# Patient Record
Sex: FEMALE | Race: WHITE | NOT HISPANIC OR LATINO
[De-identification: names, ages, dates, MRNs, and addresses within clinical notes are randomized per-mention and may not be internally consistent; named-entity substitution may affect disease eponyms.]

---

## 2013-04-03 RX ORDER — LOSARTAN/HYDROCHLOROTHIAZIDE 100MG-25MG
1 TABLET ORAL
Qty: 0 | Refills: 0 | COMMUNITY
Start: 2013-04-03

## 2013-04-03 RX ORDER — PREGABALIN 225 MG/1
1 CAPSULE ORAL
Qty: 0 | Refills: 0 | DISCHARGE
Start: 2013-04-03

## 2017-01-04 ENCOUNTER — APPOINTMENT (OUTPATIENT)
Dept: VASCULAR SURGERY | Facility: CLINIC | Age: 82
End: 2017-01-04

## 2017-01-04 VITALS
HEART RATE: 105 BPM | HEIGHT: 62 IN | WEIGHT: 149 LBS | SYSTOLIC BLOOD PRESSURE: 150 MMHG | TEMPERATURE: 97.6 F | DIASTOLIC BLOOD PRESSURE: 78 MMHG | BODY MASS INDEX: 27.42 KG/M2

## 2017-01-04 DIAGNOSIS — R60.0 LOCALIZED EDEMA: ICD-10-CM

## 2017-01-26 ENCOUNTER — LABORATORY RESULT (OUTPATIENT)
Age: 82
End: 2017-01-26

## 2017-01-26 ENCOUNTER — NON-APPOINTMENT (OUTPATIENT)
Age: 82
End: 2017-01-26

## 2017-01-26 ENCOUNTER — APPOINTMENT (OUTPATIENT)
Dept: CARDIOLOGY | Facility: CLINIC | Age: 82
End: 2017-01-26

## 2017-01-26 VITALS
HEART RATE: 96 BPM | WEIGHT: 147 LBS | DIASTOLIC BLOOD PRESSURE: 66 MMHG | HEIGHT: 62 IN | BODY MASS INDEX: 27.05 KG/M2 | OXYGEN SATURATION: 99 % | SYSTOLIC BLOOD PRESSURE: 120 MMHG

## 2017-01-26 DIAGNOSIS — R07.89 OTHER CHEST PAIN: ICD-10-CM

## 2017-01-26 DIAGNOSIS — K52.9 NONINFECTIVE GASTROENTERITIS AND COLITIS, UNSPECIFIED: ICD-10-CM

## 2017-01-27 ENCOUNTER — NON-APPOINTMENT (OUTPATIENT)
Age: 82
End: 2017-01-27

## 2017-01-27 LAB
25(OH)D3 SERPL-MCNC: 34.6 NG/ML
ALBUMIN SERPL ELPH-MCNC: 4.2 G/DL
ALP BLD-CCNC: 99 U/L
ALT SERPL-CCNC: 13 U/L
ANION GAP SERPL CALC-SCNC: 19 MMOL/L
AST SERPL-CCNC: 19 U/L
BASOPHILS # BLD AUTO: 0.05 K/UL
BASOPHILS NFR BLD AUTO: 0.7 %
BILIRUB SERPL-MCNC: 0.2 MG/DL
BUN SERPL-MCNC: 43 MG/DL
CALCIUM SERPL-MCNC: 10.1 MG/DL
CHLORIDE SERPL-SCNC: 100 MMOL/L
CHOLEST SERPL-MCNC: 224 MG/DL
CHOLEST/HDLC SERPL: 4.7 RATIO
CO2 SERPL-SCNC: 24 MMOL/L
CREAT SERPL-MCNC: 1.68 MG/DL
EOSINOPHIL # BLD AUTO: 0.18 K/UL
EOSINOPHIL NFR BLD AUTO: 2.4 %
FT4I SERPL CALC-MCNC: 7.2 INDEX
GLUCOSE SERPL-MCNC: 88 MG/DL
HBA1C MFR BLD HPLC: 6 %
HCT VFR BLD CALC: 32.6 %
HDLC SERPL-MCNC: 48 MG/DL
HGB BLD-MCNC: 10.2 G/DL
IMM GRANULOCYTES NFR BLD AUTO: 0.3 %
LDLC SERPL CALC-MCNC: 133 MG/DL
LYMPHOCYTES # BLD AUTO: 2.01 K/UL
LYMPHOCYTES NFR BLD AUTO: 27.3 %
MAN DIFF?: NORMAL
MCHC RBC-ENTMCNC: 25.5 PG
MCHC RBC-ENTMCNC: 31.3 GM/DL
MCV RBC AUTO: 81.5 FL
MONOCYTES # BLD AUTO: 0.76 K/UL
MONOCYTES NFR BLD AUTO: 10.3 %
NEUTROPHILS # BLD AUTO: 4.34 K/UL
NEUTROPHILS NFR BLD AUTO: 59 %
PLATELET # BLD AUTO: 357 K/UL
POTASSIUM SERPL-SCNC: 3.8 MMOL/L
PROT SERPL-MCNC: 7.3 G/DL
RBC # BLD: 4 M/UL
RBC # FLD: 13 %
SODIUM SERPL-SCNC: 143 MMOL/L
T3 SERPL-MCNC: 75 NG/DL
T3RU NFR SERPL: 1 INDEX
T4 SERPL-MCNC: 7.2 UG/DL
TRIGL SERPL-MCNC: 217 MG/DL
TSH SERPL-ACNC: 2.77 UIU/ML
WBC # FLD AUTO: 7.36 K/UL

## 2017-02-09 ENCOUNTER — APPOINTMENT (OUTPATIENT)
Dept: GASTROENTEROLOGY | Facility: CLINIC | Age: 82
End: 2017-02-09

## 2017-02-23 ENCOUNTER — APPOINTMENT (OUTPATIENT)
Dept: GASTROENTEROLOGY | Facility: CLINIC | Age: 82
End: 2017-02-23

## 2017-03-09 ENCOUNTER — APPOINTMENT (OUTPATIENT)
Dept: CARDIOLOGY | Facility: CLINIC | Age: 82
End: 2017-03-09

## 2017-03-13 ENCOUNTER — APPOINTMENT (OUTPATIENT)
Dept: VASCULAR SURGERY | Facility: CLINIC | Age: 82
End: 2017-03-13

## 2017-04-06 ENCOUNTER — RX RENEWAL (OUTPATIENT)
Age: 82
End: 2017-04-06

## 2017-06-07 ENCOUNTER — APPOINTMENT (OUTPATIENT)
Dept: VASCULAR SURGERY | Facility: CLINIC | Age: 82
End: 2017-06-07

## 2017-06-07 VITALS
TEMPERATURE: 98.2 F | WEIGHT: 145 LBS | BODY MASS INDEX: 26.68 KG/M2 | HEIGHT: 62 IN | SYSTOLIC BLOOD PRESSURE: 130 MMHG | HEART RATE: 69 BPM | DIASTOLIC BLOOD PRESSURE: 73 MMHG

## 2017-06-20 ENCOUNTER — APPOINTMENT (OUTPATIENT)
Dept: GASTROENTEROLOGY | Facility: CLINIC | Age: 82
End: 2017-06-20

## 2017-09-16 ENCOUNTER — RX RENEWAL (OUTPATIENT)
Age: 82
End: 2017-09-16

## 2017-10-30 ENCOUNTER — APPOINTMENT (OUTPATIENT)
Dept: CARDIOLOGY | Facility: CLINIC | Age: 82
End: 2017-10-30

## 2017-11-28 ENCOUNTER — APPOINTMENT (OUTPATIENT)
Dept: CARDIOLOGY | Facility: CLINIC | Age: 82
End: 2017-11-28

## 2017-12-07 ENCOUNTER — NON-APPOINTMENT (OUTPATIENT)
Age: 82
End: 2017-12-07

## 2017-12-07 ENCOUNTER — APPOINTMENT (OUTPATIENT)
Dept: CARDIOLOGY | Facility: CLINIC | Age: 82
End: 2017-12-07
Payer: MEDICARE

## 2017-12-07 VITALS
DIASTOLIC BLOOD PRESSURE: 80 MMHG | OXYGEN SATURATION: 100 % | WEIGHT: 150 LBS | SYSTOLIC BLOOD PRESSURE: 158 MMHG | BODY MASS INDEX: 27.6 KG/M2 | HEIGHT: 62 IN | HEART RATE: 93 BPM

## 2017-12-07 DIAGNOSIS — I35.9 NONRHEUMATIC AORTIC VALVE DISORDER, UNSPECIFIED: ICD-10-CM

## 2017-12-07 PROCEDURE — 93000 ELECTROCARDIOGRAM COMPLETE: CPT

## 2017-12-07 PROCEDURE — 99215 OFFICE O/P EST HI 40 MIN: CPT

## 2018-03-12 ENCOUNTER — INPATIENT (INPATIENT)
Facility: HOSPITAL | Age: 83
LOS: 3 days | Discharge: HOME | DRG: 309 | End: 2018-03-16
Attending: INTERNAL MEDICINE | Admitting: INTERNAL MEDICINE
Payer: MEDICARE

## 2018-03-12 VITALS
OXYGEN SATURATION: 97 % | SYSTOLIC BLOOD PRESSURE: 118 MMHG | TEMPERATURE: 99 F | HEART RATE: 135 BPM | WEIGHT: 151.9 LBS | RESPIRATION RATE: 18 BRPM | DIASTOLIC BLOOD PRESSURE: 78 MMHG

## 2018-03-12 DIAGNOSIS — R07.89 OTHER CHEST PAIN: ICD-10-CM

## 2018-03-12 DIAGNOSIS — I48.91 UNSPECIFIED ATRIAL FIBRILLATION: ICD-10-CM

## 2018-03-12 DIAGNOSIS — I10 ESSENTIAL (PRIMARY) HYPERTENSION: ICD-10-CM

## 2018-03-12 DIAGNOSIS — R06.09 OTHER FORMS OF DYSPNEA: ICD-10-CM

## 2018-03-12 DIAGNOSIS — Z98.89 OTHER SPECIFIED POSTPROCEDURAL STATES: Chronic | ICD-10-CM

## 2018-03-12 DIAGNOSIS — K21.9 GASTRO-ESOPHAGEAL REFLUX DISEASE WITHOUT ESOPHAGITIS: ICD-10-CM

## 2018-03-12 DIAGNOSIS — G47.00 INSOMNIA, UNSPECIFIED: ICD-10-CM

## 2018-03-12 LAB
ALBUMIN SERPL ELPH-MCNC: 4.3 G/DL — SIGNIFICANT CHANGE UP (ref 3.3–5)
ALP SERPL-CCNC: 119 U/L — SIGNIFICANT CHANGE UP (ref 40–120)
ALT FLD-CCNC: 75 U/L RC — HIGH (ref 10–45)
ANION GAP SERPL CALC-SCNC: 14 MMOL/L — SIGNIFICANT CHANGE UP (ref 5–17)
APTT BLD: 29.4 SEC — SIGNIFICANT CHANGE UP (ref 27.5–37.4)
AST SERPL-CCNC: 39 U/L — SIGNIFICANT CHANGE UP (ref 10–40)
BASOPHILS # BLD AUTO: 0.1 K/UL — SIGNIFICANT CHANGE UP (ref 0–0.2)
BASOPHILS NFR BLD AUTO: 0.8 % — SIGNIFICANT CHANGE UP (ref 0–2)
BILIRUB SERPL-MCNC: 0.2 MG/DL — SIGNIFICANT CHANGE UP (ref 0.2–1.2)
BUN SERPL-MCNC: 25 MG/DL — HIGH (ref 7–23)
CALCIUM SERPL-MCNC: 9.6 MG/DL — SIGNIFICANT CHANGE UP (ref 8.4–10.5)
CHLORIDE SERPL-SCNC: 104 MMOL/L — SIGNIFICANT CHANGE UP (ref 96–108)
CK MB CFR SERPL CALC: 2.4 NG/ML — SIGNIFICANT CHANGE UP (ref 0–3.8)
CK SERPL-CCNC: 65 U/L — SIGNIFICANT CHANGE UP (ref 25–170)
CO2 SERPL-SCNC: 24 MMOL/L — SIGNIFICANT CHANGE UP (ref 22–31)
CREAT SERPL-MCNC: 1.63 MG/DL — HIGH (ref 0.5–1.3)
EOSINOPHIL # BLD AUTO: 0.3 K/UL — SIGNIFICANT CHANGE UP (ref 0–0.5)
EOSINOPHIL NFR BLD AUTO: 3.8 % — SIGNIFICANT CHANGE UP (ref 0–6)
GLUCOSE SERPL-MCNC: 97 MG/DL — SIGNIFICANT CHANGE UP (ref 70–99)
HCT VFR BLD CALC: 38.3 % — SIGNIFICANT CHANGE UP (ref 34.5–45)
HGB BLD-MCNC: 13 G/DL — SIGNIFICANT CHANGE UP (ref 11.5–15.5)
INR BLD: 1.01 RATIO — SIGNIFICANT CHANGE UP (ref 0.88–1.16)
LYMPHOCYTES # BLD AUTO: 2.7 K/UL — SIGNIFICANT CHANGE UP (ref 1–3.3)
LYMPHOCYTES # BLD AUTO: 32.1 % — SIGNIFICANT CHANGE UP (ref 13–44)
MCHC RBC-ENTMCNC: 29.3 PG — SIGNIFICANT CHANGE UP (ref 27–34)
MCHC RBC-ENTMCNC: 33.9 GM/DL — SIGNIFICANT CHANGE UP (ref 32–36)
MCV RBC AUTO: 86.4 FL — SIGNIFICANT CHANGE UP (ref 80–100)
MONOCYTES # BLD AUTO: 1 K/UL — HIGH (ref 0–0.9)
MONOCYTES NFR BLD AUTO: 11.3 % — SIGNIFICANT CHANGE UP (ref 2–14)
NEUTROPHILS # BLD AUTO: 4.4 K/UL — SIGNIFICANT CHANGE UP (ref 1.8–7.4)
NEUTROPHILS NFR BLD AUTO: 52.1 % — SIGNIFICANT CHANGE UP (ref 43–77)
NT-PROBNP SERPL-SCNC: 8711 PG/ML — HIGH (ref 0–300)
PLATELET # BLD AUTO: 241 K/UL — SIGNIFICANT CHANGE UP (ref 150–400)
POTASSIUM SERPL-MCNC: 3.9 MMOL/L — SIGNIFICANT CHANGE UP (ref 3.5–5.3)
POTASSIUM SERPL-SCNC: 3.9 MMOL/L — SIGNIFICANT CHANGE UP (ref 3.5–5.3)
PROT SERPL-MCNC: 7.9 G/DL — SIGNIFICANT CHANGE UP (ref 6–8.3)
PROTHROM AB SERPL-ACNC: 11 SEC — SIGNIFICANT CHANGE UP (ref 9.8–12.7)
RBC # BLD: 4.44 M/UL — SIGNIFICANT CHANGE UP (ref 3.8–5.2)
RBC # FLD: 12.1 % — SIGNIFICANT CHANGE UP (ref 10.3–14.5)
SODIUM SERPL-SCNC: 142 MMOL/L — SIGNIFICANT CHANGE UP (ref 135–145)
TROPONIN T SERPL-MCNC: <0.01 NG/ML — SIGNIFICANT CHANGE UP (ref 0–0.06)
WBC # BLD: 8.4 K/UL — SIGNIFICANT CHANGE UP (ref 3.8–10.5)
WBC # FLD AUTO: 8.4 K/UL — SIGNIFICANT CHANGE UP (ref 3.8–10.5)

## 2018-03-12 PROCEDURE — 99223 1ST HOSP IP/OBS HIGH 75: CPT

## 2018-03-12 PROCEDURE — 71045 X-RAY EXAM CHEST 1 VIEW: CPT | Mod: 26

## 2018-03-12 PROCEDURE — 93010 ELECTROCARDIOGRAM REPORT: CPT

## 2018-03-12 PROCEDURE — 99291 CRITICAL CARE FIRST HOUR: CPT | Mod: 25

## 2018-03-12 RX ORDER — FUROSEMIDE 40 MG
40 TABLET ORAL DAILY
Qty: 0 | Refills: 0 | Status: DISCONTINUED | OUTPATIENT
Start: 2018-03-12 | End: 2018-03-15

## 2018-03-12 RX ORDER — AMITRIPTYLINE HCL 25 MG
12.5 TABLET ORAL AT BEDTIME
Qty: 0 | Refills: 0 | Status: DISCONTINUED | OUTPATIENT
Start: 2018-03-12 | End: 2018-03-16

## 2018-03-12 RX ORDER — CHOLESTYRAMINE 4 G/9G
4 POWDER, FOR SUSPENSION ORAL
Qty: 0 | Refills: 0 | Status: DISCONTINUED | OUTPATIENT
Start: 2018-03-12 | End: 2018-03-16

## 2018-03-12 RX ORDER — CHOLECALCIFEROL (VITAMIN D3) 125 MCG
1000 CAPSULE ORAL DAILY
Qty: 0 | Refills: 0 | Status: DISCONTINUED | OUTPATIENT
Start: 2018-03-12 | End: 2018-03-16

## 2018-03-12 RX ORDER — HEPARIN SODIUM 5000 [USP'U]/ML
INJECTION INTRAVENOUS; SUBCUTANEOUS
Qty: 25000 | Refills: 0 | Status: DISCONTINUED | OUTPATIENT
Start: 2018-03-12 | End: 2018-03-13

## 2018-03-12 RX ORDER — PANTOPRAZOLE SODIUM 20 MG/1
40 TABLET, DELAYED RELEASE ORAL
Qty: 0 | Refills: 0 | Status: DISCONTINUED | OUTPATIENT
Start: 2018-03-12 | End: 2018-03-16

## 2018-03-12 RX ORDER — ASPIRIN/CALCIUM CARB/MAGNESIUM 324 MG
1 TABLET ORAL
Qty: 0 | Refills: 0 | COMMUNITY

## 2018-03-12 RX ORDER — PREGABALIN 225 MG/1
100 CAPSULE ORAL DAILY
Qty: 0 | Refills: 0 | Status: DISCONTINUED | OUTPATIENT
Start: 2018-03-12 | End: 2018-03-16

## 2018-03-12 RX ORDER — HEPARIN SODIUM 5000 [USP'U]/ML
5500 INJECTION INTRAVENOUS; SUBCUTANEOUS ONCE
Qty: 0 | Refills: 0 | Status: DISCONTINUED | OUTPATIENT
Start: 2018-03-12 | End: 2018-03-12

## 2018-03-12 RX ORDER — HEPARIN SODIUM 5000 [USP'U]/ML
6000 INJECTION INTRAVENOUS; SUBCUTANEOUS ONCE
Qty: 0 | Refills: 0 | Status: COMPLETED | OUTPATIENT
Start: 2018-03-12 | End: 2018-03-12

## 2018-03-12 RX ORDER — HYDROCHLOROTHIAZIDE 25 MG
25 TABLET ORAL DAILY
Qty: 0 | Refills: 0 | Status: DISCONTINUED | OUTPATIENT
Start: 2018-03-12 | End: 2018-03-15

## 2018-03-12 RX ORDER — HEPARIN SODIUM 5000 [USP'U]/ML
3000 INJECTION INTRAVENOUS; SUBCUTANEOUS EVERY 6 HOURS
Qty: 0 | Refills: 0 | Status: DISCONTINUED | OUTPATIENT
Start: 2018-03-12 | End: 2018-03-14

## 2018-03-12 RX ORDER — HEPARIN SODIUM 5000 [USP'U]/ML
INJECTION INTRAVENOUS; SUBCUTANEOUS
Qty: 25000 | Refills: 0 | Status: DISCONTINUED | OUTPATIENT
Start: 2018-03-12 | End: 2018-03-12

## 2018-03-12 RX ORDER — ASPIRIN/CALCIUM CARB/MAGNESIUM 324 MG
81 TABLET ORAL DAILY
Qty: 0 | Refills: 0 | Status: DISCONTINUED | OUTPATIENT
Start: 2018-03-12 | End: 2018-03-16

## 2018-03-12 RX ORDER — HEPARIN SODIUM 5000 [USP'U]/ML
6000 INJECTION INTRAVENOUS; SUBCUTANEOUS EVERY 6 HOURS
Qty: 0 | Refills: 0 | Status: DISCONTINUED | OUTPATIENT
Start: 2018-03-12 | End: 2018-03-14

## 2018-03-12 RX ORDER — LOSARTAN POTASSIUM 100 MG/1
100 TABLET, FILM COATED ORAL DAILY
Qty: 0 | Refills: 0 | Status: DISCONTINUED | OUTPATIENT
Start: 2018-03-12 | End: 2018-03-15

## 2018-03-12 RX ORDER — HEPARIN SODIUM 5000 [USP'U]/ML
2500 INJECTION INTRAVENOUS; SUBCUTANEOUS EVERY 6 HOURS
Qty: 0 | Refills: 0 | Status: DISCONTINUED | OUTPATIENT
Start: 2018-03-12 | End: 2018-03-12

## 2018-03-12 RX ORDER — HEPARIN SODIUM 5000 [USP'U]/ML
5500 INJECTION INTRAVENOUS; SUBCUTANEOUS EVERY 6 HOURS
Qty: 0 | Refills: 0 | Status: DISCONTINUED | OUTPATIENT
Start: 2018-03-12 | End: 2018-03-12

## 2018-03-12 RX ADMIN — HEPARIN SODIUM 1300 UNIT(S)/HR: 5000 INJECTION INTRAVENOUS; SUBCUTANEOUS at 21:02

## 2018-03-12 RX ADMIN — HEPARIN SODIUM 6000 UNIT(S): 5000 INJECTION INTRAVENOUS; SUBCUTANEOUS at 21:02

## 2018-03-12 NOTE — H&P ADULT - PROBLEM SELECTOR PLAN 3
-on chart review was present 7 and 12/17 as OP visit to cardiology, at the time plan was for stress test  -recent echo 9/17, EF 52%. consider repeat to eval mitral valve

## 2018-03-12 NOTE — ED ADULT NURSE NOTE - PSH
Abdominal hernia  repair 2007  Bilateral cataracts  extraction w/ IOL  H/O carotid endarterectomy    History of lumbar laminectomy for spinal cord decompression  1995  Hx of tonsillectomy    Papilloma of breast  s/p excision from right breast >20 years ago  S/P MVR (mitral valve repair)  1997 at [facility]  Status post DACIA-BSO  1975  Varicose veins  s/p sclerotherapy bilaterally

## 2018-03-12 NOTE — H&P ADULT - PROBLEM SELECTOR PLAN 1
-at this time asymptomatic, no urgent need for aggressive rate control at this time  -c/w diltiazem 30 q6h for now, hold home toprol for now  -c/w heparin for now, will need A/c for CHADSVASC =5

## 2018-03-12 NOTE — H&P ADULT - HISTORY OF PRESENT ILLNESS
Ms Marshall is an 82 year old woman with history of HTN, HLD, non obstructive CAD on cath 9.2014, carotid artery stenosis s/p endarterectomy, Mitral valve repair 1997, CKD 3b, who presents with ARCHER x 4 days and HR 140s noted at home today. Started gradually 4 days prior, unable to walk more than a few steps or 1 flight of stairs. +intermittent palpitations. Intermittent chest pressure with radiation to R arm not worse with exertion, worse lying down, worse with eating. +episodes of sweating but no clear association with pain. Patient checks BP daily and noted today to have , HR 140s so came to ED.   Has intermittent LE swelling but not currently, no orthopnea, no SOB at rest. No current chest discomfort. No history of DVT/PE, flight to [place] 2/1-2/5.    Vital Signs Last 24 Hrs  T(C): 37.3 (12 Mar 2018 18:10), Max: 37.3 (12 Mar 2018 18:10)  T(F): 99.2 (12 Mar 2018 18:10), Max: 99.2 (12 Mar 2018 18:10)  HR: 109 (12 Mar 2018 21:54) (91 - 135)  BP: 111/78 (12 Mar 2018 21:54) (111/78 - 135/102)  BP(mean): --  RR: 23 (12 Mar 2018 21:54) (16 - 23)  SpO2: 97% (12 Mar 2018 21:54) (97% - 98%)    EKG with Afib with RVR without ischemic changes  received 10mg IVP diltiazem followed by 30mg PO with improvement in HR to 110-120  CXR clear

## 2018-03-12 NOTE — ED PROVIDER NOTE - OBJECTIVE STATEMENT
Attendinyo female presents with shortness of breath and chest pressure since Friday.   Symptoms were intermittent.  Pt states that currently she has no symptoms and feels comfortable.  As far as she knows she does not have A fib.  No blood thinners.    Pt had her metoprolol from 50 to 200 about 2 or 3 weeks ago. Attendinyo female presents with shortness of breath and chest pressure since Friday.   Symptoms were intermittent.  Pt states that currently she has no symptoms and feels comfortable.  As far as she knows she does not have A fib.  No blood thinners.    Pt had her metoprolol from 50 to 200 about 2 or 3 weeks ago.    PA: patient is an 82 year old female with hx of HTN, MVP, HLD who presents to the ED for SOB since friday. states it has been constant since and associated with some chest pressure. home pulses have been very elevated. takes asa 81 and states she was on metoprolol 50 until about 2 weeks ago which was increased to 200

## 2018-03-12 NOTE — ED ADULT NURSE REASSESSMENT NOTE - NS ED NURSE REASSESS COMMENT FT1
Pt lying on assigned stretcher shows no signs of any distress, no pain noted & VS WNL. Awaiting proper disposition. Safety maintained & continue monitor.

## 2018-03-12 NOTE — H&P ADULT - PROBLEM SELECTOR PLAN 2
-atypical, possibly anginal, EKG without signs of ischemia, trop neg x1  -non obstructive CAD 9/14  -should have repeat stress test  -trend trop, tele  -wells score=1 for PE (low risk)

## 2018-03-12 NOTE — ED PROVIDER NOTE - PSH
Abdominal hernia  repair 2007  Bilateral cataracts  extraction w/ IOL  H/O carotid endarterectomy    History of lumbar laminectomy for spinal cord decompression  1995  Hx of tonsillectomy    Papilloma of breast  s/p excision from right breast >20 years ago  S/P MVR (mitral valve repair)  1997 at [facility]  Status post DACIA-BSO  1975  Varicose veins  s/p sclerotherapy bilaterally

## 2018-03-12 NOTE — ED PROVIDER NOTE - NS ED ATTENDING STATEMENT MOD
I have personally performed a face to face diagnostic evaluation on this patient. I have reviewed the ACP note and agree with the history, exam and plan of care, except as noted.
walking

## 2018-03-12 NOTE — ED ADULT NURSE NOTE - OBJECTIVE STATEMENT
82 yr old female to ed ems to ed . Pt awake alert and orientedx3 Resp even and nonlab C/o sob since Friday intermittently. Denies chest pain. EKG done  and irreg. On ASA Has h/o mitral valve repair. Denies n/v Denies abd pain has H/O HTN and HLD . On CM

## 2018-03-12 NOTE — H&P ADULT - ASSESSMENT
Ms Marshall is an 82 year old woman with history of HTN, HLD, non obstructive CAD on cath 9.2014, carotid artery stenosis s/p endarterectomy, Mitral valve repair 1997, CKD 3b, who presents with ARCHER x 4 days and HR 140s noted at home today.

## 2018-03-12 NOTE — H&P ADULT - NSHPREVIEWOFSYSTEMS_GEN_ALL_CORE
REVIEW OF SYSTEMS:    CONSTITUTIONAL: No weakness, fevers or chills.  No sick contacts  HEENT: no blurry vision, sore throat  NECK: No pain or stiffness  RESPIRATORY: No cough, wheezing, hemoptysis; No current shortness of breath  CARDIOVASCULAR: No current chest pain or palpitations, No orthopnea, PND  GASTROINTESTINAL: No abdominal or epigastric pain. No nausea, vomiting, or hematemesis; No diarrhea or constipation. No melena or hematochezia.  GENITOURINARY: No dysuria, frequency or hematuria  MSK: No joint pains or swelling  SKIN: No rashes

## 2018-03-12 NOTE — ED ADULT NURSE NOTE - PMH
Carotid artery occlusion  (R)  GERD (gastroesophageal reflux disease)    HTN (hypertension)    Hypercholesteremia    Insomnia    Mitral valve disease    Osteoporosis    Vitamin B 12 deficiency

## 2018-03-12 NOTE — H&P ADULT - FAMILY HISTORY
Family history of dementia, mother     Grandparent  Still living? No  Family history of lung cancer, Age at diagnosis: Age Unknown

## 2018-03-12 NOTE — H&P ADULT - PSH
Abdominal hernia  repair 2007  Bilateral cataracts  extraction w/ IOL  H/O carotid endarterectomy    History of lumbar laminectomy for spinal cord decompression  1995  Hx of tonsillectomy    Papilloma of breast  s/p excision from right breast >20 years ago  S/P MVR (mitral valve repair)  1997 at [facility]  Status post DACIA-BSO  1975  Varicose veins  s/p sclerotherapy bilaterally

## 2018-03-12 NOTE — H&P ADULT - NSHPSOCIALHISTORY_GEN_ALL_CORE
Tobacco use: former  EtOH use: 4 drinks wine/day  Illicit drug use: no    Living situation: lives alone

## 2018-03-12 NOTE — ED PROVIDER NOTE - CRITICAL CARE PROVIDED
additional history taking/documentation/direct patient care (not related to procedure)/consult w/ pt's family directly relating to pts condition/interpretation of diagnostic studies/consultation with other physicians

## 2018-03-12 NOTE — ED PROVIDER NOTE - PROGRESS NOTE DETAILS
spoke with patients covering cardiologist Dr. Dooley who states no access to patients chart unsure if patient has hx of afib. requests admission to [facility]

## 2018-03-13 LAB
ANION GAP SERPL CALC-SCNC: 17 MMOL/L — SIGNIFICANT CHANGE UP (ref 5–17)
APTT BLD: 143.4 SEC — CRITICAL HIGH (ref 27.5–37.4)
APTT BLD: > 200 SEC (ref 27.5–37.4)
BASOPHILS # BLD AUTO: 0.1 K/UL — SIGNIFICANT CHANGE UP (ref 0–0.2)
BASOPHILS NFR BLD AUTO: 0.9 % — SIGNIFICANT CHANGE UP (ref 0–2)
BUN SERPL-MCNC: 27 MG/DL — HIGH (ref 7–23)
CALCIUM SERPL-MCNC: 8.8 MG/DL — SIGNIFICANT CHANGE UP (ref 8.4–10.5)
CHLORIDE SERPL-SCNC: 105 MMOL/L — SIGNIFICANT CHANGE UP (ref 96–108)
CO2 SERPL-SCNC: 20 MMOL/L — LOW (ref 22–31)
CREAT SERPL-MCNC: 1.5 MG/DL — HIGH (ref 0.5–1.3)
EOSINOPHIL # BLD AUTO: 0.3 K/UL — SIGNIFICANT CHANGE UP (ref 0–0.5)
EOSINOPHIL NFR BLD AUTO: 3.8 % — SIGNIFICANT CHANGE UP (ref 0–6)
GLUCOSE SERPL-MCNC: 104 MG/DL — HIGH (ref 70–99)
HCT VFR BLD CALC: 34.9 % — SIGNIFICANT CHANGE UP (ref 34.5–45)
HGB BLD-MCNC: 11.7 G/DL — SIGNIFICANT CHANGE UP (ref 11.5–15.5)
LYMPHOCYTES # BLD AUTO: 3.2 K/UL — SIGNIFICANT CHANGE UP (ref 1–3.3)
LYMPHOCYTES # BLD AUTO: 37.9 % — SIGNIFICANT CHANGE UP (ref 13–44)
MAGNESIUM SERPL-MCNC: 1.6 MG/DL — SIGNIFICANT CHANGE UP (ref 1.6–2.6)
MCHC RBC-ENTMCNC: 29.1 PG — SIGNIFICANT CHANGE UP (ref 27–34)
MCHC RBC-ENTMCNC: 33.5 GM/DL — SIGNIFICANT CHANGE UP (ref 32–36)
MCV RBC AUTO: 86.9 FL — SIGNIFICANT CHANGE UP (ref 80–100)
MONOCYTES # BLD AUTO: 0.9 K/UL — SIGNIFICANT CHANGE UP (ref 0–0.9)
MONOCYTES NFR BLD AUTO: 10.4 % — SIGNIFICANT CHANGE UP (ref 2–14)
NEUTROPHILS # BLD AUTO: 4 K/UL — SIGNIFICANT CHANGE UP (ref 1.8–7.4)
NEUTROPHILS NFR BLD AUTO: 47.1 % — SIGNIFICANT CHANGE UP (ref 43–77)
PHOSPHATE SERPL-MCNC: 4.1 MG/DL — SIGNIFICANT CHANGE UP (ref 2.5–4.5)
PLATELET # BLD AUTO: 197 K/UL — SIGNIFICANT CHANGE UP (ref 150–400)
POTASSIUM SERPL-MCNC: 3.9 MMOL/L — SIGNIFICANT CHANGE UP (ref 3.5–5.3)
POTASSIUM SERPL-SCNC: 3.9 MMOL/L — SIGNIFICANT CHANGE UP (ref 3.5–5.3)
RBC # BLD: 4.01 M/UL — SIGNIFICANT CHANGE UP (ref 3.8–5.2)
RBC # FLD: 12 % — SIGNIFICANT CHANGE UP (ref 10.3–14.5)
SODIUM SERPL-SCNC: 142 MMOL/L — SIGNIFICANT CHANGE UP (ref 135–145)
TROPONIN T SERPL-MCNC: <0.01 NG/ML — SIGNIFICANT CHANGE UP (ref 0–0.06)
TROPONIN T SERPL-MCNC: <0.01 NG/ML — SIGNIFICANT CHANGE UP (ref 0–0.06)
WBC # BLD: 8.4 K/UL — SIGNIFICANT CHANGE UP (ref 3.8–10.5)
WBC # FLD AUTO: 8.4 K/UL — SIGNIFICANT CHANGE UP (ref 3.8–10.5)

## 2018-03-13 PROCEDURE — 99223 1ST HOSP IP/OBS HIGH 75: CPT

## 2018-03-13 RX ORDER — HEPARIN SODIUM 5000 [USP'U]/ML
1100 INJECTION INTRAVENOUS; SUBCUTANEOUS
Qty: 25000 | Refills: 0 | Status: DISCONTINUED | OUTPATIENT
Start: 2018-03-13 | End: 2018-03-14

## 2018-03-13 RX ADMIN — HEPARIN SODIUM 0 UNIT(S)/HR: 5000 INJECTION INTRAVENOUS; SUBCUTANEOUS at 19:41

## 2018-03-13 RX ADMIN — PANTOPRAZOLE SODIUM 40 MILLIGRAM(S): 20 TABLET, DELAYED RELEASE ORAL at 06:42

## 2018-03-13 RX ADMIN — HEPARIN SODIUM 1100 UNIT(S)/HR: 5000 INJECTION INTRAVENOUS; SUBCUTANEOUS at 12:05

## 2018-03-13 RX ADMIN — Medication 40 MILLIGRAM(S): at 06:43

## 2018-03-13 RX ADMIN — Medication 25 MILLIGRAM(S): at 06:42

## 2018-03-13 RX ADMIN — Medication 1000 UNIT(S): at 12:06

## 2018-03-13 RX ADMIN — PREGABALIN 100 MICROGRAM(S): 225 CAPSULE ORAL at 12:04

## 2018-03-13 RX ADMIN — HEPARIN SODIUM 0 UNIT(S)/HR: 5000 INJECTION INTRAVENOUS; SUBCUTANEOUS at 07:02

## 2018-03-13 RX ADMIN — CHOLESTYRAMINE 4 GRAM(S): 4 POWDER, FOR SUSPENSION ORAL at 10:50

## 2018-03-13 RX ADMIN — HEPARIN SODIUM 900 UNIT(S)/HR: 5000 INJECTION INTRAVENOUS; SUBCUTANEOUS at 20:59

## 2018-03-13 RX ADMIN — LOSARTAN POTASSIUM 100 MILLIGRAM(S): 100 TABLET, FILM COATED ORAL at 06:43

## 2018-03-13 RX ADMIN — Medication 12.5 MILLIGRAM(S): at 00:11

## 2018-03-13 RX ADMIN — Medication 12.5 MILLIGRAM(S): at 23:12

## 2018-03-13 RX ADMIN — Medication 81 MILLIGRAM(S): at 12:05

## 2018-03-13 NOTE — PROVIDER CONTACT NOTE (CRITICAL VALUE NOTIFICATION) - ACTION/TREATMENT ORDERED:
stop gtt for 1 hr as per heparin gtt nomogram stop gtt for 1 hr as per heparin gtt nomogram restart at 11cc/hr

## 2018-03-13 NOTE — CHART NOTE - NSCHARTNOTEFT_GEN_A_CORE
MEDICINE MORENITA BANKS  Patient is a 82 year old female who presents with a chief complaint of dyspnea on exertion. (12 Mar 2018 22:49)       Event Summary:  Called by RN to report patient with prolonged PTT of 143.4.      COAGULATION PANEL:  PT/INR - ( 12 Mar 2018 19:33 )   PT: 11.0 sec;   INR: 1.01 ratio     PTT - ( 13 Mar 2018 18:43 )  PTT:143.4 sec      PLAN: Prolonged PTT  - Heparin nomogram followed - Heparin gtt held for 1 hour and rate decreased by 200 units/hr  - No signs of active bleeding   - Confirmed with RN that patient's weight is accurate  - Continue to monitor PTT  - Will continue to monitor closely for any signs of bleeding  - Primary team to follow up in AM      #[id number]  Ksenia Barone PA-C  Medicine Department MEDICINE MORENITA BANKS  Patient is a 82 year old female who presents with a chief complaint of dyspnea on exertion. (12 Mar 2018 22:49)       Event Summary:  Called by RN to report patient with prolonged PTT of 143.4.      COAGULATION PANEL:  PT/INR - ( 12 Mar 2018 19:33 )   PT: 11.0 sec;   INR: 1.01 ratio     PTT - ( 13 Mar 2018 18:43 )  PTT:143.4 sec      PLAN: Prolonged PTT  - Heparin nomogram followed - Heparin gtt held for 1 hour and rate decreased by 200 units/hr  - No signs of active bleeding   - Confirmed with RN that patient's weight is accurate  - Continue to monitor PTT and CBC  - Will continue to monitor closely for any signs of bleeding  - Primary team to follow up in AM      #[id number]  Ksenia Barone PA-C  Medicine Department

## 2018-03-13 NOTE — CONSULT NOTE ADULT - ASSESSMENT
82 year-old woman with history as above presents with new onset atrial fibrillation. Ruled out for ACS by CE negative x2.  Continue rate control.  Continue diuresis. Keep O > I, K > 4.0, Mag > 2.0  Check daily standing weights.    Ask EP to consult for cardioversion.  Plan for YULIA/DCCV.  Would keep NPO after midnight.

## 2018-03-14 LAB
ANION GAP SERPL CALC-SCNC: 17 MMOL/L — SIGNIFICANT CHANGE UP (ref 5–17)
APTT BLD: 155.5 SEC — CRITICAL HIGH (ref 27.5–37.4)
APTT BLD: 64.4 SEC — HIGH (ref 27.5–37.4)
BUN SERPL-MCNC: 24 MG/DL — HIGH (ref 7–23)
CALCIUM SERPL-MCNC: 9 MG/DL — SIGNIFICANT CHANGE UP (ref 8.4–10.5)
CHLORIDE SERPL-SCNC: 104 MMOL/L — SIGNIFICANT CHANGE UP (ref 96–108)
CO2 SERPL-SCNC: 23 MMOL/L — SIGNIFICANT CHANGE UP (ref 22–31)
CREAT SERPL-MCNC: 1.43 MG/DL — HIGH (ref 0.5–1.3)
GLUCOSE SERPL-MCNC: 109 MG/DL — HIGH (ref 70–99)
HCT VFR BLD CALC: 37.2 % — SIGNIFICANT CHANGE UP (ref 34.5–45)
HGB BLD-MCNC: 11.6 G/DL — SIGNIFICANT CHANGE UP (ref 11.5–15.5)
MCHC RBC-ENTMCNC: 27.6 PG — SIGNIFICANT CHANGE UP (ref 27–34)
MCHC RBC-ENTMCNC: 31.2 GM/DL — LOW (ref 32–36)
MCV RBC AUTO: 88.6 FL — SIGNIFICANT CHANGE UP (ref 80–100)
NRBC # BLD: 0 /100 WBCS — SIGNIFICANT CHANGE UP (ref 0–0)
PLATELET # BLD AUTO: 233 K/UL — SIGNIFICANT CHANGE UP (ref 150–400)
POTASSIUM SERPL-MCNC: 3.7 MMOL/L — SIGNIFICANT CHANGE UP (ref 3.5–5.3)
POTASSIUM SERPL-SCNC: 3.7 MMOL/L — SIGNIFICANT CHANGE UP (ref 3.5–5.3)
RBC # BLD: 4.2 M/UL — SIGNIFICANT CHANGE UP (ref 3.8–5.2)
RBC # FLD: 13.9 % — SIGNIFICANT CHANGE UP (ref 10.3–14.5)
SODIUM SERPL-SCNC: 144 MMOL/L — SIGNIFICANT CHANGE UP (ref 135–145)
TSH SERPL-MCNC: 3.12 UIU/ML — SIGNIFICANT CHANGE UP (ref 0.27–4.2)
WBC # BLD: 8.29 K/UL — SIGNIFICANT CHANGE UP (ref 3.8–10.5)
WBC # FLD AUTO: 8.29 K/UL — SIGNIFICANT CHANGE UP (ref 3.8–10.5)

## 2018-03-14 PROCEDURE — 93306 TTE W/DOPPLER COMPLETE: CPT | Mod: 26

## 2018-03-14 PROCEDURE — 93010 ELECTROCARDIOGRAM REPORT: CPT

## 2018-03-14 PROCEDURE — 99233 SBSQ HOSP IP/OBS HIGH 50: CPT

## 2018-03-14 PROCEDURE — 99222 1ST HOSP IP/OBS MODERATE 55: CPT

## 2018-03-14 RX ORDER — AMIODARONE HYDROCHLORIDE 400 MG/1
200 TABLET ORAL
Qty: 0 | Refills: 0 | Status: DISCONTINUED | OUTPATIENT
Start: 2018-03-14 | End: 2018-03-16

## 2018-03-14 RX ORDER — AMIODARONE HYDROCHLORIDE 400 MG/1
200 TABLET ORAL DAILY
Qty: 0 | Refills: 0 | Status: CANCELLED | OUTPATIENT
Start: 2018-03-29 | End: 2018-03-16

## 2018-03-14 RX ORDER — APIXABAN 2.5 MG/1
5 TABLET, FILM COATED ORAL EVERY 12 HOURS
Qty: 0 | Refills: 0 | Status: DISCONTINUED | OUTPATIENT
Start: 2018-03-14 | End: 2018-03-16

## 2018-03-14 RX ADMIN — Medication 40 MILLIGRAM(S): at 06:44

## 2018-03-14 RX ADMIN — AMIODARONE HYDROCHLORIDE 200 MILLIGRAM(S): 400 TABLET ORAL at 17:28

## 2018-03-14 RX ADMIN — Medication 12.5 MILLIGRAM(S): at 22:10

## 2018-03-14 RX ADMIN — PREGABALIN 100 MICROGRAM(S): 225 CAPSULE ORAL at 08:25

## 2018-03-14 RX ADMIN — CHOLESTYRAMINE 4 GRAM(S): 4 POWDER, FOR SUSPENSION ORAL at 08:23

## 2018-03-14 RX ADMIN — HEPARIN SODIUM 700 UNIT(S)/HR: 5000 INJECTION INTRAVENOUS; SUBCUTANEOUS at 04:30

## 2018-03-14 RX ADMIN — Medication 25 MILLIGRAM(S): at 06:44

## 2018-03-14 RX ADMIN — HEPARIN SODIUM 700 UNIT(S)/HR: 5000 INJECTION INTRAVENOUS; SUBCUTANEOUS at 11:00

## 2018-03-14 RX ADMIN — APIXABAN 5 MILLIGRAM(S): 2.5 TABLET, FILM COATED ORAL at 17:40

## 2018-03-14 RX ADMIN — PANTOPRAZOLE SODIUM 40 MILLIGRAM(S): 20 TABLET, DELAYED RELEASE ORAL at 06:44

## 2018-03-14 RX ADMIN — LOSARTAN POTASSIUM 100 MILLIGRAM(S): 100 TABLET, FILM COATED ORAL at 06:44

## 2018-03-14 RX ADMIN — Medication 1000 UNIT(S): at 08:25

## 2018-03-14 RX ADMIN — HEPARIN SODIUM 0 UNIT(S)/HR: 5000 INJECTION INTRAVENOUS; SUBCUTANEOUS at 03:28

## 2018-03-14 RX ADMIN — Medication 81 MILLIGRAM(S): at 08:25

## 2018-03-14 NOTE — CONSULT NOTE ADULT - ASSESSMENT
81 y/o F with hx HTN, HLD, non obstructive CAD on cath 9/2014, carotid artery stenosis s/p endarterectomy, Mitral valve repair 1997, CKD 3b presenting with afib now with conversion pause of 4.4 seconds overnight.    -- currently in sinus rhythm  -- continue diltiazem  -- anticoagulate  -- no indication for PPM, tolerate up to 5 second pauses in atrial fibrillation  -- if increasing pauses or symptomatic please notify    Jatinder Mcwilliams MD 81 y/o F with hx HTN, HLD, non obstructive CAD on cath 9/2014, carotid artery stenosis s/p endarterectomy, Mitral valve repair 1997, CKD 3b presenting with afib now with conversion pause of 4.4 seconds overnight.    -- currently in sinus rhythm  -- Begin amiodarone 200mg po bid x 2 weeks, then 200mg daily  -- anticoagulate  -- no indication for PPM, tolerate up to 5 second pauses in atrial fibrillation  -- if increasing pauses or symptomatic please notify    Jatinder Mcwilliams MD

## 2018-03-14 NOTE — CONSULT NOTE ADULT - ATTENDING COMMENTS
Agree with above fellow's note. 82 year old woman with above history and newly diagnosed symptomatic CHADSVASC 5 paroxysmal AF. She notes frequent episodes of palpitations at home lasting minutes to hours but no history of sustained arrhythmia until this incident event. Distant history of syncopal episode >5 years ago but no recent episodes of dizziness/LOC. Experienced 4.5 s conversion pause last night while urinating with brief episode of dizziness. Had received IV then PO diltiazem beforehand for rate control. Sinus rhythm in 70s-80s since.    - Newly diagnosed CHADSVASC 5 pAF with long conversion pause back to sinus rhythm.  - Suggest anti-arrhythmic therapy with amiodarone in effort to maintain sinus rhythm and avoid further pauses. Load as above. Discontinue diltiazem.  - Assuming medication is tolerated, she is ok for discharge tomorrow after arranging for 2 week event monitoring to assess for further pause.  - Agree with NOAC  - Outpatient f/u in 2-4 weeks.

## 2018-03-14 NOTE — CHART NOTE - NSCHARTNOTEFT_GEN_A_CORE
MEDICINE MORENITA BANKS  Patient is an 82 year old female who presents with a chief complaint of dyspnea on exertion. (12 Mar 2018 22:49)       Event Summary:  Called by RN to report patient noted with 4.4 second pause on tele monitor.  Patient seen and examined at the bedside.  She reports feeling a brief second of lightheadedness while using the restroom but states that it has now resolved and that she "feels fine now."  Patient does not report any other symptoms at this time.  Denies dizziness, chest pain, palpitations, shortness of breath, abdominal pain, nausea, vomiting and diarrhea.      Vital Signs Last 24 Hrs  T(C): 36.5 (13 Mar 2018 20:14), Max: 36.7 (13 Mar 2018 04:57)  T(F): 97.7 (13 Mar 2018 20:14), Max: 98 (13 Mar 2018 04:57)  HR: 69 (14 Mar 2018 02:15) (69 - 134)  BP: 124/63 (14 Mar 2018 02:15) (107/70 - 126/63)  RR: 18 (13 Mar 2018 20:14) (16 - 18)  SpO2: 97% (13 Mar 2018 20:14) (95% - 100%)      CHEM:  142  |  105  |  27<H>  ----------------------------<  104<H>  3.9   |  20<L>  |  1.50<H>    Ca    8.8      13 Mar 2018 05:54  Phos  4.1     03-13  Mg     1.6     03-13    TPro  7.9  /  Alb  4.3  /  TBili  0.2  /  DBili  x   /  AST  39  /  ALT  75<H>  /  AlkPhos  119  03-12      PHYSICAL EXAM:  GENERAL: Laying down, in no acute distress  PSYCH: AAOx3  HEAD:  Atraumatic, Normocephalic  HEART: +S1/S2.  Regular rate and rhythm.  No murmurs, rubs or gallops  CHEST/LUNG: Breath sounds clear to auscultation bilaterally.  No wheezes, rales, rhonchi or crackles  ABDOMEN: Bowel sounds present in all 4 quadrants.  Soft, Nontender, Nondistended      HPI:  Patient is an 82 year old female with a PMHx of HTN, HLD, non obstructive CAD (cath done 9/2014), carotid artery stenosis (S/P endarterectomy), mitral valve repair (1997) and CKD 3B who presented with dyspnea on exertion x4 days and HR in 140s noted at home. (12 Mar 2018 22:49)  Now with 4.4 second pause on tele monitor.      PLAN: Pause on tele monitor  - R2 pads placed on patient at the bedside  - EKG showing normal sinus rhythm @65 bpm  - Can consider decreasing dose of Cardizem - will discuss with attending  - Cardiology following patient  - Follow up with BMP in AM  - Will continue to monitor closely  - Primary team to follow up in AM      #[id number]  Ksenia Barone PA-C  Medicine Department MEDICINE PA - LATE NOTE ENTRY  3/14/18 @02:30      MORENITA ROSAS  Patient is an 82 year old female who presents with a chief complaint of dyspnea on exertion. (12 Mar 2018 22:49)       Event Summary:  Called by RN to report patient noted with 4.4 second pause on tele monitor.  Patient seen and examined at the bedside.  She reports feeling a brief second of lightheadedness while using the restroom but states that it has now resolved and that she "feels fine now."  Patient does not report any other symptoms at this time.  Denies dizziness, chest pain, palpitations, shortness of breath, abdominal pain, nausea, vomiting and diarrhea.      Vital Signs Last 24 Hrs  T(C): 36.5 (13 Mar 2018 20:14), Max: 36.7 (13 Mar 2018 04:57)  T(F): 97.7 (13 Mar 2018 20:14), Max: 98 (13 Mar 2018 04:57)  HR: 69 (14 Mar 2018 02:15) (69 - 134)  BP: 124/63 (14 Mar 2018 02:15) (107/70 - 126/63)  RR: 18 (13 Mar 2018 20:14) (16 - 18)  SpO2: 97% (13 Mar 2018 20:14) (95% - 100%)      CHEM:  142  |  105  |  27<H>  ----------------------------<  104<H>  3.9   |  20<L>  |  1.50<H>    Ca    8.8      13 Mar 2018 05:54  Phos  4.1     03-13  Mg     1.6     03-13    TPro  7.9  /  Alb  4.3  /  TBili  0.2  /  DBili  x   /  AST  39  /  ALT  75<H>  /  AlkPhos  119  03-12      PHYSICAL EXAM:  GENERAL: Laying down, in no acute distress  PSYCH: AAOx3  HEAD:  Atraumatic, Normocephalic  HEART: +S1/S2.  Regular rate and rhythm.  No murmurs, rubs or gallops  CHEST/LUNG: Breath sounds clear to auscultation bilaterally.  No wheezes, rales, rhonchi or crackles  ABDOMEN: Bowel sounds present in all 4 quadrants.  Soft, Nontender, Nondistended      HPI:  Patient is an 82 year old female with a PMHx of HTN, HLD, non obstructive CAD (cath done 9/2014), carotid artery stenosis (S/P endarterectomy), mitral valve repair (1997) and CKD 3B who presented with dyspnea on exertion x4 days and HR in 140s noted at home. (12 Mar 2018 22:49)  Now with 4.4 second pause on tele monitor.      PLAN: Pause on tele monitor  - R2 pads placed on patient at the bedside  - EKG showing normal sinus rhythm @65 bpm  - Can consider decreasing dose of Cardizem - will discuss with attending  - Cardiology following patient  - Follow up with BMP in AM  - Will continue to monitor closely  - Primary team to follow up in AM      #[id number]  Ksenia Barone PA-C  Medicine Department

## 2018-03-14 NOTE — PROVIDER CONTACT NOTE (CRITICAL VALUE NOTIFICATION) - BACKGROUND
3/12 admit with ARCHER & new onset Afib 's
3/12 ARCHER new onset Afib  3/14 4.4 sec pause, convert to NSR
3/12 ARCHER new onset afib rate 's

## 2018-03-14 NOTE — PROVIDER CONTACT NOTE (CRITICAL VALUE NOTIFICATION) - ACTION/TREATMENT ORDERED:
as per full anticoag heparin gtt nomogram stop gtt for 1 hr restart 11cc/hr as per full anticoag heparin gtt nomogram stop gtt for 1 hr restart 9 cc/hr

## 2018-03-15 LAB
ANION GAP SERPL CALC-SCNC: 12 MMOL/L — SIGNIFICANT CHANGE UP (ref 5–17)
APPEARANCE UR: ABNORMAL
BACTERIA # UR AUTO: ABNORMAL
BILIRUB UR-MCNC: NEGATIVE — SIGNIFICANT CHANGE UP
BUN SERPL-MCNC: 27 MG/DL — HIGH (ref 7–23)
CALCIUM SERPL-MCNC: 9 MG/DL — SIGNIFICANT CHANGE UP (ref 8.4–10.5)
CHLORIDE SERPL-SCNC: 105 MMOL/L — SIGNIFICANT CHANGE UP (ref 96–108)
CO2 SERPL-SCNC: 27 MMOL/L — SIGNIFICANT CHANGE UP (ref 22–31)
COLOR SPEC: YELLOW — SIGNIFICANT CHANGE UP
CREAT SERPL-MCNC: 1.7 MG/DL — HIGH (ref 0.5–1.3)
DIFF PNL FLD: ABNORMAL
EPI CELLS # UR: 1 /HPF — SIGNIFICANT CHANGE UP (ref 0–5)
GLUCOSE SERPL-MCNC: 104 MG/DL — HIGH (ref 70–99)
GLUCOSE UR QL: NEGATIVE MG/DL — SIGNIFICANT CHANGE UP
HCT VFR BLD CALC: 37.1 % — SIGNIFICANT CHANGE UP (ref 34.5–45)
HGB BLD-MCNC: 11 G/DL — LOW (ref 11.5–15.5)
HYALINE CASTS # UR AUTO: 4 /LPF — SIGNIFICANT CHANGE UP (ref 0–7)
KETONES UR-MCNC: NEGATIVE — SIGNIFICANT CHANGE UP
LEUKOCYTE ESTERASE UR-ACNC: ABNORMAL
MAGNESIUM SERPL-MCNC: 1.5 MG/DL — LOW (ref 1.6–2.6)
MCHC RBC-ENTMCNC: 25.9 PG — LOW (ref 27–34)
MCHC RBC-ENTMCNC: 29.6 GM/DL — LOW (ref 32–36)
MCV RBC AUTO: 87.5 FL — SIGNIFICANT CHANGE UP (ref 80–100)
NITRITE UR-MCNC: POSITIVE
NRBC # BLD: 0 /100 WBCS — SIGNIFICANT CHANGE UP (ref 0–0)
PH UR: 5 — SIGNIFICANT CHANGE UP (ref 5–8)
PHOSPHATE SERPL-MCNC: 3.4 MG/DL — SIGNIFICANT CHANGE UP (ref 2.5–4.5)
PLATELET # BLD AUTO: 227 K/UL — SIGNIFICANT CHANGE UP (ref 150–400)
POTASSIUM SERPL-MCNC: 3.9 MMOL/L — SIGNIFICANT CHANGE UP (ref 3.5–5.3)
POTASSIUM SERPL-SCNC: 3.9 MMOL/L — SIGNIFICANT CHANGE UP (ref 3.5–5.3)
PROT UR-MCNC: NEGATIVE MG/DL — SIGNIFICANT CHANGE UP
RBC # BLD: 4.24 M/UL — SIGNIFICANT CHANGE UP (ref 3.8–5.2)
RBC # FLD: 14 % — SIGNIFICANT CHANGE UP (ref 10.3–14.5)
RBC CASTS # UR COMP ASSIST: 2 /HPF — SIGNIFICANT CHANGE UP (ref 0–4)
SODIUM SERPL-SCNC: 144 MMOL/L — SIGNIFICANT CHANGE UP (ref 135–145)
SP GR SPEC: 1.02 — SIGNIFICANT CHANGE UP (ref 1.01–1.02)
UROBILINOGEN FLD QL: NEGATIVE MG/DL — SIGNIFICANT CHANGE UP
WBC # BLD: 7.03 K/UL — SIGNIFICANT CHANGE UP (ref 3.8–10.5)
WBC # FLD AUTO: 7.03 K/UL — SIGNIFICANT CHANGE UP (ref 3.8–10.5)
WBC UR QL: 159 /HPF — HIGH (ref 0–5)

## 2018-03-15 PROCEDURE — 99233 SBSQ HOSP IP/OBS HIGH 50: CPT

## 2018-03-15 RX ORDER — SENNA PLUS 8.6 MG/1
2 TABLET ORAL AT BEDTIME
Qty: 0 | Refills: 0 | Status: DISCONTINUED | OUTPATIENT
Start: 2018-03-15 | End: 2018-03-16

## 2018-03-15 RX ORDER — CEFTRIAXONE 500 MG/1
1 INJECTION, POWDER, FOR SOLUTION INTRAMUSCULAR; INTRAVENOUS EVERY 24 HOURS
Qty: 0 | Refills: 0 | Status: DISCONTINUED | OUTPATIENT
Start: 2018-03-16 | End: 2018-03-16

## 2018-03-15 RX ORDER — DOCUSATE SODIUM 100 MG
100 CAPSULE ORAL
Qty: 0 | Refills: 0 | Status: DISCONTINUED | OUTPATIENT
Start: 2018-03-15 | End: 2018-03-16

## 2018-03-15 RX ORDER — CEFTRIAXONE 500 MG/1
INJECTION, POWDER, FOR SOLUTION INTRAMUSCULAR; INTRAVENOUS
Qty: 0 | Refills: 0 | Status: DISCONTINUED | OUTPATIENT
Start: 2018-03-15 | End: 2018-03-16

## 2018-03-15 RX ORDER — CEFTRIAXONE 500 MG/1
1 INJECTION, POWDER, FOR SOLUTION INTRAMUSCULAR; INTRAVENOUS ONCE
Qty: 0 | Refills: 0 | Status: COMPLETED | OUTPATIENT
Start: 2018-03-15 | End: 2018-03-15

## 2018-03-15 RX ADMIN — AMIODARONE HYDROCHLORIDE 200 MILLIGRAM(S): 400 TABLET ORAL at 07:46

## 2018-03-15 RX ADMIN — CHOLESTYRAMINE 4 GRAM(S): 4 POWDER, FOR SUSPENSION ORAL at 08:44

## 2018-03-15 RX ADMIN — PREGABALIN 100 MICROGRAM(S): 225 CAPSULE ORAL at 11:19

## 2018-03-15 RX ADMIN — LOSARTAN POTASSIUM 100 MILLIGRAM(S): 100 TABLET, FILM COATED ORAL at 06:43

## 2018-03-15 RX ADMIN — AMIODARONE HYDROCHLORIDE 200 MILLIGRAM(S): 400 TABLET ORAL at 17:58

## 2018-03-15 RX ADMIN — Medication 81 MILLIGRAM(S): at 11:19

## 2018-03-15 RX ADMIN — Medication 12.5 MILLIGRAM(S): at 22:25

## 2018-03-15 RX ADMIN — CEFTRIAXONE 100 GRAM(S): 500 INJECTION, POWDER, FOR SOLUTION INTRAMUSCULAR; INTRAVENOUS at 06:42

## 2018-03-15 RX ADMIN — Medication 25 MILLIGRAM(S): at 06:43

## 2018-03-15 RX ADMIN — APIXABAN 5 MILLIGRAM(S): 2.5 TABLET, FILM COATED ORAL at 17:57

## 2018-03-15 RX ADMIN — APIXABAN 5 MILLIGRAM(S): 2.5 TABLET, FILM COATED ORAL at 06:43

## 2018-03-15 RX ADMIN — Medication 40 MILLIGRAM(S): at 06:43

## 2018-03-15 RX ADMIN — PANTOPRAZOLE SODIUM 40 MILLIGRAM(S): 20 TABLET, DELAYED RELEASE ORAL at 06:42

## 2018-03-15 RX ADMIN — Medication 1000 UNIT(S): at 11:19

## 2018-03-15 NOTE — PROGRESS NOTE ADULT - PROBLEM SELECTOR PLAN 4
-c/w home HCTZ 25, losartan 100    UTI  started ceftriax. f/u cult    WEN  dc lasix  renal consulted -c/w home HCTZ 25, losartan 100    UTI  started ceftriax. f/u cult    WEN  hold lasix, ARB, HCTZ  renal consulted  monitor

## 2018-03-15 NOTE — CHART NOTE - NSCHARTNOTEFT_GEN_A_CORE
Medicine NP episodic Note    Called by RN to report positive UA.  Pt. with earlier c/o dysuria. VSS. Afebrile.  Will start Ceftriaxone 1 gram IVPB daily.  F/u urine culture.  Will endorse to primary team in am.  Attending to follow.     Nany Cesar, [facility]  [phone]

## 2018-03-16 ENCOUNTER — RX RENEWAL (OUTPATIENT)
Age: 83
End: 2018-03-16

## 2018-03-16 ENCOUNTER — TRANSCRIPTION ENCOUNTER (OUTPATIENT)
Age: 83
End: 2018-03-16

## 2018-03-16 VITALS
SYSTOLIC BLOOD PRESSURE: 120 MMHG | DIASTOLIC BLOOD PRESSURE: 78 MMHG | RESPIRATION RATE: 16 BRPM | TEMPERATURE: 98 F | OXYGEN SATURATION: 95 % | HEART RATE: 104 BPM

## 2018-03-16 LAB
-  AMIKACIN: SIGNIFICANT CHANGE UP
-  AZTREONAM: SIGNIFICANT CHANGE UP
-  CEFEPIME: SIGNIFICANT CHANGE UP
-  CEFTAZIDIME: SIGNIFICANT CHANGE UP
-  CIPROFLOXACIN: SIGNIFICANT CHANGE UP
-  GENTAMICIN: SIGNIFICANT CHANGE UP
-  IMIPENEM: SIGNIFICANT CHANGE UP
-  LEVOFLOXACIN: SIGNIFICANT CHANGE UP
-  MEROPENEM: SIGNIFICANT CHANGE UP
-  PIPERACILLIN/TAZOBACTAM: SIGNIFICANT CHANGE UP
-  TOBRAMYCIN: SIGNIFICANT CHANGE UP
ANION GAP SERPL CALC-SCNC: 15 MMOL/L — SIGNIFICANT CHANGE UP (ref 5–17)
BUN SERPL-MCNC: 24 MG/DL — HIGH (ref 7–23)
CALCIUM SERPL-MCNC: 9.1 MG/DL — SIGNIFICANT CHANGE UP (ref 8.4–10.5)
CHLORIDE SERPL-SCNC: 103 MMOL/L — SIGNIFICANT CHANGE UP (ref 96–108)
CO2 SERPL-SCNC: 24 MMOL/L — SIGNIFICANT CHANGE UP (ref 22–31)
CREAT SERPL-MCNC: 1.52 MG/DL — HIGH (ref 0.5–1.3)
GLUCOSE SERPL-MCNC: 103 MG/DL — HIGH (ref 70–99)
HCT VFR BLD CALC: 36.5 % — SIGNIFICANT CHANGE UP (ref 34.5–45)
HGB BLD-MCNC: 11.4 G/DL — LOW (ref 11.5–15.5)
MCHC RBC-ENTMCNC: 27.7 PG — SIGNIFICANT CHANGE UP (ref 27–34)
MCHC RBC-ENTMCNC: 31.2 GM/DL — LOW (ref 32–36)
MCV RBC AUTO: 88.8 FL — SIGNIFICANT CHANGE UP (ref 80–100)
METHOD TYPE: SIGNIFICANT CHANGE UP
NRBC # BLD: 0 /100 WBCS — SIGNIFICANT CHANGE UP (ref 0–0)
PLATELET # BLD AUTO: 212 K/UL — SIGNIFICANT CHANGE UP (ref 150–400)
POTASSIUM SERPL-MCNC: 3.7 MMOL/L — SIGNIFICANT CHANGE UP (ref 3.5–5.3)
POTASSIUM SERPL-SCNC: 3.7 MMOL/L — SIGNIFICANT CHANGE UP (ref 3.5–5.3)
RBC # BLD: 4.11 M/UL — SIGNIFICANT CHANGE UP (ref 3.8–5.2)
RBC # FLD: 13.9 % — SIGNIFICANT CHANGE UP (ref 10.3–14.5)
SODIUM SERPL-SCNC: 142 MMOL/L — SIGNIFICANT CHANGE UP (ref 135–145)
WBC # BLD: 7.38 K/UL — SIGNIFICANT CHANGE UP (ref 3.8–10.5)
WBC # FLD AUTO: 7.38 K/UL — SIGNIFICANT CHANGE UP (ref 3.8–10.5)

## 2018-03-16 PROCEDURE — 99291 CRITICAL CARE FIRST HOUR: CPT | Mod: 25

## 2018-03-16 PROCEDURE — 84484 ASSAY OF TROPONIN QUANT: CPT

## 2018-03-16 PROCEDURE — 81001 URINALYSIS AUTO W/SCOPE: CPT

## 2018-03-16 PROCEDURE — 83735 ASSAY OF MAGNESIUM: CPT

## 2018-03-16 PROCEDURE — 80048 BASIC METABOLIC PNL TOTAL CA: CPT

## 2018-03-16 PROCEDURE — 85730 THROMBOPLASTIN TIME PARTIAL: CPT

## 2018-03-16 PROCEDURE — 93306 TTE W/DOPPLER COMPLETE: CPT

## 2018-03-16 PROCEDURE — 85610 PROTHROMBIN TIME: CPT

## 2018-03-16 PROCEDURE — 84100 ASSAY OF PHOSPHORUS: CPT

## 2018-03-16 PROCEDURE — 82550 ASSAY OF CK (CPK): CPT

## 2018-03-16 PROCEDURE — 80053 COMPREHEN METABOLIC PANEL: CPT

## 2018-03-16 PROCEDURE — 71045 X-RAY EXAM CHEST 1 VIEW: CPT

## 2018-03-16 PROCEDURE — 84443 ASSAY THYROID STIM HORMONE: CPT

## 2018-03-16 PROCEDURE — 85027 COMPLETE CBC AUTOMATED: CPT

## 2018-03-16 PROCEDURE — 87086 URINE CULTURE/COLONY COUNT: CPT

## 2018-03-16 PROCEDURE — 83880 ASSAY OF NATRIURETIC PEPTIDE: CPT

## 2018-03-16 PROCEDURE — 87186 SC STD MICRODIL/AGAR DIL: CPT

## 2018-03-16 PROCEDURE — 82553 CREATINE MB FRACTION: CPT

## 2018-03-16 PROCEDURE — 93005 ELECTROCARDIOGRAM TRACING: CPT

## 2018-03-16 PROCEDURE — 96374 THER/PROPH/DIAG INJ IV PUSH: CPT

## 2018-03-16 RX ORDER — ASPIRIN/CALCIUM CARB/MAGNESIUM 324 MG
1 TABLET ORAL
Qty: 0 | Refills: 0 | DISCHARGE
Start: 2018-03-16

## 2018-03-16 RX ORDER — LOSARTAN POTASSIUM 100 MG/1
100 TABLET, FILM COATED ORAL DAILY
Qty: 0 | Refills: 0 | Status: DISCONTINUED | OUTPATIENT
Start: 2018-03-16 | End: 2018-03-16

## 2018-03-16 RX ORDER — CIPROFLOXACIN LACTATE 400MG/40ML
1 VIAL (ML) INTRAVENOUS
Qty: 6 | Refills: 0 | OUTPATIENT
Start: 2018-03-16 | End: 2018-03-18

## 2018-03-16 RX ORDER — APIXABAN 2.5 MG/1
1 TABLET, FILM COATED ORAL
Qty: 60 | Refills: 0 | OUTPATIENT
Start: 2018-03-16 | End: 2018-04-14

## 2018-03-16 RX ORDER — HYDRALAZINE HCL 50 MG
25 TABLET ORAL ONCE
Qty: 0 | Refills: 0 | Status: COMPLETED | OUTPATIENT
Start: 2018-03-16 | End: 2018-03-16

## 2018-03-16 RX ORDER — CHOLESTYRAMINE 4 G/9G
4 POWDER, FOR SUSPENSION ORAL
Qty: 0 | Refills: 0 | COMMUNITY

## 2018-03-16 RX ORDER — FUROSEMIDE 40 MG
1 TABLET ORAL
Qty: 0 | Refills: 0 | COMMUNITY

## 2018-03-16 RX ORDER — AMITRIPTYLINE HCL 25 MG
0.5 TABLET ORAL
Qty: 0 | Refills: 0 | COMMUNITY

## 2018-03-16 RX ORDER — AMIODARONE HYDROCHLORIDE 400 MG/1
1 TABLET ORAL
Qty: 60 | Refills: 0 | OUTPATIENT
Start: 2018-03-16 | End: 2018-04-14

## 2018-03-16 RX ORDER — MAGNESIUM SULFATE 500 MG/ML
2 VIAL (ML) INJECTION ONCE
Qty: 0 | Refills: 0 | Status: COMPLETED | OUTPATIENT
Start: 2018-03-16 | End: 2018-03-16

## 2018-03-16 RX ORDER — METOPROLOL TARTRATE 50 MG
1 TABLET ORAL
Qty: 0 | Refills: 0 | COMMUNITY

## 2018-03-16 RX ADMIN — Medication 50 GRAM(S): at 10:51

## 2018-03-16 RX ADMIN — APIXABAN 5 MILLIGRAM(S): 2.5 TABLET, FILM COATED ORAL at 05:13

## 2018-03-16 RX ADMIN — AMIODARONE HYDROCHLORIDE 200 MILLIGRAM(S): 400 TABLET ORAL at 17:38

## 2018-03-16 RX ADMIN — LOSARTAN POTASSIUM 100 MILLIGRAM(S): 100 TABLET, FILM COATED ORAL at 15:54

## 2018-03-16 RX ADMIN — Medication 81 MILLIGRAM(S): at 11:25

## 2018-03-16 RX ADMIN — PANTOPRAZOLE SODIUM 40 MILLIGRAM(S): 20 TABLET, DELAYED RELEASE ORAL at 05:12

## 2018-03-16 RX ADMIN — AMIODARONE HYDROCHLORIDE 200 MILLIGRAM(S): 400 TABLET ORAL at 08:34

## 2018-03-16 RX ADMIN — CEFTRIAXONE 100 GRAM(S): 500 INJECTION, POWDER, FOR SOLUTION INTRAMUSCULAR; INTRAVENOUS at 05:12

## 2018-03-16 RX ADMIN — PREGABALIN 100 MICROGRAM(S): 225 CAPSULE ORAL at 11:25

## 2018-03-16 RX ADMIN — Medication 25 MILLIGRAM(S): at 06:14

## 2018-03-16 RX ADMIN — CHOLESTYRAMINE 4 GRAM(S): 4 POWDER, FOR SUSPENSION ORAL at 08:34

## 2018-03-16 RX ADMIN — APIXABAN 5 MILLIGRAM(S): 2.5 TABLET, FILM COATED ORAL at 17:38

## 2018-03-16 RX ADMIN — Medication 1000 UNIT(S): at 11:25

## 2018-03-16 NOTE — DISCHARGE NOTE ADULT - CARE PLAN
Principal Discharge DX:	Atrial fibrillation  Goal:	continue in normal sinus rhythm  Assessment and plan of treatment:	Atrial fibrillation is the most common heart rhythm problem & has the risk of stroke & heart attack  It helps if you control your blood pressure, not drink more than 1-2 alcohol drinks per day, cut down on caffeine, getting treatment for over active thyroid gland, & getting exercise  Call your doctor if you feel your heart racing or beating unusually, chest tightness or pain, lightheaded, faint, shortness of breath especially with exercise  It is important to take your heart medication as prescribed  You are on Eliquis for anticoagulation & stroke prevention  Eliquis/Apixaban is used to thin the blood so clots will not form and to keep existing ones from getting bigger.  Take this medication daily as prescribed by your health care provider.  Take this medication with food to prevent upset stomach.  If you miss a dose call your health care provider or pharmacist right away.  Tell your doctor you use this drug before you have a spinal or epidural procedure  Tell dentists, surgeon, and other doctors that you use this drug.  You may bleed more easily.  Be careful and avoid injury.  Use a soft toothbrush and an electric razor.  Secondary Diagnosis:	HTN (hypertension)  Assessment and plan of treatment:	Follow up with your medical doctor to establish long term blood pressure treatment goals.  Secondary Diagnosis:	Insomnia  Assessment and plan of treatment:	take Amitriptyline as directed  Secondary Diagnosis:	UTI (urinary tract infection)  Assessment and plan of treatment:	You had a bladder &/or kidney infection  Call your doctor with pain or burning with urination, frequent urination, feeling to urinate suddenly, blood in urine, fever, back pain, nausea or vomiting  You need to take and finish your antibiotic as prescribed so that the infection does not reoccur  Drink adequate fluids - there is no harm to try cranberry juice  Females need to urinate right after sex  start Cipro tonight & take for 6 doses  it is very important to see Dr. Wynne on Monday/Tuesday for 12 lead EKG to check QTC interval due to Cipro & Amiodarone drug interaction  Assessment and plan of treatment:	on Amiodarone  make sure to check liver function tests, eye exams, thyroid testing as directed due to Amiodarone side effects

## 2018-03-16 NOTE — PROGRESS NOTE ADULT - ASSESSMENT
P.A. Fib  MS/MR AS/AI  UTI  Discharge home on Eliquis 5 mg Q12 H, Amiodarone 200 mg BID for 2 weeks, then daily and a po antibiotic  for UTI  Follow up with Dr. Nicolás Wynne within 2 weeks
82 year-old woman with new AFib, now spontaneously converted to NSR with 4.5 second pause.   Appreciate EPS input.  Will change from diltiazem to amiodarone (load per their recommendations - this can be done as outpatient).  Outpatient monitoring per EPS.  Continue anticoagulation for patient with CHADSVASc = 5 (HTN, age, gender, vascular disease).    Discharge planning per primary team.
Ms Marshall is an 82 year old woman with history of HTN, HLD, non obstructive CAD on cath 9.2014, carotid artery stenosis s/p endarterectomy, Mitral valve repair 1997, CKD 3b, who presents with ARCHER x 4 days and HR 140s noted at home today.

## 2018-03-16 NOTE — PROGRESS NOTE ADULT - PROBLEM SELECTOR PLAN 4
-c/w home HCTZ 25, losartan 100    UTI  cult noted. change to cipro 250 bid for 3 days    WEN  now improved  restart hctz/arb.  restart lasix monday  plan d/w renal

## 2018-03-16 NOTE — DISCHARGE NOTE ADULT - HOSPITAL COURSE
· Assessment		  Ms Marshall is an 82 year old woman with history of HTN, HLD, non obstructive CAD on cath 9.2014, carotid artery stenosis s/p endarterectomy, Mitral valve repair 1997, CKD 3b, who presents with ARCHER x 4 days and HR 140s noted at home today.     Problem/Plan - 1:  ·  Problem: Atrial fibrillation.  Plan: pt converted to NSR  cards/EP f.u noted   amio per EP  AC with eliquis  echo noted  tele monitor.      Problem/Plan - 2:  ·  Problem: Chest pressure.  Plan: acs ruled out  no evid of acs  cards f/u.      Problem/Plan - 3:  ·  Problem: Dyspnea on effort.      Problem/Plan - 4:  ·  Problem: HTN (hypertension).  Plan: -c/w home HCTZ 25, losartan 100    UTI  cult noted. change to cipro 250 bid for 3 days    WEN  now improved  restart hctz/arb.  restart lasix monday  plan d/w renal.      Problem/Plan - 5:  ·  Problem: Insomnia.  Plan: -amitriptyline 12.5 qhs.      Problem/Plan - 6:  Problem: GERD (gastroesophageal reflux disease). Plan: pantoprazole 40.

## 2018-03-16 NOTE — CONSULT NOTE ADULT - ASSESSMENT
82 year old woman with history of HTN, HLD, non obstructive CAD on cath 9.2014, carotid artery stenosis s/p endarterectomy, Mitral valve repair 1997, CKD 3b, who presents with ARCHER x 4 days and HR 140s rapid a fib with ckd III     1- CKD III  2- HTN   3- UTI     cr steady  cipro 250 mg bid   restart losartan 100 mg daily  and hctz 2 5mg daily   d/w Dr. Del Real

## 2018-03-16 NOTE — DISCHARGE NOTE ADULT - ADDITIONAL INSTRUCTIONS
follow up with primary care physician/cardiology Dr. Wynne on Monday or Tuesday - you need EKG to check QTC interval with Amiodarone & Cipro  follow up with EP Dr. Estes as directed

## 2018-03-16 NOTE — DISCHARGE NOTE ADULT - MEDICATION SUMMARY - MEDICATIONS TO TAKE
I will START or STAY ON the medications listed below when I get home from the hospital:    aspirin 81 mg oral tablet, chewable  -- 1 tab(s) by mouth once a day  -- Indication: For Cardiac & stroke prevention    amiodarone 200 mg oral tablet  -- 1 tab(s) by mouth 2 times a day through 3/28 and then once daily starting on 3/29  -- Indication: For Atrial fibrillation    apixaban 5 mg oral tablet  -- 1 tab(s) by mouth every 12 hours  -- Indication: For Atrial fibrillation & stroke prevention    amitriptyline 25 mg oral tablet  -- 0.5 tab(s) = 12.5 mg by mouth once a day (at bedtime)  -- Indication: For Insomnia    cholestyramine 4 g/5 g oral powder for reconstitution  -- 4 gram(s) by mouth 2 times a day  -- Indication: For Hypercholesterolemia    losartan-hydroCHLOROthiazide 100 mg-25 mg oral tablet  -- 1 tab(s) by mouth once a day  -- Indication: For HTN (hypertension)    pantoprazole 40 mg oral delayed release tablet  -- 1 tab(s) by mouth once a day 1/2 hour before breakfast  -- Indication: For GERD (gastroesophageal reflux disease)    Cipro 250 mg oral tablet  -- 1 tab(s) by mouth every 12 hours - start tonight  -- Avoid prolonged or excessive exposure to direct and/or artificial sunlight while taking this medication.  Check with your doctor before becoming pregnant.  Do not take dairy products, antacids, or iron preparations within one hour of this medication.  Finish all this medication unless otherwise directed by prescriber.  Medication should be taken with plenty of water.    -- Indication: For urine tract infection    cyanocobalamin 100 mcg oral tablet  -- 1 tab(s) by mouth once a day  -- Indication: For vitamin B12 supplement    Vitamin D3 1000 intl units oral tablet  -- 1 tab(s) by mouth once a day  -- Indication: For vitamin D supplement

## 2018-03-16 NOTE — PROGRESS NOTE ADULT - PROVIDER SPECIALTY LIST ADULT
Cardiology
Electrophysiology
Internal Medicine
Cardiology

## 2018-03-16 NOTE — CONSULT NOTE ADULT - SUBJECTIVE AND OBJECTIVE BOX
Patient seen and evaluated @ 7:00 AM  Chief Complaint: 4.4 s pause    HPI:  81 y/o F with hx HTN, HLD, non obstructive CAD on cath 2014, carotid artery stenosis s/p endarterectomy, Mitral valve repair , CKD 3b, who presents with ARCHER x 4 days and HR 140s noted at home today. Started gradually 4 days prior, unable to walk more than a few steps or 1 flight of stairs. +intermittent palpitations. Intermittent chest pressure with radiation to R arm not worse with exertion, worse lying down, worse with eating. +episodes of sweating but no clear association with pain. Patient checks BP daily and noted today to have , HR 140s so came to ED.   Has intermittent LE swelling but not currently, no orthopnea, no SOB at rest. No current chest discomfort. No history of DVT/PE, flight to [place] -.    EKG with Afib with RVR without ischemic changes  received 10mg IVP diltiazem followed by 30mg PO with improvement in HR to 110-120  CXR clear    2:00 AM 3/14 with 4.4 second pause with conversion to sinus rhythm. Patient with lightheadedness that resolved. No CP.    PMH:   Mitral valve disease  Osteoporosis  Vitamin B 12 deficiency  Carotid artery occlusion  GERD (gastroesophageal reflux disease)  Hypercholesteremia  HTN (hypertension)  Insomnia    PSH:   H/O carotid endarterectomy  Varicose veins  Bilateral cataracts  Papilloma of breast  History of lumbar laminectomy for spinal cord decompression  Abdominal hernia  Hx of tonsillectomy  Status post DACIA-BSO  S/P MVR (mitral valve repair)    Medications:   amitriptyline 12.5 milliGRAM(s) Oral at bedtime  aspirin  chewable 81 milliGRAM(s) Oral daily  cholecalciferol 1000 Unit(s) Oral daily  cholestyramine Powder (Sugar-Free) 4 Gram(s) Oral two times a day  cyanocobalamin 100 MICROGram(s) Oral daily  diltiazem    Tablet 60 milliGRAM(s) Oral every 6 hours  furosemide    Tablet 40 milliGRAM(s) Oral daily  heparin  Infusion. 1100 Unit(s)/Hr IV Continuous <Continuous>  heparin  Injectable 6000 Unit(s) IV Push every 6 hours PRN  heparin  Injectable 3000 Unit(s) IV Push every 6 hours PRN  hydrochlorothiazide 25 milliGRAM(s) Oral daily  losartan 100 milliGRAM(s) Oral daily  pantoprazole    Tablet 40 milliGRAM(s) Oral before breakfast    Allergies:  &quot;VASELINE BASED OINTMENTS&quot; (Urticaria; Rash)  adhesives (Urticaria; Rash)  statins (Unknown)    FAMILY HISTORY:  Family history of dementia: mother  Family history of lung cancer (Grandparent)    Social History:  [place]    Review of Systems:  Constitutional: [ ] Fever [ ] Chills [ ] Fatigue [ ] Weight change   HEENT: [ ] Blurred vision [ ] Eye Pain [ ] Headache [ ] Runny nose [ ] Sore Throat   Respiratory: [ ] Cough [ ] Wheezing [ ] Shortness of breath  Cardiovascular: [ ] Chest Pain [ ] Palpitations [ ] ARCHER [ ] PND [ ] Orthopnea  Gastrointestinal: [ ] Abdominal Pain [ ] Diarrhea [ ] Constipation [ ] Hemorrhoids [ ] Nausea [ ] Vomiting  Genitourinary: [ ] Nocturia [ ] Dysuria [ ] Incontinence  Extremities: [ ] Swelling [ ] Joint Pain  Neurologic: [ ] Focal deficit [ ] Paresthesias [ ] Syncope  Lymphatic: [ ] Swelling [ ] Lymphadenopathy   Skin: [ ] Rash [ ] Ecchymoses [ ] Wounds [ ] Lesions  Psychiatry: [ ] Depression [ ] Suicidal/Homicidal Ideation [ ] Anxiety [ ] Sleep Disturbances  [ ] 10 point review of systems is otherwise negative except as mentioned above            [ ]Unable to obtain    Physical Exam:  T(C): 36.6 (18 @ 05:40), Max: 36.7 (18 @ 13:59)  HR: 70 (18 @ 05:40) (69 - 134)  BP: 104/60 (18 @ 05:40) (104/60 - 126/63)  RR: 17 (18 @ 05:40) (16 - 18)  SpO2: 96% (18 @ 05:40) (96% - 100%)  Wt(kg): --     @ 07:01  -   @ 07:00  --------------------------------------------------------  IN: 807 mL / OUT: 750 mL / NET: 57 mL      Daily     Daily Weight in k.4 (14 Mar 2018 08:01)    Appearance: NAD  Eyes: PERRL, EOMI  HENT: Normal oral [place], NC/AT  Cardiovascular: normal S1 and S2, RRR, no m/r/g, no edema, normal JVP  Respiratory: Clear to auscultation bilaterally  Gastrointestinal: Soft, non-tender, non-distended, BS+  Musculoskeletal: No clubbing, no joint deformity   Neurologic: Non-focal  Lymphatic: No lymphadenopathy  Psychiatry: AAOx3, mood & affect appropriate  Skin: No rashes, no ecchymoses, no cyanosis    Cardiovascular Diagnostic Testing:  ECG: afib with RVR  3/14 sinus    Echo:   YULIA  Conclusions:  1. An annuloplasty ring is seen in mitral position. Mild  mitral regurgitation. Peak mitral valve gradient equals 21  mm Hg, mean transmitral valve gradient equals 9mm Hg.  Estimated mitral valve area by planimetry equals 2.4 sqcm.  2. Calcified trileaflet aortic valve with normal opening.  Moderate aortic regurgitation.  3. Simple atheroma (sessile plaque protruding 1.0-3.9mm  into lumen) noted in aortic arch/descending aorta.  4. Moderate left atrial dilatation.  No left atrium or left  atrial appendage thrombus. Mild spontaneous echo contrast  is noted in the left atrial appendage with mildly blunted  flow velocities of 30cm/s.  5. Normal left ventricular systolic function. No segmental  wall motion abnormalities. Ejection fraction 72%.  6. Normal right atrium.  7. Mild right ventricular enlargement with normal systolic  function.  8. Moderate tricuspid regurgitation. Estimated pulmonary  artery systolic pressure equals 59 mm Hg, assuming right  atrial pressure equals 10 mm Hg, consistent with moderate  pulmonary hypertension.  9. Agitated saline injection demonstrates no evidence of a  patent foramen ovale.    Stress Testing:  none    Cath:  2014  VENTRICLES:Global left ventricular function was hypercontractile. EF  estimated was 80 %.  CORONARY VESSELS: The coronary circulation is right dominant.  LM:   --  LM: Normal.  LAD:   --  Proximal LAD: There was a 30 % stenosis.  CX:   --  Proximal circumflex: There was a 50 % stenosis.  RCA:   --  Mid RCA: There was a 20 % stenosis.  COMPLICATIONS: There were no complications.  DIAGNOSTIC RECOMMENDATIONS: Nonobstructive CAD with evidence of coronary  spasm improving after intracoronary NTG    Interpretation of Telemetry:  sinus  2:14 AM 4.4 second pause from afib to sinus    Imaging:  3/12/2018 CXR  IMPRESSION:     Trace left pleural effusion with adjacent subsegmental atelectasis.      Labs:                        11.6   8.29  )-----------( 233      ( 14 Mar 2018 07:38 )             37.2     -    144  |  104  |  24<H>  ----------------------------<  109<H>  3.7   |  23  |  1.43<H>    Ca    9.0      14 Mar 2018 06:07  Phos  4.1     -  Mg     1.6     -    TPro  7.9  /  Alb  4.3  /  TBili  0.2  /  DBili  x   /  AST  39  /  ALT  75<H>  /  AlkPhos  119  03-12    PT/INR - ( 12 Mar 2018 19:33 )   PT: 11.0 sec;   INR: 1.01 ratio         PTT - ( 14 Mar 2018 02:52 )  PTT:155.5 sec  CARDIAC MARKERS ( 13 Mar 2018 05:54 )  x     / <0.01 ng/mL / x     / x     / x      CARDIAC MARKERS ( 13 Mar 2018 00:45 )  x     / <0.01 ng/mL / x     / x     / x      CARDIAC MARKERS ( 12 Mar 2018 19:33 )  x     / <0.01 ng/mL / 65 U/L / x     / 2.4 ng/mL      Serum Pro-Brain Natriuretic Peptide: 8711 pg/mL ( @ 19:33)
Patient seen and evaluated @ 9am on [address]  Chief Complaint: Shortness of breath and lower extremity edema    HPI:  Ms Marshall is an 82 year old woman with history of HTN, HLD, non obstructive CAD on cath , carotid artery stenosis s/p endarterectomy, Mitral valve repair , CKD 3b, who presents with ARCHER x 4 days and HR 140s noted at home today. Started gradually 4 days prior, unable to walk more than a few steps or 1 flight of stairs. +intermittent palpitations. Intermittent chest pressure with radiation to R arm not worse with exertion, worse lying down, worse with eating. +episodes of sweating but no clear association with pain. Patient checks BP daily and noted today to have , HR 140s so came to ED.   Has intermittent LE swelling but not currently, no orthopnea, no SOB at rest. No current chest discomfort. No history of DVT/PE, flight to [place] -.    Vital Signs Last 24 Hrs  T(C): 37.3 (12 Mar 2018 18:10), Max: 37.3 (12 Mar 2018 18:10)  T(F): 99.2 (12 Mar 2018 18:10), Max: 99.2 (12 Mar 2018 18:10)  HR: 109 (12 Mar 2018 21:54) (91 - 135)  BP: 111/78 (12 Mar 2018 21:54) (111/78 - 135/102)  BP(mean): --  RR: 23 (12 Mar 2018 21:54) (16 - 23)  SpO2: 97% (12 Mar 2018 21:54) (97% - 98%)    EKG with Afib with RVR without ischemic changes  received 10mg IVP diltiazem followed by 30mg PO with improvement in HR to 110-120  CXR clear (12 Mar 2018 22:49)    PMH:   Mitral valve disease  Osteoporosis  Vitamin B 12 deficiency  Carotid artery occlusion  GERD (gastroesophageal reflux disease)  Hypercholesteremia  HTN (hypertension)  Insomnia    PSH:   H/O carotid endarterectomy  Varicose veins  Bilateral cataracts  Papilloma of breast  History of lumbar laminectomy for spinal cord decompression  Abdominal hernia  Hx of tonsillectomy  Status post DACIA-BSO  S/P MVR (mitral valve repair)    Medications:   amitriptyline 12.5 milliGRAM(s) Oral at bedtime  aspirin  chewable 81 milliGRAM(s) Oral daily  cholecalciferol 1000 Unit(s) Oral daily  cholestyramine Powder (Sugar-Free) 4 Gram(s) Oral two times a day  cyanocobalamin 100 MICROGram(s) Oral daily  diltiazem    Tablet 60 milliGRAM(s) Oral every 6 hours  furosemide    Tablet 40 milliGRAM(s) Oral daily  heparin  Infusion. 1100 Unit(s)/Hr IV Continuous <Continuous>  heparin  Injectable 6000 Unit(s) IV Push every 6 hours PRN  heparin  Injectable 3000 Unit(s) IV Push every 6 hours PRN  hydrochlorothiazide 25 milliGRAM(s) Oral daily  losartan 100 milliGRAM(s) Oral daily  pantoprazole    Tablet 40 milliGRAM(s) Oral before breakfast    Allergies:  &quot;VASELINE BASED OINTMENTS&quot; (Urticaria; Rash)  adhesives (Urticaria; Rash)  statins (Unknown)    FAMILY HISTORY:  Family history of dementia: mother  Family history of lung cancer (Grandparent)    Social History: lives alone  Smoking: former smoker  Alcohol: 4 drinks per day  Drugs: no illicit drug use    Review of Systems:  See HPI  Constitutional: No fever, chills, fatigue, or changes in weight  HEENT: No blurry vision, eye pain, headache, runny nose, or sore throat  Respiratory: No shortness of breath, cough, or wheezing  Cardiovascular: No chest pain or palpitations  Gastrointestinal: No nausea, vomiting, diarrhea, or abdominal pain  Genitourinary: No dysuria or incontinence  Extremities: No lower extremity swelling  Neurologic: No focal findings  Lymphatic: No lymphadenopathy   Skin: No rash  Psychiatry: No anxiety or depression  10 point review of systems is otherwise negative except as mentioned above            Physical Exam:  T(C): 36.7 (18 @ 13:59), Max: 36.7 (18 @ 04:57)  HR: 134 (18 @ 16:05) (91 - 134)  BP: 107/70 (18 @ 16:05) (107/70 - 136/64)  RR: 18 (18 @ 16:05) (16 - 23)  SpO2: 97% (18 @ 16:05) (95% - 100%)  Wt(kg): --     @ 07:01  -   @ 07:00  --------------------------------------------------------  IN: 237 mL / OUT: 0 mL / NET: 237 mL     @ 07:01  -   @ 18:13  --------------------------------------------------------  IN: 350 mL / OUT: 550 mL / NET: -200 mL      Daily Height in cm: 157.48 (12 Mar 2018 23:25)    Daily Weight in k.9 (13 Mar 2018 08:00)    Appearance: Normal, well groomed, NAD  Eyes: PERRLA, EOMI, pink conjunctiva, no scleral icterus   HENT: Normal oral mucosa  Cardiovascular: RRR, S1, S2, no murmur, rub, or gallop; no edema; no JVD  Procedural Access Site: Clean, dry, intact, without hematoma  Respiratory: Clear to auscultation bilaterally  Gastrointestinal: Soft, non-tender, non-distended, BS+  Musculoskeletal: No clubbing or joint deformity   Neurologic: No focal weakness  Lymphatic: No lymphadenopathy  Psychiatry: AAOx3 with appropriate mood and affect  Skin: No rashes, ecchymoses, or cyanosis    Cardiovascular Diagnostic Testing:  ECG:    Echo:    Stress Testing:    Cath:    Interpretation of Telemetry:    Imaging:    Labs:                        11.7   8.4   )-----------( 197      ( 13 Mar 2018 05:54 )             34.9     03-13    142  |  105  |  27<H>  ----------------------------<  104<H>  3.9   |  20<L>  |  1.50<H>    Ca    8.8      13 Mar 2018 05:54  Phos  4.1     -  Mg     1.6         TPro  7.9  /  Alb  4.3  /  TBili  0.2  /  DBili  x   /  AST  39  /  ALT  75<H>  /  AlkPhos  119  12    PT/INR - ( 12 Mar 2018 19:33 )   PT: 11.0 sec;   INR: 1.01 ratio         PTT - ( 13 Mar 2018 05:54 )  PTT:> 200 sec  CARDIAC MARKERS ( 13 Mar 2018 05:54 )  x     / <0.01 ng/mL / x     / x     / x      CARDIAC MARKERS ( 13 Mar 2018 00:45 )  x     / <0.01 ng/mL / x     / x     / x      CARDIAC MARKERS ( 12 Mar 2018 19:33 )  x     / <0.01 ng/mL / 65 U/L / x     / 2.4 ng/mL      Serum Pro-Brain Natriuretic Peptide: 8711 pg/mL ( @ 19:33)
[facility]  [phone]  NEPHROLOGY      INITIAL CONSULT NOTE  --------------------------------------------------------------------------------  HPI:    HPI:  Ms Marshall is an 82 year old woman with history of HTN, HLD, non obstructive CAD on cath 9.2014, carotid artery stenosis s/p endarterectomy, Mitral valve repair 1997, CKD 3b, who presents with ARCHER x 4 days and HR 140s noted at home today. dx with rapid a fib as well as uti. had cr .1.6. held losartan and hctz. on abx. renal consult called.       PAST HISTORY  --------------------------------------------------------------------------------  PAST MEDICAL & SURGICAL HISTORY:  Mitral valve disease  Osteoporosis  Vitamin B 12 deficiency  Carotid artery occlusion: (R)  GERD (gastroesophageal reflux disease)  Hypercholesteremia  HTN (hypertension)  Insomnia  H/O carotid endarterectomy  Varicose veins: s/p sclerotherapy bilaterally  Bilateral cataracts: extraction w/ IOL  Papilloma of breast: s/p excision from right breast &gt;20 years ago  History of lumbar laminectomy for spinal cord decompression: 1995  Abdominal hernia: repair 2007  Hx of tonsillectomy  Status post DACIA-BSO: 1975  S/P MVR (mitral valve repair): [facility] at [facility]    FAMILY HISTORY:  Family history of dementia: mother  Family history of lung cancer (Grandparent)    PAST SOCIAL HISTORY: remote tobacco no alcohol     ALLERGIES & MEDICATIONS  --------------------------------------------------------------------------------  Allergies    &quot;VASELINE BASED OINTMENTS&quot; (Urticaria; Rash)  adhesives (Urticaria; Rash)  statins (Unknown)    Intolerances      Standing Inpatient Medications  amiodarone    Tablet 200 milliGRAM(s) Oral two times a day  amitriptyline 12.5 milliGRAM(s) Oral at bedtime  apixaban 5 milliGRAM(s) Oral every 12 hours  aspirin  chewable 81 milliGRAM(s) Oral daily  cholecalciferol 1000 Unit(s) Oral daily  cholestyramine Powder (Sugar-Free) 4 Gram(s) Oral two times a day  cyanocobalamin 100 MICROGram(s) Oral daily  docusate sodium 100 milliGRAM(s) Oral two times a day  losartan 100 milliGRAM(s) Oral daily  pantoprazole    Tablet 40 milliGRAM(s) Oral before breakfast  senna 2 Tablet(s) Oral at bedtime    PRN Inpatient Medications      REVIEW OF SYSTEMS  --------------------------------------------------------------------------------  Gen: No  fevers/chills   Skin: No rashes  Head/Eyes/Ears/Mouth: No headache; Normal hearing;  No sinus pain/discomfort, sore throat  Respiratory: No dyspnea, cough, wheezing,   CV: No chest pain, orthopnea  GI: No abdominal pain, diarrhea, nausea, vomiting,   : No dysuria, decrease urination or hesitancy urinating  hematuria, nocturia  MSK: No joint pain/swelling; no back pain  Neuro: No dizziness/lightheadedness  All other systems were reviewed and are negative, except as noted.    VITALS/PHYSICAL EXAM  --------------------------------------------------------------------------------  T(C): 36.6 (03-16-18 @ 13:40), Max: 37 (03-15-18 @ 20:01)  HR: 104 (03-16-18 @ 13:40) (88 - 104)  BP: 120/78 (03-16-18 @ 13:40) (120/78 - 163/83)  RR: 16 (03-16-18 @ 13:40) (16 - 18)  SpO2: 95% (03-16-18 @ 13:40) (94% - 95%)  Wt(kg): --        03-15-18 @ 07:01  -  03-16-18 @ 07:00  --------------------------------------------------------  IN: 710 mL / OUT: 600 mL / NET: 110 mL    03-16-18 @ 07:01  -  03-16-18 @ 15:49  --------------------------------------------------------  IN: 300 mL / OUT: 550 mL / NET: -250 mL      Physical Exam:  	Gen: Non toxic comfortable appearing   	no jvd ,  	Pulm: decrease bs  no rales or ronchi or wheeizng   	CV: RRR, S1S2; no rub  	Back: No CVA tenderness; no sacral edema  	Abd: +BS, soft, nontender/nondistended  	: No suprapubic tenderness  	UE: Warm, no cyanosis  no clubbing,  no edema;  	LE: Warm, no cyanosis  no clubbing, no edema  	Neuro: alert and oriented. speech coherent   	    LABS/STUDIES  --------------------------------------------------------------------------------              11.4   7.38  >-----------<  212      [03-16-18 @ 07:48]              36.5     142  |  103  |  24  ----------------------------<  103      [03-16-18 @ 06:26]  3.7   |  24  |  1.52        Ca     9.1     [03-16-18 @ 06:26]      Mg     1.5     [03-15-18 @ 05:39]      Phos  3.4     [03-15-18 @ 05:39]        Creatinine Trend:  SCr 1.52 [03-16 @ 06:26]  SCr 1.70 [03-15 @ 05:39]  SCr 1.43 [03-14 @ 06:07]  SCr 1.50 [03-13 @ 05:54]  SCr 1.63 [03-12 @ 19:33]    Urinalysis - [03-15-18 @ 00:59]      Color Yellow / Appearance Slightly Turbid / SG 1.018 / pH 5.0      Gluc Negative / Ketone Negative  / Bili Negative / Urobili Negative       Blood Small / Protein Negative / Leuk Est Large / Nitrite Positive      RBC 2 /  / Hyaline 4 / Gran  / Sq Epi  / Non Sq Epi 1 / Bacteria Few      TSH 3.12      [03-14-18 @ 07:51]

## 2018-03-16 NOTE — PROGRESS NOTE ADULT - PROBLEM SELECTOR PLAN 1
cards f/u noted  EP consult pending  AC with hep gtt  rate control with cardizem. inc to 60 q4 for better control  possible YULIA/DCCV
pt converted to NSR  cards/EP f.u noted   amio per EP  AC with eliquis  echo noted  tele monitor
pt converted to NSR  cards/EP f.u noted  start amio per EP  AC with eliquis  f/u echo    tele monitor
pt converted to NSR  cards/EP f.u noted   amio per EP  AC with eliquis  echo noted  tele monitor

## 2018-03-16 NOTE — PROGRESS NOTE ADULT - SUBJECTIVE AND OBJECTIVE BOX
24H hour events: Tele shows SR 70-90's bpm with no significant pauses or frida; Pt without complaints    MEDICATIONS:  amiodarone    Tablet 200 milliGRAM(s) Oral two times a day  apixaban 5 milliGRAM(s) Oral every 12 hours  aspirin  chewable 81 milliGRAM(s) Oral daily  hydrochlorothiazide 25 milliGRAM(s) Oral daily  losartan 100 milliGRAM(s) Oral daily  cefTRIAXone   IVPB      amitriptyline 12.5 milliGRAM(s) Oral at bedtime  pantoprazole    Tablet 40 milliGRAM(s) Oral before breakfast  cholestyramine Powder (Sugar-Free) 4 Gram(s) Oral two times a day  cholecalciferol 1000 Unit(s) Oral daily  cyanocobalamin 100 MICROGram(s) Oral daily      REVIEW OF SYSTEMS:  Complete 10point ROS negative.    PHYSICAL EXAM:  T(C): 36.4 (03-15-18 @ 05:37), Max: 36.7 (03-14-18 @ 12:50)  HR: 74 (03-15-18 @ 05:37) (74 - 84)  BP: 146/68 (03-15-18 @ 05:37) (129/65 - 146/68)  RR: 17 (03-15-18 @ 05:37) (17 - 18)  SpO2: 94% (03-15-18 @ 05:37) (94% - 98%)  Wt(kg): --  I&O's Summary    14 Mar 2018 07:01  -  15 Mar 2018 07:00  --------------------------------------------------------  IN: 470 mL / OUT: 450 mL / NET: 20 mL        Appearance: Alert. NAD	  Cardiovascular: +S1S2 RRR  Respiratory: Lungs clear to auscultation	  Psychiatry: A & O x 3, Mood & affect appropriate  Gastrointestinal:  Soft, NT. ND, + BS	  Skin: No rashes  Neurologic: Non-focal  Extremities: No edema BLE  Vascular: Peripheral pulses palpable 2+ bilaterally      LABS:	 	    CBC Full  -  ( 15 Mar 2018 07:42 )  WBC Count : 7.03 K/uL  Hemoglobin : 11.0 g/dL  Hematocrit : 37.1 %  Platelet Count - Automated : 227 K/uL  Mean Cell Volume : 87.5 fl  Mean Cell Hemoglobin : 25.9 pg  Mean Cell Hemoglobin Concentration : 29.6 gm/dL    03-15    144  |  105  |  27<H>  ----------------------------<  104<H>  3.9   |  27  |  1.70<H>  03-14    144  |  104  |  24<H>  ----------------------------<  109<H>  3.7   |  23  |  1.43<H>    Ca    9.0      15 Mar 2018 05:39  Ca    9.0      14 Mar 2018 06:07  Phos  3.4     03-15  Mg     1.5     03-15    proBNP: Serum Pro-Brain Natriuretic Peptide: 8711 pg/mL (03-12 @ 19:33)    TELEMETRY: SR 70-90's bpm; APC's; VPC's    	  ASSESSMENT/PLAN: 	  83 y/o F with hx HTN, HLD, non obstructive CAD on cath 9/2014, carotid artery stenosis s/p endarterectomy, Mitral valve repair 1997, CKD 3b presenting with afib now with conversion pause of 4.4 seconds. Patient has had no further significant frida or pauses. Remains in SR with HR's in 70-90's bpm  --No indication for PPM at this time  --Continue amio 200mg BID for two weeks total then decrease to 200mg daily thereafter  --Continue Eliquis  --[employer] event monitor arranged and will be mailed to patient  --F/U with Dr. Estes 4/18 @ 11:20am  --EP will sign off. Reconsult prn
HPI:  Patient converted to NSR this AM. She had symptomatic 4.5 second conversion pause.  She is now feeling much better than on admission.    Review Of Systems:           Respiratory: No shortness of breath, cough, or wheezing  Cardiovascular: No chest pain or palpitations  10 point review of systems is otherwise negative except as mentioned above        Medications:  amiodarone    Tablet 200 milliGRAM(s) Oral two times a day  amitriptyline 12.5 milliGRAM(s) Oral at bedtime  apixaban 5 milliGRAM(s) Oral every 12 hours  aspirin  chewable 81 milliGRAM(s) Oral daily  cholecalciferol 1000 Unit(s) Oral daily  cholestyramine Powder (Sugar-Free) 4 Gram(s) Oral two times a day  cyanocobalamin 100 MICROGram(s) Oral daily  furosemide    Tablet 40 milliGRAM(s) Oral daily  hydrochlorothiazide 25 milliGRAM(s) Oral daily  losartan 100 milliGRAM(s) Oral daily  pantoprazole    Tablet 40 milliGRAM(s) Oral before breakfast    PAST MEDICAL & SURGICAL HISTORY:  Mitral valve disease  Osteoporosis  Vitamin B 12 deficiency  Carotid artery occlusion: (R)  GERD (gastroesophageal reflux disease)  Hypercholesteremia  HTN (hypertension)  Insomnia  H/O carotid endarterectomy  Varicose veins: s/p sclerotherapy bilaterally  Bilateral cataracts: extraction w/ IOL  Papilloma of breast: s/p excision from right breast &gt;20 years ago  History of lumbar laminectomy for spinal cord decompression:   Abdominal hernia: repair   Hx of tonsillectomy  Status post DACIA-BSO:   S/P MVR (mitral valve repair):  at [facility]    Vitals:  T(C): 36.6 (18 @ 19:55), Max: 36.8 (18 @ 08:30)  HR: 84 (18 @ 19:55) (69 - 84)  BP: 139/66 (18 @ 19:55) (104/60 - 139/66)  BP(mean): --  RR: 18 (18 @ 19:55) (17 - 18)  SpO2: 97% (18 @ 19:55) (96% - 98%)  Wt(kg): --  Daily     Daily Weight in k.4 (14 Mar 2018 08:01)  I&O's Summary    13 Mar 2018 07:01  -  14 Mar 2018 07:00  --------------------------------------------------------  IN: 807 mL / OUT: 750 mL / NET: 57 mL    14 Mar 2018 07:  -  14 Mar 2018 20:45  --------------------------------------------------------  IN: 0 mL / OUT: 100 mL / NET: -100 mL        Physical Exam:  Appearance: Normal, well groomed, NAD  Eyes: PERRLA, EOMI, pink conjunctiva, no scleral icterus   HENT: Normal oral mucosa  Cardiovascular: RRR, S1, S2, no murmur, rub, or gallop; no edema; no JVD  Respiratory: Clear to auscultation bilaterally  Gastrointestinal: Soft, non-tender, non-distended, BS+  Musculoskeletal: No clubbing or joint deformity   Neurologic: No focal weakness  Lymphatic: No lymphadenopathy  Psychiatry: AAOx3 with appropriate mood and affect  Skin: No rashes, ecchymoses, or cyanosis                          11.6   8.29  )-----------( 233      ( 14 Mar 2018 07:38 )             37.2     03-14    144  |  104  |  24<H>  ----------------------------<  109<H>  3.7   |  23  |  1.43<H>    Ca    9.0      14 Mar 2018 06:07  Phos  4.1     03-13  Mg     1.6     03-13      PTT - ( 14 Mar 2018 10:37 )  PTT:64.4 sec  CARDIAC MARKERS ( 13 Mar 2018 05:54 )  x     / <0.01 ng/mL / x     / x     / x      CARDIAC MARKERS ( 13 Mar 2018 00:45 )  x     / <0.01 ng/mL / x     / x     / x          Serum Pro-Brain Natriuretic Peptide: 8711 pg/mL ( @ 19:33)        Echo: PENDING    Interpretation of Telemetry: as above - now in NSR after 4.5 second conversion pause earlier this AM
MORENITA MARSHALL  82y Female  MRN:[id number]    Patient is a 82y old  Female who presents with a chief complaint of ARCHER (12 Mar 2018 22:49)    HPI:  Ms Marshall is an 82 year old woman with history of HTN, HLD, non obstructive CAD on cath 9.2014, carotid artery stenosis s/p endarterectomy, Mitral valve repair 1997, CKD 3b, who presents with ARCHER x 4 days and HR 140s noted at home today. Started gradually 4 days prior, unable to walk more than a few steps or 1 flight of stairs. +intermittent palpitations. Intermittent chest pressure with radiation to R arm not worse with exertion, worse lying down, worse with eating. +episodes of sweating but no clear association with pain. Patient checks BP daily and noted today to have , HR 140s so came to ED.   Has intermittent LE swelling but not currently, no orthopnea, no SOB at rest. No current chest discomfort. No history of DVT/PE, flight to [place] 2/1-2/5.     EKG with Afib with RVR without ischemic changes  received 10mg IVP diltiazem followed by 30mg PO with improvement in HR to 110-120  CXR clear (12 Mar 2018 22:49)      Patient seen and evaluated at bedside. No acute events overnight except as noted.    Interval HPI: feels well  tele: NSR    PAST MEDICAL & SURGICAL HISTORY:  Mitral valve disease  Osteoporosis  Vitamin B 12 deficiency  Carotid artery occlusion: (R)  GERD (gastroesophageal reflux disease)  Hypercholesteremia  HTN (hypertension)  Insomnia  H/O carotid endarterectomy  Varicose veins: s/p sclerotherapy bilaterally  Bilateral cataracts: extraction w/ IOL  Papilloma of breast: s/p excision from right breast &gt;20 years ago  History of lumbar laminectomy for spinal cord decompression: 1995  Abdominal hernia: repair 2007  Hx of tonsillectomy  Status post DACIA-BSO: 1975  S/P MVR (mitral valve repair): 1997 at [facility]      REVIEW OF SYSTEMS:  as per hpi    VITALS:  Vital Signs Last 24 Hrs  T(C): 36.5 (16 Mar 2018 05:17), Max: 37 (15 Mar 2018 20:01)  T(F): 97.7 (16 Mar 2018 05:17), Max: 98.6 (15 Mar 2018 20:01)  HR: 97 (16 Mar 2018 05:17) (88 - 97)  BP: 163/83 (16 Mar 2018 05:17) (130/59 - 163/83)  BP(mean): --  RR: 16 (16 Mar 2018 05:17) (16 - 18)  SpO2: 94% (16 Mar 2018 05:17) (94% - 97%)    PHYSICAL EXAM:  GENERAL: NAD, well-developed  HEAD:  Atraumatic, Normocephalic  EYES: EOMI, PERRLA, conjunctiva and sclera clear  NECK: Supple, No JVD  CHEST/LUNG: Clear to auscultation bilaterally; No wheeze  HEART: S1, S2; No murmurs, rubs, or gallops  ABDOMEN: Soft, Nontender, Nondistended; Bowel sounds present  EXTREMITIES:  2+ Peripheral Pulses, No clubbing, cyanosis, or edema  PSYCH: Normal affect  NEUROLOGY: AAOX3; non-focal  SKIN: No rashes or lesions    Consultant(s) Notes Reviewed:  [x ] YES  [ ] NO  Care Discussed with Consultants/Other Providers [ x] YES  [ ] NO    MEDS:  MEDICATIONS  (STANDING):  amiodarone    Tablet 200 milliGRAM(s) Oral two times a day  amitriptyline 12.5 milliGRAM(s) Oral at bedtime  apixaban 5 milliGRAM(s) Oral every 12 hours  aspirin  chewable 81 milliGRAM(s) Oral daily  cholecalciferol 1000 Unit(s) Oral daily  cholestyramine Powder (Sugar-Free) 4 Gram(s) Oral two times a day  cyanocobalamin 100 MICROGram(s) Oral daily  docusate sodium 100 milliGRAM(s) Oral two times a day  pantoprazole    Tablet 40 milliGRAM(s) Oral before breakfast  senna 2 Tablet(s) Oral at bedtime    MEDICATIONS  (PRN):        ALLERGIES:  &quot;VASELINE BASED OINTMENTS&quot; (Urticaria; Rash)  adhesives (Urticaria; Rash)  statins (Unknown)      LABS:                                        11.4   7.38  )-----------( 212      ( 16 Mar 2018 07:48 )             36.5   03-16    142  |  103  |  24<H>  ----------------------------<  103<H>  3.7   |  24  |  1.52<H>    Ca    9.1      16 Mar 2018 06:26  Phos  3.4     03-15  Mg     1.5     03-15          < from: Transthoracic Echocardiogram (03.14.18 @ 12:01) >  -----------------------------  Conclusions:  1. Calcified trileaflet aortic valve with decreased  opening. Peak transaortic valve gradient equals 12 mm Hg,  mean transaortic valve gradient equals 6 mm Hg, estimated  aortic valve area equals 1.8 sqcm (by continuity equation),  aortic valve velocity time integral equals 34 cm,  consistent with mild aortic stenosis. Moderate aortic  regurgitation.  2. Normal left ventricular internal dimensions and wall  thicknesses.  3. Normal left ventricular systolic function. No segmental  wall motion abnormalities.  4. Right ventricular enlargement with normal right  ventricular systolic function.  5. Estimated pulmonary artery systolic pressure equals 57  mm Hg, assuming right atrial pressure equals 8 mm Hg,  consistent with moderate pulmonary pressures.  -------------------------------------------------    < end of copied text >
MORENITA MARSHALL  82y Female  MRN:[id number]    Patient is a 82y old  Female who presents with a chief complaint of ARCHER (12 Mar 2018 22:49)    HPI:  Ms Marshall is an 82 year old woman with history of HTN, HLD, non obstructive CAD on cath 9.2014, carotid artery stenosis s/p endarterectomy, Mitral valve repair 1997, CKD 3b, who presents with ARCHER x 4 days and HR 140s noted at home today. Started gradually 4 days prior, unable to walk more than a few steps or 1 flight of stairs. +intermittent palpitations. Intermittent chest pressure with radiation to R arm not worse with exertion, worse lying down, worse with eating. +episodes of sweating but no clear association with pain. Patient checks BP daily and noted today to have , HR 140s so came to ED.   Has intermittent LE swelling but not currently, no orthopnea, no SOB at rest. No current chest discomfort. No history of DVT/PE, flight to [place] 2/1-2/5.     EKG with Afib with RVR without ischemic changes  received 10mg IVP diltiazem followed by 30mg PO with improvement in HR to 110-120  CXR clear (12 Mar 2018 22:49)      Patient seen and evaluated at bedside. No acute events overnight except as noted.    Interval HPI: no chest pain. yes palpitations  tele: afib up to 150    PAST MEDICAL & SURGICAL HISTORY:  Mitral valve disease  Osteoporosis  Vitamin B 12 deficiency  Carotid artery occlusion: (R)  GERD (gastroesophageal reflux disease)  Hypercholesteremia  HTN (hypertension)  Insomnia  H/O carotid endarterectomy  Varicose veins: s/p sclerotherapy bilaterally  Bilateral cataracts: extraction w/ IOL  Papilloma of breast: s/p excision from right breast &gt;20 years ago  History of lumbar laminectomy for spinal cord decompression: 1995  Abdominal hernia: repair 2007  Hx of tonsillectomy  Status post DACIA-BSO: 1975  S/P MVR (mitral valve repair): 1997 at [facility]      REVIEW OF SYSTEMS:  as per hpi    VITALS:  Vital Signs Last 24 Hrs  T(C): 36.7 (13 Mar 2018 13:59), Max: 36.7 (13 Mar 2018 04:57)  T(F): 98 (13 Mar 2018 13:59), Max: 98 (13 Mar 2018 04:57)  HR: 134 (13 Mar 2018 16:05) (91 - 134)  BP: 107/70 (13 Mar 2018 16:05) (107/70 - 136/64)  BP(mean): --  RR: 18 (13 Mar 2018 16:05) (16 - 23)  SpO2: 97% (13 Mar 2018 16:05) (95% - 100%)  CAPILLARY BLOOD GLUCOSE        I&O's Summary    12 Mar 2018 07:01  -  13 Mar 2018 07:00  --------------------------------------------------------  IN: 237 mL / OUT: 0 mL / NET: 237 mL    13 Mar 2018 07:01  -  13 Mar 2018 18:46  --------------------------------------------------------  IN: 732 mL / OUT: 550 mL / NET: 182 mL        PHYSICAL EXAM:  GENERAL: NAD, well-developed  HEAD:  Atraumatic, Normocephalic  EYES: EOMI, PERRLA, conjunctiva and sclera clear  NECK: Supple, No JVD  CHEST/LUNG: Clear to auscultation bilaterally; No wheeze  HEART: S1, S2; No murmurs, rubs, or gallops  ABDOMEN: Soft, Nontender, Nondistended; Bowel sounds present  EXTREMITIES:  2+ Peripheral Pulses, No clubbing, cyanosis, or edema  PSYCH: Normal affect  NEUROLOGY: AAOX3; non-focal  SKIN: No rashes or lesions    Consultant(s) Notes Reviewed:  [x ] YES  [ ] NO  Care Discussed with Consultants/Other Providers [ x] YES  [ ] NO    MEDS:  MEDICATIONS  (STANDING):  amitriptyline 12.5 milliGRAM(s) Oral at bedtime  aspirin  chewable 81 milliGRAM(s) Oral daily  cholecalciferol 1000 Unit(s) Oral daily  cholestyramine Powder (Sugar-Free) 4 Gram(s) Oral two times a day  cyanocobalamin 100 MICROGram(s) Oral daily  diltiazem    Tablet 60 milliGRAM(s) Oral every 6 hours  furosemide    Tablet 40 milliGRAM(s) Oral daily  heparin  Infusion. 1100 Unit(s)/Hr (11 mL/Hr) IV Continuous <Continuous>  hydrochlorothiazide 25 milliGRAM(s) Oral daily  losartan 100 milliGRAM(s) Oral daily  pantoprazole    Tablet 40 milliGRAM(s) Oral before breakfast    MEDICATIONS  (PRN):  heparin  Injectable 6000 Unit(s) IV Push every 6 hours PRN For aPTT less than 40  heparin  Injectable 3000 Unit(s) IV Push every 6 hours PRN For aPTT between 40 - 57    ALLERGIES:  &quot;VASELINE BASED OINTMENTS&quot; (Urticaria; Rash)  adhesives (Urticaria; Rash)  statins (Unknown)      LABS:                        11.7   8.4   )-----------( 197      ( 13 Mar 2018 05:54 )             34.9     03-13    142  |  105  |  27<H>  ----------------------------<  104<H>  3.9   |  20<L>  |  1.50<H>    Ca    8.8      13 Mar 2018 05:54  Phos  4.1     03-13  Mg     1.6     03-13    TPro  7.9  /  Alb  4.3  /  TBili  0.2  /  DBili  x   /  AST  39  /  ALT  75<H>  /  AlkPhos  119  03-12    PT/INR - ( 12 Mar 2018 19:33 )   PT: 11.0 sec;   INR: 1.01 ratio         PTT - ( 13 Mar 2018 05:54 )  PTT:> 200 sec  CARDIAC MARKERS ( 13 Mar 2018 05:54 )  x     / <0.01 ng/mL / x     / x     / x      CARDIAC MARKERS ( 13 Mar 2018 00:45 )  x     / <0.01 ng/mL / x     / x     / x      CARDIAC MARKERS ( 12 Mar 2018 19:33 )  x     / <0.01 ng/mL / 65 U/L / x     / 2.4 ng/mL      LIVER FUNCTIONS - ( 12 Mar 2018 19:33 )  Alb: 4.3 g/dL / Pro: 7.9 g/dL / ALK PHOS: 119 U/L / ALT: 75 U/L RC / AST: 39 U/L / GGT: x             TSH:   A1c:  BNP:Serum Pro-Brain Natriuretic Peptide: 8711 pg/mL (03-12 @ 19:33)    Lipid panel:   cultures:     RADIOLOGY & ADDITIONAL TESTS:    Imaging Personally Reviewed:  [ ] YES  [ ] NO
OMRENITA MARSHALL  82y Female  MRN:[id number]    Patient is a 82y old  Female who presents with a chief complaint of ARCHER (12 Mar 2018 22:49)    HPI:  Ms Marshall is an 82 year old woman with history of HTN, HLD, non obstructive CAD on cath 9.2014, carotid artery stenosis s/p endarterectomy, Mitral valve repair 1997, CKD 3b, who presents with ARCHER x 4 days and HR 140s noted at home today. Started gradually 4 days prior, unable to walk more than a few steps or 1 flight of stairs. +intermittent palpitations. Intermittent chest pressure with radiation to R arm not worse with exertion, worse lying down, worse with eating. +episodes of sweating but no clear association with pain. Patient checks BP daily and noted today to have , HR 140s so came to ED.   Has intermittent LE swelling but not currently, no orthopnea, no SOB at rest. No current chest discomfort. No history of DVT/PE, flight to [place] 2/1-2/5.     EKG with Afib with RVR without ischemic changes  received 10mg IVP diltiazem followed by 30mg PO with improvement in HR to 110-120  CXR clear (12 Mar 2018 22:49)      Patient seen and evaluated at bedside. No acute events overnight except as noted.    Interval HPI: converted to NSR overnight    PAST MEDICAL & SURGICAL HISTORY:  Mitral valve disease  Osteoporosis  Vitamin B 12 deficiency  Carotid artery occlusion: (R)  GERD (gastroesophageal reflux disease)  Hypercholesteremia  HTN (hypertension)  Insomnia  H/O carotid endarterectomy  Varicose veins: s/p sclerotherapy bilaterally  Bilateral cataracts: extraction w/ IOL  Papilloma of breast: s/p excision from right breast &gt;20 years ago  History of lumbar laminectomy for spinal cord decompression: 1995  Abdominal hernia: repair 2007  Hx of tonsillectomy  Status post DACIA-BSO: 1975  S/P MVR (mitral valve repair): 1997 at [facility]      REVIEW OF SYSTEMS:  as per hpi    VITALS:  Vital Signs Last 24 Hrs  T(C): 36.7 (14 Mar 2018 12:50), Max: 36.8 (14 Mar 2018 08:30)  T(F): 98 (14 Mar 2018 12:50), Max: 98.3 (14 Mar 2018 08:30)  HR: 75 (14 Mar 2018 12:50) (69 - 109)  BP: 129/65 (14 Mar 2018 12:50) (104/60 - 129/65)  BP(mean): --  RR: 18 (14 Mar 2018 12:50) (17 - 18)  SpO2: 98% (14 Mar 2018 12:50) (96% - 98%)      PHYSICAL EXAM:  GENERAL: NAD, well-developed  HEAD:  Atraumatic, Normocephalic  EYES: EOMI, PERRLA, conjunctiva and sclera clear  NECK: Supple, No JVD  CHEST/LUNG: Clear to auscultation bilaterally; No wheeze  HEART: S1, S2; No murmurs, rubs, or gallops  ABDOMEN: Soft, Nontender, Nondistended; Bowel sounds present  EXTREMITIES:  2+ Peripheral Pulses, No clubbing, cyanosis, or edema  PSYCH: Normal affect  NEUROLOGY: AAOX3; non-focal  SKIN: No rashes or lesions    Consultant(s) Notes Reviewed:  [x ] YES  [ ] NO  Care Discussed with Consultants/Other Providers [ x] YES  [ ] NO    MEDS:  MEDICATIONS  (STANDING):  amiodarone    Tablet 200 milliGRAM(s) Oral two times a day  amitriptyline 12.5 milliGRAM(s) Oral at bedtime  apixaban 5 milliGRAM(s) Oral every 12 hours  aspirin  chewable 81 milliGRAM(s) Oral daily  cholecalciferol 1000 Unit(s) Oral daily  cholestyramine Powder (Sugar-Free) 4 Gram(s) Oral two times a day  cyanocobalamin 100 MICROGram(s) Oral daily  furosemide    Tablet 40 milliGRAM(s) Oral daily  hydrochlorothiazide 25 milliGRAM(s) Oral daily  losartan 100 milliGRAM(s) Oral daily  pantoprazole    Tablet 40 milliGRAM(s) Oral before breakfast    MEDICATIONS  (PRN):      ALLERGIES:  &quot;VASELINE BASED OINTMENTS&quot; (Urticaria; Rash)  adhesives (Urticaria; Rash)  statins (Unknown)      LABS:                                                 11.6   8.29  )-----------( 233      ( 14 Mar 2018 07:38 )             37.2   03-14    144  |  104  |  24<H>  ----------------------------<  109<H>  3.7   |  23  |  1.43<H>    Ca    9.0      14 Mar 2018 06:07  Phos  4.1     03-13  Mg     1.6     03-13    TPro  7.9  /  Alb  4.3  /  TBili  0.2  /  DBili  x   /  AST  39  /  ALT  75<H>  /  AlkPhos  119  03-12
CC: No chest pains, SOB, or palpitations    HPI:  Ms Marshall is an 82 year old woman with history of HTN, HLD, non obstructive CAD on cath , carotid artery stenosis s/p endarterectomy, Mitral valve repair , CKD 3b, who presents with ARCHER x 4 days and HR 140s noted at home today. Started gradually 4 days prior, unable to walk more than a few steps or 1 flight of stairs. +intermittent palpitations. Intermittent chest pressure with radiation to R arm not worse with exertion, worse lying down, worse with eating. +episodes of sweating but no clear association with pain. Patient checks BP daily and noted today to have , HR 140s so came to ED.   Has intermittent LE swelling but not currently, no orthopnea, no SOB at rest. No current chest discomfort. No history of DVT/PE, flight to [place] -.    Vital Signs Last 24 Hrs  T(C): 37.3 (12 Mar 2018 18:10), Max: 37.3 (12 Mar 2018 18:10)  T(F): 99.2 (12 Mar 2018 18:10), Max: 99.2 (12 Mar 2018 18:10)  HR: 109 (12 Mar 2018 21:54) (91 - 135)  BP: 111/78 (12 Mar 2018 21:54) (111/78 - 135/102)  BP(mean): --  RR: 23 (12 Mar 2018 21:54) (16 - 23)  SpO2: 97% (12 Mar 2018 21:54) (97% - 98%)    EKG with Afib with RVR without ischemic changes  received 10mg IVP diltiazem followed by 30mg PO with improvement in HR to 110-120  CXR clear (12 Mar 2018 22:49)    Review Of Systems:     No chest pain, shortness of breath, or palpitations            Medications:  amiodarone    Tablet 200 milliGRAM(s) Oral two times a day  amitriptyline 12.5 milliGRAM(s) Oral at bedtime  apixaban 5 milliGRAM(s) Oral every 12 hours  aspirin  chewable 81 milliGRAM(s) Oral daily  cefTRIAXone   IVPB      cholecalciferol 1000 Unit(s) Oral daily  cholestyramine Powder (Sugar-Free) 4 Gram(s) Oral two times a day  cyanocobalamin 100 MICROGram(s) Oral daily  furosemide    Tablet 40 milliGRAM(s) Oral daily  hydrochlorothiazide 25 milliGRAM(s) Oral daily  losartan 100 milliGRAM(s) Oral daily  pantoprazole    Tablet 40 milliGRAM(s) Oral before breakfast    PAST MEDICAL & SURGICAL HISTORY:  Mitral valve disease  Osteoporosis  Vitamin B 12 deficiency  Carotid artery occlusion: (R)  GERD (gastroesophageal reflux disease)  Hypercholesteremia  HTN (hypertension)  Insomnia  H/O carotid endarterectomy  Varicose veins: s/p sclerotherapy bilaterally  Bilateral cataracts: extraction w/ IOL  Papilloma of breast: s/p excision from right breast &gt;20 years ago  History of lumbar laminectomy for spinal cord decompression:   Abdominal hernia: repair   Hx of tonsillectomy  Status post DACIA-BSO:   S/P MVR (mitral valve repair):  at [facility]    Vitals:  T(C): 36.4 (03-15-18 @ 05:37), Max: 36.8 (18 @ 08:30)  HR: 74 (03-15-18 @ 05:37) (74 - 84)  BP: 146/68 (03-15-18 @ 05:37) (112/70 - 146/68)  BP(mean): --  RR: 17 (03-15-18 @ 05:37) (17 - 18)  SpO2: 94% (03-15-18 @ 05:37) (94% - 98%)  Wt(kg): --  Daily     Daily Weight in k.4 (14 Mar 2018 08:01)  I&O's Summary    14 Mar 2018 07:01  -  15 Mar 2018 07:00  --------------------------------------------------------  IN: 470 mL / OUT: 450 mL / NET: 20 mL        Physical Exam:  Appearance: No acute distress; well appearing  Eyes: PERRL, EOMI, pink conjunctiva  HENT: Normal oral mucosa  Cardiovascular: RRR, S1, S2, no murmurs, rubs, or gallops; no edema; no JVD  Respiratory: Clear to auscultation bilaterally  Gastrointestinal: soft, non-tender, non-distended with normal bowel sounds  Musculoskeletal: No clubbing; no joint deformity   Neurologic: Non-focal  Lymphatic: No lymphadenopathy  Psychiatry: AAOx3, mood & affect appropriate  Skin: No rashes, ecchymoses, or cyanosis                          11.6   8.29  )-----------( 233      ( 14 Mar 2018 07:38 )             37.2     03-15    144  |  105  |  27<H>  ----------------------------<  104<H>  3.9   |  27  |  1.70<H>    Ca    9.0      15 Mar 2018 05:39  Phos  3.4     03-15  Mg     1.5     03-15      PTT - ( 14 Mar 2018 10:37 )  PTT:64.4 sec      Serum Pro-Brain Natriuretic Peptide: 8711 pg/mL (12 @ 19:33)          ECG:    Echo:    Stress Testing:     Cath:    Imaging:    Interpretation of Telemetry: NSR occ APCs, VPCs
MORENITA MARSHALL  82y Female  MRN:[id number]    Patient is a 82y old  Female who presents with a chief complaint of ARCHER (12 Mar 2018 22:49)    HPI:  Ms Marshall is an 82 year old woman with history of HTN, HLD, non obstructive CAD on cath 9.2014, carotid artery stenosis s/p endarterectomy, Mitral valve repair 1997, CKD 3b, who presents with ARCHER x 4 days and HR 140s noted at home today. Started gradually 4 days prior, unable to walk more than a few steps or 1 flight of stairs. +intermittent palpitations. Intermittent chest pressure with radiation to R arm not worse with exertion, worse lying down, worse with eating. +episodes of sweating but no clear association with pain. Patient checks BP daily and noted today to have , HR 140s so came to ED.   Has intermittent LE swelling but not currently, no orthopnea, no SOB at rest. No current chest discomfort. No history of DVT/PE, flight to [place] 2/1-2/5.     EKG with Afib with RVR without ischemic changes  received 10mg IVP diltiazem followed by 30mg PO with improvement in HR to 110-120  CXR clear (12 Mar 2018 22:49)      Patient seen and evaluated at bedside. No acute events overnight except as noted.    Interval HPI: c/o dysuria  tele: NSR    PAST MEDICAL & SURGICAL HISTORY:  Mitral valve disease  Osteoporosis  Vitamin B 12 deficiency  Carotid artery occlusion: (R)  GERD (gastroesophageal reflux disease)  Hypercholesteremia  HTN (hypertension)  Insomnia  H/O carotid endarterectomy  Varicose veins: s/p sclerotherapy bilaterally  Bilateral cataracts: extraction w/ IOL  Papilloma of breast: s/p excision from right breast &gt;20 years ago  History of lumbar laminectomy for spinal cord decompression: 1995  Abdominal hernia: repair 2007  Hx of tonsillectomy  Status post DACIA-BSO: 1975  S/P MVR (mitral valve repair): 1997 at [facility]      REVIEW OF SYSTEMS:  as per hpi    VITALS:  Vital Signs Last 24 Hrs  T(C): 36.4 (15 Mar 2018 05:37), Max: 36.7 (14 Mar 2018 12:50)  T(F): 97.5 (15 Mar 2018 05:37), Max: 98 (14 Mar 2018 12:50)  HR: 74 (15 Mar 2018 05:37) (74 - 84)  BP: 146/68 (15 Mar 2018 05:37) (129/65 - 146/68)  BP(mean): --  RR: 17 (15 Mar 2018 05:37) (17 - 18)  SpO2: 94% (15 Mar 2018 05:37) (94% - 98%)    PHYSICAL EXAM:  GENERAL: NAD, well-developed  HEAD:  Atraumatic, Normocephalic  EYES: EOMI, PERRLA, conjunctiva and sclera clear  NECK: Supple, No JVD  CHEST/LUNG: Clear to auscultation bilaterally; No wheeze  HEART: S1, S2; No murmurs, rubs, or gallops  ABDOMEN: Soft, Nontender, Nondistended; Bowel sounds present  EXTREMITIES:  2+ Peripheral Pulses, No clubbing, cyanosis, or edema  PSYCH: Normal affect  NEUROLOGY: AAOX3; non-focal  SKIN: No rashes or lesions    Consultant(s) Notes Reviewed:  [x ] YES  [ ] NO  Care Discussed with Consultants/Other Providers [ x] YES  [ ] NO    MEDS:  MEDICATIONS  (STANDING):  amiodarone    Tablet 200 milliGRAM(s) Oral two times a day  amitriptyline 12.5 milliGRAM(s) Oral at bedtime  apixaban 5 milliGRAM(s) Oral every 12 hours  aspirin  chewable 81 milliGRAM(s) Oral daily  cefTRIAXone   IVPB      cholecalciferol 1000 Unit(s) Oral daily  cholestyramine Powder (Sugar-Free) 4 Gram(s) Oral two times a day  cyanocobalamin 100 MICROGram(s) Oral daily  hydrochlorothiazide 25 milliGRAM(s) Oral daily  losartan 100 milliGRAM(s) Oral daily  pantoprazole    Tablet 40 milliGRAM(s) Oral before breakfast    MEDICATIONS  (PRN):        ALLERGIES:  &quot;VASELINE BASED OINTMENTS&quot; (Urticaria; Rash)  adhesives (Urticaria; Rash)  statins (Unknown)      LABS:                                                          11.0   7.03  )-----------( 227      ( 15 Mar 2018 07:42 )             37.1   03-15    144  |  105  |  27<H>  ----------------------------<  104<H>  3.9   |  27  |  1.70<H>    Ca    9.0      15 Mar 2018 05:39  Phos  3.4     03-15  Mg     1.5     03-15      < from: Transthoracic Echocardiogram (03.14.18 @ 12:01) >  -----------------------------  Conclusions:  1. Calcified trileaflet aortic valve with decreased  opening. Peak transaortic valve gradient equals 12 mm Hg,  mean transaortic valve gradient equals 6 mm Hg, estimated  aortic valve area equals 1.8 sqcm (by continuity equation),  aortic valve velocity time integral equals 34 cm,  consistent with mild aortic stenosis. Moderate aortic  regurgitation.  2. Normal left ventricular internal dimensions and wall  thicknesses.  3. Normal left ventricular systolic function. No segmental  wall motion abnormalities.  4. Right ventricular enlargement with normal right  ventricular systolic function.  5. Estimated pulmonary artery systolic pressure equals 57  mm Hg, assuming right atrial pressure equals 8 mm Hg,  consistent with moderate pulmonary pressures.  -------------------------------------------------    < end of copied text >

## 2018-03-16 NOTE — DISCHARGE NOTE ADULT - INSTRUCTIONS
Report signs and symptoms of distress, such as chest pain, shortness of breath  to your health-care provider promptly; if necessary call 911. Take medications as instructed. Keep follow-up appointments as scheduled. cardiac healthy diet with no fluid restrictions

## 2018-03-16 NOTE — DISCHARGE NOTE ADULT - PLAN OF CARE
Atrial fibrillation is the most common heart rhythm problem & has the risk of stroke & heart attack  It helps if you control your blood pressure, not drink more than 1-2 alcohol drinks per day, cut down on caffeine, getting treatment for over active thyroid gland, & getting exercise  Call your doctor if you feel your heart racing or beating unusually, chest tightness or pain, lightheaded, faint, shortness of breath especially with exercise  It is important to take your heart medication as prescribed  You are on Eliquis for anticoagulation & stroke prevention  Eliquis/Apixaban is used to thin the blood so clots will not form and to keep existing ones from getting bigger.  Take this medication daily as prescribed by your health care provider.  Take this medication with food to prevent upset stomach.  If you miss a dose call your health care provider or pharmacist right away.  Tell your doctor you use this drug before you have a spinal or epidural procedure  Tell dentists, surgeon, and other doctors that you use this drug.  You may bleed more easily.  Be careful and avoid injury.  Use a soft toothbrush and an electric razor. Follow up with your medical doctor to establish long term blood pressure treatment goals. take Amitriptyline as directed You had a bladder &/or kidney infection  Call your doctor with pain or burning with urination, frequent urination, feeling to urinate suddenly, blood in urine, fever, back pain, nausea or vomiting  You need to take and finish your antibiotic as prescribed so that the infection does not reoccur  Drink adequate fluids - there is no harm to try cranberry juice  Females need to urinate right after sex  start Cipro tonight & take for 6 doses  it is very important to see Dr. Wynne on Monday/Tuesday for 12 lead EKG to check QTC interval due to Cipro & Amiodarone drug interaction on Amiodarone  make sure to check liver function tests, eye exams, thyroid testing as directed due to Amiodarone side effects continue in normal sinus rhythm

## 2018-03-16 NOTE — DISCHARGE NOTE ADULT - PATIENT PORTAL LINK FT
You can access the [facility] Patient Portal, offered by [facility], by registering with the following website: [email or web address]

## 2018-03-16 NOTE — DISCHARGE NOTE ADULT - CARE PROVIDER_API CALL
Nicolás Wynne), Cardiovascular Disease; Internal Medicine  [address]  [place] [address]  Phone: [phone]  Fax: [phone]    Cory Estes), Cardiac Electrophysiology; Cardiology; Internal Medicine  [address] [place] [address]  Phone: [phone]

## 2018-03-16 NOTE — PROGRESS NOTE ADULT - PROBLEM SELECTOR PLAN 2
acs ruled out  no evid of acs  cards f/u
acs ruled out  no evid of acs  cards f/u
acs ruled out  no evid of acs  cards f/u  consider stress test
acs ruled out  no evid of acs  cards f/u

## 2018-03-16 NOTE — PROVIDER CONTACT NOTE (OTHER) - BACKGROUND
3/12/18- ARCHER x 4 days, atrial fibrillation  3/14/18- converted to NSR  3/15/18- elevated creatinine, UTI +

## 2018-03-16 NOTE — DISCHARGE NOTE ADULT - MEDICATION SUMMARY - MEDICATIONS TO CHANGE
I will SWITCH the dose or number of times a day I take the medications listed below when I get home from the hospital:    cholestyramine 4 g/5 g oral powder for reconstitution  -- 4 gram(s) by mouth 3 times a day

## 2018-03-17 LAB
-  AMIKACIN: SIGNIFICANT CHANGE UP
-  AMPICILLIN/SULBACTAM: SIGNIFICANT CHANGE UP
-  AMPICILLIN: SIGNIFICANT CHANGE UP
-  AZTREONAM: SIGNIFICANT CHANGE UP
-  CEFAZOLIN: SIGNIFICANT CHANGE UP
-  CEFEPIME: SIGNIFICANT CHANGE UP
-  CEFOXITIN: SIGNIFICANT CHANGE UP
-  CEFTAZIDIME: SIGNIFICANT CHANGE UP
-  CEFTRIAXONE: SIGNIFICANT CHANGE UP
-  CIPROFLOXACIN: SIGNIFICANT CHANGE UP
-  ERTAPENEM: SIGNIFICANT CHANGE UP
-  GENTAMICIN: SIGNIFICANT CHANGE UP
-  IMIPENEM: SIGNIFICANT CHANGE UP
-  LEVOFLOXACIN: SIGNIFICANT CHANGE UP
-  MEROPENEM: SIGNIFICANT CHANGE UP
-  NITROFURANTOIN: SIGNIFICANT CHANGE UP
-  PIPERACILLIN/TAZOBACTAM: SIGNIFICANT CHANGE UP
-  TOBRAMYCIN: SIGNIFICANT CHANGE UP
-  TRIMETHOPRIM/SULFAMETHOXAZOLE: SIGNIFICANT CHANGE UP
CULTURE RESULTS: SIGNIFICANT CHANGE UP
METHOD TYPE: SIGNIFICANT CHANGE UP
ORGANISM # SPEC MICROSCOPIC CNT: SIGNIFICANT CHANGE UP
SPECIMEN SOURCE: SIGNIFICANT CHANGE UP

## 2018-03-20 ENCOUNTER — APPOINTMENT (OUTPATIENT)
Dept: CARDIOLOGY | Facility: CLINIC | Age: 83
End: 2018-03-20
Payer: MEDICARE

## 2018-03-20 ENCOUNTER — NON-APPOINTMENT (OUTPATIENT)
Age: 83
End: 2018-03-20

## 2018-03-20 VITALS
HEIGHT: 62 IN | SYSTOLIC BLOOD PRESSURE: 118 MMHG | BODY MASS INDEX: 27.6 KG/M2 | WEIGHT: 150 LBS | HEART RATE: 70 BPM | DIASTOLIC BLOOD PRESSURE: 70 MMHG | OXYGEN SATURATION: 98 %

## 2018-03-20 PROCEDURE — 93000 ELECTROCARDIOGRAM COMPLETE: CPT

## 2018-03-20 PROCEDURE — 99214 OFFICE O/P EST MOD 30 MIN: CPT

## 2018-03-23 RX ORDER — AMIODARONE HYDROCHLORIDE 200 MG/1
200 TABLET ORAL
Qty: 60 | Refills: 0 | Status: COMPLETED | COMMUNITY
Start: 2018-03-16

## 2018-03-23 RX ORDER — CIPROFLOXACIN HYDROCHLORIDE 250 MG/1
250 TABLET, FILM COATED ORAL
Qty: 6 | Refills: 0 | Status: COMPLETED | COMMUNITY
Start: 2018-03-16

## 2018-03-23 RX ORDER — AMOXICILLIN 500 MG/1
500 CAPSULE ORAL
Qty: 21 | Refills: 0 | Status: COMPLETED | COMMUNITY
Start: 2018-01-19

## 2018-03-23 RX ORDER — BENZONATATE 200 MG/1
200 CAPSULE ORAL
Qty: 21 | Refills: 0 | Status: COMPLETED | COMMUNITY
Start: 2018-01-19

## 2018-03-23 RX ORDER — METOPROLOL SUCCINATE 50 MG/1
50 TABLET, EXTENDED RELEASE ORAL
Qty: 90 | Refills: 0 | Status: COMPLETED | COMMUNITY
Start: 2017-09-16

## 2018-03-23 RX ORDER — APIXABAN 5 MG/1
5 TABLET, FILM COATED ORAL
Qty: 60 | Refills: 0 | Status: COMPLETED | COMMUNITY
Start: 2018-03-16

## 2018-03-28 ENCOUNTER — MEDICATION RENEWAL (OUTPATIENT)
Age: 83
End: 2018-03-28

## 2018-04-13 ENCOUNTER — RX RENEWAL (OUTPATIENT)
Age: 83
End: 2018-04-13

## 2018-04-18 ENCOUNTER — APPOINTMENT (OUTPATIENT)
Dept: ELECTROPHYSIOLOGY | Facility: CLINIC | Age: 83
End: 2018-04-18

## 2018-04-19 ENCOUNTER — NON-APPOINTMENT (OUTPATIENT)
Age: 83
End: 2018-04-19

## 2018-04-19 ENCOUNTER — APPOINTMENT (OUTPATIENT)
Dept: CARDIOLOGY | Facility: CLINIC | Age: 83
End: 2018-04-19
Payer: MEDICARE

## 2018-04-19 VITALS
OXYGEN SATURATION: 97 % | HEART RATE: 76 BPM | DIASTOLIC BLOOD PRESSURE: 80 MMHG | SYSTOLIC BLOOD PRESSURE: 156 MMHG | WEIGHT: 150 LBS | BODY MASS INDEX: 27.6 KG/M2 | HEIGHT: 62 IN

## 2018-04-19 PROCEDURE — 93000 ELECTROCARDIOGRAM COMPLETE: CPT

## 2018-04-19 PROCEDURE — 99215 OFFICE O/P EST HI 40 MIN: CPT

## 2018-05-15 ENCOUNTER — INPATIENT (INPATIENT)
Facility: HOSPITAL | Age: 83
LOS: 6 days | Discharge: HOME | DRG: 417 | End: 2018-05-22
Attending: SURGERY | Admitting: SURGERY
Payer: MEDICARE

## 2018-05-15 VITALS
OXYGEN SATURATION: 97 % | RESPIRATION RATE: 18 BRPM | DIASTOLIC BLOOD PRESSURE: 58 MMHG | TEMPERATURE: 98 F | WEIGHT: 139.99 LBS | HEART RATE: 76 BPM | SYSTOLIC BLOOD PRESSURE: 107 MMHG

## 2018-05-15 DIAGNOSIS — Z98.89 OTHER SPECIFIED POSTPROCEDURAL STATES: Chronic | ICD-10-CM

## 2018-05-15 DIAGNOSIS — R10.13 EPIGASTRIC PAIN: ICD-10-CM

## 2018-05-15 LAB
ALBUMIN SERPL ELPH-MCNC: 4.7 G/DL — SIGNIFICANT CHANGE UP (ref 3.3–5)
ALP SERPL-CCNC: 117 U/L — SIGNIFICANT CHANGE UP (ref 40–120)
ALT FLD-CCNC: 25 U/L — SIGNIFICANT CHANGE UP (ref 10–45)
ANION GAP SERPL CALC-SCNC: 16 MMOL/L — SIGNIFICANT CHANGE UP (ref 5–17)
APTT BLD: 32.1 SEC — SIGNIFICANT CHANGE UP (ref 27.5–37.4)
AST SERPL-CCNC: 39 U/L — SIGNIFICANT CHANGE UP (ref 10–40)
BASOPHILS # BLD AUTO: 0.1 K/UL — SIGNIFICANT CHANGE UP (ref 0–0.2)
BASOPHILS NFR BLD AUTO: 1 % — SIGNIFICANT CHANGE UP (ref 0–2)
BILIRUB SERPL-MCNC: 0.6 MG/DL — SIGNIFICANT CHANGE UP (ref 0.2–1.2)
BUN SERPL-MCNC: 30 MG/DL — HIGH (ref 7–23)
CALCIUM SERPL-MCNC: 10.2 MG/DL — SIGNIFICANT CHANGE UP (ref 8.4–10.5)
CHLORIDE SERPL-SCNC: 99 MMOL/L — SIGNIFICANT CHANGE UP (ref 96–108)
CK MB CFR SERPL CALC: 1.5 NG/ML — SIGNIFICANT CHANGE UP (ref 0–3.8)
CK SERPL-CCNC: 64 U/L — SIGNIFICANT CHANGE UP (ref 25–170)
CO2 SERPL-SCNC: 25 MMOL/L — SIGNIFICANT CHANGE UP (ref 22–31)
CREAT SERPL-MCNC: 1.54 MG/DL — HIGH (ref 0.5–1.3)
EOSINOPHIL # BLD AUTO: 0.1 K/UL — SIGNIFICANT CHANGE UP (ref 0–0.5)
EOSINOPHIL NFR BLD AUTO: 1.3 % — SIGNIFICANT CHANGE UP (ref 0–6)
GLUCOSE SERPL-MCNC: 119 MG/DL — HIGH (ref 70–99)
HCT VFR BLD CALC: 39.6 % — SIGNIFICANT CHANGE UP (ref 34.5–45)
HGB BLD-MCNC: 13.1 G/DL — SIGNIFICANT CHANGE UP (ref 11.5–15.5)
INR BLD: 1.29 RATIO — HIGH (ref 0.88–1.16)
LIDOCAIN IGE QN: 27 U/L — SIGNIFICANT CHANGE UP (ref 7–60)
LYMPHOCYTES # BLD AUTO: 1.4 K/UL — SIGNIFICANT CHANGE UP (ref 1–3.3)
LYMPHOCYTES # BLD AUTO: 12.8 % — LOW (ref 13–44)
MCHC RBC-ENTMCNC: 28.7 PG — SIGNIFICANT CHANGE UP (ref 27–34)
MCHC RBC-ENTMCNC: 33.2 GM/DL — SIGNIFICANT CHANGE UP (ref 32–36)
MCV RBC AUTO: 86.5 FL — SIGNIFICANT CHANGE UP (ref 80–100)
MONOCYTES # BLD AUTO: 0.9 K/UL — SIGNIFICANT CHANGE UP (ref 0–0.9)
MONOCYTES NFR BLD AUTO: 8.3 % — SIGNIFICANT CHANGE UP (ref 2–14)
NEUTROPHILS # BLD AUTO: 8.5 K/UL — HIGH (ref 1.8–7.4)
NEUTROPHILS NFR BLD AUTO: 76.6 % — SIGNIFICANT CHANGE UP (ref 43–77)
NT-PROBNP SERPL-SCNC: 1230 PG/ML — HIGH (ref 0–300)
PLATELET # BLD AUTO: 244 K/UL — SIGNIFICANT CHANGE UP (ref 150–400)
POTASSIUM SERPL-MCNC: 4 MMOL/L — SIGNIFICANT CHANGE UP (ref 3.5–5.3)
POTASSIUM SERPL-SCNC: 4 MMOL/L — SIGNIFICANT CHANGE UP (ref 3.5–5.3)
PROT SERPL-MCNC: 7.9 G/DL — SIGNIFICANT CHANGE UP (ref 6–8.3)
PROTHROM AB SERPL-ACNC: 14 SEC — HIGH (ref 9.8–12.7)
RBC # BLD: 4.57 M/UL — SIGNIFICANT CHANGE UP (ref 3.8–5.2)
RBC # FLD: 12.3 % — SIGNIFICANT CHANGE UP (ref 10.3–14.5)
SODIUM SERPL-SCNC: 140 MMOL/L — SIGNIFICANT CHANGE UP (ref 135–145)
TROPONIN T SERPL-MCNC: <0.01 NG/ML — SIGNIFICANT CHANGE UP (ref 0–0.06)
WBC # BLD: 11 K/UL — HIGH (ref 3.8–10.5)
WBC # FLD AUTO: 11 K/UL — HIGH (ref 3.8–10.5)

## 2018-05-15 PROCEDURE — 71045 X-RAY EXAM CHEST 1 VIEW: CPT | Mod: 26

## 2018-05-15 PROCEDURE — 74174 CTA ABD&PLVS W/CONTRAST: CPT | Mod: 26

## 2018-05-15 PROCEDURE — 99285 EMERGENCY DEPT VISIT HI MDM: CPT | Mod: 25

## 2018-05-15 PROCEDURE — 71275 CT ANGIOGRAPHY CHEST: CPT | Mod: 26

## 2018-05-15 PROCEDURE — 93010 ELECTROCARDIOGRAM REPORT: CPT | Mod: 76

## 2018-05-15 RX ORDER — PANTOPRAZOLE SODIUM 20 MG/1
40 TABLET, DELAYED RELEASE ORAL
Qty: 0 | Refills: 0 | Status: DISCONTINUED | OUTPATIENT
Start: 2018-05-15 | End: 2018-05-18

## 2018-05-15 RX ORDER — MORPHINE SULFATE 50 MG/1
4 CAPSULE, EXTENDED RELEASE ORAL ONCE
Qty: 0 | Refills: 0 | Status: DISCONTINUED | OUTPATIENT
Start: 2018-05-15 | End: 2018-05-16

## 2018-05-15 RX ORDER — SODIUM CHLORIDE 9 MG/ML
1000 INJECTION, SOLUTION INTRAVENOUS
Qty: 0 | Refills: 0 | Status: DISCONTINUED | OUTPATIENT
Start: 2018-05-15 | End: 2018-05-16

## 2018-05-15 RX ORDER — AMITRIPTYLINE HCL 25 MG
25 TABLET ORAL AT BEDTIME
Qty: 0 | Refills: 0 | Status: DISCONTINUED | OUTPATIENT
Start: 2018-05-15 | End: 2018-05-18

## 2018-05-15 RX ORDER — ONDANSETRON 8 MG/1
8 TABLET, FILM COATED ORAL ONCE
Qty: 0 | Refills: 0 | Status: COMPLETED | OUTPATIENT
Start: 2018-05-15 | End: 2018-05-15

## 2018-05-15 RX ORDER — ONDANSETRON 8 MG/1
4 TABLET, FILM COATED ORAL ONCE
Qty: 0 | Refills: 0 | Status: COMPLETED | OUTPATIENT
Start: 2018-05-15 | End: 2018-05-15

## 2018-05-15 RX ORDER — CEFTRIAXONE 500 MG/1
1 INJECTION, POWDER, FOR SOLUTION INTRAMUSCULAR; INTRAVENOUS ONCE
Qty: 0 | Refills: 0 | Status: DISCONTINUED | OUTPATIENT
Start: 2018-05-15 | End: 2018-05-15

## 2018-05-15 RX ORDER — METOPROLOL TARTRATE 50 MG
100 TABLET ORAL
Qty: 0 | Refills: 0 | Status: DISCONTINUED | OUTPATIENT
Start: 2018-05-15 | End: 2018-05-18

## 2018-05-15 RX ORDER — LOSARTAN POTASSIUM 100 MG/1
100 TABLET, FILM COATED ORAL DAILY
Qty: 0 | Refills: 0 | Status: DISCONTINUED | OUTPATIENT
Start: 2018-05-15 | End: 2018-05-16

## 2018-05-15 RX ORDER — ASPIRIN/CALCIUM CARB/MAGNESIUM 324 MG
81 TABLET ORAL ONCE
Qty: 0 | Refills: 0 | Status: COMPLETED | OUTPATIENT
Start: 2018-05-15 | End: 2018-05-15

## 2018-05-15 RX ORDER — CEFTRIAXONE 500 MG/1
1 INJECTION, POWDER, FOR SOLUTION INTRAMUSCULAR; INTRAVENOUS ONCE
Qty: 0 | Refills: 0 | Status: COMPLETED | OUTPATIENT
Start: 2018-05-15 | End: 2018-05-15

## 2018-05-15 RX ORDER — HYDROCHLOROTHIAZIDE 25 MG
25 TABLET ORAL DAILY
Qty: 0 | Refills: 0 | Status: DISCONTINUED | OUTPATIENT
Start: 2018-05-15 | End: 2018-05-16

## 2018-05-15 RX ORDER — AMPICILLIN SODIUM AND SULBACTAM SODIUM 250; 125 MG/ML; MG/ML
3 INJECTION, POWDER, FOR SUSPENSION INTRAMUSCULAR; INTRAVENOUS EVERY 12 HOURS
Qty: 0 | Refills: 0 | Status: DISCONTINUED | OUTPATIENT
Start: 2018-05-16 | End: 2018-05-18

## 2018-05-15 RX ORDER — AMPICILLIN SODIUM AND SULBACTAM SODIUM 250; 125 MG/ML; MG/ML
3 INJECTION, POWDER, FOR SUSPENSION INTRAMUSCULAR; INTRAVENOUS ONCE
Qty: 0 | Refills: 0 | Status: COMPLETED | OUTPATIENT
Start: 2018-05-15 | End: 2018-05-15

## 2018-05-15 RX ORDER — SODIUM CHLORIDE 9 MG/ML
3 INJECTION INTRAMUSCULAR; INTRAVENOUS; SUBCUTANEOUS ONCE
Qty: 0 | Refills: 0 | Status: COMPLETED | OUTPATIENT
Start: 2018-05-15 | End: 2018-05-15

## 2018-05-15 RX ORDER — AMPICILLIN SODIUM AND SULBACTAM SODIUM 250; 125 MG/ML; MG/ML
INJECTION, POWDER, FOR SUSPENSION INTRAMUSCULAR; INTRAVENOUS
Qty: 0 | Refills: 0 | Status: DISCONTINUED | OUTPATIENT
Start: 2018-05-15 | End: 2018-05-18

## 2018-05-15 RX ORDER — AMITRIPTYLINE HCL 25 MG
0.5 TABLET ORAL
Qty: 0 | Refills: 0 | COMMUNITY

## 2018-05-15 RX ORDER — SODIUM CHLORIDE 9 MG/ML
500 INJECTION INTRAMUSCULAR; INTRAVENOUS; SUBCUTANEOUS ONCE
Qty: 0 | Refills: 0 | Status: COMPLETED | OUTPATIENT
Start: 2018-05-15 | End: 2018-05-15

## 2018-05-15 RX ORDER — NITROGLYCERIN 6.5 MG
0.4 CAPSULE, EXTENDED RELEASE ORAL
Qty: 0 | Refills: 0 | Status: DISCONTINUED | OUTPATIENT
Start: 2018-05-15 | End: 2018-05-18

## 2018-05-15 RX ADMIN — SODIUM CHLORIDE 3 MILLILITER(S): 9 INJECTION INTRAMUSCULAR; INTRAVENOUS; SUBCUTANEOUS at 14:43

## 2018-05-15 RX ADMIN — SODIUM CHLORIDE 500 MILLILITER(S): 9 INJECTION INTRAMUSCULAR; INTRAVENOUS; SUBCUTANEOUS at 20:20

## 2018-05-15 RX ADMIN — ONDANSETRON 8 MILLIGRAM(S): 8 TABLET, FILM COATED ORAL at 16:55

## 2018-05-15 RX ADMIN — ONDANSETRON 4 MILLIGRAM(S): 8 TABLET, FILM COATED ORAL at 23:30

## 2018-05-15 RX ADMIN — AMPICILLIN SODIUM AND SULBACTAM SODIUM 200 GRAM(S): 250; 125 INJECTION, POWDER, FOR SUSPENSION INTRAMUSCULAR; INTRAVENOUS at 23:37

## 2018-05-15 RX ADMIN — SODIUM CHLORIDE 500 MILLILITER(S): 9 INJECTION INTRAMUSCULAR; INTRAVENOUS; SUBCUTANEOUS at 16:55

## 2018-05-15 RX ADMIN — Medication 81 MILLIGRAM(S): at 16:55

## 2018-05-15 RX ADMIN — SODIUM CHLORIDE 100 MILLILITER(S): 9 INJECTION, SOLUTION INTRAVENOUS at 23:30

## 2018-05-15 RX ADMIN — Medication 0.4 MILLIGRAM(S): at 15:44

## 2018-05-15 RX ADMIN — CEFTRIAXONE 100 GRAM(S): 500 INJECTION, POWDER, FOR SOLUTION INTRAMUSCULAR; INTRAVENOUS at 20:20

## 2018-05-15 NOTE — H&P ADULT - ASSESSMENT
82 year old female with 2 days of vague chest/epigastric pain radiating to her back, RUQ tender on exam, WBC 11, LFTs wnl, CT showing distended gb with 1.8 cm CBD, but 0.8 cm on US  - admit to surgery Dr Flores  - NPO, IVF  - will treat for acute cholecystitis with IV antibiotics  - Will obtain MRCP to evaluate for bile duct dilatation  - GI consult  - Hold Eliquis for now  - Discussed with attg Dr Mark ALVARADO Rosey R4  [facility] [id number]

## 2018-05-15 NOTE — ED ADULT NURSE NOTE - PSH
Abdominal hernia  repair 2007  Bilateral cataracts  extraction w/ IOL  H/O carotid endarterectomy    History of lumbar laminectomy for spinal cord decompression  1995  Hx of tonsillectomy    Papilloma of breast  s/p excision from right breast >20 years ago  S/P MVR (mitral valve repair)  1997 at [facility]  Status post DACIA-BSO  1975  Varicose veins  s/p sclerotherapy bilaterally

## 2018-05-15 NOTE — H&P ADULT - PSH
Abdominal hernia  repair 2007  Bilateral cataracts  extraction w/ IOL  H/O carotid endarterectomy    History of lumbar laminectomy for spinal cord decompression  1995  Hx of tonsillectomy    Papilloma of breast  s/p excision from right breast >20 years ago  S/P MVR (mitral valve repair)  1997 at [facility]  Status post DACIA-BSO  1975  Varicose veins  s/p sclerotherapy bilaterally

## 2018-05-15 NOTE — ED PROVIDER NOTE - NS ED ROS FT
No fever, no chills, no change in vision, no throat pain, no chest pain, no abdominal pain, no joint pain, no rashes, no focal neurologic complaints,  all ROS otherwise as per HPI or negative. Positive for chest, back, neck pain    No fever, no chills, no change in vision, no throat pain, no joint pain, no rashes, no focal neurologic complaints,  all ROS otherwise as per HPI or negative.

## 2018-05-15 NOTE — H&P ADULT - NSHPPHYSICALEXAM_GEN_ALL_CORE
T(C): 36.6 (05-15-18 @ 15:44), Max: 36.7 (05-15-18 @ 14:11)  HR: 80 (05-15-18 @ 20:25) (71 - 87)  BP: 180/73 (05-15-18 @ 20:25) (107/58 - 182/68)  RR: 17 (05-15-18 @ 20:25) (16 - 22)  SpO2: 97% (05-15-18 @ 20:25) (95% - 98%)  Tmax: T(C): , Max: 36.7 (05-15-18 @ 14:11)      Gen: NAD, awake, alert, pleasant  HEENT: normocephalic, atraumatic, no scleral icterus  CV: S1, S2, RRR  Pulm: CTA B/L  Abd: Soft, ND, NTP, no rebound, no guarding, no palpable organomegaly/masses  Ext: warm

## 2018-05-15 NOTE — H&P ADULT - NSHPLABSRESULTS_GEN_ALL_CORE
13.1   11.0  )-----------( 244      ( 15 May 2018 15:02 )             39.6     05-15    140  |  99  |  30<H>  ----------------------------<  119<H>  4.0   |  25  |  1.54<H>    Ca    10.2      15 May 2018 15:02    TPro  7.9  /  Alb  4.7  /  TBili  0.6  /  DBili  x   /  AST  39  /  ALT  25  /  AlkPhos  117  05-15    PT/INR - ( 15 May 2018 15:02 )   PT: 14.0 sec;   INR: 1.29 ratio         PTT - ( 15 May 2018 15:02 )  PTT:32.1 sec          Imaging  < from: CT Angio Abdomen and Pelvis w/ IV Cont (05.15.18 @ 17:29) >    FINDINGS:    CHEST:     LUNGS AND LARGE AIRWAYS: Patent central airways. A 2 mm right upper lobe   nodule (series 13, image 80). Bibasilar subsegmental and dependent   atelectasis.  PLEURA: Small bilateral pleural effusions.  VESSELS: Atheromatousdisease of the aorta. No acute intramural hematoma.   No aortic dissection. Great vessels off the aortic arch are patent.   Normal caliber thoracic aorta. Coronary artery calcifications.   HEART: Heart size is enlarged. No pericardial effusion. Mitral valve   repair.  MEDIASTINUM AND AMBER: No lymphadenopathy.  CHEST WALL AND LOWER NECK: Median sternotomy. Left chest pacemaker.    ABDOMEN AND PELVIS:    LIVER: Within normal limits.  BILE DUCTS: Common bile duct measures up to 1.8 cm.  GALLBLADDER:Distended. Ultrasound recommended for further evaluation.  SPLEEN: Within normal limits.  PANCREAS: Within normal limits.  ADRENALS: Within normal limits.  KIDNEYS/URETERS: Right renal cyst. No hydronephrosis.    BLADDER: Within normal limits.  REPRODUCTIVE ORGANS: Status post hysterectomy. No adnexal masses.    BOWEL: No bowel obstruction. Small hiatal hernia.  PERITONEUM: No ascites.  VESSELS: Atherosclerotic calcifications. No aortic dissection. Normal   caliber abdominal aorta. The celiac, superior mesenteric, bilateral renal   and inferior mesenteric arteries are patent.  RETROPERITONEUM: No lymphadenopathy.    ABDOMINAL WALL: Small fat-containing umbilical hernia. Status post   anterior abdominal wall repair.  BONES: Grade 2 anterolisthesis of L4 and L5. Age indeterminate severe T10   compression deformity.    IMPRESSION: No aortic dissection.    Distended gallbladder with common bile duct dilatation. Ultrasound   recommended for further evaluation.    < end of copied text >

## 2018-05-15 NOTE — H&P ADULT - HISTORY OF PRESENT ILLNESS
GENERAL SURGERY NOTE    CC: Patient is a 82 year old female who presents with a chief complaint of chest pain      Patient is a 82 year old female with PMHx of Afib (on Eliquis), CHF, DIANE, MV repair, presenting initially with complaint of chest pain, radiating to the back. The patient was ruled out for ACS with normal EKG and negative troponins, and CTA did not show a dissection. The CT scan however did reveal a dilated gallbladder with 1.8 cm CBD. A follow-up US revealed a gallbladder with stones and sludge, pericholecystic fluid, and a positive sonographic Hui's sign, CBD 0.8 cm. The patient reports that pain began yesterday, which was thought to be just reflux. She took reflux medication but did not feel any relief. The patient then developed nausea and vomiting, noted to be red-tinged. She is passing flatus and is having normal BMs, last BM today. Denies having any history of gallbladder disease or similar episodes of pain to this one. Last endoscopy > 10 years ago, showed reflux, last c-scope 2016 for diarrhea showed colitis.        PMH  Afib  Mitral valve disease  Osteoporosis  Vitamin B 12 deficiency  Carotid artery stenosis  GERD (gastroesophageal reflux disease)  Hypercholesteremia  HTN (hypertension)  Insomnia      PSH  H/O carotid endarterectomy/stent 2014 ([facility])  Varicose veins  Bilateral cataracts  Papilloma of breast 1989  History of lumbar laminectomy for spinal cord decompression 1995  Abdominal hernia 2007  Hx of tonsillectomy  Status post DACIA-BSO age 1975  S/P MVR (mitral valve repair) 1997  Hiatal hernia repair 2010 ([facility])      10-point review of history otherwise negative unless indicated in HPI    FAMILY HISTORY  2 sisters with gallbladder disease    MEDS  Amitryptiline 25 mg   Losartan HCTZ 100-25 mg  Pantoprazole 40 mg  Metoprolol 200 mg  Furosemide 40 mg  Cholestyramine 4 gms 1-3 times daily  ASA 81 mg  Eliquis BID 5 mg BID    ALLERGIES  "VASELINE BASED OINTMENT" (Urticaria; Rash)  adhesives (Urticaria; Rash)  statins (Unknown)

## 2018-05-15 NOTE — ED ADULT NURSE REASSESSMENT NOTE - NS ED NURSE REASSESS COMMENT FT1
c/o midsternal chest pain and abd pain, opted to take SL nitro (before trying morphine for pain control), dtr present, vitals stable

## 2018-05-15 NOTE — ED PROVIDER NOTE - PROGRESS NOTE DETAILS
Sukhdeep Dsouza MD: surgery consulted and will evaluate, Dr. Lee aware and will likely disposition to medicine to Dr. Najera. Surgery contacted for CT abdomen findings Wants pt admitted to Dr Flores service--Lee Sukhdeep Dsouza MD: surgery consulted and will evaluate, Dr. Lee aware and will likely disposition to medicine to Dr. Najera for acs r/o and or possible surgery admission to follow

## 2018-05-15 NOTE — ED PROVIDER NOTE - PHYSICAL EXAMINATION
82 cp squeein in nature epigastric to the back GEN: mild distress secondary to pain. , awake, eyes open spontaneously/    /HEENT: NCAT, MMM, Trachea midline, normal conjunctiva, perrl/    /CHEST/LUNGS: Non-tachypneic, CTAB, bilateral breath sounds/    /CARDIAC: Non-tachycardic, normal perfusion/    /ABDOMEN: Soft, NTND, No rebound/guarding/    /MSK: No edema, no gross deformity of extremities/    /SKIN: No rashes, no petechiae, no vesicles/    /NEURO: CN grossly intact, normal coordination, no focal motor or sensory deficits/    /PSYCH: Alert, appropriate, cooperative, with capacity and insight GEN: mild distress secondary to pain. , awake, eyes open spontaneously/    /HEENT: NCAT, MMM, Trachea midline, normal conjunctiva, perrl/    /CHEST/LUNGS: Non-tachypneic, CTAB, bilateral breath sounds/    /CARDIAC: Non-tachycardic, normal perfusion/    /ABDOMEN: Soft, +ruq tenderness to palpation and epigastric tenderness to palpation, No rebound/guarding/    /MSK: No edema, no gross deformity of extremities/    /SKIN: No rashes, no petechiae, no vesicles/    /NEURO: CN grossly intact, normal coordination, no focal motor or sensory deficits/    /PSYCH: Alert, appropriate, cooperative, with capacity and insight

## 2018-05-15 NOTE — ED PROVIDER NOTE - OBJECTIVE STATEMENT
83 year old female with PMHx of congestive heart failure, atrial fibrillation, HTN, HLD who presents to the ED with complaints of pain in her chest radiating to her stomach, chest, back and ribs on the right, and heart burn yesterday. Pain described as a squeezing. Takes heart burn medication but was ineffective so she took some Tums with some alleviation of her symptoms. Woke up this morning and her pain had worsened. Episode of vomiting around 11 am today while sitting at the computer. Reports Vomiting 5-6x. After taking 4 nitroglycerin she began feeling better. Denies nausea.       Medication - Amitriptyline 25 mg, Losartan HCTZ 100-25, Pantoprazole 40 mg, Metoprolol 200m, Furosemide 40 mg, all 1x a day.  Cholestyramine 4 gms 1-3x daily. 83 year old female with PMHx of congestive heart failure, atrial fibrillation, HTN, HLD who presents to the ED with complaints of pain in her chest radiating to her stomach, chest, back and ribs on the right, and heart burn yesterday. Pain described as a squeezing. Takes heart burn medication but was ineffective so she took some Tums with some alleviation of her symptoms. Woke up this morning and her pain had worsened. Episode of vomiting around 11 am today while sitting at the computer. Reports Vomiting 5-6x. After taking 4 nitroglycerin she began feeling better. Denies nausea.     Current Medication - Amitriptyline 25 mg, Losartan HCTZ 100-25, Pantoprazole 40 mg, Metoprolol 200m, Furosemide 40 mg, all 1x a day.  Cholestyramine 4 gms 1-3x daily.

## 2018-05-15 NOTE — H&P ADULT - NSHPSOCIALHISTORY_GEN_ALL_CORE
Former tobacco, quit 30 years ago, 1 ppd x 20 years  3-4 glasses of wine/ day, no history of withdrawal  Lives alone

## 2018-05-15 NOTE — ED PROVIDER NOTE - PSH
Abdominal hernia  repair 2007  Bilateral cataracts  extraction w/ IOL  H/O carotid endarterectomy    History of lumbar laminectomy for spinal cord decompression  1995  Hx of tonsillectomy    Papilloma of breast  s/p excision from right breast >20 years ago  S/P MVR (mitral valve repair)  1997 at [facility]  Status post DACIA-BSO  1975  Varicose veins  s/p sclerotherapy bilaterally

## 2018-05-15 NOTE — ED ADULT NURSE NOTE - OBJECTIVE STATEMENT
82 year old female alert and oriented x 4 came to the ED by EMS c/o chest pain.  Patient said she had a burning sensation in the chest and it felt like acid reflux.  Patient said she took Tums with mild relief of symptoms and was able to sleep.  Patient said this morning she was at rest and felt a pain across her chest which she described as tightness radiating to the epigastric region and back.  Patient took 2 nitro with no relief and one 81mg ASA.  EMS gave patient an additional 2 nitro with relief of pain and 2 81mg ASA.  Patient arrived with no chest pain, shortness of breath.  Patient endorses 5 episodes of vomiting since this morning.  Patient had #18 established in right AC by EMS and #20 by ED in left AC.  Patient placed on CM in SR and safety ensured.

## 2018-05-15 NOTE — ED PROVIDER NOTE - MEDICAL DECISION MAKING DETAILS
82 year old female with chest pain squeezing in nature, epigastric radiates to the back. Concern for ACS ekg looks like old ekg, no acute mi iv blood work repeat ekg in 15 cbc cmp cardiac enzyme chste xr aspriin 325 4 irene tiwari aware ercom marilee pearce will wait cardaic enzyme treat as necessar yif pain persist ct scan with contrast to ru aortic aneurism or disection. 82 year old female with chest pain squeezing in nature, epigastric radiates to the back. Concern for ACS EKG looks like old EKG, no acute myocardial infarction, IV, blood work. Repeat EKG in 15 minutes. CBC,  CMP, cardiac enzymes, chest XR. Will given aspirin for a total of 325mg, 4 today. Dr. Edward Oconnell has been made aware will communicate with Dr. Lulu Najera. Will wait for cardiac enzyme, treat as necessary if pain persist obtain CT scan with contrast to R/u aortic aneurism or dissection.

## 2018-05-16 DIAGNOSIS — Z29.9 ENCOUNTER FOR PROPHYLACTIC MEASURES, UNSPECIFIED: ICD-10-CM

## 2018-05-16 DIAGNOSIS — E78.5 HYPERLIPIDEMIA, UNSPECIFIED: ICD-10-CM

## 2018-05-16 DIAGNOSIS — K80.00 CALCULUS OF GALLBLADDER WITH ACUTE CHOLECYSTITIS WITHOUT OBSTRUCTION: ICD-10-CM

## 2018-05-16 DIAGNOSIS — I10 ESSENTIAL (PRIMARY) HYPERTENSION: ICD-10-CM

## 2018-05-16 DIAGNOSIS — R74.0 NONSPECIFIC ELEVATION OF LEVELS OF TRANSAMINASE AND LACTIC ACID DEHYDROGENASE [LDH]: ICD-10-CM

## 2018-05-16 LAB
ALBUMIN SERPL ELPH-MCNC: 4.1 G/DL — SIGNIFICANT CHANGE UP (ref 3.3–5)
ALP SERPL-CCNC: 112 U/L — SIGNIFICANT CHANGE UP (ref 40–120)
ALT FLD-CCNC: 52 U/L — HIGH (ref 10–45)
ANION GAP SERPL CALC-SCNC: 14 MMOL/L — SIGNIFICANT CHANGE UP (ref 5–17)
ANION GAP SERPL CALC-SCNC: 14 MMOL/L — SIGNIFICANT CHANGE UP (ref 5–17)
AST SERPL-CCNC: 63 U/L — HIGH (ref 10–40)
BILIRUB SERPL-MCNC: 0.5 MG/DL — SIGNIFICANT CHANGE UP (ref 0.2–1.2)
BLD GP AB SCN SERPL QL: NEGATIVE — SIGNIFICANT CHANGE UP
BUN SERPL-MCNC: 18 MG/DL — SIGNIFICANT CHANGE UP (ref 7–23)
BUN SERPL-MCNC: 23 MG/DL — SIGNIFICANT CHANGE UP (ref 7–23)
CALCIUM SERPL-MCNC: 8.9 MG/DL — SIGNIFICANT CHANGE UP (ref 8.4–10.5)
CALCIUM SERPL-MCNC: 9 MG/DL — SIGNIFICANT CHANGE UP (ref 8.4–10.5)
CHLORIDE SERPL-SCNC: 104 MMOL/L — SIGNIFICANT CHANGE UP (ref 96–108)
CHLORIDE SERPL-SCNC: 104 MMOL/L — SIGNIFICANT CHANGE UP (ref 96–108)
CK SERPL-CCNC: 130 U/L — SIGNIFICANT CHANGE UP (ref 25–170)
CO2 SERPL-SCNC: 25 MMOL/L — SIGNIFICANT CHANGE UP (ref 22–31)
CO2 SERPL-SCNC: 25 MMOL/L — SIGNIFICANT CHANGE UP (ref 22–31)
CREAT SERPL-MCNC: 1.32 MG/DL — HIGH (ref 0.5–1.3)
CREAT SERPL-MCNC: 1.32 MG/DL — HIGH (ref 0.5–1.3)
GLUCOSE SERPL-MCNC: 100 MG/DL — HIGH (ref 70–99)
GLUCOSE SERPL-MCNC: 85 MG/DL — SIGNIFICANT CHANGE UP (ref 70–99)
HCT VFR BLD CALC: 33.8 % — LOW (ref 34.5–45)
HCT VFR BLD CALC: 35.9 % — SIGNIFICANT CHANGE UP (ref 34.5–45)
HGB BLD-MCNC: 10.9 G/DL — LOW (ref 11.5–15.5)
HGB BLD-MCNC: 11.9 G/DL — SIGNIFICANT CHANGE UP (ref 11.5–15.5)
MAGNESIUM SERPL-MCNC: 1.8 MG/DL — SIGNIFICANT CHANGE UP (ref 1.6–2.6)
MCHC RBC-ENTMCNC: 27.3 PG — SIGNIFICANT CHANGE UP (ref 27–34)
MCHC RBC-ENTMCNC: 29.1 PG — SIGNIFICANT CHANGE UP (ref 27–34)
MCHC RBC-ENTMCNC: 32.2 GM/DL — SIGNIFICANT CHANGE UP (ref 32–36)
MCHC RBC-ENTMCNC: 33.3 GM/DL — SIGNIFICANT CHANGE UP (ref 32–36)
MCV RBC AUTO: 84.7 FL — SIGNIFICANT CHANGE UP (ref 80–100)
MCV RBC AUTO: 87.5 FL — SIGNIFICANT CHANGE UP (ref 80–100)
PHOSPHATE SERPL-MCNC: 3.3 MG/DL — SIGNIFICANT CHANGE UP (ref 2.5–4.5)
PLATELET # BLD AUTO: 198 K/UL — SIGNIFICANT CHANGE UP (ref 150–400)
PLATELET # BLD AUTO: 221 K/UL — SIGNIFICANT CHANGE UP (ref 150–400)
POTASSIUM SERPL-MCNC: 3.5 MMOL/L — SIGNIFICANT CHANGE UP (ref 3.5–5.3)
POTASSIUM SERPL-MCNC: 3.8 MMOL/L — SIGNIFICANT CHANGE UP (ref 3.5–5.3)
POTASSIUM SERPL-SCNC: 3.5 MMOL/L — SIGNIFICANT CHANGE UP (ref 3.5–5.3)
POTASSIUM SERPL-SCNC: 3.8 MMOL/L — SIGNIFICANT CHANGE UP (ref 3.5–5.3)
PROT SERPL-MCNC: 6.8 G/DL — SIGNIFICANT CHANGE UP (ref 6–8.3)
RBC # BLD: 3.99 M/UL — SIGNIFICANT CHANGE UP (ref 3.8–5.2)
RBC # BLD: 4.1 M/UL — SIGNIFICANT CHANGE UP (ref 3.8–5.2)
RBC # FLD: 12.4 % — SIGNIFICANT CHANGE UP (ref 10.3–14.5)
RBC # FLD: 14.2 % — SIGNIFICANT CHANGE UP (ref 10.3–14.5)
RH IG SCN BLD-IMP: POSITIVE — SIGNIFICANT CHANGE UP
SODIUM SERPL-SCNC: 143 MMOL/L — SIGNIFICANT CHANGE UP (ref 135–145)
SODIUM SERPL-SCNC: 143 MMOL/L — SIGNIFICANT CHANGE UP (ref 135–145)
TROPONIN T SERPL-MCNC: <0.01 NG/ML — SIGNIFICANT CHANGE UP (ref 0–0.06)
WBC # BLD: 7.1 K/UL — SIGNIFICANT CHANGE UP (ref 3.8–10.5)
WBC # BLD: 7.75 K/UL — SIGNIFICANT CHANGE UP (ref 3.8–10.5)
WBC # FLD AUTO: 7.1 K/UL — SIGNIFICANT CHANGE UP (ref 3.8–10.5)
WBC # FLD AUTO: 7.75 K/UL — SIGNIFICANT CHANGE UP (ref 3.8–10.5)

## 2018-05-16 PROCEDURE — 74183 MRI ABD W/O CNTR FLWD CNTR: CPT | Mod: 26

## 2018-05-16 PROCEDURE — 99232 SBSQ HOSP IP/OBS MODERATE 35: CPT | Mod: 57

## 2018-05-16 PROCEDURE — 93010 ELECTROCARDIOGRAM REPORT: CPT

## 2018-05-16 PROCEDURE — 12345: CPT | Mod: NC

## 2018-05-16 RX ORDER — AMLODIPINE BESYLATE 2.5 MG/1
2.5 TABLET ORAL DAILY
Qty: 0 | Refills: 0 | Status: DISCONTINUED | OUTPATIENT
Start: 2018-05-16 | End: 2018-05-17

## 2018-05-16 RX ORDER — ENOXAPARIN SODIUM 100 MG/ML
30 INJECTION SUBCUTANEOUS DAILY
Qty: 0 | Refills: 0 | Status: DISCONTINUED | OUTPATIENT
Start: 2018-05-16 | End: 2018-05-18

## 2018-05-16 RX ORDER — DEXTROSE MONOHYDRATE, SODIUM CHLORIDE, AND POTASSIUM CHLORIDE 50; .745; 4.5 G/1000ML; G/1000ML; G/1000ML
1000 INJECTION, SOLUTION INTRAVENOUS
Qty: 0 | Refills: 0 | Status: DISCONTINUED | OUTPATIENT
Start: 2018-05-16 | End: 2018-05-17

## 2018-05-16 RX ORDER — POTASSIUM CHLORIDE 20 MEQ
10 PACKET (EA) ORAL
Qty: 0 | Refills: 0 | Status: COMPLETED | OUTPATIENT
Start: 2018-05-16 | End: 2018-05-16

## 2018-05-16 RX ORDER — AMLODIPINE BESYLATE 2.5 MG/1
5 TABLET ORAL DAILY
Qty: 0 | Refills: 0 | Status: DISCONTINUED | OUTPATIENT
Start: 2018-05-16 | End: 2018-05-16

## 2018-05-16 RX ADMIN — LOSARTAN POTASSIUM 100 MILLIGRAM(S): 100 TABLET, FILM COATED ORAL at 05:51

## 2018-05-16 RX ADMIN — Medication 100 MILLIGRAM(S): at 18:00

## 2018-05-16 RX ADMIN — Medication 25 MILLIGRAM(S): at 21:32

## 2018-05-16 RX ADMIN — Medication 0.4 MILLIGRAM(S): at 20:12

## 2018-05-16 RX ADMIN — Medication 25 MILLIGRAM(S): at 00:26

## 2018-05-16 RX ADMIN — AMPICILLIN SODIUM AND SULBACTAM SODIUM 200 GRAM(S): 250; 125 INJECTION, POWDER, FOR SUSPENSION INTRAMUSCULAR; INTRAVENOUS at 05:51

## 2018-05-16 RX ADMIN — Medication 100 MILLIEQUIVALENT(S): at 12:14

## 2018-05-16 RX ADMIN — AMPICILLIN SODIUM AND SULBACTAM SODIUM 200 GRAM(S): 250; 125 INJECTION, POWDER, FOR SUSPENSION INTRAMUSCULAR; INTRAVENOUS at 18:00

## 2018-05-16 RX ADMIN — DEXTROSE MONOHYDRATE, SODIUM CHLORIDE, AND POTASSIUM CHLORIDE 75 MILLILITER(S): 50; .745; 4.5 INJECTION, SOLUTION INTRAVENOUS at 12:15

## 2018-05-16 RX ADMIN — Medication 100 MILLIGRAM(S): at 05:51

## 2018-05-16 RX ADMIN — ENOXAPARIN SODIUM 30 MILLIGRAM(S): 100 INJECTION SUBCUTANEOUS at 12:11

## 2018-05-16 RX ADMIN — Medication 100 MILLIEQUIVALENT(S): at 18:40

## 2018-05-16 RX ADMIN — Medication 100 MILLIEQUIVALENT(S): at 21:32

## 2018-05-16 RX ADMIN — PANTOPRAZOLE SODIUM 40 MILLIGRAM(S): 20 TABLET, DELAYED RELEASE ORAL at 05:52

## 2018-05-16 RX ADMIN — Medication 25 MILLIGRAM(S): at 05:51

## 2018-05-16 NOTE — PROGRESS NOTE ADULT - ATTENDING COMMENTS
seen and examined 5/16/2018 @ 1137    RUQ pain improved this morning    afeb  RUQ mildly tender    WBC = 8  AST = 63  ALT = 52  alk phos and bilirubin - wnl    RUQ U/S - gallstones, dilated gallbladder with pericholecystic fluid, sonographic Hui's sign, CBD = 8 mm  CT abd/pelvis - epigastric hernia mesh, dilated gallbladder, CBD 11 mm      acute cholecystitis with dilated CBD  -MRCP  -hold Eliquis for surgery  -cleared by cardiology for laparoscopic cholecystectomy tomorrow

## 2018-05-16 NOTE — PHYSICAL THERAPY INITIAL EVALUATION ADULT - PRECAUTIONS/LIMITATIONS, REHAB EVAL
HISTORY CONTINUED: A follow-up US revealed a gallbladder with stones and sludge, pericholecystic fluid, and a positive sonographic Hui's sign. Pt admitted with acute cholecystitis with dilated CBD. Pt now scheduled for laparoscopic cholecystectomy. HISTORY CONTINUED: A follow-up US revealed a gallbladder with stones and sludge, pericholecystic fluid, and a positive sonographic Hui's sign. Pt admitted with acute cholecystitis with dilated CBD. Pt now scheduled for laparoscopic cholecystectomy./surgical precautions

## 2018-05-16 NOTE — PHYSICAL THERAPY INITIAL EVALUATION ADULT - DISCHARGE DISPOSITION, PT EVAL
Home with Home PT to assess safety, increase strength and endurance assisting with functional activities and ADLs. pt owns RW and cane. pt's dtr will be staying with her and able to assist as needed with mobility/ADLs.

## 2018-05-16 NOTE — CONSULT NOTE ADULT - SUBJECTIVE AND OBJECTIVE BOX
Patient is a 82y old  Female who presents with a chief complaint of abdominal pain (15 May 2018 22:29)      HPI:  Patient is a 82y Female with history of HTN, HLD, PAD comes to ER c/o upper abdominal and chest pain. She has been c/o several days of increasing symptoms with nausea, vomiting. Pain radiating to the right side and back. She denies exertional chest pain or SOB. No fevers.     PAST MEDICAL & SURGICAL HISTORY:  Mitral valve disease  Osteoporosis  Vitamin B 12 deficiency  Carotid artery occlusion: (R)  GERD (gastroesophageal reflux disease)  Hypercholesteremia  HTN (hypertension)  Insomnia  H/O carotid endarterectomy  Varicose veins: s/p sclerotherapy bilaterally  Bilateral cataracts: extraction w/ IOL  Papilloma of breast: s/p excision from right breast &gt;20 years ago  History of lumbar laminectomy for spinal cord decompression: 1995  Abdominal hernia: repair 2007  Hx of tonsillectomy  Status post DACIA-BSO: 1975  S/P MVR (mitral valve repair): 1997 at [facility]    Allergies    &quot;VASELINE BASED OINTMENTS&quot; (Urticaria; Rash)  adhesives (Urticaria; Rash)  statins (Unknown)    Intolerances      MEDICATIONS  (STANDING):  amitriptyline 25 milliGRAM(s) Oral at bedtime  ampicillin/sulbactam  IVPB 3 Gram(s) IV Intermittent every 12 hours  ampicillin/sulbactam  IVPB      enoxaparin Injectable 30 milliGRAM(s) SubCutaneous daily  hydrochlorothiazide 25 milliGRAM(s) Oral daily  lactated ringers. 1000 milliLiter(s) (100 mL/Hr) IV Continuous <Continuous>  losartan 100 milliGRAM(s) Oral daily  metoprolol tartrate 100 milliGRAM(s) Oral two times a day  pantoprazole    Tablet 40 milliGRAM(s) Oral before breakfast    MEDICATIONS  (PRN):  morphine  - Injectable 4 milliGRAM(s) IV Push Once PRN Moderate Pain (4 - 6)  nitroglycerin     SubLingual 0.4 milliGRAM(s) SubLingual every 5 minutes PRN Chest Pain    FAMILY HISTORY:  Family history of dementia: mother  Family history of lung cancer (Grandparent)      ROS:  General: Denies weight loss, fevers, rash, decreased hearing  Cardiac: Denies SOB, ARCHER, orthopnea, PND, claudication, edema, snoring, daytime somnolence, palpitations, syncope  Resp: Denies SOB, ARCHER, cough, sputum, wheezing, hemoptysis  GI: Denies change in bowel habits, diarrhea, weight loss, melena, tarry stools, nausea, vomiting, jaundice, dysphagia  : Denies dysuria, nocturia, hematuria  Neuro: Denies tinnitus, headache, visual changes, weakness, dizziness or vertigo  Musculoskeletal: Denies neck pain back pain joint pain.  Skin: Denies rash, itching, dryness.  Endocrine: Denies polydipsia, polyuria  Psychiatric: Denies depression, anxiety  All other review of systems is negative unless indicated above.    PHYSICAL EXAM:  Vital Signs Last 24 Hrs  T(C): 37.1 (16 May 2018 04:42), Max: 37.1 (16 May 2018 00:31)  T(F): 98.7 (16 May 2018 04:42), Max: 98.7 (16 May 2018 00:31)  HR: 80 (16 May 2018 04:42) (71 - 96)  BP: 155/75 (16 May 2018 04:42) (107/58 - 182/68)  BP(mean): --  RR: 18 (16 May 2018 04:42) (16 - 22)  SpO2: 94% (16 May 2018 04:42) (94% - 98%)  I&O's Summary      General Appearance: 	 Alert, cooperative, no distress  HEENT: normocephalic, atraumatic,   Neck: no JVD,  carotid 2+  bilaterally without bruits  Lungs:  clear to auscultation and percussion bilaterally  Cor:  pmi 5th ICS MCL, regular rate and rhythm, S1 normal intensity, S2 normal intensity, no gallops, Grade II/VI apical systolic murmur  Abdomen: soft, RUQ tenderness; BS positive  Extremities: without cyanosis, clubbing or edema  Vasc: 2-+ PT and DP pulses; LE varicosities  Neurologic: A&O x 3 (time, place, person). Symmetric strength; limited exam  Skin: no rashes; limited exam        LABS:                        13.1   11.0  )-----------( 244      ( 15 May 2018 15:02 )             39.6     05-15    140  |  99  |  30<H>  ----------------------------<  119<H>  4.0   |  25  |  1.54<H>    Ca    10.2      15 May 2018 15:02    TPro  7.9  /  Alb  4.7  /  TBili  0.6  /  DBili  x   /  AST  39  /  ALT  25  /  AlkPhos  117  05-15        CAPILLARY BLOOD GLUCOSE        PT/INR - ( 15 May 2018 15:02 )   PT: 14.0 sec;   INR: 1.29 ratio         PTT - ( 15 May 2018 15:02 )  PTT:32.1 sec    Radiology/Imaging:      Edward Oconnell MD < from: US Echo Abdomen Limited (ED) (05.15.18 @ 19:37) >  INTERPRETATION:  Grayscale imaging of the right upper quadrant was   performed.    The gallbladder was visualized and scanned in longitudinal and transverse   planes.  The anterior gallbladder wall measured  0.39 cm.  The common bile duct measured 0.8 cm.  Gallstones were present.  Sludge was present.  Pericholecystic fluid was present.  Gallbladder wall edema was not noted.  Sonographic Hui's sign was present.  The gallbladder was slightly hydropic measuring    12.11 x 5.03 cm.    IMPRESSION:Findings concerning for early acute cholecystitis. The common   bile duct was not noted to be dilated on ultrasound but was noted to be   1.8 cm on CT performed prior to this study.    < from: CT Angio Abdomen and Pelvis w/ IV Cont (05.15.18 @ 17:29) >  EXAM:  CT ANGIO ABD PELV (W)AW IC                          EXAM:  CT ANGIO CHEST (W)AW IC                            PROCEDURE DATE:  05/15/2018            INTERPRETATION:  CLINICAL INFORMATION: Abdominal pain radiating to the   back. Chest pain. Evaluate for aortic dissection.    COMPARISON: CT abdomen/pelvis 11/27/2016.    PROCEDURE:   CT Angiography of the Chest, Abdomen and Pelvis.   Gated precontrast imaging was performed through the heart followed by   gated CT Angiography of the heart with subsequent non-gated arterial   phase imaging of the chest, abdomen and pelvis.  Intravenous contrast: 90 ml Omnipaque 350. 10 ml discarded.  Oral contrast: None.  Sagittal and coronal reformats were performed as well as 3D (MIP)   reconstructions.    FINDINGS:    CHEST:     LUNGS AND LARGE AIRWAYS: Patent central airways. A 2 mm right upper lobe   nodule (series 13, image 80). Bibasilar subsegmental and dependent   atelectasis.  PLEURA: Small bilateral pleural effusions.  VESSELS: Atheromatousdisease of the aorta. No acute intramural hematoma.   No aortic dissection. Great vessels off the aortic arch are patent.   Normal caliber thoracic aorta. Coronary artery calcifications.   HEART: Heart size is enlarged. No pericardial effusion. Mitral valve   repair.  MEDIASTINUM AND AMBER: No lymphadenopathy.  CHEST WALL AND LOWER NECK: Median sternotomy. Left chest pacemaker.    ABDOMEN AND PELVIS:    LIVER: Within normal limits.  BILE DUCTS: Common bile duct measures up to 1.8 cm.  GALLBLADDER:Distended. Ultrasound recommended for further evaluation.  SPLEEN: Within normal limits.  PANCREAS: Within normal limits.  ADRENALS: Within normal limits.  KIDNEYS/URETERS: Right renal cyst. No hydronephrosis.    BLADDER: Within normal limits.  REPRODUCTIVE ORGANS: Status post hysterectomy. No adnexal masses.    BOWEL: No bowel obstruction. Small hiatal hernia.  PERITONEUM: No ascites.  VESSELS: Atherosclerotic calcifications. No aortic dissection. Normal   caliber abdominal aorta. The celiac, superior mesenteric, bilateral renal   and inferior mesenteric arteries are patent.  RETROPERITONEUM: No lymphadenopathy.    ABDOMINAL WALL: Small fat-containing umbilical hernia. Status post   anterior abdominal wall repair.  BONES: Grade 2 anterolisthesis of L4 and L5. Age indeterminate severe T10   compression deformity.    IMPRESSION: No aortic dissection.    Distended gallbladder with common bile duct dilatation. Ultrasound   recommended for further evaluation.      < end of copied text >    < end of copied text >  [facility]  [phone]

## 2018-05-16 NOTE — CONSULT NOTE ADULT - PROBLEM SELECTOR RECOMMENDATION 2
mildly elevated, pain free, maybe passed the stone. awaiting MRCP to R/O CBD stone. CT chest mentions pacemaker wires, ptn does not have a pacemaker. mildly elevated, pain free, maybe passed the stone. awaiting MRCP to R/O CBD stone. CT chest mentions Left chest  pacemaker. ptn does not have a pacemaker. She has a removable  event monitor recorder on for the past 1.5 weeks

## 2018-05-16 NOTE — PHYSICAL THERAPY INITIAL EVALUATION ADULT - PLANNED THERAPY INTERVENTIONS, PT EVAL
stair training: Pt will negotiate up/down 10 steps, w/ SUP assist, w/ Right rails and appropriate AD, w/ step-to pattern in 2 weeks/transfer training/strengthening/balance training/bed mobility training/gait training

## 2018-05-16 NOTE — CONSULT NOTE ADULT - ASSESSMENT
Impression:  1. Abdominal pain - likely due to acute cholecystitis; there are conflicting imaging of the common bile duct, unclear if she has a dilated CBD or not  2. Chest pain - ruled out for ACS, cardiology following    Recommend:  -Would check MRCP to definitively evaluate the CBD for any stones  -Continue IV antibiotics  -Monitor liver tests  -Plan for eventual cholecystectomy per surgical team

## 2018-05-16 NOTE — CHART NOTE - NSCHARTNOTEFT_GEN_A_CORE
ACS Preop Note    Procedure: Lap giuliana  Surgeon: TROY Johnson    05-16    143  |  104  |  23  ----------------------------<  100<H>  3.5   |  25  |  1.32<H>    Ca    9.0      16 May 2018 07:30  Phos  3.3     05-16  Mg     1.8     05-16    TPro  6.8  /  Alb  4.1  /  TBili  0.5  /  DBili  x   /  AST  63<H>  /  ALT  52<H>  /  AlkPhos  112  05-16                          10.9   7.75  )-----------( 221      ( 16 May 2018 09:34 )             33.8     PT/INR - ( 15 May 2018 15:02 )   PT: 14.0 sec;   INR: 1.29 ratio         PTT - ( 15 May 2018 15:02 )  PTT:32.1 sec      - EKG in chart  - T/S x 2 ord  - UA negative    82y Female to undergo lap giuliana.   - NPO pMN  - IVF when NPO  - Consent in chart  - Plan for OR tomorrow ACS Preop Note    Procedure: Lap giuliana  Surgeon: TROY Johnson    05-16    143  |  104  |  23  ----------------------------<  100<H>  3.5   |  25  |  1.32<H>    Ca    9.0      16 May 2018 07:30  Phos  3.3     05-16  Mg     1.8     05-16    TPro  6.8  /  Alb  4.1  /  TBili  0.5  /  DBili  x   /  AST  63<H>  /  ALT  52<H>  /  AlkPhos  112  05-16                          10.9   7.75  )-----------( 221      ( 16 May 2018 09:34 )             33.8     PT/INR - ( 15 May 2018 15:02 )   PT: 14.0 sec;   INR: 1.29 ratio         PTT - ( 15 May 2018 15:02 )  PTT:32.1 sec      - EKG in chart  - T/S x 2 ord  - UA negative    82y Female to undergo lap giuliana.   - NPO pMN  - IVF when NPO  - Consent in chart  - Plan for OR 5/18

## 2018-05-16 NOTE — PHYSICAL THERAPY INITIAL EVALUATION ADULT - BALANCE TRAINING, PT EVAL
Pt will demonstrate improved static/dynamic balance to good, in order to improve stability, decrease fall risk and increase independence with ADLs within 2 weeks

## 2018-05-16 NOTE — PROGRESS NOTE ADULT - ASSESSMENT
82F with hx of Afib and CHF p/w acute cholecystitis vs choledocholithiasis     - NPO/IVF  - GI consult  - f/u MRCP  - trend LFTs  - DVT ppx  - f/u cards recs - cleared for cholecystectomy if necessary     [id number]

## 2018-05-16 NOTE — CONSULT NOTE ADULT - ATTENDING COMMENTS
As above.    #1.  RUQ abd pain  #2.  Abnormal LFTs  #3.  Dilated CBD to 1.8 cm on ultrasound/CT  #4.  Distended gallbladder with pericholecystic fluid on ultrasound  #5.  Choledocholithiasis on MRCP without cholangitis  #6. Atrial fibrillation on eliquis    Recommendations:  #1.  Cardiology risk stratification / optimization for ERCP with general anesthesia  #2.  Hold Eliquis if approved by cardiology.  #3.  NPO after MN for possible ERCP 5/17 or 5/18

## 2018-05-16 NOTE — CONSULT NOTE ADULT - ASSESSMENT
82y Female with history of HTN, HLD, PAD comes to ER c/o upper abdominal pain radiating to the right side and back.  US and CT imaging c/w acute cholecystitis. No evidence of cardiac etiology. She likely will need surgical intervention. She is in otherwise stable medical condition and is cleared for surgery if necessary. She should receive DVT prophylaxis. Hold HCTZ and losartan as she has CKD and received IV contrast yesterday. Maintain IV hydration and monitor renal function. Continue toprol. 82y Female with history of HTN, HLD, PAD comes to ER c/o upper abdominal pain radiating to the right side and back.  US and CT imaging c/w acute cholecystitis. No evidence of cardiac etiology. She likely will need surgical intervention. She is in otherwise stable medical condition and is cleared for surgery if necessary. She has been on eliquis 2.5mg bid. Should be held at least 24 hours before surgery.  She has remained NSR and does not need to be resumed for 5-7 days post-operatively. She should receive DVT prophylaxis. Hold HCTZ and losartan as she has CKD and received IV contrast yesterday. Maintain IV hydration and monitor renal function. Continue toprol.

## 2018-05-16 NOTE — CONSULT NOTE ADULT - SUBJECTIVE AND OBJECTIVE BOX
ADVANCED  ENDOSCOPY CONSULT      Chief Complaint:  Patient is a 82y old  Female who presents with a chief complaint of abdominal pain (15 May 2018 22:29)      HPI: 82 years old female with Afib (on Eliquis), CHF, MV repair, collangeous colitis, presented with chest/epigastric pain with some radiation to the back over the last 2 days. She reported some associated nausea/vomiting but no chills. She had a CT which showed distended gallbladder and dilated CBD 1.8, but the dilated CBD was not seen on ultrasound. She does not remember having had gallstones before. She had a colonoscopy years ago that showed collangeous colitis and was treated with cholecystyramine?, and the diarrhea has now resolved.    Allergies:  &quot;VASELINE BASED OINTMENTS&quot; (Urticaria; Rash)  adhesives (Urticaria; Rash)  statins (Unknown)        Hospital Medications:  amitriptyline 25 milliGRAM(s) Oral at bedtime  ampicillin/sulbactam  IVPB 3 Gram(s) IV Intermittent every 12 hours  ampicillin/sulbactam  IVPB      enoxaparin Injectable 30 milliGRAM(s) SubCutaneous daily  lactated ringers. 1000 milliLiter(s) IV Continuous <Continuous>  metoprolol tartrate 100 milliGRAM(s) Oral two times a day  nitroglycerin     SubLingual 0.4 milliGRAM(s) SubLingual every 5 minutes PRN  pantoprazole    Tablet 40 milliGRAM(s) Oral before breakfast      PMHX/PSHX:  Mitral valve disease  Osteoporosis  Vitamin B 12 deficiency  Carotid artery occlusion  GERD (gastroesophageal reflux disease)  Hypercholesteremia  HTN (hypertension)  Insomnia  H/O carotid endarterectomy  Varicose veins  Bilateral cataracts  Papilloma of breast  History of lumbar laminectomy for spinal cord decompression  Abdominal hernia  Hx of tonsillectomy  Status post DACIA-BSO  S/P MVR (mitral valve repair)      Family history:  Family history of dementia  Family history of lung cancer (Grandparent)  No cancers in family    Social History: Drinks 1/2 bottle of wine daily; no illicit drugs, no smoking    ROS:     General:  No wt loss, fevers, chills, night sweats, fatigue,   Eyes:  Good vision, no reported pain  ENT:  No sore throat, pain, runny nose, dysphagia  CV:  Chest pain  Resp:  No dyspnea, cough, tachypnea, wheezing  GI:  See HPI  :  No pain, bleeding, incontinence, nocturia  Muscle:  No pain, weakness  Neuro:  No weakness, tingling, memory problems  Psych:  No fatigue, insomnia, mood problems, depression  Endocrine:  No polyuria, polydipsia, cold/heat intolerance  Heme:  No petechiae, ecchymosis, easy bruisability  Skin:  No rash, edema      PHYSICAL EXAM:     GENERAL:  Appears stated age, well-groomed, well-nourished, no distress  HEENT:  NC/AT,  conjunctivae clear and pink,  no JVD,   CHEST:  Full & symmetric excursion, no increased effort, breath sounds clear  HEART:  Regular rhythm, S1, S2, no murmur  ABDOMEN:  Soft, mild epigastric pain without rebound/guarding, non-distended, normoactive bowel sounds,  no masses ,  EXTREMITIES:  no cyanosis,clubbing or edema  SKIN:  No rash/erythema  NEURO:  Alert, oriented,     Vital Signs:  Vital Signs Last 24 Hrs  T(C): 37.1 (16 May 2018 04:42), Max: 37.1 (16 May 2018 00:31)  T(F): 98.7 (16 May 2018 04:42), Max: 98.7 (16 May 2018 00:31)  HR: 80 (16 May 2018 04:42) (71 - 96)  BP: 155/75 (16 May 2018 04:42) (107/58 - 182/68)  BP(mean): --  RR: 18 (16 May 2018 04:42) (16 - 22)  SpO2: 94% (16 May 2018 04:42) (94% - 98%)  Daily Height in cm: 157.48 (16 May 2018 04:42)    Daily Weight in k.6 (16 May 2018 04:42)    LABS:                        13.1   11.0  )-----------( 244      ( 15 May 2018 15:02 )             39.6     05-16    143  |  104  |  23  ----------------------------<  100<H>  3.5   |  25  |  1.32<H>    Ca    9.0      16 May 2018 07:30  Phos  3.3     05-16  Mg     1.8     05-16    TPro  6.8  /  Alb  4.1  /  TBili  0.5  /  DBili  x   /  AST  63<H>  /  ALT  52<H>  /  AlkPhos  112  05-16    LIVER FUNCTIONS - ( 16 May 2018 07:30 )  Alb: 4.1 g/dL / Pro: 6.8 g/dL / ALK PHOS: 112 U/L / ALT: 52 U/L / AST: 63 U/L / GGT: x           PT/INR - ( 15 May 2018 15:02 )   PT: 14.0 sec;   INR: 1.29 ratio         PTT - ( 15 May 2018 15:02 )  PTT:32.1 sec    Amylase Serum--      Lipase serum27       Ammonia--      Imaging:      < from: US Echo Abdomen Limited (ED) (05.15.18 @ 19:37) >  INTERPRETATION:  Grayscale imaging of the right upper quadrant was   performed.    The gallbladder was visualized and scanned in longitudinal and transverse   planes.  The anterior gallbladder wall measured  0.39 cm.  The common bile duct measured 0.8 cm.  Gallstones were present.  Sludge was present.  Pericholecystic fluid was present.  Gallbladder wall edema was not noted.  Sonographic Hui's sign was present.  The gallbladder was slightly hydropic measuring    12.11 x 5.03 cm.    IMPRESSION:Findings concerning for early acute cholecystitis. The common   bile duct was not noted to be dilated on ultrasound but was noted to be   1.8 cm on CT performed prior to this study.    < end of copied text >      < from: CT Angio Abdomen and Pelvis w/ IV Cont (05.15.18 @ 17:29) >  INTERPRETATION:  CLINICAL INFORMATION: Abdominal pain radiating to the   back. Chest pain. Evaluate for aortic dissection.    COMPARISON: CT abdomen/pelvis 2016.    PROCEDURE:   CT Angiography of the Chest, Abdomen and Pelvis.   Gated precontrast imaging was performed through the heart followed by   gated CT Angiography of the heart with subsequent non-gated arterial   phase imaging of the chest, abdomen and pelvis.  Intravenous contrast: 90 ml Omnipaque 350. 10 ml discarded.  Oral contrast: None.  Sagittal and coronal reformats were performed as well as 3D (MIP)   reconstructions.    FINDINGS:    CHEST:     LUNGS AND LARGE AIRWAYS: Patent central airways. A 2 mm right upper lobe   nodule (series 13, image 80). Bibasilar subsegmental and dependent   atelectasis.  PLEURA: Small bilateral pleural effusions.  VESSELS: Atheromatousdisease of the aorta. No acute intramural hematoma.   No aortic dissection. Great vessels off the aortic arch are patent.   Normal caliber thoracic aorta. Coronary artery calcifications.   HEART: Heart size is enlarged. No pericardial effusion. Mitral valve   repair.  MEDIASTINUM AND AMBER: No lymphadenopathy.  CHEST WALL AND LOWER NECK: Median sternotomy. Left chest pacemaker.    ABDOMEN AND PELVIS:    LIVER: Within normal limits.  BILE DUCTS: Common bile duct measures up to 1.8 cm.  GALLBLADDER:Distended. Ultrasound recommended for further evaluation.  SPLEEN: Within normal limits.  PANCREAS: Within normal limits.  ADRENALS: Within normal limits.  KIDNEYS/URETERS: Right renal cyst. No hydronephrosis.    BLADDER: Within normal limits.  REPRODUCTIVE ORGANS: Status post hysterectomy. No adnexal masses.    BOWEL: No bowel obstruction. Small hiatal hernia.  PERITONEUM: No ascites.  VESSELS: Atherosclerotic calcifications. No aortic dissection. Normal   caliber abdominal aorta. The celiac, superior mesenteric, bilateral renal   and inferior mesenteric arteries are patent.  RETROPERITONEUM: No lymphadenopathy.    ABDOMINAL WALL: Small fat-containing umbilical hernia. Status post   anterior abdominal wall repair.  BONES: Grade 2 anterolisthesis of L4 and L5. Age indeterminate severe T10   compression deformity.    IMPRESSION: No aortic dissection.    Distended gallbladder with common bile duct dilatation. Ultrasound   recommended for further evaluation.      < end of copied text >

## 2018-05-16 NOTE — PHYSICAL THERAPY INITIAL EVALUATION ADULT - PERTINENT HX OF CURRENT PROBLEM, REHAB EVAL
83 y/o female with PMHx of Afib (on Eliquis), CHF, DIANE, MV repair, presenting initially with complaint of chest pain, radiating to the back. The patient was ruled out for ACS with normal EKG and negative troponins, and CTA did not show a dissection. The CT scan however did reveal a dilated gallbladder with 1.8 cm CBD.

## 2018-05-16 NOTE — PHYSICAL THERAPY INITIAL EVALUATION ADULT - STRENGTHENING, PT EVAL
Increase by 1/2 grade throughout to assist with safe bed mobility, transfers, amb and functional activities.

## 2018-05-16 NOTE — CONSULT NOTE ADULT - SUBJECTIVE AND OBJECTIVE BOX
CC: Patient is a 82 year old female who presents with a chief complaint of chest pain      Patient is a 82 year old female with PMHx of Afib (on Eliquis), CHF, DIANE, s/p CEA, MV repair, presenting initially with complaint of chest pain, radiating to the back. The patient was ruled out for ACS with normal EKG and negative troponins, and CTA did not show a dissection. The CT scan however did reveal a dilated gallbladder with 1.8 cm CBD. A follow-up US revealed a gallbladder with stones and sludge, pericholecystic fluid, and a positive sonographic Hui's sign, CBD 0.8 cm. The patient reports that pain began on 5/14, which she thought was just reflux. She took reflux medication but did not feel any relief. The patient then developed nausea and vomiting, noted to be red-tinged. She is passing flatus and is having normal BMs, last BM 5/15. Denies having any history of gallbladder disease or similar episodes of pain to this one. Last endoscopy > 10 years ago, showed reflux, last c-scope 2016 for diarrhea showed colitis.        PMH  Afib  Mitral valve disease  Osteoporosis  Vitamin B 12 deficiency  Carotid artery stenosis  GERD (gastroesophageal reflux disease)  Hypercholesteremia  HTN (hypertension)  Insomnia      PSH  H/O carotid endarterectomy/stent 2014 ([facility])  Varicose veins  Bilateral cataracts  Papilloma of breast 1989  History of lumbar laminectomy for spinal cord decompression 1995  Abdominal hernia 2007  Hx of tonsillectomy  Status post DACIA-BSO age 1975  S/P MVR (mitral valve repair) 1997  Hiatal hernia repair 2010 ([facility])      10-point review of history otherwise negative unless indicated in HPI    FAMILY HISTORY  2 sisters with gallbladder disease    Home MEDS  Amitryptiline 25 mg   Losartan HCTZ 100-25 mg  Pantoprazole 40 mg  Metoprolol 200 mg  Furosemide 40 mg  Cholestyramine 4 gms 1-3 times daily  ASA 81 mg  Eliquis BID 5 mg BID    Inpatient meds:  Lovenox sc  UNASYN  Elavil qhs  Lopressor 100 bid  Protonix 40 daily po  NPO exc meds        ALLERGIES  "VASELINE BASED OINTMENT" (Urticaria; Rash)  adhesives (Urticaria; Rash)  statins (Unknown)    WDWN woman, cheerful, NAD, pain free  HEEENT-NC/AT, PERRL, EOMI, CP/SA  NECK-SUPPLE  LUNGS-CTA  CARD-RRR, S1S2  ABD-RUQ tend, ND, pos BS, no Hui sign  EXT-no edema  NEURO-nonfocal, A and Ox3    labs, imaging studies, meds, consults reviewed

## 2018-05-16 NOTE — CONSULT NOTE ADULT - PROBLEM SELECTOR RECOMMENDATION 9
pain free post IVF, NPO, IV UNASYN. awaiting Lap-giuliana once CBD stone is ruled out. Echo stable 3/18, medically cleared for lap-Giuliana

## 2018-05-16 NOTE — PHYSICAL THERAPY INITIAL EVALUATION ADULT - ADDITIONAL COMMENTS
Pt states she lives in an apartment with 4 steps to enter building (+handrail) and approximately 12 steps to reach apartment (+handrail). Pt states prior to admission being independent with all functional mobility and ADLs. Pt states she still drives. Pt states she lives in an apartment with 4 steps to enter building (+handrail) and approximately 12 steps to reach apartment (+handrail). Pt states prior to admission being independent with all functional mobility and ADLs. Pt states she still drives.  Pt's dtr will be staying with her and able to assist as needed with mobility/ADLs.

## 2018-05-16 NOTE — PROGRESS NOTE ADULT - SUBJECTIVE AND OBJECTIVE BOX
ACS PROGRESS NOTE      SUBJECTIVE: Pt seen at examined at bedside. AVSS. No acute events overnight. Pain well controlled. Denies fever, CP, SOB, and NV.         Vital Signs Last 24 Hrs  T(C): 37.1 (16 May 2018 04:42), Max: 37.1 (16 May 2018 00:31)  T(F): 98.7 (16 May 2018 04:42), Max: 98.7 (16 May 2018 00:31)  HR: 80 (16 May 2018 04:42) (71 - 96)  BP: 155/75 (16 May 2018 04:42) (107/58 - 182/68)  BP(mean): --  RR: 18 (16 May 2018 04:42) (16 - 22)  SpO2: 94% (16 May 2018 04:42) (94% - 98%)    Physical Exam  General: awake, alert, NAD    Pulm: respirations unlabored, no increased WOB  Abdomen: soft, moderately distended, mild RUQ TTP  Extremities: Grossly symmetric    I&O's Summary    15 May 2018 07:01  -  16 May 2018 07:00  --------------------------------------------------------  IN: 200 mL / OUT: 200 mL / NET: 0 mL      I&O's Detail    15 May 2018 07:01  -  16 May 2018 07:00  --------------------------------------------------------  IN:    lactated ringers.: 200 mL  Total IN: 200 mL    OUT:    Voided: 200 mL  Total OUT: 200 mL    Total NET: 0 mL          MEDICATIONS  (STANDING):  amitriptyline 25 milliGRAM(s) Oral at bedtime  ampicillin/sulbactam  IVPB 3 Gram(s) IV Intermittent every 12 hours  ampicillin/sulbactam  IVPB      enoxaparin Injectable 30 milliGRAM(s) SubCutaneous daily  lactated ringers. 1000 milliLiter(s) (100 mL/Hr) IV Continuous <Continuous>  metoprolol tartrate 100 milliGRAM(s) Oral two times a day  pantoprazole    Tablet 40 milliGRAM(s) Oral before breakfast    MEDICATIONS  (PRN):  nitroglycerin     SubLingual 0.4 milliGRAM(s) SubLingual every 5 minutes PRN Chest Pain      LABS:                        13.1   11.0  )-----------( 244      ( 15 May 2018 15:02 )             39.6     05-16    143  |  104  |  23  ----------------------------<  100<H>  3.5   |  25  |  1.32<H>    Ca    9.0      16 May 2018 07:30  Phos  3.3     05-16  Mg     1.8     05-16    TPro  6.8  /  Alb  4.1  /  TBili  0.5  /  DBili  x   /  AST  63<H>  /  ALT  52<H>  /  AlkPhos  112  05-16    PT/INR - ( 15 May 2018 15:02 )   PT: 14.0 sec;   INR: 1.29 ratio         PTT - ( 15 May 2018 15:02 )  PTT:32.1 sec      RADIOLOGY & ADDITIONAL STUDIES:  No new studies

## 2018-05-17 ENCOUNTER — TRANSCRIPTION ENCOUNTER (OUTPATIENT)
Age: 83
End: 2018-05-17

## 2018-05-17 LAB
ALBUMIN SERPL ELPH-MCNC: 3.7 G/DL — SIGNIFICANT CHANGE UP (ref 3.3–5)
ALP SERPL-CCNC: 113 U/L — SIGNIFICANT CHANGE UP (ref 40–120)
ALT FLD-CCNC: 44 U/L — SIGNIFICANT CHANGE UP (ref 10–45)
ANION GAP SERPL CALC-SCNC: 10 MMOL/L — SIGNIFICANT CHANGE UP (ref 5–17)
APTT BLD: 31.6 SEC — SIGNIFICANT CHANGE UP (ref 27.5–37.4)
AST SERPL-CCNC: 43 U/L — HIGH (ref 10–40)
BILIRUB SERPL-MCNC: 0.4 MG/DL — SIGNIFICANT CHANGE UP (ref 0.2–1.2)
BLD GP AB SCN SERPL QL: NEGATIVE — SIGNIFICANT CHANGE UP
BUN SERPL-MCNC: 17 MG/DL — SIGNIFICANT CHANGE UP (ref 7–23)
CALCIUM SERPL-MCNC: 8.5 MG/DL — SIGNIFICANT CHANGE UP (ref 8.4–10.5)
CHLORIDE SERPL-SCNC: 107 MMOL/L — SIGNIFICANT CHANGE UP (ref 96–108)
CO2 SERPL-SCNC: 24 MMOL/L — SIGNIFICANT CHANGE UP (ref 22–31)
CREAT SERPL-MCNC: 1.32 MG/DL — HIGH (ref 0.5–1.3)
GLUCOSE SERPL-MCNC: 101 MG/DL — HIGH (ref 70–99)
HCT VFR BLD CALC: 34.1 % — LOW (ref 34.5–45)
HGB BLD-MCNC: 11.3 G/DL — LOW (ref 11.5–15.5)
INR BLD: 1.16 RATIO — SIGNIFICANT CHANGE UP (ref 0.88–1.16)
MCHC RBC-ENTMCNC: 28.8 PG — SIGNIFICANT CHANGE UP (ref 27–34)
MCHC RBC-ENTMCNC: 33 GM/DL — SIGNIFICANT CHANGE UP (ref 32–36)
MCV RBC AUTO: 87.1 FL — SIGNIFICANT CHANGE UP (ref 80–100)
PLATELET # BLD AUTO: 190 K/UL — SIGNIFICANT CHANGE UP (ref 150–400)
POTASSIUM SERPL-MCNC: 4.1 MMOL/L — SIGNIFICANT CHANGE UP (ref 3.5–5.3)
POTASSIUM SERPL-SCNC: 4.1 MMOL/L — SIGNIFICANT CHANGE UP (ref 3.5–5.3)
PROT SERPL-MCNC: 6.6 G/DL — SIGNIFICANT CHANGE UP (ref 6–8.3)
PROTHROM AB SERPL-ACNC: 12.7 SEC — SIGNIFICANT CHANGE UP (ref 9.8–12.7)
RBC # BLD: 3.92 M/UL — SIGNIFICANT CHANGE UP (ref 3.8–5.2)
RBC # FLD: 12.1 % — SIGNIFICANT CHANGE UP (ref 10.3–14.5)
RH IG SCN BLD-IMP: POSITIVE — SIGNIFICANT CHANGE UP
SODIUM SERPL-SCNC: 141 MMOL/L — SIGNIFICANT CHANGE UP (ref 135–145)
TROPONIN T SERPL-MCNC: <0.01 NG/ML — SIGNIFICANT CHANGE UP (ref 0–0.06)
TROPONIN T SERPL-MCNC: <0.01 NG/ML — SIGNIFICANT CHANGE UP (ref 0–0.06)
WBC # BLD: 6.6 K/UL — SIGNIFICANT CHANGE UP (ref 3.8–10.5)
WBC # FLD AUTO: 6.6 K/UL — SIGNIFICANT CHANGE UP (ref 3.8–10.5)

## 2018-05-17 PROCEDURE — 99231 SBSQ HOSP IP/OBS SF/LOW 25: CPT

## 2018-05-17 PROCEDURE — 43262 ENDO CHOLANGIOPANCREATOGRAPH: CPT | Mod: 59,GC

## 2018-05-17 PROCEDURE — 43264 ERCP REMOVE DUCT CALCULI: CPT | Mod: GC

## 2018-05-17 PROCEDURE — 71045 X-RAY EXAM CHEST 1 VIEW: CPT | Mod: 26

## 2018-05-17 PROCEDURE — 74328 X-RAY BILE DUCT ENDOSCOPY: CPT | Mod: 26,GC

## 2018-05-17 PROCEDURE — 43277 ERCP EA DUCT/AMPULLA DILATE: CPT | Mod: 59,GC

## 2018-05-17 RX ORDER — DEXTROSE MONOHYDRATE, SODIUM CHLORIDE, AND POTASSIUM CHLORIDE 50; .745; 4.5 G/1000ML; G/1000ML; G/1000ML
1000 INJECTION, SOLUTION INTRAVENOUS
Qty: 0 | Refills: 0 | Status: DISCONTINUED | OUTPATIENT
Start: 2018-05-17 | End: 2018-05-18

## 2018-05-17 RX ORDER — FUROSEMIDE 40 MG
20 TABLET ORAL ONCE
Qty: 0 | Refills: 0 | Status: COMPLETED | OUTPATIENT
Start: 2018-05-17 | End: 2018-05-17

## 2018-05-17 RX ORDER — AMLODIPINE BESYLATE 2.5 MG/1
5 TABLET ORAL DAILY
Qty: 0 | Refills: 0 | Status: DISCONTINUED | OUTPATIENT
Start: 2018-05-17 | End: 2018-05-18

## 2018-05-17 RX ORDER — IPRATROPIUM/ALBUTEROL SULFATE 18-103MCG
3 AEROSOL WITH ADAPTER (GRAM) INHALATION ONCE
Qty: 0 | Refills: 0 | Status: COMPLETED | OUTPATIENT
Start: 2018-05-17 | End: 2018-05-17

## 2018-05-17 RX ORDER — DEXTROSE MONOHYDRATE, SODIUM CHLORIDE, AND POTASSIUM CHLORIDE 50; .745; 4.5 G/1000ML; G/1000ML; G/1000ML
1000 INJECTION, SOLUTION INTRAVENOUS
Qty: 0 | Refills: 0 | Status: DISCONTINUED | OUTPATIENT
Start: 2018-05-17 | End: 2018-05-17

## 2018-05-17 RX ADMIN — Medication 100 MILLIGRAM(S): at 13:02

## 2018-05-17 RX ADMIN — AMPICILLIN SODIUM AND SULBACTAM SODIUM 200 GRAM(S): 250; 125 INJECTION, POWDER, FOR SUSPENSION INTRAMUSCULAR; INTRAVENOUS at 17:39

## 2018-05-17 RX ADMIN — Medication 100 MILLIGRAM(S): at 17:41

## 2018-05-17 RX ADMIN — Medication 20 MILLIGRAM(S): at 14:10

## 2018-05-17 RX ADMIN — Medication 25 MILLIGRAM(S): at 21:23

## 2018-05-17 RX ADMIN — PANTOPRAZOLE SODIUM 40 MILLIGRAM(S): 20 TABLET, DELAYED RELEASE ORAL at 05:31

## 2018-05-17 RX ADMIN — ENOXAPARIN SODIUM 30 MILLIGRAM(S): 100 INJECTION SUBCUTANEOUS at 12:04

## 2018-05-17 RX ADMIN — Medication 3 MILLILITER(S): at 14:11

## 2018-05-17 RX ADMIN — AMPICILLIN SODIUM AND SULBACTAM SODIUM 200 GRAM(S): 250; 125 INJECTION, POWDER, FOR SUSPENSION INTRAMUSCULAR; INTRAVENOUS at 05:27

## 2018-05-17 RX ADMIN — AMLODIPINE BESYLATE 2.5 MILLIGRAM(S): 2.5 TABLET ORAL at 05:27

## 2018-05-17 NOTE — PROVIDER CONTACT NOTE (OTHER) - BACKGROUND
hx- CP radiating to back in ED, Dilated gallbaldder w/ stones and sludge. afib, mitral valve disease, HTN, Carotid artery stenosis

## 2018-05-17 NOTE — PROGRESS NOTE ADULT - ATTENDING COMMENTS
seen and examined 5/17/2018 @ 0830    RUQ mildly tender    acute cholecystitis with choledocholithiasis confirmed on MRCP  -ERCP  -hold Eliquis for laparoscopic cholecystectomy tomorrow  -cleared by cardiology cholecystectomy seen and examined 5/17/2018 @ 0830    RUQ mildly tender    acute cholecystitis with choledocholithiasis confirmed on MRCP  -ERCP today  -hold Eliquis for laparoscopic cholecystectomy tomorrow  -cleared by cardiology cholecystectomy

## 2018-05-17 NOTE — PROGRESS NOTE ADULT - ATTENDING COMMENTS
seen and examined 5/17/2018 @ 1920    acute cholecystitis with choledocholithiasis  -s/p ERCP / sphincterotomy / stone extraction today  -I explained the risks (bleeding, infection, bile duct injury), benefits and alternatives of laparoscopic (possible open) cholecystectomy with the patient. Written informed consent was obtained for surgery. Booked for tomorrow at 12:30pm.

## 2018-05-17 NOTE — PROGRESS NOTE ADULT - SUBJECTIVE AND OBJECTIVE BOX
Patient is a 82y old  Female who presents with a chief complaint of abdominal pain (15 May 2018 22:29)    She denies c/o chest pain, SOB or palpitations. Less abdominal discomfort.     Allergies    &quot;VASELINE BASED OINTMENTS&quot; (Urticaria; Rash)  adhesives (Urticaria; Rash)  statins (Unknown)    Intolerances      MEDICATIONS  (STANDING):  amitriptyline 25 milliGRAM(s) Oral at bedtime  amLODIPine   Tablet 2.5 milliGRAM(s) Oral daily  ampicillin/sulbactam  IVPB 3 Gram(s) IV Intermittent every 12 hours  ampicillin/sulbactam  IVPB      dextrose 5% + sodium chloride 0.45% with potassium chloride 20 mEq/L 1000 milliLiter(s) (75 mL/Hr) IV Continuous <Continuous>  enoxaparin Injectable 30 milliGRAM(s) SubCutaneous daily  metoprolol tartrate 100 milliGRAM(s) Oral two times a day  pantoprazole    Tablet 40 milliGRAM(s) Oral before breakfast    MEDICATIONS  (PRN):  nitroglycerin     SubLingual 0.4 milliGRAM(s) SubLingual every 5 minutes PRN Chest Pain      PHYSICAL EXAM:  Vital Signs Last 24 Hrs  T(C): 36.6 (17 May 2018 05:08), Max: 36.8 (16 May 2018 08:00)  T(F): 97.8 (17 May 2018 05:08), Max: 98.2 (16 May 2018 08:00)  HR: 60 (17 May 2018 05:08) (60 - 72)  BP: 125/71 (17 May 2018 05:08) (121/68 - 156/79)  BP(mean): --  RR: 18 (17 May 2018 05:08) (18 - 18)  SpO2: 97% (17 May 2018 05:08) (95% - 98%)  Daily     Daily   I&O's Summary    15 May 2018 07:01  -  16 May 2018 07:00  --------------------------------------------------------  IN: 200 mL / OUT: 200 mL / NET: 0 mL    16 May 2018 07:01  -  17 May 2018 06:59  --------------------------------------------------------  IN: 2620 mL / OUT: 350 mL / NET: 2270 mL    General Appearance: 	 Alert, cooperative, no distress  HEENT: normocephalic, atraumatic,   Neck: no JVD,  carotid 2+  bilaterally without bruits  Lungs:  clear to auscultation and percussion bilaterally  Cor:  pmi 5th ICS MCL, regular rate and rhythm, S1 normal intensity, S2 normal intensity, no gallops, Grade II/VI apical systolic murmur  Abdomen: soft, RUQ tenderness; BS positive  Extremities: without cyanosis, clubbing or edema  Vasc: 2-+ PT and DP pulses; LE varicosities  Neurologic: A&O x 3 (time, place, person). Symmetric strength; limited ex      Labs:  CBC Full  -  ( 17 May 2018 02:39 )  WBC Count : 6.6 K/uL  Hemoglobin : 11.3 g/dL  Hematocrit : 34.1 %  Platelet Count - Automated : 190 K/uL  Mean Cell Volume : 87.1 fl  Mean Cell Hemoglobin : 28.8 pg  Mean Cell Hemoglobin Concentration : 33.0 gm/dL  Auto Neutrophil # : x  Auto Lymphocyte # : x  Auto Monocyte # : x  Auto Eosinophil # : x  Auto Basophil # : x  Auto Neutrophil % : x  Auto Lymphocyte % : x  Auto Monocyte % : x  Auto Eosinophil % : x  Auto Basophil % : x    05-17    141  |  107  |  17  ----------------------------<  101<H>  4.1   |  24  |  1.32<H>    Ca    8.5      17 May 2018 02:39  Phos  3.3     05-16  Mg     1.8     05-16    TPro  6.6  /  Alb  3.7  /  TBili  0.4  /  DBili  x   /  AST  43<H>  /  ALT  44  /  AlkPhos  113  05-17    CARDIAC MARKERS ( 17 May 2018 02:39 )  x     / <0.01 ng/mL / x     / x     / x      CARDIAC MARKERS ( 17 May 2018 00:04 )  x     / <0.01 ng/mL / x     / x     / x      CARDIAC MARKERS ( 16 May 2018 20:43 )  x     / <0.01 ng/mL / 130 U/L / x     / x      CARDIAC MARKERS ( 15 May 2018 15:02 )  x     / <0.01 ng/mL / 64 U/L / x     / 1.5 ng/mL      PT/INR - ( 17 May 2018 02:39 )   PT: 12.7 sec;   INR: 1.16 ratio         PTT - ( 17 May 2018 02:39 )  PTT:31.6 sec      Radiology/Imaging:      < from: MR MRCP w/wo IV Cont (05.16.18 @ 14:43) >  EXAM:  MR MRCP [facility]                            PROCEDURE DATE:  05/16/2018            INTERPRETATION:  CLINICAL INFORMATION: Gallstones and possible acute   cholecystitis. Dilated CBD on recent CT. 2 days of chest and abdominal   pain.    COMPARISON: Ultrasound 5/15/2018, CTA chest 5/15/2018.    PROCEDURE:   MRI of the abdomen was performed with and without intravenous contrast.  7.5 ml of Gadavist was administered intravenously without complications   and 0 ml was discarded.    MRCP was performed.    FINDINGS:    LOWER CHEST: Small bilateral pleural effusions with adjacent compressive   atelectasis.    LIVER: Mild fatty infiltration.  BILE DUCTS: Mild intrahepatic and extra hepatic biliary ductal dilatation   with CBD measuring 10 mm. Multiple stones are identified in distal CBD.  GALLBLADDER: Distended with multiple stones and gallbladder wall   thickening. Mild adjacent fatty stranding.  SPLEEN: Within normal limits.  PANCREAS: Within normal limits.  ADRENALS: Within normal limits.  KIDNEYS/URETERS: Right renal cyst.    VISUALIZED PORTIONS:    BOWEL: Small hiatal hernia.   PERITONEUM: No ascites.  VESSELS: Within normal limits.  RETROPERITONEUM: No lymphadenopathy.    ABDOMINAL WALL: Postsurgical changes in the upper anterior abdominal wall   from hernia repair.  BONES: Degenerative changes of the visualized axial spine and mild   scoliosis.    IMPRESSION: Findings suggestive of acute cholecystitis.   Choledocholithiasis in distal CBD with associated mild biliary ductal   dilatation.    < end of copied text >            Edward Oconnell MD [facility]  [phone]

## 2018-05-17 NOTE — PROVIDER CONTACT NOTE (OTHER) - ASSESSMENT
Pt complained of chest tightness, and SOB /difficulty breathing when breathing in.  PT vitals 115/65 70 97.3 18 98% RA.

## 2018-05-17 NOTE — CHART NOTE - NSCHARTNOTEFT_GEN_A_CORE
Post-Procedure Note    Pre-Op Dx:  choledocholithiasis   Procedure:  ERCP and sphincterotomy   Surgeon:   GI    Subjective:   Pt seen and examined at the bedside. Pt w/ no complaints. Denies fever, CP, SOB and NV. Pain controlled with medication. Tolerating CLD    Vital Signs Last 24 Hrs  T(C): 36.4 (17 May 2018 20:39), Max: 36.8 (16 May 2018 21:37)  T(F): 97.5 (17 May 2018 20:39), Max: 98.2 (16 May 2018 21:37)  HR: 59 (17 May 2018 20:39) (59 - 90)  BP: 133/58 (17 May 2018 20:39) (121/68 - 174/83)  BP(mean): --  RR: 18 (17 May 2018 20:39) (18 - 18)  SpO2: 98% (17 May 2018 20:39) (92% - 99%)    Physical Exam:  General: NAD, resting comfortably in bed  Neuro: A/O x 3, no focal deficits  Pulmonary: Nonlabored breathing, no respiratory distress  Cardiovascular: NSR  Abdominal: soft, NT, ND  Extremities: WWP        LABS:                        11.3   6.6   )-----------( 190      ( 17 May 2018 02:39 )             34.1     05-17    141  |  107  |  17  ----------------------------<  101<H>  4.1   |  24  |  1.32<H>    Ca    8.5      17 May 2018 02:39  Phos  3.3     05-16  Mg     1.8     05-16    TPro  6.6  /  Alb  3.7  /  TBili  0.4  /  DBili  x   /  AST  43<H>  /  ALT  44  /  AlkPhos  113  05-17    PT/INR - ( 17 May 2018 02:39 )   PT: 12.7 sec;   INR: 1.16 ratio         PTT - ( 17 May 2018 02:39 )  PTT:31.6 sec  CAPILLARY BLOOD GLUCOSE          LIVER FUNCTIONS - ( 17 May 2018 02:39 )  Alb: 3.7 g/dL / Pro: 6.6 g/dL / ALK PHOS: 113 U/L / ALT: 44 U/L / AST: 43 U/L / GGT: x           ABO Interpretation: A (05-17 @ 02:43)        Radiology and Additional Studies:    Assessment:82y Female s/p above procedure    Plan:  IVF, Diet: CLD, NPOpmn   Pain/nausea control PRN  Incentive spirometer/OOB/Ambulate  Lap cholecystectomy in morning

## 2018-05-17 NOTE — PROGRESS NOTE ADULT - ASSESSMENT
82F with hx of Afib and CHF p/w acute cholecystitis vs choledocholithiasis     - NPO/IVF  - GI consult  - 5/16 MRCP - CBD stone   - GI plan for ERCP today   - trend LFTs  - DVT ppx  - f/u cards recs - cleared for cholecystectomy if necessary   - consented for lap cholecystectomy     [id number]

## 2018-05-17 NOTE — PROGRESS NOTE ADULT - SUBJECTIVE AND OBJECTIVE BOX
ACS PROGRESS NOTE        SUBJECTIVE: Pt seen at examined at bedside. AVSS. Overnight patient had chest tightness that improved with nitroglycerin. EKG was normal sinus and troponin level x 3 were negative. Patient is being followed by cardiology. Abdominal pain well controlled. OOB and ambulating. Denies fever, CP, SOB, and NV. MRCP showed choledolithiasis and patient will have ERCP with GI today         Vital Signs Last 24 Hrs  T(C): 36.4 (17 May 2018 08:10), Max: 36.8 (16 May 2018 21:37)  T(F): 97.6 (17 May 2018 08:10), Max: 98.2 (16 May 2018 21:37)  HR: 74 (17 May 2018 08:10) (60 - 74)  BP: 160/73 (17 May 2018 08:10) (121/68 - 160/73)  BP(mean): --  RR: 18 (17 May 2018 08:10) (18 - 18)  SpO2: 92% (17 May 2018 08:10) (92% - 98%)    Physical Exam  General: awake, alert, NAD    Pulm: respirations unlabored, : no increased WOB  Abdomen: soft, mildly distended, NT  Extremities: Grossly symmetric    I&O's Summary    16 May 2018 07:01  -  17 May 2018 07:00  --------------------------------------------------------  IN: 2620 mL / OUT: 350 mL / NET: 2270 mL      I&O's Detail    16 May 2018 07:01  -  17 May 2018 07:00  --------------------------------------------------------  IN:    dextrose 5% + sodium chloride 0.45% with potassium chloride 20 mEq/L: 1500 mL    IV PiggyBack: 520 mL    Oral Fluid: 600 mL  Total IN: 2620 mL    OUT:    Voided: 350 mL  Total OUT: 350 mL    Total NET: 2270 mL          MEDICATIONS  (STANDING):  amitriptyline 25 milliGRAM(s) Oral at bedtime  amLODIPine   Tablet 2.5 milliGRAM(s) Oral daily  ampicillin/sulbactam  IVPB 3 Gram(s) IV Intermittent every 12 hours  ampicillin/sulbactam  IVPB      dextrose 5% + sodium chloride 0.45% with potassium chloride 20 mEq/L 1000 milliLiter(s) (75 mL/Hr) IV Continuous <Continuous>  enoxaparin Injectable 30 milliGRAM(s) SubCutaneous daily  metoprolol tartrate 100 milliGRAM(s) Oral two times a day  pantoprazole    Tablet 40 milliGRAM(s) Oral before breakfast    MEDICATIONS  (PRN):  nitroglycerin     SubLingual 0.4 milliGRAM(s) SubLingual every 5 minutes PRN Chest Pain      LABS:                        11.3   6.6   )-----------( 190      ( 17 May 2018 02:39 )             34.1     05-17    141  |  107  |  17  ----------------------------<  101<H>  4.1   |  24  |  1.32<H>    Ca    8.5      17 May 2018 02:39  Phos  3.3     05-16  Mg     1.8     05-16    TPro  6.6  /  Alb  3.7  /  TBili  0.4  /  DBili  x   /  AST  43<H>  /  ALT  44  /  AlkPhos  113  05-17    PT/INR - ( 17 May 2018 02:39 )   PT: 12.7 sec;   INR: 1.16 ratio         PTT - ( 17 May 2018 02:39 )  PTT:31.6 sec      RADIOLOGY & ADDITIONAL STUDIES:  EXAM:  MR MRCP [facility]                            PROCEDURE DATE:  05/16/2018            INTERPRETATION:  CLINICAL INFORMATION: Gallstones and possible acute   cholecystitis. Dilated CBD on recent CT. 2 days of chest and abdominal   pain.    COMPARISON: Ultrasound 5/15/2018, CTA chest 5/15/2018.    PROCEDURE:   MRI of the abdomen was performed with and without intravenous contrast.  7.5 ml of Gadavist was administered intravenously without complications   and 0 ml was discarded.    MRCP was performed.    FINDINGS:    LOWER CHEST: Small bilateral pleural effusions with adjacent compressive   atelectasis.    LIVER: Mild fatty infiltration.  BILE DUCTS: Mild intrahepatic and extra hepatic biliary ductal dilatation   with CBD measuring 10 mm. Multiple stones are identified in distal CBD.  GALLBLADDER: Distended with multiple stones and gallbladder wall   thickening. Mild adjacent fatty stranding.  SPLEEN: Within normal limits.  PANCREAS: Within normal limits.  ADRENALS: Within normal limits.  KIDNEYS/URETERS: Right renal cyst.    VISUALIZED PORTIONS:    BOWEL: Small hiatal hernia.   PERITONEUM: No ascites.  VESSELS: Within normal limits.  RETROPERITONEUM: No lymphadenopathy.    ABDOMINAL WALL: Postsurgical changes in the upper anterior abdominal wall   from hernia repair.  BONES: Degenerative changes of the visualized axial spine and mild   scoliosis.    IMPRESSION: Findings suggestive of acute cholecystitis.   Choledocholithiasis in distal CBD with associated mild biliary ductal   dilatation.

## 2018-05-17 NOTE — PROGRESS NOTE ADULT - ASSESSMENT
82y Female with history of HTN, HLD, PAD comes to ER c/o upper abdominal pain radiating to the right side and back.  US and CT imaging c/w acute cholecystitis. She as well has CBD stones on MRCP. No evidence of cardiac etiology. She is in otherwise stable medical/cardiac condition and is cleared for ERCP and surgery. She has been on eliquis 2.5mg bid and has been held > 24 hours.  She has remained NSR and does not need to be resumed for 5-7 days post-operatively. She should receive DVT prophylaxis. Continue to hold HCTZ and losartan as she has CKD and received IV contrast. Maintain IV hydration and monitor renal function. Continue toprol. IV antibiotic prophylaxis with prior MV surgery and ERCP.

## 2018-05-17 NOTE — PROVIDER CONTACT NOTE (OTHER) - SITUATION
1pm - Patient c/o wheezing, 174/83 , 90, O2 sat: 93% on room air  1:34pm - 147/79, 76, 18, 97.0, 98% with 2lpm O2

## 2018-05-17 NOTE — PROVIDER CONTACT NOTE (OTHER) - ACTION/TREATMENT ORDERED:
MD notified, Bedside assessment done, O2 2L/min given. STAT EKGx2, CBC BMP, CKMB and troponinx3 Done

## 2018-05-17 NOTE — PROGRESS NOTE ADULT - SUBJECTIVE AND OBJECTIVE BOX
Patient is a 82y old  Female who presents with a chief complaint of abdominal pain (15 May 2018 22:29)      SUBJECTIVE / OVERNIGHT EVENTS: MRCP c/w CBD stones/acute giuliana, RUQ tender on exam, no N/V, had CP overnight no EKG abn. ELiquis on hold for ERCP/SUrgery    MEDICATIONS  (STANDING):  amitriptyline 25 milliGRAM(s) Oral at bedtime  amLODIPine   Tablet 2.5 milliGRAM(s) Oral daily  ampicillin/sulbactam  IVPB 3 Gram(s) IV Intermittent every 12 hours  ampicillin/sulbactam  IVPB      dextrose 5% + sodium chloride 0.45% with potassium chloride 20 mEq/L 1000 milliLiter(s) (40 mL/Hr) IV Continuous <Continuous>  enoxaparin Injectable 30 milliGRAM(s) SubCutaneous daily  metoprolol tartrate 100 milliGRAM(s) Oral two times a day  pantoprazole    Tablet 40 milliGRAM(s) Oral before breakfast    MEDICATIONS  (PRN):  nitroglycerin     SubLingual 0.4 milliGRAM(s) SubLingual every 5 minutes PRN Chest Pain      Vital Signs Last 24 Hrs  T(F): 97.9 (05-17-18 @ 17:42), Max: 98.2 (05-16-18 @ 21:37)  HR: 73 (05-17-18 @ 17:42) (60 - 90)  BP: 161/73 (05-17-18 @ 17:42) (121/68 - 174/83)  RR: 18 (05-17-18 @ 17:42) (18 - 18)  SpO2: 97% (05-17-18 @ 17:42) (92% - 98%)  Telemetry:   CAPILLARY BLOOD GLUCOSE        I&O's Summary    16 May 2018 07:01  -  17 May 2018 07:00  --------------------------------------------------------  IN: 2620 mL / OUT: 350 mL / NET: 2270 mL        PHYSICAL EXAM:  GENERAL: NAD, well-developed  HEAD:  Atraumatic, Normocephalic  EYES: EOMI, PERRLA, conjunctiva and sclera clear  NECK: Supple, No JVD  CHEST/LUNG: Clear to auscultation bilaterally; No wheeze  HEART: Regular rate and rhythm; No murmurs, rubs, or gallops  ABDOMEN: Soft, Nontender, Nondistended; Bowel sounds present  EXTREMITIES:  2+ Peripheral Pulses, No clubbing, cyanosis, or edema  PSYCH: AAOx3  NEUROLOGY: non-focal  SKIN: No rashes or lesions    LABS:                        11.3   6.6   )-----------( 190      ( 17 May 2018 02:39 )             34.1     05-17    141  |  107  |  17  ----------------------------<  101<H>  4.1   |  24  |  1.32<H>    Ca    8.5      17 May 2018 02:39  Phos  3.3     05-16  Mg     1.8     05-16    TPro  6.6  /  Alb  3.7  /  TBili  0.4  /  DBili  x   /  AST  43<H>  /  ALT  44  /  AlkPhos  113  05-17    PT/INR - ( 17 May 2018 02:39 )   PT: 12.7 sec;   INR: 1.16 ratio         PTT - ( 17 May 2018 02:39 )  PTT:31.6 sec  CARDIAC MARKERS ( 17 May 2018 02:39 )  x     / <0.01 ng/mL / x     / x     / x      CARDIAC MARKERS ( 17 May 2018 00:04 )  x     / <0.01 ng/mL / x     / x     / x      CARDIAC MARKERS ( 16 May 2018 20:43 )  x     / <0.01 ng/mL / 130 U/L / x     / x              RADIOLOGY & ADDITIONAL TESTS:    Imaging Personally Reviewed:    Consultant(s) Notes Reviewed:      Care Discussed with Consultants/Other Providers:

## 2018-05-17 NOTE — PROVIDER CONTACT NOTE (OTHER) - ACTION/TREATMENT ORDERED:
As per MD, Lasix  20mg IVP and Duoneb inh administered. Encourage to use insentive spirometer. Metoprolol 100mg PO given. IV fluids discontinued. As per MD, Lasix  20mg IVP and Duoneb inh administered. Encourage to use insentive spirometer. Metoprolol 100mg PO given. IV fluids discontinued. pt cont to monitor

## 2018-05-18 ENCOUNTER — RESULT REVIEW (OUTPATIENT)
Age: 83
End: 2018-05-18

## 2018-05-18 LAB
ALBUMIN SERPL ELPH-MCNC: 3.2 G/DL — LOW (ref 3.3–5)
ALP SERPL-CCNC: 105 U/L — SIGNIFICANT CHANGE UP (ref 40–120)
ALT FLD-CCNC: 60 U/L — HIGH (ref 10–45)
ANION GAP SERPL CALC-SCNC: 10 MMOL/L — SIGNIFICANT CHANGE UP (ref 5–17)
APTT BLD: 30.9 SEC — SIGNIFICANT CHANGE UP (ref 27.5–37.4)
AST SERPL-CCNC: 55 U/L — HIGH (ref 10–40)
BILIRUB DIRECT SERPL-MCNC: <0.1 MG/DL — SIGNIFICANT CHANGE UP (ref 0–0.2)
BILIRUB INDIRECT FLD-MCNC: >0.1 MG/DL — LOW (ref 0.2–1)
BILIRUB SERPL-MCNC: 0.2 MG/DL — SIGNIFICANT CHANGE UP (ref 0.2–1.2)
BUN SERPL-MCNC: 16 MG/DL — SIGNIFICANT CHANGE UP (ref 7–23)
CALCIUM SERPL-MCNC: 8.7 MG/DL — SIGNIFICANT CHANGE UP (ref 8.4–10.5)
CHLORIDE SERPL-SCNC: 106 MMOL/L — SIGNIFICANT CHANGE UP (ref 96–108)
CO2 SERPL-SCNC: 25 MMOL/L — SIGNIFICANT CHANGE UP (ref 22–31)
CREAT SERPL-MCNC: 1.61 MG/DL — HIGH (ref 0.5–1.3)
GLUCOSE SERPL-MCNC: 100 MG/DL — HIGH (ref 70–99)
HCT VFR BLD CALC: 31.8 % — LOW (ref 34.5–45)
HGB BLD-MCNC: 10.3 G/DL — LOW (ref 11.5–15.5)
INR BLD: 1.11 RATIO — SIGNIFICANT CHANGE UP (ref 0.88–1.16)
LIDOCAIN IGE QN: 13 U/L — SIGNIFICANT CHANGE UP (ref 7–60)
MAGNESIUM SERPL-MCNC: 1.7 MG/DL — SIGNIFICANT CHANGE UP (ref 1.6–2.6)
MCHC RBC-ENTMCNC: 28 PG — SIGNIFICANT CHANGE UP (ref 27–34)
MCHC RBC-ENTMCNC: 32.4 GM/DL — SIGNIFICANT CHANGE UP (ref 32–36)
MCV RBC AUTO: 86.4 FL — SIGNIFICANT CHANGE UP (ref 80–100)
PHOSPHATE SERPL-MCNC: 3.4 MG/DL — SIGNIFICANT CHANGE UP (ref 2.5–4.5)
PLATELET # BLD AUTO: 175 K/UL — SIGNIFICANT CHANGE UP (ref 150–400)
POTASSIUM SERPL-MCNC: 4 MMOL/L — SIGNIFICANT CHANGE UP (ref 3.5–5.3)
POTASSIUM SERPL-SCNC: 4 MMOL/L — SIGNIFICANT CHANGE UP (ref 3.5–5.3)
PROT SERPL-MCNC: 6.1 G/DL — SIGNIFICANT CHANGE UP (ref 6–8.3)
PROTHROM AB SERPL-ACNC: 12.6 SEC — SIGNIFICANT CHANGE UP (ref 10–13.1)
RBC # BLD: 3.68 M/UL — LOW (ref 3.8–5.2)
RBC # FLD: 13.9 % — SIGNIFICANT CHANGE UP (ref 10.3–14.5)
SODIUM SERPL-SCNC: 141 MMOL/L — SIGNIFICANT CHANGE UP (ref 135–145)
WBC # BLD: 5.35 K/UL — SIGNIFICANT CHANGE UP (ref 3.8–10.5)
WBC # FLD AUTO: 5.35 K/UL — SIGNIFICANT CHANGE UP (ref 3.8–10.5)

## 2018-05-18 PROCEDURE — 47562 LAPAROSCOPIC CHOLECYSTECTOMY: CPT

## 2018-05-18 PROCEDURE — 88304 TISSUE EXAM BY PATHOLOGIST: CPT | Mod: 26

## 2018-05-18 PROCEDURE — 71045 X-RAY EXAM CHEST 1 VIEW: CPT | Mod: 26

## 2018-05-18 RX ORDER — METOPROLOL TARTRATE 50 MG
100 TABLET ORAL
Qty: 0 | Refills: 0 | Status: DISCONTINUED | OUTPATIENT
Start: 2018-05-18 | End: 2018-05-22

## 2018-05-18 RX ORDER — OXYCODONE HYDROCHLORIDE 5 MG/1
10 TABLET ORAL EVERY 4 HOURS
Qty: 0 | Refills: 0 | Status: DISCONTINUED | OUTPATIENT
Start: 2018-05-18 | End: 2018-05-22

## 2018-05-18 RX ORDER — ENOXAPARIN SODIUM 100 MG/ML
30 INJECTION SUBCUTANEOUS DAILY
Qty: 0 | Refills: 0 | Status: DISCONTINUED | OUTPATIENT
Start: 2018-05-18 | End: 2018-05-22

## 2018-05-18 RX ORDER — PANTOPRAZOLE SODIUM 20 MG/1
40 TABLET, DELAYED RELEASE ORAL
Qty: 0 | Refills: 0 | Status: DISCONTINUED | OUTPATIENT
Start: 2018-05-18 | End: 2018-05-22

## 2018-05-18 RX ORDER — TOBRAMYCIN 0.3 %
1 DROPS OPHTHALMIC (EYE) EVERY 4 HOURS
Qty: 0 | Refills: 0 | Status: COMPLETED | OUTPATIENT
Start: 2018-05-18 | End: 2018-05-19

## 2018-05-18 RX ORDER — ACETAMINOPHEN 500 MG
1000 TABLET ORAL ONCE
Qty: 0 | Refills: 0 | Status: COMPLETED | OUTPATIENT
Start: 2018-05-18 | End: 2018-05-18

## 2018-05-18 RX ORDER — AMITRIPTYLINE HCL 25 MG
25 TABLET ORAL AT BEDTIME
Qty: 0 | Refills: 0 | Status: DISCONTINUED | OUTPATIENT
Start: 2018-05-18 | End: 2018-05-22

## 2018-05-18 RX ORDER — HYDROMORPHONE HYDROCHLORIDE 2 MG/ML
0.25 INJECTION INTRAMUSCULAR; INTRAVENOUS; SUBCUTANEOUS
Qty: 0 | Refills: 0 | Status: DISCONTINUED | OUTPATIENT
Start: 2018-05-18 | End: 2018-05-18

## 2018-05-18 RX ORDER — TOBRAMYCIN 0.3 %
1 DROPS OPHTHALMIC (EYE) ONCE
Qty: 0 | Refills: 0 | Status: COMPLETED | OUTPATIENT
Start: 2018-05-18 | End: 2018-05-18

## 2018-05-18 RX ORDER — CIPROFLOXACIN LACTATE 400MG/40ML
250 VIAL (ML) INTRAVENOUS EVERY 12 HOURS
Qty: 0 | Refills: 0 | Status: COMPLETED | OUTPATIENT
Start: 2018-05-18 | End: 2018-05-21

## 2018-05-18 RX ORDER — HYDROMORPHONE HYDROCHLORIDE 2 MG/ML
0.5 INJECTION INTRAMUSCULAR; INTRAVENOUS; SUBCUTANEOUS ONCE
Qty: 0 | Refills: 0 | Status: DISCONTINUED | OUTPATIENT
Start: 2018-05-18 | End: 2018-05-18

## 2018-05-18 RX ORDER — AMLODIPINE BESYLATE 2.5 MG/1
5 TABLET ORAL DAILY
Qty: 0 | Refills: 0 | Status: DISCONTINUED | OUTPATIENT
Start: 2018-05-18 | End: 2018-05-20

## 2018-05-18 RX ORDER — TOBRAMYCIN 0.3 %
DROPS OPHTHALMIC (EYE)
Qty: 0 | Refills: 0 | Status: COMPLETED | OUTPATIENT
Start: 2018-05-18 | End: 2018-05-19

## 2018-05-18 RX ORDER — OXYCODONE HYDROCHLORIDE 5 MG/1
5 TABLET ORAL EVERY 4 HOURS
Qty: 0 | Refills: 0 | Status: DISCONTINUED | OUTPATIENT
Start: 2018-05-18 | End: 2018-05-22

## 2018-05-18 RX ORDER — NITROGLYCERIN 6.5 MG
0.4 CAPSULE, EXTENDED RELEASE ORAL
Qty: 0 | Refills: 0 | Status: DISCONTINUED | OUTPATIENT
Start: 2018-05-18 | End: 2018-05-22

## 2018-05-18 RX ORDER — HYDROMORPHONE HYDROCHLORIDE 2 MG/ML
0.5 INJECTION INTRAMUSCULAR; INTRAVENOUS; SUBCUTANEOUS
Qty: 0 | Refills: 0 | Status: DISCONTINUED | OUTPATIENT
Start: 2018-05-18 | End: 2018-05-18

## 2018-05-18 RX ORDER — SODIUM CHLORIDE 9 MG/ML
1000 INJECTION, SOLUTION INTRAVENOUS
Qty: 0 | Refills: 0 | Status: DISCONTINUED | OUTPATIENT
Start: 2018-05-18 | End: 2018-05-19

## 2018-05-18 RX ORDER — ONDANSETRON 8 MG/1
4 TABLET, FILM COATED ORAL ONCE
Qty: 0 | Refills: 0 | Status: DISCONTINUED | OUTPATIENT
Start: 2018-05-18 | End: 2018-05-18

## 2018-05-18 RX ADMIN — HYDROMORPHONE HYDROCHLORIDE 0.5 MILLIGRAM(S): 2 INJECTION INTRAMUSCULAR; INTRAVENOUS; SUBCUTANEOUS at 21:30

## 2018-05-18 RX ADMIN — AMLODIPINE BESYLATE 5 MILLIGRAM(S): 2.5 TABLET ORAL at 05:58

## 2018-05-18 RX ADMIN — Medication 1 DROP(S): at 20:47

## 2018-05-18 RX ADMIN — Medication 25 MILLIGRAM(S): at 23:10

## 2018-05-18 RX ADMIN — Medication 400 MILLIGRAM(S): at 15:51

## 2018-05-18 RX ADMIN — HYDROMORPHONE HYDROCHLORIDE 0.5 MILLIGRAM(S): 2 INJECTION INTRAMUSCULAR; INTRAVENOUS; SUBCUTANEOUS at 21:15

## 2018-05-18 RX ADMIN — Medication 400 MILLIGRAM(S): at 23:10

## 2018-05-18 RX ADMIN — Medication 1 DROP(S): at 23:18

## 2018-05-18 RX ADMIN — Medication 100 MILLIGRAM(S): at 20:11

## 2018-05-18 RX ADMIN — AMPICILLIN SODIUM AND SULBACTAM SODIUM 200 GRAM(S): 250; 125 INJECTION, POWDER, FOR SUSPENSION INTRAMUSCULAR; INTRAVENOUS at 05:58

## 2018-05-18 RX ADMIN — Medication 1000 MILLIGRAM(S): at 17:26

## 2018-05-18 RX ADMIN — Medication 1000 MILLIGRAM(S): at 23:25

## 2018-05-18 RX ADMIN — PANTOPRAZOLE SODIUM 40 MILLIGRAM(S): 20 TABLET, DELAYED RELEASE ORAL at 05:59

## 2018-05-18 RX ADMIN — ENOXAPARIN SODIUM 30 MILLIGRAM(S): 100 INJECTION SUBCUTANEOUS at 23:10

## 2018-05-18 NOTE — PROGRESS NOTE ADULT - SUBJECTIVE AND OBJECTIVE BOX
Patient is a 82y old  Female who presents with a chief complaint of abdominal pain (15 May 2018 22:29)    She denies c/o chest pain, SOB or palpitations. No orthopnea. Denies abdominal pain.     Allergies    &quot;VASELINE BASED OINTMENTS&quot; (Urticaria; Rash)  adhesives (Urticaria; Rash)  statins (Unknown)    Intolerances      MEDICATIONS  (STANDING):  amitriptyline 25 milliGRAM(s) Oral at bedtime  amLODIPine   Tablet 5 milliGRAM(s) Oral daily  ampicillin/sulbactam  IVPB 3 Gram(s) IV Intermittent every 12 hours  ampicillin/sulbactam  IVPB      dextrose 5% + sodium chloride 0.45% with potassium chloride 20 mEq/L 1000 milliLiter(s) (40 mL/Hr) IV Continuous <Continuous>  enoxaparin Injectable 30 milliGRAM(s) SubCutaneous daily  metoprolol tartrate 100 milliGRAM(s) Oral two times a day  pantoprazole    Tablet 40 milliGRAM(s) Oral before breakfast    MEDICATIONS  (PRN):  nitroglycerin     SubLingual 0.4 milliGRAM(s) SubLingual every 5 minutes PRN Chest Pain      PHYSICAL EXAM:  Vital Signs Last 24 Hrs  T(C): 36.4 (18 May 2018 04:46), Max: 36.6 (17 May 2018 17:42)  T(F): 97.6 (18 May 2018 04:46), Max: 97.9 (17 May 2018 17:42)  HR: 57 (18 May 2018 04:46) (51 - 90)  BP: 131/62 (18 May 2018 04:46) (99/51 - 174/83)  BP(mean): --  RR: 18 (18 May 2018 04:46) (18 - 18)  SpO2: 97% (18 May 2018 04:46) (92% - 99%)  Daily     Daily   I&O's Summary    16 May 2018 07:01  -  17 May 2018 07:00  --------------------------------------------------------  IN: 2620 mL / OUT: 350 mL / NET: 2270 mL    17 May 2018 07:01  -  18 May 2018 06:37  --------------------------------------------------------  IN: 760 mL / OUT: 100 mL / NET: 660 mL    General Appearance: 	 Alert, cooperative, no distress  HEENT: normocephalic, atraumatic,   Neck: no JVD,  carotid 2+  bilaterally without bruits  Lungs:  clear to auscultation and percussion bilaterally  Cor:  pmi 5th ICS MCL, regular rate and rhythm, S1 normal intensity, S2 normal intensity, no gallops, Grade II/VI apical systolic murmur  Abdomen: soft, RUQ tenderness; BS positive  Extremities: without cyanosis, clubbing or edema  Vasc: 2-+ PT and DP pulses; LE varicosities  Neurologic: A&O x 3 (time, place, person). Symmetric strength; limited e    Labs:  CBC Full  -  ( 17 May 2018 02:39 )  WBC Count : 6.6 K/uL  Hemoglobin : 11.3 g/dL  Hematocrit : 34.1 %  Platelet Count - Automated : 190 K/uL  Mean Cell Volume : 87.1 fl  Mean Cell Hemoglobin : 28.8 pg  Mean Cell Hemoglobin Concentration : 33.0 gm/dL  Auto Neutrophil # : x  Auto Lymphocyte # : x  Auto Monocyte # : x  Auto Eosinophil # : x  Auto Basophil # : x  Auto Neutrophil % : x  Auto Lymphocyte % : x  Auto Monocyte % : x  Auto Eosinophil % : x  Auto Basophil % : x    05-17    141  |  107  |  17  ----------------------------<  101<H>  4.1   |  24  |  1.32<H>    Ca    8.5      17 May 2018 02:39  Phos  3.3     05-16  Mg     1.8     05-16    TPro  6.6  /  Alb  3.7  /  TBili  0.4  /  DBili  x   /  AST  43<H>  /  ALT  44  /  AlkPhos  113  05-17    CARDIAC MARKERS ( 17 May 2018 02:39 )  x     / <0.01 ng/mL / x     / x     / x      CARDIAC MARKERS ( 17 May 2018 00:04 )  x     / <0.01 ng/mL / x     / x     / x      CARDIAC MARKERS ( 16 May 2018 20:43 )  x     / <0.01 ng/mL / 130 U/L / x     / x          PT/INR - ( 17 May 2018 02:39 )   PT: 12.7 sec;   INR: 1.16 ratio         PTT - ( 17 May 2018 02:39 )  PTT:31.6 sec      Radiology/Imaging:          < from: Xray Chest 1 View- PORTABLE-Urgent (05.17.18 @ 21:50) >  ******PRELIMINARY REPORT******    ******PRELIMINARY REPORT******          EXAM:  XR CHEST PORTABLE URGENT 1V                            PROCEDURE DATE:  05/17/2018      ******PRELIMINARY REPORT******    ******PRELIMINARY REPORT******              INTERPRETATION:  small bilateral pleural effusions    < end of copied text >        Edward Oconnell MD [facility]  [phone]

## 2018-05-18 NOTE — PROGRESS NOTE ADULT - ASSESSMENT
82F s/p lap giuliana doing well  - ADAT  - IVF in perioperative period  - DVT ppx lovenox  - C/w home meds  - F/u am labs

## 2018-05-18 NOTE — PROGRESS NOTE ADULT - ASSESSMENT
82y Female with history of HTN, HLD, PAD comes to ER c/o upper abdominal pain radiating to the right side and back.  US and CT imaging c/w acute cholecystitis. She had CBD stones on MRCP. S/P ERCP and to have laparascopic cholecystectomy today.     CXR finding and intial CT with small effusions. May be reactive. Received lasix yesterday. Would resume HCTZ when taking po and reduce IVF when taking po.   She remains in otherwise stable medical/cardiac condition and is cleared for  surgery. She has been on eliquis 2.5mg bid and has been held > 24 hours.  She has remained NSR and does not need to be resumed for 5-7 days post-operatively. She should receive DVT prophylaxis.

## 2018-05-18 NOTE — BRIEF OPERATIVE NOTE - PROCEDURE
<<-----Click on this checkbox to enter Procedure Cholecystectomy, laparoscopic  05/18/2018    Active  I5

## 2018-05-18 NOTE — PROGRESS NOTE ADULT - SUBJECTIVE AND OBJECTIVE BOX
Surgery Post-Op Check    SUBJECTIVE: Pt seen and examined at bedside. Patient comfortable and in no-apparent distress. No nausea, vomiting, diarrhea. Pain is controlled. no CP/SOB.       Vital Signs Last 24 Hrs  T(C): 36.6 (18 May 2018 18:45), Max: 36.6 (18 May 2018 18:45)  T(F): 97.8 (18 May 2018 18:45), Max: 97.8 (18 May 2018 18:45)  HR: 72 (18 May 2018 18:45) (51 - 81)  BP: 132/74 (18 May 2018 18:45) (99/51 - 180/77)  BP(mean): 97 (18 May 2018 17:45) (94 - 111)  RR: 18 (18 May 2018 18:45) (10 - 18)  SpO2: 99% (18 May 2018 18:45) (92% - 100%)    Physical Exam:  General Appearance: Appears well, NAD  Respiratory: No labored breathing  CV: Pulse regularly present  Abdomen: Soft, nontense, nondistended, incisions CDI, incisional tenderness      LABS:                        11.9   12.3  )-----------( 185      ( 18 May 2018 17:46 )             36.8     05-18    141  |  106  |  16  ----------------------------<  100<H>  4.0   |  25  |  1.61<H>    Ca    8.7      18 May 2018 06:30  Phos  3.4     05-18  Mg     1.7     05-18    TPro  6.1  /  Alb  3.2<L>  /  TBili  0.2  /  DBili  <0.1  /  AST  55<H>  /  ALT  60<H>  /  AlkPhos  105  05-18    PT/INR - ( 18 May 2018 07:57 )   PT: 12.6 sec;   INR: 1.11 ratio         PTT - ( 18 May 2018 07:57 )  PTT:30.9 sec      INs and OUTs:    05-17-18 @ 07:01  -  05-18-18 @ 07:00  --------------------------------------------------------  IN: 760 mL / OUT: 100 mL / NET: 660 mL    05-18-18 @ 07:01  -  05-18-18 @ 19:42  --------------------------------------------------------  IN: 180 mL / OUT: 450 mL / NET: -270 mL

## 2018-05-18 NOTE — PROGRESS NOTE ADULT - SUBJECTIVE AND OBJECTIVE BOX
Patient is a 82y old  Female who presents with a chief complaint of abdominal pain (15 May 2018 22:29)      SUBJECTIVE / OVERNIGHT EVENTS: pain free, no fever/chills, NPO for lap giuliana today    MEDICATIONS  (STANDING):  amitriptyline 25 milliGRAM(s) Oral at bedtime  amLODIPine   Tablet 5 milliGRAM(s) Oral daily  ampicillin/sulbactam  IVPB 3 Gram(s) IV Intermittent every 12 hours  ampicillin/sulbactam  IVPB      dextrose 5% + sodium chloride 0.45% with potassium chloride 20 mEq/L 1000 milliLiter(s) (40 mL/Hr) IV Continuous <Continuous>  enoxaparin Injectable 30 milliGRAM(s) SubCutaneous daily  metoprolol tartrate 100 milliGRAM(s) Oral two times a day  pantoprazole    Tablet 40 milliGRAM(s) Oral before breakfast    MEDICATIONS  (PRN):  nitroglycerin     SubLingual 0.4 milliGRAM(s) SubLingual every 5 minutes PRN Chest Pain      Vital Signs Last 24 Hrs  T(F): 97.5 (05-18-18 @ 08:25), Max: 97.9 (05-17-18 @ 17:42)  HR: 70 (05-18-18 @ 08:25) (51 - 90)  BP: 153/76 (05-18-18 @ 08:25) (99/51 - 174/83)  RR: 16 (05-18-18 @ 08:25) (16 - 18)  SpO2: 93% (05-18-18 @ 08:25) (93% - 99%)  Telemetry:   CAPILLARY BLOOD GLUCOSE        I&O's Summary    17 May 2018 07:01  -  18 May 2018 07:00  --------------------------------------------------------  IN: 760 mL / OUT: 100 mL / NET: 660 mL    18 May 2018 07:01  -  18 May 2018 10:50  --------------------------------------------------------  IN: 0 mL / OUT: 150 mL / NET: -150 mL        PHYSICAL EXAM:  GENERAL: NAD, well-developed  HEAD:  Atraumatic, Normocephalic  EYES: EOMI, PERRLA, conjunctiva and sclera clear  NECK: Supple, No JVD  CHEST/LUNG: Clear to auscultation bilaterally; No wheeze  HEART: Regular rate and rhythm; No murmurs, rubs, or gallops  ABDOMEN: Soft, Nontender, Nondistended; Bowel sounds present  EXTREMITIES:  2+ Peripheral Pulses, No clubbing, cyanosis, or edema  PSYCH: AAOx3  NEUROLOGY: non-focal  SKIN: No rashes or lesions    LABS:                        10.3   5.35  )-----------( 175      ( 18 May 2018 07:53 )             31.8     05-18    141  |  106  |  16  ----------------------------<  100<H>  4.0   |  25  |  1.61<H>    Ca    8.7      18 May 2018 06:30  Phos  3.4     05-18  Mg     1.7     05-18    TPro  6.1  /  Alb  3.2<L>  /  TBili  0.2  /  DBili  <0.1  /  AST  55<H>  /  ALT  60<H>  /  AlkPhos  105  05-18    PT/INR - ( 18 May 2018 07:57 )   PT: 12.6 sec;   INR: 1.11 ratio         PTT - ( 18 May 2018 07:57 )  PTT:30.9 sec  CARDIAC MARKERS ( 17 May 2018 02:39 )  x     / <0.01 ng/mL / x     / x     / x      CARDIAC MARKERS ( 17 May 2018 00:04 )  x     / <0.01 ng/mL / x     / x     / x      CARDIAC MARKERS ( 16 May 2018 20:43 )  x     / <0.01 ng/mL / 130 U/L / x     / x              RADIOLOGY & ADDITIONAL TESTS:    Imaging Personally Reviewed:    Consultant(s) Notes Reviewed:      Care Discussed with Consultants/Other Providers:

## 2018-05-18 NOTE — CHART NOTE - NSCHARTNOTEFT_GEN_A_CORE
Called to bedside to evaluate pt for corneal abrasion. Pt c/o left eye " like there is something in it".  Corneal abrasion protocol initiated.  Pt with relief from tetracaine. Informed both patient and daughter that feeling in eye would return, however if it worsens significantly alert team for opthalmology consult. They both verbalized understanding

## 2018-05-18 NOTE — PROGRESS NOTE ADULT - ASSESSMENT
82F with hx of Afib and CHF p/w acute cholecystitis vs choledocholithiasis     - NPO/IVF  - GI consult  - 5/16 MRCP - CBD stone   - GI plan for ERCP today   - trend LFTs  - DVT ppx  - f/u cards recs - cleared for cholecystectomy if necessary   - consented for lap cholecystectomy     [id number] 82F with hx of Afib and CHF p/w acute cholecystitis vs choledocholithiasis, choledocholithiasis on 5/6 MRCP s/p ERCP and sphincterotomy (5/17    - NPO/IVF  - GI consult  - 5/16 MRCP - CBD stone   - GI plan for ERCP today   - trend LFTs  - DVT ppx  - f/u cards recs - cleared for cholecystectomy   - consented for lap cholecystectomy   - encourage OOB    [id number]

## 2018-05-18 NOTE — PROGRESS NOTE ADULT - ASSESSMENT
Impression:  1. Abdominal pain - due to choledocholithiasis/cholecystitis, s/p successful ERCP with stone removal yesterday    Recommend:  -Patient doing well after ERCP, no complications  -continue antibiotics per primary team  -Cholecystectomy today per primary surgery team  -Please call with questions

## 2018-05-18 NOTE — PROGRESS NOTE ADULT - SUBJECTIVE AND OBJECTIVE BOX
ADVANCED ENDOSCOPY PROGRESS      Chief Complaint:  Patient is a 82y old  Female who presents with a chief complaint of abdominal pain (15 May 2018 22:29)      Interval Events: Patient is doing well. No abdominal pain, no nausea/vomiting. She is NPO for cholecystectomy today.    Allergies:  &quot;VASELINE BASED OINTMENTS&quot; (Urticaria; Rash)  adhesives (Urticaria; Rash)  statins (Unknown)      Hospital Medications:  amitriptyline 25 milliGRAM(s) Oral at bedtime  amLODIPine   Tablet 5 milliGRAM(s) Oral daily  ampicillin/sulbactam  IVPB 3 Gram(s) IV Intermittent every 12 hours  ampicillin/sulbactam  IVPB      dextrose 5% + sodium chloride 0.45% with potassium chloride 20 mEq/L 1000 milliLiter(s) IV Continuous <Continuous>  enoxaparin Injectable 30 milliGRAM(s) SubCutaneous daily  metoprolol tartrate 100 milliGRAM(s) Oral two times a day  nitroglycerin     SubLingual 0.4 milliGRAM(s) SubLingual every 5 minutes PRN  pantoprazole    Tablet 40 milliGRAM(s) Oral before breakfast      PMHX/PSHX:  Mitral valve disease  Osteoporosis  Vitamin B 12 deficiency  Carotid artery occlusion  GERD (gastroesophageal reflux disease)  Hypercholesteremia  HTN (hypertension)  Insomnia  H/O carotid endarterectomy  Varicose veins  Bilateral cataracts  Papilloma of breast  History of lumbar laminectomy for spinal cord decompression  Abdominal hernia  Hx of tonsillectomy  Status post DACIA-BSO  S/P MVR (mitral valve repair)      Family history:  Family history of dementia  Family history of lung cancer (Grandparent)      ROS:     General:  No wt loss, fevers, chills, night sweats, fatigue,   Eyes:  Good vision, no reported pain  ENT:  No sore throat, pain, runny nose, dysphagia  CV:  No pain, palpitations, hypo/hypertension  Resp:  No dyspnea, cough, tachypnea, wheezing  GI:  See HPI  :  No pain, bleeding, incontinence, nocturia  Muscle:  No pain, weakness  Neuro:  No weakness, tingling, memory problems  Skin:  No rash, edema      PHYSICAL EXAM:   Vital Signs:  Vital Signs Last 24 Hrs  T(C): 36.4 (18 May 2018 08:25), Max: 36.6 (17 May 2018 17:42)  T(F): 97.5 (18 May 2018 08:25), Max: 97.9 (17 May 2018 17:42)  HR: 70 (18 May 2018 08:25) (51 - 90)  BP: 153/76 (18 May 2018 08:25) (99/51 - 174/83)  BP(mean): --  RR: 16 (18 May 2018 08:25) (16 - 18)  SpO2: 93% (18 May 2018 08:25) (93% - 99%)  Daily Height in cm: 157.48 (18 May 2018 06:55)    Daily     GENERAL:  Appears stated age, well-groomed, well-nourished, no distress  HEENT:  NC/AT,  conjunctivae clear, sclera -anicteric  CHEST:  Full & symmetric excursion, no increased effort, breath sounds clear  HEART:  Regular rhythm, S1, S2, no murmur  ABDOMEN:  Soft, non-tender, non-distended, normoactive bowel sounds,  no masses  EXTREMITIES:  no cyanosis,clubbing or edema  SKIN:  No rash/erythema  NEURO:  Alert, oriented,     LABS:                        10.3   5.35  )-----------( 175      ( 18 May 2018 07:53 )             31.8     05-18    141  |  106  |  16  ----------------------------<  100<H>  4.0   |  25  |  1.61<H>    Ca    8.7      18 May 2018 06:30  Phos  3.4     05-18  Mg     1.7     05-18    TPro  6.1  /  Alb  3.2<L>  /  TBili  0.2  /  DBili  <0.1  /  AST  55<H>  /  ALT  60<H>  /  AlkPhos  105  05-18    LIVER FUNCTIONS - ( 18 May 2018 06:30 )  Alb: 3.2 g/dL / Pro: 6.1 g/dL / ALK PHOS: 105 U/L / ALT: 60 U/L / AST: 55 U/L / GGT: x           PT/INR - ( 18 May 2018 07:57 )   PT: 12.6 sec;   INR: 1.11 ratio         PTT - ( 18 May 2018 07:57 )  PTT:30.9 sec    Amylase Serum--      Lipase serum13       Ammonia--      Imaging:    < from: ERCP (05.17.18 @ 15:18) >  Impression:          - Choledocholithiasis s/p complete removal by sphincterotomy,        sphincteroplasty and balloon sweeps                       - There was slight oozing owing to the large stone extraction. This was at                        the apex of the sphicnterotomy.                       - A hemoclip was placed at apex of the ampulla to prevent post-sphincterotomy                        bleeding.  Recommendation:      - Return patient to hospital [facility] for ongoing care.                       - Clear liquid diet today, NPO after midnight.                       - IV hydration with LR.                       - Monitor LFTs.                       - CCY as per surgery.                                                                                        < end of copied text >

## 2018-05-18 NOTE — PROGRESS NOTE ADULT - SUBJECTIVE AND OBJECTIVE BOX
ACS PROGRESS NOTE      SUBJECTIVE: Pt seen at examined at bedside. AVSS. Yesterday around 1pm patient was audibly wheezing  Pain well controlled. Flatus/ BM. OOB and ambulating. Denies fever, CP, SOB, and NV. Tolerating         Vital Signs Last 24 Hrs  T(C): 36.4 (18 May 2018 04:46), Max: 36.6 (17 May 2018 17:42)  T(F): 97.6 (18 May 2018 04:46), Max: 97.9 (17 May 2018 17:42)  HR: 57 (18 May 2018 04:46) (51 - 90)  BP: 131/62 (18 May 2018 04:46) (99/51 - 174/83)  BP(mean): --  RR: 18 (18 May 2018 04:46) (18 - 18)  SpO2: 97% (18 May 2018 04:46) (92% - 99%)    Physical Exam  General: awake, alert, NAD    Pulm: respirations unlabored, no increased WOB  Abdomen: soft, mildly distended, NT, incision c/d/i  Extremities: Grossly symmetric    I&O's Summary    16 May 2018 07:01  -  17 May 2018 07:00  --------------------------------------------------------  IN: 2620 mL / OUT: 350 mL / NET: 2270 mL    17 May 2018 07:01  -  18 May 2018 06:03  --------------------------------------------------------  IN: 760 mL / OUT: 100 mL / NET: 660 mL      I&O's Detail    16 May 2018 07:01  -  17 May 2018 07:00  --------------------------------------------------------  IN:    dextrose 5% + sodium chloride 0.45% with potassium chloride 20 mEq/L: 1500 mL    IV PiggyBack: 520 mL    Oral Fluid: 600 mL  Total IN: 2620 mL    OUT:    Voided: 350 mL  Total OUT: 350 mL    Total NET: 2270 mL      17 May 2018 07:01  -  18 May 2018 06:03  --------------------------------------------------------  IN:    dextrose 5% + sodium chloride 0.45% with potassium chloride 20 mEq/L: 280 mL    Oral Fluid: 480 mL  Total IN: 760 mL    OUT:    Voided: 100 mL  Total OUT: 100 mL    Total NET: 660 mL          MEDICATIONS  (STANDING):  amitriptyline 25 milliGRAM(s) Oral at bedtime  amLODIPine   Tablet 5 milliGRAM(s) Oral daily  ampicillin/sulbactam  IVPB 3 Gram(s) IV Intermittent every 12 hours  ampicillin/sulbactam  IVPB      dextrose 5% + sodium chloride 0.45% with potassium chloride 20 mEq/L 1000 milliLiter(s) (40 mL/Hr) IV Continuous <Continuous>  enoxaparin Injectable 30 milliGRAM(s) SubCutaneous daily  metoprolol tartrate 100 milliGRAM(s) Oral two times a day  pantoprazole    Tablet 40 milliGRAM(s) Oral before breakfast    MEDICATIONS  (PRN):  nitroglycerin     SubLingual 0.4 milliGRAM(s) SubLingual every 5 minutes PRN Chest Pain      LABS:                        11.3   6.6   )-----------( 190      ( 17 May 2018 02:39 )             34.1     05-17    141  |  107  |  17  ----------------------------<  101<H>  4.1   |  24  |  1.32<H>    Ca    8.5      17 May 2018 02:39  Phos  3.3     05-16  Mg     1.8     05-16    TPro  6.6  /  Alb  3.7  /  TBili  0.4  /  DBili  x   /  AST  43<H>  /  ALT  44  /  AlkPhos  113  05-17    PT/INR - ( 17 May 2018 02:39 )   PT: 12.7 sec;   INR: 1.16 ratio         PTT - ( 17 May 2018 02:39 )  PTT:31.6 sec      RADIOLOGY & ADDITIONAL STUDIES: ACS PROGRESS NOTE      SUBJECTIVE: Pt seen at examined at bedside. AVSS. Yesterday around 1pm patient was audibly wheezing and had some difficulty breathing. O2 stat was 93% before supplemental O2 was added. Symptoms resolved after 20mg IV Lasix and Duoneb treatment. CXR showed small bilateral pleural effusions. ERCP and sphincterectomy yesterday with GI w/o any complications. Pain well controlled. OOB and ambulating. Denies fever, CP, SOB, and NV. Tolerating regular diet         Vital Signs Last 24 Hrs  T(C): 36.4 (18 May 2018 04:46), Max: 36.6 (17 May 2018 17:42)  T(F): 97.6 (18 May 2018 04:46), Max: 97.9 (17 May 2018 17:42)  HR: 57 (18 May 2018 04:46) (51 - 90)  BP: 131/62 (18 May 2018 04:46) (99/51 - 174/83)  BP(mean): --  RR: 18 (18 May 2018 04:46) (18 - 18)  SpO2: 97% (18 May 2018 04:46) (92% - 99%)    Physical Exam  General: awake, alert, NAD    Pulm: respirations unlabored, no increased WOB, on 2L NC  Abdomen: soft, non distended, NT  Extremities: Grossly symmetric    I&O's Summary    16 May 2018 07:01  -  17 May 2018 07:00  --------------------------------------------------------  IN: 2620 mL / OUT: 350 mL / NET: 2270 mL    17 May 2018 07:01  -  18 May 2018 06:03  --------------------------------------------------------  IN: 760 mL / OUT: 100 mL / NET: 660 mL      I&O's Detail    16 May 2018 07:01  -  17 May 2018 07:00  --------------------------------------------------------  IN:    dextrose 5% + sodium chloride 0.45% with potassium chloride 20 mEq/L: 1500 mL    IV PiggyBack: 520 mL    Oral Fluid: 600 mL  Total IN: 2620 mL    OUT:    Voided: 350 mL  Total OUT: 350 mL    Total NET: 2270 mL      17 May 2018 07:01  -  18 May 2018 06:03  --------------------------------------------------------  IN:    dextrose 5% + sodium chloride 0.45% with potassium chloride 20 mEq/L: 280 mL    Oral Fluid: 480 mL  Total IN: 760 mL    OUT:    Voided: 100 mL  Total OUT: 100 mL    Total NET: 660 mL          MEDICATIONS  (STANDING):  amitriptyline 25 milliGRAM(s) Oral at bedtime  amLODIPine   Tablet 5 milliGRAM(s) Oral daily  ampicillin/sulbactam  IVPB 3 Gram(s) IV Intermittent every 12 hours  ampicillin/sulbactam  IVPB      dextrose 5% + sodium chloride 0.45% with potassium chloride 20 mEq/L 1000 milliLiter(s) (40 mL/Hr) IV Continuous <Continuous>  enoxaparin Injectable 30 milliGRAM(s) SubCutaneous daily  metoprolol tartrate 100 milliGRAM(s) Oral two times a day  pantoprazole    Tablet 40 milliGRAM(s) Oral before breakfast    MEDICATIONS  (PRN):  nitroglycerin     SubLingual 0.4 milliGRAM(s) SubLingual every 5 minutes PRN Chest Pain      LABS:                        11.3   6.6   )-----------( 190      ( 17 May 2018 02:39 )             34.1     05-17    141  |  107  |  17  ----------------------------<  101<H>  4.1   |  24  |  1.32<H>    Ca    8.5      17 May 2018 02:39  Phos  3.3     05-16  Mg     1.8     05-16    TPro  6.6  /  Alb  3.7  /  TBili  0.4  /  DBili  x   /  AST  43<H>  /  ALT  44  /  AlkPhos  113  05-17    PT/INR - ( 17 May 2018 02:39 )   PT: 12.7 sec;   INR: 1.16 ratio         PTT - ( 17 May 2018 02:39 )  PTT:31.6 sec      RADIOLOGY & ADDITIONAL STUDIES:  ******PRELIMINARY REPORT******    ******PRELIMINARY REPORT******          EXAM:  XR CHEST PORTABLE URGENT 1V                            PROCEDURE DATE:  05/17/2018      ******PRELIMINARY REPORT******    ******PRELIMINARY REPORT******              INTERPRETATION:  small bilateral pleural effusions              ******PRELIMINARY REPORT******    ******PRELIMINARY REPORT******          KATRIN RENO M.D., RADIOLOGY RESIDENT

## 2018-05-19 RX ADMIN — Medication 1 DROP(S): at 05:48

## 2018-05-19 RX ADMIN — OXYCODONE HYDROCHLORIDE 5 MILLIGRAM(S): 5 TABLET ORAL at 21:46

## 2018-05-19 RX ADMIN — Medication 25 MILLIGRAM(S): at 21:16

## 2018-05-19 RX ADMIN — OXYCODONE HYDROCHLORIDE 5 MILLIGRAM(S): 5 TABLET ORAL at 17:14

## 2018-05-19 RX ADMIN — Medication 1 DROP(S): at 17:07

## 2018-05-19 RX ADMIN — OXYCODONE HYDROCHLORIDE 10 MILLIGRAM(S): 5 TABLET ORAL at 01:41

## 2018-05-19 RX ADMIN — Medication 1 DROP(S): at 12:09

## 2018-05-19 RX ADMIN — OXYCODONE HYDROCHLORIDE 10 MILLIGRAM(S): 5 TABLET ORAL at 01:11

## 2018-05-19 RX ADMIN — OXYCODONE HYDROCHLORIDE 5 MILLIGRAM(S): 5 TABLET ORAL at 06:28

## 2018-05-19 RX ADMIN — Medication 100 MILLIGRAM(S): at 05:58

## 2018-05-19 RX ADMIN — ENOXAPARIN SODIUM 30 MILLIGRAM(S): 100 INJECTION SUBCUTANEOUS at 12:08

## 2018-05-19 RX ADMIN — PANTOPRAZOLE SODIUM 40 MILLIGRAM(S): 20 TABLET, DELAYED RELEASE ORAL at 06:00

## 2018-05-19 RX ADMIN — Medication 1 DROP(S): at 08:00

## 2018-05-19 RX ADMIN — OXYCODONE HYDROCHLORIDE 5 MILLIGRAM(S): 5 TABLET ORAL at 05:58

## 2018-05-19 RX ADMIN — AMLODIPINE BESYLATE 5 MILLIGRAM(S): 2.5 TABLET ORAL at 05:58

## 2018-05-19 RX ADMIN — OXYCODONE HYDROCHLORIDE 5 MILLIGRAM(S): 5 TABLET ORAL at 21:16

## 2018-05-19 RX ADMIN — Medication 250 MILLIGRAM(S): at 05:58

## 2018-05-19 RX ADMIN — Medication 250 MILLIGRAM(S): at 17:07

## 2018-05-19 NOTE — PROGRESS NOTE ADULT - ASSESSMENT
82F with hx of Afib and CHF p/w acute cholecystitis vs choledocholithiasis, choledocholithiasis on 5/6 MRCP s/p ERCP and sphincterotomy (5/17) and lap giuliana on 5/18. Doing well with no immediate post op complications.     - Advance Reg diet  - Pain control  - 5/16 MRCP - CBD stone   - trend LFTs  - DVT ppx  - encourage OOB  - Poss home today    [id number]

## 2018-05-19 NOTE — PROGRESS NOTE ADULT - SUBJECTIVE AND OBJECTIVE BOX
Patient is a 82y old  Female who presents with a chief complaint of abdominal pain (15 May 2018 22:29)      SUBJECTIVE / OVERNIGHT EVENTS: no SOB, coughing, some abd pain , POD1 post lap-giuliana    MEDICATIONS  (STANDING):  amitriptyline 25 milliGRAM(s) Oral at bedtime  amLODIPine   Tablet 5 milliGRAM(s) Oral daily  ciprofloxacin     Tablet 250 milliGRAM(s) Oral every 12 hours  enoxaparin Injectable 30 milliGRAM(s) SubCutaneous daily  lactated ringers. 1000 milliLiter(s) (100 mL/Hr) IV Continuous <Continuous>  metoprolol tartrate 100 milliGRAM(s) Oral two times a day  pantoprazole    Tablet 40 milliGRAM(s) Oral before breakfast  tetracaine 0.5% Solution 1 Drop(s) Left EYE once  tobramycin 0.3% Ophthalmic Solution      tobramycin 0.3% Ophthalmic Solution 1 Drop(s) Both EYES every 4 hours    MEDICATIONS  (PRN):  nitroglycerin     SubLingual 0.4 milliGRAM(s) SubLingual every 5 minutes PRN Chest Pain  oxyCODONE    IR 5 milliGRAM(s) Oral every 4 hours PRN Moderate Pain (4 - 6)  oxyCODONE    IR 10 milliGRAM(s) Oral every 4 hours PRN Severe Pain (7 - 10)      Vital Signs Last 24 Hrs  T(F): 97.8 (05-19-18 @ 08:30), Max: 97.9 (05-18-18 @ 21:45)  HR: 69 (05-19-18 @ 09:36) (60 - 81)  BP: 94/63 (05-19-18 @ 09:36) (94/63 - 180/77)  RR: 16 (05-19-18 @ 08:30) (10 - 18)  SpO2: 92% (05-19-18 @ 09:36) (92% - 100%)  Telemetry:   CAPILLARY BLOOD GLUCOSE        I&O's Summary    18 May 2018 07:01  -  19 May 2018 07:00  --------------------------------------------------------  IN: 1380 mL / OUT: 750 mL / NET: 630 mL    19 May 2018 07:01  -  19 May 2018 14:16  --------------------------------------------------------  IN: 470 mL / OUT: 150 mL / NET: 320 mL        PHYSICAL EXAM:  GENERAL: NAD, well-developed  HEAD:  Atraumatic, Normocephalic  EYES: EOMI, PERRLA, conjunctiva and sclera clear  NECK: Supple, No JVD  CHEST/LUNG: Clear to auscultation bilaterally; No wheeze  HEART: Regular rate and rhythm; No murmurs, rubs, or gallops  ABDOMEN: Soft, Nontender, Nondistended; Bowel sounds present  EXTREMITIES:  2+ Peripheral Pulses, No clubbing, cyanosis, or edema  PSYCH: AAOx3  NEUROLOGY: non-focal  SKIN: No rashes or lesions    LABS:                        9.4    12.49 )-----------( 220      ( 19 May 2018 08:34 )             29.1     05-19    138  |  102  |  18  ----------------------------<  110<H>  4.4   |  23  |  1.74<H>    Ca    8.5      19 May 2018 06:57  Phos  3.8     05-19  Mg     1.8     05-19    TPro  6.5  /  Alb  3.8  /  TBili  0.3  /  DBili  <0.1  /  AST  103<H>  /  ALT  98<H>  /  AlkPhos  114  05-19    PT/INR - ( 18 May 2018 07:57 )   PT: 12.6 sec;   INR: 1.11 ratio         PTT - ( 18 May 2018 07:57 )  PTT:30.9 sec          RADIOLOGY & ADDITIONAL TESTS:    Imaging Personally Reviewed:    Consultant(s) Notes Reviewed:      Care Discussed with Consultants/Other Providers:

## 2018-05-19 NOTE — PROGRESS NOTE ADULT - ASSESSMENT
82y Female with history of HTN, HLD, PAF comes to ER c/o upper abdominal pain radiating to the right side and back.  US and CT imaging c/w acute cholecystitis. She had CBD stones on MRCP. S/P ERCP and  laparascopic cholecystectomy yesterday. She is doing well without CHF and remains NSR.      Would resume HCTZ tomorrow and continue to hold losartan. She does not need to resume eliquis for 5-7 days post-operatively. Will re-evaluate as outpatient. She should receive DVT prophylaxis.

## 2018-05-19 NOTE — PROGRESS NOTE ADULT - SUBJECTIVE AND OBJECTIVE BOX
ACS PROGRESS NOTE      SUBJECTIVE: Pt seen at examined at bedside. AVSS.  Pain well controlled. OOB and ambulating. Denies fever, CP, SOB, and NV. Tolerating regular diet         Vital Signs Last 24 Hrs  Vital Signs Last 24 Hrs  T(C): 36.3 (19 May 2018 05:07), Max: 36.6 (18 May 2018 18:45)  T(F): 97.4 (19 May 2018 05:07), Max: 97.9 (18 May 2018 21:45)  HR: 73 (19 May 2018 05:52) (60 - 81)  BP: 120/78 (19 May 2018 05:52) (100/57 - 180/77)  BP(mean): 97 (18 May 2018 17:45) (94 - 111)  RR: 18 (19 May 2018 05:07) (10 - 18)  SpO2: 93% (19 May 2018 05:07) (92% - 100%)    I&O's Detail    18 May 2018 07:01  -  19 May 2018 07:00  --------------------------------------------------------  IN:    dextrose 5% + sodium chloride 0.45% with potassium chloride 20 mEq/L: 180 mL    lactated ringers.: 1200 mL  Total IN: 1380 mL    OUT:    Voided: 750 mL  Total OUT: 750 mL    Total NET: 630 mL      Physical Exam  General: awake, alert, NAD    Pulm: respirations unlabored, no increased WOB  Abdomen: soft, non distended, NT, dressing c/d/i  Extremities: Grossly symmetric        MEDICATIONS  (STANDING):  amitriptyline 25 milliGRAM(s) Oral at bedtime  amLODIPine   Tablet 5 milliGRAM(s) Oral daily  ampicillin/sulbactam  IVPB 3 Gram(s) IV Intermittent every 12 hours  ampicillin/sulbactam  IVPB      dextrose 5% + sodium chloride 0.45% with potassium chloride 20 mEq/L 1000 milliLiter(s) (40 mL/Hr) IV Continuous <Continuous>  enoxaparin Injectable 30 milliGRAM(s) SubCutaneous daily  metoprolol tartrate 100 milliGRAM(s) Oral two times a day  pantoprazole    Tablet 40 milliGRAM(s) Oral before breakfast    MEDICATIONS  (PRN):  nitroglycerin     SubLingual 0.4 milliGRAM(s) SubLingual every 5 minutes PRN Chest Pain      LABS:                   11.9   12.3   )----------(  185       ( 18 May 2018 17:46 )               36.8                  CAPILLARY BLOOD GLUCOSE

## 2018-05-19 NOTE — PROGRESS NOTE ADULT - SUBJECTIVE AND OBJECTIVE BOX
Patient is a 82y old  Female who presents with a chief complaint of abdominal pain (15 May 2018 22:29)    She denies c/o chest pain, SOB or palpitations. No abdominal pain. Tolerating liquids.     Allergies    &quot;VASELINE BASED OINTMENTS&quot; (Urticaria; Rash)  adhesives (Urticaria; Rash)  statins (Unknown)    Intolerances      MEDICATIONS  (STANDING):  amitriptyline 25 milliGRAM(s) Oral at bedtime  amLODIPine   Tablet 5 milliGRAM(s) Oral daily  ciprofloxacin     Tablet 250 milliGRAM(s) Oral every 12 hours  enoxaparin Injectable 30 milliGRAM(s) SubCutaneous daily  lactated ringers. 1000 milliLiter(s) (100 mL/Hr) IV Continuous <Continuous>  metoprolol tartrate 100 milliGRAM(s) Oral two times a day  pantoprazole    Tablet 40 milliGRAM(s) Oral before breakfast  tetracaine 0.5% Solution 1 Drop(s) Left EYE once  tobramycin 0.3% Ophthalmic Solution      tobramycin 0.3% Ophthalmic Solution 1 Drop(s) Both EYES every 4 hours    MEDICATIONS  (PRN):  nitroglycerin     SubLingual 0.4 milliGRAM(s) SubLingual every 5 minutes PRN Chest Pain  oxyCODONE    IR 5 milliGRAM(s) Oral every 4 hours PRN Moderate Pain (4 - 6)  oxyCODONE    IR 10 milliGRAM(s) Oral every 4 hours PRN Severe Pain (7 - 10)      PHYSICAL EXAM:  Vital Signs Last 24 Hrs  T(C): 36.3 (19 May 2018 05:07), Max: 36.6 (18 May 2018 18:45)  T(F): 97.4 (19 May 2018 05:07), Max: 97.9 (18 May 2018 21:45)  HR: 73 (19 May 2018 05:52) (60 - 81)  BP: 120/78 (19 May 2018 05:52) (100/57 - 180/77)  BP(mean): 97 (18 May 2018 17:45) (94 - 111)  RR: 18 (19 May 2018 05:07) (10 - 18)  SpO2: 93% (19 May 2018 05:07) (92% - 100%)  Daily     Daily   I&O's Summary    18 May 2018 07:01  -  19 May 2018 07:00  --------------------------------------------------------  IN: 1380 mL / OUT: 750 mL / NET: 630 mL    General Appearance: 	 Alert, cooperative, no distress  HEENT: normocephalic, atraumatic,   Neck: no JVD,  carotid 2+  bilaterally without bruits  Lungs:  clear to auscultation and percussion bilaterally  Cor:  pmi 5th ICS MCL, regular rate and rhythm, S1 normal intensity, S2 normal intensity, no gallops, Grade II/VI apical systolic murmur  Abdomen: soft,BS present.  Extremities: without cyanosis, clubbing or edema  Vasc: 2-+ PT and DP pulses; LE varicosities  Neurologic: A&O x 3 (time, place, person). Symmetric strength; limited e      Labs:  CBC Full  -  ( 18 May 2018 17:46 )  WBC Count : 12.3 K/uL  Hemoglobin : 11.9 g/dL  Hematocrit : 36.8 %  Platelet Count - Automated : 185 K/uL  Mean Cell Volume : 88.5 fl  Mean Cell Hemoglobin : 28.5 pg  Mean Cell Hemoglobin Concentration : 32.3 gm/dL  Auto Neutrophil # : x  Auto Lymphocyte # : x  Auto Monocyte # : x  Auto Eosinophil # : x  Auto Basophil # : x  Auto Neutrophil % : x  Auto Lymphocyte % : x  Auto Monocyte % : x  Auto Eosinophil % : x  Auto Basophil % : x    05-19    138  |  102  |  18  ----------------------------<  110<H>  4.4   |  23  |  1.74<H>    Ca    8.5      19 May 2018 06:57  Phos  3.8     05-19  Mg     1.8     05-19    TPro  6.5  /  Alb  3.8  /  TBili  0.3  /  DBili  <0.1  /  AST  103<H>  /  ALT  98<H>  /  AlkPhos  114  05-19        PT/INR - ( 18 May 2018 07:57 )   PT: 12.6 sec;   INR: 1.11 ratio         PTT - ( 18 May 2018 07:57 )  PTT:30.9 sec      Radiology/Imaging:                Edward Oconnell MD [facility]  [phone]

## 2018-05-20 LAB
ALBUMIN SERPL ELPH-MCNC: 3.5 G/DL — SIGNIFICANT CHANGE UP (ref 3.3–5)
ALP SERPL-CCNC: 114 U/L — SIGNIFICANT CHANGE UP (ref 40–120)
ALT FLD-CCNC: 86 U/L — HIGH (ref 10–45)
ANION GAP SERPL CALC-SCNC: 12 MMOL/L — SIGNIFICANT CHANGE UP (ref 5–17)
ANION GAP SERPL CALC-SCNC: 13 MMOL/L — SIGNIFICANT CHANGE UP (ref 5–17)
AST SERPL-CCNC: 71 U/L — HIGH (ref 10–40)
BILIRUB SERPL-MCNC: 0.4 MG/DL — SIGNIFICANT CHANGE UP (ref 0.2–1.2)
BUN SERPL-MCNC: 26 MG/DL — HIGH (ref 7–23)
BUN SERPL-MCNC: 28 MG/DL — HIGH (ref 7–23)
CALCIUM SERPL-MCNC: 8 MG/DL — LOW (ref 8.4–10.5)
CALCIUM SERPL-MCNC: 8.3 MG/DL — LOW (ref 8.4–10.5)
CHLORIDE SERPL-SCNC: 98 MMOL/L — SIGNIFICANT CHANGE UP (ref 96–108)
CHLORIDE SERPL-SCNC: 99 MMOL/L — SIGNIFICANT CHANGE UP (ref 96–108)
CO2 SERPL-SCNC: 22 MMOL/L — SIGNIFICANT CHANGE UP (ref 22–31)
CO2 SERPL-SCNC: 23 MMOL/L — SIGNIFICANT CHANGE UP (ref 22–31)
CREAT SERPL-MCNC: 2.07 MG/DL — HIGH (ref 0.5–1.3)
CREAT SERPL-MCNC: 2.13 MG/DL — HIGH (ref 0.5–1.3)
GLUCOSE SERPL-MCNC: 116 MG/DL — HIGH (ref 70–99)
GLUCOSE SERPL-MCNC: 121 MG/DL — HIGH (ref 70–99)
HCT VFR BLD CALC: 27 % — LOW (ref 34.5–45)
HCT VFR BLD CALC: 28.3 % — LOW (ref 34.5–45)
HGB BLD-MCNC: 9 G/DL — LOW (ref 11.5–15.5)
HGB BLD-MCNC: 9 G/DL — LOW (ref 11.5–15.5)
MCHC RBC-ENTMCNC: 28.1 PG — SIGNIFICANT CHANGE UP (ref 27–34)
MCHC RBC-ENTMCNC: 29.3 PG — SIGNIFICANT CHANGE UP (ref 27–34)
MCHC RBC-ENTMCNC: 31.9 GM/DL — LOW (ref 32–36)
MCHC RBC-ENTMCNC: 33.5 GM/DL — SIGNIFICANT CHANGE UP (ref 32–36)
MCV RBC AUTO: 87.4 FL — SIGNIFICANT CHANGE UP (ref 80–100)
MCV RBC AUTO: 88.1 FL — SIGNIFICANT CHANGE UP (ref 80–100)
PLATELET # BLD AUTO: 192 K/UL — SIGNIFICANT CHANGE UP (ref 150–400)
PLATELET # BLD AUTO: 216 K/UL — SIGNIFICANT CHANGE UP (ref 150–400)
POTASSIUM SERPL-MCNC: 3.6 MMOL/L — SIGNIFICANT CHANGE UP (ref 3.5–5.3)
POTASSIUM SERPL-MCNC: 4.1 MMOL/L — SIGNIFICANT CHANGE UP (ref 3.5–5.3)
POTASSIUM SERPL-SCNC: 3.6 MMOL/L — SIGNIFICANT CHANGE UP (ref 3.5–5.3)
POTASSIUM SERPL-SCNC: 4.1 MMOL/L — SIGNIFICANT CHANGE UP (ref 3.5–5.3)
PROT SERPL-MCNC: 6.9 G/DL — SIGNIFICANT CHANGE UP (ref 6–8.3)
RBC # BLD: 3.08 M/UL — LOW (ref 3.8–5.2)
RBC # BLD: 3.21 M/UL — LOW (ref 3.8–5.2)
RBC # FLD: 12 % — SIGNIFICANT CHANGE UP (ref 10.3–14.5)
RBC # FLD: 12 % — SIGNIFICANT CHANGE UP (ref 10.3–14.5)
SODIUM SERPL-SCNC: 133 MMOL/L — LOW (ref 135–145)
SODIUM SERPL-SCNC: 134 MMOL/L — LOW (ref 135–145)
WBC # BLD: 13 K/UL — HIGH (ref 3.8–10.5)
WBC # BLD: 14.7 K/UL — HIGH (ref 3.8–10.5)
WBC # FLD AUTO: 13 K/UL — HIGH (ref 3.8–10.5)
WBC # FLD AUTO: 14.7 K/UL — HIGH (ref 3.8–10.5)

## 2018-05-20 RX ORDER — ASPIRIN/CALCIUM CARB/MAGNESIUM 324 MG
81 TABLET ORAL DAILY
Qty: 0 | Refills: 0 | Status: DISCONTINUED | OUTPATIENT
Start: 2018-05-20 | End: 2018-05-22

## 2018-05-20 RX ADMIN — Medication 81 MILLIGRAM(S): at 18:04

## 2018-05-20 RX ADMIN — OXYCODONE HYDROCHLORIDE 5 MILLIGRAM(S): 5 TABLET ORAL at 06:18

## 2018-05-20 RX ADMIN — Medication 250 MILLIGRAM(S): at 18:04

## 2018-05-20 RX ADMIN — PANTOPRAZOLE SODIUM 40 MILLIGRAM(S): 20 TABLET, DELAYED RELEASE ORAL at 05:48

## 2018-05-20 RX ADMIN — AMLODIPINE BESYLATE 5 MILLIGRAM(S): 2.5 TABLET ORAL at 05:47

## 2018-05-20 RX ADMIN — OXYCODONE HYDROCHLORIDE 5 MILLIGRAM(S): 5 TABLET ORAL at 05:48

## 2018-05-20 RX ADMIN — Medication 25 MILLIGRAM(S): at 21:38

## 2018-05-20 RX ADMIN — Medication 100 MILLIGRAM(S): at 05:48

## 2018-05-20 RX ADMIN — ENOXAPARIN SODIUM 30 MILLIGRAM(S): 100 INJECTION SUBCUTANEOUS at 13:14

## 2018-05-20 RX ADMIN — Medication 250 MILLIGRAM(S): at 05:48

## 2018-05-20 NOTE — PROGRESS NOTE ADULT - ASSESSMENT
82y Female with history of HTN, HLD, PAF comes to ER c/o upper abdominal pain radiating to the right side and back.  US and CT imaging c/w acute cholecystitis. She had CBD stones on MRCP. S/P ERCP and  laparoscopic cholecystectomy.     CKD has increased on labs. To continue to hold ARB and diuretic. To increase ambulation, incentive spirometry. Repeat BT.  She does not need to resume eliquis for 5-7 days post-operatively. Will re-evaluate as outpatient. Continue DVT prophylaxis. 82y Female with history of HTN, HLD, PAF comes to ER c/o upper abdominal pain radiating to the right side and back.  US and CT imaging c/w acute cholecystitis. She had CBD stones on MRCP. S/P ERCP and  laparoscopic cholecystectomy.     CKD has increased on labs along with WBC and mild LFT elevation. To continue to hold ARB and diuretic. To increase ambulation, incentive spirometry. Repeat BT.  She does not need to resume eliquis for 5-7 days post-operatively. Will re-evaluate as outpatient. Continue DVT prophylaxis. IV hydration. 82y Female with history of HTN, HLD, PAF comes to ER c/o upper abdominal pain radiating to the right side and back.  US and CT imaging c/w acute cholecystitis. She had CBD stones on MRCP. S/P ERCP and  laparoscopic cholecystectomy.     CKD has increased on labs along with WBC and mild LFT elevation. To continue to hold ARB and diuretic. To increase ambulation, incentive spirometry. Repeat BT.  She does not need to resume eliquis for 5-7 days post-operatively. Will re-evaluate as outpatient. Continue DVT prophylaxis. IV hydration.  To hold norvasc. BP was low during the day.

## 2018-05-20 NOTE — PROGRESS NOTE ADULT - SUBJECTIVE AND OBJECTIVE BOX
Patient is a 82y old  Female who presents with a chief complaint of abdominal pain (15 May 2018 22:29)    She denies c/o chest pain, increasing SOB or palpitations. Not ambulating much. Notes abdominal soreness. No BM.    Allergies    &quot;VASELINE BASED OINTMENTS&quot; (Urticaria; Rash)  adhesives (Urticaria; Rash)  statins (Unknown)    Intolerances      MEDICATIONS  (STANDING):  amitriptyline 25 milliGRAM(s) Oral at bedtime  amLODIPine   Tablet 5 milliGRAM(s) Oral daily  ciprofloxacin     Tablet 250 milliGRAM(s) Oral every 12 hours  enoxaparin Injectable 30 milliGRAM(s) SubCutaneous daily  metoprolol tartrate 100 milliGRAM(s) Oral two times a day  pantoprazole    Tablet 40 milliGRAM(s) Oral before breakfast  tetracaine 0.5% Solution 1 Drop(s) Left EYE once    MEDICATIONS  (PRN):  nitroglycerin     SubLingual 0.4 milliGRAM(s) SubLingual every 5 minutes PRN Chest Pain  oxyCODONE    IR 5 milliGRAM(s) Oral every 4 hours PRN Moderate Pain (4 - 6)  oxyCODONE    IR 10 milliGRAM(s) Oral every 4 hours PRN Severe Pain (7 - 10)      PHYSICAL EXAM:  Vital Signs Last 24 Hrs  T(C): 36.8 (20 May 2018 05:33), Max: 37 (19 May 2018 21:47)  T(F): 98.2 (20 May 2018 05:33), Max: 98.6 (19 May 2018 21:47)  HR: 99 (20 May 2018 05:33) (65 - 99)  BP: 133/75 (20 May 2018 05:33) (94/63 - 146/71)  BP(mean): --  RR: 18 (20 May 2018 05:33) (16 - 18)  SpO2: 92% (20 May 2018 05:33) (92% - 94%)  Daily     Daily   I&O's Summary    19 May 2018 07:01  -  20 May 2018 07:00  --------------------------------------------------------  IN: 970 mL / OUT: 550 mL / NET: 420 mL    General Appearance: 	 Alert, cooperative, no distress  HEENT: normocephalic, atraumatic,   Neck: no JVD,  carotid 2+  bilaterally without bruits  Lungs:  decreased BS bases bilaterally  Cor:  pmi 5th ICS MCL, regular rate and rhythm, S1 normal intensity, S2 normal intensity, no gallops, Grade II/VI apical systolic murmur  Abdomen: soft,BS present.  Extremities: without cyanosis, clubbing or edema  Vasc: 2-+ PT and DP pulses; LE varicosities  Neurologic: A&O x 3 (time, place, person). Symmetric strength; limited e    Labs:  CBC Full  -  ( 20 May 2018 06:31 )  WBC Count : 13.0 K/uL  Hemoglobin : 9.0 g/dL  Hematocrit : 27.0 %  Platelet Count - Automated : 192 K/uL  Mean Cell Volume : 87.4 fl  Mean Cell Hemoglobin : 29.3 pg  Mean Cell Hemoglobin Concentration : 33.5 gm/dL  Auto Neutrophil # : x  Auto Lymphocyte # : x  Auto Monocyte # : x  Auto Eosinophil # : x  Auto Basophil # : x  Auto Neutrophil % : x  Auto Lymphocyte % : x  Auto Monocyte % : x  Auto Eosinophil % : x  Auto Basophil % : x    05-20    133<L>  |  98  |  26<H>  ----------------------------<  121<H>  3.6   |  23  |  2.07<H>    Ca    8.0<L>      20 May 2018 06:31  Phos  3.8     05-19  Mg     1.8     05-19    TPro  6.9  /  Alb  3.5  /  TBili  0.4  /  DBili  x   /  AST  71<H>  /  ALT  86<H>  /  AlkPhos  114  05-20                          Edward Oconnell MD [facility]  [phone]

## 2018-05-20 NOTE — PROGRESS NOTE ADULT - SUBJECTIVE AND OBJECTIVE BOX
Patient is a 82y old  Female who presents with a chief complaint of abdominal pain (15 May 2018 22:29)      SUBJECTIVE / OVERNIGHT EVENTS: pain free, ambulating, tolerating po, POD2 post lap-giuliana    MEDICATIONS  (STANDING):  amitriptyline 25 milliGRAM(s) Oral at bedtime  aspirin  chewable 81 milliGRAM(s) Oral daily  ciprofloxacin     Tablet 250 milliGRAM(s) Oral every 12 hours  enoxaparin Injectable 30 milliGRAM(s) SubCutaneous daily  metoprolol tartrate 100 milliGRAM(s) Oral two times a day  pantoprazole    Tablet 40 milliGRAM(s) Oral before breakfast  tetracaine 0.5% Solution 1 Drop(s) Left EYE once    MEDICATIONS  (PRN):  nitroglycerin     SubLingual 0.4 milliGRAM(s) SubLingual every 5 minutes PRN Chest Pain  oxyCODONE    IR 5 milliGRAM(s) Oral every 4 hours PRN Moderate Pain (4 - 6)  oxyCODONE    IR 10 milliGRAM(s) Oral every 4 hours PRN Severe Pain (7 - 10)      Vital Signs Last 24 Hrs  T(F): 98.1 (05-20-18 @ 21:47), Max: 98.8 (05-20-18 @ 09:15)  HR: 93 (05-20-18 @ 21:47) (76 - 99)  BP: 126/93 (05-20-18 @ 21:47) (105/50 - 133/75)  RR: 16 (05-20-18 @ 21:47) (16 - 18)  SpO2: 98% (05-20-18 @ 21:47) (92% - 98%)  Telemetry:   CAPILLARY BLOOD GLUCOSE        I&O's Summary    19 May 2018 07:01  -  20 May 2018 07:00  --------------------------------------------------------  IN: 970 mL / OUT: 550 mL / NET: 420 mL    20 May 2018 07:01  -  21 May 2018 00:19  --------------------------------------------------------  IN: 1080 mL / OUT: 450 mL / NET: 630 mL        PHYSICAL EXAM:  GENERAL: NAD, well-developed  HEAD:  Atraumatic, Normocephalic  EYES: EOMI, PERRLA, conjunctiva and sclera clear  NECK: Supple, No JVD  CHEST/LUNG: Clear to auscultation bilaterally; No wheeze  HEART: Regular rate and rhythm; No murmurs, rubs, or gallops  ABDOMEN: Soft, Nontender, Nondistended; Bowel sounds present  EXTREMITIES:  2+ Peripheral Pulses, No clubbing, cyanosis, or edema  PSYCH: AAOx3  NEUROLOGY: non-focal  SKIN: No rashes or lesions    LABS:                        9.0    14.7  )-----------( 216      ( 20 May 2018 12:52 )             28.3     05-20    134<L>  |  99  |  28<H>  ----------------------------<  116<H>  4.1   |  22  |  2.13<H>    Ca    8.3<L>      20 May 2018 12:52  Phos  3.8     05-19  Mg     1.8     05-19    TPro  6.9  /  Alb  3.5  /  TBili  0.4  /  DBili  x   /  AST  71<H>  /  ALT  86<H>  /  AlkPhos  114  05-20              RADIOLOGY & ADDITIONAL TESTS:    Imaging Personally Reviewed:    Consultant(s) Notes Reviewed:      Care Discussed with Consultants/Other Providers:

## 2018-05-20 NOTE — PROGRESS NOTE ADULT - ASSESSMENT
82F with hx of Afib and CHF p/w acute cholecystitis vs choledocholithiasis, choledocholithiasis on 5/6 MRCP s/p ERCP and sphincterotomy (5/17) and lap giuliana on 5/18. Doing well today.     - Advance Reg diet  - Pain control  - 5/16 MRCP - CBD stone   - trend LFTs  - DVT ppx  - encourage OOB  - Poss home today    [id number]

## 2018-05-20 NOTE — PROGRESS NOTE ADULT - SUBJECTIVE AND OBJECTIVE BOX
ACS PROGRESS NOTE      SUBJECTIVE: Pt seen at examined at bedside. AVSS.  Pain well controlled. OOB and ambulating. Denies fever, CP, SOB, and NV. Tolerating regular diet.       Vital Signs Last 24 Hrs  T(C): 36.8 (20 May 2018 05:33), Max: 37 (19 May 2018 21:47)  T(F): 98.2 (20 May 2018 05:33), Max: 98.6 (19 May 2018 21:47)  HR: 99 (20 May 2018 05:33) (66 - 99)  BP: 133/75 (20 May 2018 05:33) (94/63 - 146/71)  BP(mean): --  RR: 18 (20 May 2018 05:33) (16 - 18)  SpO2: 92% (20 May 2018 05:33) (92% - 94%)    I&O's Detail    19 May 2018 07:01  -  20 May 2018 07:00  --------------------------------------------------------  IN:    lactated ringers.: 500 mL    Oral Fluid: 470 mL  Total IN: 970 mL    OUT:    Voided: 550 mL  Total OUT: 550 mL    Total NET: 420 mL            Physical Exam  General: awake, alert, NAD    Pulm: respirations unlabored, no increased WOB  Abdomen: soft, non distended, NT, dressing c/d/i  Extremities: Grossly symmetric        MEDICATIONS  (STANDING):  amitriptyline 25 milliGRAM(s) Oral at bedtime  amLODIPine   Tablet 5 milliGRAM(s) Oral daily  ampicillin/sulbactam  IVPB 3 Gram(s) IV Intermittent every 12 hours  ampicillin/sulbactam  IVPB      dextrose 5% + sodium chloride 0.45% with potassium chloride 20 mEq/L 1000 milliLiter(s) (40 mL/Hr) IV Continuous <Continuous>  enoxaparin Injectable 30 milliGRAM(s) SubCutaneous daily  metoprolol tartrate 100 milliGRAM(s) Oral two times a day  pantoprazole    Tablet 40 milliGRAM(s) Oral before breakfast    MEDICATIONS  (PRN):  nitroglycerin     SubLingual 0.4 milliGRAM(s) SubLingual every 5 minutes PRN Chest Pain      LABS:                         9.0    13.0   )----------(  192       ( 20 May 2018 06:31 )               27.0      133    |  98     |  26     ----------------------------<  121        ( 20 May 2018 06:31 )  3.6     |  23     |  2.07     Ca    8.0        ( 20 May 2018 06:31 )    TPro  6.9    /  Alb  3.5    /  TBili  0.4    /  DBili  x      /  AST  71     /  ALT  86     /  AlkPhos  114    ( 20 May 2018 06:31 )    LIVER FUNCTIONS - ( 20 May 2018 06:31 )  Alb: 3.5 g/dL / Pro: 6.9 g/dL / ALK PHOS: 114 U/L / ALT: 86 U/L / AST: 71 U/L / GGT: x               CAPILLARY BLOOD GLUCOSE

## 2018-05-21 LAB
ANION GAP SERPL CALC-SCNC: 12 MMOL/L — SIGNIFICANT CHANGE UP (ref 5–17)
BUN SERPL-MCNC: 26 MG/DL — HIGH (ref 7–23)
CALCIUM SERPL-MCNC: 8.3 MG/DL — LOW (ref 8.4–10.5)
CHLORIDE SERPL-SCNC: 102 MMOL/L — SIGNIFICANT CHANGE UP (ref 96–108)
CO2 SERPL-SCNC: 26 MMOL/L — SIGNIFICANT CHANGE UP (ref 22–31)
CREAT SERPL-MCNC: 1.68 MG/DL — HIGH (ref 0.5–1.3)
GLUCOSE SERPL-MCNC: 78 MG/DL — SIGNIFICANT CHANGE UP (ref 70–99)
HCT VFR BLD CALC: 26.4 % — LOW (ref 34.5–45)
HGB BLD-MCNC: 8.4 G/DL — LOW (ref 11.5–15.5)
MCHC RBC-ENTMCNC: 27.9 PG — SIGNIFICANT CHANGE UP (ref 27–34)
MCHC RBC-ENTMCNC: 31.7 GM/DL — LOW (ref 32–36)
MCV RBC AUTO: 87.8 FL — SIGNIFICANT CHANGE UP (ref 80–100)
PLATELET # BLD AUTO: 212 K/UL — SIGNIFICANT CHANGE UP (ref 150–400)
POTASSIUM SERPL-MCNC: 4 MMOL/L — SIGNIFICANT CHANGE UP (ref 3.5–5.3)
POTASSIUM SERPL-SCNC: 4 MMOL/L — SIGNIFICANT CHANGE UP (ref 3.5–5.3)
RBC # BLD: 3.01 M/UL — LOW (ref 3.8–5.2)
RBC # FLD: 12.1 % — SIGNIFICANT CHANGE UP (ref 10.3–14.5)
SODIUM SERPL-SCNC: 140 MMOL/L — SIGNIFICANT CHANGE UP (ref 135–145)
WBC # BLD: 9.6 K/UL — SIGNIFICANT CHANGE UP (ref 3.8–10.5)
WBC # FLD AUTO: 9.6 K/UL — SIGNIFICANT CHANGE UP (ref 3.8–10.5)

## 2018-05-21 RX ADMIN — Medication 250 MILLIGRAM(S): at 05:32

## 2018-05-21 RX ADMIN — Medication 250 MILLIGRAM(S): at 17:31

## 2018-05-21 RX ADMIN — Medication 100 MILLIGRAM(S): at 17:30

## 2018-05-21 RX ADMIN — Medication 25 MILLIGRAM(S): at 21:35

## 2018-05-21 RX ADMIN — Medication 100 MILLIGRAM(S): at 05:32

## 2018-05-21 RX ADMIN — PANTOPRAZOLE SODIUM 40 MILLIGRAM(S): 20 TABLET, DELAYED RELEASE ORAL at 05:33

## 2018-05-21 RX ADMIN — ENOXAPARIN SODIUM 30 MILLIGRAM(S): 100 INJECTION SUBCUTANEOUS at 12:02

## 2018-05-21 RX ADMIN — Medication 81 MILLIGRAM(S): at 12:02

## 2018-05-21 NOTE — PROGRESS NOTE ADULT - SUBJECTIVE AND OBJECTIVE BOX
Patient is a 82y old  Female who presents with a chief complaint of abdominal pain (15 May 2018 22:29)    She feels better. Denies further dizziness. She denies c/o chest pain, SOB or palpitations. No abdominal pian.     Allergies    &quot;VASELINE BASED OINTMENTS&quot; (Urticaria; Rash)  adhesives (Urticaria; Rash)  statins (Unknown)    Intolerances      MEDICATIONS  (STANDING):  amitriptyline 25 milliGRAM(s) Oral at bedtime  aspirin  chewable 81 milliGRAM(s) Oral daily  ciprofloxacin     Tablet 250 milliGRAM(s) Oral every 12 hours  enoxaparin Injectable 30 milliGRAM(s) SubCutaneous daily  metoprolol tartrate 100 milliGRAM(s) Oral two times a day  pantoprazole    Tablet 40 milliGRAM(s) Oral before breakfast  tetracaine 0.5% Solution 1 Drop(s) Left EYE once    MEDICATIONS  (PRN):  nitroglycerin     SubLingual 0.4 milliGRAM(s) SubLingual every 5 minutes PRN Chest Pain  oxyCODONE    IR 5 milliGRAM(s) Oral every 4 hours PRN Moderate Pain (4 - 6)  oxyCODONE    IR 10 milliGRAM(s) Oral every 4 hours PRN Severe Pain (7 - 10)      PHYSICAL EXAM:  Vital Signs Last 24 Hrs  T(C): 36.7 (21 May 2018 04:25), Max: 37.1 (20 May 2018 09:15)  T(F): 98 (21 May 2018 04:25), Max: 98.8 (20 May 2018 09:15)  HR: 92 (21 May 2018 04:25) (76 - 93)  BP: 118/70 (21 May 2018 04:25) (105/50 - 126/93)  BP(mean): --  RR: 16 (21 May 2018 04:25) (16 - 16)  SpO2: 96% (21 May 2018 04:25) (92% - 98%)  Daily     Daily   I&O's Summary    19 May 2018 07:01  -  20 May 2018 07:00  --------------------------------------------------------  IN: 970 mL / OUT: 550 mL / NET: 420 mL    20 May 2018 07:01  -  21 May 2018 06:48  --------------------------------------------------------  IN: 1580 mL / OUT: 450 mL / NET: 1130 mL    General Appearance: 	 Alert, cooperative, no distress  HEENT: normocephalic, atraumatic,   Neck: no JVD,  carotid 2+  bilaterally without bruits  Lungs:  decreased BS bases bilaterally  Cor:  pmi 5th ICS MCL, regular rate and rhythm, S1 normal intensity, S2 normal intensity, no gallops, Grade II/VI apical systolic murmur  Abdomen: soft, BS present.  Extremities: without cyanosis, clubbing or edema  Vasc: 2-+ PT and DP pulses; LE varicosities  Neurologic: A&O x 3 (time, place, person). Symmetric strength; limited exAm    Labs:  CBC Full  -  ( 20 May 2018 12:52 )  WBC Count : 14.7 K/uL  Hemoglobin : 9.0 g/dL  Hematocrit : 28.3 %  Platelet Count - Automated : 216 K/uL  Mean Cell Volume : 88.1 fl  Mean Cell Hemoglobin : 28.1 pg  Mean Cell Hemoglobin Concentration : 31.9 gm/dL  Auto Neutrophil # : x  Auto Lymphocyte # : x  Auto Monocyte # : x  Auto Eosinophil # : x  Auto Basophil # : x  Auto Neutrophil % : x  Auto Lymphocyte % : x  Auto Monocyte % : x  Auto Eosinophil % : x  Auto Basophil % : x    05-20    134<L>  |  99  |  28<H>  ----------------------------<  116<H>  4.1   |  22  |  2.13<H>    Ca    8.3<L>      20 May 2018 12:52  Phos  3.8     05-19  Mg     1.8     05-19    TPro  6.9  /  Alb  3.5  /  TBili  0.4  /  DBili  x   /  AST  71<H>  /  ALT  86<H>  /  AlkPhos  114  05-20              Radiology/Imaging:                Edward Oconnell MD [facility]  [phone]

## 2018-05-21 NOTE — PROGRESS NOTE ADULT - SUBJECTIVE AND OBJECTIVE BOX
ACS PROGRESS NOTE      SUBJECTIVE: Pt seen at examined at bedside. AVSS.  Pain well controlled. OOB and ambulating. Denies fever, CP, SOB, and NV. Tolerating regular diet.         Vital Signs Last 24 Hrs  T(C): 36.7 (21 May 2018 04:25), Max: 37.1 (20 May 2018 09:15)  T(F): 98 (21 May 2018 04:25), Max: 98.8 (20 May 2018 09:15)  HR: 92 (21 May 2018 04:25) (76 - 93)  BP: 118/70 (21 May 2018 04:25) (105/50 - 126/93)  BP(mean): --  RR: 16 (21 May 2018 04:25) (16 - 16)  SpO2: 96% (21 May 2018 04:25) (92% - 98%)    I&O's Detail    19 May 2018 07:01  -  20 May 2018 07:00  --------------------------------------------------------  IN:    lactated ringers.: 500 mL    Oral Fluid: 470 mL  Total IN: 970 mL    OUT:    Voided: 550 mL  Total OUT: 550 mL    Total NET: 420 mL      20 May 2018 07:01  -  21 May 2018 05:56  --------------------------------------------------------  IN:    Oral Fluid: 1580 mL  Total IN: 1580 mL    OUT:    Voided: 450 mL  Total OUT: 450 mL    Total NET: 1130 mL          Physical Exam  General: awake, alert, NAD    Pulm: respirations unlabored, no increased WOB  Abdomen: soft, non distended, NT, dressing c/d/i  Extremities: Grossly symmetric        MEDICATIONS  (STANDING):  amitriptyline 25 milliGRAM(s) Oral at bedtime  amLODIPine   Tablet 5 milliGRAM(s) Oral daily  ampicillin/sulbactam  IVPB 3 Gram(s) IV Intermittent every 12 hours  ampicillin/sulbactam  IVPB      dextrose 5% + sodium chloride 0.45% with potassium chloride 20 mEq/L 1000 milliLiter(s) (40 mL/Hr) IV Continuous <Continuous>  enoxaparin Injectable 30 milliGRAM(s) SubCutaneous daily  metoprolol tartrate 100 milliGRAM(s) Oral two times a day  pantoprazole    Tablet 40 milliGRAM(s) Oral before breakfast    MEDICATIONS  (PRN):  nitroglycerin     SubLingual 0.4 milliGRAM(s) SubLingual every 5 minutes PRN Chest Pain                   9.0    14.7   )----------(  216       ( 20 May 2018 12:52 )               28.3      134    |  99     |  28     ----------------------------<  116        ( 20 May 2018 12:52 )  4.1     |  22     |  2.13     Ca    8.3        ( 20 May 2018 12:52 )

## 2018-05-21 NOTE — PROGRESS NOTE ADULT - ASSESSMENT
82y Female with history of HTN, HLD, PAF comes to ER c/o upper abdominal pain radiating to the right side and back.  US and CT imaging c/w acute cholecystitis. She had CBD stones on MRCP. S/P ERCP and  laparoscopic cholecystectomy.     CKD has increased on labs along with WBC and mild LFT elevation. Continue to hold ARB and diuretic. Noravsc on hold as well with low BP.  Increase ambulation, incentive spirometry. Repeat BT.  She does not need to resume eliquis for 5-7 days post-operatively. Will re-evaluate as outpatient. Continue DVT prophylaxis. IV hydration if renal function not improved today. If WBC elevates further may need imaging. No fevers.

## 2018-05-21 NOTE — PROGRESS NOTE ADULT - ASSESSMENT
82F with hx of Afib and CHF p/w acute cholecystitis vs choledocholithiasis, choledocholithiasis on 5/6 MRCP s/p ERCP and sphincterotomy (5/17) and lap giuliana on 5/18. Doing well today. Approaching discharge home with home PT.     - Reg diet  - Pain control  - 5/16 MRCP - CBD stone   - trend LFTs  - DVT ppx  - encourage OOB  - Home today    [id number]

## 2018-05-21 NOTE — PROGRESS NOTE ADULT - SUBJECTIVE AND OBJECTIVE BOX
Patient is a 82y old  Female who presents with a chief complaint of abdominal pain (15 May 2018 22:29)      SUBJECTIVE / OVERNIGHT EVENTS: no abd pain, no N/V, tolearting po intake, no BM, has flatus    MEDICATIONS  (STANDING):  amitriptyline 25 milliGRAM(s) Oral at bedtime  aspirin  chewable 81 milliGRAM(s) Oral daily  ciprofloxacin     Tablet 250 milliGRAM(s) Oral every 12 hours  enoxaparin Injectable 30 milliGRAM(s) SubCutaneous daily  metoprolol tartrate 100 milliGRAM(s) Oral two times a day  pantoprazole    Tablet 40 milliGRAM(s) Oral before breakfast  tetracaine 0.5% Solution 1 Drop(s) Left EYE once    MEDICATIONS  (PRN):  nitroglycerin     SubLingual 0.4 milliGRAM(s) SubLingual every 5 minutes PRN Chest Pain  oxyCODONE    IR 5 milliGRAM(s) Oral every 4 hours PRN Moderate Pain (4 - 6)  oxyCODONE    IR 10 milliGRAM(s) Oral every 4 hours PRN Severe Pain (7 - 10)      Vital Signs Last 24 Hrs  T(F): 98.2 (05-21-18 @ 13:27), Max: 98.5 (05-20-18 @ 17:18)  HR: 89 (05-21-18 @ 13:27) (83 - 93)  BP: 112/69 (05-21-18 @ 13:27) (106/70 - 126/93)  RR: 18 (05-21-18 @ 13:27) (16 - 18)  SpO2: 95% (05-21-18 @ 13:27) (93% - 98%)  Telemetry:   CAPILLARY BLOOD GLUCOSE        I&O's Summary    20 May 2018 07:01  -  21 May 2018 07:00  --------------------------------------------------------  IN: 1580 mL / OUT: 450 mL / NET: 1130 mL    21 May 2018 07:01  -  21 May 2018 15:36  --------------------------------------------------------  IN: 480 mL / OUT: 350 mL / NET: 130 mL        PHYSICAL EXAM:  GENERAL: NAD, well-developed  HEAD:  Atraumatic, Normocephalic  EYES: EOMI, PERRLA, conjunctiva and sclera clear  NECK: Supple, No JVD  CHEST/LUNG: Clear to auscultation bilaterally; No wheeze  HEART: Regular rate and rhythm; No murmurs, rubs, or gallops  ABDOMEN: Soft, Nontender, Nondistended; Bowel sounds present  EXTREMITIES:  2+ Peripheral Pulses, No clubbing, cyanosis, or edema  PSYCH: AAOx3  NEUROLOGY: non-focal  SKIN: No rashes or lesions    LABS:                        8.4    9.6   )-----------( 212      ( 21 May 2018 11:17 )             26.4     05-21    140  |  102  |  26<H>  ----------------------------<  78  4.0   |  26  |  1.68<H>    Ca    8.3<L>      21 May 2018 11:17    TPro  6.9  /  Alb  3.5  /  TBili  0.4  /  DBili  x   /  AST  71<H>  /  ALT  86<H>  /  AlkPhos  114  05-20              RADIOLOGY & ADDITIONAL TESTS:    Imaging Personally Reviewed:    Consultant(s) Notes Reviewed:      Care Discussed with Consultants/Other Providers:

## 2018-05-22 ENCOUNTER — TRANSCRIPTION ENCOUNTER (OUTPATIENT)
Age: 83
End: 2018-05-22

## 2018-05-22 VITALS
SYSTOLIC BLOOD PRESSURE: 144 MMHG | OXYGEN SATURATION: 96 % | RESPIRATION RATE: 18 BRPM | HEART RATE: 81 BPM | DIASTOLIC BLOOD PRESSURE: 72 MMHG

## 2018-05-22 LAB
ANION GAP SERPL CALC-SCNC: 11 MMOL/L — SIGNIFICANT CHANGE UP (ref 5–17)
BUN SERPL-MCNC: 22 MG/DL — SIGNIFICANT CHANGE UP (ref 7–23)
CALCIUM SERPL-MCNC: 8.1 MG/DL — LOW (ref 8.4–10.5)
CHLORIDE SERPL-SCNC: 105 MMOL/L — SIGNIFICANT CHANGE UP (ref 96–108)
CO2 SERPL-SCNC: 25 MMOL/L — SIGNIFICANT CHANGE UP (ref 22–31)
CREAT SERPL-MCNC: 1.42 MG/DL — HIGH (ref 0.5–1.3)
GLUCOSE SERPL-MCNC: 107 MG/DL — HIGH (ref 70–99)
HCT VFR BLD CALC: 25.6 % — LOW (ref 34.5–45)
HGB BLD-MCNC: 8 G/DL — LOW (ref 11.5–15.5)
MAGNESIUM SERPL-MCNC: 1.9 MG/DL — SIGNIFICANT CHANGE UP (ref 1.6–2.6)
MCHC RBC-ENTMCNC: 27.5 PG — SIGNIFICANT CHANGE UP (ref 27–34)
MCHC RBC-ENTMCNC: 31.3 GM/DL — LOW (ref 32–36)
MCV RBC AUTO: 88 FL — SIGNIFICANT CHANGE UP (ref 80–100)
PHOSPHATE SERPL-MCNC: 1.6 MG/DL — LOW (ref 2.5–4.5)
PLATELET # BLD AUTO: 250 K/UL — SIGNIFICANT CHANGE UP (ref 150–400)
POTASSIUM SERPL-MCNC: 3.7 MMOL/L — SIGNIFICANT CHANGE UP (ref 3.5–5.3)
POTASSIUM SERPL-SCNC: 3.7 MMOL/L — SIGNIFICANT CHANGE UP (ref 3.5–5.3)
RBC # BLD: 2.91 M/UL — LOW (ref 3.8–5.2)
RBC # FLD: 14 % — SIGNIFICANT CHANGE UP (ref 10.3–14.5)
SODIUM SERPL-SCNC: 141 MMOL/L — SIGNIFICANT CHANGE UP (ref 135–145)
WBC # BLD: 8.45 K/UL — SIGNIFICANT CHANGE UP (ref 3.8–10.5)
WBC # FLD AUTO: 8.45 K/UL — SIGNIFICANT CHANGE UP (ref 3.8–10.5)

## 2018-05-22 PROCEDURE — C1769: CPT

## 2018-05-22 PROCEDURE — A9585: CPT

## 2018-05-22 PROCEDURE — 83735 ASSAY OF MAGNESIUM: CPT

## 2018-05-22 PROCEDURE — 71275 CT ANGIOGRAPHY CHEST: CPT

## 2018-05-22 PROCEDURE — 85027 COMPLETE CBC AUTOMATED: CPT

## 2018-05-22 PROCEDURE — 85610 PROTHROMBIN TIME: CPT

## 2018-05-22 PROCEDURE — 93005 ELECTROCARDIOGRAM TRACING: CPT

## 2018-05-22 PROCEDURE — 82553 CREATINE MB FRACTION: CPT

## 2018-05-22 PROCEDURE — 82550 ASSAY OF CK (CPK): CPT

## 2018-05-22 PROCEDURE — 84484 ASSAY OF TROPONIN QUANT: CPT

## 2018-05-22 PROCEDURE — C1889: CPT

## 2018-05-22 PROCEDURE — 96374 THER/PROPH/DIAG INJ IV PUSH: CPT | Mod: XU

## 2018-05-22 PROCEDURE — 80076 HEPATIC FUNCTION PANEL: CPT

## 2018-05-22 PROCEDURE — 96375 TX/PRO/DX INJ NEW DRUG ADDON: CPT

## 2018-05-22 PROCEDURE — 99285 EMERGENCY DEPT VISIT HI MDM: CPT | Mod: 25

## 2018-05-22 PROCEDURE — 80048 BASIC METABOLIC PNL TOTAL CA: CPT

## 2018-05-22 PROCEDURE — 94640 AIRWAY INHALATION TREATMENT: CPT

## 2018-05-22 PROCEDURE — 88304 TISSUE EXAM BY PATHOLOGIST: CPT

## 2018-05-22 PROCEDURE — 74183 MRI ABD W/O CNTR FLWD CNTR: CPT

## 2018-05-22 PROCEDURE — 80053 COMPREHEN METABOLIC PANEL: CPT

## 2018-05-22 PROCEDURE — 97116 GAIT TRAINING THERAPY: CPT

## 2018-05-22 PROCEDURE — 86850 RBC ANTIBODY SCREEN: CPT

## 2018-05-22 PROCEDURE — 83880 ASSAY OF NATRIURETIC PEPTIDE: CPT

## 2018-05-22 PROCEDURE — 85730 THROMBOPLASTIN TIME PARTIAL: CPT

## 2018-05-22 PROCEDURE — 97530 THERAPEUTIC ACTIVITIES: CPT

## 2018-05-22 PROCEDURE — 76705 ECHO EXAM OF ABDOMEN: CPT

## 2018-05-22 PROCEDURE — C1726: CPT

## 2018-05-22 PROCEDURE — 86901 BLOOD TYPING SEROLOGIC RH(D): CPT

## 2018-05-22 PROCEDURE — 74174 CTA ABD&PLVS W/CONTRAST: CPT

## 2018-05-22 PROCEDURE — 83690 ASSAY OF LIPASE: CPT

## 2018-05-22 PROCEDURE — 84100 ASSAY OF PHOSPHORUS: CPT

## 2018-05-22 PROCEDURE — 86900 BLOOD TYPING SEROLOGIC ABO: CPT

## 2018-05-22 PROCEDURE — 97162 PT EVAL MOD COMPLEX 30 MIN: CPT

## 2018-05-22 PROCEDURE — 71045 X-RAY EXAM CHEST 1 VIEW: CPT

## 2018-05-22 RX ORDER — SODIUM,POTASSIUM PHOSPHATES 278-250MG
2 POWDER IN PACKET (EA) ORAL ONCE
Qty: 0 | Refills: 0 | Status: COMPLETED | OUTPATIENT
Start: 2018-05-22 | End: 2018-05-22

## 2018-05-22 RX ORDER — PANTOPRAZOLE SODIUM 20 MG/1
1 TABLET, DELAYED RELEASE ORAL
Qty: 0 | Refills: 0 | COMMUNITY

## 2018-05-22 RX ORDER — ALBUTEROL 90 UG/1
2.5 AEROSOL, METERED ORAL ONCE
Qty: 0 | Refills: 0 | Status: COMPLETED | OUTPATIENT
Start: 2018-05-22 | End: 2018-05-22

## 2018-05-22 RX ORDER — OXYCODONE HYDROCHLORIDE 5 MG/1
1 TABLET ORAL
Qty: 18 | Refills: 0 | OUTPATIENT
Start: 2018-05-22 | End: 2018-05-24

## 2018-05-22 RX ORDER — FUROSEMIDE 40 MG
1 TABLET ORAL
Qty: 0 | Refills: 0 | COMMUNITY

## 2018-05-22 RX ADMIN — Medication 2 PACKET(S): at 12:20

## 2018-05-22 RX ADMIN — Medication 100 MILLIGRAM(S): at 05:04

## 2018-05-22 RX ADMIN — Medication 81 MILLIGRAM(S): at 12:21

## 2018-05-22 RX ADMIN — PANTOPRAZOLE SODIUM 40 MILLIGRAM(S): 20 TABLET, DELAYED RELEASE ORAL at 05:04

## 2018-05-22 RX ADMIN — ENOXAPARIN SODIUM 30 MILLIGRAM(S): 100 INJECTION SUBCUTANEOUS at 12:21

## 2018-05-22 RX ADMIN — ALBUTEROL 2.5 MILLIGRAM(S): 90 AEROSOL, METERED ORAL at 13:33

## 2018-05-22 NOTE — DISCHARGE NOTE ADULT - MEDICATION SUMMARY - MEDICATIONS TO STOP TAKING
I will STOP taking the medications listed below when I get home from the hospital:    losartan-hydroCHLOROthiazide 100 mg-25 mg oral tablet  -- 1 tab(s) by mouth once a day    apixaban 5 mg oral tablet  -- 1 tab(s) by mouth every 12 hours    Lasix 40 mg oral tablet  -- 1 tab(s) by mouth once a day

## 2018-05-22 NOTE — PROVIDER CONTACT NOTE (OTHER) - ASSESSMENT
RLE with copious s/s drainage on drsg and bed, VSS, pt denies feeling lightheaded or weak. No pallor noted, however, drsg with noted drainage.

## 2018-05-22 NOTE — PROGRESS NOTE ADULT - PROBLEM SELECTOR PROBLEM 1
Acute cholecystitis due to biliary calculus

## 2018-05-22 NOTE — PROVIDER CONTACT NOTE (OTHER) - RECOMMENDATIONS
Gracy COLON and Arianna PIERRE at bedside for drsg change, wet to dry and wound vac removed. Ortho ordered CBC & T&S, drawn and sent.

## 2018-05-22 NOTE — PROVIDER CONTACT NOTE (OTHER) - SITUATION
Ortho resident in room, removed RLE ace wrap covering fasciotomy w wound vac applied. RLE drsg saturated with s/s drainage

## 2018-05-22 NOTE — PROGRESS NOTE ADULT - PROBLEM SELECTOR PLAN 2
improving, expected to resolve post CBD stone extraction
still elevated, expected to resolve post CBD stone extraction, needs outptn GI F/U
improving, expected to resolve post CBD stone extraction
still elavated, expected to resolve post CBD stone extraction, needs outptn GI F/U

## 2018-05-22 NOTE — PROGRESS NOTE ADULT - PROBLEM SELECTOR PROBLEM 3
R/O WEN (acute kidney injury)

## 2018-05-22 NOTE — PROGRESS NOTE ADULT - PROBLEM SELECTOR PLAN 1
off UNASYN, POD4 post lap-giuliana, s/p ERCP w CBD stone extraction and sphincterotomy for choledocholithiasis. pain controlled, tolerating po, ambulating. HH  stable  Dc home today w outptn f/u
on UNASYN, awaiting ERCP for choledocholithiasis. thereafter will get Lap giuliana
off UNASYN, POD1 post lap-giuliana, s/p ERCP w CBD stone extraction and sphincterotomy for choledocholithiasis. minimal  pain, wants to abstain from narcotic pain meds.
on UNASYN, s/p ERCP w CBD stone extraction and sphincterotomy for choledocholithiasis.  Lap giuliana today, pain free
off UNASYN, POD2 post lap-giuliana, s/p ERCP w CBD stone extraction and sphincterotomy for choledocholithiasis. pain controlled, tolerating po, ambulating.  9/28, stable
off UNASYN, POD3 post lap-giuliana, s/p ERCP w CBD stone extraction and sphincterotomy for choledocholithiasis. pain controlled, tolerating po, ambulating. HH  stable  Dc home today w outptn f/u

## 2018-05-22 NOTE — PROGRESS NOTE ADULT - PROBLEM SELECTOR PLAN 7
on sc Lovenox for DVT ppx

## 2018-05-22 NOTE — DISCHARGE NOTE ADULT - PATIENT PORTAL LINK FT
You can access the [facility] Patient Portal, offered by [facility], by registering with the following website: [email or web address]

## 2018-05-22 NOTE — PROGRESS NOTE ADULT - ASSESSMENT
82F with hx of Afib and CHF p/w acute cholecystitis vs choledocholithiasis, choledocholithiasis on 5/6 MRCP s/p ERCP and sphincterotomy (5/17) and lap giuliana on 5/18. Doing well today. Approaching discharge home with home PT.     - Reg diet  - Pain control  - DVT ppx  - f/u AM labs (cbc, Cr)  - encourage OOB  - Dispo: Home today      Marylin Izaguirre PA-C

## 2018-05-22 NOTE — PROGRESS NOTE ADULT - SUBJECTIVE AND OBJECTIVE BOX
Patient is a 82y old  Female who presents with a chief complaint of abdominal pain (22 May 2018 09:12)      SUBJECTIVE / OVERNIGHT EVENTS:feels well today, pain free    MEDICATIONS  (STANDING):  amitriptyline 25 milliGRAM(s) Oral at bedtime  aspirin  chewable 81 milliGRAM(s) Oral daily  enoxaparin Injectable 30 milliGRAM(s) SubCutaneous daily  metoprolol tartrate 100 milliGRAM(s) Oral two times a day  pantoprazole    Tablet 40 milliGRAM(s) Oral before breakfast  tetracaine 0.5% Solution 1 Drop(s) Left EYE once    MEDICATIONS  (PRN):  nitroglycerin     SubLingual 0.4 milliGRAM(s) SubLingual every 5 minutes PRN Chest Pain  oxyCODONE    IR 5 milliGRAM(s) Oral every 4 hours PRN Moderate Pain (4 - 6)  oxyCODONE    IR 10 milliGRAM(s) Oral every 4 hours PRN Severe Pain (7 - 10)      Vital Signs Last 24 Hrs  T(F): 97.9 (05-22-18 @ 04:42), Max: 98.5 (05-21-18 @ 21:46)  HR: 81 (05-22-18 @ 13:28) (71 - 81)  BP: 144/72 (05-22-18 @ 13:28) (120/58 - 144/72)  RR: 18 (05-22-18 @ 13:28) (18 - 18)  SpO2: 96% (05-22-18 @ 13:28) (93% - 96%)  Telemetry:   CAPILLARY BLOOD GLUCOSE        I&O's Summary    21 May 2018 07:01  -  22 May 2018 07:00  --------------------------------------------------------  IN: 1400 mL / OUT: 350 mL / NET: 1050 mL    22 May 2018 07:01  -  22 May 2018 18:25  --------------------------------------------------------  IN: 2930 mL / OUT: 350 mL / NET: 2580 mL        PHYSICAL EXAM:  GENERAL: NAD, well-developed  HEAD:  Atraumatic, Normocephalic  EYES: EOMI, PERRLA, conjunctiva and sclera clear  NECK: Supple, No JVD  CHEST/LUNG: Clear to auscultation bilaterally; No wheeze  HEART: Regular rate and rhythm; No murmurs, rubs, or gallops  ABDOMEN: Soft, Nontender, Nondistended; Bowel sounds present  EXTREMITIES:  2+ Peripheral Pulses, No clubbing, cyanosis, or edema  PSYCH: AAOx3  NEUROLOGY: non-focal  SKIN: No rashes or lesions    LABS:                        8.0    8.45  )-----------( 250      ( 22 May 2018 07:49 )             25.6     05-22    141  |  105  |  22  ----------------------------<  107<H>  3.7   |  25  |  1.42<H>    Ca    8.1<L>      22 May 2018 05:50  Phos  1.6     05-22  Mg     1.9     05-22                RADIOLOGY & ADDITIONAL TESTS:    Imaging Personally Reviewed:    Consultant(s) Notes Reviewed:      Care Discussed with Consultants/Other Providers:

## 2018-05-22 NOTE — PROGRESS NOTE ADULT - ASSESSMENT
82y Female with history of HTN, HLD, PAF comes to ER c/o upper abdominal pain radiating to the right side and back.  US and CT imaging c/w acute cholecystitis. She had CBD stones on MRCP. S/P ERCP and  laparoscopic cholecystectomy.     CKD has retruned to baseline and WBC improved. Continue to hold ARB, diuretic and Noravsc. BP stable. CV stable for discharge on toprol. To follow up as outpatient and gradually resume BP meds and eliquis.

## 2018-05-22 NOTE — PROGRESS NOTE ADULT - PROBLEM SELECTOR PLAN 5
BP a bit low, norvasc being held , cont lopressor 100 bid
BP elevated, cont lopressor, increase Norvasc to 5 mg/day
BP stable on lopressor 100 bid and  Norvasc to 5 mg/day
BP stable on lopressor 100 bid and  Norvasc to 5 mg/day

## 2018-05-22 NOTE — PROVIDER CONTACT NOTE (OTHER) - ACTION/TREATMENT ORDERED:
However, drsg became saturated again, vascular currently at bedside for suturing. Drsg changed again, now c/d/i. Will cont to monitor.

## 2018-05-22 NOTE — PROGRESS NOTE ADULT - SUBJECTIVE AND OBJECTIVE BOX
No pain, no shortness of breath      VITAL:  T(C): , Max: 36.9 (05-21-18 @ 21:46)  T(F): , Max: 98.5 (05-21-18 @ 21:46)  HR: 76 (05-22-18 @ 04:42)  BP: 120/58 (05-22-18 @ 04:42)  BP(mean): --  RR: 18 (05-22-18 @ 04:42)  SpO2: 93% (05-22-18 @ 04:42)      PHYSICAL EXAM:  Constitutional: NAD, Alert  HEENT: NCAT, MMM  Neck: Supple, No JVD  Respiratory: CTA-b/l  Cardiovascular: RRR s1s2, no m/r/g  Gastrointestinal: BS+, soft; minimally tender  Extremities: No peripheral edema b/l  Neurological: no focal deficits; strength grossly intact  Psychiatric: Normal mood, normal affect  Back: no CVAT b/l  Skin: abdominal incision wounds clean-appearing      LABS:                        8.0    8.45  )-----------( 250      ( 22 May 2018 07:49 )             25.6     Na(141)/K(3.7)/Cl(105)/HCO3(25)/BUN(22)/Cr(1.42)Glu(107)/Ca(8.1)/Mg(1.9)/PO4(1.6)    05-22 @ 05:50  Na(140)/K(4.0)/Cl(102)/HCO3(26)/BUN(26)/Cr(1.68)Glu(78)/Ca(8.3)/Mg(--)/PO4(--)    05-21 @ 11:17  Na(134)/K(4.1)/Cl(99)/HCO3(22)/BUN(28)/Cr(2.13)Glu(116)/Ca(8.3)/Mg(--)/PO4(--)    05-20 @ 12:52  Na(133)/K(3.6)/Cl(98)/HCO3(23)/BUN(26)/Cr(2.07)Glu(121)/Ca(8.0)/Mg(--)/PO4(--)    05-20 @ 06:31    IMPRESSION:  82F w/ HTN, AFib, CKD3, and past mitral valve repair 5/15/18 a/w cholecystitis; now s/p lap giuliana 5/18/18    (1)Renal - CKD3- GFR 40-50 ml/min; nonproteinuric; appears to be relatively longstanding. Etiology unclear.  (2)Anemia - not CKD-associated; developed during the admission - likely due to recent surgery      RECOMMEND:  (1)Dose new meds for GFR 40-50ml/min; present med dosing is acceptable  (2)No renal objection to discharge; could f/u at my office in 1-2 months.            Maximus Owens MD  [facility]  [phone]

## 2018-05-22 NOTE — DISCHARGE NOTE ADULT - CARE PROVIDER_API CALL
Adrian Chaudhari), Surgery; Surgical Critical Care  [address] [place] [address]  Phone: [phone]  Fax: [phone]    Edward Oconnell), Cardiovascular Disease; Internal Medicine; Nuclear Cardiology  [address] [facility]  [address] [place] [id number]  Phone: [phone]  Fax: [phone]

## 2018-05-22 NOTE — DISCHARGE NOTE ADULT - PLAN OF CARE
pain control and resolution of symptoms WOUND CARE: Benjamini strips will fall off on their own in 7-10 days   BATHING: Please do not submerge wound underwater. You may shower and/or sponge bathe.  ACTIVITY: No heavy lifting anything more than 10-15lbs or straining. Otherwise, you may return to your usual level of physical activity. If you are taking narcotic pain medication (such as Percocet), do NOT drive a car, operate machinery or make important decisions.  DIET: Regular diet  NOTIFY YOUR SURGEON IF: You have any bleeding that does not stop, any pus draining from your wound, any fever (over 100.4 F) or chills, persistent nausea/vomiting with inability to tolerate food or liquids, persistent diarrhea, or if your pain is not controlled on your discharge pain medications.  FOLLOW-UP:  1. Please call to make a 1-2 week follow-up appointment with Dr. Chaudhari within one week of discharge   2. Please follow up with your primary care physician in one week regarding your hospitalization. resolution of symptoms Please call to make follow up appointment with cardiologist Dr. Oconnell and discuss with him blood pressure medications

## 2018-05-22 NOTE — DISCHARGE NOTE ADULT - CARE PLAN
Principal Discharge DX:	Acute cholecystitis due to biliary calculus  Goal:	pain control and resolution of symptoms  Assessment and plan of treatment:	WOUND CARE: Steri strips will fall off on their own in 7-10 days   BATHING: Please do not submerge wound underwater. You may shower and/or sponge bathe.  ACTIVITY: No heavy lifting anything more than 10-15lbs or straining. Otherwise, you may return to your usual level of physical activity. If you are taking narcotic pain medication (such as Percocet), do NOT drive a car, operate machinery or make important decisions.  DIET: Regular diet  NOTIFY YOUR SURGEON IF: You have any bleeding that does not stop, any pus draining from your wound, any fever (over 100.4 F) or chills, persistent nausea/vomiting with inability to tolerate food or liquids, persistent diarrhea, or if your pain is not controlled on your discharge pain medications.  FOLLOW-UP:  1. Please call to make a 1-2 week follow-up appointment with Dr. Chaudhari within one week of discharge   2. Please follow up with your primary care physician in one week regarding your hospitalization.  Secondary Diagnosis:	Chest pain, unspecified type  Goal:	resolution of symptoms  Assessment and plan of treatment:	Please call to make follow up appointment with cardiologist Dr. Oconnell and discuss with him blood pressure medications

## 2018-05-22 NOTE — PROGRESS NOTE ADULT - PROBLEM SELECTOR PLAN 3
cr is stable at 1.32, HCTZ and Losartan on hold. s/p IV contrast for ABD CT. cont IVF
slight rise in creatinine. HCTZ and Losartan on hold. s/p IV contrast for ABD CT. off IVF, h/o CKD3, renal input appreciated
slight rise in creatinine. HCTZ and Losartan on hold. s/p IV contrast for ABD CT. cont IVF, h/o CKD3
slight rise in creatinine. cr is stable at 1.6, HCTZ and Losartan on hold. s/p IV contrast for ABD CT. cont IVF
slight rise in creatinine. HCTZ and Losartan on hold. s/p IV contrast for ABD CT. off IVF, h/o CKD3
slight rise in creatinine. HCTZ and Losartan on hold. s/p IV contrast for ABD CT. off IVF, h/o CKD3, renal input appreciated

## 2018-05-22 NOTE — PROGRESS NOTE ADULT - PROBLEM SELECTOR PLAN 6
on cholestyramine PTA, on hold while inptn

## 2018-05-22 NOTE — PROGRESS NOTE ADULT - PROVIDER SPECIALTY LIST ADULT
Cardiology
Gastroenterology
Internal Medicine
Nephrology
Surgery
Trauma Surgery
Internal Medicine
Internal Medicine

## 2018-05-22 NOTE — DISCHARGE NOTE ADULT - MEDICATION SUMMARY - MEDICATIONS TO TAKE
I will START or STAY ON the medications listed below when I get home from the hospital:    aspirin 81 mg oral tablet, chewable  -- 1 tab(s) by mouth once a day  -- Indication: For Home med     oxyCODONE 5 mg oral tablet  -- 1 tab(s) by mouth every 4 to 6 hours, As Needed -Moderate Pain (4 - 6) MDD:6  -- Indication: For Pain med     amitriptyline 25 mg oral tablet  -- 1 tab(s) by mouth once a day (at bedtime)  -- Indication: For Home med    cholestyramine 4 g/5 g oral powder for reconstitution  -- 4 gram(s) by mouth 2 times a day  -- Indication: For Home med    metoprolol succinate 200 mg oral tablet, extended release  -- 1 tab(s) by mouth once a day  -- Indication: For HTN    pantoprazole 40 mg oral delayed release tablet  -- 1 tab(s) by mouth once a day 1/2 hour before breakfast  -- Indication: For GERD    cyanocobalamin 100 mcg oral tablet  -- 1 tab(s) by mouth once a day  -- Indication: For Home med     Vitamin D3 1000 intl units oral tablet  -- 1 tab(s) by mouth once a day  -- Indication: For Home med

## 2018-05-22 NOTE — PROGRESS NOTE ADULT - SUBJECTIVE AND OBJECTIVE BOX
TRAUMA SURGERY DAILY PROGRESS NOTE:       SUBJECTIVE/ROS: Patient seen and examined at bedside.  No acute overnight events.         MEDICATIONS  (STANDING):  amitriptyline 25 milliGRAM(s) Oral at bedtime  aspirin  chewable 81 milliGRAM(s) Oral daily  enoxaparin Injectable 30 milliGRAM(s) SubCutaneous daily  metoprolol tartrate 100 milliGRAM(s) Oral two times a day  pantoprazole    Tablet 40 milliGRAM(s) Oral before breakfast  tetracaine 0.5% Solution 1 Drop(s) Left EYE once    MEDICATIONS  (PRN):  nitroglycerin     SubLingual 0.4 milliGRAM(s) SubLingual every 5 minutes PRN Chest Pain  oxyCODONE    IR 5 milliGRAM(s) Oral every 4 hours PRN Moderate Pain (4 - 6)  oxyCODONE    IR 10 milliGRAM(s) Oral every 4 hours PRN Severe Pain (7 - 10)      OBJECTIVE:    Vital Signs Last 24 Hrs  T(C): 36.6 (22 May 2018 04:42), Max: 36.9 (21 May 2018 21:46)  T(F): 97.9 (22 May 2018 04:42), Max: 98.5 (21 May 2018 21:46)  HR: 76 (22 May 2018 04:42) (71 - 90)  BP: 120/58 (22 May 2018 04:42) (106/70 - 137/74)  BP(mean): --  RR: 18 (22 May 2018 04:42) (18 - 18)  SpO2: 93% (22 May 2018 04:42) (93% - 95%)        I&O's Detail    21 May 2018 07:01  -  22 May 2018 07:00  --------------------------------------------------------  IN:    Oral Fluid: 1400 mL  Total IN: 1400 mL    OUT:    Voided: 350 mL  Total OUT: 350 mL    Total NET: 1050 mL          Daily     Daily     LABS:                        8.4    9.6   )-----------( 212      ( 21 May 2018 11:17 )             26.4     05-22    141  |  105  |  22  ----------------------------<  107<H>  3.7   |  25  |  1.42<H>    Ca    8.1<L>      22 May 2018 05:50  Phos  1.6     05-22  Mg     1.9     05-22                    PHYSICAL EXAM:  Constitutional: well developed, well nourished, NAD  Respiratory: No increased WOB  Abdomen: Softly distended, appropriate tenderness with gary-umbilical ecchymosis.  Incision c/d/i  Psych: A&Ox3

## 2018-05-22 NOTE — PROGRESS NOTE ADULT - SUBJECTIVE AND OBJECTIVE BOX
Patient is a 82y old  Female who presents with a chief complaint of abdominal pain (15 May 2018 22:29)    She feels improved. She denies c/o chest pain, SOB or palpitations.    Allergies    &quot;VASELINE BASED OINTMENTS&quot; (Urticaria; Rash)  adhesives (Urticaria; Rash)  statins (Unknown)    Intolerances      MEDICATIONS  (STANDING):  amitriptyline 25 milliGRAM(s) Oral at bedtime  aspirin  chewable 81 milliGRAM(s) Oral daily  enoxaparin Injectable 30 milliGRAM(s) SubCutaneous daily  metoprolol tartrate 100 milliGRAM(s) Oral two times a day  pantoprazole    Tablet 40 milliGRAM(s) Oral before breakfast  tetracaine 0.5% Solution 1 Drop(s) Left EYE once    MEDICATIONS  (PRN):  nitroglycerin     SubLingual 0.4 milliGRAM(s) SubLingual every 5 minutes PRN Chest Pain  oxyCODONE    IR 5 milliGRAM(s) Oral every 4 hours PRN Moderate Pain (4 - 6)  oxyCODONE    IR 10 milliGRAM(s) Oral every 4 hours PRN Severe Pain (7 - 10)      PHYSICAL EXAM:  Vital Signs Last 24 Hrs  T(C): 36.6 (22 May 2018 04:42), Max: 36.9 (21 May 2018 21:46)  T(F): 97.9 (22 May 2018 04:42), Max: 98.5 (21 May 2018 21:46)  HR: 76 (22 May 2018 04:42) (71 - 90)  BP: 120/58 (22 May 2018 04:42) (106/70 - 137/74)  BP(mean): --  RR: 18 (22 May 2018 04:42) (18 - 18)  SpO2: 93% (22 May 2018 04:42) (93% - 95%)  Daily     Daily   I&O's Summary    20 May 2018 07:01  -  21 May 2018 07:00  --------------------------------------------------------  IN: 1580 mL / OUT: 450 mL / NET: 1130 mL    21 May 2018 07:01  -  22 May 2018 06:38  --------------------------------------------------------  IN: 1400 mL / OUT: 350 mL / NET: 1050 mL    General Appearance: 	 Alert, cooperative, no distress  HEENT: normocephalic, atraumatic,   Neck: no JVD,  carotid 2+  bilaterally without bruits  Lungs:  decreased BS bases bilaterally  Cor:  pmi 5th ICS MCL, regular rate and rhythm, S1 normal intensity, S2 normal intensity, no gallops, Grade II/VI apical systolic murmur  Abdomen: soft, BS present.  Extremities: without cyanosis, clubbing or edema    Labs:  CBC Full  -  ( 21 May 2018 11:17 )  WBC Count : 9.6 K/uL  Hemoglobin : 8.4 g/dL  Hematocrit : 26.4 %  Platelet Count - Automated : 212 K/uL  Mean Cell Volume : 87.8 fl  Mean Cell Hemoglobin : 27.9 pg  Mean Cell Hemoglobin Concentration : 31.7 gm/dL  Auto Neutrophil # : x  Auto Lymphocyte # : x  Auto Monocyte # : x  Auto Eosinophil # : x  Auto Basophil # : x  Auto Neutrophil % : x  Auto Lymphocyte % : x  Auto Monocyte % : x  Auto Eosinophil % : x  Auto Basophil % : x    05-22    141  |  105  |  22  ----------------------------<  107<H>  3.7   |  25  |  1.42<H>    Ca    8.1<L>      22 May 2018 05:50  Phos  1.6     05-22  Mg     1.9     05-22                Radiology/Imaging:                Edward Oconnell MD [facility]  [phone]

## 2018-05-22 NOTE — DISCHARGE NOTE ADULT - HOSPITAL COURSE
Patient is a 82 year old female with PMHx of Afib (on Eliquis), CHF, DIANE, MV repair, presenting initially on 5/15 with complaint of chest pain, radiating to the back. The patient was ruled out for ACS with normal EKG and negative troponins, and CTA did not show a dissection. The CT scan however did reveal a dilated gallbladder with 1.8 cm CBD. A follow-up US revealed a gallbladder with stones and sludge, pericholecystic fluid, and a positive sonographic Hui's sign, CBD 0.8 cm. The patient reports that pain began 5/14, which was thought to be just reflux. She took reflux medication but did not feel any relief. The patient then developed nausea and vomiting, noted to be red-tinged. She is passing flatus and is having normal BMs, last BM 5/15. Denies having any history of gallbladder disease or similar episodes of pain to this one. Last endoscopy > 10 years ago, showed reflux, last c-scope 2016 for diarrhea showed colitis. Patient was followed by her cardiologist Dr. Oconnell while inpatient and cleared patient for procedures. 5/16 MRCP showed findings suggestive of acute cholecystitis and choledocholithiasis in distal CBD with associated mild biliary ductal dilatation. Patient had ERCP and sphincterectomy on 5/17 with GI w/o any complications. Dr. Chaudhari performed a laparoscopic cholecystectomy on 5/18. The patient tolerated the procedure well. There were no complications. The patient was extubated in the OR and transferred to the PACU in stable condition and transferred to a surgical floor. The patient's pain was controlled by IV pain medications and then by PO pain medications. The patient was placed back on home medications. The patient had daily wound care and was seen by physical therapy which recommended discharge to home with home PT. At the time of discharge, the patient was hemodynamically stable, was tolerating PO diet, was voiding urine and passing stool, was ambulating, and was comfortable with adequate pain control. The patient was instructed to follow up with Dr. Chaudhari within 1-2 weeks after discharge from the hospital. The patient felt comfortable with discharge. The patient was discharged to home with home PT on POD# 4. The patient had no other issues.

## 2018-05-22 NOTE — PROGRESS NOTE ADULT - NSHPATTENDINGPLANDISCUSS_GEN_ALL_CORE
patient.
ptn, RN

## 2018-05-22 NOTE — PROGRESS NOTE ADULT - PROBLEM SELECTOR PROBLEM 4
R/O Paroxysmal atrial fibrillation

## 2018-05-22 NOTE — PROGRESS NOTE ADULT - PROBLEM SELECTOR PLAN 4
in NSR, eliquis on hold, to be resumed 1 week post op
in NSR, eliquis on hold, to be resumed 1 week post op
in NSR, eliquis on hold, cleared by cardiology for surgery
in NSR, eliquis on hold, to be resumed 1 week post op
in NSR, eliquis on hold, cleared by cardiology for surgery
in NSR, eliquis on hold, to be resumed 1 week post op

## 2018-05-23 ENCOUNTER — INPATIENT (INPATIENT)
Facility: HOSPITAL | Age: 83
LOS: 2 days | Discharge: HOME | DRG: 291 | End: 2018-05-26
Attending: INTERNAL MEDICINE | Admitting: INTERNAL MEDICINE
Payer: MEDICARE

## 2018-05-23 VITALS
DIASTOLIC BLOOD PRESSURE: 71 MMHG | RESPIRATION RATE: 19 BRPM | WEIGHT: 149.91 LBS | SYSTOLIC BLOOD PRESSURE: 165 MMHG | HEART RATE: 81 BPM | OXYGEN SATURATION: 100 % | TEMPERATURE: 98 F

## 2018-05-23 DIAGNOSIS — Z98.89 OTHER SPECIFIED POSTPROCEDURAL STATES: Chronic | ICD-10-CM

## 2018-05-23 DIAGNOSIS — I50.9 HEART FAILURE, UNSPECIFIED: ICD-10-CM

## 2018-05-23 DIAGNOSIS — Z90.49 ACQUIRED ABSENCE OF OTHER SPECIFIED PARTS OF DIGESTIVE TRACT: Chronic | ICD-10-CM

## 2018-05-23 DIAGNOSIS — K59.00 CONSTIPATION, UNSPECIFIED: ICD-10-CM

## 2018-05-23 DIAGNOSIS — I10 ESSENTIAL (PRIMARY) HYPERTENSION: ICD-10-CM

## 2018-05-23 DIAGNOSIS — K21.9 GASTRO-ESOPHAGEAL REFLUX DISEASE WITHOUT ESOPHAGITIS: ICD-10-CM

## 2018-05-23 DIAGNOSIS — I48.0 PAROXYSMAL ATRIAL FIBRILLATION: ICD-10-CM

## 2018-05-23 LAB
ALBUMIN SERPL ELPH-MCNC: 3.8 G/DL — SIGNIFICANT CHANGE UP (ref 3.3–5)
ALP SERPL-CCNC: 168 U/L — HIGH (ref 40–120)
ALT FLD-CCNC: 61 U/L — HIGH (ref 10–45)
ANION GAP SERPL CALC-SCNC: 13 MMOL/L — SIGNIFICANT CHANGE UP (ref 5–17)
APTT BLD: 30.5 SEC — SIGNIFICANT CHANGE UP (ref 27.5–37.4)
AST SERPL-CCNC: 38 U/L — SIGNIFICANT CHANGE UP (ref 10–40)
BASOPHILS # BLD AUTO: 0.1 K/UL — SIGNIFICANT CHANGE UP (ref 0–0.2)
BASOPHILS NFR BLD AUTO: 0.7 % — SIGNIFICANT CHANGE UP (ref 0–2)
BILIRUB SERPL-MCNC: 0.3 MG/DL — SIGNIFICANT CHANGE UP (ref 0.2–1.2)
BUN SERPL-MCNC: 15 MG/DL — SIGNIFICANT CHANGE UP (ref 7–23)
CALCIUM SERPL-MCNC: 9.1 MG/DL — SIGNIFICANT CHANGE UP (ref 8.4–10.5)
CHLORIDE SERPL-SCNC: 104 MMOL/L — SIGNIFICANT CHANGE UP (ref 96–108)
CK MB CFR SERPL CALC: 1 NG/ML — SIGNIFICANT CHANGE UP (ref 0–3.8)
CK MB CFR SERPL CALC: 1.3 NG/ML — SIGNIFICANT CHANGE UP (ref 0–3.8)
CK SERPL-CCNC: 56 U/L — SIGNIFICANT CHANGE UP (ref 25–170)
CO2 SERPL-SCNC: 26 MMOL/L — SIGNIFICANT CHANGE UP (ref 22–31)
CREAT SERPL-MCNC: 1.27 MG/DL — SIGNIFICANT CHANGE UP (ref 0.5–1.3)
D DIMER BLD IA.RAPID-MCNC: 2433 NG/ML DDU — HIGH
EOSINOPHIL # BLD AUTO: 0.5 K/UL — SIGNIFICANT CHANGE UP (ref 0–0.5)
EOSINOPHIL NFR BLD AUTO: 5.1 % — SIGNIFICANT CHANGE UP (ref 0–6)
GAS PNL BLDV: SIGNIFICANT CHANGE UP
GLUCOSE SERPL-MCNC: 109 MG/DL — HIGH (ref 70–99)
HCT VFR BLD CALC: 29.4 % — LOW (ref 34.5–45)
HGB BLD-MCNC: 9.7 G/DL — LOW (ref 11.5–15.5)
INR BLD: 1.08 RATIO — SIGNIFICANT CHANGE UP (ref 0.88–1.16)
LYMPHOCYTES # BLD AUTO: 1.6 K/UL — SIGNIFICANT CHANGE UP (ref 1–3.3)
LYMPHOCYTES # BLD AUTO: 17.4 % — SIGNIFICANT CHANGE UP (ref 13–44)
MCHC RBC-ENTMCNC: 29 PG — SIGNIFICANT CHANGE UP (ref 27–34)
MCHC RBC-ENTMCNC: 32.9 GM/DL — SIGNIFICANT CHANGE UP (ref 32–36)
MCV RBC AUTO: 88.2 FL — SIGNIFICANT CHANGE UP (ref 80–100)
MONOCYTES # BLD AUTO: 1.2 K/UL — HIGH (ref 0–0.9)
MONOCYTES NFR BLD AUTO: 13.1 % — SIGNIFICANT CHANGE UP (ref 2–14)
NEUTROPHILS # BLD AUTO: 5.8 K/UL — SIGNIFICANT CHANGE UP (ref 1.8–7.4)
NEUTROPHILS NFR BLD AUTO: 63.6 % — SIGNIFICANT CHANGE UP (ref 43–77)
NT-PROBNP SERPL-SCNC: 4235 PG/ML — HIGH (ref 0–300)
PLATELET # BLD AUTO: 387 K/UL — SIGNIFICANT CHANGE UP (ref 150–400)
POTASSIUM SERPL-MCNC: 4.2 MMOL/L — SIGNIFICANT CHANGE UP (ref 3.5–5.3)
POTASSIUM SERPL-SCNC: 4.2 MMOL/L — SIGNIFICANT CHANGE UP (ref 3.5–5.3)
PROT SERPL-MCNC: 7.1 G/DL — SIGNIFICANT CHANGE UP (ref 6–8.3)
PROTHROM AB SERPL-ACNC: 11.7 SEC — SIGNIFICANT CHANGE UP (ref 9.8–12.7)
RBC # BLD: 3.33 M/UL — LOW (ref 3.8–5.2)
RBC # FLD: 12.1 % — SIGNIFICANT CHANGE UP (ref 10.3–14.5)
SODIUM SERPL-SCNC: 143 MMOL/L — SIGNIFICANT CHANGE UP (ref 135–145)
TROPONIN T SERPL-MCNC: <0.01 NG/ML — SIGNIFICANT CHANGE UP (ref 0–0.06)
TROPONIN T SERPL-MCNC: <0.01 NG/ML — SIGNIFICANT CHANGE UP (ref 0–0.06)
WBC # BLD: 9.1 K/UL — SIGNIFICANT CHANGE UP (ref 3.8–10.5)
WBC # FLD AUTO: 9.1 K/UL — SIGNIFICANT CHANGE UP (ref 3.8–10.5)

## 2018-05-23 PROCEDURE — 93010 ELECTROCARDIOGRAM REPORT: CPT

## 2018-05-23 PROCEDURE — 99285 EMERGENCY DEPT VISIT HI MDM: CPT | Mod: 25

## 2018-05-23 PROCEDURE — 71045 X-RAY EXAM CHEST 1 VIEW: CPT | Mod: 26

## 2018-05-23 PROCEDURE — 71275 CT ANGIOGRAPHY CHEST: CPT | Mod: 26

## 2018-05-23 RX ORDER — DOCUSATE SODIUM 100 MG
100 CAPSULE ORAL
Qty: 0 | Refills: 0 | Status: DISCONTINUED | OUTPATIENT
Start: 2018-05-23 | End: 2018-05-26

## 2018-05-23 RX ORDER — PANTOPRAZOLE SODIUM 20 MG/1
40 TABLET, DELAYED RELEASE ORAL
Qty: 0 | Refills: 0 | Status: DISCONTINUED | OUTPATIENT
Start: 2018-05-23 | End: 2018-05-26

## 2018-05-23 RX ORDER — LOSARTAN POTASSIUM 100 MG/1
25 TABLET, FILM COATED ORAL DAILY
Qty: 0 | Refills: 0 | Status: DISCONTINUED | OUTPATIENT
Start: 2018-05-23 | End: 2018-05-25

## 2018-05-23 RX ORDER — METOPROLOL TARTRATE 50 MG
200 TABLET ORAL DAILY
Qty: 0 | Refills: 0 | Status: DISCONTINUED | OUTPATIENT
Start: 2018-05-23 | End: 2018-05-24

## 2018-05-23 RX ORDER — FUROSEMIDE 40 MG
40 TABLET ORAL EVERY 12 HOURS
Qty: 0 | Refills: 0 | Status: DISCONTINUED | OUTPATIENT
Start: 2018-05-23 | End: 2018-05-23

## 2018-05-23 RX ORDER — SENNA PLUS 8.6 MG/1
2 TABLET ORAL AT BEDTIME
Qty: 0 | Refills: 0 | Status: DISCONTINUED | OUTPATIENT
Start: 2018-05-23 | End: 2018-05-26

## 2018-05-23 RX ORDER — AMITRIPTYLINE HCL 25 MG
25 TABLET ORAL AT BEDTIME
Qty: 0 | Refills: 0 | Status: DISCONTINUED | OUTPATIENT
Start: 2018-05-23 | End: 2018-05-26

## 2018-05-23 RX ORDER — FUROSEMIDE 40 MG
40 TABLET ORAL ONCE
Qty: 0 | Refills: 0 | Status: COMPLETED | OUTPATIENT
Start: 2018-05-23 | End: 2018-05-23

## 2018-05-23 RX ORDER — ASPIRIN/CALCIUM CARB/MAGNESIUM 324 MG
81 TABLET ORAL DAILY
Qty: 0 | Refills: 0 | Status: DISCONTINUED | OUTPATIENT
Start: 2018-05-23 | End: 2018-05-26

## 2018-05-23 RX ORDER — PREGABALIN 225 MG/1
100 CAPSULE ORAL DAILY
Qty: 0 | Refills: 0 | Status: DISCONTINUED | OUTPATIENT
Start: 2018-05-23 | End: 2018-05-26

## 2018-05-23 RX ORDER — POLYETHYLENE GLYCOL 3350 17 G/17G
17 POWDER, FOR SOLUTION ORAL DAILY
Qty: 0 | Refills: 0 | Status: DISCONTINUED | OUTPATIENT
Start: 2018-05-23 | End: 2018-05-26

## 2018-05-23 RX ORDER — OXYCODONE HYDROCHLORIDE 5 MG/1
5 TABLET ORAL EVERY 6 HOURS
Qty: 0 | Refills: 0 | Status: DISCONTINUED | OUTPATIENT
Start: 2018-05-23 | End: 2018-05-26

## 2018-05-23 RX ORDER — CHOLECALCIFEROL (VITAMIN D3) 125 MCG
1000 CAPSULE ORAL DAILY
Qty: 0 | Refills: 0 | Status: DISCONTINUED | OUTPATIENT
Start: 2018-05-23 | End: 2018-05-26

## 2018-05-23 RX ORDER — CHOLESTYRAMINE 4 G/9G
4 POWDER, FOR SUSPENSION ORAL
Qty: 0 | Refills: 0 | Status: DISCONTINUED | OUTPATIENT
Start: 2018-05-23 | End: 2018-05-26

## 2018-05-23 RX ORDER — APIXABAN 2.5 MG/1
5 TABLET, FILM COATED ORAL EVERY 12 HOURS
Qty: 0 | Refills: 0 | Status: DISCONTINUED | OUTPATIENT
Start: 2018-05-23 | End: 2018-05-24

## 2018-05-23 RX ADMIN — LOSARTAN POTASSIUM 25 MILLIGRAM(S): 100 TABLET, FILM COATED ORAL at 22:11

## 2018-05-23 RX ADMIN — SENNA PLUS 2 TABLET(S): 8.6 TABLET ORAL at 22:11

## 2018-05-23 RX ADMIN — Medication 40 MILLIGRAM(S): at 14:10

## 2018-05-23 RX ADMIN — Medication 1000 UNIT(S): at 22:11

## 2018-05-23 RX ADMIN — Medication 25 MILLIGRAM(S): at 22:11

## 2018-05-23 RX ADMIN — Medication 100 MILLIGRAM(S): at 22:11

## 2018-05-23 RX ADMIN — APIXABAN 5 MILLIGRAM(S): 2.5 TABLET, FILM COATED ORAL at 23:34

## 2018-05-23 NOTE — CONSULT NOTE ADULT - SUBJECTIVE AND OBJECTIVE BOX
GENERAL SURGERY CONSULT NOTE    Patient is a 82 year old female with PMHx of Afib (on Eliquis), CHF, DIANE, MV repair, now POD 5 from a laparoscopic cholecystectomy. Patient was discharged yesterday and was okay when she got home. She said she had some shortness of breath climbing the steps yesterday, but it resolved overnight. This am she awoke more dyspneic than before. She took a lasix which she takes occasionally when she feels swollen, and called the ambulance. She had been tolerating a regular diet and having bowel function. Denies any abdominal pain/ nausea/ or vomiting.     In the ED, patient appeared short of breath, but was saturating 98% on 2L nasal cannula. Other vital signs normal. CT angiogram performed that ruled out PE.           PAST MEDICAL & SURGICAL HISTORY:  Mitral valve disease  Osteoporosis  Vitamin B 12 deficiency  Carotid artery occlusion: (R)  GERD (gastroesophageal reflux disease)  Hypercholesteremia  HTN (hypertension)  Insomnia  H/O carotid endarterectomy  Varicose veins: s/p sclerotherapy bilaterally  Bilateral cataracts: extraction w/ IOL  Papilloma of breast: s/p excision from right breast &gt;20 years ago  History of lumbar laminectomy for spinal cord decompression: 1995  Abdominal hernia: repair 2007  Hx of tonsillectomy  Status post DACIA-BSO: 1975  S/P MVR (mitral valve repair): 1997 at [facility]    FAMILY HISTORY:  Family history of dementia: mother  Family history of lung cancer (Grandparent)    SOCIAL HISTORY:    MEDICATIONS  (STANDING):    MEDICATIONS  (PRN):    Allergies    &quot;VASELINE BASED OINTMENTS&quot; (Urticaria; Rash)  adhesives (Urticaria; Rash)  statins (Unknown)    Intolerances    PHYSICAL EXAM     Vital Signs Last 24 Hrs  T(C): 36.7 (23 May 2018 13:29), Max: 36.9 (23 May 2018 10:56)  T(F): 98.1 (23 May 2018 13:29), Max: 98.4 (23 May 2018 10:56)  HR: 81 (23 May 2018 13:29) (81 - 81)  BP: 156/75 (23 May 2018 13:29) (156/75 - 165/71)  BP(mean): --  RR: 20 (23 May 2018 13:29) (19 - 20)  SpO2: 100% (23 May 2018 13:29) (100% - 100%)  Daily     Daily     General: WN/WD NAD  Neurology: A&Ox3, nonfocal, DAVIS x 4  Respiratory: dyspneic with crackles in the bases bilaterally   CV: RRR, S1S2, no murmurs, rubs or gallops  Abdominal: Soft, NT, ND incisions c/d/i  Extremities: No edema, + peripheral pulses                          9.7    9.1   )-----------( 387      ( 23 May 2018 11:26 )             29.4     05-23    143  |  104  |  15  ----------------------------<  109<H>  4.2   |  26  |  1.27    Ca    9.1      23 May 2018 11:26  Phos  1.6     05-22  Mg     1.9     05-22    TPro  7.1  /  Alb  3.8  /  TBili  0.3  /  DBili  x   /  AST  38  /  ALT  61<H>  /  AlkPhos  168<H>  05-23    PT/INR - ( 23 May 2018 11:26 )   PT: 11.7 sec;   INR: 1.08 ratio         PTT - ( 23 May 2018 11:26 )  PTT:30.5 sec      IMAGING STUDIES:

## 2018-05-23 NOTE — ED PROVIDER NOTE - MEDICAL DECISION MAKING DETAILS
Just discharged yesterday s/p lap giuliana. As a result, Gen Surg has been consulted but full investigation initiated from a medical perspective. Will follow her studies, reassess and treat/dispo accordingly.

## 2018-05-23 NOTE — H&P ADULT - PMH
Carotid artery occlusion  (R)  Choledocholithiasis    Gallstones    GERD (gastroesophageal reflux disease)    HTN (hypertension)    Hypercholesteremia    Insomnia    Mitral valve disease    Osteoporosis    Vitamin B 12 deficiency

## 2018-05-23 NOTE — ED ADULT NURSE REASSESSMENT NOTE - NS ED NURSE REASSESS COMMENT FT1
patient is resting in the [facility] waiting for a bed upstairs. patient denies any complaints. family at bedside. vss/nad. will continue to monitor.

## 2018-05-23 NOTE — ED PROVIDER NOTE - OBJECTIVE STATEMENT
82 year old female with pmhx of GERD, Hypertension, Hypercholesteremia, osteoporosis with recent cholecystectomy presents with SOB since yesterday after being discharged but then was resolved at rest. SOB returned this morning progressively worsened around 8am. Associated with cough with slight sputum production. Denies chest pain. Denies abdominal pain. Constipation x1 week. Denies fever or chills. Former smoker. 82 year old female with pmhx of GERD, Hypertension, Hypercholesteremia, osteoporosis and discharged just yesterday s/p alessandra giordano who presents with SOB since yesterday. Her SOB started after climbing a flight of stairs at home but this resolved with rest and she was well throughout the night. Her SOB returned this morning. It has been gradual, constant, progressive, exacerbated by activity, improved with rest and supplemental O2 and associated with cough with slight clear sputum production. Denies chest pain. Denies abdominal pain. Constipation x1 week. Denies fever or chills. Former smoker.

## 2018-05-23 NOTE — H&P ADULT - HISTORY OF PRESENT ILLNESS
83 yo woman was D/Dionte yesterday post lap-giuliana, CBD stone extraction w ERCP and WEN  After getting home ptn became progressively short of breath with chest tightness, still has dyspnea, but denies chest discomfort at present. no fever, no chills, no cough., no dizziness, no LOC  ptn has h/oAfib, eliquis held perioperatively until 1 week post op, DIANE, s/p CEA, MV repair, HTN, HLD, PAD, GERD

## 2018-05-23 NOTE — ED ADULT NURSE NOTE - OBJECTIVE STATEMENT
82 y f came to the ed by ems c/o sob. patient was d/c from the hospital yesterday after gallbladder removal surgery. patient states the sob started yesterday after walking up a flight of stairs. sob improved but this morning she said the sob started at rest and has gradually gotten worse. patient is a/ox3. denies any fevers, chills, chest pain, sob. wounds are dry and intact. skin is warm and dry.

## 2018-05-23 NOTE — ED PROVIDER NOTE - PSH
Abdominal hernia  repair 2007  Bilateral cataracts  extraction w/ IOL  H/O carotid endarterectomy    History of lumbar laminectomy for spinal cord decompression  1995  Hx of tonsillectomy    Papilloma of breast  s/p excision from right breast >20 years ago  S/P MVR (mitral valve repair)  1997 at [facility]  Status post DACIA-BSO  1975  Varicose veins  s/p sclerotherapy bilaterally

## 2018-05-23 NOTE — H&P ADULT - PSH
Abdominal hernia  repair 2007  Bilateral cataracts  extraction w/ IOL  H/O carotid endarterectomy    History of lumbar laminectomy for spinal cord decompression  1995  Hx of tonsillectomy    Papilloma of breast  s/p excision from right breast >20 years ago  S/P laparoscopic cholecystectomy    S/P MVR (mitral valve repair)  1997 at [facility]  Status post DACIA-BSO  1975  Varicose veins  s/p sclerotherapy bilaterally

## 2018-05-23 NOTE — ED PROVIDER NOTE - PROGRESS NOTE DETAILS
CTA -ve for PE. Already took 40mg PO lasix earlier. Another 40mg IV dose ordered. D/w Dr. Oconnell. To be admitted to Dr. Holguin's service. Discussed with Dr. Holguin.

## 2018-05-23 NOTE — ED ADULT NURSE REASSESSMENT NOTE - NS ED NURSE REASSESS COMMENT FT1
patient is resting in the room with family at bedside. states sob has improved with supp o2. remains on cm for monitoring. vss/nad. pending dispo. will continue to monitor.

## 2018-05-23 NOTE — H&P ADULT - PROBLEM SELECTOR PLAN 1
feeling better post diuresis, cont IV Lasix 40 mg q12H  ptn also felt chest pain, will do serial card enzymes  TTE

## 2018-05-23 NOTE — H&P ADULT - NSHPPHYSICALEXAM_GEN_ALL_CORE
WD WN woman, breathless while speaking, on oxygen  HEENT  NECK  LUNGS  CARD  ABD  EXT  NEURO WD WN woman, breathless while speaking, on oxygen  165/71-81-18-98.4F-98%on O2  HEENT-NC/AT,PERRL,EOMI,CP/SA  NECK-SUPPLE  LUNGS-DECREASED bs AT BASES  CARD-RRR, S1S2  ABD-NT, ND, POS BS  EXT-NO EDEMA  NEURO-NONFOCAL

## 2018-05-23 NOTE — ED PROVIDER NOTE - CONSTITUTIONAL, MLM
normal... Appears dyspneic and is tachypneic. Able to speak in short sentences. Well nourished, awake, alert.

## 2018-05-23 NOTE — ED PROVIDER NOTE - SKIN, MLM
Skin normal color for race, warm, dry and intact. No evidence of rash. Trace B/L LE pitting edema. Some light ecchymosis abdominal wall.

## 2018-05-24 LAB
ANION GAP SERPL CALC-SCNC: 10 MMOL/L — SIGNIFICANT CHANGE UP (ref 5–17)
BUN SERPL-MCNC: 14 MG/DL — SIGNIFICANT CHANGE UP (ref 7–23)
CALCIUM SERPL-MCNC: 9 MG/DL — SIGNIFICANT CHANGE UP (ref 8.4–10.5)
CHLORIDE SERPL-SCNC: 102 MMOL/L — SIGNIFICANT CHANGE UP (ref 96–108)
CO2 SERPL-SCNC: 29 MMOL/L — SIGNIFICANT CHANGE UP (ref 22–31)
CREAT SERPL-MCNC: 1.35 MG/DL — HIGH (ref 0.5–1.3)
GLUCOSE SERPL-MCNC: 99 MG/DL — SIGNIFICANT CHANGE UP (ref 70–99)
HCT VFR BLD CALC: 27.9 % — LOW (ref 34.5–45)
HGB BLD-MCNC: 8.5 G/DL — LOW (ref 11.5–15.5)
MCHC RBC-ENTMCNC: 26.6 PG — LOW (ref 27–34)
MCHC RBC-ENTMCNC: 30.5 GM/DL — LOW (ref 32–36)
MCV RBC AUTO: 87.2 FL — SIGNIFICANT CHANGE UP (ref 80–100)
PLATELET # BLD AUTO: 346 K/UL — SIGNIFICANT CHANGE UP (ref 150–400)
POTASSIUM SERPL-MCNC: 3.7 MMOL/L — SIGNIFICANT CHANGE UP (ref 3.5–5.3)
POTASSIUM SERPL-SCNC: 3.7 MMOL/L — SIGNIFICANT CHANGE UP (ref 3.5–5.3)
RBC # BLD: 3.2 M/UL — LOW (ref 3.8–5.2)
RBC # FLD: 13.9 % — SIGNIFICANT CHANGE UP (ref 10.3–14.5)
SODIUM SERPL-SCNC: 141 MMOL/L — SIGNIFICANT CHANGE UP (ref 135–145)
TROPONIN T SERPL-MCNC: <0.01 NG/ML — SIGNIFICANT CHANGE UP (ref 0–0.06)
WBC # BLD: 7.36 K/UL — SIGNIFICANT CHANGE UP (ref 3.8–10.5)
WBC # FLD AUTO: 7.36 K/UL — SIGNIFICANT CHANGE UP (ref 3.8–10.5)

## 2018-05-24 PROCEDURE — 71045 X-RAY EXAM CHEST 1 VIEW: CPT | Mod: 26

## 2018-05-24 RX ORDER — FUROSEMIDE 40 MG
40 TABLET ORAL DAILY
Qty: 0 | Refills: 0 | Status: DISCONTINUED | OUTPATIENT
Start: 2018-05-24 | End: 2018-05-26

## 2018-05-24 RX ORDER — METOPROLOL TARTRATE 50 MG
100 TABLET ORAL DAILY
Qty: 0 | Refills: 0 | Status: DISCONTINUED | OUTPATIENT
Start: 2018-05-24 | End: 2018-05-26

## 2018-05-24 RX ORDER — CALCIUM CARBONATE 500(1250)
1 TABLET ORAL ONCE
Qty: 0 | Refills: 0 | Status: COMPLETED | OUTPATIENT
Start: 2018-05-24 | End: 2018-05-24

## 2018-05-24 RX ORDER — METOPROLOL TARTRATE 50 MG
100 TABLET ORAL ONCE
Qty: 0 | Refills: 0 | Status: COMPLETED | OUTPATIENT
Start: 2018-05-24 | End: 2018-05-24

## 2018-05-24 RX ADMIN — Medication 25 MILLIGRAM(S): at 21:26

## 2018-05-24 RX ADMIN — Medication 81 MILLIGRAM(S): at 11:45

## 2018-05-24 RX ADMIN — POLYETHYLENE GLYCOL 3350 17 GRAM(S): 17 POWDER, FOR SOLUTION ORAL at 05:31

## 2018-05-24 RX ADMIN — APIXABAN 5 MILLIGRAM(S): 2.5 TABLET, FILM COATED ORAL at 11:45

## 2018-05-24 RX ADMIN — Medication 1000 UNIT(S): at 11:45

## 2018-05-24 RX ADMIN — Medication 100 MILLIGRAM(S): at 05:31

## 2018-05-24 RX ADMIN — Medication 40 MILLIGRAM(S): at 11:45

## 2018-05-24 RX ADMIN — PANTOPRAZOLE SODIUM 40 MILLIGRAM(S): 20 TABLET, DELAYED RELEASE ORAL at 06:00

## 2018-05-24 RX ADMIN — PREGABALIN 100 MICROGRAM(S): 225 CAPSULE ORAL at 11:45

## 2018-05-24 RX ADMIN — LOSARTAN POTASSIUM 25 MILLIGRAM(S): 100 TABLET, FILM COATED ORAL at 05:31

## 2018-05-24 RX ADMIN — Medication 100 MILLIGRAM(S): at 05:56

## 2018-05-24 RX ADMIN — CHOLESTYRAMINE 4 GRAM(S): 4 POWDER, FOR SUSPENSION ORAL at 20:05

## 2018-05-24 NOTE — CHART NOTE - NSCHARTNOTEFT_GEN_A_CORE
Patient states that PMD Dr. Oconnell has patient prescribed for Metoprolol  mg, yet she is told to only take half the tablet. Ordered for a one time dose of Metoprolol  mg. Will endorse to primary team in the AM.   Lydia Stratton PA-C   [id number]

## 2018-05-24 NOTE — CONSULT NOTE ADULT - ASSESSMENT
82y Female with history of MV repair, HTN, PAF, diastolic CHF, aortic stenosis and CKD stage 3 comes to ER with increasing SOB and elevated BP.  Probable acute on chronic diastolic CHF. Last TTE with mild aortic stenosis and normal LV systolic function. She remains NSR. Evidence of pleural effusions and elevated BNP. No PE. CKD much improved. To need continued diuretic therapy while monitoring renal function and BP. Eliquis resumed. Dose of metoprolol as outpatient was 100mg per day. To start lasix 40mg per day. BP improved this AM. Continue losartan as long as renal function is stable. Will adjust meds as needed.
Patient is a 82 year old female with PMHx of Afib (on Eliquis), CHF, DIANE, MV repair, now POD 5 from a laparoscopic cholecystectomy. Recovering well from surgery but now admitted with CHF exacerbation    -ruled out for PE  -recommend cardiac evaluation  -had been holding eliquis on discharge, resume as per cardiology  -page [phone] with questions
ASSESSMENT:    multifactorial dyspnea without hypoxemia    1) bilateral small to moderate pleural effusions following IV hydration for contrast induced WEN and IV hydration in the recent gary-operative period in the setting of valvular heart disease and diastolic dysfunction  2) anemia  3) restrictive lung disease following abdominal surgery and due to kyphosis  4) no evidence of PE on CTA    PLAN/RECOMMENDATIONS:    stable oxygenation on room air  no pulmonary medications indicated  hold Eliquis in anticipation of thoracenteses tomorrow  analgesics/incentive spirometry  diurese as tolerated by renal function and hemodynamics (WEN much improved)  cardiology consult noted - lasix/toprol XL/cozaar/ASA  GI/DVT prophylaxis  bowel regimen    Thank you for the courtesy of this referral. Plan of care discussed with the patient at bedside and Dr. Reinaldo Daniel MD, [facility] - [phone]  Pulmonary Medicine

## 2018-05-24 NOTE — CONSULT NOTE ADULT - SUBJECTIVE AND OBJECTIVE BOX
Patient is a 82y old  Female who presents with a chief complaint of dyspnea (23 May 2018 16:21)      HPI:  Patient is a 82y Female with history of MV repair, HTN, PAF, diastolic CHF, aortic stenosis and CKD stage 3 comes to ER with increasing SOB and elevated BP. She was recently hospitalized with acute cholecystitis and underwent ERCP with papillotomy for CBD stone followed by alessandra giordano. She developed WEN after IV contrast for CT studies along with IV lasix.  HCTZ and ARB were held. She had further periods of low BP and norvasc was held as well. She went home and as stated developed SOB with minimal exertion and BP was elevated. CTA done ER without PE and recieved IV lasix. She as well had taken po lasix at home. This AM feels much less SOB. Denies LE swelling or calf pain. Denies chest pressure or palpitations.     PAST MEDICAL & SURGICAL HISTORY:  Choledocholithiasis  Gallstones  Mitral valve disease  Osteoporosis  Vitamin B 12 deficiency  Carotid artery occlusion: (R)  GERD (gastroesophageal reflux disease)  Hypercholesteremia  HTN (hypertension)  Insomnia  S/P laparoscopic cholecystectomy  H/O carotid endarterectomy  Varicose veins: s/p sclerotherapy bilaterally  Bilateral cataracts: extraction w/ IOL  Papilloma of breast: s/p excision from right breast &gt;20 years ago  History of lumbar laminectomy for spinal cord decompression: 1995  Abdominal hernia: repair 2007  Hx of tonsillectomy  Status post DACIA-BSO: 1975  S/P MVR (mitral valve repair): 1997 at [facility]    Allergies    &quot;VASELINE BASED OINTMENTS&quot; (Urticaria; Rash)  adhesives (Urticaria; Rash)  statins (Unknown)    Intolerances      MEDICATIONS  (STANDING):  amitriptyline 25 milliGRAM(s) Oral at bedtime  apixaban 5 milliGRAM(s) Oral every 12 hours  aspirin  chewable 81 milliGRAM(s) Oral daily  cholecalciferol 1000 Unit(s) Oral daily  cholestyramine Powder (Sugar-Free) 4 Gram(s) Oral two times a day  cyanocobalamin 100 MICROGram(s) Oral daily  docusate sodium 100 milliGRAM(s) Oral two times a day  losartan 25 milliGRAM(s) Oral daily  metoprolol succinate  milliGRAM(s) Oral daily  pantoprazole    Tablet 40 milliGRAM(s) Oral before breakfast  senna 2 Tablet(s) Oral at bedtime    MEDICATIONS  (PRN):  oxyCODONE    IR 5 milliGRAM(s) Oral every 6 hours PRN Moderate Pain (4 - 6)  polyethylene glycol 3350 17 Gram(s) Oral daily PRN Constipation    FAMILY HISTORY:  Family history of dementia: mother  Family history of lung cancer (Grandparent)      ROS:  General: Denies weight loss, fevers, rash, decreased hearing  Cardiac: Denies chest pain, orthopnea, PND, claudication, edema, snoring, daytime somnolence, palpitations, syncope  Resp: Denies cough, sputum, wheezing, hemoptysis  GI: Denies change in bowel habits, diarrhea, weight loss, melena, tarry stools, nausea, vomiting, jaundice, abdominal pain, dysphagia  : Denies dysuria, nocturia, hematuria  Neuro: Denies tinnitus, headache, visual changes, weakness, dizziness or vertigo  Musculoskeletal: Denies neck pain back pain joint pain.  Skin: Denies rash, itching, dryness.  Endocrine: Denies polydipsia, polyuria  Psychiatric: Denies depression, anxiety  All other review of systems is negative unless indicated above.    PHYSICAL EXAM:  Vital Signs Last 24 Hrs  T(C): 36.7 (24 May 2018 04:18), Max: 36.9 (23 May 2018 10:56)  T(F): 98 (24 May 2018 04:18), Max: 98.5 (23 May 2018 18:18)  HR: 73 (24 May 2018 04:18) (71 - 84)  BP: 145/70 (24 May 2018 04:18) (125/77 - 165/71)  BP(mean): --  RR: 18 (24 May 2018 04:18) (18 - 20)  SpO2: 98% (24 May 2018 04:18) (98% - 100%)  I&O's Summary    23 May 2018 07:01  -  24 May 2018 06:59  --------------------------------------------------------  IN: 240 mL / OUT: 0 mL / NET: 240 mL        General Appearance: 	 Alert, cooperative, no distress  HEENT: normocephalic, atraumatic  Neck: no JVD,  carotid 2+  bilaterally without bruits  Lungs:  decreased BS baseas bilaterally R>L  Cor:  pmi 5th ICS MCL, regular rate and rhythm, S1 normal intensity, S2 normal intensity, no gallops, Grade I/VI CALVIN ULSB and apex  Abdomen: soft, non-tender; bowel sounds normal; no masses,  no organomegaly  Extremities: without cyanosis, clubbing or edema  Vasc: 2-+ PT and DP pulses; LE varicosities  Neurologic: A&O x 3 (time, place, person). Symmetric strength; limited exam  Musculoskeletal: no kyphosis, scoliosis; normal gait, normal tone  Skin: no rashes; limited exam    Telemetry: NSR with PVCs, PACS    LABS:                        9.7    9.1   )-----------( 387      ( 23 May 2018 11:26 )             29.4     05-23    143  |  104  |  15  ----------------------------<  109<H>  4.2   |  26  |  1.27    Ca    9.1      23 May 2018 11:26    TPro  7.1  /  Alb  3.8  /  TBili  0.3  /  DBili  x   /  AST  38  /  ALT  61<H>  /  AlkPhos  168<H>  05-23    Serum Pro-Brain Natriuretic Peptide (05.23.18 @ 11:26)    Serum Pro-Brain Natriuretic Peptide: 4235 pg/mL        CAPILLARY BLOOD GLUCOSE        PT/INR - ( 23 May 2018 11:26 )   PT: 11.7 sec;   INR: 1.08 ratio         PTT - ( 23 May 2018 11:26 )  PTT:30.5 sec    Radiology/Imaging:      Edward Pandey< from: CT Angio Chest w/ IV Cont (05.23.18 @ 13:21) >  EXAM:  CT ANGIO CHEST (W)AW IC                            PROCEDURE DATE:  05/23/2018            INTERPRETATION:  Clinical indications: Shortness of breath.    CT pulmonary angiogram was performed following uncomplicated intravenous   administration of 68 cc of Omnipaque-350. 32 cc of contrast was   discarded. MIP images are submitted.    There are no pulmonary arterial emboli.    There are no pathologically enlarged bilateral axillary lymph nodes.   There are multiple nonspecific subcentimetermediastinal lymph nodes as   on the prior CT of May 15, 2018. The patient is status post mitral valve   repair. Heart size is enlarged. There is no pericardial effusion.   Coronary artery calcifications are noted. There is atherosclerotic   disease of the aorta.    Evaluation of the upper abdominal organs demonstrate trace amount of   perihepatic ascites new since the prior study. The patient is status post   anterior abdominal wall surgery with partially imaged postoperative   changes.    There are small to moderate-sized bilateral pleural effusions, right   greater than left with interval increase in size since the prior study.    Evaluation of the lungs demonstrate bibasilar areas of compressive or   subsegmental atelectasis. There is linear atelectasis within the right   middle lobe. There is a 2 mm nodule associated with the upper aspect of   the right major fissure as on the prior study likely a lymph node. Right   apical atelectasis and/or scarring is noted. There are no central   endobronchial lesions.    Sternotomy wires are noted. There is severe compression fracture   deformity of the T10 vertebral body as on the prior CT of May 15, 2018   although it demonstrates interval progression since the prior spinal MRI   of November 27, 2016.    IMPRESSION: Small to moderate-sized bilateral pleural effusions with   bibasilar atelectasis with overall interval progression since May 15,   2018.    No pulmonary arterial emboli.    Severe compression fracture deformity of the T10 vertebral body as on May   15, 2018.                    JULIO CESAR BERKOWITZ M.D. ATTENDING RADIOLOGIST  This document has been electronically signed. May 23 2018  1:55PM        < end of copied text >    < from: Transthoracic Echocardiogram (03.14.18 @ 12:01) >  Patient name: MORENITA ROSAS  YOB: 1935   Age: 82 (F)   MR#: [id number]  Study Date: 3/14/2018  Location: [address] [place], [place] [id number]: Digna Merino RDCS  Study quality: Technically fair  Referring Physician: Daniel Alonso MD  Blood Pressure: 133/76 mmHg  Height: 157 cm  Weight: 71 kg  BSA: 1.7 m2  ------------------------------------------------------------------------  PROCEDURE: Transthoracic echocardiogram with 2-D, M-Mode  and complete spectral and color flow Doppler.  INDICATION: Unspecified atrial fibrillation (I48.91)  ------------------------------------------------------------------------  Dimensions:    Normal Values:  LA:     3.9    2.0 - 4.0 cm  Ao:     2.6    2.0 - 3.8 cm  SEPTUM: 1.0    0.6 - 1.2 cm  PWT: 1.0    0.6 - 1.1 cm  LVIDd:  4.4    3.0 - 5.6 cm  LVIDs:  3.0    1.8 - 4.0 cm  Derived variables:  LVMI: 86 g/m2  RWT: 0.45  Fractional short: 32 %  EF (Visual Estimate): 65 %  EF (Teicholtz): 60 %  Doppler Peak Velocity (m/sec): MV=2.0 AoV=1.7 PV=1.0  ------------------------------------------------------------------------  Observations:  Mitral Valve: Mitral annular calcification and calcified  mitral leaflets with decreased diastolic opening. Mild  mitral regurgitation.  Peak mitral valve gradient equals 16  mm Hg, mean transmitral valve gradient equals 5 mm Hg,  consistent with moderate mitral stenosis.  Aortic Valve/Aorta: Calcified trileaflet aortic valve with  decreased opening. Peak transaortic valve gradient equals  12 mm Hg, mean transaortic valve gradient equals 6 mm Hg,  estimated aortic valve area equals 1.8 sqcm (by continuity  equation), aortic valve velocity time integral equals 34  cm, consistent with mild aortic stenosis. Moderate aortic  regurgitation. Peak left ventricular outflow tract gradient  equals 6 mm Hg, mean gradient is equal to 4 mm Hg, LVOT  velocity time integral equals 25 cm.  Normal aortic root (Ao: 2.6 cm at the sinuses of Valsalva).  Left Atrium: Moderately dilated left atrium.  LA volume  index = 42 cc/m2.  Left Ventricle: Normal left ventricular systolic function.  No segmental wall motion abnormalities. Normal left  ventricular internal dimensions and wall thicknesses.  Right Heart: Normal right atrium. Right ventricular  enlargement with normal right ventricular systolic  function. Normal tricuspid valve. Moderate tricuspid  regurgitation. Normal pulmonic valve.  Pericardium/Pleura: Normal pericardium with no pericardial  effusion.  Hemodynamic: Estimated right atrial pressure is 8 mm Hg.  Estimated right ventricular systolic pressure equals 57 mm  Hg, assuming right atrial pressure equals 8 mm Hg,  consistent with moderate pulmonary hypertension.  ------------------------------------------------------------------------  Conclusions:  1. Calcified trileaflet aortic valve with decreased  opening. Peak transaortic valve gradient equals 12 mm Hg,  mean transaortic valve gradient equals 6 mm Hg, estimated  aortic valve area equals 1.8 sqcm (by continuity equation),  aortic valve velocity time integral equals 34 cm,  consistent with mild aortic stenosis. Moderate aortic  regurgitation.  2. Normal left ventricular internal dimensions and wall  thicknesses.  3. Normal left ventricular systolic function. No segmental  wall motion abnormalities.  4. Right ventricular enlargement with normal right  ventricular systolic function.  5. Estimated pulmonary artery systolic pressure equals 57  mm Hg, assuming right atrial pressure equals 8 mm Hg,  consistent with moderate pulmonary pressures.  ------------------------------------------------------------------------  Confirmed on  3/14/2018 - 15:16:04 by Haritha Jordan M.D.  ------------------------------------------------------------------------    < end of copied text >     [facility]  [phone]

## 2018-05-24 NOTE — CONSULT NOTE ADULT - SUBJECTIVE AND OBJECTIVE BOX
[facility] - [facility]  CONSULT NOTE    HPI: 82 year old gentlewoman, former smoker, with history of mitral valve repair (with mild MR and moderate MS on most recent ECHO), HTN, PAF, diastolic dysfunction and CKD. The patient was discharged yesterday following an admission for acute cholecystitis treated initially with ERCP with papillotomy for extraction of common bile duct stones followed by a laparoscopic cholecystectomy on . Hospital course was notable for WEN following IV contrast and diuretic therapy. HCTZ and ARBs were held. Norvasc was subsequently held after bouts of hypotension.    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  Choledocholithiasis  Gallstones  Osteoporosis  Vitamin B 12 deficiency  Carotid artery occlusion  GERD (gastroesophageal reflux disease)  Insomnia  Varicose veins    PSHX:  S/P laparoscopic cholecystectomy  H/O carotid endarterectomy  Bilateral cataracts  Hiatal hernia repair   Abdominal hernia repair   Papilloma of breast 1989  History of lumbar laminectomy for spinal cord decompression   Hx of tonsillectomy  Status post DACIA-BSO 1964  Mitral valve repair         FAMILY HISTORY:  Family history of dementia: mother  Family history of lung cancer (Grandparent)      SOCIAL HISTORY:    Pulmonary Medications:       Antimicrobials:      Cardiology:  furosemide    Tablet 40 milliGRAM(s) Oral daily  losartan 25 milliGRAM(s) Oral daily  metoprolol succinate  milliGRAM(s) Oral daily      Other:  amitriptyline 25 milliGRAM(s) Oral at bedtime  apixaban 5 milliGRAM(s) Oral every 12 hours  aspirin  chewable 81 milliGRAM(s) Oral daily  cholecalciferol 1000 Unit(s) Oral daily  cholestyramine Powder (Sugar-Free) 4 Gram(s) Oral two times a day  cyanocobalamin 100 MICROGram(s) Oral daily  docusate sodium 100 milliGRAM(s) Oral two times a day  oxyCODONE    IR 5 milliGRAM(s) Oral every 6 hours PRN  pantoprazole    Tablet 40 milliGRAM(s) Oral before breakfast  polyethylene glycol 3350 17 Gram(s) Oral daily PRN  senna 2 Tablet(s) Oral at bedtime      Allergies    VASELINE BASED OINTMENTS&quot; (Urticaria; Rash)  adhesives (Urticaria; Rash)  statins (Unknown)    Intolerances        HOME MEDICATIONS: see  H & P    REVIEW OF SYSTEMS:  Constitutional: As per HPI  HEENT: Within normal limits  CV: As per HPI  Resp: As per HPI  GI: Within normal limits   : Within normal limits  Musculoskeletal: Within normal limits  Skin: Within normal limits  Neurological: Within normal limits  Psychiatric: Within normal limits  Endocrine: Within normal limits  Hematologic/Lymphatic: Within normal limits  Allergic/Immunologic: Within normal limits    [ ] All other systems negative    OBJECTIVE:      I&O's Detail    23 May 2018 07:  -  24 May 2018 07:00  --------------------------------------------------------  IN:    Oral Fluid: 240 mL  Total IN: 240 mL    OUT:  Total OUT: 0 mL    Total NET: 240 mL      24 May 2018 07:  -  24 May 2018 12:42  --------------------------------------------------------  IN:    Oral Fluid: 480 mL  Total IN: 480 mL    OUT:  Total OUT: 0 mL    Total NET: 480 mL    Daily Height in cm: 157.48 (23 May 2018 22:41)    Daily Weight in k.2 (24 May 2018 07:22)    PHYSICAL EXAM:  ICU Vital Signs Last 24 Hrs  T(C): 36.5 (24 May 2018 11:26), Max: 36.9 (23 May 2018 18:18)  T(F): 97.7 (24 May 2018 11:26), Max: 98.5 (23 May 2018 18:18)  HR: 69 (24 May 2018 11:26) (69 - 84)  BP: 132/74 (24 May 2018 11:26) (125/77 - 156/75)  BP(mean): --  ABP: --  ABP(mean): --  RR: 17 (24 May 2018 11:26) (17 - 20)  SpO2: 98% (24 May 2018 11:26) (98% - 100%)    General: Awake. Alert. Cooperative. No distress. Appears stated age 	  HEENT:   Atraumatic. Normocephalic. Anicteric. Normal oral mucosa, PERRL, EOMI  Neck: Supple. Trachea midline. Thyroid without enlargement/tenderness/nodules. No carotid bruit. No JVD	  Cardiovascular: Regular rate and rhythm. S1 S2 normal. No murmurs, rubs or gallops  Respiratory: Respirations unlabored. Clear to auscultation and percussion bilaterally  Abdomen: Soft. Non-tender. Non-distended. No organomegaly. No masses. Normal bowel sounds	  Extremities: Warm to touch. No clubbing or cyanosis. No pedal edema.  Pulses: 2+ peripheral pulses all extremities	  Skin: Normal skin color. No rashes or lesions. No ecchymoses, No cyanosis. Warm to touch  Lymph Nodes: Cervical, supraclavicular and axillary nodes normal  Neurological: Motor and sensory examination equal and normal. A and O x 3  Psychiatry: Appropriate mood and affect.      LABS:                          8.5    7.36  )-----------( 346      ( 24 May 2018 07:56 )             27.9                         9.7    9.1   )-----------( 387      ( 23 May 2018 11:26 )             29.4         141  |  102  |  14  ----------------------------<  99  3.7   |  29  |  1.35<H>        143  |  104  |  15  ----------------------------<  109<H>  4.2   |  26  |  1.27    Ca      9.0          Ca      9.1          Phos    1.6           Mg       1.9         TPro  7.1  /  Alb  3.8  /  TBili  0.3  /  DBili  x   /  AST  38  /  ALT  61<H>  /  AlkPhos  168<H>      TPro  6.9  /  Alb  3.5  /  TBili  0.4  /  DBili  x   /  AST  71<H>  /  ALT  86<H>  /  AlkPhos  114  20    PT/INR - ( 23 May 2018 11:26 )   PT: 11.7 sec;   INR: 1.08 ratio       PTT - ( 23 May 2018 11:26 )  PTT: 30.5 sec    Venous Blood Gas:   @ 11:26  7.38/48/22/28/29  VBG Lactate: 1.7    Serum Pro-Brain Natriuretic Peptide: 4235 pg/mL ( @ 11:26)    D-Dimer Assay, Quantitative: 2433 ng/mL DDU ( @ 11:26)    CARDIAC MARKERS ( 24 May 2018 06:40 )  x     / <0.01 ng/mL / x     / x     / x      CARDIAC MARKERS ( 23 May 2018 22:14 )  x     / <0.01 ng/mL / 56 U/L / x     / 1.0 ng/mL  CARDIAC MARKERS ( 23 May 2018 11:26 )  x     / <0.01 ng/mL / x     / x     / 1.3 ng/mL    < from: Transthoracic Echocardiogram (18 @ 12:01) >    Patient name: MORENITA ROSAS  YOB: 1935   Age: 82 (F)   MR#: [id number]  Study Date: 3/14/2018  Location: [address] [place], [place] [id number]: Digna Merino RDCS  Study quality: Technically fair  Referring Physician: Daniel Alonso MD  Blood Pressure: 133/76 mmHg  Height: 157 cm  Weight: 71 kg  BSA: 1.7 m2  ------------------------------------------------------------------------  PROCEDURE: Transthoracic echocardiogram with 2-D, M-Mode  and complete spectral and color flow Doppler.  INDICATION: Unspecified atrial fibrillation (I48.91)  ------------------------------------------------------------------------  Dimensions:    Normal Values:  LA:     3.9    2.0 - 4.0 cm  Ao:     2.6    2.0 - 3.8 cm  SEPTUM: 1.0    0.6 - 1.2 cm  PWT: 1.0    0.6 - 1.1 cm  LVIDd:  4.4    3.0 - 5.6 cm  LVIDs:  3.0    1.8 - 4.0 cm  Derived variables:  LVMI: 86 g/m2  RWT: 0.45  Fractional short: 32 %  EF (Visual Estimate): 65 %  EF (Teicholtz): 60 %  Doppler Peak Velocity (m/sec): MV=2.0 AoV=1.7 PV=1.0  ------------------------------------------------------------------------  Observations:  Mitral Valve: Mitral annular calcification and calcified  mitral leaflets with decreased diastolic opening. Mild  mitral regurgitation.  Peak mitral valve gradient equals 16  mm Hg, mean transmitral valve gradient equals 5 mm Hg,  consistent with moderate mitral stenosis.  Aortic Valve/Aorta: Calcified trileaflet aortic valve with  decreased opening. Peak transaortic valve gradient equals  12 mm Hg, mean transaortic valve gradient equals 6 mm Hg,  estimated aortic valve area equals 1.8 sqcm (by continuity  equation), aortic valve velocity time integral equals 34  cm, consistent with mild aortic stenosis. Moderate aortic  regurgitation. Peak left ventricular outflow tract gradient  equals 6 mm Hg, mean gradient is equal to 4 mm Hg, LVOT  velocity time integral equals 25 cm.  Normal aortic root (Ao: 2.6 cm at the sinuses of Valsalva).  Left Atrium: Moderately dilated left atrium.  LA volume  index = 42 cc/m2.  Left Ventricle: Normal left ventricular systolic function.  No segmental wall motion abnormalities. Normal left  ventricular internal dimensions and wall thicknesses.  Right Heart: Normal right atrium. Right ventricular  enlargement with normal right ventricular systolic  function. Normal tricuspid valve. Moderate tricuspid  regurgitation. Normal pulmonic valve.  Pericardium/Pleura: Normal pericardium with no pericardial  effusion.  Hemodynamic: Estimated right atrial pressure is 8 mm Hg.  Estimated right ventricular systolic pressure equals 57 mm  Hg, assuming right atrial pressure equals 8 mm Hg,  consistent with moderate pulmonary hypertension.  ------------------------------------------------------------------------  Conclusions:  1. Calcified trileaflet aortic valve with decreased  opening. Peak transaortic valve gradient equals 12 mm Hg,  mean transaortic valve gradient equals 6 mm Hg, estimated  aortic valve area equals 1.8 sqcm (by continuity equation),  aortic valve velocity time integral equals 34 cm,  consistent with mild aortic stenosis. Moderate aortic  regurgitation.  2. Normal left ventricular internal dimensions and wall  thicknesses.  3. Normal left ventricular systolic function. No segmental  wall motion abnormalities.  4. Right ventricular enlargement with normal right  ventricular systolic function.  5. Estimated pulmonary artery systolic pressure equals 57  mm Hg, assuming right atrial pressure equals 8 mm Hg,  consistent with moderate pulmonary pressures.  ------------------------------------------------------------------------  Confirmed on  3/14/2018 - 15:16:04 by Haritha Jordan M.D.  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 The patient was discharged 2 days ago following an admission for acute cholecystitis treated initially with ERCP with papillotomy for extraction of common bile duct stones followed by a laparoscopic cholecystectomy on . Hospital course was notable for WEN following IV contrast and diuretic therapy. HCTZ and ARBs were held. Norvasc was subsequently held after bouts of hypotension. When the patient arrived home, she noticed marked worsening of her chronic shortness of breath when climbing stairs without cough, sputum production, chest congestion or wheeze. She took a dose of lasix without relief and therefore comes to the ER for evaluation and treatment. She has no fevers, chills or sweats. No chest pain/pressure or palpitations. No calf pain or lower extremity edema.    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  Choledocholithiasis  Gallstones  Osteoporosis  Vitamin B 12 deficiency  Carotid artery occlusion  GERD (gastroesophageal reflux disease)  Insomnia  Varicose veins    PSHX:  S/P laparoscopic cholecystectomy  H/O carotid endarterectomy  Bilateral cataracts  Hiatal hernia repair   Abdominal hernia repair   Papilloma of breast 1989  History of lumbar laminectomy for spinal cord decompression   Hx of tonsillectomy  Status post DACIA-BSO 1964  Mitral valve repair       FAMILY HISTORY:  Family history of dementia: mother  Family history of lung cancer (Grandparent)      SOCIAL HISTORY:  former smoker;     Pulmonary Medications:       Antimicrobials:      Cardiology:  furosemide    Tablet 40 milliGRAM(s) Oral daily  losartan 25 milliGRAM(s) Oral daily  metoprolol succinate  milliGRAM(s) Oral daily      Other:  amitriptyline 25 milliGRAM(s) Oral at bedtime  apixaban 5 milliGRAM(s) Oral every 12 hours  aspirin  chewable 81 milliGRAM(s) Oral daily  cholecalciferol 1000 Unit(s) Oral daily  cholestyramine Powder (Sugar-Free) 4 Gram(s) Oral two times a day  cyanocobalamin 100 MICROGram(s) Oral daily  docusate sodium 100 milliGRAM(s) Oral two times a day  oxyCODONE    IR 5 milliGRAM(s) Oral every 6 hours PRN  pantoprazole    Tablet 40 milliGRAM(s) Oral before breakfast  polyethylene glycol 3350 17 Gram(s) Oral daily PRN  senna 2 Tablet(s) Oral at bedtime      Allergies    VASELINE BASED OINTMENTS (Urticaria; Rash)  adhesives (Urticaria; Rash)  statins (Unknown)      HOME MEDICATIONS: see  H & P    REVIEW OF SYSTEMS:  Constitutional: As per HPI  HEENT: Within normal limits  CV: As per HPI  Resp: As per HPI  GI: Within normal limits   : Within normal limits  Musculoskeletal: Within normal limits  Skin: Within normal limits  Neurological: Within normal limits  Psychiatric: Within normal limits  Endocrine: Within normal limits  Hematologic/Lymphatic: Within normal limits  Allergic/Immunologic: Within normal limits      OBJECTIVE:      I&O's Detail    23 May 2018 07:  -  24 May 2018 07:00  --------------------------------------------------------  IN:    Oral Fluid: 240 mL  Total IN: 240 mL    OUT:  Total OUT: 0 mL    Total NET: 240 mL      24 May 2018 07:  -  24 May 2018 12:42  --------------------------------------------------------  IN:    Oral Fluid: 480 mL  Total IN: 480 mL    OUT:  Total OUT: 0 mL    Total NET: 480 mL    Daily Height in cm: 157.48 (23 May 2018 22:41)    Daily Weight in k.2 (24 May 2018 07:22)    PHYSICAL EXAM:  ICU Vital Signs Last 24 Hrs  T(C): 36.5 (24 May 2018 11:26), Max: 36.9 (23 May 2018 18:18)  T(F): 97.7 (24 May 2018 11:26), Max: 98.5 (23 May 2018 18:18)  HR: 69 (24 May 2018 11:26) (69 - 84)  BP: 132/74 (24 May 2018 11:26) (125/77 - 156/75)  BP(mean): --  ABP: --  ABP(mean): --  RR: 17 (24 May 2018 11:26) (17 - 20)  SpO2: 98% (24 May 2018 11:26) (98% - 100%) on room air    General: Awake. Alert. Cooperative. No distress. Appears stated age 	  HEENT:   Atraumatic. Normocephalic. Anicteric. Normal oral mucosa, PERRL, EOMI  Neck: Supple. Trachea midline. Thyroid without enlargement/tenderness/nodules. No carotid bruit. No JVD	  Cardiovascular: Regular rate and rhythm. S1 S2 normal. III/VI systolic murmur  Respiratory: Respirations unlabored. Decreased breath sounds ~ 1/3 bilaterally right > left  Abdomen: Soft. Non-tender. Non-distended. No organomegaly. No masses. Normal bowel sounds	  Extremities: Warm to touch. No clubbing or cyanosis. No pedal edema.  Pulses: 2+ peripheral pulses all extremities	  Skin: Normal skin color. No rashes or lesions. No ecchymoses, No cyanosis. Warm to touch  Lymph Nodes: Cervical, supraclavicular and axillary nodes normal  Neurological: Motor and sensory examination equal and normal. A and O x 3  Psychiatry: Appropriate mood and affect.      LABS:                          8.5    7.36  )-----------( 346      ( 24 May 2018 07:56 )             27.9                         9.7    9.1   )-----------( 387      ( 23 May 2018 11:26 )             29.4     0524    141  |  102  |  14  ----------------------------<  99  3.7   |  29  |  1.35<H>        143  |  104  |  15  ----------------------------<  109<H>  4.2   |  26  |  1.27    Ca      9.0          Ca      9.1          Phos    1.6           Mg       1.9         TPro  7.1  /  Alb  3.8  /  TBili  0.3  /  DBili  x   /  AST  38  /  ALT  61<H>  /  AlkPhos  168<H>      TPro  6.9  /  Alb  3.5  /  TBili  0.4  /  DBili  x   /  AST  71<H>  /  ALT  86<H>  /  AlkPhos  114      PT/INR - ( 23 May 2018 11:26 )   PT: 11.7 sec;   INR: 1.08 ratio       PTT - ( 23 May 2018 11:26 )  PTT: 30.5 sec    Venous Blood Gas:   @ 11:26  7.38/48/22/28/29  VBG Lactate: 1.7    Serum Pro-Brain Natriuretic Peptide: 4235 pg/mL ( @ 11:26)    D-Dimer Assay, Quantitative: 2433 ng/mL DDU ( @ 11:26)    CARDIAC MARKERS ( 24 May 2018 06:40 )  x     / <0.01 ng/mL / x     / x     / x      CARDIAC MARKERS ( 23 May 2018 22:14 )  x     / <0.01 ng/mL / 56 U/L / x     / 1.0 ng/mL  CARDIAC MARKERS ( 23 May 2018 11:26 )  x     / <0.01 ng/mL / x     / x     / 1.3 ng/mL    < from: Transthoracic Echocardiogram (18 @ 12:01) >    Patient name: MORENITA ROSAS  YOB: 1935   Age: 82 (F)   MR#: [id number]  Study Date: 3/14/2018  Location: [address] [place], [place] [id number]: Digna Merino RDCS  Study quality: Technically fair  Referring Physician: Daniel Alonso MD  Blood Pressure: 133/76 mmHg  Height: 157 cm  Weight: 71 kg  BSA: 1.7 m2  ------------------------------------------------------------------------  PROCEDURE: Transthoracic echocardiogram with 2-D, M-Mode  and complete spectral and color flow Doppler.  INDICATION: Unspecified atrial fibrillation (I48.91)  ------------------------------------------------------------------------  Dimensions:    Normal Values:  LA:     3.9    2.0 - 4.0 cm  Ao:     2.6    2.0 - 3.8 cm  SEPTUM: 1.0    0.6 - 1.2 cm  PWT: 1.0    0.6 - 1.1 cm  LVIDd:  4.4    3.0 - 5.6 cm  LVIDs:  3.0    1.8 - 4.0 cm  Derived variables:  LVMI: 86 g/m2  RWT: 0.45  Fractional short: 32 %  EF (Visual Estimate): 65 %  EF (Teicholtz): 60 %  Doppler Peak Velocity (m/sec): MV=2.0 AoV=1.7 PV=1.0  ------------------------------------------------------------------------  Observations:  Mitral Valve: Mitral annular calcification and calcified  mitral leaflets with decreased diastolic opening. Mild  mitral regurgitation.  Peak mitral valve gradient equals 16  mm Hg, mean transmitral valve gradient equals 5 mm Hg,  consistent with moderate mitral stenosis.  Aortic Valve/Aorta: Calcified trileaflet aortic valve with  decreased opening. Peak transaortic valve gradient equals  12 mm Hg, mean transaortic valve gradient equals 6 mm Hg,  estimated aortic valve area equals 1.8 sqcm (by continuity  equation), aortic valve velocity time integral equals 34  cm, consistent with mild aortic stenosis. Moderate aortic  regurgitation. Peak left ventricular outflow tract gradient  equals 6 mm Hg, mean gradient is equal to 4 mm Hg, LVOT  velocity time integral equals 25 cm.  Normal aortic root (Ao: 2.6 cm at the sinuses of Valsalva).  Left Atrium: Moderately dilated left atrium.  LA volume  index = 42 cc/m2.  Left Ventricle: Normal left ventricular systolic function.  No segmental wall motion abnormalities. Normal left  ventricular internal dimensions and wall thicknesses.  Right Heart: Normal right atrium. Right ventricular  enlargement with normal right ventricular systolic  function. Normal tricuspid valve. Moderate tricuspid  regurgitation. Normal pulmonic valve.  Pericardium/Pleura: Normal pericardium with no pericardial  effusion.  Hemodynamic: Estimated right atrial pressure is 8 mm Hg.  Estimated right ventricular systolic pressure equals 57 mm  Hg, assuming right atrial pressure equals 8 mm Hg,  consistent with moderate pulmonary hypertension.  ------------------------------------------------------------------------  Conclusions:  1. Calcified trileaflet aortic valve with decreased  opening. Peak transaortic valve gradient equals 12 mm Hg,  mean transaortic valve gradient equals 6 mm Hg, estimated  aortic valve area equals 1.8 sqcm (by continuity equation),  aortic valve velocity time integral equals 34 cm,  consistent with mild aortic stenosis. Moderate aortic  regurgitation.  2. Normal left ventricular internal dimensions and wall  thicknesses.  3. Normal left ventricular systolic function. No segmental  wall motion abnormalities.  4. Right ventricular enlargement with normal right  ventricular systolic function.  5. Estimated pulmonary artery systolic pressure equals 57  mm Hg, assuming right atrial pressure equals 8 mm Hg,  consistent with moderate pulmonary pressures.  ------------------------------------------------------------------------  Confirmed on  3/14/2018 - 15:16:04 by Haritha Jordan M.D.  ------------------------------------------------------------------------    < end of copied text >  ---------------------------------------------------------------------------------------------------------    MICROBIOLOGY:       RADIOLOGY:  [x ] Chest radiographs reviewed and interpreted by me    < from: Xray Chest 1 View- PORTABLE-Routine (18 @ 09:20) >    EXAM:  XR CHEST PORTABLE ROUTINE 1V                            PROCEDURE DATE:  2018      INTERPRETATION:  INDICATION: Congestive heart failure and shortness of   breath which is mild.    COMPARISON: Radiograph 2018 at 1149 hours.    FINDINGS:    Lines and Tubes: None.    Lungs: Bilateral atelectasis is unchanged.    Pleura: Bilateral pleural effusions are unchanged.    Heart and Mediastinum: Cardiomegaly.  Mitral valve annuloplasty ring.    Skeletal: Sternotomy.    IMPRESSION:    1.  No change in the bilateral pleural effusions.    XAVIER PATTERSON M.D., ATTENDING RADIOLOGIST  This document has been electronically signed. May 24 2018 12:21PM      < end of copied text >  ---------------------------------------------------------------------------------------------------------  < from: CT Angio Chest w/ IV Cont (18 @ 13:21) >    EXAM:  CT ANGIO CHEST (W)AW IC                          PROCEDURE DATE:  2018      INTERPRETATION:  Clinical indications: Shortness of breath.    CT pulmonary angiogram was performed following uncomplicated intravenous   administration of 68 cc of Omnipaque-350. 32 cc of contrast was   discarded. MIP images are submitted.    There are no pulmonary arterial emboli.    There are no pathologically enlarged bilateral axillary lymph nodes.   There are multiple nonspecific subcentimetermediastinal lymph nodes as   on the prior CT of May 15, 2018. The patient is status post mitral valve   repair. Heart size is enlarged. There is no pericardial effusion.   Coronary artery calcifications are noted. There is atherosclerotic   disease of the aorta.    Evaluation of the upper abdominal organs demonstrate trace amount of   perihepatic ascites new since the prior study. The patient is status post   anterior abdominal wall surgery with partially imaged postoperative   changes.    There are small to moderate-sized bilateral pleural effusions, right   greater than left with interval increase in size since the prior study.    Evaluation of the lungs demonstrate bibasilar areas of compressive or   subsegmental atelectasis. There is linear atelectasis within the right   middle lobe. There is a 2 mm nodule associated with the upper aspect of   the right major fissure as on the prior study likely a lymph node. Right   apical atelectasis and/or scarring is noted. There are no central   endobronchial lesions.    Sternotomy wires are noted. There is severe compression fracture   deformity of the T10 vertebral body as on the prior CT of May 15, 2018   although it demonstrates interval progression since the prior spinal MRI   of 2016.    IMPRESSION: Small to moderate-sized bilateral pleural effusions with   bibasilar atelectasis with overall interval progression since May 15,   2018.    No pulmonary arterial emboli.    Severe compression fracture deformity of the T10 vertebral body as on May   15, 2018.    JULIO CESAR BERKOWITZ M.D. ATTENDING RADIOLOGIST  This document has been electronically signed. May 23 2018  1:55PM      < end of copied text >    ---------------------------------------------------------------------------------------------------------

## 2018-05-24 NOTE — CONSULT NOTE ADULT - CONSULT REASON
dyspnea; hypoxemia; pleural effusions; atelectasis dyspnea; hypoxemia; pleural effusions; atelectasis; mitral valve stenosis and regurgitation

## 2018-05-25 LAB
ANION GAP SERPL CALC-SCNC: 12 MMOL/L — SIGNIFICANT CHANGE UP (ref 5–17)
BUN SERPL-MCNC: 14 MG/DL — SIGNIFICANT CHANGE UP (ref 7–23)
CALCIUM SERPL-MCNC: 9.3 MG/DL — SIGNIFICANT CHANGE UP (ref 8.4–10.5)
CHLORIDE SERPL-SCNC: 100 MMOL/L — SIGNIFICANT CHANGE UP (ref 96–108)
CO2 SERPL-SCNC: 30 MMOL/L — SIGNIFICANT CHANGE UP (ref 22–31)
CREAT SERPL-MCNC: 1.46 MG/DL — HIGH (ref 0.5–1.3)
GLUCOSE SERPL-MCNC: 104 MG/DL — HIGH (ref 70–99)
HCT VFR BLD CALC: 28.7 % — LOW (ref 34.5–45)
HGB BLD-MCNC: 8.8 G/DL — LOW (ref 11.5–15.5)
MCHC RBC-ENTMCNC: 26.4 PG — LOW (ref 27–34)
MCHC RBC-ENTMCNC: 30.7 GM/DL — LOW (ref 32–36)
MCV RBC AUTO: 86.2 FL — SIGNIFICANT CHANGE UP (ref 80–100)
PLATELET # BLD AUTO: 391 K/UL — SIGNIFICANT CHANGE UP (ref 150–400)
POTASSIUM SERPL-MCNC: 4 MMOL/L — SIGNIFICANT CHANGE UP (ref 3.5–5.3)
POTASSIUM SERPL-SCNC: 4 MMOL/L — SIGNIFICANT CHANGE UP (ref 3.5–5.3)
RBC # BLD: 3.33 M/UL — LOW (ref 3.8–5.2)
RBC # FLD: 13.9 % — SIGNIFICANT CHANGE UP (ref 10.3–14.5)
SODIUM SERPL-SCNC: 142 MMOL/L — SIGNIFICANT CHANGE UP (ref 135–145)
WBC # BLD: 8.26 K/UL — SIGNIFICANT CHANGE UP (ref 3.8–10.5)
WBC # FLD AUTO: 8.26 K/UL — SIGNIFICANT CHANGE UP (ref 3.8–10.5)

## 2018-05-25 PROCEDURE — 76604 US EXAM CHEST: CPT | Mod: 26

## 2018-05-25 RX ORDER — APIXABAN 2.5 MG/1
5 TABLET, FILM COATED ORAL EVERY 12 HOURS
Qty: 0 | Refills: 0 | Status: DISCONTINUED | OUTPATIENT
Start: 2018-05-25 | End: 2018-05-26

## 2018-05-25 RX ORDER — LOSARTAN POTASSIUM 100 MG/1
50 TABLET, FILM COATED ORAL DAILY
Qty: 0 | Refills: 0 | Status: DISCONTINUED | OUTPATIENT
Start: 2018-05-25 | End: 2018-05-26

## 2018-05-25 RX ADMIN — LOSARTAN POTASSIUM 25 MILLIGRAM(S): 100 TABLET, FILM COATED ORAL at 05:58

## 2018-05-25 RX ADMIN — Medication 40 MILLIGRAM(S): at 05:58

## 2018-05-25 RX ADMIN — LOSARTAN POTASSIUM 50 MILLIGRAM(S): 100 TABLET, FILM COATED ORAL at 12:09

## 2018-05-25 RX ADMIN — Medication 100 MILLIGRAM(S): at 05:58

## 2018-05-25 RX ADMIN — Medication 25 MILLIGRAM(S): at 21:09

## 2018-05-25 RX ADMIN — APIXABAN 5 MILLIGRAM(S): 2.5 TABLET, FILM COATED ORAL at 20:18

## 2018-05-25 RX ADMIN — PANTOPRAZOLE SODIUM 40 MILLIGRAM(S): 20 TABLET, DELAYED RELEASE ORAL at 05:57

## 2018-05-25 RX ADMIN — PREGABALIN 100 MICROGRAM(S): 225 CAPSULE ORAL at 12:06

## 2018-05-25 RX ADMIN — Medication 1000 UNIT(S): at 12:06

## 2018-05-25 RX ADMIN — CHOLESTYRAMINE 4 GRAM(S): 4 POWDER, FOR SUSPENSION ORAL at 10:59

## 2018-05-25 RX ADMIN — Medication 81 MILLIGRAM(S): at 12:06

## 2018-05-25 NOTE — PROGRESS NOTE ADULT - ASSESSMENT
82y Female with history of MV repair, HTN, PAF, diastolic CHF, aortic stenosis and CKD stage 3 comes to ER with increasing SOB and elevated BP.  Acute on chronic diastolic CHF. Last TTE with mild aortic stenosis and normal LV systolic function. She remains NSR.  Evidence of pleural effusions and elevated BNP. No PE. CKD improved and stable so far after IV dye load.    Continue lasix 40mg po daily. Monitor renal function. Will increase losartan 50mg per day.  Eliquis on hold for possible thoracentesis under US guidance if effusions significant.

## 2018-05-25 NOTE — PHYSICAL THERAPY INITIAL EVALUATION ADULT - PERTINENT HX OF CURRENT PROBLEM, REHAB EVAL
81 yo woman admitted w dyspnea, found to be in CHF, recently admitted for acute cholecystitis. Evidence of pleural effusions and elevated BNP. No PE. CKD improved and stable so far after IV dye load.

## 2018-05-25 NOTE — PROGRESS NOTE ADULT - ASSESSMENT
ASSESSMENT:    multifactorial dyspnea without hypoxemia    1) bilateral small to moderate pleural effusions following IV hydration for contrast induced WEN and IV hydration in the recent gary-operative period in the setting of valvular heart disease and diastolic dysfunction  2) anemia  3) restrictive lung disease following abdominal surgery and due to kyphosis  4) no evidence of PE on CTA    PLAN/RECOMMENDATIONS:    stable oxygenation on room air  no pulmonary medications indicated  chest ultrasound today - if effusions are large, will proceed with thoracenteses - restart Eliquis thereafter  analgesics/incentive spirometry  diurese as tolerated by renal function and hemodynamics (WEN much improved)  cardiology consult noted - lasix/toprol XL/cozaar/ASA  GI/DVT prophylaxis  bowel regimen    Will follow with you. Plan of care discussed with the patient at bedside and dedicated floor NP.  No pulmonary objection to discharge    Francisco Daniel MD, [facility] - [phone]  Pulmonary Medicine      Francisco Daniel MD, [facility] - [phone]  Pulmonary Medicine ASSESSMENT:    multifactorial dyspnea without hypoxemia    1) bilateral small to moderate pleural effusions following IV hydration for contrast induced WEN and IV hydration in the recent gary-operative period in the setting of valvular heart disease and diastolic dysfunction  2) anemia  3) restrictive lung disease following abdominal surgery and due to kyphosis  4) no evidence of PE on CTA    PLAN/RECOMMENDATIONS:    stable oxygenation on room air  no pulmonary medications indicated  chest ultrasound today - if effusions are large, will proceed with thoracenteses - restart Eliquis thereafter  analgesics/incentive spirometry  diurese as tolerated by renal function and hemodynamics (WEN much improved)  cardiology consult noted - lasix/toprol XL/cozaar/ASA  GI/DVT prophylaxis  bowel regimen    Will follow with you. Plan of care discussed with the patient at bedside.  No pulmonary objection to discharge    Francisco Daniel MD, [facility] - [phone]  Pulmonary Medicine    addendum @ 1915:    chest ultrasound reveals bilateral small pleural effusions with atelectatic lung within the fluid - do not think the benefit of a thoracentesis in this patient breathing comfortably with exertion on room air after diuresis outweighs the risk of the procedure - discussed with the dedicated floor NP Jami    < from: US Chest (05.25.18 @ 16:25) >    EXAM:  US CHEST                          PROCEDURE DATE:  05/25/2018      INTERPRETATION:  Chest ultrasound    Clinical information: Assess for pleural effusions.    Findings: Small bilateral pleural effusions are noted.     IMPRESSION:    Small bilateral pleural effusions.     ANDREW PATTERSON M.D., ATTENDING RADIOLOGIST  This document has been electronically signed. May 25 2018  4:28PM      < end of copied text >  ---------------------------------------------------------------------------------------------------------

## 2018-05-26 ENCOUNTER — TRANSCRIPTION ENCOUNTER (OUTPATIENT)
Age: 83
End: 2018-05-26

## 2018-05-26 VITALS
RESPIRATION RATE: 16 BRPM | DIASTOLIC BLOOD PRESSURE: 70 MMHG | TEMPERATURE: 98 F | HEART RATE: 80 BPM | OXYGEN SATURATION: 97 % | SYSTOLIC BLOOD PRESSURE: 134 MMHG

## 2018-05-26 LAB
ANION GAP SERPL CALC-SCNC: 11 MMOL/L — SIGNIFICANT CHANGE UP (ref 5–17)
BUN SERPL-MCNC: 12 MG/DL — SIGNIFICANT CHANGE UP (ref 7–23)
CALCIUM SERPL-MCNC: 8.8 MG/DL — SIGNIFICANT CHANGE UP (ref 8.4–10.5)
CHLORIDE SERPL-SCNC: 101 MMOL/L — SIGNIFICANT CHANGE UP (ref 96–108)
CO2 SERPL-SCNC: 30 MMOL/L — SIGNIFICANT CHANGE UP (ref 22–31)
CREAT SERPL-MCNC: 1.33 MG/DL — HIGH (ref 0.5–1.3)
GLUCOSE SERPL-MCNC: 100 MG/DL — HIGH (ref 70–99)
HCT VFR BLD CALC: 26.5 % — LOW (ref 34.5–45)
HGB BLD-MCNC: 8.6 G/DL — LOW (ref 11.5–15.5)
MCHC RBC-ENTMCNC: 27.9 PG — SIGNIFICANT CHANGE UP (ref 27–34)
MCHC RBC-ENTMCNC: 32.5 GM/DL — SIGNIFICANT CHANGE UP (ref 32–36)
MCV RBC AUTO: 86 FL — SIGNIFICANT CHANGE UP (ref 80–100)
PLATELET # BLD AUTO: 426 K/UL — HIGH (ref 150–400)
POTASSIUM SERPL-MCNC: 3.6 MMOL/L — SIGNIFICANT CHANGE UP (ref 3.5–5.3)
POTASSIUM SERPL-SCNC: 3.6 MMOL/L — SIGNIFICANT CHANGE UP (ref 3.5–5.3)
RBC # BLD: 3.08 M/UL — LOW (ref 3.8–5.2)
RBC # FLD: 14 % — SIGNIFICANT CHANGE UP (ref 10.3–14.5)
SODIUM SERPL-SCNC: 142 MMOL/L — SIGNIFICANT CHANGE UP (ref 135–145)
WBC # BLD: 9.5 K/UL — SIGNIFICANT CHANGE UP (ref 3.8–10.5)
WBC # FLD AUTO: 9.5 K/UL — SIGNIFICANT CHANGE UP (ref 3.8–10.5)

## 2018-05-26 PROCEDURE — 85014 HEMATOCRIT: CPT

## 2018-05-26 PROCEDURE — 71275 CT ANGIOGRAPHY CHEST: CPT

## 2018-05-26 PROCEDURE — 76604 US EXAM CHEST: CPT

## 2018-05-26 PROCEDURE — 80048 BASIC METABOLIC PNL TOTAL CA: CPT

## 2018-05-26 PROCEDURE — 85730 THROMBOPLASTIN TIME PARTIAL: CPT

## 2018-05-26 PROCEDURE — 82435 ASSAY OF BLOOD CHLORIDE: CPT

## 2018-05-26 PROCEDURE — 85610 PROTHROMBIN TIME: CPT

## 2018-05-26 PROCEDURE — 82947 ASSAY GLUCOSE BLOOD QUANT: CPT

## 2018-05-26 PROCEDURE — 83605 ASSAY OF LACTIC ACID: CPT

## 2018-05-26 PROCEDURE — 84484 ASSAY OF TROPONIN QUANT: CPT

## 2018-05-26 PROCEDURE — 93005 ELECTROCARDIOGRAM TRACING: CPT

## 2018-05-26 PROCEDURE — 83880 ASSAY OF NATRIURETIC PEPTIDE: CPT

## 2018-05-26 PROCEDURE — 99285 EMERGENCY DEPT VISIT HI MDM: CPT | Mod: 25

## 2018-05-26 PROCEDURE — 84295 ASSAY OF SERUM SODIUM: CPT

## 2018-05-26 PROCEDURE — 82803 BLOOD GASES ANY COMBINATION: CPT

## 2018-05-26 PROCEDURE — 71045 X-RAY EXAM CHEST 1 VIEW: CPT

## 2018-05-26 PROCEDURE — 85027 COMPLETE CBC AUTOMATED: CPT

## 2018-05-26 PROCEDURE — 96374 THER/PROPH/DIAG INJ IV PUSH: CPT | Mod: XU

## 2018-05-26 PROCEDURE — 80053 COMPREHEN METABOLIC PANEL: CPT

## 2018-05-26 PROCEDURE — 82553 CREATINE MB FRACTION: CPT

## 2018-05-26 PROCEDURE — 84132 ASSAY OF SERUM POTASSIUM: CPT

## 2018-05-26 PROCEDURE — 97162 PT EVAL MOD COMPLEX 30 MIN: CPT

## 2018-05-26 PROCEDURE — 85379 FIBRIN DEGRADATION QUANT: CPT

## 2018-05-26 PROCEDURE — 82330 ASSAY OF CALCIUM: CPT

## 2018-05-26 PROCEDURE — 82550 ASSAY OF CK (CPK): CPT

## 2018-05-26 RX ORDER — APIXABAN 2.5 MG/1
1 TABLET, FILM COATED ORAL
Qty: 60 | Refills: 0
Start: 2018-05-26 | End: 2018-06-24

## 2018-05-26 RX ORDER — DOCUSATE SODIUM 100 MG
1 CAPSULE ORAL
Qty: 60 | Refills: 0 | OUTPATIENT
Start: 2018-05-26 | End: 2018-06-24

## 2018-05-26 RX ORDER — LOSARTAN POTASSIUM 100 MG/1
1 TABLET, FILM COATED ORAL
Qty: 30 | Refills: 0 | OUTPATIENT
Start: 2018-05-26 | End: 2018-06-24

## 2018-05-26 RX ORDER — SENNA PLUS 8.6 MG/1
2 TABLET ORAL
Qty: 60 | Refills: 0 | OUTPATIENT
Start: 2018-05-26 | End: 2018-06-24

## 2018-05-26 RX ORDER — POLYETHYLENE GLYCOL 3350 17 G/17G
17 POWDER, FOR SOLUTION ORAL
Qty: 765 | Refills: 0 | OUTPATIENT
Start: 2018-05-26

## 2018-05-26 RX ORDER — FUROSEMIDE 40 MG
1 TABLET ORAL
Qty: 30 | Refills: 0
Start: 2018-05-26 | End: 2018-06-24

## 2018-05-26 RX ADMIN — LOSARTAN POTASSIUM 50 MILLIGRAM(S): 100 TABLET, FILM COATED ORAL at 06:14

## 2018-05-26 RX ADMIN — Medication 40 MILLIGRAM(S): at 06:14

## 2018-05-26 RX ADMIN — Medication 81 MILLIGRAM(S): at 11:11

## 2018-05-26 RX ADMIN — APIXABAN 5 MILLIGRAM(S): 2.5 TABLET, FILM COATED ORAL at 06:14

## 2018-05-26 RX ADMIN — Medication 100 MILLIGRAM(S): at 06:14

## 2018-05-26 RX ADMIN — PANTOPRAZOLE SODIUM 40 MILLIGRAM(S): 20 TABLET, DELAYED RELEASE ORAL at 06:15

## 2018-05-26 RX ADMIN — PREGABALIN 100 MICROGRAM(S): 225 CAPSULE ORAL at 12:25

## 2018-05-26 RX ADMIN — Medication 1000 UNIT(S): at 11:12

## 2018-05-26 RX ADMIN — CHOLESTYRAMINE 4 GRAM(S): 4 POWDER, FOR SUSPENSION ORAL at 11:07

## 2018-05-26 NOTE — PROGRESS NOTE ADULT - PROBLEM SELECTOR PLAN 3
seen by pulm, will hold eliquis in prep for thoracentesis harini in pm
seen by pulm, will restart eliquis since pl eff not large enough to tap. cont po lasix
seen by pulm, effusions too small to tap, cont po lasix

## 2018-05-26 NOTE — DISCHARGE NOTE ADULT - SECONDARY DIAGNOSIS.
Essential hypertension PAF (paroxysmal atrial fibrillation) Constipation, unspecified constipation type

## 2018-05-26 NOTE — PROGRESS NOTE ADULT - PROBLEM SELECTOR PROBLEM 1
Acute on chronic congestive heart failure, unspecified heart failure type

## 2018-05-26 NOTE — PROGRESS NOTE ADULT - SUBJECTIVE AND OBJECTIVE BOX
[place], [place]    Patient is a 82y old  Female who presents with a chief complaint of dyspnea,acute on chronic CHF,mitral stenosis,MR,PAF,HTN,WEN.Doing well with diuresis.No CP less SOB.Chest US small effusions,no Tap.    Allergies    &quot;VASELINE BASED OINTMENTS&quot; (Urticaria; Rash)  adhesives (Urticaria; Rash)  statins (Unknown)    Intolerances      MEDICATIONS  (STANDING):  amitriptyline 25 milliGRAM(s) Oral at bedtime  apixaban 5 milliGRAM(s) Oral every 12 hours  aspirin  chewable 81 milliGRAM(s) Oral daily  cholecalciferol 1000 Unit(s) Oral daily  cholestyramine Powder (Sugar-Free) 4 Gram(s) Oral two times a day  cyanocobalamin 100 MICROGram(s) Oral daily  docusate sodium 100 milliGRAM(s) Oral two times a day  furosemide    Tablet 40 milliGRAM(s) Oral daily  losartan 50 milliGRAM(s) Oral daily  metoprolol succinate  milliGRAM(s) Oral daily  pantoprazole    Tablet 40 milliGRAM(s) Oral before breakfast  senna 2 Tablet(s) Oral at bedtime    MEDICATIONS  (PRN):  oxyCODONE    IR 5 milliGRAM(s) Oral every 6 hours PRN Moderate Pain (4 - 6)  polyethylene glycol 3350 17 Gram(s) Oral daily PRN Constipation      ROS:  Positive:    General: Denies weight loss, fevers, rash, decreased hearing  Cardiac: Denies chest pain, SOB, ARCHER, orthopnea, PND, claudication, edema, snoring, daytime somnolence, palpitations, syncope  Resp: Denies SOB, ARCHER, cough, sputum, wheezing, hemoptysis  GI: Denies change in bowel habits, diarrhea, weight loss, melena, tarry stools,   nausea, vomiting, jaundice, abdominal pain, dysphagia  : Denies dysuria, nocturia, hematuria  Neuro: Denies tinnitus, headache, visual changes, weakness, dizziness or vertigo  Musculoskeletal: Denies neck pain back pain joint pain.  Skin: Denies rash, itching, dryness.  Endocrine: Denies polydipsia, polyuria  Psychiatric: Denies depression, anxiety      PHYSICAL EXAM:  Vital Signs Last 24 Hrs  T(C): 36.7 (26 May 2018 04:34), Max: 37.2 (25 May 2018 20:35)  T(F): 98.1 (26 May 2018 04:34), Max: 98.9 (25 May 2018 20:35)  HR: 91 (26 May 2018 04:34) (76 - 91)  BP: 159/80 (26 May 2018 04:34) (133/70 - 159/80)  BP(mean): --  RR: 18 (26 May 2018 04:34) (18 - 18)  SpO2: 92% (26 May 2018 04:34) (92% - 96%)  Daily     Daily   I&O's Summary    24 May 2018 07:01  -  25 May 2018 07:00  --------------------------------------------------------  IN: 1140 mL / OUT: 0 mL / NET: 1140 mL    25 May 2018 07:01  -  26 May 2018 06:45  --------------------------------------------------------  IN: 440 mL / OUT: 0 mL / NET: 440 mL        General Appearance: 	 Alert, cooperative, no distress  HEENT: normocephalic, atraumatic, PERRLA, EOMI, conjunctiva normal, sclera anicteric,   Neck: no JVD,  carotid 2+  bilaterally without bruits, thyroid normal to inspection and palpation, no adenopathy, trachea midline  Lungs:  Decreased BS bases  Cor:  pmi 5th ICS MCL, regular rate and rhythm, S1 normal intensity, S2 normal intensity, no gallops, murmurs or rubs  Abdomen:	 soft, non-tender; bowel sounds normal; no masses,  no organomegaly  Extremities: without cyanosis, clubbing or edema  Vasc: 2-+ PT and DP pulses; no varicosities  Neurologic: A&O x 3 (time, place, person). Symmetric strength; limited exam  Musculoskeletal: no kyphosis, scoliosis; normal gait, normal tone  Skin: no rashes; limited exam      Labs:  CBC Full  -  ( 25 May 2018 07:49 )  WBC Count : 8.26 K/uL  Hemoglobin : 8.8 g/dL  Hematocrit : 28.7 %  Platelet Count - Automated : 391 K/uL  Mean Cell Volume : 86.2 fl  Mean Cell Hemoglobin : 26.4 pg  Mean Cell Hemoglobin Concentration : 30.7 gm/dL  Auto Neutrophil # : x  Auto Lymphocyte # : x  Auto Monocyte # : x  Auto Eosinophil # : x  Auto Basophil # : x  Auto Neutrophil % : x  Auto Lymphocyte % : x  Auto Monocyte % : x  Auto Eosinophil % : x  Auto Basophil % : x        Impression/Plan:Patient with acute on chronic CHF,MS,MR,pulmonary HTN,PAF,HTN,anemia, and WEN.Doing well,cardiopulmonary stable.Continue ASA,eliquis,lasix,cozaar,Toprol.No pleural tap,effusions small.OOB,ambulation.D/C planning,F/U Dr. Oconnell 1 week.        Donald Sotelo MD, [facility]  [facility]
Patient is a 82y old  Female who presents with a chief complaint of dyspnea (23 May 2018 16:21)      SUBJECTIVE / OVERNIGHT EVENTS:    MEDICATIONS  (STANDING):  amitriptyline 25 milliGRAM(s) Oral at bedtime  aspirin  chewable 81 milliGRAM(s) Oral daily  cholecalciferol 1000 Unit(s) Oral daily  cholestyramine Powder (Sugar-Free) 4 Gram(s) Oral two times a day  cyanocobalamin 100 MICROGram(s) Oral daily  docusate sodium 100 milliGRAM(s) Oral two times a day  furosemide    Tablet 40 milliGRAM(s) Oral daily  losartan 25 milliGRAM(s) Oral daily  metoprolol succinate  milliGRAM(s) Oral daily  pantoprazole    Tablet 40 milliGRAM(s) Oral before breakfast  senna 2 Tablet(s) Oral at bedtime    MEDICATIONS  (PRN):  oxyCODONE    IR 5 milliGRAM(s) Oral every 6 hours PRN Moderate Pain (4 - 6)  polyethylene glycol 3350 17 Gram(s) Oral daily PRN Constipation      Vital Signs Last 24 Hrs  T(F): 97.7 (05-24-18 @ 11:26), Max: 98.5 (05-23-18 @ 18:18)  HR: 69 (05-24-18 @ 11:26) (69 - 84)  BP: 132/74 (05-24-18 @ 11:26) (125/77 - 154/76)  RR: 17 (05-24-18 @ 11:26) (17 - 20)  SpO2: 98% (05-24-18 @ 11:26) (98% - 100%)  Telemetry:   CAPILLARY BLOOD GLUCOSE        I&O's Summary    23 May 2018 07:01  -  24 May 2018 07:00  --------------------------------------------------------  IN: 240 mL / OUT: 0 mL / NET: 240 mL    24 May 2018 07:01  -  24 May 2018 13:35  --------------------------------------------------------  IN: 660 mL / OUT: 0 mL / NET: 660 mL        PHYSICAL EXAM:  GENERAL: NAD, well-developed  HEAD:  Atraumatic, Normocephalic  EYES: EOMI, PERRLA, conjunctiva and sclera clear  NECK: Supple, No JVD  CHEST/LUNG: Clear to auscultation bilaterally; No wheeze  HEART: Regular rate and rhythm; No murmurs, rubs, or gallops  ABDOMEN: Soft, Nontender, Nondistended; Bowel sounds present  EXTREMITIES:  2+ Peripheral Pulses, No clubbing, cyanosis, or edema  PSYCH: AAOx3  NEUROLOGY: non-focal  SKIN: No rashes or lesions    LABS:                        8.5    7.36  )-----------( 346      ( 24 May 2018 07:56 )             27.9     05-24    141  |  102  |  14  ----------------------------<  99  3.7   |  29  |  1.35<H>    Ca    9.0      24 May 2018 06:40    TPro  7.1  /  Alb  3.8  /  TBili  0.3  /  DBili  x   /  AST  38  /  ALT  61<H>  /  AlkPhos  168<H>  05-23    PT/INR - ( 23 May 2018 11:26 )   PT: 11.7 sec;   INR: 1.08 ratio         PTT - ( 23 May 2018 11:26 )  PTT:30.5 sec  CARDIAC MARKERS ( 24 May 2018 06:40 )  x     / <0.01 ng/mL / x     / x     / x      CARDIAC MARKERS ( 23 May 2018 22:14 )  x     / <0.01 ng/mL / 56 U/L / x     / 1.0 ng/mL  CARDIAC MARKERS ( 23 May 2018 11:26 )  x     / <0.01 ng/mL / x     / x     / 1.3 ng/mL          RADIOLOGY & ADDITIONAL TESTS:    Imaging Personally Reviewed:    Consultant(s) Notes Reviewed:      Care Discussed with Consultants/Other Providers:
Patient is a 82y old  Female who presents with a chief complaint of dyspnea (23 May 2018 16:21)    She feels much improved. She denies c/o chest pain or palpitations. Less SOB.     Allergies    &quot;VASELINE BASED OINTMENTS&quot; (Urticaria; Rash)  adhesives (Urticaria; Rash)  statins (Unknown)    Intolerances      MEDICATIONS  (STANDING):  amitriptyline 25 milliGRAM(s) Oral at bedtime  aspirin  chewable 81 milliGRAM(s) Oral daily  cholecalciferol 1000 Unit(s) Oral daily  cholestyramine Powder (Sugar-Free) 4 Gram(s) Oral two times a day  cyanocobalamin 100 MICROGram(s) Oral daily  docusate sodium 100 milliGRAM(s) Oral two times a day  furosemide    Tablet 40 milliGRAM(s) Oral daily  losartan 25 milliGRAM(s) Oral daily  metoprolol succinate  milliGRAM(s) Oral daily  pantoprazole    Tablet 40 milliGRAM(s) Oral before breakfast  senna 2 Tablet(s) Oral at bedtime    MEDICATIONS  (PRN):  oxyCODONE    IR 5 milliGRAM(s) Oral every 6 hours PRN Moderate Pain (4 - 6)  polyethylene glycol 3350 17 Gram(s) Oral daily PRN Constipation      PHYSICAL EXAM:  Vital Signs Last 24 Hrs  T(C): 36.9 (25 May 2018 04:36), Max: 37.3 (24 May 2018 20:46)  T(F): 98.4 (25 May 2018 04:36), Max: 99.1 (24 May 2018 20:46)  HR: 85 (25 May 2018 04:36) (69 - 85)  BP: 147/63 (25 May 2018 04:36) (132/74 - 147/63)  BP(mean): --  RR: 18 (25 May 2018 04:36) (17 - 18)  SpO2: 95% (25 May 2018 04:36) (95% - 98%)  Daily     Daily Weight in k.2 (24 May 2018 07:22)  I&O's Summary    23 May 2018 07:01  -  24 May 2018 07:00  --------------------------------------------------------  IN: 240 mL / OUT: 0 mL / NET: 240 mL    24 May 2018 07:01  -  25 May 2018 06:49  --------------------------------------------------------  IN: 1140 mL / OUT: 0 mL / NET: 1140 mL    General Appearance: 	 Alert, cooperative, no distress  HEENT: normocephalic, atraumatic  Neck: no JVD,  carotid 2+  bilaterally without bruits  Lungs:  decreased BS bases bilaterally; improved  Cor:  pmi 5th ICS MCL, regular rate and rhythm, S1 normal intensity, S2 normal intensity, no gallops, Grade I/VI CALVIN ULSB and apex  Abdomen: soft, non-tender; bowel sounds normal; no masses,  no organomegaly  Extremities: without cyanosis, clubbing or edema  Vasc: 2-+ PT and DP pulses; LE varicosities  Neurologic: A&O x 3 (time, place, person). Symmetric strength; limited exam  Skin: no rashes; limited exam      Labs:  CBC Full  -  ( 24 May 2018 07:56 )  WBC Count : 7.36 K/uL  Hemoglobin : 8.5 g/dL  Hematocrit : 27.9 %  Platelet Count - Automated : 346 K/uL  Mean Cell Volume : 87.2 fl  Mean Cell Hemoglobin : 26.6 pg  Mean Cell Hemoglobin Concentration : 30.5 gm/dL  Auto Neutrophil # : x  Auto Lymphocyte # : x  Auto Monocyte # : x  Auto Eosinophil # : x  Auto Basophil # : x  Auto Neutrophil % : x  Auto Lymphocyte % : x  Auto Monocyte % : x  Auto Eosinophil % : x  Auto Basophil % : x    -    142  |  100  |  14  ----------------------------<  104<H>  4.0   |  30  |  1.46<H>    Ca    9.3      25 May 2018 06:01    TPro  7.1  /  Alb  3.8  /  TBili  0.3  /  DBili  x   /  AST  38  /  ALT  61<H>  /  AlkPhos  168<H>  05-23    CARDIAC MARKERS ( 24 May 2018 06:40 )  x     / <0.01 ng/mL / x     / x     / x      CARDIAC MARKERS ( 23 May 2018 22:14 )  x     / <0.01 ng/mL / 56 U/L / x     / 1.0 ng/mL  CARDIAC MARKERS ( 23 May 2018 11:26 )  x     / <0.01 ng/mL / x     / x     / 1.3 ng/mL      PT/INR - ( 23 May 2018 11:26 )   PT: 11.7 sec;   INR: 1.08 ratio         PTT - ( 23 May 2018 11:26 )  PTT:30.5 sec                      Edward [facility]connell MD [facility]  [phone]
[facility] - [facility]     PROGRESS NOTE    CHIEF COMPLAINT: dyspnea; hypoxemia; pleural effusions; atelectasis; mitral valve stenosis/regurgitation    INTERVAL HISTORY: breathing comfortably at rest and with exertion following brisk diuresis; mild cough without sputum production, chest congestion or wheeze; no fevers, chills or sweats; no chest pain/pressure or palpitations; no significant hypotension or hypertension; renal function stable    REVIEW OF SYSTEMS:  Constitutional: As per interval history  HEENT: Within normal limits  CV: As per interval history  Resp: As per interval history  GI: Within normal limits   : Within normal limits  Musculoskeletal: Within normal limits  Skin: Within normal limits  Neurological: Within normal limits  Psychiatric: Within normal limits  Endocrine: Within normal limits  Hematologic/Lymphatic: Within normal limits  Allergic/Immunologic: Within normal limits      MEDICATIONS:     Pulmonary "      Anti-microbials:      Cardiovascular:  furosemide    Tablet 40 milliGRAM(s) Oral daily  losartan 50 milliGRAM(s) Oral daily  metoprolol succinate  milliGRAM(s) Oral daily      Other:  amitriptyline 25 milliGRAM(s) Oral at bedtime  aspirin  chewable 81 milliGRAM(s) Oral daily  cholecalciferol 1000 Unit(s) Oral daily  cholestyramine Powder (Sugar-Free) 4 Gram(s) Oral two times a day  cyanocobalamin 100 MICROGram(s) Oral daily  docusate sodium 100 milliGRAM(s) Oral two times a day  oxyCODONE    IR 5 milliGRAM(s) Oral every 6 hours PRN  pantoprazole    Tablet 40 milliGRAM(s) Oral before breakfast  polyethylene glycol 3350 17 Gram(s) Oral daily PRN  senna 2 Tablet(s) Oral at bedtime        OBJECTIVE:    I&O's Detail    24 May 2018 07:01  -  25 May 2018 07:00  --------------------------------------------------------  IN:    Oral Fluid: 1140 mL  Total IN: 1140 mL    OUT:  Total OUT: 0 mL    Total NET: 1140 mL      25 May 2018 07:01  -  25 May 2018 12:59  --------------------------------------------------------  IN:    Oral Fluid: 120 mL  Total IN: 120 mL    OUT:  Total OUT: 0 mL    Total NET: 120 mL    PHYSICAL EXAM:       ICU Vital Signs Last 24 Hrs  T(C): 36.8 (25 May 2018 11:32), Max: 37.3 (24 May 2018 20:46)  T(F): 98.2 (25 May 2018 11:32), Max: 99.1 (24 May 2018 20:46)  HR: 76 (25 May 2018 11:32) (76 - 85)  BP: 154/61 (25 May 2018 11:32) (145/70 - 154/61)  BP(mean): --  ABP: --  ABP(mean): --  RR: 18 (25 May 2018 11:32) (18 - 18)  SpO2: 95% (25 May 2018 11:32) (95% - 97%) on room air     General: Awake. Alert. Cooperative. No distress. Appears stated age 	  HEENT:   Atraumatic. Normocephalic. Anicteric. Normal oral mucosa, PERRL, EOMI  Neck: Supple. Trachea midline. Thyroid without enlargement/tenderness/nodules. No carotid bruit. No JVD	  Cardiovascular: Regular rate and rhythm. S1 S2 normal. III/VI systolic murmur  Respiratory: Respirations unlabored. Decreased breath sounds at the bases with dullness to percussion  Abdomen: Soft. Non-tender. Non-distended. No organomegaly. No masses. Normal bowel sounds	  Extremities: Warm to touch. No clubbing or cyanosis. No pedal edema.  Pulses: 2+ peripheral pulses all extremities	  Skin: Normal skin color. No rashes or lesions. No ecchymoses, No cyanosis. Warm to touch  Lymph Nodes: Cervical, supraclavicular and axillary nodes normal  Neurological: Motor and sensory examination equal and normal. A and O x 3  Psychiatry: Appropriate mood and affect.      LABS:                        8.8    8.26  )-----------( 391      ( 25 May 2018 07:49 )             28.7                         8.5    7.36  )-----------( 346      ( 24 May 2018 07:56 )             27.9     05-25    142  |  100  |  14  ----------------------------<  104<H>  4.0   |  30  |  1.46<H>    05-24    141  |  102  |  14  ----------------------------<  99  3.7   |  29  |  1.35<H>    Ca      9.3      05-25    Ca      9.0      05-24    Phos    1.6     05-22      Mg       1.9     05-22    TPro  7.1  /  Alb  3.8  /  TBili  0.3  /  DBili  x   /  AST  38  /  ALT  61<H>  /  AlkPhos  168<H>  05-23    Serum Pro-Brain Natriuretic Peptide: 4235 pg/mL (05-23 @ 11:26)    CARDIAC MARKERS ( 24 May 2018 06:40 )  x     / <0.01 ng/mL / x     / x     / x      CARDIAC MARKERS ( 23 May 2018 22:14 )  x     / <0.01 ng/mL / 56 U/L / x     / 1.0 ng/mL  CARDIAC MARKERS ( 23 May 2018 11:26 )  x     / <0.01 ng/mL / x     / x     / 1.3 ng/mL    < from: Transthoracic Echocardiogram (03.14.18 @ 12:01) >    Patient name: MORENITA ROSAS  YOB: 1935   Age: 82 (F)   MR#: [id number]  Study Date: 3/14/2018  Location: [address] [place], [place] [id number]: Digna Merino RDCS  Study quality: Technically fair  Referring Physician: Daniel Alonso MD  Blood Pressure: 133/76 mmHg  Height: 157 cm  Weight: 71 kg  BSA: 1.7 m2  ------------------------------------------------------------------------  PROCEDURE: Transthoracic echocardiogram with 2-D, M-Mode  and complete spectral and color flow Doppler.  INDICATION: Unspecified atrial fibrillation (I48.91)  ------------------------------------------------------------------------  Dimensions:    Normal Values:  LA:     3.9    2.0 - 4.0 cm  Ao:     2.6    2.0 - 3.8 cm  SEPTUM: 1.0    0.6 - 1.2 cm  PWT: 1.0    0.6 - 1.1 cm  LVIDd:  4.4    3.0 - 5.6 cm  LVIDs:  3.0    1.8 - 4.0 cm  Derived variables:  LVMI: 86 g/m2  RWT: 0.45  Fractional short: 32 %  EF (Visual Estimate): 65 %  EF (Teicholtz): 60 %  Doppler Peak Velocity (m/sec): MV=2.0 AoV=1.7 PV=1.0  ------------------------------------------------------------------------  Observations:  Mitral Valve: Mitral annular calcification and calcified  mitral leaflets with decreased diastolic opening. Mild  mitral regurgitation.  Peak mitral valve gradient equals 16  mm Hg, mean transmitral valve gradient equals 5 mm Hg,  consistent with moderate mitral stenosis.  Aortic Valve/Aorta: Calcified trileaflet aortic valve with  decreased opening. Peak transaortic valve gradient equals  12 mm Hg, mean transaortic valve gradient equals 6 mm Hg,  estimated aortic valve area equals 1.8 sqcm (by continuity  equation), aortic valve velocity time integral equals 34  cm, consistent with mild aortic stenosis. Moderate aortic  regurgitation. Peak left ventricular outflow tract gradient  equals 6 mm Hg, mean gradient is equal to 4 mm Hg, LVOT  velocity time integral equals 25 cm.  Normal aortic root (Ao: 2.6 cm at the sinuses of Valsalva).  Left Atrium: Moderately dilated left atrium.  LA volume  index = 42 cc/m2.  Left Ventricle: Normal left ventricular systolic function.  No segmental wall motion abnormalities. Normal left  ventricular internal dimensions and wall thicknesses.  Right Heart: Normal right atrium. Right ventricular  enlargement with normal right ventricular systolic  function. Normal tricuspid valve. Moderate tricuspid  regurgitation. Normal pulmonic valve.  Pericardium/Pleura: Normal pericardium with no pericardial  effusion.  Hemodynamic: Estimated right atrial pressure is 8 mm Hg.  Estimated right ventricular systolic pressure equals 57 mm  Hg, assuming right atrial pressure equals 8 mm Hg,  consistent with moderate pulmonary hypertension.  ------------------------------------------------------------------------  Conclusions:  1. Calcified trileaflet aortic valve with decreased  opening. Peak transaortic valve gradient equals 12 mm Hg,  mean transaortic valve gradient equals 6 mm Hg, estimated  aortic valve area equals 1.8 sqcm (by continuity equation),  aortic valve velocity time integral equals 34 cm,  consistent with mild aortic stenosis. Moderate aortic  regurgitation.  2. Normal left ventricular internal dimensions and wall  thicknesses.  3. Normal left ventricular systolic function. No segmental  wall motion abnormalities.  4. Right ventricular enlargement with normal right  ventricular systolic function.  5. Estimated pulmonary artery systolic pressure equals 57  mm Hg, assuming right atrial pressure equals 8 mm Hg,  consistent with moderate pulmonary pressures.  ------------------------------------------------------------------------  Confirmed on  3/14/2018 - 15:16:04 by Haritha Jordan M.D.  ------------------------------------------------------------------------    < end of copied text >  ---------------------------------------------------------------------------------------------------------      MICROBIOLOGY:           RADIOLOGY:  [x] Chest radiographs reviewed and interpreted by me    < from: Xray Chest 1 View- PORTABLE-Routine (05.24.18 @ 09:20) >    EXAM:  XR CHEST PORTABLE ROUTINE 1V                            PROCEDURE DATE:  05/24/2018      INTERPRETATION:  INDICATION: Congestive heart failure and shortness of   breath which is mild.    COMPARISON: Radiograph 5/23/2018 at 1149 hours.    FINDINGS:    Lines and Tubes: None.    Lungs: Bilateral atelectasis is unchanged.    Pleura: Bilateral pleural effusions are unchanged.    Heart and Mediastinum: Cardiomegaly.  Mitral valve annuloplasty ring.    Skeletal: Sternotomy.    IMPRESSION:    1.  No change in the bilateral pleural effusions.    XAVIER PATTERSON M.D., ATTENDING RADIOLOGIST  This document has been electronically signed. May 24 2018 12:21PM      < end of copied text >  ---------------------------------------------------------------------------------------------------------  < from: CT Angio Chest w/ IV Cont (05.23.18 @ 13:21) >    EXAM:  CT ANGIO CHEST (W)AW IC                          PROCEDURE DATE:  05/23/2018      INTERPRETATION:  Clinical indications: Shortness of breath.    CT pulmonary angiogram was performed following uncomplicated intravenous   administration of 68 cc of Omnipaque-350. 32 cc of contrast was   discarded. MIP images are submitted.    There are no pulmonary arterial emboli.    There are no pathologically enlarged bilateral axillary lymph nodes.   There are multiple nonspecific subcentimetermediastinal lymph nodes as   on the prior CT of May 15, 2018. The patient is status post mitral valve   repair. Heart size is enlarged. There is no pericardial effusion.   Coronary artery calcifications are noted. There is atherosclerotic   disease of the aorta.    Evaluation of the upper abdominal organs demonstrate trace amount of   perihepatic ascites new since the prior study. The patient is status post   anterior abdominal wall surgery with partially imaged postoperative   changes.    There are small to moderate-sized bilateral pleural effusions, right   greater than left with interval increase in size since the prior study.    Evaluation of the lungs demonstrate bibasilar areas of compressive or   subsegmental atelectasis. There is linear atelectasis within the right   middle lobe. There is a 2 mm nodule associated with the upper aspect of   the right major fissure as on the prior study likely a lymph node. Right   apical atelectasis and/or scarring is noted. There are no central   endobronchial lesions.    Sternotomy wires are noted. There is severe compression fracture   deformity of the T10 vertebral body as on the prior CT of May 15, 2018   although it demonstrates interval progression since the prior spinal MRI   of November 27, 2016.    IMPRESSION: Small to moderate-sized bilateral pleural effusions with   bibasilar atelectasis with overall interval progression since May 15,   2018.    No pulmonary arterial emboli.    Severe compression fracture deformity of the T10 vertebral body as on May   15, 2018.    JULIO CESAR BERKOWITZ M.D. ATTENDING RADIOLOGIST  This document has been electronically signed. May 23 2018  1:55PM      < end of copied text >    ---------------------------------------------------------------------------------------------------------
[facility] - [facility]     PROGRESS NOTE    CHIEF COMPLAINT: dyspnea; hypoxemia; pleural effusions; atelectasis; mitral valve stenosis/regurgitation    INTERVAL HISTORY: breathing comfortably at rest and with exertion following brisk diuresis; thoracenteses not performed to the small amount of pleural fluid with atelectatic lung within the effusion; minimal cough without sputum production, chest congestion or wheeze; no fevers, chills or sweats; no chest pain/pressure or palpitations; no significant hypotension or hypertension; renal function stable; eager to go home    REVIEW OF SYSTEMS:  Constitutional: As per interval history  HEENT: Within normal limits  CV: As per interval history  Resp: As per interval history  GI: Within normal limits   : Within normal limits  Musculoskeletal: Within normal limits  Skin: Within normal limits  Neurological: Within normal limits  Psychiatric: Within normal limits  Endocrine: Within normal limits  Hematologic/Lymphatic: Within normal limits  Allergic/Immunologic: Within normal limits    MEDICATIONS:     Pulmonary "      Anti-microbials:      Cardiovascular:  furosemide    Tablet 40 milliGRAM(s) Oral daily  losartan 50 milliGRAM(s) Oral daily  metoprolol succinate  milliGRAM(s) Oral daily      Other:  amitriptyline 25 milliGRAM(s) Oral at bedtime  apixaban 5 milliGRAM(s) Oral every 12 hours  aspirin  chewable 81 milliGRAM(s) Oral daily  cholecalciferol 1000 Unit(s) Oral daily  cholestyramine Powder (Sugar-Free) 4 Gram(s) Oral two times a day  cyanocobalamin 100 MICROGram(s) Oral daily  docusate sodium 100 milliGRAM(s) Oral two times a day  oxyCODONE    IR 5 milliGRAM(s) Oral every 6 hours PRN  pantoprazole    Tablet 40 milliGRAM(s) Oral before breakfast  polyethylene glycol 3350 17 Gram(s) Oral daily PRN  senna 2 Tablet(s) Oral at bedtime        OBJECTIVE:    I&O's Detail    25 May 2018 07:01  -  26 May 2018 07:00  --------------------------------------------------------  IN:    Oral Fluid: 440 mL  Total IN: 440 mL    OUT:  Total OUT: 0 mL    Total NET: 440 mL    Daily Weight in k.2 (26 May 2018 08:26)    PHYSICAL EXAM:       ICU Vital Signs Last 24 Hrs  T(C): 36.7 (26 May 2018 04:34), Max: 37.2 (25 May 2018 20:35)  T(F): 98.1 (26 May 2018 04:34), Max: 98.9 (25 May 2018 20:35)  HR: 91 (26 May 2018 04:34) (76 - 91)  BP: 159/80 (26 May 2018 04:34) (133/70 - 159/80)  BP(mean): --  ABP: --  ABP(mean): --  RR: 18 (26 May 2018 04:34) (18 - 18)  SpO2: 92% (26 May 2018 04:34) (92% - 96%) on room air    General: Awake. Alert. Cooperative. No distress. Appears stated age 	  HEENT:   Atraumatic. Normocephalic. Anicteric. Normal oral mucosa, PERRL, EOMI  Neck: Supple. Trachea midline. Thyroid without enlargement/tenderness/nodules. No carotid bruit. No JVD	  Cardiovascular: Regular rate and rhythm. S1 S2 normal. III/VI systolic murmur  Respiratory: Respirations unlabored. Decreased breath sounds at the bases with dullness to percussion  Abdomen: Soft. Non-tender. Non-distended. No organomegaly. No masses. Normal bowel sounds	  Extremities: Warm to touch. No clubbing or cyanosis. No pedal edema.  Pulses: 2+ peripheral pulses all extremities	  Skin: Normal skin color. No rashes or lesions. No ecchymoses, No cyanosis. Warm to touch  Lymph Nodes: Cervical, supraclavicular and axillary nodes normal  Neurological: Motor and sensory examination equal and normal. A and O x 3  Psychiatry: Appropriate mood and affect.      LABS:                        8.6    9.50  )-----------( 426      ( 26 May 2018 07:56 )             26.5                         8.8    8.26  )-----------( 391      ( 25 May 2018 07:49 )             28.7     05-    142  |  101  |  12  ----------------------------<  100<H>  3.6   |  30  |  1.33<H>    05    142  |  100  |  14  ----------------------------<  104<H>  4.0   |  30  |  1.46<H>    Ca      8.8          Ca      9.3          Phos    1.6           Mg       1.9         TPro  7.1  /  Alb  3.8  /  TBili  0.3  /  DBili  x   /  AST  38  /  ALT  61<H>  /  AlkPhos  168<H>      Serum Pro-Brain Natriuretic Peptide: 4235 pg/mL ( @ 11:26)    CARDIAC MARKERS ( 24 May 2018 06:40 )  x     / <0.01 ng/mL / x     / x     / x      CARDIAC MARKERS ( 23 May 2018 22:14 )  x     / <0.01 ng/mL / 56 U/L / x     / 1.0 ng/mL  CARDIAC MARKERS ( 23 May 2018 11:26 )  x     / <0.01 ng/mL / x     / x     / 1.3 ng/mL    < from: Transthoracic Echocardiogram (18 @ 12:01) >    Patient name: MORENITA ROSAS  YOB: 1935   Age: 82 (F)   MR#: [id number]  Study Date: 3/14/2018  Location: [address] [place], [place] [id number]: Digna Merino RDCS  Study quality: Technically fair  Referring Physician: Daniel Alonso MD  Blood Pressure: 133/76 mmHg  Height: 157 cm  Weight: 71 kg  BSA: 1.7 m2  ------------------------------------------------------------------------  PROCEDURE: Transthoracic echocardiogram with 2-D, M-Mode  and complete spectral and color flow Doppler.  INDICATION: Unspecified atrial fibrillation (I48.91)  ------------------------------------------------------------------------  Dimensions:    Normal Values:  LA:     3.9    2.0 - 4.0 cm  Ao:     2.6    2.0 - 3.8 cm  SEPTUM: 1.0    0.6 - 1.2 cm  PWT: 1.0    0.6 - 1.1 cm  LVIDd:  4.4    3.0 - 5.6 cm  LVIDs:  3.0    1.8 - 4.0 cm  Derived variables:  LVMI: 86 g/m2  RWT: 0.45  Fractional short: 32 %  EF (Visual Estimate): 65 %  EF (Teicholtz): 60 %  Doppler Peak Velocity (m/sec): MV=2.0 AoV=1.7 PV=1.0  ------------------------------------------------------------------------  Observations:  Mitral Valve: Mitral annular calcification and calcified  mitral leaflets with decreased diastolic opening. Mild  mitral regurgitation.  Peak mitral valve gradient equals 16  mm Hg, mean transmitral valve gradient equals 5 mm Hg,  consistent with moderate mitral stenosis.  Aortic Valve/Aorta: Calcified trileaflet aortic valve with  decreased opening. Peak transaortic valve gradient equals  12 mm Hg, mean transaortic valve gradient equals 6 mm Hg,  estimated aortic valve area equals 1.8 sqcm (by continuity  equation), aortic valve velocity time integral equals 34  cm, consistent with mild aortic stenosis. Moderate aortic  regurgitation. Peak left ventricular outflow tract gradient  equals 6 mm Hg, mean gradient is equal to 4 mm Hg, LVOT  velocity time integral equals 25 cm.  Normal aortic root (Ao: 2.6 cm at the sinuses of Valsalva).  Left Atrium: Moderately dilated left atrium.  LA volume  index = 42 cc/m2.  Left Ventricle: Normal left ventricular systolic function.  No segmental wall motion abnormalities. Normal left  ventricular internal dimensions and wall thicknesses.  Right Heart: Normal right atrium. Right ventricular  enlargement with normal right ventricular systolic  function. Normal tricuspid valve. Moderate tricuspid  regurgitation. Normal pulmonic valve.  Pericardium/Pleura: Normal pericardium with no pericardial  effusion.  Hemodynamic: Estimated right atrial pressure is 8 mm Hg.  Estimated right ventricular systolic pressure equals 57 mm  Hg, assuming right atrial pressure equals 8 mm Hg,  consistent with moderate pulmonary hypertension.  ------------------------------------------------------------------------  Conclusions:  1. Calcified trileaflet aortic valve with decreased  opening. Peak transaortic valve gradient equals 12 mm Hg,  mean transaortic valve gradient equals 6 mm Hg, estimated  aortic valve area equals 1.8 sqcm (by continuity equation),  aortic valve velocity time integral equals 34 cm,  consistent with mild aortic stenosis. Moderate aortic  regurgitation.  2. Normal left ventricular internal dimensions and wall  thicknesses.  3. Normal left ventricular systolic function. No segmental  wall motion abnormalities.  4. Right ventricular enlargement with normal right  ventricular systolic function.  5. Estimated pulmonary artery systolic pressure equals 57  mm Hg, assuming right atrial pressure equals 8 mm Hg,  consistent with moderate pulmonary pressures.  ------------------------------------------------------------------------  Confirmed on  3/14/2018 - 15:16:04 by Haritha Jordan M.D.  ------------------------------------------------------------------------    < end of copied text >  ---------------------------------------------------------------------------------------------------------      MICROBIOLOGY:     RADIOLOGY:  [x] Chest radiographs reviewed and interpreted by me    < from: US Chest (18 @ 16:25) >    EXAM:  US CHEST                            PROCEDURE DATE:  2018      INTERPRETATION:  Chest ultrasound    Clinical information: Assess for pleural effusions.    Findings: Small bilateral pleural effusions are noted.     IMPRESSION:    Small bilateral pleural effusions.     ANDREW PATTERSON M.D., ATTENDING RADIOLOGIST  This document has been electronically signed. May 25 2018  4:28PM      < end of copied text >  ---------------------------------------------------------------------------------------------------------  < from: Xray Chest 1 View- PORTABLE-Routine (18 @ 09:20) >    EXAM:  XR CHEST PORTABLE ROUTINE 1V                            PROCEDURE DATE:  2018      INTERPRETATION:  INDICATION: Congestive heart failure and shortness of   breath which is mild.    COMPARISON: Radiograph 2018 at 1149 hours.    FINDINGS:    Lines and Tubes: None.    Lungs: Bilateral atelectasis is unchanged.    Pleura: Bilateral pleural effusions are unchanged.    Heart and Mediastinum: Cardiomegaly.  Mitral valve annuloplasty ring.    Skeletal: Sternotomy.    IMPRESSION:    1.  No change in the bilateral pleural effusions.    XAVIER PATTERSON M.D., ATTENDING RADIOLOGIST  This document has been electronically signed. May 24 2018 12:21PM      < end of copied text >  ---------------------------------------------------------------------------------------------------------  < from: CT Angio Chest w/ IV Cont (18 @ 13:21) >    EXAM:  CT ANGIO CHEST (W)AW IC                          PROCEDURE DATE:  2018      INTERPRETATION:  Clinical indications: Shortness of breath.    CT pulmonary angiogram was performed following uncomplicated intravenous   administration of 68 cc of Omnipaque-350. 32 cc of contrast was   discarded. MIP images are submitted.    There are no pulmonary arterial emboli.    There are no pathologically enlarged bilateral axillary lymph nodes.   There are multiple nonspecific subcentimetermediastinal lymph nodes as   on the prior CT of May 15, 2018. The patient is status post mitral valve   repair. Heart size is enlarged. There is no pericardial effusion.   Coronary artery calcifications are noted. There is atherosclerotic   disease of the aorta.    Evaluation of the upper abdominal organs demonstrate trace amount of   perihepatic ascites new since the prior study. The patient is status post   anterior abdominal wall surgery with partially imaged postoperative   changes.    There are small to moderate-sized bilateral pleural effusions, right   greater than left with interval increase in size since the prior study.    Evaluation of the lungs demonstrate bibasilar areas of compressive or   subsegmental atelectasis. There is linear atelectasis within the right   middle lobe. There is a 2 mm nodule associated with the upper aspect of   the right major fissure as on the prior study likely a lymph node. Right   apical atelectasis and/or scarring is noted. There are no central   endobronchial lesions.    Sternotomy wires are noted. There is severe compression fracture   deformity of the T10 vertebral body as on the prior CT of May 15, 2018   although it demonstrates interval progression since the prior spinal MRI   of 2016.    IMPRESSION: Small to moderate-sized bilateral pleural effusions with   bibasilar atelectasis with overall interval progression since May 15,   2018.    No pulmonary arterial emboli.    Severe compression fracture deformity of the T10 vertebral body as on May   15, 2018.    JULIO CESAR BERKOWITZ M.D. ATTENDING RADIOLOGIST  This document has been electronically signed. May 23 2018  1:55PM      < end of copied text >    ---------------------------------------------------------------------------------------------------------
Patient is a 82y old  Female who presents with a chief complaint of dyspnea (23 May 2018 16:21)      SUBJECTIVE / OVERNIGHT EVENTS: feels well, ambulatory, off O2, pulm US reveals pl eff not big enough to tap    MEDICATIONS  (STANDING):  amitriptyline 25 milliGRAM(s) Oral at bedtime  aspirin  chewable 81 milliGRAM(s) Oral daily  cholecalciferol 1000 Unit(s) Oral daily  cholestyramine Powder (Sugar-Free) 4 Gram(s) Oral two times a day  cyanocobalamin 100 MICROGram(s) Oral daily  docusate sodium 100 milliGRAM(s) Oral two times a day  furosemide    Tablet 40 milliGRAM(s) Oral daily  losartan 50 milliGRAM(s) Oral daily  metoprolol succinate  milliGRAM(s) Oral daily  pantoprazole    Tablet 40 milliGRAM(s) Oral before breakfast  senna 2 Tablet(s) Oral at bedtime    MEDICATIONS  (PRN):  oxyCODONE    IR 5 milliGRAM(s) Oral every 6 hours PRN Moderate Pain (4 - 6)  polyethylene glycol 3350 17 Gram(s) Oral daily PRN Constipation      Vital Signs Last 24 Hrs  T(F): 98.2 (05-25-18 @ 11:32), Max: 99.1 (05-24-18 @ 20:46)  HR: 76 (05-25-18 @ 11:32) (76 - 85)  BP: 154/61 (05-25-18 @ 11:32) (145/70 - 154/61)  RR: 18 (05-25-18 @ 11:32) (18 - 18)  SpO2: 95% (05-25-18 @ 11:32) (95% - 97%)  Telemetry:   CAPILLARY BLOOD GLUCOSE        I&O's Summary    24 May 2018 07:01  -  25 May 2018 07:00  --------------------------------------------------------  IN: 1140 mL / OUT: 0 mL / NET: 1140 mL    25 May 2018 07:01  -  25 May 2018 17:04  --------------------------------------------------------  IN: 120 mL / OUT: 0 mL / NET: 120 mL        PHYSICAL EXAM:  GENERAL: NAD, well-developed  HEAD:  Atraumatic, Normocephalic  EYES: EOMI, PERRLA, conjunctiva and sclera clear  NECK: Supple, No JVD  CHEST/LUNG: Clear to auscultation bilaterally; No wheeze  HEART: Regular rate and rhythm; No murmurs, rubs, or gallops  ABDOMEN: Soft, Nontender, Nondistended; Bowel sounds present  EXTREMITIES:  2+ Peripheral Pulses, No clubbing, cyanosis, or edema  PSYCH: AAOx3  NEUROLOGY: non-focal  SKIN: No rashes or lesions    LABS:                        8.8    8.26  )-----------( 391      ( 25 May 2018 07:49 )             28.7     05-25    142  |  100  |  14  ----------------------------<  104<H>  4.0   |  30  |  1.46<H>    Ca    9.3      25 May 2018 06:01        CARDIAC MARKERS ( 24 May 2018 06:40 )  x     / <0.01 ng/mL / x     / x     / x      CARDIAC MARKERS ( 23 May 2018 22:14 )  x     / <0.01 ng/mL / 56 U/L / x     / 1.0 ng/mL          RADIOLOGY & ADDITIONAL TESTS:    Imaging Personally Reviewed:    Consultant(s) Notes Reviewed:      Care Discussed with Consultants/Other Providers:
Patient is a 82y old  Female who presents with a chief complaint of dyspnea (26 May 2018 08:26)      SUBJECTIVE / OVERNIGHT EVENTS: no new c/o    MEDICATIONS  (STANDING):  amitriptyline 25 milliGRAM(s) Oral at bedtime  apixaban 5 milliGRAM(s) Oral every 12 hours  aspirin  chewable 81 milliGRAM(s) Oral daily  cholecalciferol 1000 Unit(s) Oral daily  cholestyramine Powder (Sugar-Free) 4 Gram(s) Oral two times a day  cyanocobalamin 100 MICROGram(s) Oral daily  docusate sodium 100 milliGRAM(s) Oral two times a day  furosemide    Tablet 40 milliGRAM(s) Oral daily  losartan 50 milliGRAM(s) Oral daily  metoprolol succinate  milliGRAM(s) Oral daily  pantoprazole    Tablet 40 milliGRAM(s) Oral before breakfast  senna 2 Tablet(s) Oral at bedtime    MEDICATIONS  (PRN):  oxyCODONE    IR 5 milliGRAM(s) Oral every 6 hours PRN Moderate Pain (4 - 6)  polyethylene glycol 3350 17 Gram(s) Oral daily PRN Constipation      Vital Signs Last 24 Hrs  T(F): 98 (05-26-18 @ 13:55), Max: 98.1 (05-26-18 @ 04:34)  HR: 80 (05-26-18 @ 13:55) (80 - 91)  BP: 134/70 (05-26-18 @ 13:55) (134/70 - 159/80)  RR: 16 (05-26-18 @ 13:55) (16 - 18)  SpO2: 97% (05-26-18 @ 13:55) (92% - 97%)  Telemetry:   CAPILLARY BLOOD GLUCOSE        I&O's Summary    25 May 2018 07:01  -  26 May 2018 07:00  --------------------------------------------------------  IN: 440 mL / OUT: 0 mL / NET: 440 mL    26 May 2018 07:01  -  26 May 2018 21:19  --------------------------------------------------------  IN: 300 mL / OUT: 0 mL / NET: 300 mL        PHYSICAL EXAM:  GENERAL: NAD, well-developed  HEAD:  Atraumatic, Normocephalic  EYES: EOMI, PERRLA, conjunctiva and sclera clear  NECK: Supple, No JVD  CHEST/LUNG: Clear to auscultation bilaterally; No wheeze  HEART: Regular rate and rhythm; No murmurs, rubs, or gallops  ABDOMEN: Soft, Nontender, Nondistended; Bowel sounds present  EXTREMITIES:  2+ Peripheral Pulses, No clubbing, cyanosis, or edema  PSYCH: AAOx3  NEUROLOGY: non-focal  SKIN: No rashes or lesions    LABS:                        8.6    9.50  )-----------( 426      ( 26 May 2018 07:56 )             26.5     05-26    142  |  101  |  12  ----------------------------<  100<H>  3.6   |  30  |  1.33<H>    Ca    8.8      26 May 2018 06:41                RADIOLOGY & ADDITIONAL TESTS:    Imaging Personally Reviewed:    Consultant(s) Notes Reviewed:      Care Discussed with Consultants/Other Providers:

## 2018-05-26 NOTE — DISCHARGE NOTE ADULT - CARE PLAN
Principal Discharge DX:	Acute on chronic congestive heart failure, unspecified heart failure type  Goal:	No shortness of breath  Assessment and plan of treatment:	Diet and activity as noted  Continue medications as prescribed  Follow up with your doctor in one week  Return for any worsening symptoms  Secondary Diagnosis:	Essential hypertension  Goal:	Controlled blood pressure  Assessment and plan of treatment:	Diet and activity as noted  Continue medications as prescribed  Follow up with your doctor in one week  Return for any worsening symptoms  Secondary Diagnosis:	PAF (paroxysmal atrial fibrillation)  Assessment and plan of treatment:	Diet and activity as noted  Continue medications as prescribed  Follow up with your doctor in one week  Return for any worsening symptoms  Secondary Diagnosis:	Constipation, unspecified constipation type  Assessment and plan of treatment:	Diet and activity as noted  Continue medications as prescribed  Hold constipation medication if you develop diarrhea  Follow up with your doctor in one week  Return for any worsening symptoms Principal Discharge DX:	Acute on chronic congestive heart failure, unspecified heart failure type  Goal:	No shortness of breath  Assessment and plan of treatment:	Diet and activity as noted  Continue medications as prescribed  Follow up with your doctor in one week  Return for any worsening symptoms  Secondary Diagnosis:	Essential hypertension  Goal:	Controlled blood pressure  Assessment and plan of treatment:	Diet and activity as noted  Continue medications as prescribed  Follow up with your doctor in one week  Return for any worsening symptoms  Secondary Diagnosis:	PAF (paroxysmal atrial fibrillation)  Goal:	Controlled heart rate  Assessment and plan of treatment:	Diet and activity as noted  Continue medications as prescribed  Follow up with your doctor in one week  Return for any worsening symptoms  Secondary Diagnosis:	Constipation, unspecified constipation type  Goal:	Maintain bowel regimen  Assessment and plan of treatment:	Diet and activity as noted  Continue medications as prescribed  Hold constipation medication if you develop diarrhea  Follow up with your doctor in one week  Return for any worsening symptoms

## 2018-05-26 NOTE — PROGRESS NOTE ADULT - PROBLEM SELECTOR PLAN 2
BP improved, cont ARBS, cont Toprol

## 2018-05-26 NOTE — DISCHARGE NOTE ADULT - HOSPITAL COURSE
82 year old female with PMHx of Afib (on Eliquis), CHF, DIANE, MV repair, presenting initially on 5/15 with complaint of chest pain, radiating to the back. The patient was ruled out for ACS with normal EKG and negative troponins, and CTA did not show a dissection. The CT scan however did reveal a dilated gallbladder with 1.8 cm CBD. A follow-up US revealed a gallbladder with stones and sludge, pericholecystic fluid, and a positive sonographic Hui's sign, CBD 0.8 cm. The patient reports that pain began 5/14, which was thought to be just reflux. She took reflux medication but did not feel any relief. The patient then developed nausea and vomiting, noted to be red-tinged. She is passing flatus and is having normal BMs, last BM 5/15. Denies having any history of gallbladder disease or similar episodes of pain to this one. Last endoscopy > 10 years ago, showed reflux, last c-scope 2016 for diarrhea showed colitis. Patient was followed by her cardiologist Dr. Oconnell while inpatient and cleared patient for procedures. 5/16 MRCP showed findings suggestive of acute cholecystitis and choledocholithiasis in distal CBD with associated mild biliary ductal dilatation. Patient had ERCP and sphincterectomy on 5/17 with GI w/o any complications. Dr. Chaudhari performed a laparoscopic cholecystectomy on 5/18. The patient tolerated the procedure well. There were no complications. The patient was extubated in the OR and transferred to the PACU in stable condition and transferred to a surgical floor. The patient's pain was controlled by IV pain medications and then by PO pain medications. The patient was placed back on home medications. The patient had daily wound care and was seen by physical therapy which recommended discharge to home with home PT. At the time of discharge, the patient was hemodynamically stable, was tolerating PO diet, was voiding urine and passing stool, was ambulating, and was comfortable with adequate pain control. The patient was instructed to follow up with Dr. Chaudhari within 1-2 weeks after discharge from the hospital. The patient felt comfortable with discharge. The patient was discharged to home with home PT on POD# 4. The patient had no other issues. 82 year old female with PMHx of Afib (on Eliquis), CHF, DIANE, MV repair, presenting initially on 5/15 with complaint of chest pain, radiating to the back. The patient was ruled out for ACS with normal EKG and negative troponins, and CTA did not show a dissection.  5/16 MRCP showed findings suggestive of acute cholecystitis and choledocholithiasis in distal CBD with associated mild biliary ductal dilatation. Patient had ERCP and sphincterectomy on 5/17 with GI w/o any complications. Dr. Chaudhari performed a laparoscopic cholecystectomy on 5/18. The patient tolerated the procedure well. There were no complications.     81 yo woman admitted w dyspnea, found to be in CHF, recently admitted for acute cholecystitis    1. Acute on chronic congestive heart failure, unspecified heart failure type.  Plan: feeling better post diuresis, stable on po Lasix 40 mg qdaily  on echo 3/18: mod MS, mod AI, mod pHTN.   2. Essential hypertension.  Plan: BP improved, cont ARBS, cont Toprol.    Seen by pulm, will restart eliquis since pl eff not large enough to tap. cont po lasix.   3. WEN (acute kidney injury).  Plan: cr at 1.46 today, slightly higher than baseline,  had WEN post contrast dye on the previous admission last week.   4.  PAF (paroxysmal atrial fibrillation).  Plan: restart Eliquis.   5. Constipation, unspecified constipation type. Plan: Miralax, senna, colace.  6. Gastroesophageal reflux disease without esophagitis.  Plan: cont Protonix.

## 2018-05-26 NOTE — DISCHARGE NOTE ADULT - MEDICATION SUMMARY - MEDICATIONS TO TAKE
I will START or STAY ON the medications listed below when I get home from the hospital:    aspirin 81 mg oral tablet, chewable  -- 1 tab(s) by mouth once a day  -- Indication: For PAF (paroxysmal atrial fibrillation)    oxyCODONE 5 mg oral tablet  -- 1 tab(s) by mouth every 4 to 6 hours, As Needed -Moderate Pain (4 - 6) MDD:6  -- Indication: For S/P laparoscopic cholecystectomy    losartan 50 mg oral tablet  -- 1 tab(s) by mouth once a day  -- Indication: For Essential hypertension    apixaban 5 mg oral tablet  -- 1 tab(s) by mouth every 12 hours  -- Indication: For PAF (paroxysmal atrial fibrillation)    amitriptyline 25 mg oral tablet  -- 1 tab(s) by mouth once a day (at bedtime)  -- Indication: For PAin    cholestyramine 4 g/5 g oral powder for reconstitution  -- 4 gram(s) by mouth 2 times a day  -- Indication: For Hyperlipidemia    metoprolol succinate 200 mg oral tablet, extended release  -- 1 tab(s) by mouth once a day  -- Indication: For Essential hypertension    furosemide 40 mg oral tablet  -- 1 tab(s) by mouth once a day  -- Indication: For Pleural effusion due to CHF (congestive heart failure)    senna oral tablet  -- 2 tab(s) by mouth once a day (at bedtime)  -- Indication: For Constipation, unspecified constipation type    polyethylene glycol 3350 oral powder for reconstitution  -- 17 gram(s) by mouth once a day, As needed, Constipation  -- Indication: For Bowel regimen while on opioids    docusate sodium 100 mg oral capsule  -- 1 cap(s) by mouth 2 times a day  -- Indication: For Bowel regimen while on Opioids    pantoprazole 40 mg oral delayed release tablet  -- 1 tab(s) by mouth once a day 1/2 hour before breakfast  -- Indication: For Need for prophylactic measure    cyanocobalamin 100 mcg oral tablet  -- 1 tab(s) by mouth once a day  -- Indication: For Nutritional supplement    Vitamin D3 1000 intl units oral tablet  -- 1 tab(s) by mouth once a day  -- Indication: For Nutritional supplement

## 2018-05-26 NOTE — PROGRESS NOTE ADULT - PROBLEM SELECTOR PLAN 1
feeling better post diuresis, change to po Lasix 40 mg qdaily  TTE pending
feeling better post diuresis, stable on po Lasix 40 mg qdaily  on echo 3/18: mod MS, mod AI, mod pHTN
feeling better post diuresis, stable on po Lasix 40 mg qdaily  on echo 3/18: mod MS, mod AI, mod pHTN

## 2018-05-26 NOTE — DISCHARGE NOTE ADULT - PLAN OF CARE
No shortness of breath Diet and activity as noted  Continue medications as prescribed  Follow up with your doctor in one week  Return for any worsening symptoms Controlled blood pressure Diet and activity as noted  Continue medications as prescribed  Hold constipation medication if you develop diarrhea  Follow up with your doctor in one week  Return for any worsening symptoms Controlled heart rate Maintain bowel regimen

## 2018-05-26 NOTE — DISCHARGE NOTE ADULT - PATIENT PORTAL LINK FT
You can access the [facility] Patient Portal, offered by [facility], by registering with the following website: [email or web address]

## 2018-05-26 NOTE — PROGRESS NOTE ADULT - PROBLEM SELECTOR PLAN 4
cr at 1.35 today, slightly higher than baseline, will trend. ptn had WEN post contrast dye on the previous admission last week
cr at 1.46 today, slightly higher than baseline, will trend. ptn had WEN post contrast dye on the previous admission last week
cr at 1.33 today,  ptn had WEN post contrast dye on the previous admission last week

## 2018-05-26 NOTE — DISCHARGE NOTE ADULT - CARE PROVIDER_API CALL
Edward Oconnell), Cardiovascular Disease; Internal Medicine; Nuclear Cardiology  [address]  [address]  [place], [place] [id number]  Phone: [phone]  Fax: [phone]    Francisco Daniel), Internal Medicine; Pulmonary Disease  [address] [address]  Phone: [phone]  Fax: [phone]

## 2018-05-26 NOTE — PROGRESS NOTE ADULT - ASSESSMENT
ASSESSMENT:    multifactorial dyspnea without hypoxemia    1) bilateral small to moderate pleural effusions following IV hydration for contrast induced WEN and IV hydration in the recent gary-operative period in the setting of valvular heart disease and diastolic dysfunction  2) anemia  3) restrictive lung disease following abdominal surgery and due to kyphosis  4) no evidence of PE on CTA    PLAN/RECOMMENDATIONS:    stable oxygenation on room air  no pulmonary medications indicated  no indication for thoracenteses  analgesics/incentive spirometry  diurese as tolerated by renal function and hemodynamics (WEN much improved)  cardiac meds - lasix/toprol XL/cozaar/ASA/eliquis - cardiology follow-up noted  GI/DVT prophylaxis  bowel regimen    Will follow with you. Plan of care discussed with the patient at bedside.  No pulmonary objection to discharge    Francisco Daniel MD, [facility] - [phone]  Pulmonary Medicine

## 2018-05-26 NOTE — PROGRESS NOTE ADULT - PROBLEM SELECTOR PROBLEM 3
R/O Pleural effusion due to CHF (congestive heart failure)

## 2018-05-30 LAB — SURGICAL PATHOLOGY STUDY: SIGNIFICANT CHANGE UP

## 2018-06-05 ENCOUNTER — FORM ENCOUNTER (OUTPATIENT)
Age: 83
End: 2018-06-05

## 2018-06-07 ENCOUNTER — APPOINTMENT (OUTPATIENT)
Dept: CARDIOLOGY | Facility: CLINIC | Age: 83
End: 2018-06-07
Payer: MEDICARE

## 2018-06-07 VITALS
HEIGHT: 62 IN | SYSTOLIC BLOOD PRESSURE: 130 MMHG | BODY MASS INDEX: 26.68 KG/M2 | DIASTOLIC BLOOD PRESSURE: 70 MMHG | WEIGHT: 145 LBS

## 2018-06-07 PROCEDURE — 93000 ELECTROCARDIOGRAM COMPLETE: CPT

## 2018-06-07 PROCEDURE — 99215 OFFICE O/P EST HI 40 MIN: CPT

## 2018-06-07 RX ORDER — POLYETHYLENE GLYCOL 3350 17 G/17G
17 POWDER, FOR SOLUTION ORAL
Qty: 527 | Refills: 0 | Status: COMPLETED | COMMUNITY
Start: 2018-05-26

## 2018-06-07 RX ORDER — APIXABAN 2.5 MG/1
2.5 TABLET, FILM COATED ORAL
Qty: 60 | Refills: 0 | Status: COMPLETED | COMMUNITY
Start: 2018-04-09

## 2018-06-11 ENCOUNTER — APPOINTMENT (OUTPATIENT)
Dept: VASCULAR SURGERY | Facility: CLINIC | Age: 83
End: 2018-06-11
Payer: MEDICARE

## 2018-06-11 VITALS
SYSTOLIC BLOOD PRESSURE: 118 MMHG | HEART RATE: 71 BPM | WEIGHT: 145 LBS | DIASTOLIC BLOOD PRESSURE: 68 MMHG | BODY MASS INDEX: 26.68 KG/M2 | TEMPERATURE: 98.4 F | HEIGHT: 62 IN

## 2018-06-11 PROCEDURE — 93880 EXTRACRANIAL BILAT STUDY: CPT

## 2018-06-11 PROCEDURE — 99212 OFFICE O/P EST SF 10 MIN: CPT

## 2018-06-25 RX ORDER — ALENDRONATE SODIUM 70 MG/1
TABLET ORAL
Refills: 0 | Status: COMPLETED | COMMUNITY

## 2018-06-25 RX ORDER — POLYETHYLENE GLYCOL 400 AND PROPYLENE GLYCOL 4; 3 MG/ML; MG/ML
SOLUTION/ DROPS OPHTHALMIC
Refills: 0 | Status: COMPLETED | COMMUNITY

## 2018-06-25 RX ORDER — LOSARTAN POTASSIUM 100 MG/1
TABLET, FILM COATED ORAL
Refills: 0 | Status: COMPLETED | COMMUNITY

## 2018-06-26 ENCOUNTER — RX RENEWAL (OUTPATIENT)
Age: 83
End: 2018-06-26

## 2018-06-29 ENCOUNTER — RX RENEWAL (OUTPATIENT)
Age: 83
End: 2018-06-29

## 2018-07-04 LAB
ANION GAP SERPL CALC-SCNC: 13 MMOL/L
BUN SERPL-MCNC: 60 MG/DL
CALCIUM SERPL-MCNC: 9.9 MG/DL
CHLORIDE SERPL-SCNC: 101 MMOL/L
CO2 SERPL-SCNC: 25 MMOL/L
CREAT SERPL-MCNC: 2.28 MG/DL
GLUCOSE SERPL-MCNC: 113 MG/DL
POTASSIUM SERPL-SCNC: 4.7 MMOL/L
SODIUM SERPL-SCNC: 139 MMOL/L

## 2018-07-20 PROBLEM — K80.50 CALCULUS OF BILE DUCT WITHOUT CHOLANGITIS OR CHOLECYSTITIS WITHOUT OBSTRUCTION: Chronic | Status: ACTIVE | Noted: 2018-05-23

## 2018-07-20 PROBLEM — K80.20 CALCULUS OF GALLBLADDER WITHOUT CHOLECYSTITIS WITHOUT OBSTRUCTION: Chronic | Status: ACTIVE | Noted: 2018-05-23

## 2018-07-23 ENCOUNTER — RX RENEWAL (OUTPATIENT)
Age: 83
End: 2018-07-23

## 2018-07-23 PROBLEM — K52.9 COLITIS: Status: ACTIVE | Noted: 2017-01-26

## 2018-07-30 ENCOUNTER — OUTPATIENT (OUTPATIENT)
Dept: OUTPATIENT SERVICES | Facility: HOSPITAL | Age: 83
LOS: 1 days | End: 2018-07-30
Payer: MEDICARE

## 2018-07-30 VITALS
HEART RATE: 63 BPM | RESPIRATION RATE: 20 BRPM | OXYGEN SATURATION: 100 % | DIASTOLIC BLOOD PRESSURE: 70 MMHG | WEIGHT: 143.08 LBS | TEMPERATURE: 98 F | HEIGHT: 62 IN | SYSTOLIC BLOOD PRESSURE: 159 MMHG

## 2018-07-30 DIAGNOSIS — Z98.89 OTHER SPECIFIED POSTPROCEDURAL STATES: Chronic | ICD-10-CM

## 2018-07-30 DIAGNOSIS — Z90.49 ACQUIRED ABSENCE OF OTHER SPECIFIED PARTS OF DIGESTIVE TRACT: Chronic | ICD-10-CM

## 2018-07-30 DIAGNOSIS — I26.99 OTHER PULMONARY EMBOLISM WITHOUT ACUTE COR PULMONALE: ICD-10-CM

## 2018-07-30 DIAGNOSIS — I50.22 CHRONIC SYSTOLIC (CONGESTIVE) HEART FAILURE: ICD-10-CM

## 2018-07-30 LAB
ALBUMIN SERPL ELPH-MCNC: 4.3 G/DL — SIGNIFICANT CHANGE UP (ref 3.3–5)
ALP SERPL-CCNC: 121 U/L — HIGH (ref 40–120)
ALT FLD-CCNC: 15 U/L — SIGNIFICANT CHANGE UP (ref 10–45)
ANION GAP SERPL CALC-SCNC: 15 MMOL/L — SIGNIFICANT CHANGE UP (ref 5–17)
AST SERPL-CCNC: 24 U/L — SIGNIFICANT CHANGE UP (ref 10–40)
BILIRUB SERPL-MCNC: 0.2 MG/DL — SIGNIFICANT CHANGE UP (ref 0.2–1.2)
BUN SERPL-MCNC: 38 MG/DL — HIGH (ref 7–23)
CALCIUM SERPL-MCNC: 9.5 MG/DL — SIGNIFICANT CHANGE UP (ref 8.4–10.5)
CHLORIDE SERPL-SCNC: 99 MMOL/L — SIGNIFICANT CHANGE UP (ref 96–108)
CO2 SERPL-SCNC: 24 MMOL/L — SIGNIFICANT CHANGE UP (ref 22–31)
CREAT SERPL-MCNC: 1.55 MG/DL — HIGH (ref 0.5–1.3)
GLUCOSE SERPL-MCNC: 96 MG/DL — SIGNIFICANT CHANGE UP (ref 70–99)
HCT VFR BLD CALC: 34.9 % — SIGNIFICANT CHANGE UP (ref 34.5–45)
HGB BLD-MCNC: 11.3 G/DL — LOW (ref 11.5–15.5)
MCHC RBC-ENTMCNC: 26.7 PG — LOW (ref 27–34)
MCHC RBC-ENTMCNC: 32.5 GM/DL — SIGNIFICANT CHANGE UP (ref 32–36)
MCV RBC AUTO: 82.3 FL — SIGNIFICANT CHANGE UP (ref 80–100)
PLATELET # BLD AUTO: 288 K/UL — SIGNIFICANT CHANGE UP (ref 150–400)
POTASSIUM SERPL-MCNC: 4.7 MMOL/L — SIGNIFICANT CHANGE UP (ref 3.5–5.3)
POTASSIUM SERPL-SCNC: 4.7 MMOL/L — SIGNIFICANT CHANGE UP (ref 3.5–5.3)
PROT SERPL-MCNC: 8 G/DL — SIGNIFICANT CHANGE UP (ref 6–8.3)
RBC # BLD: 4.23 M/UL — SIGNIFICANT CHANGE UP (ref 3.8–5.2)
RBC # FLD: 13.9 % — SIGNIFICANT CHANGE UP (ref 10.3–14.5)
SODIUM SERPL-SCNC: 138 MMOL/L — SIGNIFICANT CHANGE UP (ref 135–145)
WBC # BLD: 7.3 K/UL — SIGNIFICANT CHANGE UP (ref 3.8–10.5)
WBC # FLD AUTO: 7.3 K/UL — SIGNIFICANT CHANGE UP (ref 3.8–10.5)

## 2018-07-30 PROCEDURE — 93460 R&L HRT ART/VENTRICLE ANGIO: CPT

## 2018-07-30 PROCEDURE — 99152 MOD SED SAME PHYS/QHP 5/>YRS: CPT

## 2018-07-30 PROCEDURE — C1894: CPT

## 2018-07-30 PROCEDURE — 93460 R&L HRT ART/VENTRICLE ANGIO: CPT | Mod: 26

## 2018-07-30 PROCEDURE — C1887: CPT

## 2018-07-30 PROCEDURE — 85027 COMPLETE CBC AUTOMATED: CPT

## 2018-07-30 PROCEDURE — 80053 COMPREHEN METABOLIC PANEL: CPT

## 2018-07-30 PROCEDURE — 93005 ELECTROCARDIOGRAM TRACING: CPT

## 2018-07-30 PROCEDURE — 93010 ELECTROCARDIOGRAM REPORT: CPT

## 2018-07-30 PROCEDURE — C1769: CPT

## 2018-07-30 RX ORDER — SODIUM CHLORIDE 9 MG/ML
3 INJECTION INTRAMUSCULAR; INTRAVENOUS; SUBCUTANEOUS EVERY 8 HOURS
Qty: 0 | Refills: 0 | Status: DISCONTINUED | OUTPATIENT
Start: 2018-07-30 | End: 2018-08-14

## 2018-07-30 RX ORDER — METOPROLOL TARTRATE 50 MG
1 TABLET ORAL
Qty: 0 | Refills: 0 | COMMUNITY

## 2018-07-30 NOTE — H&P CARDIOLOGY - PMH
Carotid artery occlusion  (R)  CHF (congestive heart failure)    Choledocholithiasis    Gallstones    GERD (gastroesophageal reflux disease)    HTN (hypertension)    Hypercholesteremia    Insomnia    Mitral valve disease    Osteoporosis    Vitamin B 12 deficiency

## 2018-07-30 NOTE — H&P CARDIOLOGY - PSH
Abdominal hernia  repair 2007  Bilateral cataracts  extraction w/ IOL  H/O carotid endarterectomy    History of lumbar laminectomy for spinal cord decompression  1995  Hx of tonsillectomy    Papilloma of breast  s/p excision from right breast >20 years ago  S/P laparoscopic cholecystectomy    S/P MVR (mitral valve repair)  1997 at [facility]  Status post DACIA-BSO  1975  Varicose veins  s/p sclerotherapy bilaterally

## 2018-07-30 NOTE — H&P CARDIOLOGY - HISTORY OF PRESENT ILLNESS
84 yo woman HTN, HLD, PAD, GERD DIANE, s/p CEA, MV repair, AFIB ON ELIQUIS last dose 7/28 was recently admitted & treated for cholecystitis underwent cholecystectomy, pt c/o of dyspnea & chest tightness which has become  became progressively worse, was admitted & treated for CHF of unclear etiology echo reveals normal LV function still pt c/o dyspnea seen & evaluated by cardiologist Dr Wynne & now recommends for cardiac cath.

## 2018-08-02 DIAGNOSIS — Z90.710 ACQUIRED ABSENCE OF BOTH CERVIX AND UTERUS: ICD-10-CM

## 2018-08-02 DIAGNOSIS — Z87.39 PERSONAL HISTORY OF OTHER DISEASES OF THE MUSCULOSKELETAL SYSTEM AND CONNECTIVE TISSUE: ICD-10-CM

## 2018-08-02 DIAGNOSIS — Z78.9 OTHER SPECIFIED HEALTH STATUS: ICD-10-CM

## 2018-08-02 DIAGNOSIS — Z60.2 PROBLEMS RELATED TO LIVING ALONE: ICD-10-CM

## 2018-08-02 DIAGNOSIS — Z87.891 PERSONAL HISTORY OF NICOTINE DEPENDENCE: ICD-10-CM

## 2018-08-02 SDOH — SOCIAL STABILITY - SOCIAL INSECURITY: PROBLEMS RELATED TO LIVING ALONE: Z60.2

## 2018-08-16 ENCOUNTER — NON-APPOINTMENT (OUTPATIENT)
Age: 83
End: 2018-08-16

## 2018-08-16 ENCOUNTER — APPOINTMENT (OUTPATIENT)
Dept: CARDIOLOGY | Facility: CLINIC | Age: 83
End: 2018-08-16
Payer: MEDICARE

## 2018-08-16 VITALS
HEIGHT: 62 IN | SYSTOLIC BLOOD PRESSURE: 120 MMHG | DIASTOLIC BLOOD PRESSURE: 70 MMHG | BODY MASS INDEX: 28.34 KG/M2 | OXYGEN SATURATION: 98 % | WEIGHT: 154 LBS | HEART RATE: 78 BPM

## 2018-08-16 PROBLEM — I50.9 HEART FAILURE, UNSPECIFIED: Chronic | Status: ACTIVE | Noted: 2018-07-30

## 2018-08-16 PROCEDURE — 99215 OFFICE O/P EST HI 40 MIN: CPT

## 2018-08-16 PROCEDURE — 93000 ELECTROCARDIOGRAM COMPLETE: CPT

## 2018-08-21 LAB
ANION GAP SERPL CALC-SCNC: 14 MMOL/L
BUN SERPL-MCNC: 32 MG/DL
CALCIUM SERPL-MCNC: 9.9 MG/DL
CHLORIDE SERPL-SCNC: 98 MMOL/L
CO2 SERPL-SCNC: 28 MMOL/L
CREAT SERPL-MCNC: 1.71 MG/DL
GLUCOSE SERPL-MCNC: 88 MG/DL
POTASSIUM SERPL-SCNC: 4.4 MMOL/L
SODIUM SERPL-SCNC: 140 MMOL/L

## 2018-08-31 ENCOUNTER — RX RENEWAL (OUTPATIENT)
Age: 83
End: 2018-08-31

## 2018-09-12 ENCOUNTER — LABORATORY RESULT (OUTPATIENT)
Age: 83
End: 2018-09-12

## 2018-10-10 ENCOUNTER — LABORATORY RESULT (OUTPATIENT)
Age: 83
End: 2018-10-10

## 2018-10-11 ENCOUNTER — APPOINTMENT (OUTPATIENT)
Dept: CARDIOLOGY | Facility: CLINIC | Age: 83
End: 2018-10-11
Payer: MEDICARE

## 2018-10-11 ENCOUNTER — NON-APPOINTMENT (OUTPATIENT)
Age: 83
End: 2018-10-11

## 2018-10-11 VITALS
OXYGEN SATURATION: 96 % | SYSTOLIC BLOOD PRESSURE: 150 MMHG | TEMPERATURE: 98.7 F | HEART RATE: 70 BPM | DIASTOLIC BLOOD PRESSURE: 73 MMHG | WEIGHT: 152 LBS | BODY MASS INDEX: 27.97 KG/M2 | HEIGHT: 62 IN

## 2018-10-11 PROCEDURE — 99215 OFFICE O/P EST HI 40 MIN: CPT

## 2018-10-11 PROCEDURE — 93000 ELECTROCARDIOGRAM COMPLETE: CPT

## 2018-10-11 NOTE — PRE-OP CHECKLIST - IDENTIFICATION BAND VERIFIED
OFFICE VISIT AND PREOP H AND P     CHIEF COMPLAINT:  Rotator cuff tear, left shoulder.    HISTORY OF PRESENT ILLNESS:  A 72-year-old female with ongoing symptoms left   shoulder for the past six months or so.  She has had injections in the past with   temporary improvement and actually her most recent injection is working quite   well.  She is really not having much pain at all.    A recent MRI of the left shoulder showed evidence of rotator cuff tear, a small   tear about 1-2 cm in size.  Small amount of retraction noted.    PAST MEDICAL HISTORY:  Significant for hypertension.    PAST SURGICAL HISTORY:  None listed.    FAMILY HISTORY:  Positive for breast cancer.    SOCIAL HISTORY:  The patient does not smoke.  Drinks 1-2 beers per week.    REVIEW OF SYSTEMS:  Negative fever, chills, rashes.    CURRENT MEDICATIONS:  Reviewed on chart.    ALLERGIES:  Penicillin.    PHYSICAL EXAMINATION:  GENERAL:  Well-developed, well-nourished female in no acute distress, alert and   oriented x3.  HEENT:  Unremarkable.  LUNGS:  Clear to auscultation.  HEART:  Regular rate and rhythm.  ABDOMEN:  Soft, nontender.  EXTREMITIES:  Significant for the left shoulder, demonstrating some mild   tenderness at the AC joint.  Range of motion left shoulder is full.  Mildly   positive impingement sign.  Mildly positive supraspinatus stress test.  NEUROLOGIC:  Intact.  NECK:  Nontender.    IMAGING:  X-rays demonstrate mild spurring at the anterolateral acromion.  MRI   demonstrates a tear of the rotator cuff with slight retraction.    IMPRESSION:  Rotator cuff tear, left shoulder.    PLAN:  I explained the nature of the problem to the patient and recommended   surgical treatment to include arthroscopic rotator cuff repair.  The patient   would like to go ahead and set this up as an outpatient surgery.  The risks and   benefits of surgery explained to her today.  In the meantime, avoid overhead   lifting.  Advil or Motrin by mouth.  Follow up for  the above surgery.      MAO/JESS  dd: 10/11/2018 16:45:00 (CDT)  td: 10/12/2018 09:18:57 (CDT)  Doc ID   #[id number]  Job ID #[id number]    CC:        done

## 2018-10-12 ENCOUNTER — MEDICATION RENEWAL (OUTPATIENT)
Age: 83
End: 2018-10-12

## 2018-10-12 LAB
ANION GAP SERPL CALC-SCNC: 12 MMOL/L
BUN SERPL-MCNC: 30 MG/DL
CALCIUM SERPL-MCNC: 9.5 MG/DL
CHLORIDE SERPL-SCNC: 104 MMOL/L
CO2 SERPL-SCNC: 27 MMOL/L
CREAT SERPL-MCNC: 1.34 MG/DL
GLUCOSE SERPL-MCNC: 96 MG/DL
POTASSIUM SERPL-SCNC: 4.8 MMOL/L
SODIUM SERPL-SCNC: 143 MMOL/L

## 2019-01-07 ENCOUNTER — LABORATORY RESULT (OUTPATIENT)
Age: 84
End: 2019-01-07

## 2019-01-08 ENCOUNTER — MEDICATION RENEWAL (OUTPATIENT)
Age: 84
End: 2019-01-08

## 2019-02-04 ENCOUNTER — NON-APPOINTMENT (OUTPATIENT)
Age: 84
End: 2019-02-04

## 2019-02-04 ENCOUNTER — APPOINTMENT (OUTPATIENT)
Dept: CARDIOLOGY | Facility: CLINIC | Age: 84
End: 2019-02-04
Payer: MEDICARE

## 2019-02-04 VITALS
HEART RATE: 77 BPM | HEIGHT: 62 IN | WEIGHT: 153 LBS | BODY MASS INDEX: 28.16 KG/M2 | OXYGEN SATURATION: 96 % | SYSTOLIC BLOOD PRESSURE: 147 MMHG | DIASTOLIC BLOOD PRESSURE: 73 MMHG

## 2019-02-04 PROCEDURE — 99215 OFFICE O/P EST HI 40 MIN: CPT

## 2019-02-04 PROCEDURE — 93000 ELECTROCARDIOGRAM COMPLETE: CPT

## 2019-02-08 ENCOUNTER — MEDICATION RENEWAL (OUTPATIENT)
Age: 84
End: 2019-02-08

## 2019-03-09 ENCOUNTER — TRANSCRIPTION ENCOUNTER (OUTPATIENT)
Age: 84
End: 2019-03-09

## 2019-03-11 ENCOUNTER — LABORATORY RESULT (OUTPATIENT)
Age: 84
End: 2019-03-11

## 2019-03-11 LAB
BASOPHILS # BLD AUTO: 0.08 K/UL
BASOPHILS NFR BLD AUTO: 1 %
EOSINOPHIL # BLD AUTO: 0.24 K/UL
EOSINOPHIL NFR BLD AUTO: 3 %
HBA1C MFR BLD HPLC: 5.6 %
HCT VFR BLD CALC: 37 %
HGB BLD-MCNC: 11 G/DL
IMM GRANULOCYTES NFR BLD AUTO: 0.6 %
LYMPHOCYTES # BLD AUTO: 1.94 K/UL
LYMPHOCYTES NFR BLD AUTO: 24 %
MAN DIFF?: NORMAL
MCHC RBC-ENTMCNC: 27.2 PG
MCHC RBC-ENTMCNC: 29.7 GM/DL
MCV RBC AUTO: 91.6 FL
MONOCYTES # BLD AUTO: 0.92 K/UL
MONOCYTES NFR BLD AUTO: 11.4 %
NEUTROPHILS # BLD AUTO: 4.86 K/UL
NEUTROPHILS NFR BLD AUTO: 60 %
PLATELET # BLD AUTO: 262 K/UL
RBC # BLD: 4.04 M/UL
RBC # FLD: 13.9 %
WBC # FLD AUTO: 8.09 K/UL

## 2019-03-12 LAB
25(OH)D3 SERPL-MCNC: 32.8 NG/ML
ALBUMIN SERPL ELPH-MCNC: 4.3 G/DL
ALP BLD-CCNC: 114 U/L
ALT SERPL-CCNC: 15 U/L
ANION GAP SERPL CALC-SCNC: 12 MMOL/L
AST SERPL-CCNC: 22 U/L
BILIRUB SERPL-MCNC: 0.2 MG/DL
BUN SERPL-MCNC: 32 MG/DL
CALCIUM SERPL-MCNC: 9.4 MG/DL
CHLORIDE SERPL-SCNC: 105 MMOL/L
CHOLEST SERPL-MCNC: 209 MG/DL
CHOLEST/HDLC SERPL: 3.7 RATIO
CO2 SERPL-SCNC: 23 MMOL/L
CREAT SERPL-MCNC: 1.56 MG/DL
GLUCOSE SERPL-MCNC: 100 MG/DL
HDLC SERPL-MCNC: 56 MG/DL
LDLC SERPL CALC-MCNC: 85 MG/DL
POTASSIUM SERPL-SCNC: 4.6 MMOL/L
PROT SERPL-MCNC: 7.5 G/DL
SODIUM SERPL-SCNC: 140 MMOL/L
T3 SERPL-MCNC: 59 NG/DL
T3RU NFR SERPL: 1 TBI
T4 SERPL-MCNC: 6.1 UG/DL
TRIGL SERPL-MCNC: 338 MG/DL
TSH SERPL-ACNC: 2.78 UIU/ML

## 2019-03-15 ENCOUNTER — RX RENEWAL (OUTPATIENT)
Age: 84
End: 2019-03-15

## 2019-03-15 LAB — FT4I SERPL CALC-MCNC: 6.9 INDEX

## 2019-04-04 ENCOUNTER — APPOINTMENT (OUTPATIENT)
Dept: GASTROENTEROLOGY | Facility: CLINIC | Age: 84
End: 2019-04-04
Payer: MEDICARE

## 2019-04-04 VITALS
DIASTOLIC BLOOD PRESSURE: 70 MMHG | HEART RATE: 70 BPM | HEIGHT: 62 IN | BODY MASS INDEX: 28.71 KG/M2 | WEIGHT: 156 LBS | SYSTOLIC BLOOD PRESSURE: 157 MMHG

## 2019-04-04 DIAGNOSIS — Z80.0 FAMILY HISTORY OF MALIGNANT NEOPLASM OF DIGESTIVE ORGANS: ICD-10-CM

## 2019-04-04 DIAGNOSIS — R14.0 ABDOMINAL DISTENSION (GASEOUS): ICD-10-CM

## 2019-04-04 DIAGNOSIS — M47.816 SPONDYLOSIS W/OUT MYELOPATHY OR RADICULOPATHY, LUMBAR REGION: ICD-10-CM

## 2019-04-04 PROCEDURE — 82274 ASSAY TEST FOR BLOOD FECAL: CPT | Mod: QW

## 2019-04-04 PROCEDURE — 99215 OFFICE O/P EST HI 40 MIN: CPT

## 2019-04-04 RX ORDER — MELOXICAM 7.5 MG/1
7.5 TABLET ORAL
Qty: 60 | Refills: 2 | Status: DISCONTINUED | COMMUNITY
Start: 2018-06-07 | End: 2019-04-04

## 2019-04-04 RX ORDER — METOPROLOL SUCCINATE 200 MG/1
200 TABLET, EXTENDED RELEASE ORAL
Qty: 90 | Refills: 2 | Status: DISCONTINUED | COMMUNITY
Start: 2018-02-14 | End: 2019-04-04

## 2019-04-04 NOTE — PHYSICAL EXAM
[General Appearance - Alert] : alert [General Appearance - In No Acute Distress] : in no acute distress [General Appearance - Well Nourished] : well nourished [General Appearance - Well Developed] : well developed [Sclera] : the sclera and conjunctiva were normal [Outer Ear] : the ears and nose were normal in appearance [Neck Appearance] : the appearance of the neck was normal [Neck Cervical Mass (___cm)] : no neck mass was observed [Jugular Venous Distention Increased] : there was no jugular-venous distention [Thyroid Diffuse Enlargement] : the thyroid was not enlarged [Thyroid Nodule] : there were no palpable thyroid nodules [Auscultation Breath Sounds / Voice Sounds] : lungs were clear to auscultation bilaterally [Heart Rate And Rhythm] : heart rate was normal and rhythm regular [Heart Sounds] : normal S1 and S2 [Heart Sounds Gallop] : no gallops [Heart Sounds Pericardial Friction Rub] : no pericardial rub [Full Pulse] : the pedal pulses are present [Edema] : there was no peripheral edema [Bowel Sounds] : normal bowel sounds [Abdomen Soft] : soft [Abdomen Tenderness] : non-tender [] : no hepato-splenomegaly [Abdomen Mass (___ Cm)] : no abdominal mass palpated [Normal Sphincter Tone] : normal sphincter tone [No Rectal Mass] : no rectal mass [Internal Hemorrhoid] : internal hemorrhoids [External Hemorrhoid] : external hemorrhoids [Occult Blood Positive] : stool was negative for occult blood [Cervical Lymph Nodes Enlarged Posterior Bilaterally] : posterior cervical [Cervical Lymph Nodes Enlarged Anterior Bilaterally] : anterior cervical [Supraclavicular Lymph Nodes Enlarged Bilaterally] : supraclavicular [Axillary Lymph Nodes Enlarged Bilaterally] : axillary [Femoral Lymph Nodes Enlarged Bilaterally] : femoral [Inguinal Lymph Nodes Enlarged Bilaterally] : inguinal [FreeTextEntry1] : ecchymosis left forearm/wrist [Oriented To Time, Place, And Person] : oriented to person, place, and time [Impaired Insight] : insight and judgment were intact [Affect] : the affect was normal

## 2019-04-04 NOTE — ASSESSMENT
[FreeTextEntry1] : 1. Chronic GERD with recent exacerbation, and dysphagia; hiatal hernia, nonerosive esophagitis at EGD July 2014--rule out erosive esophagitis, peptic stricture, neoplasm.\par 2. Collagenous colitis diagnosed 2016; history of colonic polyps, family history of colon cancer (sister); hemoperitoneum after colonoscopy 2008. Stool guaiac negative with negative FIT on today's exam.\par 3. History of diastolic CHF, atrial fibrillation; status post mitral valve annuloplasty, aortic and mitral insufficiency, pulmonary hypertension--maintained on Eliquis + ASA.\par 4. Overweight.\par 5. Hypertension.\par 6. Hyperlipidemia.\par 7. Osteoarthritis, osteoporosis.\par 8. Moderate renal insufficiency.\par 9. Status post right breast papilloma, left breast cyst excision.\par 10. Status post epigastric ventral hernia repair, hysterectomy.\par 11. Allergic to niacin, -statins, amlodipine, adhesive tape, iodine dye.\par \par Plan:\par 1. Recent labs reviewed.\par 2. Increase pantoprazole to 40 mg a.c. b.i.d. x 2 weeks.\par 3. Ordinarily, EGD would be advised at this time, but quite hesitant, given significant cardiac history and anticoagulation. If no improvement in esophageal symptoms despite twice daily PPI, she will require cardiac clearance (and input regarding how long she can hold Eliquis) before her EGD is performed in the hospital setting. As I do no hospital work, Dr. Marcos Young could perform on my behalf. She is aware of the possibility of esophageal cancer, but we are looking for easy to treat conditions at this point. Procedure, rationale, alternatives, material risks, anesthesia plan were reviewed and brochure given.\par

## 2019-04-04 NOTE — HISTORY OF PRESENT ILLNESS
[FreeTextEntry1] : Recently, Virginia has been experiencing far more GERD been previously, with episodic dysphagia to solids, food sticking in the throat. She also reports excessive flatulence. She has been taking pantoprazole 40 mg daily for quite some time, but feels better when taking TUMS. Her bowels have improved, having diarrhea perhaps weekly, and now is tending towards constipation. She is tried probiotics regularly without improvement. She is now maintained on Eliquis and baby ASA because of significant heart disease. Last EGD July 2014 revealed hiatal hernia and nonerosive GERD. Collagenous colitis was diagnosed August 2016; she has history of colonic polyps, and her sister had colon cancer. After her 2008 colonoscopy, she had hemoperitoneum, felt to be secondary to a ruptured mesenteric vessel.

## 2019-04-04 NOTE — END OF VISIT
[>50% of Time Spent on Counseling and Coordination of Care for  ___] : Greater than 50% of the encounter time was spent on counseling and coordination of care for [unfilled]
No

## 2019-04-04 NOTE — REVIEW OF SYSTEMS
[Dry Eyes] : dryness of the eyes [Eyes Itch] : itching of the eyes [Leg Claudication] : intermittent leg claudication [Lower Ext Edema] : lower extremity edema [Shortness Of Breath] : shortness of breath [Wheezing] : wheezing [Orthopnea] : orthopnea [PND] : PND [Heartburn] : heartburn [Joint Pain] : joint pain [Limb Pain] : limb pain [Limb Swelling] : limb swelling [Limb Weakness] : limb weakness [Difficulty Walking] : difficulty walking [Easy Bruising] : a tendency for easy bruising [Negative] : Heme/Lymph [As Noted in HPI] : as noted in HPI [Melena] : no melena

## 2019-04-04 NOTE — CONSULT LETTER
[Dear  ___] : Dear  [unfilled], [Courtesy Letter:] : I had the pleasure of seeing your patient, [unfilled], in my office today. [Please see my note below.] : Please see my note below. [Consult Closing:] : Thank you very much for allowing me to participate in the care of this patient.  If you have any questions, please do not hesitate to contact me. [Sincerely,] : Sincerely, [FreeTextEntry3] : Jerry James M.D.\par

## 2019-04-11 ENCOUNTER — APPOINTMENT (OUTPATIENT)
Dept: INTERNAL MEDICINE | Facility: CLINIC | Age: 84
End: 2019-04-11
Payer: MEDICARE

## 2019-04-11 VITALS
WEIGHT: 154 LBS | BODY MASS INDEX: 28.34 KG/M2 | HEIGHT: 62 IN | HEART RATE: 69 BPM | DIASTOLIC BLOOD PRESSURE: 76 MMHG | SYSTOLIC BLOOD PRESSURE: 154 MMHG

## 2019-04-11 DIAGNOSIS — M54.16 RADICULOPATHY, LUMBAR REGION: ICD-10-CM

## 2019-04-11 DIAGNOSIS — M67.432 GANGLION, LEFT WRIST: ICD-10-CM

## 2019-04-11 LAB
BILIRUB UR QL STRIP: NORMAL
CLARITY UR: CLEAR
COLLECTION METHOD: NORMAL
GLUCOSE UR-MCNC: NORMAL
HCG UR QL: 0.2 EU/DL
HGB UR QL STRIP.AUTO: NORMAL
KETONES UR-MCNC: NORMAL
LEUKOCYTE ESTERASE UR QL STRIP: NORMAL
NITRITE UR QL STRIP: NORMAL
PH UR STRIP: 5
PROT UR STRIP-MCNC: NORMAL
SP GR UR STRIP: 1.01

## 2019-04-11 PROCEDURE — 81003 URINALYSIS AUTO W/O SCOPE: CPT | Mod: QW

## 2019-04-11 PROCEDURE — 99203 OFFICE O/P NEW LOW 30 MIN: CPT | Mod: 25

## 2019-04-11 RX ORDER — NITROGLYCERIN 0.4 MG/1
0.4 TABLET SUBLINGUAL
Qty: 3 | Refills: 0 | Status: DISCONTINUED | COMMUNITY
Start: 2017-04-06 | End: 2019-04-11

## 2019-04-11 NOTE — REVIEW OF SYSTEMS
[Abdominal Pain] : no abdominal pain [Nausea] : no nausea [Constipation] : no constipation [Vomiting] : no vomiting [Heartburn] : heartburn [Back Pain] : back pain [Headache] : no headache [Negative] : Genitourinary [FreeTextEntry9] : as per HPI [de-identified] : mutiple purpura scattered over upper extremity bilaterally

## 2019-04-11 NOTE — HISTORY OF PRESENT ILLNESS
[FreeTextEntry8] : here to establish care\par 84 y/o female with h/o lumbar stenosis, lumber pain s/p surgery and intradural injections in the past presents c/o right leg pain.  describes as shooting pain on the outside of her leg.  intermittent, shooting pain, lasts for several minutes to hours at times.  resolves on its own.  had a similar episode in the past, resolves spontaneously\par \par also c/o left wrist mass, s/p trauma.  started 2 weeks ago.  states swelling decreased significantly but now feels a fluid filled bag.  mild tender to palpation. no limited ROM, weakness, or finger pain.  no medications to date \par  \par \par

## 2019-04-11 NOTE — PLAN
[FreeTextEntry1] : advised may take gabapentin to help with radiculopathy pain, .\par risks and benefits of medications discussed in detail.  advised to contact office if experience any\par \par follow-up as needed or in 1 month for annual exam

## 2019-04-11 NOTE — PHYSICAL EXAM
[Normal] : normoactive bowel sounds, soft and nontender, no hepatosplenomegaly or masses appreciated [de-identified] : mild spinal tenderness to palpation  [de-identified] : left wrist- [id number] soft, rubbery, mobile subcutaneous mass.  mild tender to palpation

## 2019-04-25 ENCOUNTER — RX RENEWAL (OUTPATIENT)
Age: 84
End: 2019-04-25

## 2019-04-26 ENCOUNTER — FORM ENCOUNTER (OUTPATIENT)
Age: 84
End: 2019-04-26

## 2019-04-27 ENCOUNTER — APPOINTMENT (OUTPATIENT)
Dept: RADIOLOGY | Facility: CLINIC | Age: 84
End: 2019-04-27
Payer: MEDICARE

## 2019-04-27 ENCOUNTER — OUTPATIENT (OUTPATIENT)
Dept: OUTPATIENT SERVICES | Facility: HOSPITAL | Age: 84
LOS: 1 days | End: 2019-04-27
Payer: MEDICARE

## 2019-04-27 ENCOUNTER — APPOINTMENT (OUTPATIENT)
Dept: MRI IMAGING | Facility: CLINIC | Age: 84
End: 2019-04-27
Payer: MEDICARE

## 2019-04-27 DIAGNOSIS — M54.16 RADICULOPATHY, LUMBAR REGION: ICD-10-CM

## 2019-04-27 DIAGNOSIS — M48.07 SPINAL STENOSIS, LUMBOSACRAL REGION: ICD-10-CM

## 2019-04-27 DIAGNOSIS — Z90.49 ACQUIRED ABSENCE OF OTHER SPECIFIED PARTS OF DIGESTIVE TRACT: Chronic | ICD-10-CM

## 2019-04-27 DIAGNOSIS — Z98.89 OTHER SPECIFIED POSTPROCEDURAL STATES: Chronic | ICD-10-CM

## 2019-04-27 PROCEDURE — 72148 MRI LUMBAR SPINE W/O DYE: CPT | Mod: 26

## 2019-04-27 PROCEDURE — 73110 X-RAY EXAM OF WRIST: CPT

## 2019-04-27 PROCEDURE — 73110 X-RAY EXAM OF WRIST: CPT | Mod: 26,LT

## 2019-04-27 PROCEDURE — 72148 MRI LUMBAR SPINE W/O DYE: CPT

## 2019-05-09 ENCOUNTER — APPOINTMENT (OUTPATIENT)
Dept: INTERNAL MEDICINE | Facility: CLINIC | Age: 84
End: 2019-05-09
Payer: MEDICARE

## 2019-05-09 ENCOUNTER — NON-APPOINTMENT (OUTPATIENT)
Age: 84
End: 2019-05-09

## 2019-05-09 VITALS
BODY MASS INDEX: 28.71 KG/M2 | WEIGHT: 156 LBS | SYSTOLIC BLOOD PRESSURE: 133 MMHG | HEIGHT: 62 IN | DIASTOLIC BLOOD PRESSURE: 74 MMHG | HEART RATE: 70 BPM

## 2019-05-09 DIAGNOSIS — Z78.9 OTHER SPECIFIED HEALTH STATUS: ICD-10-CM

## 2019-05-09 DIAGNOSIS — S22.070A WEDGE COMPRESSION FRACTURE OF T9-T10 VERTEBRA, INITIAL ENCOUNTER FOR CLOSED FRACTURE: ICD-10-CM

## 2019-05-09 LAB
BILIRUB UR QL STRIP: NORMAL
CLARITY UR: CLEAR
COLLECTION METHOD: NORMAL
GLUCOSE UR-MCNC: NORMAL
HCG UR QL: 0.2 EU/DL
HGB UR QL STRIP.AUTO: NORMAL
KETONES UR-MCNC: NORMAL
LEUKOCYTE ESTERASE UR QL STRIP: NORMAL
NITRITE UR QL STRIP: NORMAL
PH UR STRIP: 5
PROT UR STRIP-MCNC: NORMAL
SAVE SPECIMEN: NORMAL
SP GR UR STRIP: 1.01

## 2019-05-09 PROCEDURE — 81003 URINALYSIS AUTO W/O SCOPE: CPT | Mod: QW

## 2019-05-09 PROCEDURE — G0447 BEHAVIOR COUNSEL OBESITY 15M: CPT

## 2019-05-09 PROCEDURE — G0439: CPT

## 2019-05-09 PROCEDURE — G0443: CPT

## 2019-05-09 PROCEDURE — 36415 COLL VENOUS BLD VENIPUNCTURE: CPT

## 2019-05-09 PROCEDURE — 93000 ELECTROCARDIOGRAM COMPLETE: CPT | Mod: 59

## 2019-05-09 PROCEDURE — G0444 DEPRESSION SCREEN ANNUAL: CPT

## 2019-05-09 RX ORDER — GABAPENTIN 300 MG/1
300 CAPSULE ORAL
Qty: 30 | Refills: 3 | Status: DISCONTINUED | COMMUNITY
Start: 2019-04-11 | End: 2019-05-09

## 2019-05-09 NOTE — COUNSELING
[Weight management counseling provided] : Weight management [Activity counseling provided] : activity [Healthy eating counseling provided] : healthy eating [Fall prevention counseling provided] : fall prevention  [Behavioral health counseling provided] : behavioral health  [Needs reinforcement, provided] : Patient needs reinforcement on understanding of disease, goals and obesity follow-up plan; reinforcement was provided [Plan in advance] : Plan in advance [Adequate lighting] : Adequate lighting [No throw rugs] : No throw rugs [Use proper foot wear] : Use proper foot wear [Use recommended devices] : Use recommended devices [None] : None [Good understanding] : Patient has a good understanding of lifestyle changes and the steps needed to achieve self management goals [ - Behavioral Counseling for Obesity (Face-to-Face for 15 Minutes)] : Behavioral Counseling for Obesity (Face-to-Face for 15 Minutes) [ - Behavioral Counseling for Alcohol Misuse (Face-to-Face for 15 Minutes)] : Behavioral Counseling for Alcohol Misuse (Face-to-Face for 15 Minutes) [ - Annual Depression Screening] : Annual Depression Screening

## 2019-05-09 NOTE — ASSESSMENT
[FreeTextEntry1] : \par \par Annual \par -Advised to get yearly Flu shot \par -Advised Yearly Eye exam with Ophthalmologist\par -Advised Yearly Dental exam\par -Educated of the importance of Healthy diet, such as Mediterranean Diet and Exercise, such as walking >20 minutes a day and increasing gradually as tolerated \par \par \par Alcohol use, >2 drinks daily \par -Stressed to decrease the amount you drink. This can help prevent health problems such as brain, heart, and liver damage, high blood pressure, diabetes, and cancer. If you cannot stop completely, healthcare providers can help you set goals to decrease the amount you drink.\par -Plan weekly alcohol use. You will be less likely to drink more than the recommended limit if you plan ahead.\par -Have food when you drink alcohol. Food will prevent alcohol from getting into your system too quickly. Eat before you have your first alcohol drink.\par -Time your drinks carefully. Have no more than 1 drink in an hour. Have a liquid such as water, coffee, or a soft drink between alcohol drinks.\par -Do not drive if you have had alcohol. Make sure someone who has not been drinking can help you get home.\par -Do not drink alcohol if you are taking medicine. Alcohol is dangerous when you combine it with certain medicines, such as acetaminophen or blood pressure medicine. Talk to your healthcare provider about all the medicines you currently take.\par -Discussed that increased alcohol intake can lead to but not limited to Cancer in your liver, pancreas, stomach, colon, kidney, or breast, Stroke or a heart attack, Liver, kidney, or lung disease, Blackouts, memory loss, brain damage, or dementia, Diabetes, immune system problems, or thiamine (vitamin B1) deficiency\par -For support and more information: Alcoholics Anonymous Web Address: http://www.aa.org\par \par T10 compression fracture with h/o underlying osteoporosis on fosamax, can be related to possible fall she is unaware of given daily alcohol consumption, nonadherent to rolling walker or cane, refuses LSO brace in the past\par -f/u with spinal MD \par -will try trial of calcitonin nasal spray for 1 week, risks and benefits of medication discussed in detail. \par -advised to refrain from alcohol use \par -stressed importance of using rolling walker for support \par \par Atrial fibrillation, rate controlled\par -cont eliquis and bb\par -f/u as per cardio\par \par HFpEF\par -cont meds as directed \par \par Hypertension\par -cont meds as directed \par -educated that increased blood pressure is linked to but limited to increase risk of heart disease, stroke, kidney failure and even death.  stressed the importance to lower values by complying to a less than 2g sodium diet, moderate cardiovascular exercise and decrease stress level.\par -advised to check blood pressure at home with blood pressure cuff at different times of the day and bring to next appt, to eliminate possibility of white coat syndrome.\par \par Follow-up in 3 months \par \par all questions and concerns addressed with patient in detail.  patient verbalized back instructions in his own words.\par \par \par \par \par

## 2019-05-09 NOTE — HISTORY OF PRESENT ILLNESS
[de-identified] : 84 y/o female with multiple comorbidities including osteoporosis on Fosamax, DDD presents for annual exam.\par states has been c/o back pain, worse upon standing.  associated with bilateral leg pain in her thighs.  no weakness, numbness or tingling in lower extremity. no bowel or bladder incontinence\par \par recent MRI shows compression fracture of T10, \par states she fell about 2 years ago, no episodes since then \par MRI at that time didn’t show any fracture \par \par admits to drinking 3-5 glasses of wine at night \par , but states has social support with her sons nearby\par

## 2019-05-09 NOTE — HEALTH RISK ASSESSMENT
[Good] : ~his/her~  mood as  good [No falls in past year] : Patient reported no falls in the past year [0] : 2) Feeling down, depressed, or hopeless: Not at all (0) [Patient reported bone density results were abnormal] : Patient reported bone density results were abnormal [None] : None [With Significant Other] : lives with significant other [] :  [Fully functional (bathing, dressing, toileting, transferring, walking, feeding)] : Fully functional (bathing, dressing, toileting, transferring, walking, feeding) [Fully functional (using the telephone, shopping, preparing meals, housekeeping, doing laundry, using] : Fully functional and needs no help or supervision to perform IADLs (using the telephone, shopping, preparing meals, housekeeping, doing laundry, using transportation, managing medications and managing finances) [Smoke Detector] : smoke detector [Carbon Monoxide Detector] : carbon monoxide detector [Seat Belt] :  uses seat belt [Sunscreen] : uses sunscreen [FreeTextEntry1] : back [pain and leg pain  [YearQuit] : 30 years ago [] : No [de-identified] : prepare own food.  mostly protein  [de-identified] : denies  [de-identified] : 3-5 glasses at night.  [[id number]] : 0 [HIV test declined] : HIV test declined [Hepatitis C test declined] : Hepatitis C test declined [Change in mental status noted] : No change in mental status noted [Sexually Active] : not sexually active [Reports changes in hearing] : Reports no changes in hearing [Reports changes in vision] : Reports no changes in vision [BoneDensityDate] : 2018 [BoneDensityComments] : +osteoporosis on fosamax

## 2019-05-09 NOTE — PHYSICAL EXAM
[Normal] : no joint swelling, grossly normal strength/tone [de-identified] : normal.  no rash or lesions

## 2019-05-10 LAB
25(OH)D3 SERPL-MCNC: 31 NG/ML
ALBUMIN SERPL ELPH-MCNC: 4.2 G/DL
ALP BLD-CCNC: 107 U/L
ALT SERPL-CCNC: 17 U/L
ANION GAP SERPL CALC-SCNC: 14 MMOL/L
AST SERPL-CCNC: 17 U/L
BASOPHILS # BLD AUTO: 0.06 K/UL
BASOPHILS NFR BLD AUTO: 0.8 %
BILIRUB DIRECT SERPL-MCNC: 0.1 MG/DL
BILIRUB INDIRECT SERPL-MCNC: 0.2 MG/DL
BILIRUB SERPL-MCNC: 0.2 MG/DL
BUN SERPL-MCNC: 43 MG/DL
CALCIUM SERPL-MCNC: 9.5 MG/DL
CHLORIDE SERPL-SCNC: 100 MMOL/L
CHOLEST SERPL-MCNC: 218 MG/DL
CHOLEST/HDLC SERPL: 3.9 RATIO
CO2 SERPL-SCNC: 27 MMOL/L
CREAT SERPL-MCNC: 1.76 MG/DL
EOSINOPHIL # BLD AUTO: 0.25 K/UL
EOSINOPHIL NFR BLD AUTO: 3.5 %
ESTIMATED AVERAGE GLUCOSE: 108 MG/DL
FERRITIN SERPL-MCNC: 28 NG/ML
FOLATE SERPL-MCNC: >20 NG/ML
GLUCOSE SERPL-MCNC: 103 MG/DL
HBA1C MFR BLD HPLC: 5.4 %
HCT VFR BLD CALC: 34.5 %
HDLC SERPL-MCNC: 56 MG/DL
HGB BLD-MCNC: 10.5 G/DL
IMM GRANULOCYTES NFR BLD AUTO: 0.3 %
IRON SATN MFR SERPL: 26 %
IRON SERPL-MCNC: 84 UG/DL
LDLC SERPL CALC-MCNC: 92 MG/DL
LYMPHOCYTES # BLD AUTO: 1.52 K/UL
LYMPHOCYTES NFR BLD AUTO: 21.4 %
MAN DIFF?: NORMAL
MCHC RBC-ENTMCNC: 27.2 PG
MCHC RBC-ENTMCNC: 30.4 GM/DL
MCV RBC AUTO: 89.4 FL
MONOCYTES # BLD AUTO: 0.89 K/UL
MONOCYTES NFR BLD AUTO: 12.5 %
NEUTROPHILS # BLD AUTO: 4.37 K/UL
NEUTROPHILS NFR BLD AUTO: 61.5 %
PLATELET # BLD AUTO: 263 K/UL
POTASSIUM SERPL-SCNC: 4.6 MMOL/L
PROT SERPL-MCNC: 7.2 G/DL
RBC # BLD: 3.86 M/UL
RBC # FLD: 13.2 %
SODIUM SERPL-SCNC: 141 MMOL/L
T3FREE SERPL-MCNC: 2.28 PG/ML
T4 FREE SERPL-MCNC: 1.3 NG/DL
TIBC SERPL-MCNC: 318 UG/DL
TRIGL SERPL-MCNC: 348 MG/DL
TSH SERPL-ACNC: 2.22 UIU/ML
UIBC SERPL-MCNC: 234 UG/DL
VIT B12 SERPL-MCNC: >2000 PG/ML
WBC # FLD AUTO: 7.11 K/UL

## 2019-05-22 DIAGNOSIS — Z00.00 ENCOUNTER FOR GENERAL ADULT MEDICAL EXAMINATION W/OUT ABNORMAL FINDINGS: ICD-10-CM

## 2019-06-03 ENCOUNTER — APPOINTMENT (OUTPATIENT)
Dept: CARDIOLOGY | Facility: CLINIC | Age: 84
End: 2019-06-03
Payer: MEDICARE

## 2019-06-03 ENCOUNTER — NON-APPOINTMENT (OUTPATIENT)
Age: 84
End: 2019-06-03

## 2019-06-03 VITALS
HEIGHT: 62 IN | DIASTOLIC BLOOD PRESSURE: 80 MMHG | OXYGEN SATURATION: 97 % | SYSTOLIC BLOOD PRESSURE: 134 MMHG | BODY MASS INDEX: 29.08 KG/M2 | WEIGHT: 158 LBS | HEART RATE: 77 BPM

## 2019-06-03 PROCEDURE — 93000 ELECTROCARDIOGRAM COMPLETE: CPT

## 2019-06-03 PROCEDURE — 99214 OFFICE O/P EST MOD 30 MIN: CPT

## 2019-06-14 ENCOUNTER — TRANSCRIPTION ENCOUNTER (OUTPATIENT)
Age: 84
End: 2019-06-14

## 2019-06-19 ENCOUNTER — APPOINTMENT (OUTPATIENT)
Dept: VASCULAR SURGERY | Facility: CLINIC | Age: 84
End: 2019-06-19
Payer: MEDICARE

## 2019-06-19 VITALS
SYSTOLIC BLOOD PRESSURE: 128 MMHG | WEIGHT: 155 LBS | HEIGHT: 62 IN | HEART RATE: 70 BPM | DIASTOLIC BLOOD PRESSURE: 68 MMHG | BODY MASS INDEX: 28.52 KG/M2

## 2019-06-19 PROCEDURE — 99213 OFFICE O/P EST LOW 20 MIN: CPT

## 2019-06-19 PROCEDURE — 93923 UPR/LXTR ART STDY 3+ LVLS: CPT

## 2019-06-19 PROCEDURE — 93880 EXTRACRANIAL BILAT STUDY: CPT

## 2019-07-01 ENCOUNTER — TRANSCRIPTION ENCOUNTER (OUTPATIENT)
Age: 84
End: 2019-07-01

## 2019-07-08 ENCOUNTER — MEDICATION RENEWAL (OUTPATIENT)
Age: 84
End: 2019-07-08

## 2019-07-25 ENCOUNTER — MEDICATION RENEWAL (OUTPATIENT)
Age: 84
End: 2019-07-25

## 2019-08-21 ENCOUNTER — RX RENEWAL (OUTPATIENT)
Age: 84
End: 2019-08-21

## 2019-08-26 ENCOUNTER — MOBILE ON CALL (OUTPATIENT)
Age: 84
End: 2019-08-26

## 2019-09-05 ENCOUNTER — APPOINTMENT (OUTPATIENT)
Dept: INTERNAL MEDICINE | Facility: CLINIC | Age: 84
End: 2019-09-05
Payer: MEDICARE

## 2019-09-05 VITALS
DIASTOLIC BLOOD PRESSURE: 77 MMHG | HEART RATE: 76 BPM | SYSTOLIC BLOOD PRESSURE: 155 MMHG | HEIGHT: 62 IN | WEIGHT: 160 LBS | BODY MASS INDEX: 29.44 KG/M2

## 2019-09-05 LAB
BILIRUB UR QL STRIP: NORMAL
CLARITY UR: CLEAR
COLLECTION METHOD: NORMAL
GLUCOSE UR-MCNC: NORMAL
HCG UR QL: 0.2 EU/DL
HGB UR QL STRIP.AUTO: NORMAL
KETONES UR-MCNC: NORMAL
LEUKOCYTE ESTERASE UR QL STRIP: NORMAL
NITRITE UR QL STRIP: NORMAL
PH UR STRIP: 5
PROT UR STRIP-MCNC: NORMAL
SP GR UR STRIP: 1.02

## 2019-09-05 PROCEDURE — 81003 URINALYSIS AUTO W/O SCOPE: CPT | Mod: QW

## 2019-09-05 PROCEDURE — 99214 OFFICE O/P EST MOD 30 MIN: CPT | Mod: 25

## 2019-09-05 PROCEDURE — 36415 COLL VENOUS BLD VENIPUNCTURE: CPT

## 2019-09-05 RX ORDER — CALCITONIN SALMON 200 [IU]/1
200 SPRAY, METERED NASAL DAILY
Qty: 1 | Refills: 0 | Status: DISCONTINUED | COMMUNITY
Start: 2019-05-09 | End: 2019-09-05

## 2019-09-05 RX ORDER — RANOLAZINE 500 MG/1
500 TABLET, EXTENDED RELEASE ORAL
Qty: 60 | Refills: 0 | Status: DISCONTINUED | COMMUNITY
Start: 2019-06-03 | End: 2019-09-05

## 2019-09-05 NOTE — ASSESSMENT
[FreeTextEntry1] : \par urinary incontinence,\par -will start oxybutynin, risks and benefits of medication discussed in detail.\par \par h/o T10 compression fracture with h/o underlying osteoporosis on fosamax, can be related to possible fall she is unaware of given daily alcohol consumption, nonadherent to rolling walker or cane, refuses LSO brace in the past\par -cont gabapentin as directed \par -stressed importance of using rolling walker for support \par \par Atrial fibrillation, rate controlled\par -cont eliquis and bb\par -f/u as per cardio\par \par HFpEF\par -cont meds as directed \par \par Hypertension, stable \par -cont meds as directed \par \par Follow-up in 3 months \par \par all questions and concerns addressed with patient in detail.  patient verbalized back instructions in his own words.\par \par Spent >25 minutes in direct patient care and addressed all questions and concerns.  >50% of this time was in direct face to face contact with the patient during exam and counseling.\par \par \par \par \par

## 2019-09-05 NOTE — HISTORY OF PRESENT ILLNESS
[de-identified] : presents for follow-up \par overall is feeling well.\par back pain better with gabapentin \par now c/o urinary incontinence for the last year, getting worse at times \par feels as if she cant make the bathroom in time occasionally, not always.  +h/o vaginal delivery x2\par no vaginal discharge or bleeding.  no dysuria or gross hematuria \par

## 2019-09-05 NOTE — PHYSICAL EXAM
[No Lymphadenopathy] : no lymphadenopathy [Supple] : supple [Pedal Pulses Present] : the pedal pulses are present [No Edema] : there was no peripheral edema [Soft] : abdomen soft [Non-distended] : non-distended [Non Tender] : non-tender [No CVA Tenderness] : no CVA  tenderness [No Rash] : no rash [Normal] : affect was normal and insight and judgment were intact [No Skin Lesions] : no skin lesions

## 2019-09-06 LAB
ALBUMIN SERPL ELPH-MCNC: 4.4 G/DL
ALP BLD-CCNC: 112 U/L
ALT SERPL-CCNC: 20 U/L
ANION GAP SERPL CALC-SCNC: 13 MMOL/L
AST SERPL-CCNC: 23 U/L
BASOPHILS # BLD AUTO: 0.07 K/UL
BASOPHILS NFR BLD AUTO: 1 %
BILIRUB DIRECT SERPL-MCNC: 0.1 MG/DL
BILIRUB INDIRECT SERPL-MCNC: 0.1 MG/DL
BILIRUB SERPL-MCNC: 0.2 MG/DL
BUN SERPL-MCNC: 43 MG/DL
CALCIUM SERPL-MCNC: 9.3 MG/DL
CHLORIDE SERPL-SCNC: 106 MMOL/L
CHOLEST SERPL-MCNC: 201 MG/DL
CHOLEST/HDLC SERPL: 3.7 RATIO
CO2 SERPL-SCNC: 23 MMOL/L
CREAT SERPL-MCNC: 1.72 MG/DL
EOSINOPHIL # BLD AUTO: 0.26 K/UL
EOSINOPHIL NFR BLD AUTO: 3.7 %
ESTIMATED AVERAGE GLUCOSE: 117 MG/DL
GLUCOSE SERPL-MCNC: 111 MG/DL
HBA1C MFR BLD HPLC: 5.7 %
HCT VFR BLD CALC: 36.9 %
HDLC SERPL-MCNC: 54 MG/DL
HGB BLD-MCNC: 11 G/DL
IMM GRANULOCYTES NFR BLD AUTO: 0.9 %
LDLC SERPL CALC-MCNC: 89 MG/DL
LYMPHOCYTES # BLD AUTO: 1.47 K/UL
LYMPHOCYTES NFR BLD AUTO: 20.9 %
MAN DIFF?: NORMAL
MCHC RBC-ENTMCNC: 26.9 PG
MCHC RBC-ENTMCNC: 29.8 GM/DL
MCV RBC AUTO: 90.2 FL
MONOCYTES # BLD AUTO: 0.78 K/UL
MONOCYTES NFR BLD AUTO: 11.1 %
NEUTROPHILS # BLD AUTO: 4.38 K/UL
NEUTROPHILS NFR BLD AUTO: 62.4 %
PLATELET # BLD AUTO: 258 K/UL
POTASSIUM SERPL-SCNC: 4.8 MMOL/L
PROT SERPL-MCNC: 7.2 G/DL
RBC # BLD: 4.09 M/UL
RBC # FLD: 14.2 %
SAVE SPECIMEN: NORMAL
SODIUM SERPL-SCNC: 142 MMOL/L
TRIGL SERPL-MCNC: 289 MG/DL
TSH SERPL-ACNC: 2.53 UIU/ML
WBC # FLD AUTO: 7.02 K/UL

## 2019-09-09 LAB — BACTERIA UR CULT: NORMAL

## 2019-09-12 ENCOUNTER — TRANSCRIPTION ENCOUNTER (OUTPATIENT)
Age: 84
End: 2019-09-12

## 2019-09-16 ENCOUNTER — TRANSCRIPTION ENCOUNTER (OUTPATIENT)
Age: 84
End: 2019-09-16

## 2019-09-16 ENCOUNTER — MEDICATION RENEWAL (OUTPATIENT)
Age: 84
End: 2019-09-16

## 2019-09-20 ENCOUNTER — TRANSCRIPTION ENCOUNTER (OUTPATIENT)
Age: 84
End: 2019-09-20

## 2019-09-23 ENCOUNTER — APPOINTMENT (OUTPATIENT)
Dept: VASCULAR SURGERY | Facility: CLINIC | Age: 84
End: 2019-09-23
Payer: MEDICARE

## 2019-09-23 VITALS
TEMPERATURE: 98 F | WEIGHT: 156 LBS | BODY MASS INDEX: 28.71 KG/M2 | HEART RATE: 93 BPM | SYSTOLIC BLOOD PRESSURE: 126 MMHG | HEIGHT: 62 IN | DIASTOLIC BLOOD PRESSURE: 71 MMHG

## 2019-09-23 DIAGNOSIS — L03.115 CELLULITIS OF RIGHT LOWER LIMB: ICD-10-CM

## 2019-09-23 PROCEDURE — 99213 OFFICE O/P EST LOW 20 MIN: CPT

## 2019-10-02 ENCOUNTER — INPATIENT (INPATIENT)
Facility: HOSPITAL | Age: 84
LOS: 5 days | Discharge: NURSING, REHAB OR LONG-TERM CARE | DRG: 683 | End: 2019-10-08
Attending: INTERNAL MEDICINE | Admitting: HOSPITALIST
Payer: MEDICARE

## 2019-10-02 VITALS
HEART RATE: 65 BPM | SYSTOLIC BLOOD PRESSURE: 117 MMHG | HEIGHT: 62 IN | TEMPERATURE: 98 F | OXYGEN SATURATION: 100 % | RESPIRATION RATE: 18 BRPM | WEIGHT: 156.97 LBS | DIASTOLIC BLOOD PRESSURE: 73 MMHG

## 2019-10-02 DIAGNOSIS — E78.00 PURE HYPERCHOLESTEROLEMIA, UNSPECIFIED: ICD-10-CM

## 2019-10-02 DIAGNOSIS — Z29.9 ENCOUNTER FOR PROPHYLACTIC MEASURES, UNSPECIFIED: ICD-10-CM

## 2019-10-02 DIAGNOSIS — Z90.49 ACQUIRED ABSENCE OF OTHER SPECIFIED PARTS OF DIGESTIVE TRACT: Chronic | ICD-10-CM

## 2019-10-02 DIAGNOSIS — E87.5 HYPERKALEMIA: ICD-10-CM

## 2019-10-02 DIAGNOSIS — M54.5 LOW BACK PAIN: ICD-10-CM

## 2019-10-02 DIAGNOSIS — I65.29 OCCLUSION AND STENOSIS OF UNSPECIFIED CAROTID ARTERY: ICD-10-CM

## 2019-10-02 DIAGNOSIS — G47.00 INSOMNIA, UNSPECIFIED: ICD-10-CM

## 2019-10-02 DIAGNOSIS — I48.91 UNSPECIFIED ATRIAL FIBRILLATION: ICD-10-CM

## 2019-10-02 DIAGNOSIS — N17.9 ACUTE KIDNEY FAILURE, UNSPECIFIED: ICD-10-CM

## 2019-10-02 DIAGNOSIS — I10 ESSENTIAL (PRIMARY) HYPERTENSION: ICD-10-CM

## 2019-10-02 DIAGNOSIS — Z98.89 OTHER SPECIFIED POSTPROCEDURAL STATES: Chronic | ICD-10-CM

## 2019-10-02 DIAGNOSIS — K21.9 GASTRO-ESOPHAGEAL REFLUX DISEASE WITHOUT ESOPHAGITIS: ICD-10-CM

## 2019-10-02 LAB
ALBUMIN SERPL ELPH-MCNC: 4.3 G/DL — SIGNIFICANT CHANGE UP (ref 3.3–5)
ALP SERPL-CCNC: 105 U/L — SIGNIFICANT CHANGE UP (ref 40–120)
ALT FLD-CCNC: 25 U/L — SIGNIFICANT CHANGE UP (ref 10–45)
ANION GAP SERPL CALC-SCNC: 13 MMOL/L — SIGNIFICANT CHANGE UP (ref 5–17)
ANION GAP SERPL CALC-SCNC: 14 MMOL/L — SIGNIFICANT CHANGE UP (ref 5–17)
ANION GAP SERPL CALC-SCNC: 16 MMOL/L
APPEARANCE UR: CLEAR — SIGNIFICANT CHANGE UP
AST SERPL-CCNC: 18 U/L — SIGNIFICANT CHANGE UP (ref 10–40)
BASE EXCESS BLDV CALC-SCNC: -9.8 MMOL/L — LOW (ref -2–2)
BASOPHILS # BLD AUTO: 0.05 K/UL — SIGNIFICANT CHANGE UP (ref 0–0.2)
BASOPHILS NFR BLD AUTO: 0.7 % — SIGNIFICANT CHANGE UP (ref 0–2)
BILIRUB SERPL-MCNC: 0.2 MG/DL — SIGNIFICANT CHANGE UP (ref 0.2–1.2)
BILIRUB UR-MCNC: NEGATIVE — SIGNIFICANT CHANGE UP
BUN SERPL-MCNC: 122 MG/DL
BUN SERPL-MCNC: 129 MG/DL — HIGH (ref 7–23)
BUN SERPL-MCNC: 130 MG/DL — HIGH (ref 7–23)
CA-I SERPL-SCNC: 1.27 MMOL/L — SIGNIFICANT CHANGE UP (ref 1.12–1.3)
CALCIUM SERPL-MCNC: 9.1 MG/DL — SIGNIFICANT CHANGE UP (ref 8.4–10.5)
CALCIUM SERPL-MCNC: 9.5 MG/DL
CALCIUM SERPL-MCNC: 9.9 MG/DL — SIGNIFICANT CHANGE UP (ref 8.4–10.5)
CHLORIDE BLDV-SCNC: 109 MMOL/L — HIGH (ref 96–108)
CHLORIDE SERPL-SCNC: 104 MMOL/L
CHLORIDE SERPL-SCNC: 105 MMOL/L — SIGNIFICANT CHANGE UP (ref 96–108)
CHLORIDE SERPL-SCNC: 105 MMOL/L — SIGNIFICANT CHANGE UP (ref 96–108)
CO2 BLDV-SCNC: 18 MMOL/L — LOW (ref 22–30)
CO2 SERPL-SCNC: 17 MMOL/L
CO2 SERPL-SCNC: 17 MMOL/L — LOW (ref 22–31)
CO2 SERPL-SCNC: 17 MMOL/L — LOW (ref 22–31)
COLOR SPEC: SIGNIFICANT CHANGE UP
CREAT SERPL-MCNC: 5.3 MG/DL — HIGH (ref 0.5–1.3)
CREAT SERPL-MCNC: 5.43 MG/DL
CREAT SERPL-MCNC: 5.56 MG/DL — HIGH (ref 0.5–1.3)
DIFF PNL FLD: NEGATIVE — SIGNIFICANT CHANGE UP
EOSINOPHIL # BLD AUTO: 0.28 K/UL — SIGNIFICANT CHANGE UP (ref 0–0.5)
EOSINOPHIL NFR BLD AUTO: 3.7 % — SIGNIFICANT CHANGE UP (ref 0–6)
EOSINOPHIL NFR URNS MANUAL: NEGATIVE — SIGNIFICANT CHANGE UP
GAS PNL BLDV: 134 MMOL/L — LOW (ref 135–145)
GAS PNL BLDV: SIGNIFICANT CHANGE UP
GAS PNL BLDV: SIGNIFICANT CHANGE UP
GLUCOSE BLDV-MCNC: 137 MG/DL — HIGH (ref 70–99)
GLUCOSE SERPL-MCNC: 100 MG/DL
GLUCOSE SERPL-MCNC: 147 MG/DL — HIGH (ref 70–99)
GLUCOSE SERPL-MCNC: 98 MG/DL — SIGNIFICANT CHANGE UP (ref 70–99)
GLUCOSE UR QL: NEGATIVE — SIGNIFICANT CHANGE UP
HCO3 BLDV-SCNC: 17 MMOL/L — LOW (ref 21–29)
HCT VFR BLD CALC: 29.3 % — LOW (ref 34.5–45)
HCT VFR BLDA CALC: 30 % — LOW (ref 39–50)
HGB BLD CALC-MCNC: 9.6 G/DL — LOW (ref 11.5–15.5)
HGB BLD-MCNC: 9.2 G/DL — LOW (ref 11.5–15.5)
IMM GRANULOCYTES NFR BLD AUTO: 0.8 % — SIGNIFICANT CHANGE UP (ref 0–1.5)
KETONES UR-MCNC: NEGATIVE — SIGNIFICANT CHANGE UP
LACTATE BLDV-MCNC: 1 MMOL/L — SIGNIFICANT CHANGE UP (ref 0.7–2)
LEUKOCYTE ESTERASE UR-ACNC: NEGATIVE — SIGNIFICANT CHANGE UP
LYMPHOCYTES # BLD AUTO: 1.36 K/UL — SIGNIFICANT CHANGE UP (ref 1–3.3)
LYMPHOCYTES # BLD AUTO: 18 % — SIGNIFICANT CHANGE UP (ref 13–44)
MCHC RBC-ENTMCNC: 27.5 PG — SIGNIFICANT CHANGE UP (ref 27–34)
MCHC RBC-ENTMCNC: 31.4 GM/DL — LOW (ref 32–36)
MCV RBC AUTO: 87.5 FL — SIGNIFICANT CHANGE UP (ref 80–100)
MONOCYTES # BLD AUTO: 0.74 K/UL — SIGNIFICANT CHANGE UP (ref 0–0.9)
MONOCYTES NFR BLD AUTO: 9.8 % — SIGNIFICANT CHANGE UP (ref 2–14)
NEUTROPHILS # BLD AUTO: 5.07 K/UL — SIGNIFICANT CHANGE UP (ref 1.8–7.4)
NEUTROPHILS NFR BLD AUTO: 67 % — SIGNIFICANT CHANGE UP (ref 43–77)
NITRITE UR-MCNC: NEGATIVE — SIGNIFICANT CHANGE UP
NRBC # BLD: 0 /100 WBCS — SIGNIFICANT CHANGE UP (ref 0–0)
PCO2 BLDV: 45 MMHG — SIGNIFICANT CHANGE UP (ref 35–50)
PH BLDV: 7.21 — LOW (ref 7.35–7.45)
PH UR: 6 — SIGNIFICANT CHANGE UP (ref 5–8)
PLATELET # BLD AUTO: 312 K/UL — SIGNIFICANT CHANGE UP (ref 150–400)
PO2 BLDV: 25 MMHG — SIGNIFICANT CHANGE UP (ref 25–45)
POTASSIUM BLDV-SCNC: 5.9 MMOL/L — HIGH (ref 3.5–5.3)
POTASSIUM SERPL-MCNC: 5.3 MMOL/L — SIGNIFICANT CHANGE UP (ref 3.5–5.3)
POTASSIUM SERPL-MCNC: 6.1 MMOL/L — HIGH (ref 3.5–5.3)
POTASSIUM SERPL-SCNC: 5.3 MMOL/L — SIGNIFICANT CHANGE UP (ref 3.5–5.3)
POTASSIUM SERPL-SCNC: 6 MMOL/L
POTASSIUM SERPL-SCNC: 6.1 MMOL/L — HIGH (ref 3.5–5.3)
PROT SERPL-MCNC: 7.6 G/DL — SIGNIFICANT CHANGE UP (ref 6–8.3)
PROT UR-MCNC: SIGNIFICANT CHANGE UP
RBC # BLD: 3.35 M/UL — LOW (ref 3.8–5.2)
RBC # FLD: 13.9 % — SIGNIFICANT CHANGE UP (ref 10.3–14.5)
SAO2 % BLDV: 34 % — LOW (ref 67–88)
SODIUM SERPL-SCNC: 135 MMOL/L — SIGNIFICANT CHANGE UP (ref 135–145)
SODIUM SERPL-SCNC: 136 MMOL/L — SIGNIFICANT CHANGE UP (ref 135–145)
SODIUM SERPL-SCNC: 137 MMOL/L
SP GR SPEC: 1.01 — SIGNIFICANT CHANGE UP (ref 1.01–1.02)
UROBILINOGEN FLD QL: NEGATIVE — SIGNIFICANT CHANGE UP
WBC # BLD: 7.56 K/UL — SIGNIFICANT CHANGE UP (ref 3.8–10.5)
WBC # FLD AUTO: 7.56 K/UL — SIGNIFICANT CHANGE UP (ref 3.8–10.5)

## 2019-10-02 PROCEDURE — 73610 X-RAY EXAM OF ANKLE: CPT | Mod: 26,RT

## 2019-10-02 PROCEDURE — 99291 CRITICAL CARE FIRST HOUR: CPT

## 2019-10-02 PROCEDURE — 93010 ELECTROCARDIOGRAM REPORT: CPT

## 2019-10-02 PROCEDURE — 73590 X-RAY EXAM OF LOWER LEG: CPT | Mod: 26,RT

## 2019-10-02 PROCEDURE — 99223 1ST HOSP IP/OBS HIGH 75: CPT

## 2019-10-02 PROCEDURE — 74176 CT ABD & PELVIS W/O CONTRAST: CPT | Mod: 26

## 2019-10-02 RX ORDER — SODIUM ZIRCONIUM CYCLOSILICATE 10 G/10G
10 POWDER, FOR SUSPENSION ORAL DAILY
Refills: 0 | Status: DISCONTINUED | OUTPATIENT
Start: 2019-10-02 | End: 2019-10-04

## 2019-10-02 RX ORDER — AMLODIPINE BESYLATE 2.5 MG/1
5 TABLET ORAL DAILY
Refills: 0 | Status: DISCONTINUED | OUTPATIENT
Start: 2019-10-02 | End: 2019-10-08

## 2019-10-02 RX ORDER — CHOLESTYRAMINE 4 G/9G
4 POWDER, FOR SUSPENSION ORAL
Qty: 0 | Refills: 0 | DISCHARGE

## 2019-10-02 RX ORDER — PANTOPRAZOLE SODIUM 20 MG/1
40 TABLET, DELAYED RELEASE ORAL
Refills: 0 | Status: DISCONTINUED | OUTPATIENT
Start: 2019-10-02 | End: 2019-10-08

## 2019-10-02 RX ORDER — GABAPENTIN 400 MG/1
300 CAPSULE ORAL
Refills: 0 | Status: DISCONTINUED | OUTPATIENT
Start: 2019-10-02 | End: 2019-10-08

## 2019-10-02 RX ORDER — AMITRIPTYLINE HCL 25 MG
25 TABLET ORAL AT BEDTIME
Refills: 0 | Status: DISCONTINUED | OUTPATIENT
Start: 2019-10-02 | End: 2019-10-08

## 2019-10-02 RX ORDER — METOPROLOL TARTRATE 50 MG
100 TABLET ORAL DAILY
Refills: 0 | Status: DISCONTINUED | OUTPATIENT
Start: 2019-10-02 | End: 2019-10-08

## 2019-10-02 RX ORDER — SODIUM BICARBONATE 1 MEQ/ML
0.1 SYRINGE (ML) INTRAVENOUS
Qty: 75 | Refills: 0 | Status: DISCONTINUED | OUTPATIENT
Start: 2019-10-02 | End: 2019-10-04

## 2019-10-02 RX ORDER — APIXABAN 2.5 MG/1
2.5 TABLET, FILM COATED ORAL
Refills: 0 | Status: DISCONTINUED | OUTPATIENT
Start: 2019-10-02 | End: 2019-10-04

## 2019-10-02 RX ORDER — DEXTROSE 50 % IN WATER 50 %
25 SYRINGE (ML) INTRAVENOUS ONCE
Refills: 0 | Status: COMPLETED | OUTPATIENT
Start: 2019-10-02 | End: 2019-10-02

## 2019-10-02 RX ORDER — SODIUM CHLORIDE 9 MG/ML
500 INJECTION, SOLUTION INTRAVENOUS
Refills: 0 | Status: DISCONTINUED | OUTPATIENT
Start: 2019-10-02 | End: 2019-10-02

## 2019-10-02 RX ORDER — MULTIVIT-MIN/FERROUS GLUCONATE 9 MG/15 ML
1 LIQUID (ML) ORAL DAILY
Refills: 0 | Status: DISCONTINUED | OUTPATIENT
Start: 2019-10-02 | End: 2019-10-08

## 2019-10-02 RX ORDER — CALCIUM GLUCONATE 100 MG/ML
1 VIAL (ML) INTRAVENOUS ONCE
Refills: 0 | Status: COMPLETED | OUTPATIENT
Start: 2019-10-02 | End: 2019-10-02

## 2019-10-02 RX ORDER — ASPIRIN/CALCIUM CARB/MAGNESIUM 324 MG
81 TABLET ORAL DAILY
Refills: 0 | Status: DISCONTINUED | OUTPATIENT
Start: 2019-10-02 | End: 2019-10-04

## 2019-10-02 RX ORDER — LIDOCAINE 4 G/100G
1 CREAM TOPICAL DAILY
Refills: 0 | Status: DISCONTINUED | OUTPATIENT
Start: 2019-10-02 | End: 2019-10-08

## 2019-10-02 RX ORDER — INSULIN HUMAN 100 [IU]/ML
10 INJECTION, SOLUTION SUBCUTANEOUS ONCE
Refills: 0 | Status: COMPLETED | OUTPATIENT
Start: 2019-10-02 | End: 2019-10-02

## 2019-10-02 RX ADMIN — LIDOCAINE 1 PATCH: 4 CREAM TOPICAL at 23:25

## 2019-10-02 RX ADMIN — Medication 200 GRAM(S): at 16:07

## 2019-10-02 RX ADMIN — GABAPENTIN 300 MILLIGRAM(S): 400 CAPSULE ORAL at 23:24

## 2019-10-02 RX ADMIN — Medication 25 MILLILITER(S): at 16:06

## 2019-10-02 RX ADMIN — APIXABAN 2.5 MILLIGRAM(S): 2.5 TABLET, FILM COATED ORAL at 23:25

## 2019-10-02 RX ADMIN — INSULIN HUMAN 10 UNIT(S): 100 INJECTION, SOLUTION SUBCUTANEOUS at 16:08

## 2019-10-02 RX ADMIN — SODIUM CHLORIDE 500 MILLILITER(S): 9 INJECTION, SOLUTION INTRAVENOUS at 14:40

## 2019-10-02 NOTE — H&P ADULT - NSICDXPASTSURGICALHX_GEN_ALL_CORE_FT
PAST SURGICAL HISTORY:  Abdominal hernia repair 2007    Bilateral cataracts extraction w/ IOL    H/O carotid endarterectomy     History of lumbar laminectomy for spinal cord decompression 1995    Hx of tonsillectomy     Papilloma of breast s/p excision from right breast >20 years ago    S/P laparoscopic cholecystectomy     S/P MVR (mitral valve repair) 1997 at [facility]    Status post DACIA-BSO 1975    Varicose veins s/p sclerotherapy bilaterally

## 2019-10-02 NOTE — H&P ADULT - PROBLEM SELECTOR PLAN 7
Start home dose of pantoprazole. Patient expresses desire of tapering off this medication, because she had been taking it for a long time.

## 2019-10-02 NOTE — H&P ADULT - NSHPREVIEWOFSYSTEMS_GEN_ALL_CORE
REVIEW OF SYSTEMS  CONSTITUTIONAL: No fever, + chills, no sweats  EYES: No eye pain, difficulty seeing computer screens   ENMT:  some hearing loss, no throat pain  RESPIRATORY: + dry cough, no wheezing, no sputum production; No shortness of breath  CARDIOVASCULAR: No chest pain, + occasional palpitations  GASTROINTESTINAL: No abdominal pain, + vomiting yesterday now resolved, +occasional diarrhea, no constipation  GENITOURINARY: No dysuria, no hematuria  NEUROLOGICAL: No headaches, no loss of strength, no numbness  SKIN: No itching, no rashes, right lower extremity redness had improved  MUSCULOSKELETAL: + lower back pain, +RLE pain   HEME/LYMPH: No easy bruising, bleeding

## 2019-10-02 NOTE — ED ADULT NURSE NOTE - OBJECTIVE STATEMENT
Patient is a 84 y female presenting to the ED ambulatory from home with c/o dizziness. Patient A&Ox4. Received call from cardiologist last night stating that based off labs, the patient currently has kidney failure. Was treated for cellulitis about 2 weeks ago and was treated with Bactrim and Keflex. Reports the dizziness started with the beginning of antibiotic therapy. PMH of CHF, CKD, Patient is a 84 y female presenting to the ED ambulatory from home with c/o dizziness. Patient A&Ox4. Received call from cardiologist last night stating that based off labs, the patient currently has kidney failure. Was treated for cellulitis about 2 weeks ago and was treated with Bactrim and Keflex. Patient speaking coherently in full sentences. Patient reports feeling unbalanced while walking and "feeling drunk." Heart sounds heard upon auscultation. Lung sounds clear bilaterally. Abdomen soft, non-distended and non-tender upon palpation. No edema noted on bilateral lower extremities. Right lower extremity shows redness on the anterior portion of the tibia. Peripheral pulses are present in bilateral lower extremities. ECG performed at bedside. Patient placed on cardiac monitor. Reports the dizziness started with the beginning of antibiotic therapy. Denies chest pain, SOB, headache, N/V/D, abdominal pain, fever/chills, burning upon urination or difficulty urinating, hematuria. PMH of CHF, CKD,

## 2019-10-02 NOTE — H&P ADULT - ASSESSMENT
This patient is an 84yoF with PMH of htn, hLd, PAD, GERD, R carotid stenosis s/p CEA, MV repair, atrial fibrillation on eliquis, CKD stage 3 who presents to the ED due to abnormal outpatient lab, found to have WEN.

## 2019-10-02 NOTE — H&P ADULT - PROBLEM SELECTOR PLAN 6
Hold home dose of losartan due to hyperkalemia and WEN.  Start home dose of metoprolol and amlodipine with hold parameters.

## 2019-10-02 NOTE — H&P ADULT - NSICDXPASTMEDICALHX_GEN_ALL_CORE_FT
PAST MEDICAL HISTORY:  Carotid artery occlusion (R)    CHF (congestive heart failure)     Choledocholithiasis     Gallstones     GERD (gastroesophageal reflux disease)     HTN (hypertension)     Hypercholesteremia     Insomnia     Mitral valve disease     Osteoporosis     Vitamin B 12 deficiency

## 2019-10-02 NOTE — H&P ADULT - NSHPPHYSICALEXAM_GEN_ALL_CORE
T(C): 36.3 (10-02-19 @ 21:09), Max: 36.4 (10-02-19 @ 11:44)  HR: 80 (10-02-19 @ 21:09) (65 - 80)  BP: 113/69 (10-02-19 @ 21:09) (104/49 - 163/55)  RR: 18 (10-02-19 @ 21:09) (16 - 20)  SpO2: 96% (10-02-19 @ 21:09) (96% - 100%)  Wt(kg): --    PHYSICAL EXAM:  GENERAL: NAD, well-groomed, well-developed  HEAD:  Atraumatic, Normocephalic  EYES: EOMI, PERRLA, conjunctiva and sclera clear  ENMT: No oropharyngeal exudates, erythema or lesions,  Moist mucous membranes, good dentition  NECK: Supple, no cervical lymphadenopathy, no JVD  NERVOUS SYSTEM:  Alert & Oriented X3, CN II-XII intact, 5/5 BUE and BLE motor strength, full sensation to light touch   CHEST/LUNG: Clear to auscultation bilaterally; No rales, no  rhonchi, no wheezing  HEART: Regular rate and rhythm; No murmurs, rubs, or gallops  ABDOMEN: Soft, Nontender, Nondistended  EXTREMITIES:  2+ Peripheral Pulses, No clubbing, cyanosis, or edema  LYMPH: No lymphadenopathy noted  SKIN: No rashes or lesions

## 2019-10-02 NOTE — ED PROVIDER NOTE - NEUROLOGICAL, MLM
Alert and oriented, no focal deficits, no motor or sensory deficits. Following commands. Coordination intact/

## 2019-10-02 NOTE — ED PROVIDER NOTE - NEURO NEGATIVE STATEMENT, MLM
Dizziness and weakness. no loss of consciousness, no gait abnormality, no headache, no sensory deficits.

## 2019-10-02 NOTE — H&P ADULT - PROBLEM SELECTOR PLAN 2
Hyperkalemia likely related to WEN.  K has improved with D50 and insulin. Pt ordered for Lokelma by ED.   WIll trend K.  Will check Mg in AM

## 2019-10-02 NOTE — ED ADULT NURSE NOTE - NS ED NURSE PATIENT LEFT UNIT TIME
How Severe Is Your Skin Lesion?: mild
Have Your Skin Lesions Been Treated?: not been treated
Is This A New Presentation, Or A Follow-Up?: Skin Lesion
20:43

## 2019-10-02 NOTE — ED ADULT NURSE REASSESSMENT NOTE - NS ED NURSE REASSESS COMMENT FT1
Report received from SIMI Kaur. pt in no acute distress, repeat FS taken. pt eating with family. pt to be admitted

## 2019-10-02 NOTE — H&P ADULT - PROBLEM SELECTOR PLAN 1
Follow up nephrology recommendations.  UA did not detect blood and did not comment on presence of WBC.   WEN may be related to recent bactrim use, ATN/azotemia due to diuretic use.  Will hold diuetics (metolazone, spironolactone), ARB at this time. Can give lasix 80mg IV PRN for dyspnea, per nephrology, but pt currently breathing comfortably.   Avoid nephrotoxic agents.  CT A/P did not identify obstructive uropathy.   Monitor strict I&Os.  Trend renal function.  As per nephrology, will check SPEP and immunofixation to evaluate for possible myeloma given renal failure, back pain and anemia  Check phos in AM.   Check BMP q12h  Continue 1/2NS+  75mEq/L NaHCO3 @ 100cc/hr Care discussed with nephrology team.  Follow up nephrology recommendations.  UA did not detect blood and did not comment on presence of WBC.   WEN may be related to recent bactrim use, ATN/azotemia due to diuretic use.  Will hold diuetics (metolazone, spironolactone), ARB at this time. Can give lasix 80mg IV PRN for dyspnea, per nephrology, but pt currently breathing comfortably.   Avoid nephrotoxic agents.  CT A/P did not identify obstructive uropathy.   Monitor strict I&Os.  Trend renal function.  As per nephrology, will check SPEP and immunofixation to evaluate for possible myeloma given renal failure, back pain and anemia  Check phos in AM.   Check BMP q12h  Continue 1/2NS+  75mEq/L NaHCO3 @ 100cc/hr

## 2019-10-02 NOTE — H&P ADULT - HISTORY OF PRESENT ILLNESS
This patient is an 84yoF with PMH of htn, hLd, PAD, GERD, R carotid stenosis s/p CEA, MV repair, atrial fibrillation on eliquis, CKD stage 3 who presents to the ED due to abnormal outpatient labs. Patient had bloodwork with Dr. Wynne on 9/30/2019 and was advised today to visit the ED due to renal failure. Patient reports she had been diagnosed with RLE cellulitis about 2.5 weeks ago after a small table fell on her leg. She was initially prescribed keflex, which she took for a few days. Due to lack of improvement, she was then switched to bactrim. While on bactrim, the patient experienced dizziness and abdominal pain, thus she stopped taking bactrim and resumed keflex. Her redness and swelling of the RLE improved. She noted decrease in urine output over the last week, without hematuria or dysuria, despite taking diuretics for RLE edema. She also took lasix.   Patient also complains of having terrible lower back pain which is exacerbated by walking or standing, causing her to "scream with each step," and improved with rest and lying down. Pain is sharp, localized but sometimes radiates up the back. She denies any associated lower extremity numbness. Pt has chronic incontinence.     In the ED, T 97.5F, HR 65,/73, RR 18, SpO2 100%RA  Patient was given calcium gluconate 1g, insulin 10u IVP, dextrose 25cc, IV NaHCO3 at 100cc/hr.

## 2019-10-02 NOTE — ED PROVIDER NOTE - SKIN, MLM
Left lower calf/ankle erythematous and tender. Skin normal color for race, warm, dry and intact. No evidence of rash. Right lower calf/ankle erythematous and tender. Skin normal color for race, warm, dry and intact. No evidence of rash.

## 2019-10-02 NOTE — CONSULT NOTE ADULT - SUBJECTIVE AND OBJECTIVE BOX
HPI: Ms. Marshall is an 84 year-old woman with history of multiple medical issues including hypertension, CKD3 (creatinine mid 1s), valvular heart disease s/p MVR, and past breast cancer s/p lumpectomy. She was in her usual state of health until 3 weeks ago when she started to develop back pain. It has been progressively worsening since then. Over the past few days, she has developed dizziness. She saw her PCP 2 days ago in light of her symptoms; bloodwork was performed and returned notable for a severely elevated creatinine. In light of this, her PCP advised her to come to the [facility] ER.    In the ER, Ms. Marshall was noted on bloodwork to have a BUN/creatinine of 129/5.5, as well as a potassium of 6.1meq/L. She received IV Calcium, as well as Insulin and D50, and was placed on IV LR. Repeat bloodwork was sent and returned notable for BUN/creatinine of 130/5.3; potassium was down to 5.3meq/L.   Of note, Ms. Marshall has recently been taking a course of oral Bactrim for presumed cellulitis.    PAST MEDICAL & SURGICAL HISTORY:  CHF (congestive heart failure)  Choledocholithiasis  Gallstones  Mitral valve disease  Osteoporosis  Vitamin B 12 deficiency  Carotid artery occlusion: (R)  GERD (gastroesophageal reflux disease)  Hypercholesteremia  HTN (hypertension)  Insomnia  S/P laparoscopic cholecystectomy  H/O carotid endarterectomy  Varicose veins: s/p sclerotherapy bilaterally  Bilateral cataracts: extraction w/ IOL  Papilloma of breast: s/p excision from right breast &gt;20 years ago  History of lumbar laminectomy for spinal cord decompression:   Abdominal hernia: repair   Hx of tonsillectomy  Status post DACIA-BSO:   S/P MVR (mitral valve repair):  at [facility]    Allergies  adhesives (Urticaria; Rash)  statins (Unknown)    SOCIAL HISTORY:  Denies ETOh,Smoking,     FAMILY HISTORY:  Family history of dementia: mother  Family history of lung cancer (Grandparent)    REVIEW OF SYSTEMS:  CONSTITUTIONAL: No weakness, fevers or chills  EYES/ENT: No visual changes;  No vertigo or throat pain   NECK: No pain or stiffness  RESPIRATORY: No cough, wheezing, hemoptysis; No shortness of breath  CARDIOVASCULAR: No chest pain or palpitations; (+)dizziness  GASTROINTESTINAL: No abdominal or epigastric pain. No nausea, vomiting, or hematemesis; No diarrhea or constipation. No melena or hematochezia.  GENITOURINARY: No dysuria, frequency or hematuria  NEUROLOGICAL: No numbness or weakness; (+)back pain  SKIN: No itching, burning, rashes, or lesions   All other review of systems is negative unless indicated above.    VITAL:  T(C): , Max: 36.4 (10-02-19 @ 11:44)  T(F): , Max: 97.5 (10-02-19 @ 11:44)  HR: 74 (10-02-19 @ 19:00)  BP: 108/43 (10-02-19 @ 19:00)  RR: 18 (10-02-19 @ 19:00)  SpO2: 98% (10-02-19 @ 19:00)    PHYSICAL EXAM:  Constitutional: NAD, Alert  HEENT: NCAT, MMM  Neck: Supple, No JVD  Respiratory: CTA-b/l  Cardiovascular: RRR s1s2, no m/r/g  Gastrointestinal: BS+, soft, NT/ND  Extremities: No peripheral edema b/l  Neurological: no focal deficits; strength grossly intact  Back: no CVAT b/l  Skin: No rashes, no nevi    LABS:                        9.2    7.56  )-----------( 312      ( 02 Oct 2019 14:36 )             29.3     Na(136)/K(5.3)/Cl(105)/HCO3(17)/BUN(130)/Cr(5.30)Glu(98)/Ca(9.9)/Mg(--)/PO4(--)    10-02 @ 19:48  Na(135)/K(6.1)/Cl(105)/HCO3(17)/BUN(129)/Cr(5.56)Glu(147)/Ca(9.1)/Mg(--)/PO4(--)    10-02 @ 14:35    Urinalysis Basic - ( 02 Oct 2019 15:35 )  Color: Light Yellow / Appearance: Clear / S.014 / pH: x  Gluc: x / Ketone: Negative  / Bili: Negative / Urobili: Negative   Blood: x / Protein: Trace / Nitrite: Negative   Leuk Esterase: Negative / RBC: x / WBC x   Sq Epi: x / Non Sq Epi: x / Bacteria: x    (2018) - BUN/creatinine: 38/1.55      IMAGING:  < from: CT Abdomen and Pelvis No Cont (10.02.19 @ 16:55) >  Punctate nonobstructing left renal stone. No evidence of obstructive uropathy.      ASSESSMENT:  (1)Renal - ELLEN on CKD3. AIN due to Bactrim? Seems not to be the case, given her urinalysis is negative for RBCs and WBCs. Ischemic ATN from a combination of sepsis, 3 diuretics (Lasix/Metolazone/Spironolactone), and Losartan seems a more likely explanation. This is not postinfectious GN, given the bland urine sediment. Not obstructive, based on CT.    (2)Hyperkalemia - renally mediated. Improved on repeat bloodwork, but this may be from the transient cellular shifts induced by insulin. A potassium binding resin such as Lokelma appears warranted here.    (3)Metabolic acidosis - renally mediated    (4)Back pain - given the presence of ellen as well, we should rule out myeloma.      RECOMMEND:  (1)No further LR (contains potassium) - would instead give 1/2NS+75meq/L NaHCO3 @100cc/h  (2)No RAAS inhibitors (ie no Spironolactone nor Losartan)  (3)No standing Lasix nor Metolazone for now; would stop IVF and give Lasix 80mg iv prn dyspnea  (4)Renal diet  (5)Lokelma 10mg po daily for now, first dose now - can d/c once potassium trends below 5  (6)BMP at least q12h for now  (7)Check Mag and PO4 as well in a.m.  (8)SPEP/Immunofixation in a.m.  (9)Dose new meds for GFR <10    Thank you for involving [facility] in this patient's care.    With warm regards,    Maximus Owens MD   [facility] [employer] [facility]  [phone] HPI: Ms. Marshall is an 84 year-old woman with history of multiple medical issues including hypertension, CKD3 (creatinine mid 1s), valvular heart disease s/p MVR, and past breast cancer s/p lumpectomy. She was in her usual state of health until 3 weeks ago when she started to develop back pain. It has been progressively worsening since then. Over the past few days, she has developed dizziness. She saw her PCP 2 days ago in light of her symptoms; bloodwork was performed and returned notable for a severely elevated creatinine. In light of this, her PCP advised her to come [facility] ER.    In the ER, Ms. Marshall was noted on bloodwork to have a BUN/creatinine of 129/5.5, as well as a potassium of 6.1meq/L. She received IV Calcium, as well as Insulin and D50, and was placed on IV LR. Repeat bloodwork was sent and returned notable for BUN/creatinine of 130/5.3; potassium was down to 5.3meq/L.   Of note, Ms. Marshall has recently been taking a course of oral Bactrim for presumed cellulitis.    PAST MEDICAL & SURGICAL HISTORY:  CHF (congestive heart failure)  Choledocholithiasis  Gallstones  Mitral valve disease  Osteoporosis  Vitamin B 12 deficiency  Carotid artery occlusion: (R)  GERD (gastroesophageal reflux disease)  Hypercholesteremia  HTN (hypertension)  Insomnia  S/P laparoscopic cholecystectomy  H/O carotid endarterectomy  Varicose veins: s/p sclerotherapy bilaterally  Bilateral cataracts: extraction w/ IOL  Papilloma of breast: s/p excision from right breast &gt;20 years ago  History of lumbar laminectomy for spinal cord decompression:   Abdominal hernia: repair   Hx of tonsillectomy  Status post DACIA-BSO:   S/P MVR (mitral valve repair):  at [facility]    Allergies  adhesives (Urticaria; Rash)  statins (Unknown)    SOCIAL HISTORY:  Denies ETOh,Smoking,     FAMILY HISTORY:  Family history of dementia: mother  Family history of lung cancer (Grandparent)    REVIEW OF SYSTEMS:  CONSTITUTIONAL: No weakness, fevers or chills  EYES/ENT: No visual changes;  No vertigo or throat pain   NECK: No pain or stiffness  RESPIRATORY: No cough, wheezing, hemoptysis; No shortness of breath  CARDIOVASCULAR: No chest pain or palpitations; (+)dizziness  GASTROINTESTINAL: No abdominal or epigastric pain. No nausea, vomiting, or hematemesis; No diarrhea or constipation. No melena or hematochezia.  GENITOURINARY: No dysuria, frequency or hematuria  NEUROLOGICAL: No numbness or weakness; (+)back pain  SKIN: No itching, burning, rashes, or lesions   All other review of systems is negative unless indicated above.    VITAL:  T(C): , Max: 36.4 (10-02-19 @ 11:44)  T(F): , Max: 97.5 (10-02-19 @ 11:44)  HR: 74 (10-02-19 @ 19:00)  BP: 108/43 (10-02-19 @ 19:00)  RR: 18 (10-02-19 @ 19:00)  SpO2: 98% (10-02-19 @ 19:00)    PHYSICAL EXAM:  Constitutional: NAD, Alert  HEENT: NCAT, MMM  Neck: Supple, No JVD  Respiratory: CTA-b/l  Cardiovascular: RRR s1s2, no m/r/g  Gastrointestinal: BS+, soft, NT/ND  Extremities: No peripheral edema b/l  Neurological: no focal deficits; strength grossly intact  Back: no CVAT b/l  Skin: No rashes, no nevi    LABS:                        9.2    7.56  )-----------( 312      ( 02 Oct 2019 14:36 )             29.3     Na(136)/K(5.3)/Cl(105)/HCO3(17)/BUN(130)/Cr(5.30)Glu(98)/Ca(9.9)/Mg(--)/PO4(--)    10-02 @ 19:48  Na(135)/K(6.1)/Cl(105)/HCO3(17)/BUN(129)/Cr(5.56)Glu(147)/Ca(9.1)/Mg(--)/PO4(--)    10-02 @ 14:35    Urinalysis Basic - ( 02 Oct 2019 15:35 )  Color: Light Yellow / Appearance: Clear / S.014 / pH: x  Gluc: x / Ketone: Negative  / Bili: Negative / Urobili: Negative   Blood: x / Protein: Trace / Nitrite: Negative   Leuk Esterase: Negative / RBC: x / WBC x   Sq Epi: x / Non Sq Epi: x / Bacteria: x    (2018) - BUN/creatinine: 38/1.55      IMAGING:  < from: CT Abdomen and Pelvis No Cont (10.02.19 @ 16:55) >  Punctate nonobstructing left renal stone. No evidence of obstructive uropathy.      ASSESSMENT:  (1)Renal - ELLEN on CKD3. AIN due to Bactrim? Seems not to be the case, given her urinalysis is negative for RBCs and WBCs. Ischemic ATN from a combination of sepsis, 3 diuretics (Lasix/Metolazone/Spironolactone), and Losartan seems a more likely explanation. This is not postinfectious GN, given the bland urine sediment. Not obstructive, based on CT.    (2)Hyperkalemia - renally mediated. Improved on repeat bloodwork, but this may be from the transient cellular shifts induced by insulin. A potassium binding resin such as Lokelma appears warranted here.    (3)Metabolic acidosis - renally mediated    (4)Back pain - given the presence of ellen as well, we should rule out myeloma.      RECOMMEND:  (1)No further LR (contains potassium) - would instead give 1/2NS+75meq/L NaHCO3 @100cc/h  (2)No RAAS inhibitors (ie no Spironolactone nor Losartan)  (3)No standing Lasix nor Metolazone for now; would stop IVF and give Lasix 80mg iv prn dyspnea  (4)Renal diet  (5)Lokelma 10mg po daily for now, first dose now - can d/c once potassium trends below 5  (6)BMP at least q12h for now  (7)Check Mag and PO4 as well in a.m.  (8)SPEP/Immunofixation in a.m.  (9)Dose new meds for GFR <10; no objection to Eliquis as taken at home  (10)No NSAIDs for back pain      Thank you for involving [facility] in this patient's care.    With warm regards,    Maximus Owens MD   [facility]  [phone] HPI: Ms. Marshall is an 84 year-old woman with history of multiple medical issues including hypertension, CKD3 (creatinine mid 1s), valvular heart disease s/p MVR, and past breast cancer s/p lumpectomy. She was in her usual state of health until 3 weeks ago when she developed redness, swelling, and tenderness of her right lower leg. She was diagnosed by a family member with cellulitis of the leg, and was placed on a course of oral Keflex. Days afterwards she saw a vascular surgeon, who opted to switch her from Keflex to Bactrim. Within a couple days of taking the Bactrim, she developed worsening back pain as well as dizziness. When this occured, she opted to switch back to the Keflex. She stopped taking antibiotics approximately 2 days prior to admission. She states that around 1 week ago she noted that her urine output was down; it finally started to return to normal today. She believes that her urine output dropped before she took the Bactrim. She shares that she took one dose of Aleve for back pain a few days ago; she took the Aleve after her urine output decreased.     Ms. Marshall shares that on Monday she underwent routine bloodwork, ordered by her cardiologist Dr. Nicolás Wynne. The results returned today notable for a severely elevated BUN and creatinine; in light of this, he advised her to go to the [facility] ER.    In the ER, Ms. Marshall was noted on bloodwork to have a BUN/creatinine of 129/5.5, as well as a potassium of 6.1meq/L. She received IV Calcium, as well as Insulin and D50, and was placed on IV LR. Repeat bloodwork was sent and returned notable for BUN/creatinine of 130/5.3; potassium was down to 5.3meq/L.       PAST MEDICAL & SURGICAL HISTORY:  CHF (congestive heart failure)  Choledocholithiasis  Gallstones  Mitral valve disease  Osteoporosis  Vitamin B 12 deficiency  Carotid artery occlusion: (R)  GERD (gastroesophageal reflux disease)  Hypercholesteremia  HTN (hypertension)  Insomnia  S/P laparoscopic cholecystectomy  H/O carotid endarterectomy  Varicose veins: s/p sclerotherapy bilaterally  Bilateral cataracts: extraction w/ IOL  Papilloma of breast: s/p excision from right breast &gt;20 years ago  History of lumbar laminectomy for spinal cord decompression:   Abdominal hernia: repair 2007  Hx of tonsillectomy  Status post DACIA-BSO: 1975  S/P MVR (mitral valve repair):  at [facility]    Allergies  adhesives (Urticaria; Rash)  statins (Unknown)    SOCIAL HISTORY:  Denies ETOh,Smoking,     FAMILY HISTORY:  Family history of dementia: mother  Family history of lung cancer (Grandparent)    REVIEW OF SYSTEMS:  CONSTITUTIONAL: No weakness, fevers or chills  EYES/ENT: No visual changes;  No vertigo or throat pain   NECK: No pain or stiffness  RESPIRATORY: No cough, wheezing, hemoptysis; No shortness of breath  CARDIOVASCULAR: No chest pain or palpitations; (+)dizziness  GASTROINTESTINAL: No abdominal or epigastric pain. No nausea, vomiting, or hematemesis; No diarrhea or constipation. No melena or hematochezia.  GENITOURINARY: No dysuria, frequency or hematuria; (+)reduced urine output  NEUROLOGICAL: (+)back pain  SKIN: (+)RLE erythema/pain/swelling  All other review of systems is negative unless indicated above.    VITAL:  T(C): , Max: 36.4 (10-02-19 @ 11:44)  T(F): , Max: 97.5 (10-02-19 @ 11:44)  HR: 74 (10-02-19 @ 19:00)  BP: 108/43 (10-02-19 @ 19:00)  RR: 18 (10-02-19 @ 19:00)  SpO2: 98% (10-02-19 @ 19:00)    PHYSICAL EXAM:  Constitutional: NAD, Alert  HEENT: NCAT, DMM  Neck: Supple, No JVD  Respiratory: CTA-b/l  Cardiovascular: RRR s1s2, no m/r/g  Gastrointestinal: BS+, soft, NT/ND  Extremities: trace RLE edema; no LLE edema  Neurological: no focal deficits; strength grossly intact  Back: no CVAT b/l  Skin: (+)RLE mild erythema and (+)tenderness, below the knee    LABS:                        9.2    7.56  )-----------( 312      ( 02 Oct 2019 14:36 )             29.3     Na(136)/K(5.3)/Cl(105)/HCO3(17)/BUN(130)/Cr(5.30)Glu(98)/Ca(9.9)/Mg(--)/PO4(--)    10-02 @ 19:48  Na(135)/K(6.1)/Cl(105)/HCO3(17)/BUN(129)/Cr(5.56)Glu(147)/Ca(9.1)/Mg(--)/PO4(--)    10-02 @ 14:35    Urinalysis Basic - ( 02 Oct 2019 15:35 )  Color: Light Yellow / Appearance: Clear / S.014 / pH: x  Gluc: x / Ketone: Negative  / Bili: Negative / Urobili: Negative   Blood: x / Protein: Trace / Nitrite: Negative   Leuk Esterase: Negative / RBC: x / WBC x   Sq Epi: x / Non Sq Epi: x / Bacteria: x    (2018) - BUN/creatinine: 38/1.55      IMAGING:  < from: CT Abdomen and Pelvis No Cont (10.02.19 @ 16:55) >  Punctate nonobstructing left renal stone. No evidence of obstructive uropathy.      ASSESSMENT:  (1)Renal - WEN on CKD3. AIN due to Bactrim? Seems not to be the case, given her urinalysis is negative for RBCs and WBCs. Ischemic ATN from a combination of sepsis, 3 diuretics (Lasix/Metolazone/Spironolactone), and Losartan seems a more likely explanation. This is not postinfectious GN, given the bland urine sediment. Not obstructive, based on CT.    (2)Hyperkalemia - renally mediated. Improved on repeat bloodwork, but this may be from the transient cellular shifts induced by insulin. A potassium binding resin such as Lokelma appears warranted here.    (3)Metabolic acidosis - renally mediated    (4)Back pain - given the presence of wen as well, we should rule out myeloma.    (5)CV - she is likely intravascularly depleted.    RECOMMEND:  (1)No further LR (contains potassium) - would instead give 1/2NS+75meq/L NaHCO3 @100cc/h  (2)No RAAS inhibitors (ie no Spironolactone nor Losartan)  (3)No standing Lasix nor Metolazone for now; would stop IVF and give Lasix 80mg iv prn dyspnea  (4)Renal diet  (5)Lokelma 10mg po daily for now, first dose now - can d/c once potassium trends below 5  (6)BMP at least q12h for now  (7)Check Mag and PO4 as well in a.m.  (8)SPEP/Immunofixation in a.m.  (9)Dose new meds for GFR <10; no objection to Eliquis as taken at home  (10)No NSAIDs for back pain      Thank you for involving [facility] in this patient's care.    With warm regards,    Maximus Owens MD   [facility]  [phone]

## 2019-10-02 NOTE — ED PROVIDER NOTE - SKIN NEGATIVE STATEMENT, MLM
Cellulitis of left lower extremity. no abrasions, no jaundice, no lesions, no pruritis, and no new rashes. Cellulitis of right lower extremity. no abrasions, no jaundice, no lesions, no pruritis, and no new rashes.

## 2019-10-02 NOTE — ED PROVIDER NOTE - PSH
Abdominal hernia  repair 2007  Bilateral cataracts  extraction w/ IOL  H/O carotid endarterectomy    History of lumbar laminectomy for spinal cord decompression  1995  Hx of tonsillectomy    Papilloma of breast  s/p excision from right breast >20 years ago  S/P laparoscopic cholecystectomy    S/P MVR (mitral valve repair)  1997 at [facility]  Status post DACIA-BSO  1975  Varicose veins  s/p sclerotherapy bilaterally

## 2019-10-02 NOTE — ED ADULT TRIAGE NOTE - CHIEF COMPLAINT QUOTE
worsening bilateral lower back pain for 3 weeks and dizziness, had blood works Monday, was called by PMD last night, instructed to come to ER for kidney failure

## 2019-10-02 NOTE — H&P ADULT - NSICDXFAMILYHX_GEN_ALL_CORE_FT
FAMILY HISTORY:  Family history of dementia, mother  FH: cirrhosis, father    Grandparent  Still living? No  Family history of lung cancer, Age at diagnosis: Age Unknown

## 2019-10-02 NOTE — ED PROVIDER NOTE - ATTENDING CONTRIBUTION TO CARE
84yr F hx of chf, htn, hl, carotid occlusion p/w abnl lab results and malaise and low back pain. reports having a closet fall on her rt leg, a few days after developed cellulitis for which received keflex which was switched to bactrim. reports severe weakness after taking bactrim and vomiting which caused her to stop taking it. since then, continued to feel unwell and low back pain for about a month with radiation to both legs.  denies fever chills. denies cp, sob.  exam notable for rt leg ant swelling with mild erythema and ttp over mid low ant leg. clear lungs, no heart murmur. no abd ttp no midline ttp. Cr from 9/30 is 5.6 up from 1.5 and K was 6 at the time. EKG today is concerning for peaked T waves in V3.   concern for AIN vs obstructive etiology vs cellulitis induced WEN. will do labs, xray of leg, CT stone protocol and admit for further work up. will send urine eosinophils.

## 2019-10-02 NOTE — H&P ADULT - NSHPSOCIALHISTORY_GEN_ALL_CORE
The patient lives alone.  She does not use a walker or cane on a regular basis.  She quit cigarette use about 30 years ago.  She stopped drinking wine 2 weeks ago.

## 2019-10-02 NOTE — ED PROVIDER NOTE - OBJECTIVE STATEMENT
84y female PMHx CHF on 25mgToresimide BID, HTN, HLD, carotid occlusion presenting with dizziness/nausea with lower back and leg pain for the past few weeks. Pt also received a call from her cardiologist Dr Wynne last night that she is in renal failure and advised her to come to ED. Pt is currently on bactrim for treatment of a left lower extremity cellulitis after cephalexin did not improve it. She states she started it Friday and stopped taking it 2 days ago, attributing her dizziness to the medication. She is currently dizzy stating she feels "drunk" but denies room spinning. Denies chest pain, SOB, cough, rash, numbness, headache, or any other symptoms. 84y female PMHx CHF on 25mgToresimide BID, HTN, HLD, carotid occlusion presenting with dizziness/nausea with lower back and leg pain for the past few weeks. Pt also received a call from her cardiologist Dr Wynne last night that she is in renal failure and advised her to come to ED. Pt is currently on bactrim for treatment of a right lower extremity cellulitis after cephalexin did not improve it. She states she started it Friday and stopped taking it 2 days ago, attributing her dizziness to the medication. She is currently dizzy stating she feels "drunk" but denies room spinning. Denies chest pain, SOB, cough, rash, numbness, headache, or any other symptoms.

## 2019-10-02 NOTE — H&P ADULT - NSHPLABSRESULTS_GEN_ALL_CORE
Labs, imaging  personally reviewed by me.     CBC found anemia with Hgb of 9.2. SCr is elevated at 5.56, with low HCO3 of 17, with elevated K of 6.1. eGFR was 7.   pH was elevate at 7.21.   Lactate is 1.0.   UA was negative for blood or leukocyte esterase, found trace protein.     LABS:                        9.2    7.56  )-----------( 312      ( 02 Oct 2019 14:36 )             29.3     Hgb Trend: 9.2<--  10    136  |  105  |  130<H>  ----------------------------<  98  5.3   |  17<L>  |  5.30<H>    Ca    9.9      02 Oct 2019 19:48    TPro  7.6  /  Alb  4.3  /  TBili  0.2  /  DBili  x   /  AST  18  /  ALT  25  /  AlkPhos  105  10    Creatinine Trend: 5.30<--, 5.56<--    Urinalysis Basic - ( 02 Oct 2019 15:35 )    Color: Light Yellow / Appearance: Clear / S.014 / pH: x  Gluc: x / Ketone: Negative  / Bili: Negative / Urobili: Negative   Blood: x / Protein: Trace / Nitrite: Negative   Leuk Esterase: Negative / RBC: x / WBC x   Sq Epi: x / Non Sq Epi: x / Bacteria: x    Venous Blood Gas:  10-02 @ 14:36  7.21/45/25/17/34  VBG Lactate: 1.0    < from: CT Abdomen and Pelvis No Cont (10.02.19 @ 16:55) >    FINDINGS:    LOWER CHEST: Within normal limits.    LIVER: Patchy fatty infiltration of liver.  BILE DUCTS: Pneumobilia.  GALLBLADDER: Surgically removed.  SPLEEN: Within normal limits.  PANCREAS: Within normal limits.  ADRENALS: Within normal limits.  KIDNEYS/URETERS: Exophytic right renal lesion, likely a cyst. Punctate   nonobstructing stone in the interpolar region of the left kidney. No   evidence of obstructive uropathy.    BLADDER: Within normal limits.  REPRODUCTIVE ORGANS: Hysterectomy.    BOWEL: No bowel obstruction. Appendix is normal. Minimal diverticulosis,   without acute diverticulitis.  PERITONEUM: No ascites.  VESSELS:  Atherosclerotic calcifications.  RETROPERITONEUM/LYMPH NODES: No lymphadenopathy.    ABDOMINAL WALL: Fat-containing umbilical hernia.  BONES: Degenerative changes of the spine with facet joint arthropathy, L4   spondylolysis and grade 1 spondylolisthesis of L4 on L5. Severe   compression deformity of the T10 vertebral body, unchanged from previous   examination 2018.    IMPRESSION:     Punctate nonobstructing left renal stone. No evidence of obstructive   uropathy.    No CT findings to explain the symptoms of abdominal pain.    < end of copied text >    < from: Xray Tibia + Fibula 2 Views, Right (10.02.19 @ 17:50) >    INTERPRETATION:  no acute fracture or dislocation. No subcutaneous   tracking gas collection, periosteal reaction or bony erosions.    < end of copied text >    < from: Xray Ankle Complete 3 Views, Right (10.02.19 @ 17:49) >    ******PRELIMINARY REPORT******        INTERPRETATION:  no subcutaneous tracking gas collection, periosteal   reaction or bony erosion suggestive of advanced osteomyelitis.    < end of copied text >

## 2019-10-02 NOTE — H&P ADULT - PROBLEM SELECTOR PLAN 3
Patient has pain at site of previous lumbar laminectomy. Patient states that she has had epidurals 2 months ago without significant improvement. Patient has MRI imaging of lower back from April 2019. She would benefit from follow up with spine center to determine if local injection, epidural or other intervention could be helpful.  Continue home dose of gabapentin.  Start lidocaine patch for pain control.  Avoid NSAIDS

## 2019-10-03 LAB
% ALBUMIN: 57.6 % — SIGNIFICANT CHANGE UP
% ALPHA 1: 4.5 % — SIGNIFICANT CHANGE UP
% ALPHA 2: 10.2 % — SIGNIFICANT CHANGE UP
% BETA: 12 % — SIGNIFICANT CHANGE UP
% GAMMA: 15.7 % — SIGNIFICANT CHANGE UP
24R-OH-CALCIDIOL SERPL-MCNC: 37.7 NG/ML — SIGNIFICANT CHANGE UP (ref 30–80)
ALBUMIN SERPL ELPH-MCNC: 3.4 G/DL — LOW (ref 3.6–5.5)
ALBUMIN/GLOB SERPL ELPH: 1.4 RATIO — SIGNIFICANT CHANGE UP
ALPHA1 GLOB SERPL ELPH-MCNC: 0.3 G/DL — SIGNIFICANT CHANGE UP (ref 0.1–0.4)
ALPHA2 GLOB SERPL ELPH-MCNC: 0.6 G/DL — SIGNIFICANT CHANGE UP (ref 0.5–1)
ANION GAP SERPL CALC-SCNC: 12 MMOL/L — SIGNIFICANT CHANGE UP (ref 5–17)
B-GLOBULIN SERPL ELPH-MCNC: 0.7 G/DL — SIGNIFICANT CHANGE UP (ref 0.5–1)
BUN SERPL-MCNC: 114 MG/DL — HIGH (ref 7–23)
CALCIUM SERPL-MCNC: 8.8 MG/DL — SIGNIFICANT CHANGE UP (ref 8.4–10.5)
CHLORIDE SERPL-SCNC: 110 MMOL/L — HIGH (ref 96–108)
CO2 SERPL-SCNC: 16 MMOL/L — LOW (ref 22–31)
CREAT SERPL-MCNC: 4.26 MG/DL — HIGH (ref 0.5–1.3)
GAMMA GLOBULIN: 0.9 G/DL — SIGNIFICANT CHANGE UP (ref 0.6–1.6)
GLUCOSE SERPL-MCNC: 70 MG/DL — SIGNIFICANT CHANGE UP (ref 70–99)
HCT VFR BLD CALC: 26.2 % — LOW (ref 34.5–45)
HGB BLD-MCNC: 8.2 G/DL — LOW (ref 11.5–15.5)
INTERPRETATION SERPL IFE-IMP: SIGNIFICANT CHANGE UP
IRON SATN MFR SERPL: 10 % — LOW (ref 14–50)
IRON SATN MFR SERPL: 31 UG/DL — SIGNIFICANT CHANGE UP (ref 30–160)
MAGNESIUM SERPL-MCNC: 2.5 MG/DL — SIGNIFICANT CHANGE UP (ref 1.6–2.6)
MCHC RBC-ENTMCNC: 27.2 PG — SIGNIFICANT CHANGE UP (ref 27–34)
MCHC RBC-ENTMCNC: 31.3 GM/DL — LOW (ref 32–36)
MCV RBC AUTO: 86.8 FL — SIGNIFICANT CHANGE UP (ref 80–100)
NRBC # BLD: 0 /100 WBCS — SIGNIFICANT CHANGE UP (ref 0–0)
PHOSPHATE SERPL-MCNC: 5.3 MG/DL — HIGH (ref 2.5–4.5)
PLATELET # BLD AUTO: 275 K/UL — SIGNIFICANT CHANGE UP (ref 150–400)
POTASSIUM SERPL-MCNC: 5.3 MMOL/L — SIGNIFICANT CHANGE UP (ref 3.5–5.3)
POTASSIUM SERPL-SCNC: 5.3 MMOL/L — SIGNIFICANT CHANGE UP (ref 3.5–5.3)
PROT PATTERN SERPL ELPH-IMP: SIGNIFICANT CHANGE UP
PROT SERPL-MCNC: 5.9 G/DL — LOW (ref 6–8.3)
PROT SERPL-MCNC: 5.9 G/DL — LOW (ref 6–8.3)
RBC # BLD: 3.02 M/UL — LOW (ref 3.8–5.2)
RBC # FLD: 14 % — SIGNIFICANT CHANGE UP (ref 10.3–14.5)
SODIUM SERPL-SCNC: 138 MMOL/L — SIGNIFICANT CHANGE UP (ref 135–145)
TIBC SERPL-MCNC: 296 UG/DL — SIGNIFICANT CHANGE UP (ref 220–430)
UIBC SERPL-MCNC: 265 UG/DL — SIGNIFICANT CHANGE UP (ref 110–370)
VIT B12 SERPL-MCNC: >2000 PG/ML — HIGH (ref 232–1245)
WBC # BLD: 5.92 K/UL — SIGNIFICANT CHANGE UP (ref 3.8–10.5)
WBC # FLD AUTO: 5.92 K/UL — SIGNIFICANT CHANGE UP (ref 3.8–10.5)

## 2019-10-03 PROCEDURE — 99233 SBSQ HOSP IP/OBS HIGH 50: CPT

## 2019-10-03 PROCEDURE — 99223 1ST HOSP IP/OBS HIGH 75: CPT

## 2019-10-03 RX ORDER — IRON SUCROSE 20 MG/ML
100 INJECTION, SOLUTION INTRAVENOUS EVERY 24 HOURS
Refills: 0 | Status: COMPLETED | OUTPATIENT
Start: 2019-10-03 | End: 2019-10-05

## 2019-10-03 RX ADMIN — Medication 100 MEQ/KG/HR: at 00:20

## 2019-10-03 RX ADMIN — Medication 25 MILLIGRAM(S): at 00:22

## 2019-10-03 RX ADMIN — IRON SUCROSE 210 MILLIGRAM(S): 20 INJECTION, SOLUTION INTRAVENOUS at 15:40

## 2019-10-03 RX ADMIN — Medication 25 MILLIGRAM(S): at 21:16

## 2019-10-03 RX ADMIN — LIDOCAINE 1 PATCH: 4 CREAM TOPICAL at 19:54

## 2019-10-03 RX ADMIN — Medication 1 DROP(S): at 15:41

## 2019-10-03 RX ADMIN — APIXABAN 2.5 MILLIGRAM(S): 2.5 TABLET, FILM COATED ORAL at 09:09

## 2019-10-03 RX ADMIN — PANTOPRAZOLE SODIUM 40 MILLIGRAM(S): 20 TABLET, DELAYED RELEASE ORAL at 05:54

## 2019-10-03 RX ADMIN — GABAPENTIN 300 MILLIGRAM(S): 400 CAPSULE ORAL at 09:09

## 2019-10-03 RX ADMIN — SODIUM ZIRCONIUM CYCLOSILICATE 10 GRAM(S): 10 POWDER, FOR SUSPENSION ORAL at 04:07

## 2019-10-03 RX ADMIN — Medication 100 MILLIGRAM(S): at 05:52

## 2019-10-03 RX ADMIN — GABAPENTIN 300 MILLIGRAM(S): 400 CAPSULE ORAL at 21:16

## 2019-10-03 RX ADMIN — LIDOCAINE 1 PATCH: 4 CREAM TOPICAL at 07:30

## 2019-10-03 RX ADMIN — Medication 1 TABLET(S): at 09:09

## 2019-10-03 RX ADMIN — AMLODIPINE BESYLATE 5 MILLIGRAM(S): 2.5 TABLET ORAL at 05:52

## 2019-10-03 RX ADMIN — LIDOCAINE 1 PATCH: 4 CREAM TOPICAL at 12:30

## 2019-10-03 RX ADMIN — LIDOCAINE 1 PATCH: 4 CREAM TOPICAL at 11:25

## 2019-10-03 RX ADMIN — Medication 1 DROP(S): at 05:52

## 2019-10-03 RX ADMIN — Medication 81 MILLIGRAM(S): at 09:09

## 2019-10-03 RX ADMIN — APIXABAN 2.5 MILLIGRAM(S): 2.5 TABLET, FILM COATED ORAL at 21:16

## 2019-10-03 RX ADMIN — Medication 100 MEQ/KG/HR: at 07:00

## 2019-10-03 NOTE — CONSULT NOTE ADULT - ASSESSMENT
84 year-old woman with HFpEF of undetermined etiology, now presents with WEN in the setting of outpatient diuretics.  Appreciate nephrology input.  Myeloma/amyloid work-up including serum free light chains, PYP scan.    No evidence of decompensated heart failure on today's exam. Okay with holding diuretics for now.  Continue apixaban 2.5 mg BID for thromboembolic prophylaxis in patient with age > 80, creatinine > 1.5 mg/dL.

## 2019-10-03 NOTE — PROGRESS NOTE ADULT - PROBLEM SELECTOR PLAN 6
Hold home dose of losartan due to hyperkalemia and WEN.  c/w metoprolol and amlodipine with hold parameters.

## 2019-10-03 NOTE — PROGRESS NOTE ADULT - PROBLEM SELECTOR PLAN 1
UA negative -only urinary symptoms is patient feels she was not urinating much prior to coming in    WEN may be related to recent bactrim use, ATN/azotemia due to diuretic use.  Will hold diuetics (metolazone, spironolactone), ARB at this time. Can give lasix 80mg IV PRN for dyspnea, per nephrology, but pt currently breathing comfortably.   Avoid nephrotoxic agents.  CT A/P did not identify obstructive uropathy. bladder scan   Monitor strict I&Os.  monitor cr.  As per nephrology, will check SPEP and immunofixation to evaluate for possible myeloma given renal failure, back pain and anemia  Continue 1/2NS+  75mEq/L NaHCO3 @ 100cc/hr per renal - monitor volume status, HCO3

## 2019-10-03 NOTE — PROGRESS NOTE ADULT - PROBLEM SELECTOR PLAN 3
Patient has pain at site of previous lumbar laminectomy. Patient states that she has had epidurals 2 months ago without significant improvement. Patient has MRI imaging of lower back from April 2019. She would benefit from follow up with spine center to determine if local injection, epidural or other intervention could be helpful.  Continue home dose of gabapentin.  started lidocaine patch for pain control.  Avoid NSAIDS

## 2019-10-03 NOTE — CONSULT NOTE ADULT - SUBJECTIVE AND OBJECTIVE BOX
Patient seen and evaluated @ 8am on 3 DSU  Chief Complaint: back pain    HPI:  This patient is an 84yoF with PMH of htn, hLd, PAD, GERD, R carotid stenosis s/p CEA, MV repair, atrial fibrillation on eliquis, CKD stage 3 who presents to the ED due to abnormal outpatient labs. Patient had bloodwork with Dr. Wynne on 2019 and was advised today to visit the ED due to renal failure. Patient reports she had been diagnosed with RLE cellulitis about 2.5 weeks ago after a small table fell on her leg. She was initially prescribed keflex, which she took for a few days. Due to lack of improvement, she was then switched to bactrim. While on bactrim, the patient experienced dizziness and abdominal pain, thus she stopped taking bactrim and resumed keflex. Her redness and swelling of the RLE improved. She noted decrease in urine output over the last week, without hematuria or dysuria, despite taking diuretics for RLE edema. She also took lasix.   Patient also complains of having terrible lower back pain which is exacerbated by walking or standing, causing her to "scream with each step," and improved with rest and lying down. Pain is sharp, localized but sometimes radiates up the back. She denies any associated lower extremity numbness. Pt has chronic incontinence.     In the ED, T 97.5F, HR 65,/73, RR 18, SpO2 100%RA  Patient was given calcium gluconate 1g, insulin 10u IVP, dextrose 25cc, IV NaHCO3 at 100cc/hr. (02 Oct 2019 21:17)    PMH:   CHF (congestive heart failure)  Choledocholithiasis  Gallstones  Mitral valve disease  Osteoporosis  Vitamin B 12 deficiency  Carotid artery occlusion  GERD (gastroesophageal reflux disease)  Hypercholesteremia  HTN (hypertension)  Insomnia    PSH:   S/P laparoscopic cholecystectomy  H/O carotid endarterectomy  Varicose veins  Bilateral cataracts  Papilloma of breast  History of lumbar laminectomy for spinal cord decompression  Abdominal hernia  Hx of tonsillectomy  Status post DACIA-BSO  S/P MVR (mitral valve repair)    Medications:   amitriptyline 25 milliGRAM(s) Oral at bedtime  amLODIPine   Tablet 5 milliGRAM(s) Oral daily  apixaban 2.5 milliGRAM(s) Oral two times a day  artificial  tears Solution 1 Drop(s) Both EYES two times a day  aspirin  chewable 81 milliGRAM(s) Oral daily  gabapentin 300 milliGRAM(s) Oral two times a day  iron sucrose IVPB 100 milliGRAM(s) IV Intermittent every 24 hours  lidocaine   Patch 1 Patch Transdermal daily  metoprolol succinate  milliGRAM(s) Oral daily  multivitamin/minerals 1 Tablet(s) Oral daily  pantoprazole    Tablet 40 milliGRAM(s) Oral before breakfast  sodium bicarbonate  Infusion 0.105 mEq/kG/Hr IV Continuous <Continuous>  sodium zirconium cyclosilicate 10 Gram(s) Oral daily    Allergies:  &quot;VASELINE BASED OINTMENTS&quot; (Urticaria; Rash)  adhesives (Urticaria; Rash)  statins (Unknown)    FAMILY HISTORY:  FH: cirrhosis: father  Family history of dementia: mother  Family history of lung cancer (Grandparent)    Social History: lives alone  Smoking: former smoker  Alcohol: no EtOH abuse - used to drink wine  Drugs: no illicit drug use    Review of Systems:    Constitutional: No fever, chills, fatigue, or changes in weight  HEENT: No blurry vision, eye pain, headache, runny nose, or sore throat  Respiratory: No shortness of breath, cough, or wheezing  Cardiovascular: No chest pain or palpitations  Gastrointestinal: No nausea, vomiting, diarrhea, or abdominal pain  Genitourinary: No dysuria or incontinence  Extremities: No lower extremity swelling  Neurologic: No focal findings  Lymphatic: No lymphadenopathy   Skin: No rash  Psychiatry: No anxiety or depression  10 point review of systems is otherwise negative except as mentioned above            Physical Exam:  T(C): 36.4 (10-03-19 @ 20:27), Max: 36.4 (10-03-19 @ 04:45)  HR: 76 (10-03-19 @ 20:27) (65 - 76)  BP: 126/73 (10-03-19 @ 20:27) (103/61 - 126/73)  RR: 18 (10-03-19 @ 20:27) (18 - 18)  SpO2: 96% (10-03-19 @ 20:27) (96% - 99%)  Wt(kg): --    10-02 @ 07:01  -  10-03 @ 07:00  --------------------------------------------------------  IN: 1060 mL / OUT: 250 mL / NET: 810 mL    10-03 @ 07:01  -  10-03 @ 22:19  --------------------------------------------------------  IN: 2250 mL / OUT: 1100 mL / NET: 1150 mL      Daily     Daily Weight in k (03 Oct 2019 04:45)    Appearance: Normal, well groomed, NAD  Eyes: PERRLA, EOMI, pink conjunctiva, no scleral icterus   HENT: Normal oral mucosa  Cardiovascular: RRR, S1, S2, no murmur, rub, or gallop; trace RLE edema; no JVD  Respiratory: Clear to auscultation bilaterally  Gastrointestinal: Soft, non-tender, non-distended, BS+  Musculoskeletal: No clubbing or joint deformity   Neurologic: No focal weakness  Lymphatic: No lymphadenopathy  Psychiatry: AAOx3 with appropriate mood and affect  Skin: No rashes, ecchymoses, or cyanosis    Cardiovascular Diagnostic Testing:  ECG: sinus 67 bpm, normal voltage ECG    Echo: < from: Transthoracic Echocardiogram (18 @ 12:01) >  EF (Visual Estimate): 65 %  EF (Teicholtz): 60 %  Doppler Peak Velocity (m/sec): MV=2.0 AoV=1.7 PV=1.0  ------------------------------------------------------------------------  Observations:  Mitral Valve: Mitral annular calcification and calcified  mitral leaflets with decreased diastolic opening. Mild  mitral regurgitation.  Peak mitral valve gradient equals 16  mm Hg, mean transmitral valve gradient equals 5 mm Hg,  consistent with moderate mitral stenosis.  Aortic Valve/Aorta: Calcified trileaflet aortic valve with  decreased opening. Peak transaortic valve gradient equals  12 mm Hg, mean transaortic valve gradient equals 6 mm Hg,  estimated aortic valve area equals 1.8 sqcm (by continuity  equation), aortic valve velocity time integral equals 34  cm, consistent with mild aortic stenosis. Moderate aortic  regurgitation. Peak left ventricular outflow tract gradient  equals 6 mm Hg, mean gradient is equal to 4 mm Hg, LVOT  velocity time integral equals 25 cm.  Normal aortic root (Ao: 2.6 cm at the sinuses of Valsalva).  Left Atrium: Moderately dilated left atrium.  LA volume  index = 42 cc/m2.  Left Ventricle: Normal left ventricular systolic function.  No segmental wall motion abnormalities. Normal left  ventricular internal dimensions and wall thicknesses.  Right Heart: Normal right atrium. Right ventricular  enlargement with normal right ventricular systolic  function. Normal tricuspid valve. Moderate tricuspid  regurgitation. Normal pulmonic valve.  Pericardium/Pleura: Normal pericardium with no pericardial  effusion.  Hemodynamic: Estimated right atrial pressure is 8 mm Hg.  Estimated right ventricular systolic pressure equals 57 mm  Hg, assuming right atrial pressure equals 8 mm Hg,  consistent with moderate pulmonary hypertension.  ------------------------------------------------------------------------  Conclusions:  1. Calcified trileaflet aortic valve with decreased  opening. Peak transaortic valve gradient equals 12 mm Hg,  mean transaortic valve gradient equals 6 mm Hg, estimated  aortic valve area equals 1.8 sqcm (by continuity equation),  aortic valve velocity time integral equals 34 cm,  consistent with mild aortic stenosis. Moderate aortic  regurgitation.  2. Normal left ventricular internal dimensions and wall  thicknesses.  3. Normal left ventricular systolic function. No segmental  wall motion abnormalities.  4. Right ventricular enlargement with normal right  ventricular systolic function.  5. Estimated pulmonary artery systolic pressure equals 57  mm Hg, assuming right atrial pressure equals 8 mm Hg,  consistent with moderate pulmonary pressures.  ------------------------------------------------------------------------  Confirmed on  3/14/2018 - 15:16:04 by Haritha Jordan M.D.  ------------------------------------------------------------------------    < end of copied text >    Cath: < from: Cardiac Cath Lab - Adult (18 @ 14:58) >  PROCEDURE:  --  Right heart catheterization.  --  Left heart catheterization with ventriculography.  --  Left coronary angiography.  --  Right coronary angiography.  TECHNIQUE: The risks and alternatives of the procedures and conscious  sedation were explained to the patient and informed consent was obtained.  Cardiac catheterization performed electively. Coronary intervention  performed electively.  Local anesthetic given. Right femoral artery access. Right femoral vein  access. Right heart catheterization. The procedure was performed utilizing  a catheter. Left heart catheterization. Ventriculography was performed.  Left coronary artery angiography. The vessel was injected utilizing a  catheter. Right coronary artery angiography. The vessel was injected  utilizing a catheter. RADIATION EXPOSURE: 5.8 min.  CONTRAST GIVEN: Omnipaque 58 ml.  MEDICATIONS GIVEN: Midazolam, 1 mg, IV. Fentanyl, 25 mcg, IV.  VENTRICLES: Global left ventricular function was hypercontractile. EF  estimated was 75 %.  CORONARY VESSELS: The coronary circulation is right dominant.  LM:   --  LM: Normal.  LAD:   --  Proximal LAD: There was a 30 % stenosis.  CX:   --  Ostial circumflex: There was a 40 % stenosis.  RCA:   --  RCA: Normal.  COMPLICATIONS: There were no complications.  DIAGNOSTIC RECOMMENDATIONS: The patient should continue with the present  medications.  Prepared and signed by  Daniel Cee M.D.  Signed 2018 13:30:25    < end of copied text >    Interpretation of Telemetry: NSR    Labs:                        8.2    5.92  )-----------( 275      ( 03 Oct 2019 07:12 )             26.2     10-03    138  |  110<H>  |  114<H>  ----------------------------<  70  5.3   |  16<L>  |  4.26<H>    Ca    8.8      03 Oct 2019 07:05  Phos  5.3     10-  Mg     2.5     10-03    TPro  5.9<L>  /  Alb  3.4<L>  /  TBili  x   /  DBili  x   /  AST  x   /  ALT  x   /  AlkPhos  x   10-03

## 2019-10-03 NOTE — PROGRESS NOTE ADULT - SUBJECTIVE AND OBJECTIVE BOX
No pain, no shortness of breath      VITAL:  T(C): , Max: 36.4 (10-02-19 @ 11:44)  T(F): , Max: 97.6 (10-03-19 @ 04:45)  HR: 71 (10-03-19 @ 04:45)  BP: 125/76 (10-03-19 @ 04:45)  BP(mean): --  RR: 18 (10-03-19 @ 04:45)  SpO2: 99% (10-03-19 @ 04:45)      PHYSICAL EXAM:  Constitutional: NAD, Alert  HEENT: NCAT, DMM  Neck: Supple, No JVD  Respiratory: CTA-b/l  Cardiovascular: RRR s1s2, no m/r/g  Gastrointestinal: BS+, soft, NT/ND  Extremities: trace RLE edema; no LLE edema  Neurological: no focal deficits; strength grossly intact  Back: no CVAT b/l  Skin: (+)RLE mild erythema and (+)tenderness, below the knee      LABS:                        8.2    5.92  )-----------( 275      ( 03 Oct 2019 07:12 )             26.2     Na(138)/K(5.3)/Cl(110)/HCO3(16)/BUN(114)/Cr(4.26)Glu(70)/Ca(8.8)/Mg(2.5)/PO4(5.3)    10-03 @ 07:05  Na(136)/K(5.3)/Cl(105)/HCO3(17)/BUN(130)/Cr(5.30)Glu(98)/Ca(9.9)/Mg(--)/PO4(--)    10-02 @ 19:48  Na(135)/K(6.1)/Cl(105)/HCO3(17)/BUN(129)/Cr(5.56)Glu(147)/Ca(9.1)/Mg(--)/PO4(--)    10-02 @ 14:35    Urinalysis Basic - ( 02 Oct 2019 15:35 )  Color: Light Yellow / Appearance: Clear / S.014 / pH: x  Gluc: x / Ketone: Negative  / Bili: Negative / Urobili: Negative   Blood: x / Protein: Trace / Nitrite: Negative   Leuk Esterase: Negative / RBC: x / WBC x   Sq Epi: x / Non Sq Epi: x / Bacteria: x      IMPRESSION: 84F w/ HTN, CKD3, MVR, and breast CA s/p lumpectomy, 10/2/19 a/w RLE cellulitis/WEN    (1)Renal - WEN on CKD3. Ischemic ATN, with superimposed prerenal azotemia. Starting to resolve as of today    (2)Hyperkalemia - renally mediated. K+ is high, but improved relative to last night, on IVF with HCO3, on low-K diet, and on Lokelma    (3)Metabolic acidosis - renally mediated - on IVF with HCO3      RECOMMEND:  (1)IVF as ordered  (2)Lokelma as ordered  (3)Renal diet as ordered  (4)BMP + Mg + PO4 at least daily  (5)Dose new meds for GFR 15ml/min        Maximus Owens MD  Health system  [phone] No pain, no shortness of breath      VITAL:  T(C): , Max: 36.4 (10-02-19 @ 11:44)  T(F): , Max: 97.6 (10-03-19 @ 04:45)  HR: 71 (10-03-19 @ 04:45)  BP: 125/76 (10-03-19 @ 04:45)  RR: 18 (10-03-19 @ 04:45)  SpO2: 99% (10-03-19 @ 04:45)      PHYSICAL EXAM:  Constitutional: NAD, Alert  HEENT: NCAT, DMM  Neck: Supple, No JVD  Respiratory: CTA-b/l  Cardiovascular: RRR s1s2, no m/r/g  Gastrointestinal: BS+, soft, NT/ND  Extremities: trace RLE edema; no LLE edema  Neurological: no focal deficits; strength grossly intact  Back: no CVAT b/l  Skin: (+)RLE mild erythema and (+)tenderness, below the knee      LABS:                        8.2    5.92  )-----------( 275      ( 03 Oct 2019 07:12 )             26.2     Na(138)/K(5.3)/Cl(110)/HCO3(16)/BUN(114)/Cr(4.26)Glu(70)/Ca(8.8)/Mg(2.5)/PO4(5.3)    10-03 @ 07:05  Na(136)/K(5.3)/Cl(105)/HCO3(17)/BUN(130)/Cr(5.30)Glu(98)/Ca(9.9)/Mg(--)/PO4(--)    10-02 @ 19:48  Na(135)/K(6.1)/Cl(105)/HCO3(17)/BUN(129)/Cr(5.56)Glu(147)/Ca(9.1)/Mg(--)/PO4(--)    10-02 @ 14:35    Urinalysis Basic - ( 02 Oct 2019 15:35 )  Color: Light Yellow / Appearance: Clear / S.014 / pH: x  Gluc: x / Ketone: Negative  / Bili: Negative / Urobili: Negative   Blood: x / Protein: Trace / Nitrite: Negative   Leuk Esterase: Negative / RBC: x / WBC x   Sq Epi: x / Non Sq Epi: x / Bacteria: x      IMPRESSION: 84F w/ HTN, CKD3, MVR, and breast CA s/p lumpectomy, 10/2/19 a/w RLE cellulitis/WEN    (1)Renal - WEN on CKD3. Ischemic ATN, with superimposed prerenal azotemia. Starting to resolve as of today    (2)Hyperkalemia - renally mediated. K+ is high, but improved relative to last night, on IVF with HCO3, on low-K diet, and on Lokelma    (3)Metabolic acidosis - renally mediated - on IVF with HCO3      RECOMMEND:  (1)IVF as ordered  (2)Lokelma as ordered  (3)Renal diet as ordered  (4)BMP + Mg + PO4 at least daily  (5)Dose new meds for GFR 15ml/min        Maximus Owens MD  [facility]  [phone]

## 2019-10-03 NOTE — PROGRESS NOTE ADULT - PROBLEM SELECTOR PLAN 7
c/w protonix  Patient expresses desire of tapering off this medication, because she had been taking it for a long time.

## 2019-10-03 NOTE — PROGRESS NOTE ADULT - SUBJECTIVE AND OBJECTIVE BOX
Patient is a 84y old  Female who presents with a chief complaint of abnormal labs (03 Oct 2019 10:16)        SUBJECTIVE / OVERNIGHT EVENTS: some pain in RLE - states for cellulitis patient was on keflex then switched to bactrim by outpatient vascular and took about 10 days total of abx. last dose of bactrim (self d/chandni for abdominal pain, nausea) was 5 days ago.       MEDICATIONS  (STANDING):  amitriptyline 25 milliGRAM(s) Oral at bedtime  amLODIPine   Tablet 5 milliGRAM(s) Oral daily  apixaban 2.5 milliGRAM(s) Oral two times a day  artificial  tears Solution 1 Drop(s) Both EYES two times a day  aspirin  chewable 81 milliGRAM(s) Oral daily  gabapentin 300 milliGRAM(s) Oral two times a day  lidocaine   Patch 1 Patch Transdermal daily  metoprolol succinate  milliGRAM(s) Oral daily  multivitamin/minerals 1 Tablet(s) Oral daily  pantoprazole    Tablet 40 milliGRAM(s) Oral before breakfast  sodium bicarbonate  Infusion 0.105 mEq/kG/Hr (100 mL/Hr) IV Continuous <Continuous>  sodium zirconium cyclosilicate 10 Gram(s) Oral daily    MEDICATIONS  (PRN):      Vital Signs Last 24 Hrs  T(C): 36.4 (03 Oct 2019 11:52), Max: 36.4 (02 Oct 2019 19:00)  T(F): 97.6 (03 Oct 2019 11:52), Max: 97.6 (03 Oct 2019 04:45)  HR: 65 (03 Oct 2019 11:52) (65 - 80)  BP: 103/61 (03 Oct 2019 11:52) (103/61 - 163/55)  BP(mean): --  RR: 18 (03 Oct 2019 11:52) (16 - 20)  SpO2: 98% (03 Oct 2019 11:52) (96% - 100%)  CAPILLARY BLOOD GLUCOSE      POCT Blood Glucose.: 171 mg/dL (02 Oct 2019 19:17)  POCT Blood Glucose.: 124 mg/dL (02 Oct 2019 16:05)    I&O's Summary    02 Oct 2019 07:01  -  03 Oct 2019 07:00  --------------------------------------------------------  IN: 1060 mL / OUT: 250 mL / NET: 810 mL    03 Oct 2019 07:01  -  03 Oct 2019 14:02  --------------------------------------------------------  IN: 480 mL / OUT: 400 mL / NET: 80 mL        PHYSICAL EXAM:  GENERAL: NAD, breathing normal  EYES: conjunctiva and sclera clear  NECK: supple, No JVD  CHEST/LUNG: CTA b/l  HEART: S1 S2 RRR  ABDOMEN: +BS Soft, NT/ND, no CVA tenderness  EXTREMITIES:  2+ DP Pulses, No c/c. trace RLE edema shin and ankle  NEUROLOGY: AAOx3, no facial droop, no focal deficits   SKIN: small swelling on right shin due to recent injury     LABS:                        8.2    5.92  )-----------( 275      ( 03 Oct 2019 07:12 )             26.2     10-03    138  |  110<H>  |  114<H>  ----------------------------<  70  5.3   |  16<L>  |  4.26<H>    Ca    8.8      03 Oct 2019 07:05  Phos  5.3     10-03  Mg     2.5     10-03    TPro  5.9<L>  /  Alb  x   /  TBili  x   /  DBili  x   /  AST  x   /  ALT  x   /  AlkPhos  x   10-03          Urinalysis Basic - ( 02 Oct 2019 15:35 )    Color: Light Yellow / Appearance: Clear / S.014 / pH: x  Gluc: x / Ketone: Negative  / Bili: Negative / Urobili: Negative   Blood: x / Protein: Trace / Nitrite: Negative   Leuk Esterase: Negative / RBC: x / WBC x   Sq Epi: x / Non Sq Epi: x / Bacteria: x        RADIOLOGY & ADDITIONAL TESTS:    Imaging Personally Reviewed:  Consultant(s) Notes Reviewed:    Care Discussed with Consultants/Other Providers:

## 2019-10-04 DIAGNOSIS — I50.32 CHRONIC DIASTOLIC (CONGESTIVE) HEART FAILURE: ICD-10-CM

## 2019-10-04 LAB
ANION GAP SERPL CALC-SCNC: 11 MMOL/L — SIGNIFICANT CHANGE UP (ref 5–17)
BUN SERPL-MCNC: 93 MG/DL — HIGH (ref 7–23)
CALCIUM SERPL-MCNC: 8.5 MG/DL — SIGNIFICANT CHANGE UP (ref 8.4–10.5)
CHLORIDE SERPL-SCNC: 109 MMOL/L — HIGH (ref 96–108)
CHOLEST SERPL-MCNC: 163 MG/DL — SIGNIFICANT CHANGE UP (ref 10–199)
CO2 SERPL-SCNC: 22 MMOL/L — SIGNIFICANT CHANGE UP (ref 22–31)
CREAT SERPL-MCNC: 2.43 MG/DL — HIGH (ref 0.5–1.3)
GLUCOSE SERPL-MCNC: 101 MG/DL — HIGH (ref 70–99)
HCT VFR BLD CALC: 26.7 % — LOW (ref 34.5–45)
HDLC SERPL-MCNC: 42 MG/DL — LOW
HGB BLD-MCNC: 8.4 G/DL — LOW (ref 11.5–15.5)
KAPPA LC SER QL IFE: 4.42 MG/DL — HIGH (ref 0.33–1.94)
KAPPA/LAMBDA FREE LIGHT CHAIN RATIO, SERUM: 1.97 RATIO — HIGH (ref 0.26–1.65)
LAMBDA LC SER QL IFE: 2.24 MG/DL — SIGNIFICANT CHANGE UP (ref 0.57–2.63)
LIPID PNL WITH DIRECT LDL SERPL: 96 MG/DL — SIGNIFICANT CHANGE UP
MCHC RBC-ENTMCNC: 27.2 PG — SIGNIFICANT CHANGE UP (ref 27–34)
MCHC RBC-ENTMCNC: 31.5 GM/DL — LOW (ref 32–36)
MCV RBC AUTO: 86.4 FL — SIGNIFICANT CHANGE UP (ref 80–100)
NRBC # BLD: 0 /100 WBCS — SIGNIFICANT CHANGE UP (ref 0–0)
OB PNL STL: POSITIVE
PLATELET # BLD AUTO: 271 K/UL — SIGNIFICANT CHANGE UP (ref 150–400)
POTASSIUM SERPL-MCNC: 4.3 MMOL/L — SIGNIFICANT CHANGE UP (ref 3.5–5.3)
POTASSIUM SERPL-SCNC: 4.3 MMOL/L — SIGNIFICANT CHANGE UP (ref 3.5–5.3)
RBC # BLD: 3.09 M/UL — LOW (ref 3.8–5.2)
RBC # FLD: 14.3 % — SIGNIFICANT CHANGE UP (ref 10.3–14.5)
SODIUM SERPL-SCNC: 142 MMOL/L — SIGNIFICANT CHANGE UP (ref 135–145)
TOTAL CHOLESTEROL/HDL RATIO MEASUREMENT: 3.9 RATIO — SIGNIFICANT CHANGE UP (ref 3.3–7.1)
TRIGL SERPL-MCNC: 124 MG/DL — SIGNIFICANT CHANGE UP (ref 10–149)
WBC # BLD: 7.26 K/UL — SIGNIFICANT CHANGE UP (ref 3.8–10.5)
WBC # FLD AUTO: 7.26 K/UL — SIGNIFICANT CHANGE UP (ref 3.8–10.5)

## 2019-10-04 PROCEDURE — 99233 SBSQ HOSP IP/OBS HIGH 50: CPT

## 2019-10-04 PROCEDURE — 78803 RP LOCLZJ TUM SPECT 1 AREA: CPT | Mod: 26

## 2019-10-04 PROCEDURE — 78499 UNLISTED CV PX DX NUC MED: CPT | Mod: 26

## 2019-10-04 RX ADMIN — AMLODIPINE BESYLATE 5 MILLIGRAM(S): 2.5 TABLET ORAL at 07:36

## 2019-10-04 RX ADMIN — APIXABAN 2.5 MILLIGRAM(S): 2.5 TABLET, FILM COATED ORAL at 21:22

## 2019-10-04 RX ADMIN — GABAPENTIN 300 MILLIGRAM(S): 400 CAPSULE ORAL at 07:36

## 2019-10-04 RX ADMIN — LIDOCAINE 1 PATCH: 4 CREAM TOPICAL at 00:00

## 2019-10-04 RX ADMIN — Medication 1 DROP(S): at 17:39

## 2019-10-04 RX ADMIN — Medication 25 MILLIGRAM(S): at 21:22

## 2019-10-04 RX ADMIN — IRON SUCROSE 210 MILLIGRAM(S): 20 INJECTION, SOLUTION INTRAVENOUS at 17:40

## 2019-10-04 RX ADMIN — SODIUM ZIRCONIUM CYCLOSILICATE 10 GRAM(S): 10 POWDER, FOR SUSPENSION ORAL at 05:19

## 2019-10-04 RX ADMIN — GABAPENTIN 300 MILLIGRAM(S): 400 CAPSULE ORAL at 17:40

## 2019-10-04 RX ADMIN — Medication 1 TABLET(S): at 09:48

## 2019-10-04 RX ADMIN — LIDOCAINE 1 PATCH: 4 CREAM TOPICAL at 21:21

## 2019-10-04 RX ADMIN — PANTOPRAZOLE SODIUM 40 MILLIGRAM(S): 20 TABLET, DELAYED RELEASE ORAL at 07:36

## 2019-10-04 RX ADMIN — APIXABAN 2.5 MILLIGRAM(S): 2.5 TABLET, FILM COATED ORAL at 09:48

## 2019-10-04 RX ADMIN — Medication 1 DROP(S): at 07:36

## 2019-10-04 RX ADMIN — Medication 100 MILLIGRAM(S): at 07:36

## 2019-10-04 RX ADMIN — LIDOCAINE 1 PATCH: 4 CREAM TOPICAL at 09:48

## 2019-10-04 RX ADMIN — Medication 81 MILLIGRAM(S): at 09:48

## 2019-10-04 NOTE — PROVIDER CONTACT NOTE (CRITICAL VALUE NOTIFICATION) - BACKGROUND
Dx - abnormal outpatient lab, found to have WEN  Hx - HTN, HLD, PAD, GERD, R carotid stenosis s.p CEA, MV repair, Afib on Eliquis

## 2019-10-04 NOTE — PROGRESS NOTE ADULT - PROBLEM SELECTOR PLAN 1
UA negative  WEN may be related to recent bactrim use, ATN/azotemia due to diuretic use.  Will hold diuetics (metolazone, spironolactone), ARB at this time. Can give lasix 80mg IV PRN for dyspnea, per nephrology  Avoid nephrotoxic agents.  CT A/P did not identify obstructive uropathy  Monitor strict I&Os.  monitor cr.  SPEP, immunofixation no monoclonal band  Continue 1/2NS+  75mEq/L NaHCO3 @ 100cc/hr per renal - monitor volume status, HCO3 UA negative  WEN may be related to recent bactrim use, ATN/azotemia due to diuretic use.  Will hold diuetics (metolazone, spironolactone), ARB at this time. Can give lasix 80mg IV PRN for dyspnea, per nephrology  Avoid nephrotoxic agents.  CT A/P did not identify obstructive uropathy  Monitor strict I&Os.  SPEP, immunofixation no monoclonal band  holding IV fluids - monitor creatinine

## 2019-10-04 NOTE — PROGRESS NOTE ADULT - PROBLEM SELECTOR PLAN 3
Patient has pain at site of previous lumbar laminectomy. Patient states that she has had epidurals 2 months ago without significant improvement. Patient has MRI imaging of lower back from April 2019. She would benefit from follow up with spine center to determine if local injection, epidural or other intervention could be helpful.  Continue home dose of gabapentin.  started lidocaine patch for pain control.  Avoid NSAIDS Hyperkalemia likely related to WEN now resolved   s/p  D50 and insulin, Lokelma

## 2019-10-04 NOTE — PROGRESS NOTE ADULT - SUBJECTIVE AND OBJECTIVE BOX
Patient is a 84y old  Female who presents with a chief complaint of abnormal labs (04 Oct 2019 09:15)        SUBJECTIVE / OVERNIGHT EVENTS: no acute complaints, some sob today.       MEDICATIONS  (STANDING):  amitriptyline 25 milliGRAM(s) Oral at bedtime  amLODIPine   Tablet 5 milliGRAM(s) Oral daily  apixaban 2.5 milliGRAM(s) Oral two times a day  artificial  tears Solution 1 Drop(s) Both EYES two times a day  aspirin  chewable 81 milliGRAM(s) Oral daily  gabapentin 300 milliGRAM(s) Oral two times a day  iron sucrose IVPB 100 milliGRAM(s) IV Intermittent every 24 hours  lidocaine   Patch 1 Patch Transdermal daily  metoprolol succinate  milliGRAM(s) Oral daily  multivitamin/minerals 1 Tablet(s) Oral daily  pantoprazole    Tablet 40 milliGRAM(s) Oral before breakfast  sodium bicarbonate  Infusion 0.105 mEq/kG/Hr (100 mL/Hr) IV Continuous <Continuous>  sodium zirconium cyclosilicate 10 Gram(s) Oral daily    MEDICATIONS  (PRN):      Vital Signs Last 24 Hrs  T(C): 36.4 (04 Oct 2019 05:07), Max: 36.4 (03 Oct 2019 11:52)  T(F): 97.5 (04 Oct 2019 05:07), Max: 97.6 (03 Oct 2019 11:52)  HR: 81 (04 Oct 2019 07:33) (65 - 81)  BP: 131/79 (04 Oct 2019 07:33) (103/61 - 131/79)  BP(mean): --  RR: 18 (04 Oct 2019 05:07) (18 - 18)  SpO2: 93% (04 Oct 2019 05:07) (93% - 98%)  CAPILLARY BLOOD GLUCOSE        I&O's Summary    03 Oct 2019 07:01  -  04 Oct 2019 07:00  --------------------------------------------------------  IN: 2950 mL / OUT: 2800 mL / NET: 150 mL        PHYSICAL EXAM:  GENERAL: NAD, breathing normal  EYES: conjunctiva and sclera clear  NECK: supple, No JVD  CHEST/LUNG: CTA b/l  HEART: S1 S2 RRR  ABDOMEN: +BS Soft, NT/ND, no CVA tenderness  EXTREMITIES:  2+ DP Pulses, No c/c. trace RLE edema shin and ankle  NEUROLOGY: AAOx3, no facial droop, no focal deficits   SKIN: small swelling on right shin due to recent injury     LABS:                        8.4    7.26  )-----------( 271      ( 04 Oct 2019 07:02 )             26.7     10-04    142  |  109<H>  |  93<H>  ----------------------------<  101<H>  4.3   |  22  |  2.43<H>    Ca    8.5      04 Oct 2019 07:02  Phos  5.3     10-03  Mg     2.5     10-03    TPro  5.9<L>  /  Alb  3.4<L>  /  TBili  x   /  DBili  x   /  AST  x   /  ALT  x   /  AlkPhos  x   10-03          Urinalysis Basic - ( 02 Oct 2019 15:35 )    Color: Light Yellow / Appearance: Clear / S.014 / pH: x  Gluc: x / Ketone: Negative  / Bili: Negative / Urobili: Negative   Blood: x / Protein: Trace / Nitrite: Negative   Leuk Esterase: Negative / RBC: x / WBC x   Sq Epi: x / Non Sq Epi: x / Bacteria: x        RADIOLOGY & ADDITIONAL TESTS:    Imaging Personally Reviewed:  Consultant(s) Notes Reviewed:    Care Discussed with Consultants/Other Providers: Patient is a 84y old  Female who presents with a chief complaint of abnormal labs (04 Oct 2019 09:15)        SUBJECTIVE / OVERNIGHT EVENTS: no acute complaints, some sob today.       MEDICATIONS  (STANDING):  amitriptyline 25 milliGRAM(s) Oral at bedtime  amLODIPine   Tablet 5 milliGRAM(s) Oral daily  apixaban 2.5 milliGRAM(s) Oral two times a day  artificial  tears Solution 1 Drop(s) Both EYES two times a day  aspirin  chewable 81 milliGRAM(s) Oral daily  gabapentin 300 milliGRAM(s) Oral two times a day  iron sucrose IVPB 100 milliGRAM(s) IV Intermittent every 24 hours  lidocaine   Patch 1 Patch Transdermal daily  metoprolol succinate  milliGRAM(s) Oral daily  multivitamin/minerals 1 Tablet(s) Oral daily  pantoprazole    Tablet 40 milliGRAM(s) Oral before breakfast  sodium bicarbonate  Infusion 0.105 mEq/kG/Hr (100 mL/Hr) IV Continuous <Continuous>  sodium zirconium cyclosilicate 10 Gram(s) Oral daily    MEDICATIONS  (PRN):      Vital Signs Last 24 Hrs  T(C): 36.4 (04 Oct 2019 05:07), Max: 36.4 (03 Oct 2019 11:52)  T(F): 97.5 (04 Oct 2019 05:07), Max: 97.6 (03 Oct 2019 11:52)  HR: 81 (04 Oct 2019 07:33) (65 - 81)  BP: 131/79 (04 Oct 2019 07:33) (103/61 - 131/79)  BP(mean): --  RR: 18 (04 Oct 2019 05:07) (18 - 18)  SpO2: 93% (04 Oct 2019 05:07) (93% - 98%)  CAPILLARY BLOOD GLUCOSE        I&O's Summary    03 Oct 2019 07:01  -  04 Oct 2019 07:00  --------------------------------------------------------  IN: 2950 mL / OUT: 2800 mL / NET: 150 mL        PHYSICAL EXAM:  GENERAL: NAD, breathing normal  EYES: conjunctiva and sclera clear  NECK: supple, No JVD  CHEST/LUNG: CTA b/l  HEART: S1 S2 RRR  ABDOMEN: +BS Soft, NT/ND, no CVA tenderness  EXTREMITIES:  2+ DP Pulses, No c/c. trace RLE edema shin and ankle  NEUROLOGY: AAOx3, no facial droop, no focal deficits   SKIN: small swelling on right shin due to recent injury     LABS:                        8.4    7.26  )-----------( 271      ( 04 Oct 2019 07:02 )             26.7     10-04    142  |  109<H>  |  93<H>  ----------------------------<  101<H>  4.3   |  22  |  2.43<H>    Ca    8.5      04 Oct 2019 07:02  Phos  5.3     10-03  Mg     2.5     10-03    TPro  5.9<L>  /  Alb  3.4<L>  /  TBili  x   /  DBili  x   /  AST  x   /  ALT  x   /  AlkPhos  x   10-03          Urinalysis Basic - ( 02 Oct 2019 15:35 )    Color: Light Yellow / Appearance: Clear / S.014 / pH: x  Gluc: x / Ketone: Negative  / Bili: Negative / Urobili: Negative   Blood: x / Protein: Trace / Nitrite: Negative   Leuk Esterase: Negative / RBC: x / WBC x   Sq Epi: x / Non Sq Epi: x / Bacteria: x        RADIOLOGY & ADDITIONAL TESTS:    Imaging Personally Reviewed: NM amyloid scan ordered  Consultant(s) Notes Reviewed:    Care Discussed with Consultants/Other Providers: d/w Renal - Dr. Owens regarding discharge medications specifically regarding diuretics

## 2019-10-04 NOTE — PROGRESS NOTE ADULT - SUBJECTIVE AND OBJECTIVE BOX
No pain, no shortness of breath      VITAL:  T(C): , Max: 36.4 (10-03-19 @ 11:52)  T(F): , Max: 97.6 (10-03-19 @ 11:52)  HR: 81 (10-04-19 @ 07:33)  BP: 131/79 (10-04-19 @ 07:33)  RR: 18 (10-04-19 @ 05:07)  SpO2: 93% (10-04-19 @ 05:07)      PHYSICAL EXAM:  Constitutional: NAD, Alert  HEENT: NCAT, DMM  Neck: Supple, No JVD  Respiratory: CTA-b/l  Cardiovascular: RRR s1s2, no m/r/g  Gastrointestinal: BS+, soft, NT/ND  Extremities: trace RLE edema; no LLE edema  Neurological: no focal deficits; strength grossly intact  Back: no CVAT b/l  Skin: (+)RLE mild erythema and (+)tenderness, below the knee      LABS:                        8.4    7.26  )-----------( 271      ( 04 Oct 2019 07:02 )             26.7     Na(142)/K(4.3)/Cl(109)/HCO3(22)/BUN(93)/Cr(2.43)Glu(101)/Ca(8.5)/Mg(--)/PO4(--)    10-04 @ 07:02  Na(138)/K(5.3)/Cl(110)/HCO3(16)/BUN(114)/Cr(4.26)Glu(70)/Ca(8.8)/Mg(2.5)/PO4(5.3)    10-03 @ 07:05  Na(136)/K(5.3)/Cl(105)/HCO3(17)/BUN(130)/Cr(5.30)Glu(98)/Ca(9.9)/Mg(--)/PO4(--)    10-02 @ 19:48  Na(135)/K(6.1)/Cl(105)/HCO3(17)/BUN(129)/Cr(5.56)Glu(147)/Ca(9.1)/Mg(--)/PO4(--)    10-02 @ 14:35    Urinalysis Basic - ( 02 Oct 2019 15:35 )  Color: Light Yellow / Appearance: Clear / S.014 / pH: x  Gluc: x / Ketone: Negative  / Bili: Negative / Urobili: Negative   Blood: x / Protein: Trace / Nitrite: Negative   Leuk Esterase: Negative / RBC: x / WBC x   Sq Epi: x / Non Sq Epi: x / Bacteria: x      IMPRESSION: 84F w/ HTN, CKD3, MVR, and breast CA s/p lumpectomy, 10/2/19 a/w RLE cellulitis/WEN    (1)Renal - WEN on CKD3. Ischemic ATN, with superimposed prerenal azotemia. Rapidly improving.    (2)Hyperkalemia - renally mediated. Much improved, with low-K diet, IVF, Lokelma, and with resolution of the WEN. No longer requiring Lokelma, IVF, nor the renal diet.    (3)Metabolic acidosis - renally mediated - much improved, with IVF with HCO3      RECOMMEND:  (1)D/C IVF  (2)D/C Lokelma  (3)D/C renal diet  (4)No objection to discharge from renal standpoint; if for discharge, would repeat bloodwork within 3days. Could f/u at my office in ~1month.              Maximus Owens MD  [facility]  [phone] No pain, no shortness of breath      VITAL:  T(C): , Max: 36.4 (10-03-19 @ 11:52)  T(F): , Max: 97.6 (10-03-19 @ 11:52)  HR: 81 (10-04-19 @ 07:33)  BP: 131/79 (10-04-19 @ 07:33)  RR: 18 (10-04-19 @ 05:07)  SpO2: 93% (10-04-19 @ 05:07)      PHYSICAL EXAM:  Constitutional: NAD, Alert  HEENT: NCAT, DMM  Neck: Supple, No JVD  Respiratory: CTA-b/l  Cardiovascular: RRR s1s2, no m/r/g  Gastrointestinal: BS+, soft, NT/ND  Extremities: trace RLE edema; no LLE edema  Neurological: no focal deficits; strength grossly intact  Back: no CVAT b/l  Skin: (+)RLE mild erythema and (+)tenderness, below the knee      LABS:                        8.4    7.26  )-----------( 271      ( 04 Oct 2019 07:02 )             26.7     Na(142)/K(4.3)/Cl(109)/HCO3(22)/BUN(93)/Cr(2.43)Glu(101)/Ca(8.5)/Mg(--)/PO4(--)    10-04 @ 07:02  Na(138)/K(5.3)/Cl(110)/HCO3(16)/BUN(114)/Cr(4.26)Glu(70)/Ca(8.8)/Mg(2.5)/PO4(5.3)    10-03 @ 07:05  Na(136)/K(5.3)/Cl(105)/HCO3(17)/BUN(130)/Cr(5.30)Glu(98)/Ca(9.9)/Mg(--)/PO4(--)    10-02 @ 19:48  Na(135)/K(6.1)/Cl(105)/HCO3(17)/BUN(129)/Cr(5.56)Glu(147)/Ca(9.1)/Mg(--)/PO4(--)    10-02 @ 14:35    Urinalysis Basic - ( 02 Oct 2019 15:35 )  Color: Light Yellow / Appearance: Clear / S.014 / pH: x  Gluc: x / Ketone: Negative  / Bili: Negative / Urobili: Negative   Blood: x / Protein: Trace / Nitrite: Negative   Leuk Esterase: Negative / RBC: x / WBC x   Sq Epi: x / Non Sq Epi: x / Bacteria: x      IMPRESSION: 84F w/ HTN, CKD3, MVR, and breast CA s/p lumpectomy, 10/2/19 a/w RLE cellulitis/WEN    (1)Renal - WEN on CKD3. Ischemic ATN, with superimposed prerenal azotemia. Rapidly improving.    (2)Hyperkalemia - renally mediated. Much improved, with low-K diet, IVF, Lokelma, and with resolution of the WEN. No longer requiring Lokelma, IVF, nor the renal diet.    (3)Metabolic acidosis - renally mediated - much improved, with IVF with HCO3      RECOMMEND:  (1)D/C IVF  (2)D/C Lokelma  (3)D/C renal diet  (4)No objection to discharge from renal standpoint; if for discharge:        (a)No ACEI/ARB for now        (b)Can place back on her home-dose Torsemide; no other diuretics on discharge        (c)Would repeat bloodwork within 3days.         (d)Could f/u at my office in ~1month.    discussed with primary attending Dr. Eugenio Owens MD  [facility]  [phone]

## 2019-10-04 NOTE — PROGRESS NOTE ADULT - PROBLEM SELECTOR PLAN 2
Hyperkalemia likely related to WEN now resolved   s/p  D50 and insulin, Lokelma on torsemide, spironolactone, and metolazone prn at home   holding all diuretics in setting of WEN   d/w Nephrology - will restart torsemide on discharge with close follow up.

## 2019-10-04 NOTE — PROGRESS NOTE ADULT - SUBJECTIVE AND OBJECTIVE BOX
HPI:  patient seen and examined at bedside on 3 DSU.  WEN resolving.    Review Of Systems:           Respiratory: No shortness of breath, cough, or wheezing  Cardiovascular: No chest pain or palpitations  10 point review of systems is otherwise negative except as mentioned above        Medications:  amitriptyline 25 milliGRAM(s) Oral at bedtime  amLODIPine   Tablet 5 milliGRAM(s) Oral daily  apixaban 2.5 milliGRAM(s) Oral two times a day  artificial  tears Solution 1 Drop(s) Both EYES two times a day  aspirin  chewable 81 milliGRAM(s) Oral daily  gabapentin 300 milliGRAM(s) Oral two times a day  iron sucrose IVPB 100 milliGRAM(s) IV Intermittent every 24 hours  lidocaine   Patch 1 Patch Transdermal daily  metoprolol succinate  milliGRAM(s) Oral daily  multivitamin/minerals 1 Tablet(s) Oral daily  pantoprazole    Tablet 40 milliGRAM(s) Oral before breakfast    PAST MEDICAL & SURGICAL HISTORY:  CHF (congestive heart failure)  Choledocholithiasis  Gallstones  Mitral valve disease  Osteoporosis  Vitamin B 12 deficiency  Carotid artery occlusion: (R)  GERD (gastroesophageal reflux disease)  Hypercholesteremia  HTN (hypertension)  Insomnia  S/P laparoscopic cholecystectomy  H/O carotid endarterectomy  Varicose veins: s/p sclerotherapy bilaterally  Bilateral cataracts: extraction w/ IOL  Papilloma of breast: s/p excision from right breast &gt;20 years ago  History of lumbar laminectomy for spinal cord decompression:   Abdominal hernia: repair   Hx of tonsillectomy  Status post DACIA-BSO:   S/P MVR (mitral valve repair):  at [facility]    Vitals:  T(C): 36.4 (10-04-19 @ 05:07), Max: 36.4 (10-03-19 @ 20:27)  HR: 81 (10-04-19 @ 07:33) (73 - 81)  BP: 131/79 (10-04-19 @ 07:33) (119/70 - 131/79)  BP(mean): --  RR: 18 (10-04-19 @ 05:07) (18 - 18)  SpO2: 93% (10-04-19 @ 05:07) (93% - 96%)  Wt(kg): --  Daily     Daily Weight in k (04 Oct 2019 05:07)  I&O's Summary    03 Oct 2019 07:01  -  04 Oct 2019 07:00  --------------------------------------------------------  IN: 2950 mL / OUT: 2800 mL / NET: 150 mL        Physical Exam:  Appearance: Normal, well groomed, NAD  Eyes: PERRLA, EOMI, pink conjunctiva, no scleral icterus   HENT: Normal oral mucosa  Cardiovascular: RRR, S1, S2, no murmur, rub, or gallop; trace RLE edema; no JVD  Respiratory: Clear to auscultation bilaterally  Gastrointestinal: Soft, non-tender, non-distended, BS+  Musculoskeletal: No clubbing or joint deformity   Neurologic: No focal weakness  Lymphatic: No lymphadenopathy  Psychiatry: AAOx3 with appropriate mood and affect  Skin: No rashes, ecchymoses, or cyanosis                          8.4    7.26  )-----------( 271      ( 04 Oct 2019 07:02 )             26.7     10-04    142  |  109<H>  |  93<H>  ----------------------------<  101<H>  4.3   |  22  |  2.43<H>    Ca    8.5      04 Oct 2019 07:02  Phos  5.3     10-03  Mg     2.5     10-03    TPro  5.9<L>  /  Alb  3.4<L>  /  TBili  x   /  DBili  x   /  AST  x   /  ALT  x   /  AlkPhos  x   10-03            Total Cholesterol: 163  LDL: 96  HDL: 42  T      Cardiovascular Diagnostic Testing:  ECG: sinus 67 bpm, normal voltage ECG    Echo: < from: Transthoracic Echocardiogram (18 @ 12:01) >  EF (Visual Estimate): 65 %  EF (Teicholtz): 60 %  Doppler Peak Velocity (m/sec): MV=2.0 AoV=1.7 PV=1.0  ------------------------------------------------------------------------  Observations:  Mitral Valve: Mitral annular calcification and calcified  mitral leaflets with decreased diastolic opening. Mild  mitral regurgitation.  Peak mitral valve gradient equals 16  mm Hg, mean transmitral valve gradient equals 5 mm Hg,  consistent with moderate mitral stenosis.  Aortic Valve/Aorta: Calcified trileaflet aortic valve with  decreased opening. Peak transaortic valve gradient equals  12 mm Hg, mean transaortic valve gradient equals 6 mm Hg,  estimated aortic valve area equals 1.8 sqcm (by continuity  equation), aortic valve velocity time integral equals 34  cm, consistent with mild aortic stenosis. Moderate aortic  regurgitation. Peak left ventricular outflow tract gradient  equals 6 mm Hg, mean gradient is equal to 4 mm Hg, LVOT  velocity time integral equals 25 cm.  Normal aortic root (Ao: 2.6 cm at the sinuses of Valsalva).  Left Atrium: Moderately dilated left atrium.  LA volume  index = 42 cc/m2.  Left Ventricle: Normal left ventricular systolic function.  No segmental wall motion abnormalities. Normal left  ventricular internal dimensions and wall thicknesses.  Right Heart: Normal right atrium. Right ventricular  enlargement with normal right ventricular systolic  function. Normal tricuspid valve. Moderate tricuspid  regurgitation. Normal pulmonic valve.  Pericardium/Pleura: Normal pericardium with no pericardial  effusion.  Hemodynamic: Estimated right atrial pressure is 8 mm Hg.  Estimated right ventricular systolic pressure equals 57 mm  Hg, assuming right atrial pressure equals 8 mm Hg,  consistent with moderate pulmonary hypertension.  ------------------------------------------------------------------------  Conclusions:  1. Calcified trileaflet aortic valve with decreased  opening. Peak transaortic valve gradient equals 12 mm Hg,  mean transaortic valve gradient equals 6 mm Hg, estimated  aortic valve area equals 1.8 sqcm (by continuity equation),  aortic valve velocity time integral equals 34 cm,  consistent with mild aortic stenosis. Moderate aortic  regurgitation.  2. Normal left ventricular internal dimensions and wall  thicknesses.  3. Normal left ventricular systolic function. No segmental  wall motion abnormalities.  4. Right ventricular enlargement with normal right  ventricular systolic function.  5. Estimated pulmonary artery systolic pressure equals 57  mm Hg, assuming right atrial pressure equals 8 mm Hg,  consistent with moderate pulmonary pressures.  ------------------------------------------------------------------------  Confirmed on  3/14/2018 - 15:16:04 by Haritha Jordan M.D.  ------------------------------------------------------------------------    < end of copied text >    Cath: < from: Cardiac Cath Lab - Adult (18 @ 14:58) >  PROCEDURE:  --  Right heart catheterization.  --  Left heart catheterization with ventriculography.  --  Left coronary angiography.  --  Right coronary angiography.  TECHNIQUE: The risks and alternatives of the procedures and conscious  sedation were explained to the patient and informed consent was obtained.  Cardiac catheterization performed electively. Coronary intervention  performed electively.  Local anesthetic given. Right femoral artery access. Right femoral vein  access. Right heart catheterization. The procedure was performed utilizing  a catheter. Left heart catheterization. Ventriculography was performed.  Left coronary artery angiography. The vessel was injected utilizing a  catheter. Right coronary artery angiography. The vessel was injected  utilizing a catheter. RADIATION EXPOSURE: 5.8 min.  CONTRAST GIVEN: Omnipaque 58 ml.  MEDICATIONS GIVEN: Midazolam, 1 mg, IV. Fentanyl, 25 mcg, IV.  VENTRICLES: Global left ventricular function was hypercontractile. EF  estimated was 75 %.  CORONARY VESSELS: The coronary circulation is right dominant.  LM:   --  LM: Normal.  LAD:   --  Proximal LAD: There was a 30 % stenosis.  CX:   --  Ostial circumflex: There was a 40 % stenosis.  RCA:   --  RCA: Normal.  COMPLICATIONS: There were no complications.  DIAGNOSTIC RECOMMENDATIONS: The patient should continue with the present  medications.  Prepared and signed by  Daniel Cee M.D.  Signed 2018 13:30:25    < end of copied text >    Interpretation of Telemetry: NOEMI

## 2019-10-04 NOTE — PROGRESS NOTE ADULT - ASSESSMENT
This patient is an 84yoF with PMH of htn, hLd, PAD, GERD, R carotid stenosis s/p CEA, MV repair, atrial fibrillation on eliquis, CKD stage 3 who presents to the ED due to abnormal outpatient lab, found to have WEN on CKD. This patient is an 84yoF with PMH of htn, hLd, PAD, GERD, R carotid stenosis s/p CEA, MV repair, atrial fibrillation on eliquis, CHF (EF 65%), CKD stage 3 who presents to the ED due to abnormal outpatient lab, found to have WEN on CKD.

## 2019-10-04 NOTE — PROVIDER CONTACT NOTE (CRITICAL VALUE NOTIFICATION) - ACTION/TREATMENT ORDERED:
PA aware.   Orthostatic BP/Pulse results notified   Lying /76 HR 74  Sitting /76 HR 82  Standing 148/68 HR 82    Will continue to monitor patient.

## 2019-10-04 NOTE — PROGRESS NOTE ADULT - ATTENDING COMMENTS
[facility]  [phone] d/c planning tomorrow if creatinine continues to improve  Dr. RACHEL Becker  [facility] Hospitalist  [phone]

## 2019-10-04 NOTE — PROGRESS NOTE ADULT - PROBLEM SELECTOR PLAN 6
Holding home dose of losartan due to hyperkalemia and WEN.  c/w metoprolol and amlodipine with hold parameters. c/w aspirin

## 2019-10-04 NOTE — PROGRESS NOTE ADULT - PROBLEM SELECTOR PLAN 4
c/w eliquis and metoprolol.   Patient is currently rate controlled Patient has pain at site of previous lumbar laminectomy. Patient states that she has had epidurals 2 months ago without significant improvement. Patient has MRI imaging of lower back from April 2019. She would benefit from follow up with spine center to determine if local injection, epidural or other intervention could be helpful.  Continue home dose of gabapentin.  started lidocaine patch for pain control.  Avoid NSAIDS

## 2019-10-04 NOTE — PROVIDER CONTACT NOTE (CRITICAL VALUE NOTIFICATION) - ASSESSMENT
Patient Aox4. Patient while coming back from bathroom to her bed after having a bowel movement was complaining of dizziness. Patient safely reached bed, dizziness resolved within few minutes. No blood noted in stool.

## 2019-10-04 NOTE — PROGRESS NOTE ADULT - PROBLEM SELECTOR PLAN 7
c/w protonix  Patient expresses desire of tapering off this medication, because she had been taking it for a long time - f/u pmd outpatient c/w protonix Holding home dose of losartan due to hyperkalemia and WEN.  c/w metoprolol and amlodipine with hold parameters.

## 2019-10-05 LAB
ANION GAP SERPL CALC-SCNC: 12 MMOL/L — SIGNIFICANT CHANGE UP (ref 5–17)
ANION GAP SERPL CALC-SCNC: 8 MMOL/L — SIGNIFICANT CHANGE UP (ref 5–17)
APTT BLD: 35.2 SEC — SIGNIFICANT CHANGE UP (ref 27.5–36.3)
BLD GP AB SCN SERPL QL: NEGATIVE — SIGNIFICANT CHANGE UP
BUN SERPL-MCNC: 70 MG/DL — HIGH (ref 7–23)
BUN SERPL-MCNC: 70 MG/DL — HIGH (ref 7–23)
CALCIUM SERPL-MCNC: 8.3 MG/DL — LOW (ref 8.4–10.5)
CALCIUM SERPL-MCNC: 8.7 MG/DL — SIGNIFICANT CHANGE UP (ref 8.4–10.5)
CHLORIDE SERPL-SCNC: 110 MMOL/L — HIGH (ref 96–108)
CHLORIDE SERPL-SCNC: 111 MMOL/L — HIGH (ref 96–108)
CO2 SERPL-SCNC: 22 MMOL/L — SIGNIFICANT CHANGE UP (ref 22–31)
CO2 SERPL-SCNC: 24 MMOL/L — SIGNIFICANT CHANGE UP (ref 22–31)
CREAT SERPL-MCNC: 1.78 MG/DL — HIGH (ref 0.5–1.3)
CREAT SERPL-MCNC: 1.83 MG/DL — HIGH (ref 0.5–1.3)
GLUCOSE SERPL-MCNC: 104 MG/DL — HIGH (ref 70–99)
GLUCOSE SERPL-MCNC: 99 MG/DL — SIGNIFICANT CHANGE UP (ref 70–99)
HCT VFR BLD CALC: 25.6 % — LOW (ref 34.5–45)
HCT VFR BLD CALC: 27.3 % — LOW (ref 34.5–45)
HGB BLD-MCNC: 8.1 G/DL — LOW (ref 11.5–15.5)
HGB BLD-MCNC: 8.4 G/DL — LOW (ref 11.5–15.5)
INR BLD: 1.31 RATIO — HIGH (ref 0.88–1.16)
MAGNESIUM SERPL-MCNC: 2.1 MG/DL — SIGNIFICANT CHANGE UP (ref 1.6–2.6)
MCHC RBC-ENTMCNC: 27.2 PG — SIGNIFICANT CHANGE UP (ref 27–34)
MCHC RBC-ENTMCNC: 27.6 PG — SIGNIFICANT CHANGE UP (ref 27–34)
MCHC RBC-ENTMCNC: 30.8 GM/DL — LOW (ref 32–36)
MCHC RBC-ENTMCNC: 31.6 GM/DL — LOW (ref 32–36)
MCV RBC AUTO: 87.1 FL — SIGNIFICANT CHANGE UP (ref 80–100)
MCV RBC AUTO: 88.3 FL — SIGNIFICANT CHANGE UP (ref 80–100)
NRBC # BLD: 0 /100 WBCS — SIGNIFICANT CHANGE UP (ref 0–0)
NRBC # BLD: 0 /100 WBCS — SIGNIFICANT CHANGE UP (ref 0–0)
PHOSPHATE SERPL-MCNC: 2.8 MG/DL — SIGNIFICANT CHANGE UP (ref 2.5–4.5)
PLATELET # BLD AUTO: 267 K/UL — SIGNIFICANT CHANGE UP (ref 150–400)
PLATELET # BLD AUTO: 280 K/UL — SIGNIFICANT CHANGE UP (ref 150–400)
POTASSIUM SERPL-MCNC: 4.4 MMOL/L — SIGNIFICANT CHANGE UP (ref 3.5–5.3)
POTASSIUM SERPL-MCNC: 4.6 MMOL/L — SIGNIFICANT CHANGE UP (ref 3.5–5.3)
POTASSIUM SERPL-SCNC: 4.4 MMOL/L — SIGNIFICANT CHANGE UP (ref 3.5–5.3)
POTASSIUM SERPL-SCNC: 4.6 MMOL/L — SIGNIFICANT CHANGE UP (ref 3.5–5.3)
PROTHROM AB SERPL-ACNC: 15.2 SEC — HIGH (ref 10–12.9)
RBC # BLD: 2.94 M/UL — LOW (ref 3.8–5.2)
RBC # BLD: 3.09 M/UL — LOW (ref 3.8–5.2)
RBC # FLD: 14.2 % — SIGNIFICANT CHANGE UP (ref 10.3–14.5)
RBC # FLD: 14.4 % — SIGNIFICANT CHANGE UP (ref 10.3–14.5)
RH IG SCN BLD-IMP: POSITIVE — SIGNIFICANT CHANGE UP
SODIUM SERPL-SCNC: 142 MMOL/L — SIGNIFICANT CHANGE UP (ref 135–145)
SODIUM SERPL-SCNC: 145 MMOL/L — SIGNIFICANT CHANGE UP (ref 135–145)
WBC # BLD: 6.38 K/UL — SIGNIFICANT CHANGE UP (ref 3.8–10.5)
WBC # BLD: 7.06 K/UL — SIGNIFICANT CHANGE UP (ref 3.8–10.5)
WBC # FLD AUTO: 6.38 K/UL — SIGNIFICANT CHANGE UP (ref 3.8–10.5)
WBC # FLD AUTO: 7.06 K/UL — SIGNIFICANT CHANGE UP (ref 3.8–10.5)

## 2019-10-05 PROCEDURE — 99233 SBSQ HOSP IP/OBS HIGH 50: CPT

## 2019-10-05 PROCEDURE — 99232 SBSQ HOSP IP/OBS MODERATE 35: CPT

## 2019-10-05 RX ORDER — IPRATROPIUM/ALBUTEROL SULFATE 18-103MCG
3 AEROSOL WITH ADAPTER (GRAM) INHALATION ONCE
Refills: 0 | Status: COMPLETED | OUTPATIENT
Start: 2019-10-05 | End: 2019-10-05

## 2019-10-05 RX ADMIN — LIDOCAINE 1 PATCH: 4 CREAM TOPICAL at 21:40

## 2019-10-05 RX ADMIN — Medication 10 MILLIGRAM(S): at 17:11

## 2019-10-05 RX ADMIN — Medication 1 TABLET(S): at 08:10

## 2019-10-05 RX ADMIN — Medication 25 MILLIGRAM(S): at 21:40

## 2019-10-05 RX ADMIN — Medication 1 DROP(S): at 17:11

## 2019-10-05 RX ADMIN — Medication 3 MILLILITER(S): at 05:55

## 2019-10-05 RX ADMIN — Medication 1 DROP(S): at 05:41

## 2019-10-05 RX ADMIN — LIDOCAINE 1 PATCH: 4 CREAM TOPICAL at 08:10

## 2019-10-05 RX ADMIN — GABAPENTIN 300 MILLIGRAM(S): 400 CAPSULE ORAL at 17:11

## 2019-10-05 RX ADMIN — PANTOPRAZOLE SODIUM 40 MILLIGRAM(S): 20 TABLET, DELAYED RELEASE ORAL at 05:41

## 2019-10-05 RX ADMIN — AMLODIPINE BESYLATE 5 MILLIGRAM(S): 2.5 TABLET ORAL at 05:41

## 2019-10-05 RX ADMIN — Medication 100 MILLIGRAM(S): at 05:41

## 2019-10-05 RX ADMIN — GABAPENTIN 300 MILLIGRAM(S): 400 CAPSULE ORAL at 05:41

## 2019-10-05 RX ADMIN — IRON SUCROSE 210 MILLIGRAM(S): 20 INJECTION, SOLUTION INTRAVENOUS at 14:15

## 2019-10-05 NOTE — PROGRESS NOTE ADULT - PROBLEM SELECTOR PLAN 1
UA negative  WEN resolving, likely in setting of bactrim and diuretics.  -restart low dose torsemide today, if tolerates, d/c tomorrow.

## 2019-10-05 NOTE — PROGRESS NOTE ADULT - ATTENDING COMMENTS
#FOBT positive stool  -patietn with no melena or hematochezia. Rectal exam personally performed with brown stool FOBT tests for microcytic blood and is a screenign tool for colon cancer. Patient can follow up with her GI phsycian, Dr. Kaye for repeat colonosocopy (last one 3 years ago).  No further inpatient workup unless stool changes.

## 2019-10-05 NOTE — PROGRESS NOTE ADULT - SUBJECTIVE AND OBJECTIVE BOX
Patient is a 84y old  Female who presents with a chief complaint of abnormal labs (04 Oct 2019 09:15)        SUBJECTIVE / OVERNIGHT EVENTS: no acute events overngiht. Increasing SOB from yesterday, otherwise no other complaints. Patient denies melena or hematochezia, chset pain.     MEDICATIONS  (STANDING):  amitriptyline 25 milliGRAM(s) Oral at bedtime  amLODIPine   Tablet 5 milliGRAM(s) Oral daily  apixaban 2.5 milliGRAM(s) Oral two times a day  artificial  tears Solution 1 Drop(s) Both EYES two times a day  aspirin  chewable 81 milliGRAM(s) Oral daily  gabapentin 300 milliGRAM(s) Oral two times a day  iron sucrose IVPB 100 milliGRAM(s) IV Intermittent every 24 hours  lidocaine   Patch 1 Patch Transdermal daily  metoprolol succinate  milliGRAM(s) Oral daily  multivitamin/minerals 1 Tablet(s) Oral daily  pantoprazole    Tablet 40 milliGRAM(s) Oral before breakfast  sodium bicarbonate  Infusion 0.105 mEq/kG/Hr (100 mL/Hr) IV Continuous <Continuous>  sodium zirconium cyclosilicate 10 Gram(s) Oral daily    MEDICATIONS  (PRN):    Vital Signs Last 24 Hrs  T(C): 37 (05 Oct 2019 12:34), Max: 37 (05 Oct 2019 12:34)  T(F): 98.6 (05 Oct 2019 12:34), Max: 98.6 (05 Oct 2019 12:34)  HR: 85 (05 Oct 2019 12:34) (70 - 85)  BP: 145/75 (05 Oct 2019 12:34) (109/57 - 145/75)  BP(mean): --  RR: 18 (05 Oct 2019 12:34) (18 - 18)  SpO2: 96% (05 Oct 2019 12:34) (95% - 98%)      I&O's Summary    03 Oct 2019 07:01  -  04 Oct 2019 07:00  --------------------------------------------------------  IN: 2950 mL / OUT: 2800 mL / NET: 150 mL        PHYSICAL EXAM:  GENERAL: NAD, breathing normal  EYES: conjunctiva and sclera clear  NECK: supple, No JVD  CHEST/LUNG: CTA b/l  HEART: S1 S2 RRR  ABDOMEN: +BS Soft, NT/ND, no CVA tenderness  EXTREMITIES:  2+ DP Pulses, No c/c. trace RLE edema shin and ankle  Rectal Exam: Brown stool, external hemorrhoids. Chaparoned by RN.  NEUROLOGY: AAOx3, no facial droop, no focal deficits   SKIN: small swelling on right shin due to recent injury     LABS:                                             8.4    6.38  )-----------( 267      ( 05 Oct 2019 05:24 )             27.3   10-05    142  |  110<H>  |  70<H>  ----------------------------<  104<H>  4.4   |  24  |  1.78<H>    Ca    8.7      05 Oct 2019 05:24  Phos  2.8     10-04  Mg     2.1     10-04          Urinalysis Basic - ( 02 Oct 2019 15:35 )    Color: Light Yellow / Appearance: Clear / S.014 / pH: x  Gluc: x / Ketone: Negative  / Bili: Negative / Urobili: Negative   Blood: x / Protein: Trace / Nitrite: Negative   Leuk Esterase: Negative / RBC: x / WBC x   Sq Epi: x / Non Sq Epi: x / Bacteria: x        RADIOLOGY & ADDITIONAL TESTS:    Imaging Personally Reviewed: NM amyloid scan reviewed, negative.   Consultant(s) Notes Reviewed:  Dr. Owens (nephrology), Dr. Wynne (Cards)  Care Discussed with Consultants/Other Providers:

## 2019-10-05 NOTE — PROGRESS NOTE ADULT - PROBLEM SELECTOR PLAN 2
restarted torsemide today, can restart other diuretics and heart failure meds slowly as outpatient, has good follow up.

## 2019-10-05 NOTE — PROGRESS NOTE ADULT - PROBLEM SELECTOR PLAN 7
Holding home dose of losartan due to hyperkalemia and WEN.  c/w metoprolol and amlodipine with hold parameters.

## 2019-10-05 NOTE — CHART NOTE - NSCHARTNOTEFT_GEN_A_CORE
Informed by RN of PAT x 2.6sec with  on telemetry. Pt currently on metoprolol succinate ER 100mg qd. Vital signs stable, Calcium 8.3, all other electrolytes within normal limits. Cards following, F/u morning labs/electrolytes. Will continue to monitor, relay to day team in AM.     Hailee Arita PA-C  Dept. of Medicine  [phone]

## 2019-10-05 NOTE — PROGRESS NOTE ADULT - ASSESSMENT
This patient is an 84yoF with PMH of htn, hLd, PAD, GERD, R carotid stenosis s/p CEA, MV repair, atrial fibrillation on eliquis, CHF (EF 65%), CKD stage 3 who presents to the ED due to abnormal outpatient lab, found to have WEN on CKD.

## 2019-10-05 NOTE — CHART NOTE - NSCHARTNOTEFT_GEN_A_CORE
MEDICINE PA NOTE    Event Summary:   Notified by RN critical lab value FOBT positive. Seen and examined patient at bedside. The patient states that she had a colonoscopy 3 years ago which found polyps and hemorrhoids. Patient is A&Ox4, NAD, calm and cooperative. Admits to dizziness when ambulating to the bathroom a few minutes prior which resolved upon returning to bed. Also admits to one recent episode of loose stool. Denies fatigue, weakness, chills, night sweats. Denies chest pain, palpitations, back pain, SOB, wheezing. Denies abdominal pain, nausea, vomiting, hematemesis, melena, hematochezia, dysuria, hematuria.     >VITAL SIGNS:  T(C): 36.6 (10-05-19 @ 04:13), Max: 36.7 (10-04-19 @ 22:11)  T(F): 97.9 (10-05-19 @ 04:13), Max: 98 (10-04-19 @ 22:11)  HR: 72 (10-05-19 @ 04:13) (70 - 81)  BP: 134/72 (10-05-19 @ 04:13) (109/57 - 137/79)  RR: 18 (10-05-19 @ 04:13) (18 - 18)  SpO2: 96% (10-05-19 @ 04:13) (95% - 98%)    Lying /76 HR 74  Sitting /76 HR 82  Standing 148/68 HR 82    >PHYSICAL EXAM:  GENERAL: NAD, well-groomed, well-developed  NERVOUS SYSTEM:  AOX4, Good concentration  CHEST/LUNG: Expiratory wheezes in upper lobes; No rales, rhonchi, or rubs  HEART: Regular rate and rhythm; No murmurs, rubs, or gallops  ABDOMEN: Soft, Nontender, Nondistended; Bowel sounds present  EXTREMITIES:  2+ Peripheral Pulses, No clubbing, cyanosis, or edema  LYMPH: No lymphadenopathy noted  SKIN: No rashes or lesions    Plan:   - Stat labs (CBC, BMP, T&S, PTT/INR)  - Informed the overnight hospitalist, recommended GI consult in AM  - C/w monitoring closely   - F/U primary care team in AM     Hailee Arita PA-C  Dept of Medicine  [place] [phone]

## 2019-10-05 NOTE — PROGRESS NOTE ADULT - SUBJECTIVE AND OBJECTIVE BOX
Patient continues to complain of severe back pain which limits her mobility.  An MRI in  demonstrated a compression fracture at T10 and the patient had an appointment with a pain management physician (today unfortunately).  MRI was not consistent with myeloma.  Patient is noticing some wheezing recently probably secondary to discontinuation of diuretics although azotemia consistent with overdiuresis persists.  If shortness of breath limits mobility, would restart torsemide 10 mg. daily and follow creatinine.  Would laso begin physical therapy with plans for discharge home tomorrow.      Vital Signs Last 24 Hrs  T(C): 36.6 (05 Oct 2019 04:13), Max: 36.7 (04 Oct 2019 22:11)  T(F): 97.9 (05 Oct 2019 04:13), Max: 98 (04 Oct 2019 22:11)  HR: 72 (05 Oct 2019 04:13) (70 - 75)  BP: 134/72 (05 Oct 2019 04:13) (109/57 - 137/79)  BP(mean): --  RR: 18 (05 Oct 2019 04:13) (18 - 18)  SpO2: 96% (05 Oct 2019 04:13) (95% - 98%)  Daily     Daily Weight in k.2 (05 Oct 2019 04:13)  I&O's Summary    04 Oct 2019 07:01  -  05 Oct 2019 07:00  --------------------------------------------------------  IN: 450 mL / OUT: 1100 mL / NET: -650 mL        Neck: no bruits, JVD, adenopathy  Chest: clear  Cor: [id number], RR, normal S1S2 with no mrght  Abd: soft, nontender, BS+ and no HSM  Ext: w/o CCE  Pulses:2+->dp bilaterally    MEDICATIONS  (STANDING):  amitriptyline 25 milliGRAM(s) Oral at bedtime  amLODIPine   Tablet 5 milliGRAM(s) Oral daily  artificial  tears Solution 1 Drop(s) Both EYES two times a day  gabapentin 300 milliGRAM(s) Oral two times a day  iron sucrose IVPB 100 milliGRAM(s) IV Intermittent every 24 hours  lidocaine   Patch 1 Patch Transdermal daily  metoprolol succinate  milliGRAM(s) Oral daily  multivitamin/minerals 1 Tablet(s) Oral daily  pantoprazole    Tablet 40 milliGRAM(s) Oral before breakfast    MEDICATIONS  (PRN):      10-05    142  |  110<H>  |  70<H>  ----------------------------<  104<H>  4.4   |  24  |  1.78<H>    Ca    8.7      05 Oct 2019 05:24  Phos  2.8     10-04  Mg     2.1     10-04                            8.4    6.38  )-----------( 267      ( 05 Oct 2019 05:24 )             27.3     PT/INR - ( 04 Oct 2019 23:58 )   PT: 15.2 sec;   INR: 1.31 ratio         PTT - ( 04 Oct 2019 23:58 )  PTT:35.2 sec    HEALTH ISSUES - PROBLEM Dx:  Chronic diastolic heart failure: continue cautious hydration.  If shortness of breath develops, would discontinue hydration and begin torsemide 10 mg. daily.  F/U BMP in AM.  If patient is able to ambulate, would consider d/c home tomorrow or Monday (with close F/u and probably weekly BMP for the next few weeks. to ensure stability.  Thoracic back pain: probably from T10 compression fracture.  Physical therapy and pain management.

## 2019-10-05 NOTE — PROGRESS NOTE ADULT - SUBJECTIVE AND OBJECTIVE BOX
No pain, no shortness of breath      VITAL:  T(C): , Max: 36.7 (10-04-19 @ 22:11)  T(F): , Max: 98 (10-04-19 @ 22:11)  HR: 72 (10-05-19 @ 04:13)  BP: 134/72 (10-05-19 @ 04:13)  RR: 18 (10-05-19 @ 04:13)  SpO2: 96% (10-05-19 @ 04:13)      PHYSICAL EXAM:  Constitutional: NAD, Alert  HEENT: NCAT, DMM  Neck: Supple, No JVD  Respiratory: CTA-b/l  Cardiovascular: RRR s1s2, no m/r/g  Gastrointestinal: BS+, soft, NT/ND  Extremities: trace RLE edema; no LLE edema  Neurological: no focal deficits; strength grossly intact  Back: no CVAT b/l  Skin: (+)RLE mild erythema and (+)tenderness, below the knee      LABS:                        8.4    6.38  )-----------( 267      ( 05 Oct 2019 05:24 )             27.3     Na(142)/K(4.4)/Cl(110)/HCO3(24)/BUN(70)/Cr(1.78)Glu(104)/Ca(8.7)/Mg(--)/PO4(--)    10-05 @ 05:24  Na(145)/K(4.6)/Cl(111)/HCO3(22)/BUN(70)/Cr(1.83)Glu(99)/Ca(8.3)/Mg(2.1)/PO4(2.8)    10-04 @ 23:58  Na(142)/K(4.3)/Cl(109)/HCO3(22)/BUN(93)/Cr(2.43)Glu(101)/Ca(8.5)/Mg(--)/PO4(--)    10-04 @ 07:02  Na(138)/K(5.3)/Cl(110)/HCO3(16)/BUN(114)/Cr(4.26)Glu(70)/Ca(8.8)/Mg(2.5)/PO4(5.3)    10-03 @ 07:05  Na(136)/K(5.3)/Cl(105)/HCO3(17)/BUN(130)/Cr(5.30)Glu(98)/Ca(9.9)/Mg(--)/PO4(--)    10-02 @ 19:48  Na(135)/K(6.1)/Cl(105)/HCO3(17)/BUN(129)/Cr(5.56)Glu(147)/Ca(9.1)/Mg(--)/PO4(--)    10-02 @ 14:35      IMPRESSION: 84F w/ HTN, CKD3, MVR, and breast CA s/p lumpectomy, 10/2/19 a/w RLE cellulitis/WEN    (1)Renal - WEN on CKD3. Ischemic ATN, with superimposed prerenal azotemia. Resolving/resolved.    (2)Lytes - highly acceptable at this point      RECOMMEND:  (1)No objection to discharge from renal standpoint; if for discharge:        (a)No ACEI/ARB for now        (b)Can place back on her home-dose Torsemide; no other diuretics on discharge        (c)Would repeat bloodwork within 3days.         (d)Could f/u at my office in ~1month.            Maximus Owens MD  [facility]  [phone]

## 2019-10-06 LAB
ANION GAP SERPL CALC-SCNC: 7 MMOL/L — SIGNIFICANT CHANGE UP (ref 5–17)
BUN SERPL-MCNC: 51 MG/DL — HIGH (ref 7–23)
CALCIUM SERPL-MCNC: 9.1 MG/DL — SIGNIFICANT CHANGE UP (ref 8.4–10.5)
CHLORIDE SERPL-SCNC: 107 MMOL/L — SIGNIFICANT CHANGE UP (ref 96–108)
CO2 SERPL-SCNC: 26 MMOL/L — SIGNIFICANT CHANGE UP (ref 22–31)
CREAT SERPL-MCNC: 1.72 MG/DL — HIGH (ref 0.5–1.3)
GLUCOSE SERPL-MCNC: 101 MG/DL — HIGH (ref 70–99)
HCT VFR BLD CALC: 27.1 % — LOW (ref 34.5–45)
HGB BLD-MCNC: 8.2 G/DL — LOW (ref 11.5–15.5)
MCHC RBC-ENTMCNC: 27.2 PG — SIGNIFICANT CHANGE UP (ref 27–34)
MCHC RBC-ENTMCNC: 30.3 GM/DL — LOW (ref 32–36)
MCV RBC AUTO: 89.7 FL — SIGNIFICANT CHANGE UP (ref 80–100)
NRBC # BLD: 0 /100 WBCS — SIGNIFICANT CHANGE UP (ref 0–0)
PLATELET # BLD AUTO: 259 K/UL — SIGNIFICANT CHANGE UP (ref 150–400)
POTASSIUM SERPL-MCNC: 4.3 MMOL/L — SIGNIFICANT CHANGE UP (ref 3.5–5.3)
POTASSIUM SERPL-SCNC: 4.3 MMOL/L — SIGNIFICANT CHANGE UP (ref 3.5–5.3)
RBC # BLD: 3.02 M/UL — LOW (ref 3.8–5.2)
RBC # FLD: 14.3 % — SIGNIFICANT CHANGE UP (ref 10.3–14.5)
SODIUM SERPL-SCNC: 140 MMOL/L — SIGNIFICANT CHANGE UP (ref 135–145)
WBC # BLD: 8.35 K/UL — SIGNIFICANT CHANGE UP (ref 3.8–10.5)
WBC # FLD AUTO: 8.35 K/UL — SIGNIFICANT CHANGE UP (ref 3.8–10.5)

## 2019-10-06 PROCEDURE — 99233 SBSQ HOSP IP/OBS HIGH 50: CPT | Mod: GC

## 2019-10-06 PROCEDURE — 99231 SBSQ HOSP IP/OBS SF/LOW 25: CPT

## 2019-10-06 RX ORDER — APIXABAN 2.5 MG/1
2.5 TABLET, FILM COATED ORAL
Refills: 0 | Status: DISCONTINUED | OUTPATIENT
Start: 2019-10-06 | End: 2019-10-08

## 2019-10-06 RX ORDER — FUROSEMIDE 40 MG
40 TABLET ORAL ONCE
Refills: 0 | Status: COMPLETED | OUTPATIENT
Start: 2019-10-06 | End: 2019-10-06

## 2019-10-06 RX ADMIN — Medication 1 TABLET(S): at 09:06

## 2019-10-06 RX ADMIN — APIXABAN 2.5 MILLIGRAM(S): 2.5 TABLET, FILM COATED ORAL at 17:28

## 2019-10-06 RX ADMIN — GABAPENTIN 300 MILLIGRAM(S): 400 CAPSULE ORAL at 06:22

## 2019-10-06 RX ADMIN — Medication 40 MILLIGRAM(S): at 09:36

## 2019-10-06 RX ADMIN — AMLODIPINE BESYLATE 5 MILLIGRAM(S): 2.5 TABLET ORAL at 06:22

## 2019-10-06 RX ADMIN — Medication 1 DROP(S): at 06:22

## 2019-10-06 RX ADMIN — LIDOCAINE 1 PATCH: 4 CREAM TOPICAL at 21:07

## 2019-10-06 RX ADMIN — PANTOPRAZOLE SODIUM 40 MILLIGRAM(S): 20 TABLET, DELAYED RELEASE ORAL at 06:22

## 2019-10-06 RX ADMIN — Medication 10 MILLIGRAM(S): at 06:22

## 2019-10-06 RX ADMIN — GABAPENTIN 300 MILLIGRAM(S): 400 CAPSULE ORAL at 17:28

## 2019-10-06 RX ADMIN — Medication 1 DROP(S): at 17:28

## 2019-10-06 RX ADMIN — LIDOCAINE 1 PATCH: 4 CREAM TOPICAL at 09:06

## 2019-10-06 RX ADMIN — Medication 100 MILLIGRAM(S): at 06:22

## 2019-10-06 RX ADMIN — Medication 25 MILLIGRAM(S): at 21:06

## 2019-10-06 NOTE — PROVIDER CONTACT NOTE (OTHER) - ASSESSMENT
Patient Aox4. Dizziness resolved after sitting in bed after few minutes. No ectopy noted on tele during the event.

## 2019-10-06 NOTE — PROGRESS NOTE ADULT - SUBJECTIVE AND OBJECTIVE BOX
Patient tried to ambulate yesterday but had difficulties with SOB (and wheezing) and back pain.  The patient is VERY sensitive to fluid status and at this point, I would like to try raising her torsemide to 20 mg.  F/U BMP in AM.  Physical therapy.  If patient feels better tomorrow, no objection to d/c home in AM.      Vital Signs Last 24 Hrs  T(C): 36.9 (06 Oct 2019 05:36), Max: 37 (05 Oct 2019 12:34)  T(F): 98.5 (06 Oct 2019 05:36), Max: 98.6 (05 Oct 2019 12:34)  HR: 78 (06 Oct 2019 06:17) (73 - 85)  BP: 143/63 (06 Oct 2019 06:17) (100/58 - 145/75)  BP(mean): --  RR: 18 (06 Oct 2019 05:36) (18 - 18)  SpO2: 97% (06 Oct 2019 05:36) (95% - 97%)  Daily     Daily Weight in k.4 (06 Oct 2019 05:36)  I&O's Summary    05 Oct 2019 07:01  -  06 Oct 2019 07:00  --------------------------------------------------------  IN: 460 mL / OUT: 900 mL / NET: -440 mL        Neck: no bruits, JVD, adenopathy  Chest: clear  Cor: [id number], RR, normal S1S2 with no mrght  Abd: soft, nontender, BS+ and no HSM  Ext: w/o CC trace pedal edema  Pulses:2+->dp bilaterally    MEDICATIONS  (STANDING):  amitriptyline 25 milliGRAM(s) Oral at bedtime  amLODIPine   Tablet 5 milliGRAM(s) Oral daily  artificial  tears Solution 1 Drop(s) Both EYES two times a day  gabapentin 300 milliGRAM(s) Oral two times a day  lidocaine   Patch 1 Patch Transdermal daily  metoprolol succinate  milliGRAM(s) Oral daily  multivitamin/minerals 1 Tablet(s) Oral daily  pantoprazole    Tablet 40 milliGRAM(s) Oral before breakfast  torsemide 20 milliGRAM(s) Oral daily    MEDICATIONS  (PRN):      10-06    140  |  107  |  51<H>  ----------------------------<  101<H>  4.3   |  26  |  1.72<H> (continues to improve)    Ca    9.1      06 Oct 2019 07:09  Phos  2.8     10-  Mg     2.1     10-04                            8.2    8.35  )-----------( 259      ( 06 Oct 2019 07:09 )             27.1     PT/INR - ( 04 Oct 2019 23:58 )   PT: 15.2 sec;   INR: 1.31 ratio         PTT - ( 04 Oct 2019 23:58 )  PTT:35.2 sec    HEALTH ISSUES - PROBLEM Dx:  Chronic diastolic heart failure: as above.  If patient is able to ambulate, would consider d/c home tomorrow or Monday (with close F/u and probably weekly BMP for the next few weeks. to ensure stability.  Thoracic back pain: probably from T10 compression fracture.  Physical therapy and pain management.

## 2019-10-06 NOTE — PROGRESS NOTE ADULT - PROBLEM/PLAN-2
Anticoagulation Clinic - Remote Progress Note  REMOTE LAB    Indication: On-X Mechanical valve #25 and a 30mm hemashield  Referring Provider: Alvaro  Initial Warfarin Start Date:  ~9/2018  Goal INR: 2-3 per referral  Current Drug Interactions: desvenlafaxine, levothyroxine  Bleed Risk: ascending aortic aneurysm   Other: Hodgkins Lymphoma (hx of chemo and radiation); h/o CVA + hemorrhagic pancreatitis (during hospitalization for mAVR)    Diet: avoids  Alcohol: socially  Tobacco: None  OTC Pain Medication: APAP PRN pain (voiced understanding to     INR History:  Date 9/27 9/28 10/1 10/4 10/8       Total Weekly Dose 8mg 12mg 19mg 29mg 31mg 34mg      INR 1.3 1.22 1.3 1.8 1.85       Notes clinic enox enox enox; clinic enox enox        Dosing from [facility]:  Date INR Dose   9/01 1.3 5 mg   9/02 1.3 5 mg   9/03 1.3 7.5 mg   9/04 1.3 7.5 mg   9/05 1.4 Coumadin held from 9/5 to   9/06 2.5 9/10 due to acute brain   9/07 2.5 infarct   9/08 2.2 HELD   9/09 2.5 HELD   9/10 2.3 HELD   9/11 2.2 5 mg   9/12 2.8 2.5 mg   9/13 5.6 HELD   9/14 6.6 HELD   9/15 6.0 HELD   9/16 4.7 HELD   9/17 3.1 1mg   9/18 Have your INR checked on this day     Dosing since outpatient at Dr. Faust's office:  9/18: received 0.5mg of warfarin  9/19: INR was 3.3; warfarin dose 0.5mg  9/20: 1mg of warfarin    Phone Interview:  Verbal Release Authorization signed on 9/27/2018- may speak with Gaby Hall (Wife)  Tablet Strength: pt has 1mg, 2mg, and 5mg tablets  Current Maintenance Dose: dose finding was taking 1mg   Patient Contact Info: [phone] (Gaby-wife)  Lab Contact Info: Megan Vu     Patient Findings:  Negatives:   Signs/symptoms of thrombosis, Signs/symptoms of bleeding, Laboratory test error suspected, Change in health, Change in alcohol use, Change in activity, Upcoming invasive procedure, Emergency department visit, Upcoming dental procedure, Missed doses, Extra doses, Change in medications, Change in diet/appetite,  Hospital admission, Bruising, Other complaints     Plan:  1. INR remains stable, subtherapeutic at 1.85. Instructed Mrs. Hall to have her  take warfarin 7mg daily (9% incr. by recheck) + continue Lovenox 80mg SubQ q12h.   2. Repeat INR on Thursday.  3. Verbal information provided over the phone. Mrs. Hall expresses understanding with teach back and has no further questions at this time.  4. Mr. Hall would like to look into home monitoring once has been on warfarin for 90 days.  5. Lovenox, warfarin 2mg tabs updated at [facility] to reflect prescriber Dr. Vimal John.     Ann Cowden Mayer, PharmD  10/8/2018  4:41 PM   DISPLAY PLAN FREE TEXT

## 2019-10-06 NOTE — PROGRESS NOTE ADULT - SUBJECTIVE AND OBJECTIVE BOX
Patient is a 84y old  Female who presents with a chief complaint of abnormal labs (04 Oct 2019 09:15)        SUBJECTIVE / OVERNIGHT EVENTS: patient is more dyspnic today. Placed on O2 overnight. No cough, chets pain, abodminal pain, n/v/d/c.     MEDICATIONS  (STANDING):  amitriptyline 25 milliGRAM(s) Oral at bedtime  amLODIPine   Tablet 5 milliGRAM(s) Oral daily  apixaban 2.5 milliGRAM(s) Oral two times a day  artificial  tears Solution 1 Drop(s) Both EYES two times a day  aspirin  chewable 81 milliGRAM(s) Oral daily  gabapentin 300 milliGRAM(s) Oral two times a day  iron sucrose IVPB 100 milliGRAM(s) IV Intermittent every 24 hours  lidocaine   Patch 1 Patch Transdermal daily  metoprolol succinate  milliGRAM(s) Oral daily  multivitamin/minerals 1 Tablet(s) Oral daily  pantoprazole    Tablet 40 milliGRAM(s) Oral before breakfast  sodium bicarbonate  Infusion 0.105 mEq/kG/Hr (100 mL/Hr) IV Continuous <Continuous>  sodium zirconium cyclosilicate 10 Gram(s) Oral daily    MEDICATIONS  (PRN):    Vital Signs Last 24 Hrs  T(C): 36.3 (06 Oct 2019 12:38), Max: 36.9 (06 Oct 2019 05:36)  T(F): 97.3 (06 Oct 2019 12:38), Max: 98.5 (06 Oct 2019 05:36)  HR: 82 (06 Oct 2019 12:38) (73 - 82)  BP: 137/73 (06 Oct 2019 12:38) (100/58 - 143/63)  BP(mean): 94 (06 Oct 2019 12:38) (94 - 94)  RR: 418 (06 Oct 2019 12:38) (18 - 418)  SpO2: 98% (06 Oct 2019 12:38) (97% - 98%)    I&O's Summary    03 Oct 2019 07:01  -  04 Oct 2019 07:00  --------------------------------------------------------  IN: 2950 mL / OUT: 2800 mL / NET: 150 mL        PHYSICAL EXAM:  GENERAL: NAD, breathing normal  EYES: conjunctiva and sclera clear  NECK: supple, No JVD  CHEST/LUNG: CTA b/l  HEART: S1 S2 RRR  ABDOMEN: +BS Soft, NT/ND, no CVA tenderness  EXTREMITIES:  2+ DP Pulses, No c/c. trace RLE edema shin and ankle  Rectal Exam: Brown stool, external hemorrhoids. Chaparoned by RN.  NEUROLOGY: AAOx3, no facial droop, no focal deficits   SKIN: small swelling on right shin due to recent injury   UNOFFICIAL POCUS LUNGS: a-line at apex, but b-line preodmenant b/l in lower lobes b/l.   LABS:                                 8.2    8.35  )-----------( 259      ( 06 Oct 2019 07:09 )             27.1   10-06    140  |  107  |  51<H>  ----------------------------<  101<H>  4.3   |  26  |  1.72<H>    Ca    9.1      06 Oct 2019 07:09  Phos  2.8     10-04  Mg     2.1     10-04            Urinalysis Basic - ( 02 Oct 2019 15:35 )    Color: Light Yellow / Appearance: Clear / S.014 / pH: x  Gluc: x / Ketone: Negative  / Bili: Negative / Urobili: Negative   Blood: x / Protein: Trace / Nitrite: Negative   Leuk Esterase: Negative / RBC: x / WBC x   Sq Epi: x / Non Sq Epi: x / Bacteria: x        RADIOLOGY & ADDITIONAL TESTS:    Imaging Personally Reviewed: NM amyloid scan reviewed, negative.   Consultant(s) Notes Reviewed:  Dr. Owens (nephrology), Dr. Wynne (Cards)  Care Discussed with Consultants/Other Providers:

## 2019-10-06 NOTE — PROGRESS NOTE ADULT - PROBLEM SELECTOR PLAN 2
patient in more acute respiratory distress today.  -dicsussed with Dr. Wynne who agrees with additional 40mg lasix IV x 1 and increase of torsemide to 20mg daily. Expect improvement and discharge tomorrow.

## 2019-10-06 NOTE — PROVIDER CONTACT NOTE (OTHER) - ACTION/TREATMENT ORDERED:
NP aware.  Orthostatic BP/Pulse results  Lying 130/70 HR 80  Sitting 147/74 HR 74  Standing 136/77 HR 82
PA aware. Mg and Phos added to lab work. Will continue to monitor patient.
PA notified, will continue to monitor and maintain safety.

## 2019-10-07 ENCOUNTER — APPOINTMENT (OUTPATIENT)
Dept: CARDIOLOGY | Facility: CLINIC | Age: 84
End: 2019-10-07

## 2019-10-07 DIAGNOSIS — M76.61 ACHILLES TENDINITIS, RIGHT LEG: ICD-10-CM

## 2019-10-07 LAB
ANION GAP SERPL CALC-SCNC: 13 MMOL/L — SIGNIFICANT CHANGE UP (ref 5–17)
BUN SERPL-MCNC: 41 MG/DL — HIGH (ref 7–23)
CALCIUM SERPL-MCNC: 9 MG/DL — SIGNIFICANT CHANGE UP (ref 8.4–10.5)
CHLORIDE SERPL-SCNC: 106 MMOL/L — SIGNIFICANT CHANGE UP (ref 96–108)
CO2 SERPL-SCNC: 26 MMOL/L — SIGNIFICANT CHANGE UP (ref 22–31)
CREAT SERPL-MCNC: 1.69 MG/DL — HIGH (ref 0.5–1.3)
GLUCOSE SERPL-MCNC: 113 MG/DL — HIGH (ref 70–99)
POTASSIUM SERPL-MCNC: 4.1 MMOL/L — SIGNIFICANT CHANGE UP (ref 3.5–5.3)
POTASSIUM SERPL-SCNC: 4.1 MMOL/L — SIGNIFICANT CHANGE UP (ref 3.5–5.3)
SODIUM SERPL-SCNC: 145 MMOL/L — SIGNIFICANT CHANGE UP (ref 135–145)

## 2019-10-07 PROCEDURE — 99233 SBSQ HOSP IP/OBS HIGH 50: CPT

## 2019-10-07 RX ORDER — IBUPROFEN 200 MG
400 TABLET ORAL ONCE
Refills: 0 | Status: COMPLETED | OUTPATIENT
Start: 2019-10-07 | End: 2019-10-07

## 2019-10-07 RX ORDER — ACETAMINOPHEN 500 MG
650 TABLET ORAL ONCE
Refills: 0 | Status: COMPLETED | OUTPATIENT
Start: 2019-10-07 | End: 2019-10-07

## 2019-10-07 RX ADMIN — Medication 650 MILLIGRAM(S): at 05:58

## 2019-10-07 RX ADMIN — Medication 25 MILLIGRAM(S): at 21:47

## 2019-10-07 RX ADMIN — LIDOCAINE 1 PATCH: 4 CREAM TOPICAL at 00:01

## 2019-10-07 RX ADMIN — APIXABAN 2.5 MILLIGRAM(S): 2.5 TABLET, FILM COATED ORAL at 05:54

## 2019-10-07 RX ADMIN — Medication 400 MILLIGRAM(S): at 14:50

## 2019-10-07 RX ADMIN — APIXABAN 2.5 MILLIGRAM(S): 2.5 TABLET, FILM COATED ORAL at 17:43

## 2019-10-07 RX ADMIN — Medication 1 TABLET(S): at 09:20

## 2019-10-07 RX ADMIN — Medication 1 DROP(S): at 17:43

## 2019-10-07 RX ADMIN — AMLODIPINE BESYLATE 5 MILLIGRAM(S): 2.5 TABLET ORAL at 05:54

## 2019-10-07 RX ADMIN — LIDOCAINE 1 PATCH: 4 CREAM TOPICAL at 17:44

## 2019-10-07 RX ADMIN — Medication 1 DROP(S): at 05:54

## 2019-10-07 RX ADMIN — LIDOCAINE 1 PATCH: 4 CREAM TOPICAL at 05:59

## 2019-10-07 RX ADMIN — GABAPENTIN 300 MILLIGRAM(S): 400 CAPSULE ORAL at 17:43

## 2019-10-07 RX ADMIN — Medication 100 MILLIGRAM(S): at 05:54

## 2019-10-07 RX ADMIN — Medication 400 MILLIGRAM(S): at 14:20

## 2019-10-07 RX ADMIN — Medication 650 MILLIGRAM(S): at 06:40

## 2019-10-07 RX ADMIN — PANTOPRAZOLE SODIUM 40 MILLIGRAM(S): 20 TABLET, DELAYED RELEASE ORAL at 05:53

## 2019-10-07 RX ADMIN — Medication 20 MILLIGRAM(S): at 05:54

## 2019-10-07 RX ADMIN — GABAPENTIN 300 MILLIGRAM(S): 400 CAPSULE ORAL at 05:53

## 2019-10-07 NOTE — PHYSICAL THERAPY INITIAL EVALUATION ADULT - GENERAL OBSERVATIONS, REHAB EVAL
Received in bed, +tele, +IVL, reports 5/10 R posterior ankle pain at rest, 10/10 with movement & activities but was agreeable to PT Received in bed, +tele, +IVL, initially without complaints and was agreeable to PT

## 2019-10-07 NOTE — PHYSICAL THERAPY INITIAL EVALUATION ADULT - ADDITIONAL COMMENTS
Pt lives in a garden apartment, 12 steps to enter, bilateral handrails; no other steps inside. Pt fully indep PTA, +driving.    XRAY RIGHT TIBIA, FIBULA, ANKLE 10/2:No cortical erosions or periosteal reactions to suggest advanced osteomyelitis. No tracking subcutaneous gas collections. No acute fracture or dislocation. Right knee chondrocalcinosis. There is pes planus. CT ABDOMEN /PELVIS 10/2: Punctate non-obstructing left renal stone. No evidence of obstructive uropathy. No CT findings to explain the symptoms of abdominal pain.

## 2019-10-07 NOTE — PROGRESS NOTE ADULT - ASSESSMENT
84 year-old woman with HFpEF of undetermined etiology, now presents with WEN in the setting of outpatient diuretics.  Appreciate nephrology input.  Myeloma/amyloid work-up including serum free light chains, PYP scan thus far normal.    No evidence of decompensated heart failure on today's exam. Okay with holding diuretics for now.  Continue apixaban 2.5 mg BID for thromboembolic prophylaxis in patient with age > 80, creatinine > 1.5 mg/dL.    Discharge planning per primary team.

## 2019-10-07 NOTE — PROGRESS NOTE ADULT - PROBLEM SELECTOR PLAN 2
respiratory status improved with torsemide at 20mg and one time dose of lasix 40mg x1, continue with current regimen to be titrated with Dr. Wynne as outpatient.

## 2019-10-07 NOTE — PROGRESS NOTE ADULT - SUBJECTIVE AND OBJECTIVE BOX
No pain, no shortness of breath      VITAL:  T(C): , Max: 37.2 (10-07-19 @ 05:25)  T(F): , Max: 98.9 (10-07-19 @ 05:25)  HR: 88 (10-07-19 @ 05:25)  BP: 123/73 (10-07-19 @ 05:25)  BP(mean): 94 (10-06-19 @ 12:38)  RR: 18 (10-07-19 @ 05:25)  SpO2: 94% (10-07-19 @ 05:25)      PHYSICAL EXAM:  Constitutional: NAD, Alert  HEENT: NCAT, DMM  Neck: Supple, No JVD  Respiratory: CTA-b/l  Cardiovascular: RRR s1s2, no m/r/g  Gastrointestinal: BS+, soft, NT/ND  Extremities: trace RLE edema; no LLE edema  Neurological: no focal deficits; strength grossly intact  Back: no CVAT b/l  Skin: (+)RLE mild erythema and (+)tenderness, below the knee    LABS:                        8.2    8.35  )-----------( 259      ( 06 Oct 2019 07:09 )             27.1     Na(145)/K(4.1)/Cl(106)/HCO3(26)/BUN(41)/Cr(1.69)Glu(113)/Ca(9.0)/Mg(--)/PO4(--)    10-07 @ 06:51  Na(140)/K(4.3)/Cl(107)/HCO3(26)/BUN(51)/Cr(1.72)Glu(101)/Ca(9.1)/Mg(--)/PO4(--)    10-06 @ 07:09  Na(142)/K(4.4)/Cl(110)/HCO3(24)/BUN(70)/Cr(1.78)Glu(104)/Ca(8.7)/Mg(--)/PO4(--)    10-05 @ 05:24  Na(145)/K(4.6)/Cl(111)/HCO3(22)/BUN(70)/Cr(1.83)Glu(99)/Ca(8.3)/Mg(2.1)/PO4(2.8)    10-04 @ 23:58      IMPRESSION: 84F w/ HTN, CKD3, MVR, and breast CA s/p lumpectomy, 10/2/19 a/w RLE cellulitis/WEN    (1)Renal - CKD3b; resolving/resolved superimposed ischemic ATN    (2)Lytes - highly acceptable at this point      RECOMMEND:  (1)No objection to discharge, on Torsemide QD as ordered for now, and off ARB  (2)Could f/u at my office in 1 month          Maximus Owens MD  [facility]  [phone] complains of right heel pain.       VITAL:  T(C): , Max: 37.2 (10-07-19 @ 05:25)  T(F): , Max: 98.9 (10-07-19 @ 05:25)  HR: 88 (10-07-19 @ 05:25)  BP: 123/73 (10-07-19 @ 05:25)  BP(mean): 94 (10-06-19 @ 12:38)  RR: 18 (10-07-19 @ 05:25)  SpO2: 94% (10-07-19 @ 05:25)      PHYSICAL EXAM:  Constitutional: NAD, Alert  HEENT: NCAT, DMM  Neck: Supple, No JVD  Respiratory: CTA-b/l  Cardiovascular: RRR s1s2, no m/r/g  Gastrointestinal: BS+, soft, NT/ND  Extremities: trace RLE edema; no LLE edema; (+)right heel tenderness  Neurological: no focal deficits; strength grossly intact  Back: no CVAT b/l    LABS:                        8.2    8.35  )-----------( 259      ( 06 Oct 2019 07:09 )             27.1     Na(145)/K(4.1)/Cl(106)/HCO3(26)/BUN(41)/Cr(1.69)Glu(113)/Ca(9.0)/Mg(--)/PO4(--)    10-07 @ 06:51  Na(140)/K(4.3)/Cl(107)/HCO3(26)/BUN(51)/Cr(1.72)Glu(101)/Ca(9.1)/Mg(--)/PO4(--)    10-06 @ 07:09  Na(142)/K(4.4)/Cl(110)/HCO3(24)/BUN(70)/Cr(1.78)Glu(104)/Ca(8.7)/Mg(--)/PO4(--)    10-05 @ 05:24  Na(145)/K(4.6)/Cl(111)/HCO3(22)/BUN(70)/Cr(1.83)Glu(99)/Ca(8.3)/Mg(2.1)/PO4(2.8)    10-04 @ 23:58      IMPRESSION: 84F w/ HTN, CKD3, MVR, and breast CA s/p lumpectomy, 10/2/19 a/w RLE cellulitis/WEN    (1)Renal - CKD3b; resolving/resolved superimposed ischemic ATN    (2)Lytes - highly acceptable at this point    (3)R heel pain - ?etiology. Infectious? She did recently have a cellulitis of that leg. Gout? Surprising that it would occur now, rather than when she was in WEN and on 3 diuretics      RECOMMEND:  (1)XRs as planned  (2)Could check a uric acid level          Maximus Owens MD  [facility]  [phone] complains of right heel pain.       VITAL:  T(C): , Max: 37.2 (10-07-19 @ 05:25)  T(F): , Max: 98.9 (10-07-19 @ 05:25)  HR: 88 (10-07-19 @ 05:25)  BP: 123/73 (10-07-19 @ 05:25)  BP(mean): 94 (10-06-19 @ 12:38)  RR: 18 (10-07-19 @ 05:25)  SpO2: 94% (10-07-19 @ 05:25)      PHYSICAL EXAM:  Constitutional: NAD, Alert  HEENT: NCAT, DMM  Neck: Supple, No JVD  Respiratory: CTA-b/l  Cardiovascular: RRR s1s2, no m/r/g  Gastrointestinal: BS+, soft, NT/ND  Extremities: trace RLE edema; no LLE edema; (+)right heel tenderness  Neurological: no focal deficits; strength grossly intact  Back: no CVAT b/l    LABS:                        8.2    8.35  )-----------( 259      ( 06 Oct 2019 07:09 )             27.1     Na(145)/K(4.1)/Cl(106)/HCO3(26)/BUN(41)/Cr(1.69)Glu(113)/Ca(9.0)/Mg(--)/PO4(--)    10-07 @ 06:51  Na(140)/K(4.3)/Cl(107)/HCO3(26)/BUN(51)/Cr(1.72)Glu(101)/Ca(9.1)/Mg(--)/PO4(--)    10-06 @ 07:09  Na(142)/K(4.4)/Cl(110)/HCO3(24)/BUN(70)/Cr(1.78)Glu(104)/Ca(8.7)/Mg(--)/PO4(--)    10-05 @ 05:24  Na(145)/K(4.6)/Cl(111)/HCO3(22)/BUN(70)/Cr(1.83)Glu(99)/Ca(8.3)/Mg(2.1)/PO4(2.8)    10-04 @ 23:58      IMPRESSION: 84F w/ HTN, CKD3, MVR, and breast CA s/p lumpectomy, 10/2/19 a/w RLE cellulitis/WEN    (1)Renal - CKD3b; resolving/resolved superimposed ischemic ATN    (2)Lytes - highly acceptable at this point    (3)R heel pain - ?etiology. Infectious? She did recently have a cellulitis of that leg. Gout? Surprising that it would occur now, rather than when she was in WEN and on 3 diuretics      RECOMMEND:  (1)XRs as planned  (2)Could check a uric acid level  (3)No NSAIDs for pain        Maximus Owens MD  [facility]  [phone]

## 2019-10-07 NOTE — PROGRESS NOTE ADULT - PROBLEM SELECTOR PLAN 3
patient with acute pain of achilles, negative rodriguez's test so do not think achilles ruprture. No pain in for or mid foot, no need for xray.  -ice packs to achilles, but discussed with Dr. Owens who statse can give NSAID if needed for pain.  If not improved with ice packs will give 1 time dose of NSAId

## 2019-10-07 NOTE — PROGRESS NOTE ADULT - SUBJECTIVE AND OBJECTIVE BOX
HPI:  Patient seen and examined on 3 DSU.  Reports pain in right heel that inhibits standing and walking.    Review Of Systems:           Respiratory: No shortness of breath, cough, or wheezing  Cardiovascular: No chest pain or palpitations  10 point review of systems is otherwise negative except as mentioned above        Medications:  amitriptyline 25 milliGRAM(s) Oral at bedtime  amLODIPine   Tablet 5 milliGRAM(s) Oral daily  apixaban 2.5 milliGRAM(s) Oral two times a day  artificial  tears Solution 1 Drop(s) Both EYES two times a day  gabapentin 300 milliGRAM(s) Oral two times a day  lidocaine   Patch 1 Patch Transdermal daily  metoprolol succinate  milliGRAM(s) Oral daily  multivitamin/minerals 1 Tablet(s) Oral daily  pantoprazole    Tablet 40 milliGRAM(s) Oral before breakfast  torsemide 20 milliGRAM(s) Oral daily    PAST MEDICAL & SURGICAL HISTORY:  CHF (congestive heart failure)  Choledocholithiasis  Gallstones  Mitral valve disease  Osteoporosis  Vitamin B 12 deficiency  Carotid artery occlusion: (R)  GERD (gastroesophageal reflux disease)  Hypercholesteremia  HTN (hypertension)  Insomnia  S/P laparoscopic cholecystectomy  H/O carotid endarterectomy  Varicose veins: s/p sclerotherapy bilaterally  Bilateral cataracts: extraction w/ IOL  Papilloma of breast: s/p excision from right breast &gt;20 years ago  History of lumbar laminectomy for spinal cord decompression:   Abdominal hernia: repair   Hx of tonsillectomy  Status post DACIA-BSO:   S/P MVR (mitral valve repair):  at [facility]    Vitals:  T(C): 37.1 (10-07-19 @ 20:34), Max: 37.2 (10-07-19 @ 05:25)  HR: 81 (10-07-19 @ 20:34) (80 - 88)  BP: 111/72 (10-07-19 @ 20:34) (111/72 - 136/71)  BP(mean): --  RR: 18 (10-07-19 @ 20:34) (18 - 18)  SpO2: 94% (10-07-19 @ 20:34) (93% - 94%)  Wt(kg): --  Daily     Daily Weight in k.1 (07 Oct 2019 05:25)  I&O's Summary    06 Oct 2019 07:01  -  07 Oct 2019 07:00  --------------------------------------------------------  IN: 350 mL / OUT: 750 mL / NET: -400 mL    07 Oct 2019 07:01  -  07 Oct 2019 21:26  --------------------------------------------------------  IN: 360 mL / OUT: 400 mL / NET: -40 mL        Physical Exam:  Appearance: No acute distress; well appearing  Eyes: PERRL, EOMI, pink conjunctiva  HENT: Normal oral mucosa  Cardiovascular: RRR, S1, S2, no murmurs, rubs, or gallops; no edema; no JVD  Respiratory: Clear to auscultation bilaterally  Gastrointestinal: soft, non-tender, non-distended with normal bowel sounds  Musculoskeletal: No clubbing; no joint deformity   Neurologic: Non-focal  Lymphatic: No lymphadenopathy  Psychiatry: AAOx3, mood & affect appropriate  Skin: No rashes, ecchymoses, or cyanosis                          8.2    8.35  )-----------( 259      ( 06 Oct 2019 07:09 )             27.1     10-07    145  |  106  |  41<H>  ----------------------------<  113<H>  4.1   |  26  |  1.69<H>    Ca    9.0      07 Oct 2019 06:51                    ECG:    Echo:    Stress Testing:     Cath:    Imaging:    Interpretation of Telemetry:

## 2019-10-07 NOTE — PROGRESS NOTE ADULT - ASSESSMENT
This patient is an 84yoF with PMH of htn, hLd, PAD, GERD, R carotid stenosis s/p CEA, MV repair, atrial fibrillation on eliquis, CHF (EF 65%), CKD stage 3 who presents to the ED due to abnormal outpatient lab, found to have WEN on CKD now with achilles tendinitis

## 2019-10-07 NOTE — PROGRESS NOTE ADULT - SUBJECTIVE AND OBJECTIVE BOX
Patient is a 84y old  Female who presents with a chief complaint of abnormal labs (04 Oct 2019 09:15)        SUBJECTIVE / OVERNIGHT EVENTS:  No events overnight. Patient with improved respiratory status, however patient with new acute heel pain, stating inability to ambulate. Patient never had this before.       MEDICATIONS  (STANDING):  amitriptyline 25 milliGRAM(s) Oral at bedtime  amLODIPine   Tablet 5 milliGRAM(s) Oral daily  apixaban 2.5 milliGRAM(s) Oral two times a day  artificial  tears Solution 1 Drop(s) Both EYES two times a day  aspirin  chewable 81 milliGRAM(s) Oral daily  gabapentin 300 milliGRAM(s) Oral two times a day  iron sucrose IVPB 100 milliGRAM(s) IV Intermittent every 24 hours  lidocaine   Patch 1 Patch Transdermal daily  metoprolol succinate  milliGRAM(s) Oral daily  multivitamin/minerals 1 Tablet(s) Oral daily  pantoprazole    Tablet 40 milliGRAM(s) Oral before breakfast  sodium bicarbonate  Infusion 0.105 mEq/kG/Hr (100 mL/Hr) IV Continuous <Continuous>  sodium zirconium cyclosilicate 10 Gram(s) Oral daily    MEDICATIONS  (PRN):    Vital Signs Last 24 Hrs  T(C): 36.4 (07 Oct 2019 12:14), Max: 37.2 (07 Oct 2019 05:25)  T(F): 97.5 (07 Oct 2019 12:14), Max: 98.9 (07 Oct 2019 05:25)  HR: 86 (07 Oct 2019 12:51) (80 - 88)  BP: 136/71 (07 Oct 2019 12:51) (117/69 - 138/74)  BP(mean): --  RR: 18 (07 Oct 2019 12:51) (18 - 18)  SpO2: 94% (07 Oct 2019 12:51) (93% - 95%)    I&O's Summary    06 Oct 2019 07:01  -  07 Oct 2019 07:00  --------------------------------------------------------  IN: 350 mL / OUT: 750 mL / NET: -400 mL          PHYSICAL EXAM:  GENERAL: NAD, breathing normal  EYES: conjunctiva and sclera clear  NECK: supple, No JVD  CHEST/LUNG: CTA b/l  HEART: S1 S2 RRR  ABDOMEN: +BS Soft, NT/ND, no CVA tenderness  EXTREMITIES:  2+ DP Pulses, No c/c.  right foot without TTP in fore-foot or mid-foot, however pain at base o heel and severe TTP on achilles tendon.  Negative rodriguez test.  NEUROLOGY: AAOx3, no facial droop, no focal deficits   SKIN: small swelling on right shin due to recent injury     LABS:                                            8.2    8.35  )-----------( 259      ( 06 Oct 2019 07:09 )             27.1   10-    145  |  106  |  41<H>  ----------------------------<  113<H>  4.1   |  26  |  1.69<H>    Ca    9.0      07 Oct 2019 06:51            Urinalysis Basic - ( 02 Oct 2019 15:35 )    Color: Light Yellow / Appearance: Clear / S.014 / pH: x  Gluc: x / Ketone: Negative  / Bili: Negative / Urobili: Negative   Blood: x / Protein: Trace / Nitrite: Negative   Leuk Esterase: Negative / RBC: x / WBC x   Sq Epi: x / Non Sq Epi: x / Bacteria: x        RADIOLOGY & ADDITIONAL TESTS:    Imaging Personally Reviewed:   Consultant(s) Notes Reviewed:  Dr. Owens (nephrology)Mati Dooley (cards.  Care Discussed with Consultants/Other Providers:

## 2019-10-07 NOTE — PHYSICAL THERAPY INITIAL EVALUATION ADULT - ACTIVE RANGE OF MOTION EXAMINATION, REHAB EVAL
bilateral  lower extremity Active ROM was WFL (within functional limits)/bilateral upper extremity Active ROM was WFL (within functional limits)/except R ankle to neutrai dorsiflexion d/t pain

## 2019-10-07 NOTE — PHYSICAL THERAPY INITIAL EVALUATION ADULT - PERTINENT HX OF CURRENT PROBLEM, REHAB EVAL
84yoF with PMH of htn, hLd, PAD, GERD, R carotid stenosis s/p CEA, MV repair, atrial fibrillation on eliquis, CKD stage 3 who presents to the ED due to abnormal outpatient lab, found to have WEN.

## 2019-10-08 ENCOUNTER — TRANSCRIPTION ENCOUNTER (OUTPATIENT)
Age: 84
End: 2019-10-08

## 2019-10-08 VITALS
RESPIRATION RATE: 18 BRPM | TEMPERATURE: 99 F | DIASTOLIC BLOOD PRESSURE: 66 MMHG | HEART RATE: 75 BPM | OXYGEN SATURATION: 93 % | SYSTOLIC BLOOD PRESSURE: 110 MMHG

## 2019-10-08 LAB
ANION GAP SERPL CALC-SCNC: 13 MMOL/L — SIGNIFICANT CHANGE UP (ref 5–17)
BUN SERPL-MCNC: 43 MG/DL — HIGH (ref 7–23)
CALCIUM SERPL-MCNC: 9.1 MG/DL — SIGNIFICANT CHANGE UP (ref 8.4–10.5)
CHLORIDE SERPL-SCNC: 104 MMOL/L — SIGNIFICANT CHANGE UP (ref 96–108)
CO2 SERPL-SCNC: 24 MMOL/L — SIGNIFICANT CHANGE UP (ref 22–31)
CREAT SERPL-MCNC: 2 MG/DL — HIGH (ref 0.5–1.3)
GLUCOSE SERPL-MCNC: 119 MG/DL — HIGH (ref 70–99)
POTASSIUM SERPL-MCNC: 4.2 MMOL/L — SIGNIFICANT CHANGE UP (ref 3.5–5.3)
POTASSIUM SERPL-SCNC: 4.2 MMOL/L — SIGNIFICANT CHANGE UP (ref 3.5–5.3)
SODIUM SERPL-SCNC: 141 MMOL/L — SIGNIFICANT CHANGE UP (ref 135–145)
URATE SERPL-MCNC: 10.4 MG/DL — HIGH (ref 2.5–7)

## 2019-10-08 PROCEDURE — A9538: CPT

## 2019-10-08 PROCEDURE — 84550 ASSAY OF BLOOD/URIC ACID: CPT

## 2019-10-08 PROCEDURE — 82435 ASSAY OF BLOOD CHLORIDE: CPT

## 2019-10-08 PROCEDURE — 83521 IG LIGHT CHAINS FREE EACH: CPT

## 2019-10-08 PROCEDURE — 85027 COMPLETE CBC AUTOMATED: CPT

## 2019-10-08 PROCEDURE — 86334 IMMUNOFIX E-PHORESIS SERUM: CPT

## 2019-10-08 PROCEDURE — 94640 AIRWAY INHALATION TREATMENT: CPT

## 2019-10-08 PROCEDURE — 84155 ASSAY OF PROTEIN SERUM: CPT

## 2019-10-08 PROCEDURE — 87205 SMEAR GRAM STAIN: CPT

## 2019-10-08 PROCEDURE — 82306 VITAMIN D 25 HYDROXY: CPT

## 2019-10-08 PROCEDURE — 78800 RP LOCLZJ TUM 1 AREA 1 D IMG: CPT

## 2019-10-08 PROCEDURE — 93005 ELECTROCARDIOGRAM TRACING: CPT

## 2019-10-08 PROCEDURE — 80061 LIPID PANEL: CPT

## 2019-10-08 PROCEDURE — 80048 BASIC METABOLIC PNL TOTAL CA: CPT

## 2019-10-08 PROCEDURE — 74176 CT ABD & PELVIS W/O CONTRAST: CPT

## 2019-10-08 PROCEDURE — 82330 ASSAY OF CALCIUM: CPT

## 2019-10-08 PROCEDURE — 99239 HOSP IP/OBS DSCHRG MGMT >30: CPT

## 2019-10-08 PROCEDURE — 81003 URINALYSIS AUTO W/O SCOPE: CPT

## 2019-10-08 PROCEDURE — 99285 EMERGENCY DEPT VISIT HI MDM: CPT | Mod: 25

## 2019-10-08 PROCEDURE — 84100 ASSAY OF PHOSPHORUS: CPT

## 2019-10-08 PROCEDURE — 84165 PROTEIN E-PHORESIS SERUM: CPT

## 2019-10-08 PROCEDURE — 82565 ASSAY OF CREATININE: CPT

## 2019-10-08 PROCEDURE — 96374 THER/PROPH/DIAG INJ IV PUSH: CPT

## 2019-10-08 PROCEDURE — 83550 IRON BINDING TEST: CPT

## 2019-10-08 PROCEDURE — 82607 VITAMIN B-12: CPT

## 2019-10-08 PROCEDURE — 84295 ASSAY OF SERUM SODIUM: CPT

## 2019-10-08 PROCEDURE — 73590 X-RAY EXAM OF LOWER LEG: CPT

## 2019-10-08 PROCEDURE — 83690 ASSAY OF LIPASE: CPT

## 2019-10-08 PROCEDURE — 82947 ASSAY GLUCOSE BLOOD QUANT: CPT

## 2019-10-08 PROCEDURE — 86850 RBC ANTIBODY SCREEN: CPT

## 2019-10-08 PROCEDURE — 85610 PROTHROMBIN TIME: CPT

## 2019-10-08 PROCEDURE — 78803 RP LOCLZJ TUM SPECT 1 AREA: CPT

## 2019-10-08 PROCEDURE — 78499 UNLISTED CV PX DX NUC MED: CPT

## 2019-10-08 PROCEDURE — 84132 ASSAY OF SERUM POTASSIUM: CPT

## 2019-10-08 PROCEDURE — 85730 THROMBOPLASTIN TIME PARTIAL: CPT

## 2019-10-08 PROCEDURE — 96375 TX/PRO/DX INJ NEW DRUG ADDON: CPT

## 2019-10-08 PROCEDURE — 86900 BLOOD TYPING SEROLOGIC ABO: CPT

## 2019-10-08 PROCEDURE — 83605 ASSAY OF LACTIC ACID: CPT

## 2019-10-08 PROCEDURE — 86901 BLOOD TYPING SEROLOGIC RH(D): CPT

## 2019-10-08 PROCEDURE — 80053 COMPREHEN METABOLIC PANEL: CPT

## 2019-10-08 PROCEDURE — 82803 BLOOD GASES ANY COMBINATION: CPT

## 2019-10-08 PROCEDURE — 85014 HEMATOCRIT: CPT

## 2019-10-08 PROCEDURE — 82962 GLUCOSE BLOOD TEST: CPT

## 2019-10-08 PROCEDURE — 97161 PT EVAL LOW COMPLEX 20 MIN: CPT

## 2019-10-08 PROCEDURE — 73610 X-RAY EXAM OF ANKLE: CPT

## 2019-10-08 PROCEDURE — 83540 ASSAY OF IRON: CPT

## 2019-10-08 PROCEDURE — 82272 OCCULT BLD FECES 1-3 TESTS: CPT

## 2019-10-08 PROCEDURE — 83735 ASSAY OF MAGNESIUM: CPT

## 2019-10-08 RX ORDER — CHOLESTYRAMINE 4 G/9G
4 POWDER, FOR SUSPENSION ORAL
Qty: 0 | Refills: 0 | DISCHARGE

## 2019-10-08 RX ORDER — LOSARTAN POTASSIUM 100 MG/1
1 TABLET, FILM COATED ORAL
Qty: 0 | Refills: 0 | DISCHARGE

## 2019-10-08 RX ORDER — IBUPROFEN 200 MG
400 TABLET ORAL ONCE
Refills: 0 | Status: COMPLETED | OUTPATIENT
Start: 2019-10-08 | End: 2019-10-08

## 2019-10-08 RX ORDER — COLCHICINE 0.6 MG
1 TABLET ORAL
Qty: 0 | Refills: 0 | DISCHARGE
Start: 2019-10-08

## 2019-10-08 RX ORDER — SPIRONOLACTONE 25 MG/1
1 TABLET, FILM COATED ORAL
Qty: 0 | Refills: 0 | DISCHARGE

## 2019-10-08 RX ORDER — COLCHICINE 0.6 MG
0.6 TABLET ORAL DAILY
Refills: 0 | Status: DISCONTINUED | OUTPATIENT
Start: 2019-10-08 | End: 2019-10-08

## 2019-10-08 RX ADMIN — Medication 100 MILLIGRAM(S): at 05:09

## 2019-10-08 RX ADMIN — Medication 400 MILLIGRAM(S): at 05:09

## 2019-10-08 RX ADMIN — Medication 1 DROP(S): at 16:48

## 2019-10-08 RX ADMIN — Medication 20 MILLIGRAM(S): at 05:09

## 2019-10-08 RX ADMIN — Medication 0.6 MILLIGRAM(S): at 16:48

## 2019-10-08 RX ADMIN — LIDOCAINE 1 PATCH: 4 CREAM TOPICAL at 10:08

## 2019-10-08 RX ADMIN — PANTOPRAZOLE SODIUM 40 MILLIGRAM(S): 20 TABLET, DELAYED RELEASE ORAL at 05:09

## 2019-10-08 RX ADMIN — APIXABAN 2.5 MILLIGRAM(S): 2.5 TABLET, FILM COATED ORAL at 05:09

## 2019-10-08 RX ADMIN — AMLODIPINE BESYLATE 5 MILLIGRAM(S): 2.5 TABLET ORAL at 05:09

## 2019-10-08 RX ADMIN — Medication 1 TABLET(S): at 10:07

## 2019-10-08 RX ADMIN — GABAPENTIN 300 MILLIGRAM(S): 400 CAPSULE ORAL at 16:48

## 2019-10-08 RX ADMIN — APIXABAN 2.5 MILLIGRAM(S): 2.5 TABLET, FILM COATED ORAL at 16:48

## 2019-10-08 RX ADMIN — Medication 400 MILLIGRAM(S): at 05:39

## 2019-10-08 RX ADMIN — GABAPENTIN 300 MILLIGRAM(S): 400 CAPSULE ORAL at 05:09

## 2019-10-08 RX ADMIN — Medication 1 DROP(S): at 10:08

## 2019-10-08 NOTE — PROGRESS NOTE ADULT - REASON FOR ADMISSION
abnormal labs

## 2019-10-08 NOTE — PROGRESS NOTE ADULT - PROBLEM SELECTOR PLAN 3
improved with ice and NSAIDS, now with right knee pain.  -knee pain with elevated uric acid likely psuedogout vs. gout in setting of torsemide.  -colchicine 0.6 daily, hold further NSAIDS in setting of bump in Creatinine.

## 2019-10-08 NOTE — DISCHARGE NOTE NURSING/CASE MANAGEMENT/SOCIAL WORK - PATIENT PORTAL LINK FT
You can access the FollowMyHealth Patient Portal offered by [facility] by registering at the following website: [email or web address]. By joining [employer]’s FollowMyHealth portal, you will also be able to view your health information using other applications (apps) compatible with our system.

## 2019-10-08 NOTE — PROGRESS NOTE ADULT - SUBJECTIVE AND OBJECTIVE BOX
Patient is a 84y old  Female who presents with a chief complaint of abnormal labs (04 Oct 2019 09:15)        SUBJECTIVE / OVERNIGHT EVENTS:  No events overnight. PAtient with improved achilles tendon pain, but now new right knee pain. Patient states in past had knee effusion, saw orthopedic doctor who injected cortisone injection as well as had arhtrocentesis. Respiratory status is stable and improved from . No chest pain, sob.    MEDICATIONS  (STANDING):  amitriptyline 25 milliGRAM(s) Oral at bedtime  amLODIPine   Tablet 5 milliGRAM(s) Oral daily  apixaban 2.5 milliGRAM(s) Oral two times a day  artificial  tears Solution 1 Drop(s) Both EYES two times a day  aspirin  chewable 81 milliGRAM(s) Oral daily  gabapentin 300 milliGRAM(s) Oral two times a day  iron sucrose IVPB 100 milliGRAM(s) IV Intermittent every 24 hours  lidocaine   Patch 1 Patch Transdermal daily  metoprolol succinate  milliGRAM(s) Oral daily  multivitamin/minerals 1 Tablet(s) Oral daily  pantoprazole    Tablet 40 milliGRAM(s) Oral before breakfast  sodium bicarbonate  Infusion 0.105 mEq/kG/Hr (100 mL/Hr) IV Continuous <Continuous>  sodium zirconium cyclosilicate 10 Gram(s) Oral daily    MEDICATIONS  (PRN):    Vital Signs Last 24 Hrs  T(C): 37.1 (08 Oct 2019 12:20), Max: 37.2 (08 Oct 2019 04:46)  T(F): 98.7 (08 Oct 2019 12:20), Max: 98.9 (08 Oct 2019 04:46)  HR: 75 (08 Oct 2019 12:20) (75 - 95)  BP: 110/66 (08 Oct 2019 12:20) (110/66 - 126/55)  BP(mean): --  RR: 18 (08 Oct 2019 12:20) (18 - 18)  SpO2: 93% (08 Oct 2019 12:20) (92% - 94%)    I&O's Summary    07 Oct 2019 07:01  -  08 Oct 2019 07:00  --------------------------------------------------------  IN: 360 mL / OUT: 800 mL / NET: -440 mL    08 Oct 2019 07:01  -  08 Oct 2019 19:24  --------------------------------------------------------  IN: 360 mL / OUT: 500 mL / NET: -140 mL          PHYSICAL EXAM:  GENERAL: NAD, breathing normal  EYES: conjunctiva and sclera clear  NECK: supple, No JVD  CHEST/LUNG: CTA b/l  HEART: S1 S2 RRR  ABDOMEN: +BS Soft, NT/ND, no CVA tenderness  EXTREMITIES:  2+ DP Pulses, No c/c. right knee with effusion, no warmth to touch, TTP.  Achilles much improved TTP.  NEUROLOGY: AAOx3, no facial droop, no focal deficits   SKIN: small swelling on right shin due to recent injury     LABS:                        10-08    141  |  104  |  43<H>  ----------------------------<  119<H>  4.2   |  24  |  2.00<H>    Ca    9.1      08 Oct 2019 05:33          Urinalysis Basic - ( 02 Oct 2019 15:35 )    Color: Light Yellow / Appearance: Clear / S.014 / pH: x  Gluc: x / Ketone: Negative  / Bili: Negative / Urobili: Negative   Blood: x / Protein: Trace / Nitrite: Negative   Leuk Esterase: Negative / RBC: x / WBC x   Sq Epi: x / Non Sq Epi: x / Bacteria: x        RADIOLOGY & ADDITIONAL TESTS:    Imaging Personally Reviewed:   Consultant(s) Notes Reviewed:  Dr. Owens (nephrology)Mati Dooley (cards.  Care Discussed with Consultants/Other Providers:

## 2019-10-08 NOTE — DISCHARGE NOTE PROVIDER - HOSPITAL COURSE
To be done. This patient is an 84yoF with PMH of htn, hLd, PAD, GERD, R carotid stenosis s/p CEA, MV repair, atrial fibrillation on eliquis, CHF (EF 65%), CKD stage 3 who presents to the ED due to abnormal outpatient lab, found to have WEN on CKD now with achilles tendinitis.  Pt was admitted to Telemetry.      Acute kidney injury superimposed on chronic kidney disease:  There was a slight increase in her creatinine.  Possibly NSAID mediated.  Will continue to avoid the use of NSAIDS    Chronic diastolic heart failure: Respiratory status improved with torsemide at 20mg and furosemide.  Pt will continue to follow up with Dr. Wynne, Cardiology, outpatient.      Achilles tendinitis of right lower extremity: improved with ice and NSAIDS, now with right knee pain. Knee pain with elevated uric acid likely pseudo-gout vs. gout in setting of torsemide.  Pt will continue with colchicine 0.6 mg daily.      Hyperkalemia is now resolved. Pt with lumbar back pain.  Patient has pain at site of previous lumbar laminectomy. Patient states that she has had epidurals 2 months ago without significant improvement. Patient has MRI imaging of lower back from April 2019. She would benefit from follow up with spine center to determine if local injection, epidural or other intervention could be helpful. Continue home dose of gabapentin.    Pt discharge to Rehab at [facility].

## 2019-10-08 NOTE — PROGRESS NOTE ADULT - SUBJECTIVE AND OBJECTIVE BOX
No pain, no shortness of breath      VITAL:  T(C): , Max: 37.2 (10-08-19 @ 04:46)  T(F): , Max: 98.9 (10-08-19 @ 04:46)  HR: 75 (10-08-19 @ 12:20)  BP: 110/66 (10-08-19 @ 12:20)  RR: 18 (10-08-19 @ 12:20)  SpO2: 93% (10-08-19 @ 12:20)      PHYSICAL EXAM:  Constitutional: NAD, Alert  HEENT: NCAT, DMM  Neck: Supple, No JVD  Respiratory: CTA-b/l  Cardiovascular: RRR s1s2, no m/r/g  Gastrointestinal: BS+, soft, NT/ND  Extremities: trace RLE edema; no LLE edema; (+)right heel tenderness  Neurological: no focal deficits; strength grossly intact  Back: no CVAT b/l      LABS:    Na(141)/K(4.2)/Cl(104)/HCO3(24)/BUN(43)/Cr(2.00)Glu(119)/Ca(9.1)/Mg(--)/PO4(--)    10-08 @ 05:33  Na(145)/K(4.1)/Cl(106)/HCO3(26)/BUN(41)/Cr(1.69)Glu(113)/Ca(9.0)/Mg(--)/PO4(--)    10-07 @ 06:51  Na(140)/K(4.3)/Cl(107)/HCO3(26)/BUN(51)/Cr(1.72)Glu(101)/Ca(9.1)/Mg(--)/PO4(--)    10-06 @ 07:09    Uric 10.4      IMPRESSION: 84F w/ HTN, CKD3, MVR, and breast CA s/p lumpectomy, 10/2/19 a/w RLE cellulitis/WEN    (1)Renal - CKD3b; mild bump in creatinine today - likely NSAID-associated    (2)Lytes - highly acceptable at this point    (3)Rheum - right heel improving (s/p NSAIDs x 1); Now with right knee pain - gout?    RECOMMEND:  (1)Colchicine 0.6mg po x 1 today; 0.3mg daily thereafter for 14 days  (2)No further NSAIDs for now, given the rise in creatinine as of today            Maximus Owens MD  [facility]  [phone] No pain, no shortness of breath      VITAL:  T(C): , Max: 37.2 (10-08-19 @ 04:46)  T(F): , Max: 98.9 (10-08-19 @ 04:46)  HR: 75 (10-08-19 @ 12:20)  BP: 110/66 (10-08-19 @ 12:20)  RR: 18 (10-08-19 @ 12:20)  SpO2: 93% (10-08-19 @ 12:20)      PHYSICAL EXAM:  Constitutional: NAD, Alert  HEENT: NCAT, DMM  Neck: Supple, No JVD  Respiratory: CTA-b/l  Cardiovascular: RRR s1s2, no m/r/g  Gastrointestinal: BS+, soft, NT/ND  Extremities: trace RLE edema; no LLE edema; (+)right heel tenderness  Neurological: no focal deficits; strength grossly intact  Back: no CVAT b/l      LABS:    Na(141)/K(4.2)/Cl(104)/HCO3(24)/BUN(43)/Cr(2.00)Glu(119)/Ca(9.1)/Mg(--)/PO4(--)    10-08 @ 05:33  Na(145)/K(4.1)/Cl(106)/HCO3(26)/BUN(41)/Cr(1.69)Glu(113)/Ca(9.0)/Mg(--)/PO4(--)    10-07 @ 06:51  Na(140)/K(4.3)/Cl(107)/HCO3(26)/BUN(51)/Cr(1.72)Glu(101)/Ca(9.1)/Mg(--)/PO4(--)    10-06 @ 07:09    Uric 10.4      IMPRESSION: 84F w/ HTN, CKD3, MVR, and breast CA s/p lumpectomy, 10/2/19 a/w RLE cellulitis/WEN    (1)Renal - CKD3b; mild bump in creatinine today - likely NSAID-associated    (2)Lytes - highly acceptable at this point    (3)Rheum - right heel improving (s/p NSAIDs x 1); Now with right knee pain - gout?    RECOMMEND:  (1)Colchicine 0.6mg po daily for now  (2)No further NSAIDs for now, given the rise in creatinine as of today            Maximus Owens MD  [facility]  [phone] No pain, no shortness of breath      VITAL:  T(C): , Max: 37.2 (10-08-19 @ 04:46)  T(F): , Max: 98.9 (10-08-19 @ 04:46)  HR: 75 (10-08-19 @ 12:20)  BP: 110/66 (10-08-19 @ 12:20)  RR: 18 (10-08-19 @ 12:20)  SpO2: 93% (10-08-19 @ 12:20)      PHYSICAL EXAM:  Constitutional: NAD, Alert  HEENT: NCAT, DMM  Neck: Supple, No JVD  Respiratory: CTA-b/l  Cardiovascular: RRR s1s2, no m/r/g  Gastrointestinal: BS+, soft, NT/ND  Musculoskeletal: R>L knee effusions  Extremities: trace RLE edema; no LLE edema  Neurological: no focal deficits; strength grossly intact  Back: no CVAT b/l      LABS:    Na(141)/K(4.2)/Cl(104)/HCO3(24)/BUN(43)/Cr(2.00)Glu(119)/Ca(9.1)/Mg(--)/PO4(--)    10-08 @ 05:33  Na(145)/K(4.1)/Cl(106)/HCO3(26)/BUN(41)/Cr(1.69)Glu(113)/Ca(9.0)/Mg(--)/PO4(--)    10-07 @ 06:51  Na(140)/K(4.3)/Cl(107)/HCO3(26)/BUN(51)/Cr(1.72)Glu(101)/Ca(9.1)/Mg(--)/PO4(--)    10-06 @ 07:09    Uric 10.4      IMPRESSION: 84F w/ HTN, CKD3, MVR, and breast CA s/p lumpectomy, 10/2/19 a/w RLE cellulitis/WEN    (1)Renal - CKD3b; mild bump in creatinine today - likely NSAID-associated    (2)Lytes - highly acceptable at this point    (3)Rheum - right heel improving (s/p NSAIDs x 1); Now with right knee pain - gout?    RECOMMEND:  (1)Colchicine 0.6mg po daily for now  (2)No further NSAIDs for now, given the rise in creatinine as of today            Maximus Owens MD  [facility]  [phone]

## 2019-10-08 NOTE — DISCHARGE NOTE PROVIDER - NSDCFUSCHEDAPPT_GEN_ALL_CORE_FT
MORENITA ROSAS ; 12/04/2019 ; [facility] [address] [facility] MORENITA ROSAS ; 12/04/2019 ; [facility] [address] [facility] MORENITA ROSAS ; 12/04/2019 ; [facility] [address] [facility]

## 2019-10-08 NOTE — DISCHARGE NOTE PROVIDER - NSDCCPCAREPLAN_GEN_ALL_CORE_FT
PRINCIPAL DISCHARGE DIAGNOSIS  Diagnosis: Acute kidney injury  Assessment and Plan of Treatment: Avoid taking (NSAIDs) - (ex: Ibuprofen, Advil, Celebrex, Naprosyn)  Avoid taking any nephrotoxic agents (can harm kidneys) - Intravenous contrast for diagnostic testing, combination cold medications.  Have all medications adjusted for your renal function by your Health Care Provider.  Blood pressure control is important.  Take all medication as prescribed.        SECONDARY DISCHARGE DIAGNOSES  Diagnosis: Chronic diastolic heart failure  Assessment and Plan of Treatment: Chronic diastolic heart failure  Weigh yourself daily.  If you gain 3lbs in 3 days, or 5lbs in a week call your Health Care Provider.  Do not eat or drink foods containing more than 2000mg of salt (sodium) in your diet every day.  Call your Health Care Provider if you have any swelling or increased swelling in your feet, ankles, and/or stomach.  Take all of your medication as directed.  If you become dizzy call your Health Care Provider.      Diagnosis: Achilles tendinitis of right lower extremity  Assessment and Plan of Treatment: Achilles tendinitis of right lower extremity  Tylenol as needed for pain.   Ice pack to right achilles 4 x day as needed.    Diagnosis: Lumbar back pain  Assessment and Plan of Treatment: Lumbar back pain  Tylenol as needed for pain.

## 2019-10-08 NOTE — DISCHARGE NOTE NURSING/CASE MANAGEMENT/SOCIAL WORK - NSDCPEPT PROEDHF_GEN_ALL_CORE
Low salt diet/Call primary care provider for follow up after discharge/Report signs and symptoms to primary care provider/Monitor weight daily/Activities as tolerated

## 2019-10-08 NOTE — PROGRESS NOTE ADULT - ATTENDING COMMENTS
Knee pain and achilles pain should not keep patient inpatient as these are typically managed as an outpatient to begin with. Patient is stable for discharge with repeat BMP In 3 days. PAtient to follow up with Dr. Wynne for re-initiation of heart failure meds and titration of diuretics.   D/C planning 35 minutes

## 2019-10-08 NOTE — DISCHARGE NOTE PROVIDER - CARE PROVIDER_API CALL
Akash Jain)  Family Medicine  [address] [facility], [address]  [place] [address]  Phone: [phone]  Fax: [phone]  Follow Up Time: 1 week

## 2019-10-08 NOTE — DISCHARGE NOTE PROVIDER - NSDCFUADDINST_GEN_ALL_CORE_FT
Must have weekly BMP to monitor creatinine levels.   Avoid use of NSAIDS secondary to elevated creatinine.

## 2019-10-16 RX ORDER — SULFAMETHOXAZOLE AND TRIMETHOPRIM 800; 160 MG/1; MG/1
800-160 TABLET ORAL TWICE DAILY
Qty: 20 | Refills: 0 | Status: DISCONTINUED | COMMUNITY
Start: 2019-09-23 | End: 2019-10-16

## 2019-10-21 ENCOUNTER — APPOINTMENT (OUTPATIENT)
Dept: INTERNAL MEDICINE | Facility: CLINIC | Age: 84
End: 2019-10-21
Payer: MEDICARE

## 2019-10-21 VITALS
SYSTOLIC BLOOD PRESSURE: 147 MMHG | WEIGHT: 159 LBS | HEIGHT: 62 IN | HEART RATE: 69 BPM | DIASTOLIC BLOOD PRESSURE: 67 MMHG | BODY MASS INDEX: 29.26 KG/M2

## 2019-10-21 PROCEDURE — 36415 COLL VENOUS BLD VENIPUNCTURE: CPT

## 2019-10-21 PROCEDURE — 99214 OFFICE O/P EST MOD 30 MIN: CPT | Mod: 25

## 2019-10-21 NOTE — ASSESSMENT
[FreeTextEntry1] : Acute kidney injury superimposed on chronic kidney disease.  likely NSAID mediated\par -recheck BMP today \par \par Acute/Chronic diastolic heart failure. +weight gain and sob on exertion today \par -advised to take extra torosemide today \par -will check labs \par -recommend f/u with Dr. Wynne \par \par Achilles tendinitis of right lower extremity.  was seen to be 2/2 to torsemide. improved with colchicine.  no tashi at this time\par -resolved \par -check uric aicd level\par -will hold gout meds at this time given no symptoms and was first attack \par \par Hyperkalemia.  Plan: resolved in hospital, likely due to WEN\par -recheck now \par \par afib, rate controlled \par -cont BB, eliquis as directed \par \par f/u in 2 months \par \par all questions and concerns addressed with patient in detail.  patient verbalized back instructions in his own words.\par \par Spent 35 minutes in direct patient care and addressed all questions and concerns.  >50% of this time was in direct face to face contact with the patient during exam and counseling.\par

## 2019-10-21 NOTE — PHYSICAL EXAM
[No Lymphadenopathy] : no lymphadenopathy [Supple] : supple [Pedal Pulses Present] : the pedal pulses are present [Soft] : abdomen soft [Non-distended] : non-distended [Non Tender] : non-tender [No Skin Lesions] : no skin lesions [No CVA Tenderness] : no CVA  tenderness [No Rash] : no rash [70078 - Moderate Complexity requires multiple possible diagnoses and/or the management options, moderate complexity of the medical data (tests, etc.) to be reviewed, and moderate risk of significant complications, morbidity, and/or mortality as well as co] : Moderate Complexity [Normal] : affect was normal and insight and judgment were intact [de-identified] : +2 LE edema bilaterally, pitting

## 2019-10-21 NOTE — HISTORY OF PRESENT ILLNESS
[FreeTextEntry2] : Acute kidney injury superimposed on chronic kidney disease.  likely NSAID mediated\par \par Acute/Chronic diastolic heart failure. \par improved with torsemide at 20mg and one time dose of lasix 40mg x1, continue with current regimen to be titrated with Dr. Wynne as outpatient. \par \par Achilles tendinitis of right lower extremity.  was seen to be 2/2 to torsemide. improved with colchicine.  no tashi at this time\par \par Hyperkalemia.  Plan: resolved in hospital \par \par patient presents today after being in rehab center.  states is not feeling 100% due to the passing of her sign other for >20 years.  \par denies sob, chest pain or palpitations.  \par currently taking one tab of torosemide at this time, was discussed with cardio \par LE swelling at baseline \par no fever/chills, n/v/d/c\par \par \par \par \par

## 2019-10-22 ENCOUNTER — TRANSCRIPTION ENCOUNTER (OUTPATIENT)
Age: 84
End: 2019-10-22

## 2019-10-23 ENCOUNTER — CLINICAL ADVICE (OUTPATIENT)
Age: 84
End: 2019-10-23

## 2019-10-23 LAB
ALBUMIN SERPL ELPH-MCNC: 4.4 G/DL
ALP BLD-CCNC: 93 U/L
ALT SERPL-CCNC: 23 U/L
ANION GAP SERPL CALC-SCNC: 13 MMOL/L
AST SERPL-CCNC: 20 U/L
BASOPHILS # BLD AUTO: 0.07 K/UL
BASOPHILS NFR BLD AUTO: 1.2 %
BILIRUB DIRECT SERPL-MCNC: 0.1 MG/DL
BILIRUB INDIRECT SERPL-MCNC: 0.1 MG/DL
BILIRUB SERPL-MCNC: 0.2 MG/DL
BUN SERPL-MCNC: 45 MG/DL
CALCIUM SERPL-MCNC: 9.7 MG/DL
CHLORIDE SERPL-SCNC: 104 MMOL/L
CHOLEST SERPL-MCNC: 192 MG/DL
CHOLEST/HDLC SERPL: 4.2 RATIO
CO2 SERPL-SCNC: 25 MMOL/L
CREAT SERPL-MCNC: 2.22 MG/DL
EOSINOPHIL # BLD AUTO: 0.34 K/UL
EOSINOPHIL NFR BLD AUTO: 6.1 %
ERYTHROCYTE [SEDIMENTATION RATE] IN BLOOD BY WESTERGREN METHOD: 34 MM/HR
ESTIMATED AVERAGE GLUCOSE: 111 MG/DL
FOLATE SERPL-MCNC: >20 NG/ML
GLUCOSE SERPL-MCNC: 114 MG/DL
HBA1C MFR BLD HPLC: 5.5 %
HCT VFR BLD CALC: 32.5 %
HDLC SERPL-MCNC: 46 MG/DL
HGB BLD-MCNC: 9.3 G/DL
IMM GRANULOCYTES NFR BLD AUTO: 0.7 %
LDLC SERPL CALC-MCNC: 89 MG/DL
LYMPHOCYTES # BLD AUTO: 1.55 K/UL
LYMPHOCYTES NFR BLD AUTO: 27.6 %
MAN DIFF?: NORMAL
MCHC RBC-ENTMCNC: 26.4 PG
MCHC RBC-ENTMCNC: 28.6 GM/DL
MCV RBC AUTO: 92.3 FL
MONOCYTES # BLD AUTO: 0.73 K/UL
MONOCYTES NFR BLD AUTO: 13 %
NEUTROPHILS # BLD AUTO: 2.88 K/UL
NEUTROPHILS NFR BLD AUTO: 51.4 %
PLATELET # BLD AUTO: 420 K/UL
POTASSIUM SERPL-SCNC: 5 MMOL/L
PROT SERPL-MCNC: 7.2 G/DL
RBC # BLD: 3.52 M/UL
RBC # FLD: 14.3 %
SAVE SPECIMEN: NORMAL
SODIUM SERPL-SCNC: 142 MMOL/L
TRIGL SERPL-MCNC: 283 MG/DL
TSH SERPL-ACNC: 2.81 UIU/ML
URATE SERPL-MCNC: 14.6 MG/DL
VIT B12 SERPL-MCNC: >2000 PG/ML
WBC # FLD AUTO: 5.61 K/UL

## 2019-11-04 ENCOUNTER — TRANSCRIPTION ENCOUNTER (OUTPATIENT)
Age: 84
End: 2019-11-04

## 2019-11-06 ENCOUNTER — LABORATORY RESULT (OUTPATIENT)
Age: 84
End: 2019-11-06

## 2019-11-08 ENCOUNTER — TRANSCRIPTION ENCOUNTER (OUTPATIENT)
Age: 84
End: 2019-11-08

## 2019-11-11 ENCOUNTER — TRANSCRIPTION ENCOUNTER (OUTPATIENT)
Age: 84
End: 2019-11-11

## 2019-11-20 ENCOUNTER — TRANSCRIPTION ENCOUNTER (OUTPATIENT)
Age: 84
End: 2019-11-20

## 2019-11-21 ENCOUNTER — LABORATORY RESULT (OUTPATIENT)
Age: 84
End: 2019-11-21

## 2019-11-21 ENCOUNTER — APPOINTMENT (OUTPATIENT)
Dept: NEPHROLOGY | Facility: CLINIC | Age: 84
End: 2019-11-21
Payer: MEDICARE

## 2019-11-21 VITALS
BODY MASS INDEX: 29.43 KG/M2 | SYSTOLIC BLOOD PRESSURE: 111 MMHG | WEIGHT: 160.93 LBS | HEART RATE: 77 BPM | OXYGEN SATURATION: 95 % | DIASTOLIC BLOOD PRESSURE: 51 MMHG

## 2019-11-21 PROCEDURE — 99203 OFFICE O/P NEW LOW 30 MIN: CPT

## 2019-11-21 NOTE — REVIEW OF SYSTEMS
[Feeling Poorly] : feeling poorly [Feeling Tired] : feeling tired [Recent Weight Gain (___ Lbs)] : recent [unfilled] ~Ulb weight gain [Eyesight Problems] : eyesight problems [Chest Pain] : no chest pain [Palpitations] : no palpitations [Lower Ext Edema] : lower extremity edema [SOB on Exertion] : shortness of breath during exertion [Constipation] : constipation [Diarrhea] : diarrhea [Heartburn] : heartburn [Arthralgias] : arthralgias [Joint Pain] : joint pain [Itching] : itching [Dizziness] : dizziness [Anxiety] : no anxiety [Depression] : no depression [Muscle Weakness] : no muscle weakness [Easy Bleeding] : a tendency for easy bleeding [Easy Bruising] : a tendency for easy bruising [FreeTextEntry9] : Knee

## 2019-11-21 NOTE — CONSULT LETTER
[Dear  ___] : Dear  [unfilled], [Consult Closing:] : Thank you very much for allowing me to participate in the care of this patient.  If you have any questions, please do not hesitate to contact me. [Sincerely,] : Sincerely, [FreeTextEntry2] : Akash Hardy MD [FreeTextEntry1] : A case of chronic kidney disease, hypertension, hypertension, CHF, chronic anticoagulation, backache and requires contrast agent in the back. 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 Advised w/u.  [FreeTextEntry3] : PSinghal

## 2019-11-21 NOTE — HISTORY OF PRESENT ILLNESS
[Stage 4] : stage 4 [Hypertensive Nephropathy] : hypertensive nephropathy [Nocturia] : nocturia [Edema] : no edema [Angiotensin Receptor Blocker] : angiotensin receptor blocker [Diuretics] : diuretics [Antihypertensives] : antihypertensives [Good Compliance] : good compliance  [FreeTextEntry1] : A case of chronic kidney disease, hypertension, hypertension, CHF, chronic anticoagulation, backache and requires contrast agent in the back. Patient is being evaluated for CKD.

## 2019-11-21 NOTE — PHYSICAL EXAM
[General Appearance - Alert] : alert [General Appearance - In No Acute Distress] : in no acute distress [Sclera] : the sclera and conjunctiva were normal [Outer Ear] : the ears and nose were normal in appearance [Neck Appearance] : the appearance of the neck was normal [Neck Cervical Mass (___cm)] : no neck mass was observed [] : no respiratory distress [Respiration, Rhythm And Depth] : normal respiratory rhythm and effort [Apical Impulse] : the apical impulse was normal [Heart Rate And Rhythm] : heart rate was normal and rhythm regular [Heart Sounds] : normal S1 and S2 [Pitting Edema] : pitting edema present [___ +] : bilateral [unfilled]+ pitting edema to the ankles [Bowel Sounds] : normal bowel sounds [Abdomen Soft] : soft [Abdomen Tenderness] : non-tender [Cervical Lymph Nodes Enlarged Posterior Bilaterally] : posterior cervical [Cervical Lymph Nodes Enlarged Anterior Bilaterally] : anterior cervical [No CVA Tenderness] : no ~M costovertebral angle tenderness [Abnormal Walk] : normal gait [Nail Clubbing] : no clubbing  or cyanosis of the fingernails [Involuntary Movements] : no involuntary movements were seen [Skin Color & Pigmentation] : normal skin color and pigmentation [Oriented To Time, Place, And Person] : oriented to person, place, and time

## 2019-11-21 NOTE — ASSESSMENT
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 Advised w/u.

## 2019-11-25 ENCOUNTER — MEDICATION RENEWAL (OUTPATIENT)
Age: 84
End: 2019-11-25

## 2019-11-26 LAB
ALBUMIN SERPL ELPH-MCNC: 4.5 G/DL
ANA PAT FLD IF-IMP: ABNORMAL
ANA SER IF-ACNC: ABNORMAL
ANION GAP SERPL CALC-SCNC: 15 MMOL/L
APPEARANCE: CLEAR
BACTERIA: NEGATIVE
BASOPHILS # BLD AUTO: 0.11 K/UL
BASOPHILS NFR BLD AUTO: 1.5 %
BILIRUBIN URINE: NEGATIVE
BLOOD URINE: NEGATIVE
BUN SERPL-MCNC: 76 MG/DL
C3 SERPL-MCNC: 118 MG/DL
CALCIUM SERPL-MCNC: 9.9 MG/DL
CHLORIDE SERPL-SCNC: 104 MMOL/L
CO2 SERPL-SCNC: 22 MMOL/L
COLOR: NORMAL
CREAT SERPL-MCNC: 3 MG/DL
CREAT SPEC-SCNC: 45 MG/DL
CREAT/PROT UR: NORMAL RATIO
DEPRECATED KAPPA LC FREE/LAMBDA SER: 1.86 RATIO
DSDNA AB SER-ACNC: <12 IU/ML
EOSINOPHIL # BLD AUTO: 0.43 K/UL
EOSINOPHIL NFR BLD AUTO: 5.8 %
FERRITIN SERPL-MCNC: 38 NG/ML
GLUCOSE QUALITATIVE U: NEGATIVE
GLUCOSE SERPL-MCNC: 95 MG/DL
HBV SURFACE AB SER QL: NONREACTIVE
HBV SURFACE AG SER QL: NONREACTIVE
HCT VFR BLD CALC: 31.2 %
HCV AB SER QL: NONREACTIVE
HCV S/CO RATIO: 0.14 S/CO
HGB BLD-MCNC: 9.2 G/DL
HYALINE CASTS: 3 /LPF
IGA SER QL IEP: 292 MG/DL
IGG SER QL IEP: 1497 MG/DL
IGM SER QL IEP: 53 MG/DL
IMM GRANULOCYTES NFR BLD AUTO: 0.5 %
IRON SATN MFR SERPL: 9 %
IRON SERPL-MCNC: 28 UG/DL
KAPPA LC CSF-MCNC: 3.7 MG/DL
KAPPA LC SERPL-MCNC: 6.9 MG/DL
KETONES URINE: NEGATIVE
LEUKOCYTE ESTERASE URINE: NEGATIVE
LYMPHOCYTES # BLD AUTO: 1.38 K/UL
LYMPHOCYTES NFR BLD AUTO: 18.6 %
M PROTEIN SPEC IFE-MCNC: NORMAL
MAN DIFF?: NORMAL
MCHC RBC-ENTMCNC: 26.6 PG
MCHC RBC-ENTMCNC: 29.5 GM/DL
MCV RBC AUTO: 90.2 FL
MICROSCOPIC-UA: NORMAL
MONOCYTES # BLD AUTO: 0.85 K/UL
MONOCYTES NFR BLD AUTO: 11.5 %
MPO AB + PR3 PNL SER: NORMAL
NEUTROPHILS # BLD AUTO: 4.6 K/UL
NEUTROPHILS NFR BLD AUTO: 62.1 %
NITRITE URINE: NEGATIVE
PH URINE: 6
PHOSPHATE SERPL-MCNC: 4.7 MG/DL
PLATELET # BLD AUTO: 335 K/UL
POTASSIUM SERPL-SCNC: 5 MMOL/L
PROT UR-MCNC: <4 MG/DL
PROTEIN URINE: NEGATIVE
RBC # BLD: 3.46 M/UL
RBC # FLD: 14.7 %
RED BLOOD CELLS URINE: 1 /HPF
SODIUM SERPL-SCNC: 141 MMOL/L
SPECIFIC GRAVITY URINE: 1.01
SQUAMOUS EPITHELIAL CELLS: 1 /HPF
TIBC SERPL-MCNC: 325 UG/DL
UIBC SERPL-MCNC: 297 UG/DL
UROBILINOGEN URINE: NORMAL
WBC # FLD AUTO: 7.41 K/UL
WHITE BLOOD CELLS URINE: 1 /HPF

## 2019-11-27 ENCOUNTER — APPOINTMENT (OUTPATIENT)
Dept: CARDIOLOGY | Facility: CLINIC | Age: 84
End: 2019-11-27
Payer: MEDICARE

## 2019-11-27 ENCOUNTER — NON-APPOINTMENT (OUTPATIENT)
Age: 84
End: 2019-11-27

## 2019-11-27 VITALS
OXYGEN SATURATION: 96 % | DIASTOLIC BLOOD PRESSURE: 72 MMHG | WEIGHT: 155 LBS | BODY MASS INDEX: 28.52 KG/M2 | HEART RATE: 80 BPM | HEIGHT: 62 IN | SYSTOLIC BLOOD PRESSURE: 132 MMHG

## 2019-11-27 PROCEDURE — 99215 OFFICE O/P EST HI 40 MIN: CPT

## 2019-11-27 PROCEDURE — 93000 ELECTROCARDIOGRAM COMPLETE: CPT

## 2019-11-30 LAB
ANION GAP SERPL CALC-SCNC: 16 MMOL/L
BUN SERPL-MCNC: 60 MG/DL
CALCIUM SERPL-MCNC: 9.5 MG/DL
CHLORIDE SERPL-SCNC: 103 MMOL/L
CO2 SERPL-SCNC: 21 MMOL/L
CREAT SERPL-MCNC: 2.91 MG/DL
GLUCOSE SERPL-MCNC: 97 MG/DL
POTASSIUM SERPL-SCNC: 5.4 MMOL/L
SODIUM SERPL-SCNC: 140 MMOL/L

## 2019-11-30 RX ORDER — LOSARTAN POTASSIUM 100 MG/1
100 TABLET, FILM COATED ORAL DAILY
Qty: 90 | Refills: 3 | Status: DISCONTINUED | COMMUNITY
Start: 2019-08-26 | End: 2019-11-30

## 2019-12-02 ENCOUNTER — TRANSCRIPTION ENCOUNTER (OUTPATIENT)
Age: 84
End: 2019-12-02

## 2019-12-04 LAB
ANION GAP SERPL CALC-SCNC: 15 MMOL/L
BUN SERPL-MCNC: 45 MG/DL
CALCIUM SERPL-MCNC: 9.8 MG/DL
CHLORIDE SERPL-SCNC: 105 MMOL/L
CO2 SERPL-SCNC: 19 MMOL/L
CREAT SERPL-MCNC: 2.55 MG/DL
POTASSIUM SERPL-SCNC: 5 MMOL/L
SODIUM SERPL-SCNC: 139 MMOL/L

## 2019-12-05 ENCOUNTER — APPOINTMENT (OUTPATIENT)
Dept: NEPHROLOGY | Facility: CLINIC | Age: 84
End: 2019-12-05
Payer: MEDICARE

## 2019-12-05 VITALS
OXYGEN SATURATION: 93 % | DIASTOLIC BLOOD PRESSURE: 63 MMHG | SYSTOLIC BLOOD PRESSURE: 100 MMHG | HEIGHT: 62 IN | BODY MASS INDEX: 28.34 KG/M2 | WEIGHT: 154 LBS | HEART RATE: 81 BPM

## 2019-12-05 DIAGNOSIS — E87.5 HYPERKALEMIA: ICD-10-CM

## 2019-12-05 PROCEDURE — 99213 OFFICE O/P EST LOW 20 MIN: CPT

## 2019-12-05 NOTE — HISTORY OF PRESENT ILLNESS
[Stage 4] : stage 4 [Hypertensive Nephropathy] : hypertensive nephropathy [Nocturia] : nocturia [Edema] : no edema [Angiotensin Receptor Blocker] : angiotensin receptor blocker [Diuretics] : diuretics [Good Compliance] : good compliance  [Antihypertensives] : antihypertensives [FreeTextEntry1] : A case of chronic kidney disease, hypertension, hypertension, CHF, chronic anticoagulation, backache and was seen recently by cardiologist and advised to stop water pill. Patient is being followed for kidney function.

## 2019-12-05 NOTE — REVIEW OF SYSTEMS
[Feeling Poorly] : feeling poorly [Feeling Tired] : feeling tired [Recent Weight Gain (___ Lbs)] : recent [unfilled] ~Ulb weight gain [Recent Weight Loss (___ Lbs)] : no recent weight loss [Eyesight Problems] : eyesight problems [Chest Pain] : no chest pain [Palpitations] : no palpitations [SOB on Exertion] : shortness of breath during exertion [Lower Ext Edema] : lower extremity edema [Diarrhea] : diarrhea [Heartburn] : heartburn [Constipation] : constipation [Joint Pain] : joint pain [Arthralgias] : arthralgias [Itching] : itching [Depression] : no depression [Anxiety] : no anxiety [Dizziness] : dizziness [Muscle Weakness] : no muscle weakness [Easy Bleeding] : a tendency for easy bleeding [Easy Bruising] : a tendency for easy bruising [FreeTextEntry9] : Knee

## 2019-12-05 NOTE — ASSESSMENT
[FreeTextEntry1] : A case of chronic kidney disease, hypertension, hypertension, CHF, chronic anticoagulation, backache. Patient blood test showed mild improvement in serum creatinine with increase in GFR to 17 ml and serum potassium within normal limits. Advised to continue on low K diet.

## 2019-12-05 NOTE — PHYSICAL EXAM
[General Appearance - In No Acute Distress] : in no acute distress [General Appearance - Alert] : alert [Outer Ear] : the ears and nose were normal in appearance [Neck Appearance] : the appearance of the neck was normal [Sclera] : the sclera and conjunctiva were normal [] : no respiratory distress [Apical Impulse] : the apical impulse was normal [Neck Cervical Mass (___cm)] : no neck mass was observed [Respiration, Rhythm And Depth] : normal respiratory rhythm and effort [Heart Rate And Rhythm] : heart rate was normal and rhythm regular [Pitting Edema] : pitting edema present [Heart Sounds] : normal S1 and S2 [___ +] : bilateral [unfilled]+ pitting edema to the ankles [Bowel Sounds] : normal bowel sounds [Abdomen Tenderness] : non-tender [Abdomen Soft] : soft [Cervical Lymph Nodes Enlarged Posterior Bilaterally] : posterior cervical [Cervical Lymph Nodes Enlarged Anterior Bilaterally] : anterior cervical [No CVA Tenderness] : no ~M costovertebral angle tenderness [Abnormal Walk] : normal gait [Nail Clubbing] : no clubbing  or cyanosis of the fingernails [Involuntary Movements] : no involuntary movements were seen [Skin Color & Pigmentation] : normal skin color and pigmentation [Oriented To Time, Place, And Person] : oriented to person, place, and time

## 2019-12-06 ENCOUNTER — RX RENEWAL (OUTPATIENT)
Age: 84
End: 2019-12-06

## 2019-12-08 LAB — BACTERIA STL CULT: NORMAL

## 2019-12-09 ENCOUNTER — APPOINTMENT (OUTPATIENT)
Dept: INTERNAL MEDICINE | Facility: CLINIC | Age: 84
End: 2019-12-09
Payer: MEDICARE

## 2019-12-09 VITALS
SYSTOLIC BLOOD PRESSURE: 134 MMHG | BODY MASS INDEX: 27.6 KG/M2 | WEIGHT: 150 LBS | OXYGEN SATURATION: 76 % | HEIGHT: 62 IN | HEART RATE: 109 BPM | DIASTOLIC BLOOD PRESSURE: 77 MMHG

## 2019-12-09 PROCEDURE — 99213 OFFICE O/P EST LOW 20 MIN: CPT | Mod: 25

## 2019-12-09 PROCEDURE — 36415 COLL VENOUS BLD VENIPUNCTURE: CPT

## 2019-12-09 RX ORDER — ALENDRONATE SODIUM 70 MG/1
70 TABLET ORAL
Refills: 0 | Status: DISCONTINUED | COMMUNITY
End: 2019-12-09

## 2019-12-09 RX ORDER — GABAPENTIN 100 MG/1
100 CAPSULE ORAL
Refills: 0 | Status: DISCONTINUED | COMMUNITY
End: 2019-12-09

## 2019-12-09 NOTE — ASSESSMENT
[FreeTextEntry1] : \par \par watery diarrhea x 3 months. stoolcx-negative.  increased risk for Cdiff given several abx and hospitalization this year\par -check cdiff \par -check labs today \par \par chronic kidney disease, improved off diuretics \par -recheck BMP today \par \par Acute/Chronic diastolic heart failure. recent d/c of diuretics given increased CR and concern for dehydration \par -recommend f/u with Dr. Wynne, has appt tomorrow \par \par \par afib, rate controlled \par -cont BB, eliquis as directed \par \par f/u in 2 months \par \par \par

## 2019-12-09 NOTE — PHYSICAL EXAM
[No Lymphadenopathy] : no lymphadenopathy [Supple] : supple [Pedal Pulses Present] : the pedal pulses are present [Soft] : abdomen soft [Non Tender] : non-tender [Non-distended] : non-distended [No CVA Tenderness] : no CVA  tenderness [No Rash] : no rash [Normal] : affect was normal and insight and judgment were intact [No Skin Lesions] : no skin lesions [de-identified] : +1 LE edema bilaterally, pitting

## 2019-12-09 NOTE — HISTORY OF PRESENT ILLNESS
[de-identified] : \par 83 y/o female with h/o stage IV CKD, HTN, CHF, Afib on eliquis (renally dosed) presents c/o watery diarrhea for the last 3 months.  associated with weight loss and decreased normal intake due to concern for diarrhea.  describes as mostly watery, about 3-5x a day. no abdominal pain, n/v/ fever/chills, chest pain or palpitations.  \par was advised to stop diuretics and ARB due to worsening creatinine level \par \par also states was switched from Fosamax to Forteo as contraindicated in CKD. but states has started to develop rash after starting medication \par \par mild increase in sob but no cough, orthopnea, or LE edema

## 2019-12-10 ENCOUNTER — NON-APPOINTMENT (OUTPATIENT)
Age: 84
End: 2019-12-10

## 2019-12-10 ENCOUNTER — APPOINTMENT (OUTPATIENT)
Dept: CARDIOLOGY | Facility: CLINIC | Age: 84
End: 2019-12-10
Payer: MEDICARE

## 2019-12-10 VITALS
HEIGHT: 62 IN | HEART RATE: 80 BPM | DIASTOLIC BLOOD PRESSURE: 78 MMHG | WEIGHT: 150 LBS | OXYGEN SATURATION: 99 % | BODY MASS INDEX: 27.6 KG/M2 | SYSTOLIC BLOOD PRESSURE: 130 MMHG

## 2019-12-10 PROCEDURE — 99215 OFFICE O/P EST HI 40 MIN: CPT

## 2019-12-10 PROCEDURE — 93000 ELECTROCARDIOGRAM COMPLETE: CPT

## 2019-12-11 LAB
ALBUMIN SERPL ELPH-MCNC: 4.8 G/DL
ALP BLD-CCNC: 137 U/L
ALT SERPL-CCNC: 29 U/L
ANION GAP SERPL CALC-SCNC: 16 MMOL/L
AST SERPL-CCNC: 25 U/L
BASOPHILS # BLD AUTO: 0.13 K/UL
BASOPHILS NFR BLD AUTO: 1.8 %
BILIRUB DIRECT SERPL-MCNC: 0.1 MG/DL
BILIRUB INDIRECT SERPL-MCNC: 0.1 MG/DL
BILIRUB SERPL-MCNC: 0.2 MG/DL
BUN SERPL-MCNC: 40 MG/DL
C DIFF TOX GENS STL QL NAA+PROBE: NORMAL
CALCIUM SERPL-MCNC: 10.2 MG/DL
CDIFF BY PCR: NOT DETECTED
CHLORIDE SERPL-SCNC: 104 MMOL/L
CO2 SERPL-SCNC: 18 MMOL/L
CREAT SERPL-MCNC: 2.38 MG/DL
EOSINOPHIL # BLD AUTO: 0.37 K/UL
EOSINOPHIL NFR BLD AUTO: 5.2 %
ERYTHROCYTE [SEDIMENTATION RATE] IN BLOOD BY WESTERGREN METHOD: 59 MM/HR
GLUCOSE SERPL-MCNC: 94 MG/DL
HCT VFR BLD CALC: 37.8 %
HGB BLD-MCNC: 11.4 G/DL
IMM GRANULOCYTES NFR BLD AUTO: 0.7 %
LYMPHOCYTES # BLD AUTO: 0.8 K/UL
LYMPHOCYTES NFR BLD AUTO: 11.2 %
MAGNESIUM SERPL-MCNC: 1.7 MG/DL
MAN DIFF?: NORMAL
MCHC RBC-ENTMCNC: 26 PG
MCHC RBC-ENTMCNC: 30.2 GM/DL
MCV RBC AUTO: 86.3 FL
MONOCYTES # BLD AUTO: 0.71 K/UL
MONOCYTES NFR BLD AUTO: 10 %
NEUTROPHILS # BLD AUTO: 5.07 K/UL
NEUTROPHILS NFR BLD AUTO: 71.1 %
PHOSPHATE SERPL-MCNC: 4.7 MG/DL
PLATELET # BLD AUTO: 381 K/UL
POTASSIUM SERPL-SCNC: 4.9 MMOL/L
PROT SERPL-MCNC: 8.2 G/DL
RBC # BLD: 4.38 M/UL
RBC # FLD: 14.8 %
SAVE SPECIMEN: NORMAL
SODIUM SERPL-SCNC: 138 MMOL/L
URATE SERPL-MCNC: 7.2 MG/DL
WBC # FLD AUTO: 7.13 K/UL

## 2019-12-12 ENCOUNTER — TRANSCRIPTION ENCOUNTER (OUTPATIENT)
Age: 84
End: 2019-12-12

## 2019-12-16 ENCOUNTER — TRANSCRIPTION ENCOUNTER (OUTPATIENT)
Age: 84
End: 2019-12-16

## 2019-12-16 LAB
ANION GAP SERPL CALC-SCNC: 14 MMOL/L
BUN SERPL-MCNC: 41 MG/DL
CALCIUM SERPL-MCNC: 9.9 MG/DL
CHLORIDE SERPL-SCNC: 106 MMOL/L
CO2 SERPL-SCNC: 22 MMOL/L
CREAT SERPL-MCNC: 1.74 MG/DL
GLUCOSE SERPL-MCNC: 104 MG/DL
POTASSIUM SERPL-SCNC: 3.9 MMOL/L
SODIUM SERPL-SCNC: 142 MMOL/L

## 2019-12-31 LAB
ANION GAP SERPL CALC-SCNC: 14 MMOL/L
BUN SERPL-MCNC: 27 MG/DL
CALCIUM SERPL-MCNC: 9.8 MG/DL
CHLORIDE SERPL-SCNC: 98 MMOL/L
CO2 SERPL-SCNC: 26 MMOL/L
CREAT SERPL-MCNC: 1.67 MG/DL
GLUCOSE SERPL-MCNC: 100 MG/DL
POTASSIUM SERPL-SCNC: 4.1 MMOL/L
SODIUM SERPL-SCNC: 138 MMOL/L

## 2020-01-08 ENCOUNTER — NON-APPOINTMENT (OUTPATIENT)
Age: 85
End: 2020-01-08

## 2020-01-08 ENCOUNTER — APPOINTMENT (OUTPATIENT)
Dept: CARDIOLOGY | Facility: CLINIC | Age: 85
End: 2020-01-08
Payer: MEDICARE

## 2020-01-08 VITALS
DIASTOLIC BLOOD PRESSURE: 80 MMHG | SYSTOLIC BLOOD PRESSURE: 124 MMHG | BODY MASS INDEX: 27.6 KG/M2 | HEART RATE: 136 BPM | WEIGHT: 150 LBS | OXYGEN SATURATION: 99 % | HEIGHT: 62 IN

## 2020-01-08 PROCEDURE — 93000 ELECTROCARDIOGRAM COMPLETE: CPT

## 2020-01-08 PROCEDURE — 99215 OFFICE O/P EST HI 40 MIN: CPT

## 2020-01-10 ENCOUNTER — RX RENEWAL (OUTPATIENT)
Age: 85
End: 2020-01-10

## 2020-01-15 ENCOUNTER — TRANSCRIPTION ENCOUNTER (OUTPATIENT)
Age: 85
End: 2020-01-15

## 2020-01-17 NOTE — PATIENT PROFILE ADULT - NSTRANSFERBELONGINGSDISPO_GEN_A_NUR
with patient Hydroquinone Pregnancy And Lactation Text: This medication has not been assigned a Pregnancy Risk Category but animal studies failed to show danger with the topical medication. It is unknown if the medication is excreted in breast milk.

## 2020-01-20 ENCOUNTER — RX RENEWAL (OUTPATIENT)
Age: 85
End: 2020-01-20

## 2020-01-28 ENCOUNTER — TRANSCRIPTION ENCOUNTER (OUTPATIENT)
Age: 85
End: 2020-01-28

## 2020-02-05 ENCOUNTER — APPOINTMENT (OUTPATIENT)
Dept: VASCULAR SURGERY | Facility: CLINIC | Age: 85
End: 2020-02-05
Payer: MEDICARE

## 2020-02-05 VITALS
HEART RATE: 90 BPM | BODY MASS INDEX: 26.13 KG/M2 | SYSTOLIC BLOOD PRESSURE: 95 MMHG | HEIGHT: 62 IN | DIASTOLIC BLOOD PRESSURE: 61 MMHG | WEIGHT: 142 LBS

## 2020-02-05 DIAGNOSIS — R20.8 OTHER DISTURBANCES OF SKIN SENSATION: ICD-10-CM

## 2020-02-05 PROCEDURE — 93970 EXTREMITY STUDY: CPT

## 2020-02-05 PROCEDURE — 99213 OFFICE O/P EST LOW 20 MIN: CPT

## 2020-02-14 LAB
ANION GAP SERPL CALC-SCNC: 18 MMOL/L
BUN SERPL-MCNC: 62 MG/DL
CALCIUM SERPL-MCNC: 10.3 MG/DL
CHLORIDE SERPL-SCNC: 92 MMOL/L
CO2 SERPL-SCNC: 30 MMOL/L
CREAT SERPL-MCNC: 2.69 MG/DL
GLUCOSE SERPL-MCNC: 96 MG/DL
POTASSIUM SERPL-SCNC: 3.8 MMOL/L
SODIUM SERPL-SCNC: 140 MMOL/L

## 2020-02-18 ENCOUNTER — TRANSCRIPTION ENCOUNTER (OUTPATIENT)
Age: 85
End: 2020-02-18

## 2020-02-24 LAB
ANION GAP SERPL CALC-SCNC: 16 MMOL/L
BUN SERPL-MCNC: 47 MG/DL
CALCIUM SERPL-MCNC: 10 MG/DL
CHLORIDE SERPL-SCNC: 99 MMOL/L
CO2 SERPL-SCNC: 25 MMOL/L
CREAT SERPL-MCNC: 1.76 MG/DL
GLUCOSE SERPL-MCNC: 101 MG/DL
POTASSIUM SERPL-SCNC: 4.3 MMOL/L
SODIUM SERPL-SCNC: 140 MMOL/L

## 2020-03-04 ENCOUNTER — TRANSCRIPTION ENCOUNTER (OUTPATIENT)
Age: 85
End: 2020-03-04

## 2020-03-05 RX ORDER — AMLODIPINE BESYLATE 10 MG/1
10 TABLET ORAL DAILY
Qty: 90 | Refills: 3 | Status: DISCONTINUED | COMMUNITY
Start: 2018-06-26 | End: 2020-03-05

## 2020-03-07 ENCOUNTER — RX RENEWAL (OUTPATIENT)
Age: 85
End: 2020-03-07

## 2020-03-09 LAB
ANION GAP SERPL CALC-SCNC: 19 MMOL/L
BUN SERPL-MCNC: 65 MG/DL
CALCIUM SERPL-MCNC: 9.8 MG/DL
CHLORIDE SERPL-SCNC: 92 MMOL/L
CO2 SERPL-SCNC: 28 MMOL/L
CREAT SERPL-MCNC: 2.55 MG/DL
POTASSIUM SERPL-SCNC: 3.7 MMOL/L
SODIUM SERPL-SCNC: 139 MMOL/L

## 2020-03-16 LAB
ANION GAP SERPL CALC-SCNC: 17 MMOL/L
BUN SERPL-MCNC: 41 MG/DL
CALCIUM SERPL-MCNC: 10 MG/DL
CHLORIDE SERPL-SCNC: 103 MMOL/L
CO2 SERPL-SCNC: 26 MMOL/L
CREAT SERPL-MCNC: 1.63 MG/DL
POTASSIUM SERPL-SCNC: 4.1 MMOL/L
SODIUM SERPL-SCNC: 145 MMOL/L

## 2020-04-02 ENCOUNTER — APPOINTMENT (OUTPATIENT)
Dept: GASTROENTEROLOGY | Facility: CLINIC | Age: 85
End: 2020-04-02

## 2020-04-13 ENCOUNTER — APPOINTMENT (OUTPATIENT)
Dept: CARDIOLOGY | Facility: CLINIC | Age: 85
End: 2020-04-13

## 2020-04-13 ENCOUNTER — TRANSCRIPTION ENCOUNTER (OUTPATIENT)
Age: 85
End: 2020-04-13

## 2020-05-18 ENCOUNTER — RX RENEWAL (OUTPATIENT)
Age: 85
End: 2020-05-18

## 2020-05-19 ENCOUNTER — APPOINTMENT (OUTPATIENT)
Dept: INTERNAL MEDICINE | Facility: CLINIC | Age: 85
End: 2020-05-19
Payer: MEDICARE

## 2020-05-19 VITALS
WEIGHT: 152 LBS | SYSTOLIC BLOOD PRESSURE: 141 MMHG | TEMPERATURE: 96.3 F | BODY MASS INDEX: 27.97 KG/M2 | HEIGHT: 62 IN | HEART RATE: 69 BPM | DIASTOLIC BLOOD PRESSURE: 80 MMHG

## 2020-05-19 PROCEDURE — 99214 OFFICE O/P EST MOD 30 MIN: CPT | Mod: 95

## 2020-05-19 RX ORDER — CALCIUM ACETATE 667 MG/1
667 CAPSULE ORAL 3 TIMES DAILY
Qty: 90 | Refills: 5 | Status: DISCONTINUED | COMMUNITY
Start: 2019-11-25 | End: 2020-05-19

## 2020-05-19 NOTE — ASSESSMENT
[FreeTextEntry1] : 83 y/o female with h/o stage IV CKD, HTN, CHF, Afib on eliquis (renally dosed) \par \par Gout, states is still having watery diarrhea. after reviewing her medications in detail, it was noted that she has been taking Colcrys that was started by rheumatologist, Dr. Tari Bauer ([facility]) after a gout attack in 10/2019.  \par noted from previous note that diarrhea started around that time.  no gout attacks since then and compliant with taking allopurinol with no issues \par -advised to stop colcrys for now, monitor for resolution of diarrhea\par -increase allopurinol to 300mg daily \par \par Stage IV CKD\par -will setup home draw \par \par f/u in 1 month \par \par This visit was completed via telehealth due to the restrictions of the COVID-19 pandemic. All issues as below were discussed and addressed but no physical exam was performed. If it was felt that the patient should be evaluated in clinic then he/she was directed there. The patient verbally consented to visit.\par \par \par \par

## 2020-05-19 NOTE — HISTORY OF PRESENT ILLNESS
[Home] : at home, [unfilled] , at the time of the visit. [Medical Office: ([facility])___] : at the medical office located in  [Patient] : the patient [de-identified] : 85 y/o female with h/o stage IV CKD, HTN, CHF, Afib on eliquis (renally dosed) \par telehealth appt for follow-up \par states overall is doing well.  trying to keep herself busy by doing chores, reading and puzzles\par \par states has been eating more then usually with increase in weight ~5 lbs.  better on days she takes to [place] (takes 5 days per week)\par \par states is still having watery diarrhea.   \par after reviewing her medications in detail, it was noted that she has been taking Colcrys that was started by rheumatologist, Dr. Tari Bauer ([facility]) after a gout attack in 10/2019.  \par noted from previous note that diarrhea started around that time.  no gout attacks since then and compliant with taking allopurinol with no issues \par \par

## 2020-05-28 ENCOUNTER — LABORATORY RESULT (OUTPATIENT)
Age: 85
End: 2020-05-28

## 2020-05-29 LAB
ALBUMIN SERPL ELPH-MCNC: 4.6 G/DL
ALP BLD-CCNC: 138 U/L
ALT SERPL-CCNC: 23 U/L
ANION GAP SERPL CALC-SCNC: 17 MMOL/L
AST SERPL-CCNC: 27 U/L
BASOPHILS # BLD AUTO: 0.06 K/UL
BASOPHILS NFR BLD AUTO: 0.7 %
BILIRUB DIRECT SERPL-MCNC: 0.1 MG/DL
BILIRUB INDIRECT SERPL-MCNC: 0.2 MG/DL
BILIRUB SERPL-MCNC: 0.3 MG/DL
BUN SERPL-MCNC: 33 MG/DL
CALCIUM SERPL-MCNC: 9.7 MG/DL
CHLORIDE SERPL-SCNC: 101 MMOL/L
CHOLEST SERPL-MCNC: 221 MG/DL
CHOLEST/HDLC SERPL: 3.4 RATIO
CO2 SERPL-SCNC: 26 MMOL/L
CREAT SERPL-MCNC: 1.52 MG/DL
EOSINOPHIL # BLD AUTO: 0.31 K/UL
EOSINOPHIL NFR BLD AUTO: 3.7 %
ERYTHROCYTE [SEDIMENTATION RATE] IN BLOOD BY WESTERGREN METHOD: 18 MM/HR
FOLATE SERPL-MCNC: >20 NG/ML
GLUCOSE SERPL-MCNC: 102 MG/DL
HCT VFR BLD CALC: 42.1 %
HDLC SERPL-MCNC: 66 MG/DL
HGB BLD-MCNC: 12.5 G/DL
IMM GRANULOCYTES NFR BLD AUTO: 0.6 %
LDLC SERPL CALC-MCNC: 90 MG/DL
LYMPHOCYTES # BLD AUTO: 1.47 K/UL
LYMPHOCYTES NFR BLD AUTO: 17.7 %
MAN DIFF?: NORMAL
MCHC RBC-ENTMCNC: 26.8 PG
MCHC RBC-ENTMCNC: 29.7 GM/DL
MCV RBC AUTO: 90.1 FL
MONOCYTES # BLD AUTO: 0.87 K/UL
MONOCYTES NFR BLD AUTO: 10.5 %
NEUTROPHILS # BLD AUTO: 5.55 K/UL
NEUTROPHILS NFR BLD AUTO: 66.8 %
PLATELET # BLD AUTO: 252 K/UL
POTASSIUM SERPL-SCNC: 3.9 MMOL/L
PROT SERPL-MCNC: 7.4 G/DL
RBC # BLD: 4.67 M/UL
RBC # FLD: 16.7 %
SAVE SPECIMEN: NORMAL
SODIUM SERPL-SCNC: 143 MMOL/L
TRIGL SERPL-MCNC: 330 MG/DL
TSH SERPL-ACNC: 4.33 UIU/ML
URATE SERPL-MCNC: 4.3 MG/DL
VIT B12 SERPL-MCNC: 1214 PG/ML
WBC # FLD AUTO: 8.31 K/UL

## 2020-06-01 ENCOUNTER — APPOINTMENT (OUTPATIENT)
Dept: CARDIOLOGY | Facility: CLINIC | Age: 85
End: 2020-06-01
Payer: MEDICARE

## 2020-06-01 PROCEDURE — 99215 OFFICE O/P EST HI 40 MIN: CPT | Mod: 95

## 2020-06-01 RX ORDER — COLCHICINE 0.6 MG/1
0.6 TABLET ORAL EVERY 6 HOURS
Qty: 30 | Refills: 1 | Status: DISCONTINUED | COMMUNITY
Start: 2020-05-29 | End: 2020-06-01

## 2020-06-09 ENCOUNTER — TRANSCRIPTION ENCOUNTER (OUTPATIENT)
Age: 85
End: 2020-06-09

## 2020-06-12 LAB
ANION GAP SERPL CALC-SCNC: 16 MMOL/L
BUN SERPL-MCNC: 42 MG/DL
CALCIUM SERPL-MCNC: 10 MG/DL
CHLORIDE SERPL-SCNC: 92 MMOL/L
CO2 SERPL-SCNC: 31 MMOL/L
CREAT SERPL-MCNC: 1.72 MG/DL
GLUCOSE SERPL-MCNC: 93 MG/DL
POTASSIUM SERPL-SCNC: 3.9 MMOL/L
SODIUM SERPL-SCNC: 139 MMOL/L

## 2020-06-16 ENCOUNTER — RX RENEWAL (OUTPATIENT)
Age: 85
End: 2020-06-16

## 2020-06-17 ENCOUNTER — RX RENEWAL (OUTPATIENT)
Age: 85
End: 2020-06-17

## 2020-06-23 ENCOUNTER — APPOINTMENT (OUTPATIENT)
Dept: GASTROENTEROLOGY | Facility: CLINIC | Age: 85
End: 2020-06-23
Payer: MEDICARE

## 2020-06-23 VITALS
WEIGHT: 152 LBS | HEIGHT: 62 IN | DIASTOLIC BLOOD PRESSURE: 78 MMHG | SYSTOLIC BLOOD PRESSURE: 132 MMHG | BODY MASS INDEX: 27.97 KG/M2 | HEART RATE: 71 BPM | TEMPERATURE: 97.7 F

## 2020-06-23 DIAGNOSIS — R10.31 RIGHT LOWER QUADRANT PAIN: ICD-10-CM

## 2020-06-23 DIAGNOSIS — K44.9 DIAPHRAGMATIC HERNIA W/OUT OBSTRUCTION OR GANGRENE: ICD-10-CM

## 2020-06-23 DIAGNOSIS — I05.9 RHEUMATIC MITRAL VALVE DISEASE, UNSPECIFIED: ICD-10-CM

## 2020-06-23 DIAGNOSIS — N28.9 DISORDER OF KIDNEY AND URETER, UNSPECIFIED: ICD-10-CM

## 2020-06-23 DIAGNOSIS — M10.9 GOUT, UNSPECIFIED: ICD-10-CM

## 2020-06-23 DIAGNOSIS — Z90.49 ACQUIRED ABSENCE OF OTHER SPECIFIED PARTS OF DIGESTIVE TRACT: ICD-10-CM

## 2020-06-23 DIAGNOSIS — M81.0 AGE-RELATED OSTEOPOROSIS W/OUT CURRENT PATHOLOGICAL FRACTURE: ICD-10-CM

## 2020-06-23 DIAGNOSIS — Z86.010 PERSONAL HISTORY OF COLONIC POLYPS: ICD-10-CM

## 2020-06-23 PROCEDURE — 99215 OFFICE O/P EST HI 40 MIN: CPT

## 2020-06-23 PROCEDURE — 82274 ASSAY TEST FOR BLOOD FECAL: CPT | Mod: QW

## 2020-06-23 RX ORDER — AMLODIPINE BESYLATE 5 MG/1
5 TABLET ORAL DAILY
Qty: 90 | Refills: 1 | Status: DISCONTINUED | COMMUNITY
Start: 2017-12-07 | End: 2020-06-23

## 2020-06-23 NOTE — REVIEW OF SYSTEMS
[Recent Weight Gain (___ Lbs)] : recent [unfilled] ~Ulb weight gain [Wheezing] : wheezing [Cough] : cough [Shortness Of Breath] : shortness of breath [Heartburn] : heartburn [Diarrhea] : diarrhea [Incontinence] : incontinence [Joint Pain] : joint pain [Arthralgias] : arthralgias [Joint Stiffness] : joint stiffness [Joint Swelling] : joint swelling [Anxiety] : anxiety [Easy Bruising] : a tendency for easy bruising [Depression] : depression [Negative] : Heme/Lymph

## 2020-06-23 NOTE — HISTORY OF PRESENT ILLNESS
[FreeTextEntry1] : [place] has been bothered with an increase in heartburn despite taking pantoprazole 40 mg once a day.  Occasionally, she takes a second pill.  Of concern, is that she has been describing dysphagia, with food sticking in the region of the suprasternal notch almost immediately after eating.  In addition, liquids can cause the same complaint.  Retrospectively, on review of Dr. James's note from April 2019, the patient had the same complaint of dysphagia.  A new complaint, is very localized right lower quadrant sticking pain for the past few days.  This is something new and not associated with trauma or change in bowel habits.  She was admitted to [facility] last year with renal failure secondary to over treatment with diuretics.  She continues to take cholestyramine.

## 2020-06-23 NOTE — ASSESSMENT
[FreeTextEntry1] : 1. Chronic GERD with recent exacerbation, and dysphagia; hiatal hernia, nonerosive esophagitis at EGD July 2014-is an exacerbation in reflux symptoms and dysphagia may be associated with an esophageal motility disorder; rule out Zenker's diverticulum.\par 2. Collagenous colitis diagnosed 2016; history of colonic polyps, family history of colon cancer (sister); hemoperitoneum after colonoscopy 2008. Stool guaiac negative with negative FIT on today's exam.\par 3. History of diastolic CHF, atrial fibrillation; status post mitral valve annuloplasty, aortic and mitral insufficiency, pulmonary hypertension--maintained on Eliquis + ASA.\par 4. Chronic renal insufficiency with history of renal failure secondary to over diuresis.\par 5. Hypertension.\par 6. Hyperlipidemia.\par 7. Osteoarthritis, osteoporosis.\par 8.  Localized right lower quadrant pain and tenderness-rule out spigelian hernia; rule out musculoskeletal trauma causing localized pain.\par 9.  Umbilical hernia.\par \par Plan:\par 1.  Esophagram and swallowing study is requested.\par 2.  CT scan of the abdomen and pelvis is requested.\par 3.  The patient was advised to continue pantoprazole, but take the medication twice daily-40 mg 30 to 45 minutes before breakfast and before dinner.\par 4.  Return after the imaging studies are performed.\par 8. Moderate renal insufficiency.\par 9. Status post right breast papilloma, left breast cyst excision.\par 10. Status post epigastric ventral hernia repair, hysterectomy.\par 11. Allergic to niacin, -statins, amlodipine, adhesive tape, iodine dye.\par \par Plan:\par 1. Recent labs reviewed.\par 2. Increase pantoprazole to 40 mg a.c. b.i.d. x 2 weeks.\par 3. Ordinarily, EGD would be advised at this time, but quite hesitant, given significant cardiac history and anticoagulation. If no improvement in esophageal symptoms despite twice daily PPI, she will require cardiac clearance (and input regarding how long she can hold Eliquis) before her EGD is performed in the hospital setting. As I do no hospital work, Dr. Marcos Young could perform on my behalf. She is aware of the possibility of esophageal cancer, but we are looking for easy to treat conditions at this point. Procedure, rationale, alternatives, material risks, anesthesia plan were reviewed and brochure given.\par

## 2020-06-23 NOTE — PHYSICAL EXAM
[General Appearance - Alert] : alert [General Appearance - Well Nourished] : well nourished [General Appearance - In No Acute Distress] : in no acute distress [Sclera] : the sclera and conjunctiva were normal [General Appearance - Well Developed] : well developed [Outer Ear] : the ears and nose were normal in appearance [Neck Appearance] : the appearance of the neck was normal [Neck Cervical Mass (___cm)] : no neck mass was observed [Jugular Venous Distention Increased] : there was no jugular-venous distention [Thyroid Nodule] : there were no palpable thyroid nodules [Thyroid Diffuse Enlargement] : the thyroid was not enlarged [Auscultation Breath Sounds / Voice Sounds] : lungs were clear to auscultation bilaterally [Heart Sounds Gallop] : no gallops [Heart Rate And Rhythm] : heart rate was normal and rhythm regular [Heart Sounds] : normal S1 and S2 [Full Pulse] : the pedal pulses are present [Heart Sounds Pericardial Friction Rub] : no pericardial rub [Bowel Sounds] : normal bowel sounds [Edema] : there was no peripheral edema [Abdomen Soft] : soft [No Rectal Mass] : no rectal mass [Normal Sphincter Tone] : normal sphincter tone [Abdomen Mass (___ Cm)] : no abdominal mass palpated [External Hemorrhoid] : external hemorrhoids [Cervical Lymph Nodes Enlarged Posterior Bilaterally] : posterior cervical [Cervical Lymph Nodes Enlarged Anterior Bilaterally] : anterior cervical [Supraclavicular Lymph Nodes Enlarged Bilaterally] : supraclavicular [Axillary Lymph Nodes Enlarged Bilaterally] : axillary [Femoral Lymph Nodes Enlarged Bilaterally] : femoral [Inguinal Lymph Nodes Enlarged Bilaterally] : inguinal [Oriented To Time, Place, And Person] : oriented to person, place, and time [Impaired Insight] : insight and judgment were intact [Affect] : the affect was normal [General Appearance - Well-Appearing] : healthy appearing [PERRL With Normal Accommodation] : pupils were equal in size, round, and reactive to light [Strabismus] : no strabismus was seen [Extraocular Movements] : extraocular movements were intact [Optic Disc Abnormality] : the optic disc were normal in size and color [Abdominal Aorta] : the abdominal aorta was normal [Arterial Pulses Carotid] : carotid pulses were normal with no bruits [Lungs Percussion] : the lungs were normal to percussion [Veins - Varicosity Changes] : there were no varicosital changes [No Spinal Tenderness] : no spinal tenderness [No CVA Tenderness] : no ~M costovertebral angle tenderness [Involuntary Movements] : no involuntary movements were seen [Skin Turgor] : normal skin turgor [] : no rash [Skin Color & Pigmentation] : normal skin color and pigmentation [Skin Lesions] : no skin lesions [No Focal Deficits] : no focal deficits [Mood] : the mood was normal [Internal Hemorrhoid] : no internal hemorrhoids [Occult Blood Positive] : stool was negative for occult blood [FreeTextEntry1] : ecchymosis left forearm/wrist

## 2020-06-23 NOTE — CONSULT LETTER
[Please see my note below.] : Please see my note below. [Dear  ___] : Dear  [unfilled], [Sincerely,] : Sincerely, [Consult Closing:] : Thank you very much for allowing me to participate in the care of this patient.  If you have any questions, please do not hesitate to contact me. [Consult Letter:] : I had the pleasure of evaluating your patient, [unfilled]. [( Thank you for referring [unfilled] for consultation for _____ )] : Thank you for referring [unfilled] for consultation for [unfilled] [FreeTextEntry3] : Jorge Kaye MD\par

## 2020-06-26 ENCOUNTER — NON-APPOINTMENT (OUTPATIENT)
Age: 85
End: 2020-06-26

## 2020-06-26 ENCOUNTER — APPOINTMENT (OUTPATIENT)
Dept: CARDIOLOGY | Facility: CLINIC | Age: 85
End: 2020-06-26
Payer: MEDICARE

## 2020-06-26 VITALS
HEIGHT: 62 IN | OXYGEN SATURATION: 98 % | WEIGHT: 154 LBS | DIASTOLIC BLOOD PRESSURE: 70 MMHG | SYSTOLIC BLOOD PRESSURE: 130 MMHG | TEMPERATURE: 97.1 F | BODY MASS INDEX: 28.34 KG/M2 | HEART RATE: 84 BPM

## 2020-06-26 PROCEDURE — 93000 ELECTROCARDIOGRAM COMPLETE: CPT

## 2020-06-26 PROCEDURE — 99215 OFFICE O/P EST HI 40 MIN: CPT

## 2020-06-30 ENCOUNTER — APPOINTMENT (OUTPATIENT)
Dept: CT IMAGING | Facility: IMAGING CENTER | Age: 85
End: 2020-06-30
Payer: MEDICARE

## 2020-06-30 ENCOUNTER — OUTPATIENT (OUTPATIENT)
Dept: OUTPATIENT SERVICES | Facility: HOSPITAL | Age: 85
LOS: 1 days | End: 2020-06-30
Payer: MEDICARE

## 2020-06-30 ENCOUNTER — RESULT REVIEW (OUTPATIENT)
Age: 85
End: 2020-06-30

## 2020-06-30 DIAGNOSIS — Z98.89 OTHER SPECIFIED POSTPROCEDURAL STATES: Chronic | ICD-10-CM

## 2020-06-30 DIAGNOSIS — Z90.49 ACQUIRED ABSENCE OF OTHER SPECIFIED PARTS OF DIGESTIVE TRACT: Chronic | ICD-10-CM

## 2020-06-30 DIAGNOSIS — R10.31 RIGHT LOWER QUADRANT PAIN: ICD-10-CM

## 2020-06-30 PROCEDURE — 74176 CT ABD & PELVIS W/O CONTRAST: CPT | Mod: 26

## 2020-06-30 PROCEDURE — 74176 CT ABD & PELVIS W/O CONTRAST: CPT

## 2020-07-01 ENCOUNTER — TRANSCRIPTION ENCOUNTER (OUTPATIENT)
Age: 85
End: 2020-07-01

## 2020-07-02 ENCOUNTER — TRANSCRIPTION ENCOUNTER (OUTPATIENT)
Age: 85
End: 2020-07-02

## 2020-07-08 ENCOUNTER — APPOINTMENT (OUTPATIENT)
Dept: VASCULAR SURGERY | Facility: CLINIC | Age: 85
End: 2020-07-08
Payer: MEDICARE

## 2020-07-08 VITALS
HEIGHT: 62 IN | DIASTOLIC BLOOD PRESSURE: 79 MMHG | SYSTOLIC BLOOD PRESSURE: 151 MMHG | WEIGHT: 154 LBS | TEMPERATURE: 98.3 F | BODY MASS INDEX: 28.34 KG/M2 | HEART RATE: 80 BPM

## 2020-07-08 PROCEDURE — 93880 EXTRACRANIAL BILAT STUDY: CPT

## 2020-07-08 PROCEDURE — 99212 OFFICE O/P EST SF 10 MIN: CPT

## 2020-07-15 ENCOUNTER — OUTPATIENT (OUTPATIENT)
Dept: OUTPATIENT SERVICES | Facility: HOSPITAL | Age: 85
LOS: 1 days | End: 2020-07-15
Payer: MEDICARE

## 2020-07-15 ENCOUNTER — APPOINTMENT (OUTPATIENT)
Dept: RADIOLOGY | Facility: HOSPITAL | Age: 85
End: 2020-07-15
Payer: MEDICARE

## 2020-07-15 DIAGNOSIS — Z98.89 OTHER SPECIFIED POSTPROCEDURAL STATES: Chronic | ICD-10-CM

## 2020-07-15 DIAGNOSIS — R13.10 DYSPHAGIA, UNSPECIFIED: ICD-10-CM

## 2020-07-15 DIAGNOSIS — Z90.49 ACQUIRED ABSENCE OF OTHER SPECIFIED PARTS OF DIGESTIVE TRACT: Chronic | ICD-10-CM

## 2020-07-15 PROCEDURE — 74221 X-RAY XM ESOPHAGUS 2CNTRST: CPT

## 2020-07-15 PROCEDURE — 74221 X-RAY XM ESOPHAGUS 2CNTRST: CPT | Mod: 26

## 2020-07-20 ENCOUNTER — TRANSCRIPTION ENCOUNTER (OUTPATIENT)
Age: 85
End: 2020-07-20

## 2020-07-20 ENCOUNTER — LABORATORY RESULT (OUTPATIENT)
Age: 85
End: 2020-07-20

## 2020-07-24 ENCOUNTER — RX RENEWAL (OUTPATIENT)
Age: 85
End: 2020-07-24

## 2020-08-11 DIAGNOSIS — R79.89 OTHER SPECIFIED ABNORMAL FINDINGS OF BLOOD CHEMISTRY: ICD-10-CM

## 2020-08-12 ENCOUNTER — RX RENEWAL (OUTPATIENT)
Age: 85
End: 2020-08-12

## 2020-08-13 DIAGNOSIS — Z01.818 ENCOUNTER FOR OTHER PREPROCEDURAL EXAMINATION: ICD-10-CM

## 2020-08-14 ENCOUNTER — APPOINTMENT (OUTPATIENT)
Dept: DISASTER EMERGENCY | Facility: CLINIC | Age: 85
End: 2020-08-14

## 2020-08-14 LAB — SARS-COV-2 N GENE NPH QL NAA+PROBE: NOT DETECTED

## 2020-08-15 ENCOUNTER — NON-APPOINTMENT (OUTPATIENT)
Age: 85
End: 2020-08-15

## 2020-08-17 ENCOUNTER — APPOINTMENT (OUTPATIENT)
Dept: GASTROENTEROLOGY | Facility: HOSPITAL | Age: 85
End: 2020-08-17

## 2020-08-17 ENCOUNTER — RESULT REVIEW (OUTPATIENT)
Age: 85
End: 2020-08-17

## 2020-08-17 ENCOUNTER — OUTPATIENT (OUTPATIENT)
Dept: OUTPATIENT SERVICES | Facility: HOSPITAL | Age: 85
LOS: 1 days | Discharge: HOME | End: 2020-08-17
Payer: MEDICARE

## 2020-08-17 VITALS
OXYGEN SATURATION: 98 % | SYSTOLIC BLOOD PRESSURE: 171 MMHG | DIASTOLIC BLOOD PRESSURE: 70 MMHG | WEIGHT: 154.98 LBS | HEIGHT: 62 IN | RESPIRATION RATE: 23 BRPM | TEMPERATURE: 97 F | HEART RATE: 70 BPM

## 2020-08-17 VITALS
OXYGEN SATURATION: 97 % | DIASTOLIC BLOOD PRESSURE: 59 MMHG | SYSTOLIC BLOOD PRESSURE: 163 MMHG | RESPIRATION RATE: 23 BRPM

## 2020-08-17 DIAGNOSIS — K21.9 GASTRO-ESOPHAGEAL REFLUX DISEASE WITHOUT ESOPHAGITIS: ICD-10-CM

## 2020-08-17 DIAGNOSIS — Z90.49 ACQUIRED ABSENCE OF OTHER SPECIFIED PARTS OF DIGESTIVE TRACT: Chronic | ICD-10-CM

## 2020-08-17 DIAGNOSIS — Z98.89 OTHER SPECIFIED POSTPROCEDURAL STATES: Chronic | ICD-10-CM

## 2020-08-17 PROCEDURE — 88312 SPECIAL STAINS GROUP 1: CPT | Mod: 26

## 2020-08-17 PROCEDURE — 43239 EGD BIOPSY SINGLE/MULTIPLE: CPT | Mod: GC

## 2020-08-17 PROCEDURE — 88305 TISSUE EXAM BY PATHOLOGIST: CPT | Mod: 26

## 2020-08-17 NOTE — ASU PATIENT PROFILE, ADULT - PSH
Abdominal hernia  repair 2007  Bilateral cataracts  extraction w/ IOL  H/O carotid endarterectomy    History of lumbar laminectomy for spinal cord decompression  1995  Hx of tonsillectomy    Papilloma of breast  s/p excision from right breast >20 years ago  S/P laparoscopic cholecystectomy    S/P MVR (mitral valve repair)  1997 at [facility]  Status post DACIA-BSO  1975  Varicose veins  s/p sclerotherapy bilaterally

## 2020-08-24 ENCOUNTER — TRANSCRIPTION ENCOUNTER (OUTPATIENT)
Age: 85
End: 2020-08-24

## 2020-08-25 ENCOUNTER — TRANSCRIPTION ENCOUNTER (OUTPATIENT)
Age: 85
End: 2020-08-25

## 2020-08-31 LAB
ANION GAP SERPL CALC-SCNC: 17 MMOL/L
BUN SERPL-MCNC: 71 MG/DL
CALCIUM SERPL-MCNC: 10.1 MG/DL
CHLORIDE SERPL-SCNC: 92 MMOL/L
CO2 SERPL-SCNC: 29 MMOL/L
CREAT SERPL-MCNC: 2.73 MG/DL
GLUCOSE SERPL-MCNC: 139 MG/DL
POTASSIUM SERPL-SCNC: 3.8 MMOL/L
SODIUM SERPL-SCNC: 138 MMOL/L

## 2020-09-23 ENCOUNTER — NON-APPOINTMENT (OUTPATIENT)
Age: 85
End: 2020-09-23

## 2020-09-23 ENCOUNTER — APPOINTMENT (OUTPATIENT)
Dept: CARDIOLOGY | Facility: CLINIC | Age: 85
End: 2020-09-23
Payer: MEDICARE

## 2020-09-23 VITALS
HEART RATE: 80 BPM | WEIGHT: 157 LBS | HEIGHT: 62 IN | SYSTOLIC BLOOD PRESSURE: 150 MMHG | TEMPERATURE: 97.5 F | OXYGEN SATURATION: 95 % | DIASTOLIC BLOOD PRESSURE: 80 MMHG | BODY MASS INDEX: 28.89 KG/M2

## 2020-09-23 DIAGNOSIS — Z23 ENCOUNTER FOR IMMUNIZATION: ICD-10-CM

## 2020-09-23 LAB
ANION GAP SERPL CALC-SCNC: 14 MMOL/L
BUN SERPL-MCNC: 31 MG/DL
CALCIUM SERPL-MCNC: 9.4 MG/DL
CHLORIDE SERPL-SCNC: 103 MMOL/L
CO2 SERPL-SCNC: 24 MMOL/L
CREAT SERPL-MCNC: 1.47 MG/DL
GLUCOSE SERPL-MCNC: 107 MG/DL
POTASSIUM SERPL-SCNC: 4 MMOL/L
SODIUM SERPL-SCNC: 141 MMOL/L

## 2020-09-23 PROCEDURE — 99215 OFFICE O/P EST HI 40 MIN: CPT

## 2020-09-23 PROCEDURE — G0008: CPT

## 2020-09-23 PROCEDURE — 36415 COLL VENOUS BLD VENIPUNCTURE: CPT

## 2020-09-23 PROCEDURE — 93000 ELECTROCARDIOGRAM COMPLETE: CPT

## 2020-09-23 PROCEDURE — 90662 IIV NO PRSV INCREASED AG IM: CPT

## 2020-09-23 PROCEDURE — 90471 IMMUNIZATION ADMIN: CPT

## 2020-09-28 ENCOUNTER — RX RENEWAL (OUTPATIENT)
Age: 85
End: 2020-09-28

## 2020-10-05 ENCOUNTER — RX RENEWAL (OUTPATIENT)
Age: 85
End: 2020-10-05

## 2020-10-05 LAB
ANION GAP SERPL CALC-SCNC: 12 MMOL/L
BUN SERPL-MCNC: 51 MG/DL
CALCIUM SERPL-MCNC: 9.6 MG/DL
CHLORIDE SERPL-SCNC: 102 MMOL/L
CO2 SERPL-SCNC: 29 MMOL/L
CREAT SERPL-MCNC: 1.73 MG/DL
GLUCOSE SERPL-MCNC: 90 MG/DL
POTASSIUM SERPL-SCNC: 4.2 MMOL/L
SODIUM SERPL-SCNC: 143 MMOL/L

## 2020-10-14 ENCOUNTER — APPOINTMENT (OUTPATIENT)
Dept: CARDIOLOGY | Facility: CLINIC | Age: 85
End: 2020-10-14
Payer: MEDICARE

## 2020-10-14 PROCEDURE — 36415 COLL VENOUS BLD VENIPUNCTURE: CPT

## 2020-10-14 PROCEDURE — 93306 TTE W/DOPPLER COMPLETE: CPT

## 2020-10-15 LAB
ANION GAP SERPL CALC-SCNC: 14 MMOL/L
BUN SERPL-MCNC: 50 MG/DL
CALCIUM SERPL-MCNC: 9.7 MG/DL
CHLORIDE SERPL-SCNC: 100 MMOL/L
CO2 SERPL-SCNC: 26 MMOL/L
CREAT SERPL-MCNC: 1.74 MG/DL
GLUCOSE SERPL-MCNC: 99 MG/DL
POTASSIUM SERPL-SCNC: 4.4 MMOL/L
SODIUM SERPL-SCNC: 140 MMOL/L

## 2020-10-19 ENCOUNTER — TRANSCRIPTION ENCOUNTER (OUTPATIENT)
Age: 85
End: 2020-10-19

## 2020-10-21 ENCOUNTER — APPOINTMENT (OUTPATIENT)
Dept: GASTROENTEROLOGY | Facility: CLINIC | Age: 85
End: 2020-10-21
Payer: MEDICARE

## 2020-10-21 VITALS
BODY MASS INDEX: 28.89 KG/M2 | WEIGHT: 157 LBS | SYSTOLIC BLOOD PRESSURE: 128 MMHG | TEMPERATURE: 97.1 F | HEIGHT: 62 IN | DIASTOLIC BLOOD PRESSURE: 71 MMHG | HEART RATE: 81 BPM

## 2020-10-21 DIAGNOSIS — K21.9 GASTRO-ESOPHAGEAL REFLUX DISEASE W/OUT ESOPHAGITIS: ICD-10-CM

## 2020-10-21 PROCEDURE — 99214 OFFICE O/P EST MOD 30 MIN: CPT | Mod: 25

## 2020-10-21 PROCEDURE — 99072 ADDL SUPL MATRL&STAF TM PHE: CPT

## 2020-10-21 NOTE — PHYSICAL EXAM
[General Appearance - Alert] : alert [General Appearance - In No Acute Distress] : in no acute distress [Sclera] : the sclera and conjunctiva were normal [PERRL With Normal Accommodation] : pupils were equal in size, round, and reactive to light [Outer Ear] : the ears and nose were normal in appearance [Nasal Cavity] : the nasal mucosa and septum were normal [Neck Appearance] : the appearance of the neck was normal [Neck Cervical Mass (___cm)] : no neck mass was observed [] : no respiratory distress [Auscultation Breath Sounds / Voice Sounds] : lungs were clear to auscultation bilaterally [Heart Rate And Rhythm] : heart rate was normal and rhythm regular [Heart Sounds] : normal S1 and S2 [Bowel Sounds] : normal bowel sounds [Abdomen Soft] : soft [Abdomen Tenderness] : non-tender [Skin Color & Pigmentation] : normal skin color and pigmentation [Skin Turgor] : normal skin turgor [No Focal Deficits] : no focal deficits [Oriented To Time, Place, And Person] : oriented to person, place, and time [Impaired Insight] : insight and judgment were intact

## 2020-10-21 NOTE — ASSESSMENT
[FreeTextEntry1] : 1. 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  Esophagram in July 2020 with mild tertiary contractions, reflux, and distal esophageal diverticulum.\par 2. Collagenous colitis diagnosed 2016; history of colonic polyps, family history of colon cancer (sister); hemoperitoneum after colonoscopy 2008.\par 3. History of diastolic CHF, atrial fibrillation; status post mitral valve annuloplasty, aortic and mitral insufficiency, pulmonary hypertension--maintained on Eliquis + ASA.\par 4. Chronic renal insufficiency with history of renal failure secondary to over diuresis.\par 5. Hypertension.\par 6. Hyperlipidemia.\par 7. Osteoarthritis, osteoporosis.\par 8.  Localized right lower quadrant pain and tenderness-  CT A/P in July 2020 with fatty liver, ?cirrhosis, small bilateral pleural effusion.\par 9.  Umbilical hernia.\par \par Plan:\par 1.  Esophagram and swallowing study is requested.\par 2.  CT scan of the abdomen and pelvis is requested.\par 3.  The patient was advised to continue pantoprazole, but take the medication twice daily-40 mg 30 to 45 minutes before breakfast and before dinner.\par 4.  Return after the imaging studies are performed.\par 8. Moderate renal insufficiency.\par 9. Status post right breast papilloma, left breast cyst excision.\par 10. Status post epigastric ventral hernia repair, hysterectomy.\par 11. Allergic to niacin, -statins, amlodipine, adhesive tape, iodine dye.\par \par Plan:\par - Esophagram, CT and EGD results reviewed.\par - Chew food carefully, remain upright for 90 minutes.\par - Continue PPI twice daily for 2-3 month course, then try using it once daily.\par - If stable, follow up in one year.\par

## 2020-10-21 NOTE — REVIEW OF SYSTEMS
[Feeling Poorly] : feeling poorly [Feeling Tired] : feeling tired [Recent Weight Gain (___ Lbs)] : recent [unfilled] ~Ulb weight gain [Dry Eyes] : dryness of the eyes [Eyes Itch] : itching of the eyes [Leg Claudication] : intermittent leg claudication [Shortness Of Breath] : shortness of breath [PND] : PND [Joint Pain] : joint pain [Joint Swelling] : joint swelling [Joint Stiffness] : joint stiffness [Limb Pain] : limb pain [Limb Swelling] : limb swelling [Itching] : itching [Dizziness] : dizziness [Difficulty Walking] : difficulty walking [Feelings Of Weakness] : feelings of weakness [Negative] : Heme/Lymph

## 2020-10-21 NOTE — HISTORY OF PRESENT ILLNESS
[_________] : Performed [unfilled] [de-identified] : Virginia presents to the office for follow up today after having an EGD at [facility] in August 2020.  She was last seen in the office by Dr. Kaye in June 2020.  \par \par After her last visit, she underwent an esophagram in July 2020 for evaluation of dysphagia.  The esophagram showed mild tertiary contractions, a diverticulum at the GE junction, and inability of the barium tablet to pass the GE junction although the barium did without difficulty.  She then underwent an EGD at [facility] as an outpatient which demonstrated inflammation at the GE junction and gastric polyps. Biopsies revealed eosinophils in the esophagus, reflux esophagitis with evidence for Bradley's, and fundic polyps.  After the EGD, she has been using omeprazole 40 mg twice daily and reports that she feels better.  She still has occasional dysphagia, but has been eating slower and chewing her food more thoroughly.  She denies any weight loss. [de-identified] : fatty liver, ?cirrhosis, pleural effusions [de-identified] : eosinophils in esophageal nodules, reflux with Bradley's, fundic polyps [de-identified] : collagenous colitis

## 2020-10-26 ENCOUNTER — RX RENEWAL (OUTPATIENT)
Age: 85
End: 2020-10-26

## 2020-12-10 LAB
25(OH)D3 SERPL-MCNC: 48.9 NG/ML
ALBUMIN SERPL ELPH-MCNC: 4.4 G/DL
ALP BLD-CCNC: 152 U/L
ALT SERPL-CCNC: 27 U/L
ANION GAP SERPL CALC-SCNC: 16 MMOL/L
AST SERPL-CCNC: 29 U/L
BASOPHILS # BLD AUTO: 0.07 K/UL
BASOPHILS NFR BLD AUTO: 0.9 %
BILIRUB SERPL-MCNC: 0.3 MG/DL
BUN SERPL-MCNC: 44 MG/DL
CALCIUM SERPL-MCNC: 9.5 MG/DL
CHLORIDE SERPL-SCNC: 96 MMOL/L
CHOLEST SERPL-MCNC: 227 MG/DL
CO2 SERPL-SCNC: 28 MMOL/L
CREAT SERPL-MCNC: 1.88 MG/DL
EOSINOPHIL # BLD AUTO: 0.22 K/UL
EOSINOPHIL NFR BLD AUTO: 2.8 %
ESTIMATED AVERAGE GLUCOSE: 117 MG/DL
GLUCOSE SERPL-MCNC: 120 MG/DL
HBA1C MFR BLD HPLC: 5.7 %
HCT VFR BLD CALC: 40.4 %
HDLC SERPL-MCNC: 55 MG/DL
HGB BLD-MCNC: 12.3 G/DL
IMM GRANULOCYTES NFR BLD AUTO: 1 %
LDLC SERPL CALC-MCNC: NORMAL MG/DL
LYMPHOCYTES # BLD AUTO: 1.41 K/UL
LYMPHOCYTES NFR BLD AUTO: 17.7 %
MAN DIFF?: NORMAL
MCHC RBC-ENTMCNC: 28.3 PG
MCHC RBC-ENTMCNC: 30.4 GM/DL
MCV RBC AUTO: 93.1 FL
MONOCYTES # BLD AUTO: 0.71 K/UL
MONOCYTES NFR BLD AUTO: 8.9 %
NEUTROPHILS # BLD AUTO: 5.49 K/UL
NEUTROPHILS NFR BLD AUTO: 68.7 %
NONHDLC SERPL-MCNC: 172 MG/DL
PLATELET # BLD AUTO: 285 K/UL
POTASSIUM SERPL-SCNC: 3.1 MMOL/L
PROT SERPL-MCNC: 7.3 G/DL
RBC # BLD: 4.34 M/UL
RBC # FLD: 14.7 %
SODIUM SERPL-SCNC: 141 MMOL/L
T4 SERPL-MCNC: 6.2 UG/DL
TRIGL SERPL-MCNC: 476 MG/DL
TSH SERPL-ACNC: 2.49 UIU/ML
WBC # FLD AUTO: 7.98 K/UL

## 2020-12-14 ENCOUNTER — RX RENEWAL (OUTPATIENT)
Age: 85
End: 2020-12-14

## 2020-12-15 ENCOUNTER — LABORATORY RESULT (OUTPATIENT)
Age: 85
End: 2020-12-15

## 2021-01-01 ENCOUNTER — APPOINTMENT (OUTPATIENT)
Dept: GASTROENTEROLOGY | Facility: CLINIC | Age: 86
End: 2021-01-01
Payer: MEDICARE

## 2021-01-01 ENCOUNTER — RX RENEWAL (OUTPATIENT)
Age: 86
End: 2021-01-01

## 2021-01-01 ENCOUNTER — NON-APPOINTMENT (OUTPATIENT)
Age: 86
End: 2021-01-01

## 2021-01-01 ENCOUNTER — TRANSCRIPTION ENCOUNTER (OUTPATIENT)
Age: 86
End: 2021-01-01

## 2021-01-01 ENCOUNTER — APPOINTMENT (OUTPATIENT)
Dept: VASCULAR SURGERY | Facility: CLINIC | Age: 86
End: 2021-01-01

## 2021-01-01 ENCOUNTER — APPOINTMENT (OUTPATIENT)
Dept: DISASTER EMERGENCY | Facility: CLINIC | Age: 86
End: 2021-01-01

## 2021-01-01 ENCOUNTER — APPOINTMENT (OUTPATIENT)
Dept: CARDIOLOGY | Facility: CLINIC | Age: 86
End: 2021-01-01
Payer: MEDICARE

## 2021-01-01 ENCOUNTER — APPOINTMENT (OUTPATIENT)
Dept: CV DIAGNOSITCS | Facility: HOSPITAL | Age: 86
End: 2021-01-01

## 2021-01-01 ENCOUNTER — APPOINTMENT (OUTPATIENT)
Dept: INTERNAL MEDICINE | Facility: CLINIC | Age: 86
End: 2021-01-01
Payer: MEDICARE

## 2021-01-01 ENCOUNTER — OUTPATIENT (OUTPATIENT)
Dept: OUTPATIENT SERVICES | Facility: HOSPITAL | Age: 86
LOS: 1 days | End: 2021-01-01
Payer: MEDICARE

## 2021-01-01 ENCOUNTER — APPOINTMENT (OUTPATIENT)
Dept: HEART FAILURE | Facility: CLINIC | Age: 86
End: 2021-01-01
Payer: MEDICARE

## 2021-01-01 ENCOUNTER — APPOINTMENT (OUTPATIENT)
Dept: HEART FAILURE | Facility: CLINIC | Age: 86
End: 2021-01-01

## 2021-01-01 ENCOUNTER — FORM ENCOUNTER (OUTPATIENT)
Age: 86
End: 2021-01-01

## 2021-01-01 ENCOUNTER — INPATIENT (INPATIENT)
Facility: HOSPITAL | Age: 86
LOS: 15 days | Discharge: NURSING, REHAB OR LONG-TERM CARE | DRG: 308 | End: 2021-07-29
Attending: INTERNAL MEDICINE | Admitting: INTERNAL MEDICINE
Payer: MEDICARE

## 2021-01-01 ENCOUNTER — LABORATORY RESULT (OUTPATIENT)
Age: 86
End: 2021-01-01

## 2021-01-01 VITALS
DIASTOLIC BLOOD PRESSURE: 61 MMHG | HEART RATE: 90 BPM | OXYGEN SATURATION: 95 % | RESPIRATION RATE: 16 BRPM | SYSTOLIC BLOOD PRESSURE: 141 MMHG

## 2021-01-01 VITALS
TEMPERATURE: 98 F | DIASTOLIC BLOOD PRESSURE: 77 MMHG | SYSTOLIC BLOOD PRESSURE: 120 MMHG | OXYGEN SATURATION: 98 % | RESPIRATION RATE: 18 BRPM | HEART RATE: 80 BPM

## 2021-01-01 VITALS
HEIGHT: 62 IN | HEART RATE: 91 BPM | WEIGHT: 149.91 LBS | DIASTOLIC BLOOD PRESSURE: 73 MMHG | RESPIRATION RATE: 25 BRPM | SYSTOLIC BLOOD PRESSURE: 109 MMHG | OXYGEN SATURATION: 100 % | TEMPERATURE: 98 F

## 2021-01-01 VITALS
RESPIRATION RATE: 17 BRPM | HEART RATE: 80 BPM | OXYGEN SATURATION: 100 % | DIASTOLIC BLOOD PRESSURE: 80 MMHG | HEIGHT: 65 IN | WEIGHT: 162 LBS | SYSTOLIC BLOOD PRESSURE: 174 MMHG | BODY MASS INDEX: 26.99 KG/M2

## 2021-01-01 VITALS
WEIGHT: 162 LBS | SYSTOLIC BLOOD PRESSURE: 150 MMHG | OXYGEN SATURATION: 96 % | HEART RATE: 88 BPM | DIASTOLIC BLOOD PRESSURE: 82 MMHG | BODY MASS INDEX: 29.63 KG/M2

## 2021-01-01 VITALS
DIASTOLIC BLOOD PRESSURE: 66 MMHG | BODY MASS INDEX: 24.99 KG/M2 | HEART RATE: 98 BPM | RESPIRATION RATE: 16 BRPM | SYSTOLIC BLOOD PRESSURE: 131 MMHG | TEMPERATURE: 97.9 F | WEIGHT: 150 LBS | OXYGEN SATURATION: 96 % | HEIGHT: 65 IN

## 2021-01-01 VITALS
HEART RATE: 80 BPM | SYSTOLIC BLOOD PRESSURE: 170 MMHG | TEMPERATURE: 97.2 F | BODY MASS INDEX: 28.52 KG/M2 | OXYGEN SATURATION: 94 % | HEIGHT: 62 IN | DIASTOLIC BLOOD PRESSURE: 81 MMHG | WEIGHT: 155 LBS

## 2021-01-01 VITALS
HEART RATE: 89 BPM | RESPIRATION RATE: 16 BRPM | DIASTOLIC BLOOD PRESSURE: 74 MMHG | TEMPERATURE: 98 F | SYSTOLIC BLOOD PRESSURE: 177 MMHG | OXYGEN SATURATION: 97 %

## 2021-01-01 VITALS
DIASTOLIC BLOOD PRESSURE: 80 MMHG | TEMPERATURE: 97 F | WEIGHT: 159 LBS | HEART RATE: 84 BPM | HEIGHT: 61.5 IN | SYSTOLIC BLOOD PRESSURE: 181 MMHG | BODY MASS INDEX: 29.64 KG/M2

## 2021-01-01 VITALS
TEMPERATURE: 98 F | RESPIRATION RATE: 18 BRPM | DIASTOLIC BLOOD PRESSURE: 79 MMHG | OXYGEN SATURATION: 99 % | SYSTOLIC BLOOD PRESSURE: 143 MMHG | HEART RATE: 96 BPM

## 2021-01-01 VITALS — HEART RATE: 82 BPM | DIASTOLIC BLOOD PRESSURE: 70 MMHG | SYSTOLIC BLOOD PRESSURE: 116 MMHG

## 2021-01-01 DIAGNOSIS — I10 ESSENTIAL (PRIMARY) HYPERTENSION: ICD-10-CM

## 2021-01-01 DIAGNOSIS — K52.9 NONINFECTIVE GASTROENTERITIS AND COLITIS, UNSPECIFIED: ICD-10-CM

## 2021-01-01 DIAGNOSIS — R06.02 SHORTNESS OF BREATH: ICD-10-CM

## 2021-01-01 DIAGNOSIS — A49.8 OTHER BACTERIAL INFECTIONS OF UNSPECIFIED SITE: ICD-10-CM

## 2021-01-01 DIAGNOSIS — M79.89 OTHER SPECIFIED SOFT TISSUE DISORDERS: ICD-10-CM

## 2021-01-01 DIAGNOSIS — K76.89 OTHER SPECIFIED DISEASES OF LIVER: ICD-10-CM

## 2021-01-01 DIAGNOSIS — I50.32 CHRONIC DIASTOLIC (CONGESTIVE) HEART FAILURE: ICD-10-CM

## 2021-01-01 DIAGNOSIS — I48.20 CHRONIC ATRIAL FIBRILLATION, UNSPECIFIED: ICD-10-CM

## 2021-01-01 DIAGNOSIS — Z90.49 ACQUIRED ABSENCE OF OTHER SPECIFIED PARTS OF DIGESTIVE TRACT: Chronic | ICD-10-CM

## 2021-01-01 DIAGNOSIS — N17.9 ACUTE KIDNEY FAILURE, UNSPECIFIED: ICD-10-CM

## 2021-01-01 DIAGNOSIS — R19.7 DIARRHEA, UNSPECIFIED: ICD-10-CM

## 2021-01-01 DIAGNOSIS — R79.89 OTHER SPECIFIED ABNORMAL FINDINGS OF BLOOD CHEMISTRY: ICD-10-CM

## 2021-01-01 DIAGNOSIS — R32 UNSPECIFIED URINARY INCONTINENCE: ICD-10-CM

## 2021-01-01 DIAGNOSIS — I05.9 RHEUMATIC MITRAL VALVE DISEASE, UNSPECIFIED: ICD-10-CM

## 2021-01-01 DIAGNOSIS — K52.831 COLLAGENOUS COLITIS: ICD-10-CM

## 2021-01-01 DIAGNOSIS — N39.0 URINARY TRACT INFECTION, SITE NOT SPECIFIED: ICD-10-CM

## 2021-01-01 DIAGNOSIS — D63.1 CHRONIC KIDNEY DISEASE, UNSPECIFIED: ICD-10-CM

## 2021-01-01 DIAGNOSIS — A41.9 SEPSIS, UNSPECIFIED ORGANISM: ICD-10-CM

## 2021-01-01 DIAGNOSIS — I50.30 UNSPECIFIED DIASTOLIC (CONGESTIVE) HEART FAILURE: ICD-10-CM

## 2021-01-01 DIAGNOSIS — I65.23 OCCLUSION AND STENOSIS OF BILATERAL CAROTID ARTERIES: ICD-10-CM

## 2021-01-01 DIAGNOSIS — Z98.89 OTHER SPECIFIED POSTPROCEDURAL STATES: Chronic | ICD-10-CM

## 2021-01-01 DIAGNOSIS — R09.02 HYPOXEMIA: ICD-10-CM

## 2021-01-01 DIAGNOSIS — I27.20 PULMONARY HYPERTENSION, UNSPECIFIED: ICD-10-CM

## 2021-01-01 DIAGNOSIS — N18.4 CHRONIC KIDNEY DISEASE, STAGE 4 (SEVERE): ICD-10-CM

## 2021-01-01 DIAGNOSIS — E03.9 HYPOTHYROIDISM, UNSPECIFIED: ICD-10-CM

## 2021-01-01 DIAGNOSIS — Z16.12 OTHER BACTERIAL INFECTIONS OF UNSPECIFIED SITE: ICD-10-CM

## 2021-01-01 DIAGNOSIS — N18.9 CHRONIC KIDNEY DISEASE, UNSPECIFIED: ICD-10-CM

## 2021-01-01 DIAGNOSIS — Z29.9 ENCOUNTER FOR PROPHYLACTIC MEASURES, UNSPECIFIED: ICD-10-CM

## 2021-01-01 DIAGNOSIS — R74.01 ELEVATION OF LEVELS OF LIVER TRANSAMINASE LEVELS: ICD-10-CM

## 2021-01-01 DIAGNOSIS — I48.91 UNSPECIFIED ATRIAL FIBRILLATION: ICD-10-CM

## 2021-01-01 DIAGNOSIS — E78.00 PURE HYPERCHOLESTEROLEMIA, UNSPECIFIED: ICD-10-CM

## 2021-01-01 LAB
-  AMPICILLIN: SIGNIFICANT CHANGE UP
-  CIPROFLOXACIN: SIGNIFICANT CHANGE UP
-  LEVOFLOXACIN: SIGNIFICANT CHANGE UP
-  NITROFURANTOIN: SIGNIFICANT CHANGE UP
-  STAPHYLOCOCCUS EPIDERMIDIS, METHICILLIN RESISTANT: SIGNIFICANT CHANGE UP
-  TETRACYCLINE: SIGNIFICANT CHANGE UP
-  VANCOMYCIN: SIGNIFICANT CHANGE UP
A1C WITH ESTIMATED AVERAGE GLUCOSE RESULT: 6 % — HIGH (ref 4–5.6)
ALBUMIN SERPL ELPH-MCNC: 3.1 G/DL — LOW (ref 3.3–5)
ALBUMIN SERPL ELPH-MCNC: 3.3 G/DL — SIGNIFICANT CHANGE UP (ref 3.3–5)
ALBUMIN SERPL ELPH-MCNC: 3.3 G/DL — SIGNIFICANT CHANGE UP (ref 3.3–5)
ALBUMIN SERPL ELPH-MCNC: 3.4 G/DL — SIGNIFICANT CHANGE UP (ref 3.3–5)
ALBUMIN SERPL ELPH-MCNC: 3.5 G/DL — SIGNIFICANT CHANGE UP (ref 3.3–5)
ALBUMIN SERPL ELPH-MCNC: 3.6 G/DL — SIGNIFICANT CHANGE UP (ref 3.3–5)
ALBUMIN SERPL ELPH-MCNC: 3.6 G/DL — SIGNIFICANT CHANGE UP (ref 3.3–5)
ALBUMIN SERPL ELPH-MCNC: 3.7 G/DL — SIGNIFICANT CHANGE UP (ref 3.3–5)
ALBUMIN SERPL ELPH-MCNC: 3.8 G/DL — SIGNIFICANT CHANGE UP (ref 3.3–5)
ALBUMIN SERPL ELPH-MCNC: 3.8 G/DL — SIGNIFICANT CHANGE UP (ref 3.3–5)
ALBUMIN SERPL ELPH-MCNC: 4 G/DL
ALBUMIN SERPL ELPH-MCNC: 4.1 G/DL
ALBUMIN SERPL ELPH-MCNC: 4.2 G/DL — SIGNIFICANT CHANGE UP (ref 3.3–5)
ALP BLD-CCNC: 168 U/L
ALP BLD-CCNC: 169 U/L
ALP SERPL-CCNC: 145 U/L — HIGH (ref 40–120)
ALP SERPL-CCNC: 146 U/L — HIGH (ref 40–120)
ALP SERPL-CCNC: 147 U/L — HIGH (ref 40–120)
ALP SERPL-CCNC: 150 U/L — HIGH (ref 40–120)
ALP SERPL-CCNC: 152 U/L — HIGH (ref 40–120)
ALP SERPL-CCNC: 153 U/L — HIGH (ref 40–120)
ALP SERPL-CCNC: 154 U/L — HIGH (ref 40–120)
ALP SERPL-CCNC: 159 U/L — HIGH (ref 40–120)
ALP SERPL-CCNC: 160 U/L — HIGH (ref 40–120)
ALP SERPL-CCNC: 161 U/L — HIGH (ref 40–120)
ALP SERPL-CCNC: 162 U/L — HIGH (ref 40–120)
ALP SERPL-CCNC: 165 U/L — HIGH (ref 40–120)
ALP SERPL-CCNC: 166 U/L — HIGH (ref 40–120)
ALP SERPL-CCNC: 167 U/L — HIGH (ref 40–120)
ALP SERPL-CCNC: 168 U/L — HIGH (ref 40–120)
ALP SERPL-CCNC: 168 U/L — HIGH (ref 40–120)
ALP SERPL-CCNC: 174 U/L — HIGH (ref 40–120)
ALP SERPL-CCNC: 176 U/L — HIGH (ref 40–120)
ALP SERPL-CCNC: 187 U/L — HIGH (ref 40–120)
ALP SERPL-CCNC: 193 U/L — HIGH (ref 40–120)
ALT FLD-CCNC: 111 U/L — HIGH (ref 10–45)
ALT FLD-CCNC: 119 U/L — HIGH (ref 10–45)
ALT FLD-CCNC: 130 U/L — HIGH (ref 10–45)
ALT FLD-CCNC: 134 U/L — HIGH (ref 10–45)
ALT FLD-CCNC: 139 U/L — HIGH (ref 10–45)
ALT FLD-CCNC: 144 U/L — HIGH (ref 10–45)
ALT FLD-CCNC: 149 U/L — HIGH (ref 10–45)
ALT FLD-CCNC: 153 U/L — HIGH (ref 10–45)
ALT FLD-CCNC: 165 U/L — HIGH (ref 10–45)
ALT FLD-CCNC: 193 U/L — HIGH (ref 10–45)
ALT FLD-CCNC: 202 U/L — HIGH (ref 10–45)
ALT FLD-CCNC: 228 U/L — HIGH (ref 10–45)
ALT FLD-CCNC: 23 U/L — SIGNIFICANT CHANGE UP (ref 10–45)
ALT FLD-CCNC: 235 U/L — HIGH (ref 10–45)
ALT FLD-CCNC: 238 U/L — HIGH (ref 10–45)
ALT FLD-CCNC: 257 U/L — HIGH (ref 10–45)
ALT FLD-CCNC: 283 U/L — HIGH (ref 10–45)
ALT FLD-CCNC: 331 U/L — HIGH (ref 10–45)
ALT FLD-CCNC: 354 U/L — HIGH (ref 10–45)
ALT FLD-CCNC: 378 U/L — HIGH (ref 10–45)
ALT SERPL-CCNC: 15 U/L
ALT SERPL-CCNC: 22 U/L
ANION GAP SERPL CALC-SCNC: 11 MMOL/L — SIGNIFICANT CHANGE UP (ref 5–17)
ANION GAP SERPL CALC-SCNC: 12 MMOL/L — SIGNIFICANT CHANGE UP (ref 5–17)
ANION GAP SERPL CALC-SCNC: 13 MMOL/L
ANION GAP SERPL CALC-SCNC: 13 MMOL/L
ANION GAP SERPL CALC-SCNC: 13 MMOL/L — SIGNIFICANT CHANGE UP (ref 5–17)
ANION GAP SERPL CALC-SCNC: 14 MMOL/L — SIGNIFICANT CHANGE UP (ref 5–17)
ANION GAP SERPL CALC-SCNC: 15 MMOL/L
ANION GAP SERPL CALC-SCNC: 15 MMOL/L — SIGNIFICANT CHANGE UP (ref 5–17)
ANION GAP SERPL CALC-SCNC: 15 MMOL/L — SIGNIFICANT CHANGE UP (ref 5–17)
ANION GAP SERPL CALC-SCNC: 16 MMOL/L
ANION GAP SERPL CALC-SCNC: 16 MMOL/L — SIGNIFICANT CHANGE UP (ref 5–17)
ANION GAP SERPL CALC-SCNC: 17 MMOL/L — SIGNIFICANT CHANGE UP (ref 5–17)
ANION GAP SERPL CALC-SCNC: 9 MMOL/L — SIGNIFICANT CHANGE UP (ref 5–17)
APPEARANCE UR: ABNORMAL
APPEARANCE UR: CLEAR — SIGNIFICANT CHANGE UP
APPEARANCE: ABNORMAL
APTT BLD: 28 SEC — SIGNIFICANT CHANGE UP (ref 27.5–35.5)
APTT BLD: 50.9 SEC — HIGH (ref 27.5–35.5)
APTT BLD: >200 SEC — CRITICAL HIGH (ref 27.5–35.5)
APTT BLD: >200 SEC — CRITICAL HIGH (ref 27.5–35.5)
AST SERPL-CCNC: 104 U/L — HIGH (ref 10–40)
AST SERPL-CCNC: 124 U/L — HIGH (ref 10–40)
AST SERPL-CCNC: 169 U/L — HIGH (ref 10–40)
AST SERPL-CCNC: 206 U/L — HIGH (ref 10–40)
AST SERPL-CCNC: 22 U/L
AST SERPL-CCNC: 222 U/L — HIGH (ref 10–40)
AST SERPL-CCNC: 24 U/L
AST SERPL-CCNC: 32 U/L — SIGNIFICANT CHANGE UP (ref 10–40)
AST SERPL-CCNC: 38 U/L — SIGNIFICANT CHANGE UP (ref 10–40)
AST SERPL-CCNC: 43 U/L — HIGH (ref 10–40)
AST SERPL-CCNC: 43 U/L — HIGH (ref 10–40)
AST SERPL-CCNC: 48 U/L — HIGH (ref 10–40)
AST SERPL-CCNC: 51 U/L — HIGH (ref 10–40)
AST SERPL-CCNC: 55 U/L — HIGH (ref 10–40)
AST SERPL-CCNC: 63 U/L — HIGH (ref 10–40)
AST SERPL-CCNC: 72 U/L — HIGH (ref 10–40)
AST SERPL-CCNC: 76 U/L — HIGH (ref 10–40)
AST SERPL-CCNC: 85 U/L — HIGH (ref 10–40)
AST SERPL-CCNC: 93 U/L — HIGH (ref 10–40)
AST SERPL-CCNC: 96 U/L — HIGH (ref 10–40)
AST SERPL-CCNC: 96 U/L — HIGH (ref 10–40)
AST SERPL-CCNC: 99 U/L — HIGH (ref 10–40)
BACTERIA # UR AUTO: NEGATIVE — SIGNIFICANT CHANGE UP
BACTERIA STL CULT: NORMAL
BACTERIA UR CULT: ABNORMAL
BACTERIA: ABNORMAL
BASE EXCESS BLDV CALC-SCNC: -0.2 MMOL/L — SIGNIFICANT CHANGE UP (ref -2–2)
BASOPHILS # BLD AUTO: 0.03 K/UL — SIGNIFICANT CHANGE UP (ref 0–0.2)
BASOPHILS # BLD AUTO: 0.04 K/UL — SIGNIFICANT CHANGE UP (ref 0–0.2)
BASOPHILS # BLD AUTO: 0.08 K/UL
BASOPHILS # BLD AUTO: 0.1 K/UL — SIGNIFICANT CHANGE UP (ref 0–0.2)
BASOPHILS # BLD AUTO: 0.14 K/UL — SIGNIFICANT CHANGE UP (ref 0–0.2)
BASOPHILS NFR BLD AUTO: 0.2 % — SIGNIFICANT CHANGE UP (ref 0–2)
BASOPHILS NFR BLD AUTO: 0.3 % — SIGNIFICANT CHANGE UP (ref 0–2)
BASOPHILS NFR BLD AUTO: 0.8 %
BASOPHILS NFR BLD AUTO: 0.9 % — SIGNIFICANT CHANGE UP (ref 0–2)
BASOPHILS NFR BLD AUTO: 1.1 % — SIGNIFICANT CHANGE UP (ref 0–2)
BILIRUB DIRECT SERPL-MCNC: 0.1 MG/DL
BILIRUB INDIRECT SERPL-MCNC: 0.2 MG/DL
BILIRUB SERPL-MCNC: 0.2 MG/DL — SIGNIFICANT CHANGE UP (ref 0.2–1.2)
BILIRUB SERPL-MCNC: 0.3 MG/DL
BILIRUB SERPL-MCNC: 0.3 MG/DL
BILIRUB SERPL-MCNC: 0.3 MG/DL — SIGNIFICANT CHANGE UP (ref 0.2–1.2)
BILIRUB SERPL-MCNC: 0.4 MG/DL — SIGNIFICANT CHANGE UP (ref 0.2–1.2)
BILIRUB SERPL-MCNC: 0.6 MG/DL — SIGNIFICANT CHANGE UP (ref 0.2–1.2)
BILIRUB UR QL STRIP: NEGATIVE
BILIRUB UR-MCNC: NEGATIVE — SIGNIFICANT CHANGE UP
BILIRUBIN URINE: NEGATIVE
BLD GP AB SCN SERPL QL: NEGATIVE — SIGNIFICANT CHANGE UP
BLD GP AB SCN SERPL QL: NEGATIVE — SIGNIFICANT CHANGE UP
BLOOD URINE: NORMAL
BUN SERPL-MCNC: 26 MG/DL
BUN SERPL-MCNC: 29 MG/DL — HIGH (ref 7–23)
BUN SERPL-MCNC: 30 MG/DL — HIGH (ref 7–23)
BUN SERPL-MCNC: 30 MG/DL — HIGH (ref 7–23)
BUN SERPL-MCNC: 34 MG/DL — HIGH (ref 7–23)
BUN SERPL-MCNC: 36 MG/DL
BUN SERPL-MCNC: 36 MG/DL — HIGH (ref 7–23)
BUN SERPL-MCNC: 36 MG/DL — HIGH (ref 7–23)
BUN SERPL-MCNC: 39 MG/DL — HIGH (ref 7–23)
BUN SERPL-MCNC: 40 MG/DL — HIGH (ref 7–23)
BUN SERPL-MCNC: 42 MG/DL — HIGH (ref 7–23)
BUN SERPL-MCNC: 42 MG/DL — HIGH (ref 7–23)
BUN SERPL-MCNC: 44 MG/DL
BUN SERPL-MCNC: 44 MG/DL — HIGH (ref 7–23)
BUN SERPL-MCNC: 46 MG/DL — HIGH (ref 7–23)
BUN SERPL-MCNC: 46 MG/DL — HIGH (ref 7–23)
BUN SERPL-MCNC: 48 MG/DL
BUN SERPL-MCNC: 48 MG/DL — HIGH (ref 7–23)
BUN SERPL-MCNC: 48 MG/DL — HIGH (ref 7–23)
BUN SERPL-MCNC: 49 MG/DL — HIGH (ref 7–23)
BUN SERPL-MCNC: 49 MG/DL — HIGH (ref 7–23)
BUN SERPL-MCNC: 50 MG/DL — HIGH (ref 7–23)
BUN SERPL-MCNC: 53 MG/DL — HIGH (ref 7–23)
BUN SERPL-MCNC: 56 MG/DL — HIGH (ref 7–23)
BUN SERPL-MCNC: 57 MG/DL — HIGH (ref 7–23)
C DIFF TOX GENS STL QL NAA+PROBE: NORMAL
CA-I SERPL-SCNC: 1.1 MMOL/L — LOW (ref 1.12–1.3)
CALCIUM SERPL-MCNC: 8 MG/DL — LOW (ref 8.4–10.5)
CALCIUM SERPL-MCNC: 8.1 MG/DL — LOW (ref 8.4–10.5)
CALCIUM SERPL-MCNC: 8.4 MG/DL — SIGNIFICANT CHANGE UP (ref 8.4–10.5)
CALCIUM SERPL-MCNC: 8.6 MG/DL — SIGNIFICANT CHANGE UP (ref 8.4–10.5)
CALCIUM SERPL-MCNC: 8.8 MG/DL — SIGNIFICANT CHANGE UP (ref 8.4–10.5)
CALCIUM SERPL-MCNC: 9.1 MG/DL — SIGNIFICANT CHANGE UP (ref 8.4–10.5)
CALCIUM SERPL-MCNC: 9.1 MG/DL — SIGNIFICANT CHANGE UP (ref 8.4–10.5)
CALCIUM SERPL-MCNC: 9.2 MG/DL — SIGNIFICANT CHANGE UP (ref 8.4–10.5)
CALCIUM SERPL-MCNC: 9.3 MG/DL
CALCIUM SERPL-MCNC: 9.3 MG/DL — SIGNIFICANT CHANGE UP (ref 8.4–10.5)
CALCIUM SERPL-MCNC: 9.3 MG/DL — SIGNIFICANT CHANGE UP (ref 8.4–10.5)
CALCIUM SERPL-MCNC: 9.4 MG/DL — SIGNIFICANT CHANGE UP (ref 8.4–10.5)
CALCIUM SERPL-MCNC: 9.4 MG/DL — SIGNIFICANT CHANGE UP (ref 8.4–10.5)
CALCIUM SERPL-MCNC: 9.5 MG/DL
CALCIUM SERPL-MCNC: 9.5 MG/DL — SIGNIFICANT CHANGE UP (ref 8.4–10.5)
CALCIUM SERPL-MCNC: 9.5 MG/DL — SIGNIFICANT CHANGE UP (ref 8.4–10.5)
CALCIUM SERPL-MCNC: 9.6 MG/DL
CALCIUM SERPL-MCNC: 9.6 MG/DL — SIGNIFICANT CHANGE UP (ref 8.4–10.5)
CALCIUM SERPL-MCNC: 9.7 MG/DL
CDIFF BY PCR: NOT DETECTED
CHLORIDE BLDV-SCNC: 109 MMOL/L — HIGH (ref 96–108)
CHLORIDE SERPL-SCNC: 100 MMOL/L
CHLORIDE SERPL-SCNC: 100 MMOL/L
CHLORIDE SERPL-SCNC: 101 MMOL/L — SIGNIFICANT CHANGE UP (ref 96–108)
CHLORIDE SERPL-SCNC: 102 MMOL/L — SIGNIFICANT CHANGE UP (ref 96–108)
CHLORIDE SERPL-SCNC: 103 MMOL/L — SIGNIFICANT CHANGE UP (ref 96–108)
CHLORIDE SERPL-SCNC: 104 MMOL/L — SIGNIFICANT CHANGE UP (ref 96–108)
CHLORIDE SERPL-SCNC: 105 MMOL/L — SIGNIFICANT CHANGE UP (ref 96–108)
CHLORIDE SERPL-SCNC: 106 MMOL/L — SIGNIFICANT CHANGE UP (ref 96–108)
CHLORIDE SERPL-SCNC: 107 MMOL/L — SIGNIFICANT CHANGE UP (ref 96–108)
CHLORIDE SERPL-SCNC: 107 MMOL/L — SIGNIFICANT CHANGE UP (ref 96–108)
CHLORIDE SERPL-SCNC: 108 MMOL/L — SIGNIFICANT CHANGE UP (ref 96–108)
CHLORIDE SERPL-SCNC: 108 MMOL/L — SIGNIFICANT CHANGE UP (ref 96–108)
CHLORIDE SERPL-SCNC: 110 MMOL/L — HIGH (ref 96–108)
CHLORIDE SERPL-SCNC: 96 MMOL/L
CHLORIDE SERPL-SCNC: 96 MMOL/L
CHLORIDE SERPL-SCNC: 98 MMOL/L — SIGNIFICANT CHANGE UP (ref 96–108)
CHLORIDE SERPL-SCNC: 99 MMOL/L — SIGNIFICANT CHANGE UP (ref 96–108)
CHOLEST SERPL-MCNC: 141 MG/DL — SIGNIFICANT CHANGE UP
CHOLEST SERPL-MCNC: 195 MG/DL
CLARITY UR: NORMAL
CMV DNA CSF QL NAA+PROBE: SIGNIFICANT CHANGE UP
CO2 BLDV-SCNC: 26 MMOL/L — SIGNIFICANT CHANGE UP (ref 22–30)
CO2 SERPL-SCNC: 16 MMOL/L — LOW (ref 22–31)
CO2 SERPL-SCNC: 16 MMOL/L — LOW (ref 22–31)
CO2 SERPL-SCNC: 18 MMOL/L — LOW (ref 22–31)
CO2 SERPL-SCNC: 19 MMOL/L — LOW (ref 22–31)
CO2 SERPL-SCNC: 20 MMOL/L — LOW (ref 22–31)
CO2 SERPL-SCNC: 21 MMOL/L — LOW (ref 22–31)
CO2 SERPL-SCNC: 24 MMOL/L — SIGNIFICANT CHANGE UP (ref 22–31)
CO2 SERPL-SCNC: 25 MMOL/L
CO2 SERPL-SCNC: 26 MMOL/L — SIGNIFICANT CHANGE UP (ref 22–31)
CO2 SERPL-SCNC: 28 MMOL/L
CO2 SERPL-SCNC: 28 MMOL/L — SIGNIFICANT CHANGE UP (ref 22–31)
CO2 SERPL-SCNC: 29 MMOL/L
CO2 SERPL-SCNC: 30 MMOL/L
COLLECTION METHOD: NORMAL
COLOR SPEC: SIGNIFICANT CHANGE UP
COLOR SPEC: YELLOW — SIGNIFICANT CHANGE UP
COLOR: NORMAL
COVID-19 SPIKE DOMAIN AB INTERP: POSITIVE
COVID-19 SPIKE DOMAIN ANTIBODY RESULT: 1.87 U/ML — HIGH
CREAT ?TM UR-MCNC: 125 MG/DL — SIGNIFICANT CHANGE UP
CREAT ?TM UR-MCNC: 95 MG/DL — SIGNIFICANT CHANGE UP
CREAT SERPL-MCNC: 1.06 MG/DL — SIGNIFICANT CHANGE UP (ref 0.5–1.3)
CREAT SERPL-MCNC: 1.09 MG/DL — SIGNIFICANT CHANGE UP (ref 0.5–1.3)
CREAT SERPL-MCNC: 1.09 MG/DL — SIGNIFICANT CHANGE UP (ref 0.5–1.3)
CREAT SERPL-MCNC: 1.23 MG/DL — SIGNIFICANT CHANGE UP (ref 0.5–1.3)
CREAT SERPL-MCNC: 1.32 MG/DL — HIGH (ref 0.5–1.3)
CREAT SERPL-MCNC: 1.33 MG/DL — HIGH (ref 0.5–1.3)
CREAT SERPL-MCNC: 1.38 MG/DL — HIGH (ref 0.5–1.3)
CREAT SERPL-MCNC: 1.44 MG/DL — HIGH (ref 0.5–1.3)
CREAT SERPL-MCNC: 1.49 MG/DL — HIGH (ref 0.5–1.3)
CREAT SERPL-MCNC: 1.52 MG/DL
CREAT SERPL-MCNC: 1.52 MG/DL — HIGH (ref 0.5–1.3)
CREAT SERPL-MCNC: 1.54 MG/DL — HIGH (ref 0.5–1.3)
CREAT SERPL-MCNC: 1.55 MG/DL — HIGH (ref 0.5–1.3)
CREAT SERPL-MCNC: 1.6 MG/DL — HIGH (ref 0.5–1.3)
CREAT SERPL-MCNC: 1.68 MG/DL
CREAT SERPL-MCNC: 1.7 MG/DL — HIGH (ref 0.5–1.3)
CREAT SERPL-MCNC: 1.74 MG/DL — HIGH (ref 0.5–1.3)
CREAT SERPL-MCNC: 1.77 MG/DL
CREAT SERPL-MCNC: 1.83 MG/DL — HIGH (ref 0.5–1.3)
CREAT SERPL-MCNC: 1.88 MG/DL — HIGH (ref 0.5–1.3)
CREAT SERPL-MCNC: 1.94 MG/DL
CREAT SERPL-MCNC: 1.95 MG/DL — HIGH (ref 0.5–1.3)
CREAT SERPL-MCNC: 1.97 MG/DL — HIGH (ref 0.5–1.3)
CREAT SERPL-MCNC: 2 MG/DL — HIGH (ref 0.5–1.3)
CREAT SERPL-MCNC: 2.02 MG/DL — HIGH (ref 0.5–1.3)
CREAT SERPL-MCNC: 2.04 MG/DL — HIGH (ref 0.5–1.3)
CREAT SERPL-MCNC: 2.12 MG/DL — HIGH (ref 0.5–1.3)
CULTURE RESULTS: SIGNIFICANT CHANGE UP
DEPRECATED O AND P PREP STL: NORMAL
DIFF PNL FLD: ABNORMAL
DIFF PNL FLD: NEGATIVE — SIGNIFICANT CHANGE UP
E HISTOLYT AB SER-ACNC: NEGATIVE — SIGNIFICANT CHANGE UP
E HISTOLYT AG STL IA-ACNC: SIGNIFICANT CHANGE UP
EBV EA AB SER IA-ACNC: 35.1 U/ML — HIGH
EBV EA AB TITR SER IF: POSITIVE
EBV EA IGG SER-ACNC: POSITIVE
EBV NA IGG SER IA-ACNC: >600 U/ML — HIGH
EBV PATRN SPEC IB-IMP: SIGNIFICANT CHANGE UP
EBV VCA IGG AVIDITY SER QL IA: POSITIVE
EBV VCA IGM SER IA-ACNC: 152 U/ML — HIGH
EBV VCA IGM SER IA-ACNC: <10 U/ML — SIGNIFICANT CHANGE UP
EBV VCA IGM TITR FLD: NEGATIVE — SIGNIFICANT CHANGE UP
ECHINOCOCCUS AB TITR SER: NEGATIVE — SIGNIFICANT CHANGE UP
EOSINOPHIL # BLD AUTO: 0 K/UL — SIGNIFICANT CHANGE UP (ref 0–0.5)
EOSINOPHIL # BLD AUTO: 0.02 K/UL — SIGNIFICANT CHANGE UP (ref 0–0.5)
EOSINOPHIL # BLD AUTO: 0.04 K/UL — SIGNIFICANT CHANGE UP (ref 0–0.5)
EOSINOPHIL # BLD AUTO: 0.19 K/UL
EOSINOPHIL # BLD AUTO: 0.29 K/UL — SIGNIFICANT CHANGE UP (ref 0–0.5)
EOSINOPHIL NFR BLD AUTO: 0 % — SIGNIFICANT CHANGE UP (ref 0–6)
EOSINOPHIL NFR BLD AUTO: 0.1 % — SIGNIFICANT CHANGE UP (ref 0–6)
EOSINOPHIL NFR BLD AUTO: 0.3 % — SIGNIFICANT CHANGE UP (ref 0–6)
EOSINOPHIL NFR BLD AUTO: 2 %
EOSINOPHIL NFR BLD AUTO: 3.2 % — SIGNIFICANT CHANGE UP (ref 0–6)
EPI CELLS # UR: 1 /HPF — SIGNIFICANT CHANGE UP
EPI CELLS # UR: 21 /HPF — HIGH
EPI CELLS # UR: 4 /HPF — SIGNIFICANT CHANGE UP
EPI CELLS # UR: 4 /HPF — SIGNIFICANT CHANGE UP
ESTIMATED AVERAGE GLUCOSE: 126 MG/DL
ESTIMATED AVERAGE GLUCOSE: 126 MG/DL — HIGH (ref 68–114)
FOLATE SERPL-MCNC: 17.9 NG/ML
G LAMBLIA AG STL QL: NORMAL
GAMMA INTERFERON BACKGROUND BLD IA-ACNC: 0.02 IU/ML — SIGNIFICANT CHANGE UP
GAS PNL BLDA: SIGNIFICANT CHANGE UP
GAS PNL BLDV: 142 MMOL/L — SIGNIFICANT CHANGE UP (ref 135–145)
GAS PNL BLDV: SIGNIFICANT CHANGE UP
GLUCOSE BLDV-MCNC: 130 MG/DL — HIGH (ref 70–99)
GLUCOSE QUALITATIVE U: NEGATIVE
GLUCOSE SERPL-MCNC: 101 MG/DL
GLUCOSE SERPL-MCNC: 101 MG/DL — HIGH (ref 70–99)
GLUCOSE SERPL-MCNC: 101 MG/DL — HIGH (ref 70–99)
GLUCOSE SERPL-MCNC: 102 MG/DL — HIGH (ref 70–99)
GLUCOSE SERPL-MCNC: 103 MG/DL — HIGH (ref 70–99)
GLUCOSE SERPL-MCNC: 105 MG/DL — HIGH (ref 70–99)
GLUCOSE SERPL-MCNC: 106 MG/DL — HIGH (ref 70–99)
GLUCOSE SERPL-MCNC: 108 MG/DL — HIGH (ref 70–99)
GLUCOSE SERPL-MCNC: 109 MG/DL — HIGH (ref 70–99)
GLUCOSE SERPL-MCNC: 111 MG/DL — HIGH (ref 70–99)
GLUCOSE SERPL-MCNC: 112 MG/DL — HIGH (ref 70–99)
GLUCOSE SERPL-MCNC: 116 MG/DL — HIGH (ref 70–99)
GLUCOSE SERPL-MCNC: 120 MG/DL — HIGH (ref 70–99)
GLUCOSE SERPL-MCNC: 121 MG/DL — HIGH (ref 70–99)
GLUCOSE SERPL-MCNC: 123 MG/DL — HIGH (ref 70–99)
GLUCOSE SERPL-MCNC: 124 MG/DL
GLUCOSE SERPL-MCNC: 128 MG/DL
GLUCOSE SERPL-MCNC: 128 MG/DL — HIGH (ref 70–99)
GLUCOSE SERPL-MCNC: 153 MG/DL — HIGH (ref 70–99)
GLUCOSE SERPL-MCNC: 185 MG/DL — HIGH (ref 70–99)
GLUCOSE SERPL-MCNC: 83 MG/DL
GLUCOSE SERPL-MCNC: 86 MG/DL — SIGNIFICANT CHANGE UP (ref 70–99)
GLUCOSE SERPL-MCNC: 97 MG/DL — SIGNIFICANT CHANGE UP (ref 70–99)
GLUCOSE UR QL: NEGATIVE — SIGNIFICANT CHANGE UP
GLUCOSE UR-MCNC: NEGATIVE
GRAM STN FLD: SIGNIFICANT CHANGE UP
HAV IGM SER-ACNC: SIGNIFICANT CHANGE UP
HBA1C MFR BLD HPLC: 6 %
HBV SURFACE AB SER-ACNC: SIGNIFICANT CHANGE UP
HBV SURFACE AG SER-ACNC: SIGNIFICANT CHANGE UP
HCG UR QL: 0.2 EU/DL
HCO3 BLDV-SCNC: 25 MMOL/L — SIGNIFICANT CHANGE UP (ref 21–29)
HCT VFR BLD CALC: 26.5 % — LOW (ref 34.5–45)
HCT VFR BLD CALC: 27.8 % — LOW (ref 34.5–45)
HCT VFR BLD CALC: 27.8 % — LOW (ref 34.5–45)
HCT VFR BLD CALC: 28.1 % — LOW (ref 34.5–45)
HCT VFR BLD CALC: 28.1 % — LOW (ref 34.5–45)
HCT VFR BLD CALC: 28.3 % — LOW (ref 34.5–45)
HCT VFR BLD CALC: 28.5 % — LOW (ref 34.5–45)
HCT VFR BLD CALC: 28.7 % — LOW (ref 34.5–45)
HCT VFR BLD CALC: 28.7 % — LOW (ref 34.5–45)
HCT VFR BLD CALC: 28.8 % — LOW (ref 34.5–45)
HCT VFR BLD CALC: 28.9 % — LOW (ref 34.5–45)
HCT VFR BLD CALC: 29.4 % — LOW (ref 34.5–45)
HCT VFR BLD CALC: 29.5 % — LOW (ref 34.5–45)
HCT VFR BLD CALC: 29.6 % — LOW (ref 34.5–45)
HCT VFR BLD CALC: 30 % — LOW (ref 34.5–45)
HCT VFR BLD CALC: 30.9 % — LOW (ref 34.5–45)
HCT VFR BLD CALC: 31.5 % — LOW (ref 34.5–45)
HCT VFR BLD CALC: 31.7 % — LOW (ref 34.5–45)
HCT VFR BLD CALC: 35.7 % — SIGNIFICANT CHANGE UP (ref 34.5–45)
HCT VFR BLD CALC: 38.2 % — SIGNIFICANT CHANGE UP (ref 34.5–45)
HCT VFR BLD CALC: 38.5 %
HCT VFR BLDA CALC: 31 % — LOW (ref 39–50)
HCV RNA SERPL NAA DL=5-ACNC: SIGNIFICANT CHANGE UP IU/ML
HCV RNA SPEC NAA+PROBE-LOG IU: SIGNIFICANT CHANGE UP LOG10IU/ML
HDLC SERPL-MCNC: 48 MG/DL
HDLC SERPL-MCNC: 55 MG/DL — SIGNIFICANT CHANGE UP
HGB BLD CALC-MCNC: 10.1 G/DL — LOW (ref 11.5–15.5)
HGB BLD-MCNC: 10.8 G/DL — LOW (ref 11.5–15.5)
HGB BLD-MCNC: 11.5 G/DL
HGB BLD-MCNC: 11.6 G/DL — SIGNIFICANT CHANGE UP (ref 11.5–15.5)
HGB BLD-MCNC: 8 G/DL — LOW (ref 11.5–15.5)
HGB BLD-MCNC: 8.3 G/DL — LOW (ref 11.5–15.5)
HGB BLD-MCNC: 8.4 G/DL — LOW (ref 11.5–15.5)
HGB BLD-MCNC: 8.5 G/DL — LOW (ref 11.5–15.5)
HGB BLD-MCNC: 8.5 G/DL — LOW (ref 11.5–15.5)
HGB BLD-MCNC: 8.6 G/DL — LOW (ref 11.5–15.5)
HGB BLD-MCNC: 8.7 G/DL — LOW (ref 11.5–15.5)
HGB BLD-MCNC: 8.7 G/DL — LOW (ref 11.5–15.5)
HGB BLD-MCNC: 8.8 G/DL — LOW (ref 11.5–15.5)
HGB BLD-MCNC: 9.3 G/DL — LOW (ref 11.5–15.5)
HGB BLD-MCNC: 9.3 G/DL — LOW (ref 11.5–15.5)
HGB BLD-MCNC: 9.6 G/DL — LOW (ref 11.5–15.5)
HGB UR QL STRIP.AUTO: NORMAL
HIV 1+2 AB+HIV1 P24 AG SERPL QL IA: SIGNIFICANT CHANGE UP
HYALINE CASTS # UR AUTO: 0 /LPF — SIGNIFICANT CHANGE UP (ref 0–2)
HYALINE CASTS # UR AUTO: 1 /LPF — SIGNIFICANT CHANGE UP (ref 0–2)
HYALINE CASTS # UR AUTO: 5 /LPF — HIGH (ref 0–2)
HYALINE CASTS # UR AUTO: 8 /LPF — HIGH (ref 0–2)
HYALINE CASTS: 0 /LPF
IMM GRANULOCYTES NFR BLD AUTO: 0.6 %
IMM GRANULOCYTES NFR BLD AUTO: 1.1 % — SIGNIFICANT CHANGE UP (ref 0–1.5)
IMM GRANULOCYTES NFR BLD AUTO: 1.2 % — SIGNIFICANT CHANGE UP (ref 0–1.5)
IMM GRANULOCYTES NFR BLD AUTO: 1.5 % — SIGNIFICANT CHANGE UP (ref 0–1.5)
INR BLD: 1.51 RATIO — HIGH (ref 0.88–1.16)
INR BLD: 1.69 RATIO — HIGH (ref 0.88–1.16)
INR BLD: 1.81 RATIO — HIGH (ref 0.88–1.16)
INR BLD: 1.94 RATIO — HIGH (ref 0.88–1.16)
KETONES UR-MCNC: NEGATIVE
KETONES UR-MCNC: NEGATIVE — SIGNIFICANT CHANGE UP
KETONES URINE: NEGATIVE
LACTATE BLDV-MCNC: 3 MMOL/L — HIGH (ref 0.7–2)
LACTATE BLDV-MCNC: 4 MMOL/L — CRITICAL HIGH (ref 0.7–2)
LACTATE SERPL-SCNC: 3.5 MMOL/L — HIGH (ref 0.7–2)
LDLC SERPL CALC-MCNC: 80 MG/DL
LEGIONELLA AG UR QL: NEGATIVE — SIGNIFICANT CHANGE UP
LEUKOCYTE ESTERASE UR QL STRIP: NORMAL
LEUKOCYTE ESTERASE UR-ACNC: ABNORMAL
LEUKOCYTE ESTERASE URINE: ABNORMAL
LIPID PNL WITH DIRECT LDL SERPL: 75 MG/DL — SIGNIFICANT CHANGE UP
LYMPHOCYTES # BLD AUTO: 1.28 K/UL
LYMPHOCYTES # BLD AUTO: 1.52 K/UL — SIGNIFICANT CHANGE UP (ref 1–3.3)
LYMPHOCYTES # BLD AUTO: 1.65 K/UL — SIGNIFICANT CHANGE UP (ref 1–3.3)
LYMPHOCYTES # BLD AUTO: 1.71 K/UL — SIGNIFICANT CHANGE UP (ref 1–3.3)
LYMPHOCYTES # BLD AUTO: 1.8 K/UL — SIGNIFICANT CHANGE UP (ref 1–3.3)
LYMPHOCYTES # BLD AUTO: 10.5 % — LOW (ref 13–44)
LYMPHOCYTES # BLD AUTO: 10.6 % — LOW (ref 13–44)
LYMPHOCYTES # BLD AUTO: 13.5 % — SIGNIFICANT CHANGE UP (ref 13–44)
LYMPHOCYTES # BLD AUTO: 19.7 % — SIGNIFICANT CHANGE UP (ref 13–44)
LYMPHOCYTES NFR BLD AUTO: 13.5 %
M TB IFN-G BLD-IMP: NEGATIVE — SIGNIFICANT CHANGE UP
M TB IFN-G CD4+ BCKGRND COR BLD-ACNC: -0.01 IU/ML — SIGNIFICANT CHANGE UP
M TB IFN-G CD4+CD8+ BCKGRND COR BLD-ACNC: -0.01 IU/ML — SIGNIFICANT CHANGE UP
MAGNESIUM SERPL-MCNC: 1.7 MG/DL
MAGNESIUM SERPL-MCNC: 1.7 MG/DL — SIGNIFICANT CHANGE UP (ref 1.6–2.6)
MAGNESIUM SERPL-MCNC: 1.7 MG/DL — SIGNIFICANT CHANGE UP (ref 1.6–2.6)
MAGNESIUM SERPL-MCNC: 1.9 MG/DL — SIGNIFICANT CHANGE UP (ref 1.6–2.6)
MAGNESIUM SERPL-MCNC: 1.9 MG/DL — SIGNIFICANT CHANGE UP (ref 1.6–2.6)
MAGNESIUM SERPL-MCNC: 2 MG/DL — SIGNIFICANT CHANGE UP (ref 1.6–2.6)
MAGNESIUM SERPL-MCNC: 2.1 MG/DL — SIGNIFICANT CHANGE UP (ref 1.6–2.6)
MAGNESIUM SERPL-MCNC: 2.2 MG/DL — SIGNIFICANT CHANGE UP (ref 1.6–2.6)
MAGNESIUM SERPL-MCNC: 2.3 MG/DL — SIGNIFICANT CHANGE UP (ref 1.6–2.6)
MAGNESIUM SERPL-MCNC: 2.3 MG/DL — SIGNIFICANT CHANGE UP (ref 1.6–2.6)
MAGNESIUM SERPL-MCNC: 2.6 MG/DL — SIGNIFICANT CHANGE UP (ref 1.6–2.6)
MAN DIFF?: NORMAL
MCHC RBC-ENTMCNC: 23.8 PG — LOW (ref 27–34)
MCHC RBC-ENTMCNC: 24.1 PG — LOW (ref 27–34)
MCHC RBC-ENTMCNC: 24.1 PG — LOW (ref 27–34)
MCHC RBC-ENTMCNC: 24.2 PG — LOW (ref 27–34)
MCHC RBC-ENTMCNC: 24.3 PG — LOW (ref 27–34)
MCHC RBC-ENTMCNC: 24.4 PG — LOW (ref 27–34)
MCHC RBC-ENTMCNC: 24.4 PG — LOW (ref 27–34)
MCHC RBC-ENTMCNC: 24.5 PG — LOW (ref 27–34)
MCHC RBC-ENTMCNC: 24.6 PG — LOW (ref 27–34)
MCHC RBC-ENTMCNC: 24.8 PG — LOW (ref 27–34)
MCHC RBC-ENTMCNC: 25.1 PG — LOW (ref 27–34)
MCHC RBC-ENTMCNC: 25.2 PG — LOW (ref 27–34)
MCHC RBC-ENTMCNC: 25.2 PG — LOW (ref 27–34)
MCHC RBC-ENTMCNC: 25.3 PG — LOW (ref 27–34)
MCHC RBC-ENTMCNC: 25.4 PG — LOW (ref 27–34)
MCHC RBC-ENTMCNC: 26.3 PG — LOW (ref 27–34)
MCHC RBC-ENTMCNC: 27.7 PG
MCHC RBC-ENTMCNC: 27.9 PG — SIGNIFICANT CHANGE UP (ref 27–34)
MCHC RBC-ENTMCNC: 29.1 GM/DL — LOW (ref 32–36)
MCHC RBC-ENTMCNC: 29.3 GM/DL — LOW (ref 32–36)
MCHC RBC-ENTMCNC: 29.3 GM/DL — LOW (ref 32–36)
MCHC RBC-ENTMCNC: 29.4 GM/DL — LOW (ref 32–36)
MCHC RBC-ENTMCNC: 29.5 GM/DL — LOW (ref 32–36)
MCHC RBC-ENTMCNC: 29.5 GM/DL — LOW (ref 32–36)
MCHC RBC-ENTMCNC: 29.6 GM/DL — LOW (ref 32–36)
MCHC RBC-ENTMCNC: 29.8 GM/DL — LOW (ref 32–36)
MCHC RBC-ENTMCNC: 29.8 GM/DL — LOW (ref 32–36)
MCHC RBC-ENTMCNC: 29.9 GM/DL
MCHC RBC-ENTMCNC: 29.9 GM/DL — LOW (ref 32–36)
MCHC RBC-ENTMCNC: 30.1 GM/DL — LOW (ref 32–36)
MCHC RBC-ENTMCNC: 30.2 GM/DL — LOW (ref 32–36)
MCHC RBC-ENTMCNC: 30.2 GM/DL — LOW (ref 32–36)
MCHC RBC-ENTMCNC: 30.3 GM/DL — LOW (ref 32–36)
MCHC RBC-ENTMCNC: 30.4 GM/DL — LOW (ref 32–36)
MCV RBC AUTO: 80.8 FL — SIGNIFICANT CHANGE UP (ref 80–100)
MCV RBC AUTO: 81.1 FL — SIGNIFICANT CHANGE UP (ref 80–100)
MCV RBC AUTO: 81.2 FL — SIGNIFICANT CHANGE UP (ref 80–100)
MCV RBC AUTO: 81.5 FL — SIGNIFICANT CHANGE UP (ref 80–100)
MCV RBC AUTO: 81.7 FL — SIGNIFICANT CHANGE UP (ref 80–100)
MCV RBC AUTO: 81.8 FL — SIGNIFICANT CHANGE UP (ref 80–100)
MCV RBC AUTO: 82.5 FL — SIGNIFICANT CHANGE UP (ref 80–100)
MCV RBC AUTO: 82.5 FL — SIGNIFICANT CHANGE UP (ref 80–100)
MCV RBC AUTO: 82.9 FL — SIGNIFICANT CHANGE UP (ref 80–100)
MCV RBC AUTO: 83 FL — SIGNIFICANT CHANGE UP (ref 80–100)
MCV RBC AUTO: 83.2 FL — SIGNIFICANT CHANGE UP (ref 80–100)
MCV RBC AUTO: 83.3 FL — SIGNIFICANT CHANGE UP (ref 80–100)
MCV RBC AUTO: 83.4 FL — SIGNIFICANT CHANGE UP (ref 80–100)
MCV RBC AUTO: 83.6 FL — SIGNIFICANT CHANGE UP (ref 80–100)
MCV RBC AUTO: 83.7 FL — SIGNIFICANT CHANGE UP (ref 80–100)
MCV RBC AUTO: 83.8 FL — SIGNIFICANT CHANGE UP (ref 80–100)
MCV RBC AUTO: 83.8 FL — SIGNIFICANT CHANGE UP (ref 80–100)
MCV RBC AUTO: 84.4 FL — SIGNIFICANT CHANGE UP (ref 80–100)
MCV RBC AUTO: 87.1 FL — SIGNIFICANT CHANGE UP (ref 80–100)
MCV RBC AUTO: 91.8 FL — SIGNIFICANT CHANGE UP (ref 80–100)
MCV RBC AUTO: 92.8 FL
METHOD TYPE: SIGNIFICANT CHANGE UP
METHOD TYPE: SIGNIFICANT CHANGE UP
MICROSCOPIC-UA: NORMAL
MONOCYTES # BLD AUTO: 0.41 K/UL — SIGNIFICANT CHANGE UP (ref 0–0.9)
MONOCYTES # BLD AUTO: 0.76 K/UL
MONOCYTES # BLD AUTO: 1.06 K/UL — HIGH (ref 0–0.9)
MONOCYTES # BLD AUTO: 1.09 K/UL — HIGH (ref 0–0.9)
MONOCYTES # BLD AUTO: 1.34 K/UL — HIGH (ref 0–0.9)
MONOCYTES NFR BLD AUTO: 11.6 % — SIGNIFICANT CHANGE UP (ref 2–14)
MONOCYTES NFR BLD AUTO: 2.6 % — SIGNIFICANT CHANGE UP (ref 2–14)
MONOCYTES NFR BLD AUTO: 8 %
MONOCYTES NFR BLD AUTO: 8.6 % — SIGNIFICANT CHANGE UP (ref 2–14)
MONOCYTES NFR BLD AUTO: 9.4 % — SIGNIFICANT CHANGE UP (ref 2–14)
MRSA PCR RESULT.: DETECTED
NEUTROPHILS # BLD AUTO: 11.25 K/UL — HIGH (ref 1.8–7.4)
NEUTROPHILS # BLD AUTO: 13.48 K/UL — HIGH (ref 1.8–7.4)
NEUTROPHILS # BLD AUTO: 5.81 K/UL — SIGNIFICANT CHANGE UP (ref 1.8–7.4)
NEUTROPHILS # BLD AUTO: 7.13 K/UL
NEUTROPHILS # BLD AUTO: 9.63 K/UL — HIGH (ref 1.8–7.4)
NEUTROPHILS NFR BLD AUTO: 63.3 % — SIGNIFICANT CHANGE UP (ref 43–77)
NEUTROPHILS NFR BLD AUTO: 75.1 %
NEUTROPHILS NFR BLD AUTO: 75.8 % — SIGNIFICANT CHANGE UP (ref 43–77)
NEUTROPHILS NFR BLD AUTO: 78.5 % — HIGH (ref 43–77)
NEUTROPHILS NFR BLD AUTO: 86 % — HIGH (ref 43–77)
NITRITE UR QL STRIP: NEGATIVE
NITRITE UR-MCNC: NEGATIVE — SIGNIFICANT CHANGE UP
NITRITE URINE: NEGATIVE
NON HDL CHOLESTEROL: 86 MG/DL — SIGNIFICANT CHANGE UP
NONHDLC SERPL-MCNC: 147 MG/DL
NRBC # BLD: 0 /100 WBCS — SIGNIFICANT CHANGE UP (ref 0–0)
NRBC # BLD: 1 /100 WBCS — HIGH (ref 0–0)
NRBC # BLD: 2 /100 WBCS — HIGH (ref 0–0)
NRBC # BLD: 2 /100 WBCS — HIGH (ref 0–0)
NT-PROBNP SERPL-MCNC: 1269 PG/ML
NT-PROBNP SERPL-MCNC: 922 PG/ML
NT-PROBNP SERPL-SCNC: 2709 PG/ML — HIGH (ref 0–300)
NT-PROBNP SERPL-SCNC: HIGH PG/ML (ref 0–300)
NT-PROBNP SERPL-SCNC: HIGH PG/ML (ref 0–300)
ORGANISM # SPEC MICROSCOPIC CNT: SIGNIFICANT CHANGE UP
OSMOLALITY UR: 570 MOS/KG — SIGNIFICANT CHANGE UP (ref 300–900)
PCO2 BLDV: 46 MMHG — SIGNIFICANT CHANGE UP (ref 35–50)
PH BLDV: 7.36 — SIGNIFICANT CHANGE UP (ref 7.35–7.45)
PH UR STRIP: 7
PH UR: 5.5 — SIGNIFICANT CHANGE UP (ref 5–8)
PH UR: 6 — SIGNIFICANT CHANGE UP (ref 5–8)
PH URINE: 7
PHOSPHATE SERPL-MCNC: 3 MG/DL — SIGNIFICANT CHANGE UP (ref 2.5–4.5)
PHOSPHATE SERPL-MCNC: 3.2 MG/DL — SIGNIFICANT CHANGE UP (ref 2.5–4.5)
PHOSPHATE SERPL-MCNC: 3.2 MG/DL — SIGNIFICANT CHANGE UP (ref 2.5–4.5)
PHOSPHATE SERPL-MCNC: 3.4 MG/DL — SIGNIFICANT CHANGE UP (ref 2.5–4.5)
PHOSPHATE SERPL-MCNC: 3.5 MG/DL — SIGNIFICANT CHANGE UP (ref 2.5–4.5)
PHOSPHATE SERPL-MCNC: 3.8 MG/DL — SIGNIFICANT CHANGE UP (ref 2.5–4.5)
PHOSPHATE SERPL-MCNC: 4 MG/DL — SIGNIFICANT CHANGE UP (ref 2.5–4.5)
PHOSPHATE SERPL-MCNC: 4 MG/DL — SIGNIFICANT CHANGE UP (ref 2.5–4.5)
PHOSPHATE SERPL-MCNC: 4.2 MG/DL — SIGNIFICANT CHANGE UP (ref 2.5–4.5)
PHOSPHATE SERPL-MCNC: 4.3 MG/DL — SIGNIFICANT CHANGE UP (ref 2.5–4.5)
PHOSPHATE SERPL-MCNC: 4.8 MG/DL — HIGH (ref 2.5–4.5)
PHOSPHATE SERPL-MCNC: 5.2 MG/DL — HIGH (ref 2.5–4.5)
PLATELET # BLD AUTO: 255 K/UL — SIGNIFICANT CHANGE UP (ref 150–400)
PLATELET # BLD AUTO: 293 K/UL — SIGNIFICANT CHANGE UP (ref 150–400)
PLATELET # BLD AUTO: 304 K/UL — SIGNIFICANT CHANGE UP (ref 150–400)
PLATELET # BLD AUTO: 311 K/UL — SIGNIFICANT CHANGE UP (ref 150–400)
PLATELET # BLD AUTO: 314 K/UL — SIGNIFICANT CHANGE UP (ref 150–400)
PLATELET # BLD AUTO: 317 K/UL — SIGNIFICANT CHANGE UP (ref 150–400)
PLATELET # BLD AUTO: 321 K/UL — SIGNIFICANT CHANGE UP (ref 150–400)
PLATELET # BLD AUTO: 325 K/UL — SIGNIFICANT CHANGE UP (ref 150–400)
PLATELET # BLD AUTO: 326 K/UL
PLATELET # BLD AUTO: 329 K/UL — SIGNIFICANT CHANGE UP (ref 150–400)
PLATELET # BLD AUTO: 342 K/UL — SIGNIFICANT CHANGE UP (ref 150–400)
PLATELET # BLD AUTO: 349 K/UL — SIGNIFICANT CHANGE UP (ref 150–400)
PLATELET # BLD AUTO: 351 K/UL — SIGNIFICANT CHANGE UP (ref 150–400)
PLATELET # BLD AUTO: 355 K/UL — SIGNIFICANT CHANGE UP (ref 150–400)
PLATELET # BLD AUTO: 356 K/UL — SIGNIFICANT CHANGE UP (ref 150–400)
PLATELET # BLD AUTO: 356 K/UL — SIGNIFICANT CHANGE UP (ref 150–400)
PLATELET # BLD AUTO: 357 K/UL — SIGNIFICANT CHANGE UP (ref 150–400)
PLATELET # BLD AUTO: 358 K/UL — SIGNIFICANT CHANGE UP (ref 150–400)
PLATELET # BLD AUTO: 365 K/UL — SIGNIFICANT CHANGE UP (ref 150–400)
PLATELET # BLD AUTO: 373 K/UL — SIGNIFICANT CHANGE UP (ref 150–400)
PLATELET # BLD AUTO: 403 K/UL — HIGH (ref 150–400)
PO2 BLDV: <20 MMHG — LOW (ref 25–45)
POTASSIUM BLDV-SCNC: 4.1 MMOL/L — SIGNIFICANT CHANGE UP (ref 3.5–5.3)
POTASSIUM SERPL-MCNC: 3.1 MMOL/L — LOW (ref 3.5–5.3)
POTASSIUM SERPL-MCNC: 3.3 MMOL/L — LOW (ref 3.5–5.3)
POTASSIUM SERPL-MCNC: 3.7 MMOL/L — SIGNIFICANT CHANGE UP (ref 3.5–5.3)
POTASSIUM SERPL-MCNC: 3.8 MMOL/L — SIGNIFICANT CHANGE UP (ref 3.5–5.3)
POTASSIUM SERPL-MCNC: 3.9 MMOL/L — SIGNIFICANT CHANGE UP (ref 3.5–5.3)
POTASSIUM SERPL-MCNC: 4 MMOL/L — SIGNIFICANT CHANGE UP (ref 3.5–5.3)
POTASSIUM SERPL-MCNC: 4.1 MMOL/L — SIGNIFICANT CHANGE UP (ref 3.5–5.3)
POTASSIUM SERPL-MCNC: 4.2 MMOL/L — SIGNIFICANT CHANGE UP (ref 3.5–5.3)
POTASSIUM SERPL-MCNC: 4.3 MMOL/L — SIGNIFICANT CHANGE UP (ref 3.5–5.3)
POTASSIUM SERPL-MCNC: 4.4 MMOL/L — SIGNIFICANT CHANGE UP (ref 3.5–5.3)
POTASSIUM SERPL-MCNC: 4.5 MMOL/L — SIGNIFICANT CHANGE UP (ref 3.5–5.3)
POTASSIUM SERPL-MCNC: 4.5 MMOL/L — SIGNIFICANT CHANGE UP (ref 3.5–5.3)
POTASSIUM SERPL-MCNC: 5.1 MMOL/L — SIGNIFICANT CHANGE UP (ref 3.5–5.3)
POTASSIUM SERPL-MCNC: 5.2 MMOL/L — SIGNIFICANT CHANGE UP (ref 3.5–5.3)
POTASSIUM SERPL-SCNC: 3.1 MMOL/L — LOW (ref 3.5–5.3)
POTASSIUM SERPL-SCNC: 3.3 MMOL/L — LOW (ref 3.5–5.3)
POTASSIUM SERPL-SCNC: 3.6 MMOL/L
POTASSIUM SERPL-SCNC: 3.7 MMOL/L — SIGNIFICANT CHANGE UP (ref 3.5–5.3)
POTASSIUM SERPL-SCNC: 3.8 MMOL/L — SIGNIFICANT CHANGE UP (ref 3.5–5.3)
POTASSIUM SERPL-SCNC: 3.9 MMOL/L — SIGNIFICANT CHANGE UP (ref 3.5–5.3)
POTASSIUM SERPL-SCNC: 4 MMOL/L
POTASSIUM SERPL-SCNC: 4 MMOL/L — SIGNIFICANT CHANGE UP (ref 3.5–5.3)
POTASSIUM SERPL-SCNC: 4.1 MMOL/L
POTASSIUM SERPL-SCNC: 4.1 MMOL/L — SIGNIFICANT CHANGE UP (ref 3.5–5.3)
POTASSIUM SERPL-SCNC: 4.2 MMOL/L — SIGNIFICANT CHANGE UP (ref 3.5–5.3)
POTASSIUM SERPL-SCNC: 4.3 MMOL/L — SIGNIFICANT CHANGE UP (ref 3.5–5.3)
POTASSIUM SERPL-SCNC: 4.4 MMOL/L — SIGNIFICANT CHANGE UP (ref 3.5–5.3)
POTASSIUM SERPL-SCNC: 4.5 MMOL/L
POTASSIUM SERPL-SCNC: 4.5 MMOL/L — SIGNIFICANT CHANGE UP (ref 3.5–5.3)
POTASSIUM SERPL-SCNC: 4.5 MMOL/L — SIGNIFICANT CHANGE UP (ref 3.5–5.3)
POTASSIUM SERPL-SCNC: 5.1 MMOL/L — SIGNIFICANT CHANGE UP (ref 3.5–5.3)
POTASSIUM SERPL-SCNC: 5.2 MMOL/L — SIGNIFICANT CHANGE UP (ref 3.5–5.3)
PROCALCITONIN SERPL-MCNC: 0.29 NG/ML — HIGH (ref 0.02–0.1)
PROT SERPL-MCNC: 6.1 G/DL — SIGNIFICANT CHANGE UP (ref 6–8.3)
PROT SERPL-MCNC: 6.2 G/DL — SIGNIFICANT CHANGE UP (ref 6–8.3)
PROT SERPL-MCNC: 6.3 G/DL — SIGNIFICANT CHANGE UP (ref 6–8.3)
PROT SERPL-MCNC: 6.4 G/DL — SIGNIFICANT CHANGE UP (ref 6–8.3)
PROT SERPL-MCNC: 6.5 G/DL — SIGNIFICANT CHANGE UP (ref 6–8.3)
PROT SERPL-MCNC: 6.6 G/DL — SIGNIFICANT CHANGE UP (ref 6–8.3)
PROT SERPL-MCNC: 6.6 G/DL — SIGNIFICANT CHANGE UP (ref 6–8.3)
PROT SERPL-MCNC: 6.7 G/DL — SIGNIFICANT CHANGE UP (ref 6–8.3)
PROT SERPL-MCNC: 6.7 G/DL — SIGNIFICANT CHANGE UP (ref 6–8.3)
PROT SERPL-MCNC: 6.8 G/DL — SIGNIFICANT CHANGE UP (ref 6–8.3)
PROT SERPL-MCNC: 7.1 G/DL
PROT SERPL-MCNC: 7.3 G/DL
PROT SERPL-MCNC: 7.8 G/DL — SIGNIFICANT CHANGE UP (ref 6–8.3)
PROT UR STRIP-MCNC: NEGATIVE
PROT UR-MCNC: 100 — SIGNIFICANT CHANGE UP
PROT UR-MCNC: 100 — SIGNIFICANT CHANGE UP
PROT UR-MCNC: ABNORMAL
PROT UR-MCNC: ABNORMAL
PROTEIN URINE: NEGATIVE
PROTHROM AB SERPL-ACNC: 17.8 SEC — HIGH (ref 10.6–13.6)
PROTHROM AB SERPL-ACNC: 19.8 SEC — HIGH (ref 10.6–13.6)
PROTHROM AB SERPL-ACNC: 21.1 SEC — HIGH (ref 10.6–13.6)
PROTHROM AB SERPL-ACNC: 22.6 SEC — HIGH (ref 10.6–13.6)
QUANT TB PLUS MITOGEN MINUS NIL: 2.95 IU/ML — SIGNIFICANT CHANGE UP
RAPID RVP RESULT: SIGNIFICANT CHANGE UP
RBC # BLD: 3.18 M/UL — LOW (ref 3.8–5.2)
RBC # BLD: 3.37 M/UL — LOW (ref 3.8–5.2)
RBC # BLD: 3.4 M/UL — LOW (ref 3.8–5.2)
RBC # BLD: 3.41 M/UL — LOW (ref 3.8–5.2)
RBC # BLD: 3.43 M/UL — LOW (ref 3.8–5.2)
RBC # BLD: 3.43 M/UL — LOW (ref 3.8–5.2)
RBC # BLD: 3.45 M/UL — LOW (ref 3.8–5.2)
RBC # BLD: 3.46 M/UL — LOW (ref 3.8–5.2)
RBC # BLD: 3.48 M/UL — LOW (ref 3.8–5.2)
RBC # BLD: 3.49 M/UL — LOW (ref 3.8–5.2)
RBC # BLD: 3.49 M/UL — LOW (ref 3.8–5.2)
RBC # BLD: 3.51 M/UL — LOW (ref 3.8–5.2)
RBC # BLD: 3.58 M/UL — LOW (ref 3.8–5.2)
RBC # BLD: 3.65 M/UL — LOW (ref 3.8–5.2)
RBC # BLD: 3.65 M/UL — LOW (ref 3.8–5.2)
RBC # BLD: 3.66 M/UL — LOW (ref 3.8–5.2)
RBC # BLD: 3.79 M/UL — LOW (ref 3.8–5.2)
RBC # BLD: 3.8 M/UL — SIGNIFICANT CHANGE UP (ref 3.8–5.2)
RBC # BLD: 4.1 M/UL — SIGNIFICANT CHANGE UP (ref 3.8–5.2)
RBC # BLD: 4.15 M/UL
RBC # BLD: 4.16 M/UL — SIGNIFICANT CHANGE UP (ref 3.8–5.2)
RBC # FLD: 14 % — SIGNIFICANT CHANGE UP (ref 10.3–14.5)
RBC # FLD: 14.2 %
RBC # FLD: 15.4 % — HIGH (ref 10.3–14.5)
RBC # FLD: 15.9 % — HIGH (ref 10.3–14.5)
RBC # FLD: 15.9 % — HIGH (ref 10.3–14.5)
RBC # FLD: 16 % — HIGH (ref 10.3–14.5)
RBC # FLD: 16 % — HIGH (ref 10.3–14.5)
RBC # FLD: 16.1 % — HIGH (ref 10.3–14.5)
RBC # FLD: 16.2 % — HIGH (ref 10.3–14.5)
RBC # FLD: 16.3 % — HIGH (ref 10.3–14.5)
RBC # FLD: 16.3 % — HIGH (ref 10.3–14.5)
RBC CASTS # UR COMP ASSIST: 2 /HPF — SIGNIFICANT CHANGE UP (ref 0–4)
RBC CASTS # UR COMP ASSIST: 2 /HPF — SIGNIFICANT CHANGE UP (ref 0–4)
RBC CASTS # UR COMP ASSIST: 3 /HPF — SIGNIFICANT CHANGE UP (ref 0–4)
RBC CASTS # UR COMP ASSIST: 6 /HPF — HIGH (ref 0–4)
RED BLOOD CELLS URINE: 2 /HPF
RH IG SCN BLD-IMP: POSITIVE — SIGNIFICANT CHANGE UP
RH IG SCN BLD-IMP: POSITIVE — SIGNIFICANT CHANGE UP
S AUREUS DNA NOSE QL NAA+PROBE: DETECTED
S PNEUM AG UR QL: NEGATIVE — SIGNIFICANT CHANGE UP
SAO2 % BLDV: 12 % — LOW (ref 67–88)
SARS-COV-2 IGG+IGM SERPL QL IA: 1.87 U/ML — HIGH
SARS-COV-2 IGG+IGM SERPL QL IA: POSITIVE
SARS-COV-2 N GENE NPH QL NAA+PROBE: NOT DETECTED
SARS-COV-2 RNA SPEC QL NAA+PROBE: SIGNIFICANT CHANGE UP
SODIUM SERPL-SCNC: 135 MMOL/L — SIGNIFICANT CHANGE UP (ref 135–145)
SODIUM SERPL-SCNC: 136 MMOL/L — SIGNIFICANT CHANGE UP (ref 135–145)
SODIUM SERPL-SCNC: 137 MMOL/L — SIGNIFICANT CHANGE UP (ref 135–145)
SODIUM SERPL-SCNC: 138 MMOL/L
SODIUM SERPL-SCNC: 138 MMOL/L — SIGNIFICANT CHANGE UP (ref 135–145)
SODIUM SERPL-SCNC: 139 MMOL/L
SODIUM SERPL-SCNC: 139 MMOL/L — SIGNIFICANT CHANGE UP (ref 135–145)
SODIUM SERPL-SCNC: 140 MMOL/L — SIGNIFICANT CHANGE UP (ref 135–145)
SODIUM SERPL-SCNC: 141 MMOL/L — SIGNIFICANT CHANGE UP (ref 135–145)
SODIUM SERPL-SCNC: 141 MMOL/L — SIGNIFICANT CHANGE UP (ref 135–145)
SODIUM SERPL-SCNC: 142 MMOL/L
SODIUM SERPL-SCNC: 142 MMOL/L
SODIUM SERPL-SCNC: 142 MMOL/L — SIGNIFICANT CHANGE UP (ref 135–145)
SODIUM UR-SCNC: 11 MMOL/L — SIGNIFICANT CHANGE UP
SODIUM UR-SCNC: 14 MMOL/L — SIGNIFICANT CHANGE UP
SP GR SPEC: 1.02 — SIGNIFICANT CHANGE UP (ref 1.01–1.02)
SP GR UR STRIP: 1.01
SPECIFIC GRAVITY URINE: 1.01
SPECIMEN SOURCE: SIGNIFICANT CHANGE UP
SQUAMOUS EPITHELIAL CELLS: 2 /HPF
TRIGL SERPL-MCNC: 336 MG/DL
TRIGL SERPL-MCNC: 56 MG/DL — SIGNIFICANT CHANGE UP
TROPONIN T, HIGH SENSITIVITY RESULT: 18 NG/L — SIGNIFICANT CHANGE UP (ref 0–51)
TROPONIN T, HIGH SENSITIVITY RESULT: 19 NG/L — SIGNIFICANT CHANGE UP (ref 0–51)
TSH SERPL-ACNC: 3.18 UIU/ML
TSH SERPL-MCNC: 3.07 UIU/ML — SIGNIFICANT CHANGE UP (ref 0.27–4.2)
UROBILINOGEN FLD QL: NEGATIVE — SIGNIFICANT CHANGE UP
UROBILINOGEN URINE: NORMAL
UUN UR-MCNC: 1054 MG/DL — SIGNIFICANT CHANGE UP
VIT B12 SERPL-MCNC: >2000 PG/ML
WBC # BLD: 10.36 K/UL — SIGNIFICANT CHANGE UP (ref 3.8–10.5)
WBC # BLD: 10.75 K/UL — HIGH (ref 3.8–10.5)
WBC # BLD: 10.82 K/UL — HIGH (ref 3.8–10.5)
WBC # BLD: 10.97 K/UL — HIGH (ref 3.8–10.5)
WBC # BLD: 11.41 K/UL — HIGH (ref 3.8–10.5)
WBC # BLD: 11.65 K/UL — HIGH (ref 3.8–10.5)
WBC # BLD: 12.15 K/UL — HIGH (ref 3.8–10.5)
WBC # BLD: 12.39 K/UL — HIGH (ref 3.8–10.5)
WBC # BLD: 12.7 K/UL — HIGH (ref 3.8–10.5)
WBC # BLD: 13.33 K/UL — HIGH (ref 3.8–10.5)
WBC # BLD: 14.33 K/UL — HIGH (ref 3.8–10.5)
WBC # BLD: 14.35 K/UL — HIGH (ref 3.8–10.5)
WBC # BLD: 14.91 K/UL — HIGH (ref 3.8–10.5)
WBC # BLD: 15.44 K/UL — HIGH (ref 3.8–10.5)
WBC # BLD: 15.67 K/UL — HIGH (ref 3.8–10.5)
WBC # BLD: 7.93 K/UL — SIGNIFICANT CHANGE UP (ref 3.8–10.5)
WBC # BLD: 9.08 K/UL — SIGNIFICANT CHANGE UP (ref 3.8–10.5)
WBC # BLD: 9.16 K/UL — SIGNIFICANT CHANGE UP (ref 3.8–10.5)
WBC # BLD: 9.42 K/UL — SIGNIFICANT CHANGE UP (ref 3.8–10.5)
WBC # BLD: 9.48 K/UL — SIGNIFICANT CHANGE UP (ref 3.8–10.5)
WBC # FLD AUTO: 10.36 K/UL — SIGNIFICANT CHANGE UP (ref 3.8–10.5)
WBC # FLD AUTO: 10.75 K/UL — HIGH (ref 3.8–10.5)
WBC # FLD AUTO: 10.82 K/UL — HIGH (ref 3.8–10.5)
WBC # FLD AUTO: 10.97 K/UL — HIGH (ref 3.8–10.5)
WBC # FLD AUTO: 11.41 K/UL — HIGH (ref 3.8–10.5)
WBC # FLD AUTO: 11.65 K/UL — HIGH (ref 3.8–10.5)
WBC # FLD AUTO: 12.15 K/UL — HIGH (ref 3.8–10.5)
WBC # FLD AUTO: 12.39 K/UL — HIGH (ref 3.8–10.5)
WBC # FLD AUTO: 12.7 K/UL — HIGH (ref 3.8–10.5)
WBC # FLD AUTO: 13.33 K/UL — HIGH (ref 3.8–10.5)
WBC # FLD AUTO: 14.33 K/UL — HIGH (ref 3.8–10.5)
WBC # FLD AUTO: 14.35 K/UL — HIGH (ref 3.8–10.5)
WBC # FLD AUTO: 14.91 K/UL — HIGH (ref 3.8–10.5)
WBC # FLD AUTO: 15.44 K/UL — HIGH (ref 3.8–10.5)
WBC # FLD AUTO: 15.67 K/UL — HIGH (ref 3.8–10.5)
WBC # FLD AUTO: 7.93 K/UL — SIGNIFICANT CHANGE UP (ref 3.8–10.5)
WBC # FLD AUTO: 9.08 K/UL — SIGNIFICANT CHANGE UP (ref 3.8–10.5)
WBC # FLD AUTO: 9.16 K/UL — SIGNIFICANT CHANGE UP (ref 3.8–10.5)
WBC # FLD AUTO: 9.42 K/UL — SIGNIFICANT CHANGE UP (ref 3.8–10.5)
WBC # FLD AUTO: 9.48 K/UL — SIGNIFICANT CHANGE UP (ref 3.8–10.5)
WBC # FLD AUTO: 9.5 K/UL
WBC UR QL: 12 /HPF — HIGH (ref 0–5)
WBC UR QL: 13 /HPF — HIGH (ref 0–5)
WBC UR QL: 9 /HPF — HIGH (ref 0–5)
WHITE BLOOD CELLS URINE: 288 /HPF

## 2021-01-01 PROCEDURE — 83735 ASSAY OF MAGNESIUM: CPT

## 2021-01-01 PROCEDURE — 99213 OFFICE O/P EST LOW 20 MIN: CPT | Mod: 95

## 2021-01-01 PROCEDURE — 84540 ASSAY OF URINE/UREA-N: CPT

## 2021-01-01 PROCEDURE — 82803 BLOOD GASES ANY COMBINATION: CPT

## 2021-01-01 PROCEDURE — U0003: CPT

## 2021-01-01 PROCEDURE — 93000 ELECTROCARDIOGRAM COMPLETE: CPT

## 2021-01-01 PROCEDURE — 99233 SBSQ HOSP IP/OBS HIGH 50: CPT | Mod: GC

## 2021-01-01 PROCEDURE — 85018 HEMOGLOBIN: CPT

## 2021-01-01 PROCEDURE — 99215 OFFICE O/P EST HI 40 MIN: CPT

## 2021-01-01 PROCEDURE — 74176 CT ABD & PELVIS W/O CONTRAST: CPT

## 2021-01-01 PROCEDURE — 87522 HEPATITIS C REVRS TRNSCRPJ: CPT

## 2021-01-01 PROCEDURE — 87150 DNA/RNA AMPLIFIED PROBE: CPT

## 2021-01-01 PROCEDURE — 99233 SBSQ HOSP IP/OBS HIGH 50: CPT

## 2021-01-01 PROCEDURE — 99072 ADDL SUPL MATRL&STAF TM PHE: CPT

## 2021-01-01 PROCEDURE — 99232 SBSQ HOSP IP/OBS MODERATE 35: CPT

## 2021-01-01 PROCEDURE — 85014 HEMATOCRIT: CPT

## 2021-01-01 PROCEDURE — U0005: CPT

## 2021-01-01 PROCEDURE — 87640 STAPH A DNA AMP PROBE: CPT

## 2021-01-01 PROCEDURE — 93010 ELECTROCARDIOGRAM REPORT: CPT

## 2021-01-01 PROCEDURE — 99231 SBSQ HOSP IP/OBS SF/LOW 25: CPT

## 2021-01-01 PROCEDURE — 80053 COMPREHEN METABOLIC PANEL: CPT

## 2021-01-01 PROCEDURE — 86709 HEPATITIS A IGM ANTIBODY: CPT

## 2021-01-01 PROCEDURE — 70450 CT HEAD/BRAIN W/O DYE: CPT

## 2021-01-01 PROCEDURE — 84145 PROCALCITONIN (PCT): CPT

## 2021-01-01 PROCEDURE — 86901 BLOOD TYPING SEROLOGIC RH(D): CPT

## 2021-01-01 PROCEDURE — 85610 PROTHROMBIN TIME: CPT

## 2021-01-01 PROCEDURE — 73552 X-RAY EXAM OF FEMUR 2/>: CPT | Mod: 26,LT

## 2021-01-01 PROCEDURE — 93306 TTE W/DOPPLER COMPLETE: CPT

## 2021-01-01 PROCEDURE — 76700 US EXAM ABDOM COMPLETE: CPT

## 2021-01-01 PROCEDURE — 70450 CT HEAD/BRAIN W/O DYE: CPT | Mod: 26

## 2021-01-01 PROCEDURE — 99223 1ST HOSP IP/OBS HIGH 75: CPT | Mod: GC

## 2021-01-01 PROCEDURE — 71045 X-RAY EXAM CHEST 1 VIEW: CPT

## 2021-01-01 PROCEDURE — 72192 CT PELVIS W/O DYE: CPT | Mod: 26,59

## 2021-01-01 PROCEDURE — 99239 HOSP IP/OBS DSCHRG MGMT >30: CPT

## 2021-01-01 PROCEDURE — 99152 MOD SED SAME PHYS/QHP 5/>YRS: CPT

## 2021-01-01 PROCEDURE — 99285 EMERGENCY DEPT VISIT HI MDM: CPT | Mod: 25

## 2021-01-01 PROCEDURE — 87077 CULTURE AEROBIC IDENTIFY: CPT

## 2021-01-01 PROCEDURE — 93005 ELECTROCARDIOGRAM TRACING: CPT

## 2021-01-01 PROCEDURE — 93010 ELECTROCARDIOGRAM REPORT: CPT | Mod: 76

## 2021-01-01 PROCEDURE — 0225U NFCT DS DNA&RNA 21 SARSCOV2: CPT

## 2021-01-01 PROCEDURE — 87186 SC STD MICRODIL/AGAR DIL: CPT

## 2021-01-01 PROCEDURE — C8929: CPT

## 2021-01-01 PROCEDURE — 85025 COMPLETE CBC W/AUTO DIFF WBC: CPT

## 2021-01-01 PROCEDURE — 96376 TX/PRO/DX INJ SAME DRUG ADON: CPT

## 2021-01-01 PROCEDURE — 71045 X-RAY EXAM CHEST 1 VIEW: CPT | Mod: 26

## 2021-01-01 PROCEDURE — 93308 TTE F-UP OR LMTD: CPT | Mod: 26

## 2021-01-01 PROCEDURE — 87086 URINE CULTURE/COLONY COUNT: CPT

## 2021-01-01 PROCEDURE — 97110 THERAPEUTIC EXERCISES: CPT

## 2021-01-01 PROCEDURE — 99214 OFFICE O/P EST MOD 30 MIN: CPT | Mod: 25

## 2021-01-01 PROCEDURE — 93460 R&L HRT ART/VENTRICLE ANGIO: CPT | Mod: 26

## 2021-01-01 PROCEDURE — 71250 CT THORAX DX C-: CPT

## 2021-01-01 PROCEDURE — 83605 ASSAY OF LACTIC ACID: CPT

## 2021-01-01 PROCEDURE — 73502 X-RAY EXAM HIP UNI 2-3 VIEWS: CPT | Mod: 26,LT

## 2021-01-01 PROCEDURE — 72170 X-RAY EXAM OF PELVIS: CPT | Mod: 26,59

## 2021-01-01 PROCEDURE — 74176 CT ABD & PELVIS W/O CONTRAST: CPT | Mod: 26

## 2021-01-01 PROCEDURE — 92960 CARDIOVERSION ELECTRIC EXT: CPT

## 2021-01-01 PROCEDURE — 87340 HEPATITIS B SURFACE AG IA: CPT

## 2021-01-01 PROCEDURE — 93460 R&L HRT ART/VENTRICLE ANGIO: CPT

## 2021-01-01 PROCEDURE — 82570 ASSAY OF URINE CREATININE: CPT

## 2021-01-01 PROCEDURE — 85730 THROMBOPLASTIN TIME PARTIAL: CPT

## 2021-01-01 PROCEDURE — 86769 SARS-COV-2 COVID-19 ANTIBODY: CPT

## 2021-01-01 PROCEDURE — 86706 HEP B SURFACE ANTIBODY: CPT

## 2021-01-01 PROCEDURE — 83935 ASSAY OF URINE OSMOLALITY: CPT

## 2021-01-01 PROCEDURE — 87337 ENTAMOEB HIST GROUP AG IA: CPT

## 2021-01-01 PROCEDURE — 71250 CT THORAX DX C-: CPT | Mod: 26

## 2021-01-01 PROCEDURE — 81001 URINALYSIS AUTO W/SCOPE: CPT

## 2021-01-01 PROCEDURE — 81003 URINALYSIS AUTO W/O SCOPE: CPT | Mod: QW

## 2021-01-01 PROCEDURE — 86663 EPSTEIN-BARR ANTIBODY: CPT

## 2021-01-01 PROCEDURE — 93312 ECHO TRANSESOPHAGEAL: CPT

## 2021-01-01 PROCEDURE — 99291 CRITICAL CARE FIRST HOUR: CPT

## 2021-01-01 PROCEDURE — 99496 TRANSJ CARE MGMT HIGH F2F 7D: CPT | Mod: 25

## 2021-01-01 PROCEDURE — 84295 ASSAY OF SERUM SODIUM: CPT

## 2021-01-01 PROCEDURE — 96375 TX/PRO/DX INJ NEW DRUG ADDON: CPT

## 2021-01-01 PROCEDURE — 99442: CPT

## 2021-01-01 PROCEDURE — 86850 RBC ANTIBODY SCREEN: CPT

## 2021-01-01 PROCEDURE — 87635 SARS-COV-2 COVID-19 AMP PRB: CPT

## 2021-01-01 PROCEDURE — 36415 COLL VENOUS BLD VENIPUNCTURE: CPT

## 2021-01-01 PROCEDURE — 99223 1ST HOSP IP/OBS HIGH 75: CPT

## 2021-01-01 PROCEDURE — 93308 TTE F-UP OR LMTD: CPT

## 2021-01-01 PROCEDURE — 99232 SBSQ HOSP IP/OBS MODERATE 35: CPT | Mod: GC

## 2021-01-01 PROCEDURE — 84443 ASSAY THYROID STIM HORMONE: CPT

## 2021-01-01 PROCEDURE — 80048 BASIC METABOLIC PNL TOTAL CA: CPT

## 2021-01-01 PROCEDURE — 97530 THERAPEUTIC ACTIVITIES: CPT

## 2021-01-01 PROCEDURE — C1894: CPT

## 2021-01-01 PROCEDURE — 80061 LIPID PANEL: CPT

## 2021-01-01 PROCEDURE — 72170 X-RAY EXAM OF PELVIS: CPT

## 2021-01-01 PROCEDURE — 97535 SELF CARE MNGMENT TRAINING: CPT

## 2021-01-01 PROCEDURE — 85027 COMPLETE CBC AUTOMATED: CPT

## 2021-01-01 PROCEDURE — 87449 NOS EACH ORGANISM AG IA: CPT

## 2021-01-01 PROCEDURE — 82565 ASSAY OF CREATININE: CPT

## 2021-01-01 PROCEDURE — 76700 US EXAM ABDOM COMPLETE: CPT | Mod: 26

## 2021-01-01 PROCEDURE — 84100 ASSAY OF PHOSPHORUS: CPT

## 2021-01-01 PROCEDURE — 84300 ASSAY OF URINE SODIUM: CPT

## 2021-01-01 PROCEDURE — 82947 ASSAY GLUCOSE BLOOD QUANT: CPT

## 2021-01-01 PROCEDURE — 93306 TTE W/DOPPLER COMPLETE: CPT | Mod: 26

## 2021-01-01 PROCEDURE — 86665 EPSTEIN-BARR CAPSID VCA: CPT

## 2021-01-01 PROCEDURE — 82330 ASSAY OF CALCIUM: CPT

## 2021-01-01 PROCEDURE — 73502 X-RAY EXAM HIP UNI 2-3 VIEWS: CPT

## 2021-01-01 PROCEDURE — C1769: CPT

## 2021-01-01 PROCEDURE — 99205 OFFICE O/P NEW HI 60 MIN: CPT

## 2021-01-01 PROCEDURE — 84484 ASSAY OF TROPONIN QUANT: CPT

## 2021-01-01 PROCEDURE — 73552 X-RAY EXAM OF FEMUR 2/>: CPT

## 2021-01-01 PROCEDURE — 87899 AGENT NOS ASSAY W/OPTIC: CPT

## 2021-01-01 PROCEDURE — 87040 BLOOD CULTURE FOR BACTERIA: CPT

## 2021-01-01 PROCEDURE — 93312 ECHO TRANSESOPHAGEAL: CPT | Mod: 26

## 2021-01-01 PROCEDURE — 82435 ASSAY OF BLOOD CHLORIDE: CPT

## 2021-01-01 PROCEDURE — 86682 HELMINTH ANTIBODY: CPT

## 2021-01-01 PROCEDURE — 97165 OT EVAL LOW COMPLEX 30 MIN: CPT

## 2021-01-01 PROCEDURE — 84156 ASSAY OF PROTEIN URINE: CPT

## 2021-01-01 PROCEDURE — 74177 CT ABD & PELVIS W/CONTRAST: CPT | Mod: 26

## 2021-01-01 PROCEDURE — 86664 EPSTEIN-BARR NUCLEAR ANTIGEN: CPT

## 2021-01-01 PROCEDURE — 74177 CT ABD & PELVIS W/CONTRAST: CPT

## 2021-01-01 PROCEDURE — 84132 ASSAY OF SERUM POTASSIUM: CPT

## 2021-01-01 PROCEDURE — 87641 MR-STAPH DNA AMP PROBE: CPT

## 2021-01-01 PROCEDURE — 86753 PROTOZOA ANTIBODY NOS: CPT

## 2021-01-01 PROCEDURE — 83880 ASSAY OF NATRIURETIC PEPTIDE: CPT

## 2021-01-01 PROCEDURE — 83036 HEMOGLOBIN GLYCOSYLATED A1C: CPT

## 2021-01-01 PROCEDURE — 97162 PT EVAL MOD COMPLEX 30 MIN: CPT

## 2021-01-01 PROCEDURE — C1887: CPT

## 2021-01-01 PROCEDURE — 96374 THER/PROPH/DIAG INJ IV PUSH: CPT

## 2021-01-01 PROCEDURE — 97116 GAIT TRAINING THERAPY: CPT

## 2021-01-01 PROCEDURE — 86480 TB TEST CELL IMMUN MEASURE: CPT

## 2021-01-01 PROCEDURE — 86900 BLOOD TYPING SEROLOGIC ABO: CPT

## 2021-01-01 PROCEDURE — 87389 HIV-1 AG W/HIV-1&-2 AB AG IA: CPT

## 2021-01-01 RX ORDER — TORSEMIDE 100 MG/1
100 TABLET ORAL DAILY
Qty: 45 | Refills: 0 | Status: ACTIVE | COMMUNITY
Start: 2021-01-01 | End: 1900-01-01

## 2021-01-01 RX ORDER — POTASSIUM CHLORIDE 20 MEQ
40 PACKET (EA) ORAL DAILY
Refills: 0 | Status: DISCONTINUED | OUTPATIENT
Start: 2021-01-01 | End: 2021-01-01

## 2021-01-01 RX ORDER — MAGNESIUM SULFATE 500 MG/ML
2 VIAL (ML) INJECTION ONCE
Refills: 0 | Status: COMPLETED | OUTPATIENT
Start: 2021-01-01 | End: 2021-01-01

## 2021-01-01 RX ORDER — METOPROLOL TARTRATE 50 MG
5 TABLET ORAL ONCE
Refills: 0 | Status: COMPLETED | OUTPATIENT
Start: 2021-01-01 | End: 2021-01-01

## 2021-01-01 RX ORDER — LOPERAMIDE HCL 2 MG
2 TABLET ORAL ONCE
Refills: 0 | Status: COMPLETED | OUTPATIENT
Start: 2021-01-01 | End: 2021-01-01

## 2021-01-01 RX ORDER — APIXABAN 2.5 MG/1
2.5 TABLET, FILM COATED ORAL EVERY 12 HOURS
Refills: 0 | Status: DISCONTINUED | OUTPATIENT
Start: 2021-01-01 | End: 2021-01-01

## 2021-01-01 RX ORDER — DIPHENHYDRAMINE HCL 50 MG
50 CAPSULE ORAL ONCE
Refills: 0 | Status: DISCONTINUED | OUTPATIENT
Start: 2021-01-01 | End: 2021-01-01

## 2021-01-01 RX ORDER — SODIUM CHLORIDE 9 MG/ML
1000 INJECTION INTRAMUSCULAR; INTRAVENOUS; SUBCUTANEOUS
Refills: 0 | Status: COMPLETED | OUTPATIENT
Start: 2021-01-01 | End: 2021-01-01

## 2021-01-01 RX ORDER — BUDESONIDE, MICRONIZED 100 %
1 POWDER (GRAM) MISCELLANEOUS
Qty: 10 | Refills: 0
Start: 2021-01-01

## 2021-01-01 RX ORDER — METOPROLOL TARTRATE 50 MG
25 TABLET ORAL ONCE
Refills: 0 | Status: COMPLETED | OUTPATIENT
Start: 2021-01-01 | End: 2021-01-01

## 2021-01-01 RX ORDER — PIPERACILLIN AND TAZOBACTAM 4; .5 G/20ML; G/20ML
3.38 INJECTION, POWDER, LYOPHILIZED, FOR SOLUTION INTRAVENOUS ONCE
Refills: 0 | Status: COMPLETED | OUTPATIENT
Start: 2021-01-01 | End: 2021-01-01

## 2021-01-01 RX ORDER — HYDROCORTISONE 1 %
1 OINTMENT (GRAM) TOPICAL
Refills: 0 | Status: DISCONTINUED | OUTPATIENT
Start: 2021-01-01 | End: 2021-01-01

## 2021-01-01 RX ORDER — APIXABAN 2.5 MG/1
1 TABLET, FILM COATED ORAL
Qty: 0 | Refills: 0 | DISCHARGE

## 2021-01-01 RX ORDER — DOCUSATE SODIUM 100 MG
0 CAPSULE ORAL
Qty: 0 | Refills: 0 | DISCHARGE

## 2021-01-01 RX ORDER — LEVOFLOXACIN 250 MG/1
250 TABLET, FILM COATED ORAL
Qty: 8 | Refills: 0 | Status: DISCONTINUED | COMMUNITY
Start: 2021-01-01 | End: 2021-01-01

## 2021-01-01 RX ORDER — AMITRIPTYLINE HCL 25 MG
25 TABLET ORAL AT BEDTIME
Refills: 0 | Status: DISCONTINUED | OUTPATIENT
Start: 2021-01-01 | End: 2021-01-01

## 2021-01-01 RX ORDER — PANTOPRAZOLE SODIUM 20 MG/1
1 TABLET, DELAYED RELEASE ORAL
Qty: 0 | Refills: 0 | DISCHARGE

## 2021-01-01 RX ORDER — PREGABALIN 225 MG/1
1 CAPSULE ORAL
Qty: 0 | Refills: 0 | DISCHARGE

## 2021-01-01 RX ORDER — FUROSEMIDE 40 MG
40 TABLET ORAL ONCE
Refills: 0 | Status: COMPLETED | OUTPATIENT
Start: 2021-01-01 | End: 2021-01-01

## 2021-01-01 RX ORDER — AMIODARONE HYDROCHLORIDE 200 MG/1
200 TABLET ORAL
Qty: 90 | Refills: 3 | Status: DISCONTINUED | COMMUNITY
Start: 2021-01-01 | End: 2021-01-01

## 2021-01-01 RX ORDER — HEPARIN SODIUM 5000 [USP'U]/ML
5500 INJECTION INTRAVENOUS; SUBCUTANEOUS EVERY 6 HOURS
Refills: 0 | Status: DISCONTINUED | OUTPATIENT
Start: 2021-01-01 | End: 2021-01-01

## 2021-01-01 RX ORDER — NYSTATIN CREAM 100000 [USP'U]/G
1 CREAM TOPICAL
Refills: 0 | Status: DISCONTINUED | OUTPATIENT
Start: 2021-01-01 | End: 2021-01-01

## 2021-01-01 RX ORDER — ALLOPURINOL 300 MG
2 TABLET ORAL
Qty: 0 | Refills: 0 | DISCHARGE
Start: 2021-01-01

## 2021-01-01 RX ORDER — AMITRIPTYLINE HCL 25 MG
1 TABLET ORAL
Qty: 0 | Refills: 0 | DISCHARGE

## 2021-01-01 RX ORDER — POLYETHYLENE GLYCOL 3350 17 G/17G
34 POWDER, FOR SOLUTION ORAL DAILY
Refills: 0 | Status: DISCONTINUED | OUTPATIENT
Start: 2021-01-01 | End: 2021-01-01

## 2021-01-01 RX ORDER — SODIUM CHLORIDE 9 MG/ML
3 INJECTION INTRAMUSCULAR; INTRAVENOUS; SUBCUTANEOUS EVERY 8 HOURS
Refills: 0 | Status: DISCONTINUED | OUTPATIENT
Start: 2021-01-01 | End: 2021-01-01

## 2021-01-01 RX ORDER — AMIODARONE HYDROCHLORIDE 400 MG/1
400 TABLET ORAL EVERY 8 HOURS
Refills: 0 | Status: DISCONTINUED | OUTPATIENT
Start: 2021-01-01 | End: 2021-01-01

## 2021-01-01 RX ORDER — METOPROLOL TARTRATE 50 MG
100 TABLET ORAL
Refills: 0 | Status: DISCONTINUED | OUTPATIENT
Start: 2021-01-01 | End: 2021-01-01

## 2021-01-01 RX ORDER — VANCOMYCIN HCL 1 G
1000 VIAL (EA) INTRAVENOUS ONCE
Refills: 0 | Status: COMPLETED | OUTPATIENT
Start: 2021-01-01 | End: 2021-01-01

## 2021-01-01 RX ORDER — CHOLECALCIFEROL (VITAMIN D3) 125 MCG
1 CAPSULE ORAL
Qty: 0 | Refills: 0 | DISCHARGE

## 2021-01-01 RX ORDER — ACETAMINOPHEN 500 MG
650 TABLET ORAL ONCE
Refills: 0 | Status: COMPLETED | OUTPATIENT
Start: 2021-01-01 | End: 2021-01-01

## 2021-01-01 RX ORDER — AMITRIPTYLINE HCL 25 MG
25 TABLET ORAL DAILY
Refills: 0 | Status: DISCONTINUED | OUTPATIENT
Start: 2021-01-01 | End: 2021-01-01

## 2021-01-01 RX ORDER — FLUTICASONE FUROATE 100 UG/1
100 POWDER RESPIRATORY (INHALATION) DAILY
Qty: 3 | Refills: 3 | Status: DISCONTINUED | COMMUNITY
Start: 2021-01-31 | End: 2021-01-01

## 2021-01-01 RX ORDER — TORSEMIDE 20 MG/1
20 TABLET ORAL DAILY
Qty: 90 | Refills: 0 | Status: DISCONTINUED | COMMUNITY
Start: 2021-01-01 | End: 2021-01-01

## 2021-01-01 RX ORDER — DILTIAZEM HYDROCHLORIDE 240 MG/1
240 CAPSULE, EXTENDED RELEASE ORAL
Qty: 180 | Refills: 2 | Status: ACTIVE | COMMUNITY
Start: 2021-01-01 | End: 1900-01-01

## 2021-01-01 RX ORDER — SODIUM CHLORIDE 9 MG/ML
1000 INJECTION, SOLUTION INTRAVENOUS
Refills: 0 | Status: DISCONTINUED | OUTPATIENT
Start: 2021-01-01 | End: 2021-01-01

## 2021-01-01 RX ORDER — OXYBUTYNIN CHLORIDE 5 MG
1 TABLET ORAL
Qty: 0 | Refills: 0 | DISCHARGE

## 2021-01-01 RX ORDER — METOPROLOL TARTRATE 50 MG
50 TABLET ORAL
Refills: 0 | Status: DISCONTINUED | OUTPATIENT
Start: 2021-01-01 | End: 2021-01-01

## 2021-01-01 RX ORDER — METOPROLOL TARTRATE 50 MG
50 TABLET ORAL THREE TIMES A DAY
Refills: 0 | Status: DISCONTINUED | OUTPATIENT
Start: 2021-01-01 | End: 2021-01-01

## 2021-01-01 RX ORDER — DIPHENHYDRAMINE HCL 50 MG
25 CAPSULE ORAL EVERY 6 HOURS
Refills: 0 | Status: DISCONTINUED | OUTPATIENT
Start: 2021-01-01 | End: 2021-01-01

## 2021-01-01 RX ORDER — HEPARIN SODIUM 5000 [USP'U]/ML
800 INJECTION INTRAVENOUS; SUBCUTANEOUS
Qty: 25000 | Refills: 0 | Status: DISCONTINUED | OUTPATIENT
Start: 2021-01-01 | End: 2021-01-01

## 2021-01-01 RX ORDER — LANOLIN ALCOHOL/MO/W.PET/CERES
5 CREAM (GRAM) TOPICAL ONCE
Refills: 0 | Status: COMPLETED | OUTPATIENT
Start: 2021-01-01 | End: 2021-01-01

## 2021-01-01 RX ORDER — CHOLESTYRAMINE 4 G/9G
1 POWDER, FOR SUSPENSION ORAL
Qty: 0 | Refills: 0 | DISCHARGE

## 2021-01-01 RX ORDER — MULTIVIT-MIN/FERROUS GLUCONATE 9 MG/15 ML
1 LIQUID (ML) ORAL
Qty: 0 | Refills: 0 | DISCHARGE

## 2021-01-01 RX ORDER — ALLOPURINOL 300 MG
200 TABLET ORAL DAILY
Refills: 0 | Status: DISCONTINUED | OUTPATIENT
Start: 2021-01-01 | End: 2021-01-01

## 2021-01-01 RX ORDER — SPIRONOLACTONE 25 MG/1
1 TABLET, FILM COATED ORAL
Qty: 0 | Refills: 0 | DISCHARGE

## 2021-01-01 RX ORDER — POTASSIUM CHLORIDE 20 MEQ
40 PACKET (EA) ORAL ONCE
Refills: 0 | Status: COMPLETED | OUTPATIENT
Start: 2021-01-01 | End: 2021-01-01

## 2021-01-01 RX ORDER — SODIUM CHLORIDE 9 MG/ML
500 INJECTION, SOLUTION INTRAVENOUS
Refills: 0 | Status: DISCONTINUED | OUTPATIENT
Start: 2021-01-01 | End: 2021-01-01

## 2021-01-01 RX ORDER — OXYBUTYNIN CHLORIDE 5 MG/1
5 TABLET, EXTENDED RELEASE ORAL
Qty: 90 | Refills: 1 | Status: ACTIVE | COMMUNITY
Start: 2019-09-05 | End: 1900-01-01

## 2021-01-01 RX ORDER — BUDESONIDE, MICRONIZED 100 %
9 POWDER (GRAM) MISCELLANEOUS DAILY
Refills: 0 | Status: DISCONTINUED | OUTPATIENT
Start: 2021-01-01 | End: 2021-01-01

## 2021-01-01 RX ORDER — METOPROLOL TARTRATE 50 MG
1 TABLET ORAL
Qty: 0 | Refills: 0 | DISCHARGE

## 2021-01-01 RX ORDER — HEPARIN SODIUM 5000 [USP'U]/ML
INJECTION INTRAVENOUS; SUBCUTANEOUS
Qty: 25000 | Refills: 0 | Status: DISCONTINUED | OUTPATIENT
Start: 2021-01-01 | End: 2021-01-01

## 2021-01-01 RX ORDER — POTASSIUM CHLORIDE 20 MEQ
40 PACKET (EA) ORAL EVERY 4 HOURS
Refills: 0 | Status: COMPLETED | OUTPATIENT
Start: 2021-01-01 | End: 2021-01-01

## 2021-01-01 RX ORDER — SODIUM CHLORIDE 9 MG/ML
1000 INJECTION INTRAMUSCULAR; INTRAVENOUS; SUBCUTANEOUS ONCE
Refills: 0 | Status: COMPLETED | OUTPATIENT
Start: 2021-01-01 | End: 2021-01-01

## 2021-01-01 RX ORDER — CALAMINE AND ZINC OXIDE AND PHENOL 160; 10 MG/ML; MG/ML
1 LOTION TOPICAL ONCE
Refills: 0 | Status: COMPLETED | OUTPATIENT
Start: 2021-01-01 | End: 2021-01-01

## 2021-01-01 RX ORDER — HEPARIN SODIUM 5000 [USP'U]/ML
5500 INJECTION INTRAVENOUS; SUBCUTANEOUS ONCE
Refills: 0 | Status: COMPLETED | OUTPATIENT
Start: 2021-01-01 | End: 2021-01-01

## 2021-01-01 RX ORDER — ACETAMINOPHEN 500 MG
650 TABLET ORAL EVERY 6 HOURS
Refills: 0 | Status: DISCONTINUED | OUTPATIENT
Start: 2021-01-01 | End: 2021-01-01

## 2021-01-01 RX ORDER — DILTIAZEM HYDROCHLORIDE 120 MG/1
120 CAPSULE, EXTENDED RELEASE ORAL DAILY
Qty: 90 | Refills: 1 | Status: DISCONTINUED | COMMUNITY
Start: 2021-01-01 | End: 2021-01-01

## 2021-01-01 RX ORDER — FERROUS SULFATE TAB EC 325 MG (65 MG FE EQUIVALENT) 325 (65 FE) MG
325 (65 FE) TABLET DELAYED RESPONSE ORAL DAILY
Qty: 30 | Refills: 5 | Status: DISCONTINUED | COMMUNITY
Start: 2019-11-25 | End: 2021-01-01

## 2021-01-01 RX ORDER — METOLAZONE 5 MG/1
5 TABLET ORAL
Qty: 90 | Refills: 1 | Status: ACTIVE | COMMUNITY
Start: 2018-06-25 | End: 1900-01-01

## 2021-01-01 RX ORDER — HYDROCORTISONE 1 %
1 OINTMENT (GRAM) TOPICAL
Qty: 0 | Refills: 0 | DISCHARGE

## 2021-01-01 RX ORDER — DILTIAZEM HCL 120 MG
120 CAPSULE, EXT RELEASE 24 HR ORAL DAILY
Refills: 0 | Status: DISCONTINUED | OUTPATIENT
Start: 2021-01-01 | End: 2021-01-01

## 2021-01-01 RX ORDER — ONDANSETRON 8 MG/1
4 TABLET, FILM COATED ORAL EVERY 8 HOURS
Refills: 0 | Status: DISCONTINUED | OUTPATIENT
Start: 2021-01-01 | End: 2021-01-01

## 2021-01-01 RX ORDER — AMIODARONE HYDROCHLORIDE 400 MG/1
200 TABLET ORAL DAILY
Refills: 0 | Status: DISCONTINUED | OUTPATIENT
Start: 2021-01-01 | End: 2021-01-01

## 2021-01-01 RX ORDER — POTASSIUM CHLORIDE 1500 MG/1
20 TABLET, EXTENDED RELEASE ORAL
Qty: 90 | Refills: 1 | Status: DISCONTINUED | COMMUNITY
Start: 2021-01-01 | End: 2021-01-01

## 2021-01-01 RX ORDER — OXYBUTYNIN CHLORIDE 5 MG
5 TABLET ORAL DAILY
Refills: 0 | Status: DISCONTINUED | OUTPATIENT
Start: 2021-01-01 | End: 2021-01-01

## 2021-01-01 RX ORDER — BUDESONIDE 3 MG/1
3 CAPSULE, COATED PELLETS ORAL DAILY
Qty: 270 | Refills: 0 | Status: ACTIVE | COMMUNITY
Start: 2021-01-01 | End: 1900-01-01

## 2021-01-01 RX ORDER — APIXABAN 2.5 MG/1
1 TABLET, FILM COATED ORAL
Qty: 0 | Refills: 0 | DISCHARGE
Start: 2021-01-01

## 2021-01-01 RX ORDER — ALLOPURINOL 300 MG
2 TABLET ORAL
Qty: 0 | Refills: 0 | DISCHARGE

## 2021-01-01 RX ORDER — CEFTRIAXONE 500 MG/1
1000 INJECTION, POWDER, FOR SOLUTION INTRAMUSCULAR; INTRAVENOUS EVERY 24 HOURS
Refills: 0 | Status: COMPLETED | OUTPATIENT
Start: 2021-01-01 | End: 2021-01-01

## 2021-01-01 RX ORDER — ERGOCALCIFEROL 1.25 MG/1
1 CAPSULE ORAL
Qty: 0 | Refills: 0 | DISCHARGE

## 2021-01-01 RX ORDER — CEPHALEXIN 500 MG/1
500 TABLET ORAL
Qty: 14 | Refills: 0 | Status: DISCONTINUED | COMMUNITY
Start: 2021-01-01 | End: 2021-01-01

## 2021-01-01 RX ORDER — LANOLIN ALCOHOL/MO/W.PET/CERES
3 CREAM (GRAM) TOPICAL AT BEDTIME
Refills: 0 | Status: DISCONTINUED | OUTPATIENT
Start: 2021-01-01 | End: 2021-01-01

## 2021-01-01 RX ORDER — SACUBITRIL AND VALSARTAN 24; 26 MG/1; MG/1
24-26 TABLET, FILM COATED ORAL
Qty: 60 | Refills: 5 | Status: DISCONTINUED | COMMUNITY
Start: 2020-09-24 | End: 2021-01-01

## 2021-01-01 RX ORDER — AMITRIPTYLINE HCL 25 MG
1 TABLET ORAL
Qty: 0 | Refills: 0 | DISCHARGE
Start: 2021-01-01

## 2021-01-01 RX ORDER — DILTIAZEM HCL 120 MG
1 CAPSULE, EXT RELEASE 24 HR ORAL
Qty: 0 | Refills: 0 | DISCHARGE

## 2021-01-01 RX ORDER — METOPROLOL TARTRATE 50 MG
5 TABLET ORAL ONCE
Refills: 0 | Status: DISCONTINUED | OUTPATIENT
Start: 2021-01-01 | End: 2021-01-01

## 2021-01-01 RX ORDER — LEVOTHYROXINE SODIUM 0.03 MG/1
25 TABLET ORAL
Qty: 90 | Refills: 1 | Status: ACTIVE | COMMUNITY
Start: 2021-01-01

## 2021-01-01 RX ORDER — HEPARIN SODIUM 5000 [USP'U]/ML
2500 INJECTION INTRAVENOUS; SUBCUTANEOUS EVERY 6 HOURS
Refills: 0 | Status: DISCONTINUED | OUTPATIENT
Start: 2021-01-01 | End: 2021-01-01

## 2021-01-01 RX ORDER — AMIODARONE HYDROCHLORIDE 400 MG/1
1 TABLET ORAL
Qty: 0 | Refills: 0 | DISCHARGE
Start: 2021-01-01

## 2021-01-01 RX ORDER — METOPROLOL TARTRATE 50 MG
75 TABLET ORAL
Refills: 0 | Status: DISCONTINUED | OUTPATIENT
Start: 2021-01-01 | End: 2021-01-01

## 2021-01-01 RX ORDER — AMIODARONE HYDROCHLORIDE 400 MG/1
150 TABLET ORAL ONCE
Refills: 0 | Status: COMPLETED | OUTPATIENT
Start: 2021-01-01 | End: 2021-01-01

## 2021-01-01 RX ORDER — ALLOPURINOL 100 MG/1
100 TABLET ORAL DAILY
Qty: 90 | Refills: 2 | Status: ACTIVE | COMMUNITY
Start: 2019-10-23

## 2021-01-01 RX ORDER — PIPERACILLIN AND TAZOBACTAM 4; .5 G/20ML; G/20ML
3.38 INJECTION, POWDER, LYOPHILIZED, FOR SOLUTION INTRAVENOUS EVERY 8 HOURS
Refills: 0 | Status: DISCONTINUED | OUTPATIENT
Start: 2021-01-01 | End: 2021-01-01

## 2021-01-01 RX ORDER — CLONIDINE HYDROCHLORIDE 0.1 MG/1
0.1 TABLET ORAL TWICE DAILY
Qty: 180 | Refills: 0 | Status: ACTIVE | COMMUNITY
Start: 2021-01-01 | End: 1900-01-01

## 2021-01-01 RX ORDER — PANTOPRAZOLE SODIUM 20 MG/1
40 TABLET, DELAYED RELEASE ORAL
Refills: 0 | Status: DISCONTINUED | OUTPATIENT
Start: 2021-01-01 | End: 2021-01-01

## 2021-01-01 RX ORDER — POLYETHYLENE GLYCOL 3350 17 G/17G
17 POWDER, FOR SOLUTION ORAL ONCE
Refills: 0 | Status: COMPLETED | OUTPATIENT
Start: 2021-01-01 | End: 2021-01-01

## 2021-01-01 RX ORDER — BUMETANIDE 2 MG/1
2 TABLET ORAL DAILY
Qty: 120 | Refills: 3 | Status: DISCONTINUED | COMMUNITY
Start: 2021-01-19 | End: 2021-01-01

## 2021-01-01 RX ORDER — CHLORHEXIDINE GLUCONATE 213 G/1000ML
1 SOLUTION TOPICAL DAILY
Refills: 0 | Status: DISCONTINUED | OUTPATIENT
Start: 2021-01-01 | End: 2021-01-01

## 2021-01-01 RX ORDER — SENNA PLUS 8.6 MG/1
2 TABLET ORAL AT BEDTIME
Refills: 0 | Status: DISCONTINUED | OUTPATIENT
Start: 2021-01-01 | End: 2021-01-01

## 2021-01-01 RX ORDER — SPIRONOLACTONE 25 MG/1
25 TABLET ORAL
Qty: 45 | Refills: 1 | Status: ACTIVE | COMMUNITY
Start: 2018-06-25 | End: 1900-01-01

## 2021-01-01 RX ORDER — DILTIAZEM HCL 120 MG
120 CAPSULE, EXT RELEASE 24 HR ORAL ONCE
Refills: 0 | Status: COMPLETED | OUTPATIENT
Start: 2021-01-01 | End: 2021-01-01

## 2021-01-01 RX ADMIN — AMIODARONE HYDROCHLORIDE 400 MILLIGRAM(S): 400 TABLET ORAL at 14:20

## 2021-01-01 RX ADMIN — NYSTATIN CREAM 1 APPLICATION(S): 100000 CREAM TOPICAL at 05:04

## 2021-01-01 RX ADMIN — Medication 5 MILLIGRAM(S): at 20:09

## 2021-01-01 RX ADMIN — Medication 9 MILLIGRAM(S): at 05:33

## 2021-01-01 RX ADMIN — Medication 9 MILLIGRAM(S): at 05:34

## 2021-01-01 RX ADMIN — Medication 50 MILLIGRAM(S): at 17:54

## 2021-01-01 RX ADMIN — SODIUM CHLORIDE 100 MILLILITER(S): 9 INJECTION, SOLUTION INTRAVENOUS at 21:18

## 2021-01-01 RX ADMIN — Medication 120 MILLIGRAM(S): at 14:08

## 2021-01-01 RX ADMIN — CHOLESTYRAMINE 4 GRAM(S): 4 POWDER, FOR SUSPENSION ORAL at 08:36

## 2021-01-01 RX ADMIN — Medication 50 MILLIGRAM(S): at 05:27

## 2021-01-01 RX ADMIN — SODIUM CHLORIDE 1000 MILLILITER(S): 9 INJECTION INTRAMUSCULAR; INTRAVENOUS; SUBCUTANEOUS at 14:47

## 2021-01-01 RX ADMIN — APIXABAN 2.5 MILLIGRAM(S): 2.5 TABLET, FILM COATED ORAL at 10:35

## 2021-01-01 RX ADMIN — Medication 100 MILLIGRAM(S): at 21:02

## 2021-01-01 RX ADMIN — Medication 120 MILLIGRAM(S): at 05:33

## 2021-01-01 RX ADMIN — Medication 120 MILLIGRAM(S): at 13:36

## 2021-01-01 RX ADMIN — Medication 5 MILLIGRAM(S): at 12:50

## 2021-01-01 RX ADMIN — Medication 3 MILLIGRAM(S): at 22:01

## 2021-01-01 RX ADMIN — Medication 3 MILLIGRAM(S): at 21:18

## 2021-01-01 RX ADMIN — Medication 1 APPLICATION(S): at 17:34

## 2021-01-01 RX ADMIN — APIXABAN 2.5 MILLIGRAM(S): 2.5 TABLET, FILM COATED ORAL at 21:35

## 2021-01-01 RX ADMIN — APIXABAN 2.5 MILLIGRAM(S): 2.5 TABLET, FILM COATED ORAL at 10:48

## 2021-01-01 RX ADMIN — Medication 1 APPLICATION(S): at 09:32

## 2021-01-01 RX ADMIN — PREGABALIN 1000 MICROGRAM(S): 225 CAPSULE ORAL at 12:50

## 2021-01-01 RX ADMIN — Medication 5 MILLIGRAM(S): at 12:42

## 2021-01-01 RX ADMIN — Medication 3 MILLIGRAM(S): at 21:08

## 2021-01-01 RX ADMIN — Medication 200 MILLIGRAM(S): at 12:50

## 2021-01-01 RX ADMIN — Medication 25 MILLIGRAM(S): at 11:06

## 2021-01-01 RX ADMIN — Medication 9 MILLIGRAM(S): at 05:03

## 2021-01-01 RX ADMIN — PIPERACILLIN AND TAZOBACTAM 25 GRAM(S): 4; .5 INJECTION, POWDER, LYOPHILIZED, FOR SOLUTION INTRAVENOUS at 05:15

## 2021-01-01 RX ADMIN — Medication 5 MILLIGRAM(S): at 18:46

## 2021-01-01 RX ADMIN — Medication 3 MILLIGRAM(S): at 21:37

## 2021-01-01 RX ADMIN — AMIODARONE HYDROCHLORIDE 400 MILLIGRAM(S): 400 TABLET ORAL at 21:11

## 2021-01-01 RX ADMIN — Medication 100 MILLIGRAM(S): at 05:33

## 2021-01-01 RX ADMIN — Medication 120 MILLIGRAM(S): at 09:27

## 2021-01-01 RX ADMIN — PANTOPRAZOLE SODIUM 40 MILLIGRAM(S): 20 TABLET, DELAYED RELEASE ORAL at 06:07

## 2021-01-01 RX ADMIN — Medication 40 MILLIGRAM(S): at 05:40

## 2021-01-01 RX ADMIN — Medication 650 MILLIGRAM(S): at 16:13

## 2021-01-01 RX ADMIN — Medication 120 MILLIGRAM(S): at 05:41

## 2021-01-01 RX ADMIN — Medication 40 MILLIEQUIVALENT(S): at 18:27

## 2021-01-01 RX ADMIN — Medication 5 MILLIGRAM(S): at 12:05

## 2021-01-01 RX ADMIN — Medication 25 MILLIGRAM(S): at 22:00

## 2021-01-01 RX ADMIN — Medication 25 MILLIGRAM(S): at 14:20

## 2021-01-01 RX ADMIN — APIXABAN 2.5 MILLIGRAM(S): 2.5 TABLET, FILM COATED ORAL at 09:23

## 2021-01-01 RX ADMIN — SODIUM CHLORIDE 100 MILLILITER(S): 9 INJECTION INTRAMUSCULAR; INTRAVENOUS; SUBCUTANEOUS at 18:46

## 2021-01-01 RX ADMIN — Medication 250 MILLIGRAM(S): at 14:47

## 2021-01-01 RX ADMIN — Medication 25 MILLIGRAM(S): at 13:14

## 2021-01-01 RX ADMIN — Medication 100 MILLIGRAM(S): at 05:20

## 2021-01-01 RX ADMIN — Medication 40 MILLIEQUIVALENT(S): at 17:44

## 2021-01-01 RX ADMIN — Medication 1 APPLICATION(S): at 05:33

## 2021-01-01 RX ADMIN — APIXABAN 2.5 MILLIGRAM(S): 2.5 TABLET, FILM COATED ORAL at 21:08

## 2021-01-01 RX ADMIN — PANTOPRAZOLE SODIUM 40 MILLIGRAM(S): 20 TABLET, DELAYED RELEASE ORAL at 05:39

## 2021-01-01 RX ADMIN — PIPERACILLIN AND TAZOBACTAM 25 GRAM(S): 4; .5 INJECTION, POWDER, LYOPHILIZED, FOR SOLUTION INTRAVENOUS at 21:02

## 2021-01-01 RX ADMIN — PIPERACILLIN AND TAZOBACTAM 25 GRAM(S): 4; .5 INJECTION, POWDER, LYOPHILIZED, FOR SOLUTION INTRAVENOUS at 14:53

## 2021-01-01 RX ADMIN — PANTOPRAZOLE SODIUM 40 MILLIGRAM(S): 20 TABLET, DELAYED RELEASE ORAL at 05:28

## 2021-01-01 RX ADMIN — PANTOPRAZOLE SODIUM 40 MILLIGRAM(S): 20 TABLET, DELAYED RELEASE ORAL at 05:03

## 2021-01-01 RX ADMIN — Medication 9 MILLIGRAM(S): at 05:27

## 2021-01-01 RX ADMIN — Medication 5 MILLIGRAM(S): at 08:49

## 2021-01-01 RX ADMIN — AMIODARONE HYDROCHLORIDE 200 MILLIGRAM(S): 400 TABLET ORAL at 05:04

## 2021-01-01 RX ADMIN — Medication 100 MILLIGRAM(S): at 18:52

## 2021-01-01 RX ADMIN — AMIODARONE HYDROCHLORIDE 400 MILLIGRAM(S): 400 TABLET ORAL at 21:49

## 2021-01-01 RX ADMIN — APIXABAN 2.5 MILLIGRAM(S): 2.5 TABLET, FILM COATED ORAL at 09:59

## 2021-01-01 RX ADMIN — PANTOPRAZOLE SODIUM 40 MILLIGRAM(S): 20 TABLET, DELAYED RELEASE ORAL at 06:36

## 2021-01-01 RX ADMIN — Medication 200 MILLIGRAM(S): at 12:42

## 2021-01-01 RX ADMIN — APIXABAN 2.5 MILLIGRAM(S): 2.5 TABLET, FILM COATED ORAL at 21:37

## 2021-01-01 RX ADMIN — Medication 40 MILLIGRAM(S): at 18:17

## 2021-01-01 RX ADMIN — Medication 120 MILLIGRAM(S): at 06:54

## 2021-01-01 RX ADMIN — Medication 100 MILLIGRAM(S): at 18:00

## 2021-01-01 RX ADMIN — Medication 200 MILLIGRAM(S): at 11:06

## 2021-01-01 RX ADMIN — PANTOPRAZOLE SODIUM 40 MILLIGRAM(S): 20 TABLET, DELAYED RELEASE ORAL at 06:25

## 2021-01-01 RX ADMIN — Medication 9 MILLIGRAM(S): at 06:50

## 2021-01-01 RX ADMIN — Medication 5 MILLIGRAM(S): at 12:39

## 2021-01-01 RX ADMIN — APIXABAN 2.5 MILLIGRAM(S): 2.5 TABLET, FILM COATED ORAL at 21:06

## 2021-01-01 RX ADMIN — Medication 1 APPLICATION(S): at 18:45

## 2021-01-01 RX ADMIN — POLYETHYLENE GLYCOL 3350 34 GRAM(S): 17 POWDER, FOR SOLUTION ORAL at 10:19

## 2021-01-01 RX ADMIN — Medication 50 GRAM(S): at 18:26

## 2021-01-01 RX ADMIN — AMIODARONE HYDROCHLORIDE 400 MILLIGRAM(S): 400 TABLET ORAL at 12:37

## 2021-01-01 RX ADMIN — Medication 1 APPLICATION(S): at 18:03

## 2021-01-01 RX ADMIN — Medication 25 MILLIGRAM(S): at 12:39

## 2021-01-01 RX ADMIN — Medication 1 APPLICATION(S): at 18:06

## 2021-01-01 RX ADMIN — Medication 9 MILLIGRAM(S): at 06:36

## 2021-01-01 RX ADMIN — PANTOPRAZOLE SODIUM 40 MILLIGRAM(S): 20 TABLET, DELAYED RELEASE ORAL at 05:33

## 2021-01-01 RX ADMIN — APIXABAN 2.5 MILLIGRAM(S): 2.5 TABLET, FILM COATED ORAL at 21:02

## 2021-01-01 RX ADMIN — Medication 1 APPLICATION(S): at 18:33

## 2021-01-01 RX ADMIN — APIXABAN 2.5 MILLIGRAM(S): 2.5 TABLET, FILM COATED ORAL at 21:50

## 2021-01-01 RX ADMIN — Medication 100 MILLIGRAM(S): at 17:35

## 2021-01-01 RX ADMIN — APIXABAN 2.5 MILLIGRAM(S): 2.5 TABLET, FILM COATED ORAL at 12:01

## 2021-01-01 RX ADMIN — Medication 3 MILLIGRAM(S): at 21:41

## 2021-01-01 RX ADMIN — SODIUM CHLORIDE 100 MILLILITER(S): 9 INJECTION, SOLUTION INTRAVENOUS at 07:59

## 2021-01-01 RX ADMIN — AMIODARONE HYDROCHLORIDE 400 MILLIGRAM(S): 400 TABLET ORAL at 22:00

## 2021-01-01 RX ADMIN — Medication 25 MILLIGRAM(S): at 12:26

## 2021-01-01 RX ADMIN — Medication 5 MILLIGRAM(S): at 14:07

## 2021-01-01 RX ADMIN — Medication 40 MILLIGRAM(S): at 06:20

## 2021-01-01 RX ADMIN — Medication 3 MILLIGRAM(S): at 21:06

## 2021-01-01 RX ADMIN — HEPARIN SODIUM 8 UNIT(S)/HR: 5000 INJECTION INTRAVENOUS; SUBCUTANEOUS at 02:54

## 2021-01-01 RX ADMIN — Medication 100 MILLIGRAM(S): at 18:26

## 2021-01-01 RX ADMIN — Medication 200 MILLIGRAM(S): at 08:49

## 2021-01-01 RX ADMIN — Medication 1 APPLICATION(S): at 05:22

## 2021-01-01 RX ADMIN — SODIUM CHLORIDE 1000 MILLILITER(S): 9 INJECTION INTRAMUSCULAR; INTRAVENOUS; SUBCUTANEOUS at 12:41

## 2021-01-01 RX ADMIN — AMIODARONE HYDROCHLORIDE 400 MILLIGRAM(S): 400 TABLET ORAL at 06:54

## 2021-01-01 RX ADMIN — HEPARIN SODIUM 5 UNIT(S)/HR: 5000 INJECTION INTRAVENOUS; SUBCUTANEOUS at 07:11

## 2021-01-01 RX ADMIN — APIXABAN 2.5 MILLIGRAM(S): 2.5 TABLET, FILM COATED ORAL at 09:56

## 2021-01-01 RX ADMIN — PANTOPRAZOLE SODIUM 40 MILLIGRAM(S): 20 TABLET, DELAYED RELEASE ORAL at 05:22

## 2021-01-01 RX ADMIN — Medication 25 MILLIGRAM(S): at 12:01

## 2021-01-01 RX ADMIN — PANTOPRAZOLE SODIUM 40 MILLIGRAM(S): 20 TABLET, DELAYED RELEASE ORAL at 05:54

## 2021-01-01 RX ADMIN — PANTOPRAZOLE SODIUM 40 MILLIGRAM(S): 20 TABLET, DELAYED RELEASE ORAL at 06:29

## 2021-01-01 RX ADMIN — Medication 25 MILLIGRAM(S): at 12:42

## 2021-01-01 RX ADMIN — Medication 25 MILLIGRAM(S): at 14:08

## 2021-01-01 RX ADMIN — Medication 5 MILLIGRAM(S): at 09:26

## 2021-01-01 RX ADMIN — APIXABAN 2.5 MILLIGRAM(S): 2.5 TABLET, FILM COATED ORAL at 22:02

## 2021-01-01 RX ADMIN — APIXABAN 2.5 MILLIGRAM(S): 2.5 TABLET, FILM COATED ORAL at 21:18

## 2021-01-01 RX ADMIN — Medication 200 MILLIGRAM(S): at 14:07

## 2021-01-01 RX ADMIN — Medication 100 MILLIGRAM(S): at 18:36

## 2021-01-01 RX ADMIN — Medication 50 MILLIGRAM(S): at 05:40

## 2021-01-01 RX ADMIN — PANTOPRAZOLE SODIUM 40 MILLIGRAM(S): 20 TABLET, DELAYED RELEASE ORAL at 05:27

## 2021-01-01 RX ADMIN — Medication 5 MILLIGRAM(S): at 13:34

## 2021-01-01 RX ADMIN — APIXABAN 2.5 MILLIGRAM(S): 2.5 TABLET, FILM COATED ORAL at 22:08

## 2021-01-01 RX ADMIN — Medication 25 MILLIGRAM(S): at 21:02

## 2021-01-01 RX ADMIN — PANTOPRAZOLE SODIUM 40 MILLIGRAM(S): 20 TABLET, DELAYED RELEASE ORAL at 06:50

## 2021-01-01 RX ADMIN — Medication 5 MILLIGRAM(S): at 12:18

## 2021-01-01 RX ADMIN — Medication 75 MILLIGRAM(S): at 05:14

## 2021-01-01 RX ADMIN — Medication 1 APPLICATION(S): at 18:24

## 2021-01-01 RX ADMIN — CEFTRIAXONE 100 MILLIGRAM(S): 500 INJECTION, POWDER, FOR SOLUTION INTRAMUSCULAR; INTRAVENOUS at 21:19

## 2021-01-01 RX ADMIN — PANTOPRAZOLE SODIUM 40 MILLIGRAM(S): 20 TABLET, DELAYED RELEASE ORAL at 06:06

## 2021-01-01 RX ADMIN — Medication 5 MILLIGRAM(S): at 21:45

## 2021-01-01 RX ADMIN — AMIODARONE HYDROCHLORIDE 600 MILLIGRAM(S): 400 TABLET ORAL at 20:27

## 2021-01-01 RX ADMIN — PANTOPRAZOLE SODIUM 40 MILLIGRAM(S): 20 TABLET, DELAYED RELEASE ORAL at 05:32

## 2021-01-01 RX ADMIN — Medication 100 MILLIGRAM(S): at 05:03

## 2021-01-01 RX ADMIN — Medication 5 MILLIGRAM(S): at 14:20

## 2021-01-01 RX ADMIN — Medication 120 MILLIGRAM(S): at 05:43

## 2021-01-01 RX ADMIN — Medication 1 APPLICATION(S): at 17:35

## 2021-01-01 RX ADMIN — Medication 200 MILLIGRAM(S): at 12:18

## 2021-01-01 RX ADMIN — Medication 5 MILLIGRAM(S): at 12:22

## 2021-01-01 RX ADMIN — AMIODARONE HYDROCHLORIDE 400 MILLIGRAM(S): 400 TABLET ORAL at 14:34

## 2021-01-01 RX ADMIN — Medication 5 MILLIGRAM(S): at 11:38

## 2021-01-01 RX ADMIN — Medication 200 MILLIGRAM(S): at 13:35

## 2021-01-01 RX ADMIN — APIXABAN 2.5 MILLIGRAM(S): 2.5 TABLET, FILM COATED ORAL at 10:03

## 2021-01-01 RX ADMIN — HEPARIN SODIUM 5500 UNIT(S): 5000 INJECTION INTRAVENOUS; SUBCUTANEOUS at 17:30

## 2021-01-01 RX ADMIN — Medication 1 APPLICATION(S): at 05:04

## 2021-01-01 RX ADMIN — NYSTATIN CREAM 1 APPLICATION(S): 100000 CREAM TOPICAL at 17:23

## 2021-01-01 RX ADMIN — Medication 20 MILLIGRAM(S): at 12:06

## 2021-01-01 RX ADMIN — Medication 200 MILLIGRAM(S): at 09:59

## 2021-01-01 RX ADMIN — Medication 25 MILLIGRAM(S): at 12:37

## 2021-01-01 RX ADMIN — Medication 5 MILLIGRAM(S): at 12:37

## 2021-01-01 RX ADMIN — PANTOPRAZOLE SODIUM 40 MILLIGRAM(S): 20 TABLET, DELAYED RELEASE ORAL at 05:44

## 2021-01-01 RX ADMIN — Medication 25 MILLIGRAM(S): at 13:35

## 2021-01-01 RX ADMIN — Medication 40 MILLIEQUIVALENT(S): at 10:15

## 2021-01-01 RX ADMIN — Medication 650 MILLIGRAM(S): at 01:40

## 2021-01-01 RX ADMIN — Medication 100 MILLIGRAM(S): at 06:25

## 2021-01-01 RX ADMIN — Medication 1 APPLICATION(S): at 05:54

## 2021-01-01 RX ADMIN — Medication 200 MILLIGRAM(S): at 05:32

## 2021-01-01 RX ADMIN — Medication 9 MILLIGRAM(S): at 06:00

## 2021-01-01 RX ADMIN — Medication 1 APPLICATION(S): at 17:13

## 2021-01-01 RX ADMIN — Medication 40 MILLIEQUIVALENT(S): at 15:10

## 2021-01-01 RX ADMIN — AMIODARONE HYDROCHLORIDE 400 MILLIGRAM(S): 400 TABLET ORAL at 21:09

## 2021-01-01 RX ADMIN — Medication 25 MILLIGRAM(S): at 21:51

## 2021-01-01 RX ADMIN — APIXABAN 2.5 MILLIGRAM(S): 2.5 TABLET, FILM COATED ORAL at 09:25

## 2021-01-01 RX ADMIN — APIXABAN 2.5 MILLIGRAM(S): 2.5 TABLET, FILM COATED ORAL at 09:29

## 2021-01-01 RX ADMIN — APIXABAN 2.5 MILLIGRAM(S): 2.5 TABLET, FILM COATED ORAL at 20:15

## 2021-01-01 RX ADMIN — CEFTRIAXONE 100 MILLIGRAM(S): 500 INJECTION, POWDER, FOR SOLUTION INTRAMUSCULAR; INTRAVENOUS at 22:23

## 2021-01-01 RX ADMIN — Medication 100 MILLIGRAM(S): at 18:07

## 2021-01-01 RX ADMIN — APIXABAN 2.5 MILLIGRAM(S): 2.5 TABLET, FILM COATED ORAL at 09:32

## 2021-01-01 RX ADMIN — SENNA PLUS 2 TABLET(S): 8.6 TABLET ORAL at 21:35

## 2021-01-01 RX ADMIN — Medication 200 MILLIGRAM(S): at 11:19

## 2021-01-01 RX ADMIN — APIXABAN 2.5 MILLIGRAM(S): 2.5 TABLET, FILM COATED ORAL at 21:11

## 2021-01-01 RX ADMIN — Medication 50 MILLIGRAM(S): at 21:18

## 2021-01-01 RX ADMIN — AMIODARONE HYDROCHLORIDE 400 MILLIGRAM(S): 400 TABLET ORAL at 13:27

## 2021-01-01 RX ADMIN — Medication 100 MILLIGRAM(S): at 18:34

## 2021-01-01 RX ADMIN — Medication 50 GRAM(S): at 22:53

## 2021-01-01 RX ADMIN — Medication 120 MILLIGRAM(S): at 05:14

## 2021-01-01 RX ADMIN — Medication 2000 UNIT(S): at 12:50

## 2021-01-01 RX ADMIN — HEPARIN SODIUM 5 UNIT(S)/HR: 5000 INJECTION INTRAVENOUS; SUBCUTANEOUS at 15:47

## 2021-01-01 RX ADMIN — NYSTATIN CREAM 1 APPLICATION(S): 100000 CREAM TOPICAL at 09:19

## 2021-01-01 RX ADMIN — Medication 100 MILLIGRAM(S): at 06:54

## 2021-01-01 RX ADMIN — PIPERACILLIN AND TAZOBACTAM 200 GRAM(S): 4; .5 INJECTION, POWDER, LYOPHILIZED, FOR SOLUTION INTRAVENOUS at 13:02

## 2021-01-01 RX ADMIN — Medication 1 APPLICATION(S): at 06:55

## 2021-01-01 RX ADMIN — AMIODARONE HYDROCHLORIDE 400 MILLIGRAM(S): 400 TABLET ORAL at 21:35

## 2021-01-01 RX ADMIN — Medication 3 MILLIGRAM(S): at 21:36

## 2021-01-01 RX ADMIN — PANTOPRAZOLE SODIUM 40 MILLIGRAM(S): 20 TABLET, DELAYED RELEASE ORAL at 05:14

## 2021-01-01 RX ADMIN — POLYETHYLENE GLYCOL 3350 34 GRAM(S): 17 POWDER, FOR SOLUTION ORAL at 12:38

## 2021-01-01 RX ADMIN — AMIODARONE HYDROCHLORIDE 200 MILLIGRAM(S): 400 TABLET ORAL at 09:45

## 2021-01-01 RX ADMIN — Medication 9 MILLIGRAM(S): at 05:02

## 2021-01-01 RX ADMIN — Medication 120 MILLIGRAM(S): at 15:06

## 2021-01-01 RX ADMIN — POLYETHYLENE GLYCOL 3350 17 GRAM(S): 17 POWDER, FOR SOLUTION ORAL at 12:01

## 2021-01-01 RX ADMIN — Medication 2 MILLIGRAM(S): at 13:13

## 2021-01-01 RX ADMIN — Medication 25 MILLIGRAM(S): at 20:09

## 2021-01-01 RX ADMIN — Medication 200 MILLIGRAM(S): at 09:26

## 2021-01-01 RX ADMIN — Medication 9 MILLIGRAM(S): at 05:53

## 2021-01-01 RX ADMIN — AMIODARONE HYDROCHLORIDE 400 MILLIGRAM(S): 400 TABLET ORAL at 06:24

## 2021-01-01 RX ADMIN — CHLORHEXIDINE GLUCONATE 1 APPLICATION(S): 213 SOLUTION TOPICAL at 20:54

## 2021-01-01 RX ADMIN — Medication 25 MILLIGRAM(S): at 22:53

## 2021-01-01 RX ADMIN — SENNA PLUS 2 TABLET(S): 8.6 TABLET ORAL at 21:42

## 2021-01-01 RX ADMIN — Medication 120 MILLIGRAM(S): at 05:20

## 2021-01-01 RX ADMIN — Medication 200 MILLIGRAM(S): at 11:38

## 2021-01-01 RX ADMIN — Medication 200 MILLIGRAM(S): at 14:21

## 2021-01-01 RX ADMIN — AMIODARONE HYDROCHLORIDE 400 MILLIGRAM(S): 400 TABLET ORAL at 13:02

## 2021-01-01 RX ADMIN — Medication 120 MILLIGRAM(S): at 05:03

## 2021-01-01 RX ADMIN — APIXABAN 2.5 MILLIGRAM(S): 2.5 TABLET, FILM COATED ORAL at 10:18

## 2021-01-01 RX ADMIN — Medication 40 MILLIEQUIVALENT(S): at 12:50

## 2021-01-01 RX ADMIN — Medication 200 MILLIGRAM(S): at 12:40

## 2021-01-01 RX ADMIN — Medication 200 MILLIGRAM(S): at 12:37

## 2021-01-01 RX ADMIN — Medication 1 APPLICATION(S): at 18:36

## 2021-01-01 RX ADMIN — Medication 100 MILLIGRAM(S): at 05:39

## 2021-01-01 RX ADMIN — Medication 9 MILLIGRAM(S): at 06:24

## 2021-01-01 RX ADMIN — Medication 1 APPLICATION(S): at 06:11

## 2021-01-01 RX ADMIN — AMIODARONE HYDROCHLORIDE 400 MILLIGRAM(S): 400 TABLET ORAL at 05:02

## 2021-01-01 RX ADMIN — AMIODARONE HYDROCHLORIDE 400 MILLIGRAM(S): 400 TABLET ORAL at 05:20

## 2021-01-01 RX ADMIN — Medication 3 MILLIGRAM(S): at 21:02

## 2021-01-01 RX ADMIN — Medication 25 MILLIGRAM(S): at 12:19

## 2021-01-01 RX ADMIN — Medication 5 MILLIGRAM(S): at 12:01

## 2021-01-01 RX ADMIN — Medication 200 MILLIGRAM(S): at 12:06

## 2021-01-01 RX ADMIN — Medication 1 APPLICATION(S): at 05:28

## 2021-01-01 RX ADMIN — Medication 50 MILLIGRAM(S): at 18:27

## 2021-01-01 RX ADMIN — PIPERACILLIN AND TAZOBACTAM 25 GRAM(S): 4; .5 INJECTION, POWDER, LYOPHILIZED, FOR SOLUTION INTRAVENOUS at 06:49

## 2021-01-01 RX ADMIN — Medication 9 MILLIGRAM(S): at 05:20

## 2021-01-01 RX ADMIN — Medication 5 MILLIGRAM(S): at 12:25

## 2021-01-01 RX ADMIN — APIXABAN 2.5 MILLIGRAM(S): 2.5 TABLET, FILM COATED ORAL at 10:28

## 2021-01-01 RX ADMIN — APIXABAN 2.5 MILLIGRAM(S): 2.5 TABLET, FILM COATED ORAL at 21:51

## 2021-01-01 RX ADMIN — NYSTATIN CREAM 1 APPLICATION(S): 100000 CREAM TOPICAL at 12:19

## 2021-01-01 RX ADMIN — APIXABAN 2.5 MILLIGRAM(S): 2.5 TABLET, FILM COATED ORAL at 21:09

## 2021-01-01 RX ADMIN — Medication 25 MILLIGRAM(S): at 09:26

## 2021-01-01 RX ADMIN — Medication 40 MILLIEQUIVALENT(S): at 05:28

## 2021-01-01 RX ADMIN — HEPARIN SODIUM 1200 UNIT(S)/HR: 5000 INJECTION INTRAVENOUS; SUBCUTANEOUS at 17:31

## 2021-01-01 RX ADMIN — Medication 5 MILLIGRAM(S): at 11:06

## 2021-01-01 RX ADMIN — SODIUM CHLORIDE 100 MILLILITER(S): 9 INJECTION, SOLUTION INTRAVENOUS at 10:28

## 2021-01-01 RX ADMIN — Medication 120 MILLIGRAM(S): at 12:06

## 2021-01-01 RX ADMIN — Medication 3 MILLIGRAM(S): at 21:10

## 2021-01-01 RX ADMIN — APIXABAN 2.5 MILLIGRAM(S): 2.5 TABLET, FILM COATED ORAL at 09:33

## 2021-01-01 RX ADMIN — Medication 1 APPLICATION(S): at 06:10

## 2021-01-01 RX ADMIN — AMIODARONE HYDROCHLORIDE 400 MILLIGRAM(S): 400 TABLET ORAL at 05:33

## 2021-01-01 RX ADMIN — CEFTRIAXONE 100 MILLIGRAM(S): 500 INJECTION, POWDER, FOR SOLUTION INTRAMUSCULAR; INTRAVENOUS at 23:23

## 2021-01-01 RX ADMIN — SODIUM CHLORIDE 100 MILLILITER(S): 9 INJECTION, SOLUTION INTRAVENOUS at 12:02

## 2021-01-01 RX ADMIN — Medication 5 MILLIGRAM(S): at 18:53

## 2021-01-01 RX ADMIN — APIXABAN 2.5 MILLIGRAM(S): 2.5 TABLET, FILM COATED ORAL at 08:36

## 2021-01-01 RX ADMIN — HEPARIN SODIUM 4 UNIT(S)/HR: 5000 INJECTION INTRAVENOUS; SUBCUTANEOUS at 07:55

## 2021-01-01 RX ADMIN — Medication 3 MILLIGRAM(S): at 21:01

## 2021-01-01 RX ADMIN — Medication 650 MILLIGRAM(S): at 16:01

## 2021-01-01 RX ADMIN — Medication 200 MILLIGRAM(S): at 12:01

## 2021-01-01 RX ADMIN — APIXABAN 2.5 MILLIGRAM(S): 2.5 TABLET, FILM COATED ORAL at 10:01

## 2021-01-01 RX ADMIN — Medication 200 MILLIGRAM(S): at 12:22

## 2021-01-01 RX ADMIN — Medication 1 APPLICATION(S): at 18:07

## 2021-01-01 RX ADMIN — Medication 100 MILLIGRAM(S): at 06:36

## 2021-01-01 RX ADMIN — Medication 120 MILLIGRAM(S): at 06:24

## 2021-01-01 RX ADMIN — Medication 9 MILLIGRAM(S): at 06:07

## 2021-01-01 RX ADMIN — Medication 3 MILLIGRAM(S): at 20:19

## 2021-01-01 RX ADMIN — APIXABAN 2.5 MILLIGRAM(S): 2.5 TABLET, FILM COATED ORAL at 08:49

## 2021-01-01 RX ADMIN — Medication 100 MILLIGRAM(S): at 18:44

## 2021-01-04 ENCOUNTER — RX RENEWAL (OUTPATIENT)
Age: 86
End: 2021-01-04

## 2021-01-06 LAB
ANION GAP SERPL CALC-SCNC: 20 MMOL/L
BUN SERPL-MCNC: 74 MG/DL
CALCIUM SERPL-MCNC: 9.8 MG/DL
CHLORIDE SERPL-SCNC: 89 MMOL/L
CO2 SERPL-SCNC: 27 MMOL/L
CREAT SERPL-MCNC: 2.56 MG/DL
GLUCOSE SERPL-MCNC: 120 MG/DL
POTASSIUM SERPL-SCNC: 3.3 MMOL/L
SODIUM SERPL-SCNC: 136 MMOL/L

## 2021-01-13 ENCOUNTER — RX RENEWAL (OUTPATIENT)
Age: 86
End: 2021-01-13

## 2021-01-19 RX ORDER — TORSEMIDE 20 MG/1
20 TABLET ORAL
Qty: 90 | Refills: 0 | Status: DISCONTINUED | COMMUNITY
Start: 2019-09-13 | End: 2021-01-19

## 2021-01-21 ENCOUNTER — APPOINTMENT (OUTPATIENT)
Dept: GASTROENTEROLOGY | Facility: CLINIC | Age: 86
End: 2021-01-21
Payer: MEDICARE

## 2021-01-21 VITALS
WEIGHT: 155 LBS | TEMPERATURE: 96.8 F | HEIGHT: 62 IN | SYSTOLIC BLOOD PRESSURE: 171 MMHG | BODY MASS INDEX: 28.52 KG/M2 | HEART RATE: 72 BPM | DIASTOLIC BLOOD PRESSURE: 79 MMHG

## 2021-01-21 DIAGNOSIS — R13.10 DYSPHAGIA, UNSPECIFIED: ICD-10-CM

## 2021-01-21 PROCEDURE — 99214 OFFICE O/P EST MOD 30 MIN: CPT

## 2021-01-21 PROCEDURE — 99072 ADDL SUPL MATRL&STAF TM PHE: CPT

## 2021-01-21 NOTE — PHYSICAL EXAM
[General Appearance - Alert] : alert [General Appearance - In No Acute Distress] : in no acute distress [Sclera] : the sclera and conjunctiva were normal [PERRL With Normal Accommodation] : pupils were equal in size, round, and reactive to light [Outer Ear] : the ears and nose were normal in appearance [Nasal Cavity] : the nasal mucosa and septum were normal [Neck Appearance] : the appearance of the neck was normal [Neck Cervical Mass (___cm)] : no neck mass was observed [Auscultation Breath Sounds / Voice Sounds] : lungs were clear to auscultation bilaterally [] : no respiratory distress [Heart Rate And Rhythm] : heart rate was normal and rhythm regular [Heart Sounds] : normal S1 and S2 [Abdomen Soft] : soft [Bowel Sounds] : normal bowel sounds [Abdomen Tenderness] : non-tender [Skin Color & Pigmentation] : normal skin color and pigmentation [Skin Turgor] : normal skin turgor [No Focal Deficits] : no focal deficits [Oriented To Time, Place, And Person] : oriented to person, place, and time [Impaired Insight] : insight and judgment were intact

## 2021-01-21 NOTE — HISTORY OF PRESENT ILLNESS
[Heartburn] : stable heartburn [Nausea] : denies nausea [Vomiting] : denies vomiting [Diarrhea] : improved diarrhea [Constipation] : denies constipation [Yellow Skin Or Eyes (Jaundice)] : denies jaundice [Abdominal Pain] : denies abdominal pain [_________] : Performed [unfilled] [de-identified] : Virginia presents to the office for follow up with complaints of diarrhea, which has resolved.  She was last seen in the office in October 2020.\par \par After her last visit, she reports that she was having loose stools for several months.  Taking cholestyramine did not help.  She was having diarrhea 1-2 times a day with urgency.  She denies any abdominal pain, fever, nausea, or GI bleeding.  She had not changed her diet or medications.  For the past several weeks, her stools have been normal again.  She has noted however, that she is more flatulent than usual.  The patient has a history of collagenous colitis, diagnosed in August 2016. \par \par PMH:  In 2016, the patient had been having diarrhea for 7 months and was diagnosed with collagenous colitis on a colonoscopy.  In 2020, the patient underwent evaluation for dysphagia.  An esophagram in July 2020 showed mild tertiary contractions, a diverticulum at the GE junction, and inability of the barium tablet to pass the GE junction although the barium did without difficulty.  She then underwent an EGD at [facility] as an outpatient in August 2020 which demonstrated inflammation at the GE junction and gastric polyps. Biopsies revealed eosinophils in the esophagus, reflux esophagitis with evidence for Bradley's, and fundic polyps.  After the EGD, she has been using omeprazole 40 mg twice daily and reports that she feels better. [de-identified] : fatty liver, ?cirrhosis, pleural effusions [de-identified] : eosinophils in esophageal nodules, reflux with Bradley's, fundic polyps [de-identified] : collagenous colitis

## 2021-01-21 NOTE — ASSESSMENT
[FreeTextEntry1] : 1.  Recent diarrhea, resolved.  Likely exacerbation of microscopic colitis vs gastroenteritis.  Collagenous colitis diagnosed 2016; history of colonic polyps, family history of colon cancer (sister); hemoperitoneum after colonoscopy 2008.\par 2.  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  Esophagram in July 2020 with mild tertiary contractions, reflux, and distal esophageal diverticulum.\par 3.  History of diastolic CHF, atrial fibrillation; status post mitral valve annuloplasty, aortic and mitral insufficiency, pulmonary hypertension--maintained on Eliquis + ASA.\par 4.  Chronic renal insufficiency with history of renal failure secondary to over diuresis.\par 5.  Hypertension.\par 6.  Hyperlipidemia.\par 7.  Osteoarthritis, osteoporosis.\par 8.  Localized right lower quadrant pain and tenderness-  CT A/P in July 2020 with fatty liver, ?cirrhosis, small bilateral pleural effusion.\par 9.  Status post epigastric ventral hernia repair, hysterectomy.\par 10.  Allergic to niacin, -statins, amlodipine, adhesive tape, iodine dye.\par \par Plan:\par - Prior colonoscopy reviewed.\par - Continue cholestyramine.\par - Given resolution of diarrhea, no further treatment recommended.  If diarrhea recurs, would use budesonide taper.\par - Consider simethicone with meals to see if flatulence improves.\par

## 2021-01-21 NOTE — REVIEW OF SYSTEMS
[Eyesight Problems] : eyesight problems [Nosebleeds] : nosebleeds [Sore Throat] : sore throat [Leg Claudication] : intermittent leg claudication [Lower Ext Edema] : lower extremity edema [Shortness Of Breath] : shortness of breath [Wheezing] : wheezing [Heartburn] : heartburn [Incontinence] : incontinence [Arthralgias] : arthralgias [Joint Pain] : joint pain [Joint Stiffness] : joint stiffness [Itching] : itching [Dizziness] : dizziness [Limb Weakness] : limb weakness [Difficulty Walking] : difficulty walking [Anxiety] : anxiety [Feelings Of Weakness] : feelings of weakness [Depression] : depression [Negative] : Heme/Lymph

## 2021-01-31 LAB
ANION GAP SERPL CALC-SCNC: 24 MMOL/L
BUN SERPL-MCNC: 29 MG/DL
CALCIUM SERPL-MCNC: 9.8 MG/DL
CHLORIDE SERPL-SCNC: 105 MMOL/L
CO2 SERPL-SCNC: 20 MMOL/L
CREAT SERPL-MCNC: 1.64 MG/DL
GLUCOSE SERPL-MCNC: 110 MG/DL
POTASSIUM SERPL-SCNC: 4.2 MMOL/L
SODIUM SERPL-SCNC: 149 MMOL/L

## 2021-02-22 ENCOUNTER — INPATIENT (INPATIENT)
Facility: HOSPITAL | Age: 86
LOS: 1 days | Discharge: HOME | DRG: 312 | End: 2021-02-24
Attending: INTERNAL MEDICINE | Admitting: INTERNAL MEDICINE
Payer: MEDICARE

## 2021-02-22 VITALS
WEIGHT: 149.91 LBS | RESPIRATION RATE: 16 BRPM | SYSTOLIC BLOOD PRESSURE: 160 MMHG | OXYGEN SATURATION: 96 % | TEMPERATURE: 98 F | HEART RATE: 74 BPM | DIASTOLIC BLOOD PRESSURE: 72 MMHG | HEIGHT: 62 IN

## 2021-02-22 DIAGNOSIS — Z90.49 ACQUIRED ABSENCE OF OTHER SPECIFIED PARTS OF DIGESTIVE TRACT: Chronic | ICD-10-CM

## 2021-02-22 DIAGNOSIS — Z29.9 ENCOUNTER FOR PROPHYLACTIC MEASURES, UNSPECIFIED: ICD-10-CM

## 2021-02-22 DIAGNOSIS — N17.9 ACUTE KIDNEY FAILURE, UNSPECIFIED: ICD-10-CM

## 2021-02-22 DIAGNOSIS — Z98.89 OTHER SPECIFIED POSTPROCEDURAL STATES: Chronic | ICD-10-CM

## 2021-02-22 DIAGNOSIS — I48.20 CHRONIC ATRIAL FIBRILLATION, UNSPECIFIED: ICD-10-CM

## 2021-02-22 DIAGNOSIS — E86.0 DEHYDRATION: ICD-10-CM

## 2021-02-22 DIAGNOSIS — I95.1 ORTHOSTATIC HYPOTENSION: ICD-10-CM

## 2021-02-22 DIAGNOSIS — E87.6 HYPOKALEMIA: ICD-10-CM

## 2021-02-22 DIAGNOSIS — R55 SYNCOPE AND COLLAPSE: ICD-10-CM

## 2021-02-22 LAB
ALBUMIN SERPL ELPH-MCNC: 4.4 G/DL — SIGNIFICANT CHANGE UP (ref 3.3–5)
ALP SERPL-CCNC: 185 U/L — HIGH (ref 40–120)
ALT FLD-CCNC: 28 U/L — SIGNIFICANT CHANGE UP (ref 10–45)
ANION GAP SERPL CALC-SCNC: 13 MMOL/L — SIGNIFICANT CHANGE UP (ref 5–17)
ANION GAP SERPL CALC-SCNC: 17 MMOL/L — SIGNIFICANT CHANGE UP (ref 5–17)
AST SERPL-CCNC: 31 U/L — SIGNIFICANT CHANGE UP (ref 10–40)
BASOPHILS # BLD AUTO: 0.09 K/UL — SIGNIFICANT CHANGE UP (ref 0–0.2)
BASOPHILS NFR BLD AUTO: 0.8 % — SIGNIFICANT CHANGE UP (ref 0–2)
BILIRUB SERPL-MCNC: 0.3 MG/DL — SIGNIFICANT CHANGE UP (ref 0.2–1.2)
BUN SERPL-MCNC: 75 MG/DL — HIGH (ref 7–23)
BUN SERPL-MCNC: 80 MG/DL — HIGH (ref 7–23)
CALCIUM SERPL-MCNC: 10.1 MG/DL — SIGNIFICANT CHANGE UP (ref 8.4–10.5)
CALCIUM SERPL-MCNC: 9.5 MG/DL — SIGNIFICANT CHANGE UP (ref 8.4–10.5)
CHLORIDE SERPL-SCNC: 89 MMOL/L — LOW (ref 96–108)
CHLORIDE SERPL-SCNC: 92 MMOL/L — LOW (ref 96–108)
CO2 SERPL-SCNC: 31 MMOL/L — SIGNIFICANT CHANGE UP (ref 22–31)
CO2 SERPL-SCNC: 31 MMOL/L — SIGNIFICANT CHANGE UP (ref 22–31)
CREAT SERPL-MCNC: 2.21 MG/DL — HIGH (ref 0.5–1.3)
CREAT SERPL-MCNC: 2.35 MG/DL — HIGH (ref 0.5–1.3)
EOSINOPHIL # BLD AUTO: 0.12 K/UL — SIGNIFICANT CHANGE UP (ref 0–0.5)
EOSINOPHIL NFR BLD AUTO: 1.1 % — SIGNIFICANT CHANGE UP (ref 0–6)
GLUCOSE SERPL-MCNC: 104 MG/DL — HIGH (ref 70–99)
GLUCOSE SERPL-MCNC: 118 MG/DL — HIGH (ref 70–99)
HCT VFR BLD CALC: 40.2 % — SIGNIFICANT CHANGE UP (ref 34.5–45)
HGB BLD-MCNC: 12.4 G/DL — SIGNIFICANT CHANGE UP (ref 11.5–15.5)
IMM GRANULOCYTES NFR BLD AUTO: 0.9 % — SIGNIFICANT CHANGE UP (ref 0–1.5)
LYMPHOCYTES # BLD AUTO: 1.55 K/UL — SIGNIFICANT CHANGE UP (ref 1–3.3)
LYMPHOCYTES # BLD AUTO: 14 % — SIGNIFICANT CHANGE UP (ref 13–44)
MCHC RBC-ENTMCNC: 27.2 PG — SIGNIFICANT CHANGE UP (ref 27–34)
MCHC RBC-ENTMCNC: 30.8 GM/DL — LOW (ref 32–36)
MCV RBC AUTO: 88.2 FL — SIGNIFICANT CHANGE UP (ref 80–100)
MONOCYTES # BLD AUTO: 0.92 K/UL — HIGH (ref 0–0.9)
MONOCYTES NFR BLD AUTO: 8.3 % — SIGNIFICANT CHANGE UP (ref 2–14)
NEUTROPHILS # BLD AUTO: 8.28 K/UL — HIGH (ref 1.8–7.4)
NEUTROPHILS NFR BLD AUTO: 74.9 % — SIGNIFICANT CHANGE UP (ref 43–77)
NRBC # BLD: 0 /100 WBCS — SIGNIFICANT CHANGE UP (ref 0–0)
PLATELET # BLD AUTO: 323 K/UL — SIGNIFICANT CHANGE UP (ref 150–400)
POTASSIUM SERPL-MCNC: 2.8 MMOL/L — CRITICAL LOW (ref 3.5–5.3)
POTASSIUM SERPL-MCNC: 4 MMOL/L — SIGNIFICANT CHANGE UP (ref 3.5–5.3)
POTASSIUM SERPL-SCNC: 2.8 MMOL/L — CRITICAL LOW (ref 3.5–5.3)
POTASSIUM SERPL-SCNC: 4 MMOL/L — SIGNIFICANT CHANGE UP (ref 3.5–5.3)
PROT SERPL-MCNC: 8.4 G/DL — HIGH (ref 6–8.3)
RBC # BLD: 4.56 M/UL — SIGNIFICANT CHANGE UP (ref 3.8–5.2)
RBC # FLD: 13.9 % — SIGNIFICANT CHANGE UP (ref 10.3–14.5)
SARS-COV-2 RNA SPEC QL NAA+PROBE: SIGNIFICANT CHANGE UP
SODIUM SERPL-SCNC: 136 MMOL/L — SIGNIFICANT CHANGE UP (ref 135–145)
SODIUM SERPL-SCNC: 137 MMOL/L — SIGNIFICANT CHANGE UP (ref 135–145)
TROPONIN T, HIGH SENSITIVITY RESULT: 17 NG/L — SIGNIFICANT CHANGE UP (ref 0–51)
TROPONIN T, HIGH SENSITIVITY RESULT: 19 NG/L — SIGNIFICANT CHANGE UP (ref 0–51)
WBC # BLD: 11.06 K/UL — HIGH (ref 3.8–10.5)
WBC # FLD AUTO: 11.06 K/UL — HIGH (ref 3.8–10.5)

## 2021-02-22 PROCEDURE — 70450 CT HEAD/BRAIN W/O DYE: CPT | Mod: 26,MA

## 2021-02-22 PROCEDURE — 99223 1ST HOSP IP/OBS HIGH 75: CPT

## 2021-02-22 PROCEDURE — 71045 X-RAY EXAM CHEST 1 VIEW: CPT | Mod: 26

## 2021-02-22 PROCEDURE — 99285 EMERGENCY DEPT VISIT HI MDM: CPT

## 2021-02-22 RX ORDER — ASPIRIN/CALCIUM CARB/MAGNESIUM 324 MG
324 TABLET ORAL ONCE
Refills: 0 | Status: COMPLETED | OUTPATIENT
Start: 2021-02-22 | End: 2021-02-22

## 2021-02-22 RX ORDER — PANTOPRAZOLE SODIUM 20 MG/1
40 TABLET, DELAYED RELEASE ORAL
Refills: 0 | Status: DISCONTINUED | OUTPATIENT
Start: 2021-02-22 | End: 2021-01-01

## 2021-02-22 RX ORDER — CHOLESTYRAMINE 4 G/9G
4 POWDER, FOR SUSPENSION ORAL DAILY
Refills: 0 | Status: DISCONTINUED | OUTPATIENT
Start: 2021-02-22 | End: 2021-01-01

## 2021-02-22 RX ORDER — GABAPENTIN 400 MG/1
1 CAPSULE ORAL
Qty: 0 | Refills: 0 | DISCHARGE

## 2021-02-22 RX ORDER — MULTIVIT-MIN/FERROUS GLUCONATE 9 MG/15 ML
1 LIQUID (ML) ORAL
Qty: 0 | Refills: 0 | DISCHARGE

## 2021-02-22 RX ORDER — APIXABAN 2.5 MG/1
2.5 TABLET, FILM COATED ORAL EVERY 12 HOURS
Refills: 0 | Status: DISCONTINUED | OUTPATIENT
Start: 2021-02-22 | End: 2021-01-01

## 2021-02-22 RX ORDER — ALLOPURINOL 300 MG
200 TABLET ORAL DAILY
Refills: 0 | Status: DISCONTINUED | OUTPATIENT
Start: 2021-02-22 | End: 2021-01-01

## 2021-02-22 RX ORDER — ALENDRONATE SODIUM 70 MG/1
1 TABLET ORAL
Qty: 0 | Refills: 0 | DISCHARGE

## 2021-02-22 RX ORDER — CHOLECALCIFEROL (VITAMIN D3) 125 MCG
2000 CAPSULE ORAL DAILY
Refills: 0 | Status: DISCONTINUED | OUTPATIENT
Start: 2021-02-22 | End: 2021-01-01

## 2021-02-22 RX ORDER — METOPROLOL TARTRATE 50 MG
200 TABLET ORAL DAILY
Refills: 0 | Status: DISCONTINUED | OUTPATIENT
Start: 2021-02-22 | End: 2021-01-01

## 2021-02-22 RX ORDER — POTASSIUM CHLORIDE 20 MEQ
40 PACKET (EA) ORAL ONCE
Refills: 0 | Status: COMPLETED | OUTPATIENT
Start: 2021-02-22 | End: 2021-02-22

## 2021-02-22 RX ORDER — PANTOPRAZOLE SODIUM 20 MG/1
1 TABLET, DELAYED RELEASE ORAL
Qty: 0 | Refills: 0 | DISCHARGE

## 2021-02-22 RX ORDER — PREGABALIN 225 MG/1
1000 CAPSULE ORAL DAILY
Refills: 0 | Status: DISCONTINUED | OUTPATIENT
Start: 2021-02-22 | End: 2021-01-01

## 2021-02-22 RX ORDER — POTASSIUM CHLORIDE 20 MEQ
10 PACKET (EA) ORAL
Refills: 0 | Status: COMPLETED | OUTPATIENT
Start: 2021-02-22 | End: 2021-02-22

## 2021-02-22 RX ORDER — AMLODIPINE BESYLATE 2.5 MG/1
1 TABLET ORAL
Qty: 0 | Refills: 0 | DISCHARGE

## 2021-02-22 RX ORDER — AMITRIPTYLINE HCL 25 MG
25 TABLET ORAL DAILY
Refills: 0 | Status: DISCONTINUED | OUTPATIENT
Start: 2021-02-22 | End: 2021-01-01

## 2021-02-22 RX ORDER — OXYBUTYNIN CHLORIDE 5 MG
5 TABLET ORAL DAILY
Refills: 0 | Status: DISCONTINUED | OUTPATIENT
Start: 2021-02-22 | End: 2021-01-01

## 2021-02-22 RX ORDER — METOPROLOL TARTRATE 50 MG
1 TABLET ORAL
Qty: 0 | Refills: 0 | DISCHARGE

## 2021-02-22 RX ADMIN — Medication 100 MILLIEQUIVALENT(S): at 15:44

## 2021-02-22 RX ADMIN — Medication 100 MILLIEQUIVALENT(S): at 17:58

## 2021-02-22 RX ADMIN — Medication 10 MILLIEQUIVALENT(S): at 17:58

## 2021-02-22 RX ADMIN — APIXABAN 2.5 MILLIGRAM(S): 2.5 TABLET, FILM COATED ORAL at 22:07

## 2021-02-22 RX ADMIN — Medication 25 MILLIGRAM(S): at 23:06

## 2021-02-22 RX ADMIN — Medication 40 MILLIEQUIVALENT(S): at 15:44

## 2021-02-22 RX ADMIN — Medication 10 MILLIEQUIVALENT(S): at 17:41

## 2021-02-22 RX ADMIN — Medication 100 MILLIEQUIVALENT(S): at 16:47

## 2021-02-22 NOTE — ED PROVIDER NOTE - NS ED ROS FT
CONSTITUTIONAL: No fevers, no chills  EYES: no visual changes, no eye pain  EARS: no ear drainage, no ear pain, no change in hearing  NOSE: no nasal congestion  MOUTH/THROAT: no sore throat  CV: No chest pain, no palpitations  RESP: No SOB, no cough  GI: No n/v/d, no abd pain  : no dysuria, no hematuria, no flank pain  MSK: no back pain, no extremity pain  SKIN: no rashes  NEURO: no headache, no focal weakness, no decreased sensation/paresthesias

## 2021-02-22 NOTE — H&P ADULT - NSHPPHYSICALEXAM_GEN_ALL_CORE
PHYSICAL EXAM:  Vital Signs Last 24 Hrs  T(C): 36.7 (22 Feb 2021 21:15), Max: 36.8 (22 Feb 2021 13:01)  T(F): 98 (22 Feb 2021 21:15), Max: 98.3 (22 Feb 2021 13:01)  HR: 80 (22 Feb 2021 21:15) (74 - 81)  BP: 131/60 (22 Feb 2021 21:15) (131/60 - 165/81)  BP(mean): 87 (22 Feb 2021 20:29) (87 - 87)  RR: 17 (22 Feb 2021 21:15) (16 - 18)  SpO2: 96% (22 Feb 2021 21:15) (96% - 99%)  GENERAL: NAD, well-groomed, well-developed  HEAD:  Atraumatic, Normocephalic  EYES: EOMI, PERRLA, conjunctiva and sclera clear  ENMT: No tonsillar erythema, exudates, or enlargement; Moist mucous membranes, Good dentition, No lesions  NECK: Supple, No JVD, Normal thyroid  CHEST/LUNG: Clear to percussion bilaterally; No rales, rhonchi, wheezing, or rubs no resp distress or acc. muscle usage.  HEART: Regular rate and rhythm; No murmurs, rubs, or gallops, no sig Le edema  ABDOMEN: Soft, Nontender, Nondistended; Bowel sounds present, no rebound/guarding  EXTREMITIES:  2+ Peripheral Pulses, No clubbing, cyanosis  LYMPH: No lymphadenopathy noted, no lymphangitis  SKIN: No rashes or lesions, no petechiae  NERVOUS SYSTEM:  Alert & Oriented X3, Good concentration; Motor Strength 5/5 B/L upper and lower extremities  PSYCH: no si no hi normal affect.

## 2021-02-22 NOTE — ED PROVIDER NOTE - OBJECTIVE STATEMENT
86yo F hx htn CHF, afib on eliquis, mitral valve repair p/w near syncope from doctors office today where she stood up and felt like she was going to pass out. Admits to similar episode at home where she stood up and syncopized and rapidly came back to baseline. Notes that over the alst several years of being on a diuretic, she has similar lightheadedness upon standing. No recent changes to meds. Thinks shes not drinking enough fluids to keep up with her diuretics. Has no complaints currently. W/ EMS her bp was 140 sitting and 110s standing. 84yo F hx htn CHF, afib on eliquis, mitral valve repair p/w near syncope from doctors office today where she stood up and felt like she was going to pass out. Admits to similar episode at home where she stood up and syncopized and rapidly came back to baseline. Notes that over the alst several years of being on a diuretic, she has similar lightheadedness upon standing. Thinks shes not drinking enough fluids to keep up with her diuretics. Has no complaints currently. W/ EMS her bp was 140 sitting and 110s standing.

## 2021-02-22 NOTE — ED PROVIDER NOTE - CLINICAL SUMMARY MEDICAL DECISION MAKING FREE TEXT BOX
PMX as noted w/ syncope and frequent near syncopal episodes in setting of orthostasis and diuretics. Appears well. No s/s fluid overload. Will check labs for low suspicion acs, CTH, CXR and touch base with her cards PMX as noted w/ syncope and frequent near syncopal episodes in setting of orthostasis and diuretics. Appears well. No s/s fluid overload. Will check labs for low suspicion acs, CTH, CXR and touch base with her cardiologist.  Very judicious fluids given h/o congestive heart failure/HFrEF history.  May require admission.  --BMM

## 2021-02-22 NOTE — H&P ADULT - ASSESSMENT
85 F PMH CHF, AF on Eliquis, MVR, who p/w syncope in setting of suspected overdiuresis and WEN/dehydration.  85 F PMH CHF, AF on Eliquis, MVR, ckd3 who p/w syncope in setting of suspected overdiuresis and WEN/dehydration.

## 2021-02-22 NOTE — H&P ADULT - NSHPLABSRESULTS_GEN_ALL_CORE
Labs, imaging  personally reviewed by me.       Labs notable for hypokalemia- K 2.8, improved to 4.  BUN elevated at 80, SCr elevated at 2.21  Alk phos elevated at 185.  [facility] found no significant change from 11/2016. No ICH noted.  CXR- clear lungs  COVID 19 PCR negative.     LABS:                        12.4   11.06 )-----------( 323      ( 22 Feb 2021 13:58 )             40.2     Hgb Trend: 12.4<--  02-22    136  |  92<L>  |  75<H>  ----------------------------<  118<H>  4.0   |  31  |  2.35<H>    Ca    9.5      22 Feb 2021 19:19    TPro  8.4<H>  /  Alb  4.4  /  TBili  0.3  /  DBili  x   /  AST  31  /  ALT  28  /  AlkPhos  185<H>  02-22    Creatinine Trend: 2.35<--, 2.21<--
Labs personally reviewed: significant for hypokalemia 2.8 corrected to 4.0 with repletion, WEN scr 2.2 b/l 1.7  Imaging personally reviewed CTH unchanged from prior.  CXR clear lungs.   EKG personally reviewed

## 2021-02-22 NOTE — H&P ADULT - HISTORY OF PRESENT ILLNESS
85 F PMH CHF, AF on Eliquis MVR, who p/w syncope.  Pt notes that she had a syncopal episode on 2/19 when she got up to walk to kitchen. +LOC, down for few seconds. Couldn't get up on own, had to call neighbor who called her son in law to help her up.  Able to ambulate afterwards without issue/pain.  No preceding chest pain. +dizziness at times. Pt notes that last week, for about 3-4 days, she was told by her cardiologist to increase her diuretics.  Ordinarily takes torsemide 40 qd, was told to start spironolactone 25 + metolazone 5 in addition. Endorses not eating/drinking as much over last few days 2/2 lost appetite.  Pt endorses prior syncopal episodes 3x in her life; once at age 16, preceded by "throat quivering", once at a wedding preceded by eoth intake, and once at her other grandson's house while standing.  85 F PMH CHF, AF on Eliquis, MVR, who p/w syncope.  Pt notes that she had a syncopal episode on 2/19 when she got up to walk to kitchen. +LOC, down for few seconds. Couldn't get up on own, had to call neighbor who called her son in law to help her up.  Able to ambulate afterwards without issue/pain.  No preceding chest pain. +dizziness at times. Pt notes that last week, for about 3-4 days, she was told by her cardiologist to increase her diuretics.  Ordinarily takes torsemide 40 qd, was told to start spironolactone 25 + metolazone 5 in addition. Endorses not eating/drinking as much over last few days 2/2 lost appetite.  Pt endorses prior syncopal episodes 3x in her life; once at age 16, preceded by "throat quivering", once at a wedding preceded by eoth intake, and once at her other grandson's house while standing. Pt endorses intermittent sob/chest discomfort related to her known AF/CHF, but notes she has not been feeling this preceding her syncope.  She notes her edema has resolved with her diuretics and she is not dyspneic, denies orthopnea.      98.2 85 F PMH CHF, AF on Eliquis, MVR, ckd3 who p/w syncope.  Pt notes that she had a syncopal episode on 2/19 when she got up to walk to kitchen. +LOC, down for few seconds. Couldn't get up on own, had to call neighbor who called her son in law to help her up.  Able to ambulate afterwards without issue/pain.  No preceding chest pain. +dizziness at times. Pt notes that last week, for about 3-4 days, she was told by her cardiologist to increase her diuretics.  Ordinarily takes torsemide 40 qd, was told to start spironolactone 25 + metolazone 5 in addition. Endorses not eating/drinking as much over last few days 2/2 lost appetite.  Pt endorses prior syncopal episodes 3x in her life; once at age 16, preceded by "throat quivering", once at a wedding preceded by eoth intake, and once at her other grandson's house while standing. Pt endorses intermittent sob/chest discomfort related to her known AF/CHF, but notes she has not been feeling this preceding her syncope.  She notes her edema has resolved with her diuretics and she is not dyspneic, denies orthopnea.

## 2021-02-22 NOTE — H&P ADULT - ATTENDING COMMENTS
Care to be assumed by Dr. Nasima Treviño at 8 am.  This patient was assigned to me by the hospitalist in charge; my involvement in this case has consisted of the initial history, physical, chart review, and management plan.  This patient was previously unknown to me.

## 2021-02-22 NOTE — ED PROVIDER NOTE - PHYSICAL EXAMINATION
Gen: Well appearing, NAD  Head: NCAT  HEENT: PERRL, MMM, normal conjunctiva, anicteric, neck supple  Lung: CTAB, no adventitious sounds  CV: RRR, no murmurs  Abd: soft, NTND, no rebound or guarding, no CVAT  MSK: No edema, no visible deformities  Neuro: Motor strength grossly intact, no obvious focal deficits  Skin: Warm and dry, no evidence of rash  Psych: normal mood and affect

## 2021-02-22 NOTE — ED PROVIDER NOTE - PROGRESS NOTE DETAILS
Joselo Cannon DO PGY3 - Per her cardiologist, she has been difficult to manage her diuresis as she frequently alternates between fluid overload and hypovolemic/dry in setting of diastolic dysfunction. Recently increased her dose of diuretics as was recently fluid overloaded. Will call back when labs resulted.

## 2021-02-22 NOTE — H&P ADULT - NSICDXPASTSURGICALHX_GEN_ALL_CORE_FT
PAST SURGICAL HISTORY:  Abdominal hernia repair 2007    Bilateral cataracts extraction w/ IOL    H/O carotid endarterectomy     History of lumbar laminectomy for spinal cord decompression 1995    Hx of tonsillectomy     Papilloma of breast s/p excision from right breast >20 years ago    S/P laparoscopic cholecystectomy     S/P MVR (mitral valve repair) 1997 at [facility]    Status post DACIA-BSO 1975    Varicose veins s/p sclerotherapy bilaterally

## 2021-02-22 NOTE — H&P ADULT - PROBLEM SELECTOR PLAN 1
-in setting of overdiuresis/ellen/dehydration  -re-check orthostatics  -fall prec  -TTE  -cards consult  -HST delta neg no cp low suspicion for acs  -orthostatic prec reviewed with patient. -in setting of overdiuresis/ellen/dehydration  -re-check orthostatics  -fall prec  -TTE  -cards consult  -HST delta neg no cp low suspicion for acs  -orthostatic prec reviewed with patient.  -tele overnight if no events can d/c

## 2021-02-22 NOTE — H&P ADULT - PROBLEM SELECTOR PLAN 3
-likely prerenal in setting of overdiuresis; known ckd3  -holding diuretics  -trend bmp  -avoid nephrotoxins  -renally dose meds.  -renal consult if uptrending scr

## 2021-02-22 NOTE — ED ADULT NURSE NOTE - OBJECTIVE STATEMENT
85y female arrived to ED complaining of lightheadedness. Patient PMHx afib on Eliquis, CHF, Mitral Valve repair. Patient had near syncopal episode today while at the lab prior to a blood draw when she was moving from a seated position to standing. Patient has a syncopal episode at home a few nights ago, denies any injury but did not want to receive medical attention at the time. Patient expresses concern that she does not drink enough fluids while on her diuretics and has had similar lightheadedness since taking her medications. Patient A&Ox4, ambulates independently, VS stable in ED - EMS reports patient was systolic 140s sitting and 110s standing. Denies CP, SOB, n/v/d, abd pain, chills, fever.

## 2021-02-22 NOTE — H&P ADULT - NSHPSOCIALHISTORY_GEN_ALL_CORE
Social History:    Occupation: retired [employer]   Lives with: ( x ) alone  (  ) children   (  ) spouse   (  ) parents  (  ) other    Substance Use (street drugs): (  x) never used  (  ) other:  Tobacco Usage:  (   ) never smoked   (x   ) former smoker   (   ) current smoker  (     ) pack year  (        ) last cigarette date  Alcohol Usage: daily 3 drinks of wine

## 2021-02-22 NOTE — PATIENT PROFILE ADULT - NSPROPOAURINARYCATHETER_GEN_A_NUR
PMR is recommending subacute rehab for the pt. MAURICIO spoke with the pt's wife who would like a referral to [facility]. Referral has been made. Psych consult has been requested and a PAS screen has also been requested.   The pt.'s wife is aware no

## 2021-02-22 NOTE — H&P ADULT - PROBLEM SELECTOR PLAN 6
on eliquis on eliquis  -pt endorses 3 drinks of wine daily, stating "I look forward to 4 oclock every day."  no s/s of withdrawal currently.  will start low risk ciwa sx triggered, and can d/c if ciwa scores low.

## 2021-02-23 LAB
ALBUMIN SERPL ELPH-MCNC: 3.8 G/DL — SIGNIFICANT CHANGE UP (ref 3.3–5)
ALP SERPL-CCNC: 156 U/L — HIGH (ref 40–120)
ALT FLD-CCNC: 23 U/L — SIGNIFICANT CHANGE UP (ref 10–45)
ANION GAP SERPL CALC-SCNC: 12 MMOL/L — SIGNIFICANT CHANGE UP (ref 5–17)
ANION GAP SERPL CALC-SCNC: 14 MMOL/L — SIGNIFICANT CHANGE UP (ref 5–17)
APPEARANCE UR: CLEAR — SIGNIFICANT CHANGE UP
AST SERPL-CCNC: 27 U/L — SIGNIFICANT CHANGE UP (ref 10–40)
BACTERIA # UR AUTO: NEGATIVE — SIGNIFICANT CHANGE UP
BASOPHILS # BLD AUTO: 0.07 K/UL — SIGNIFICANT CHANGE UP (ref 0–0.2)
BASOPHILS NFR BLD AUTO: 0.8 % — SIGNIFICANT CHANGE UP (ref 0–2)
BILIRUB SERPL-MCNC: 0.2 MG/DL — SIGNIFICANT CHANGE UP (ref 0.2–1.2)
BILIRUB UR-MCNC: NEGATIVE — SIGNIFICANT CHANGE UP
BUN SERPL-MCNC: 67 MG/DL — HIGH (ref 7–23)
BUN SERPL-MCNC: 72 MG/DL — HIGH (ref 7–23)
CALCIUM SERPL-MCNC: 9.5 MG/DL — SIGNIFICANT CHANGE UP (ref 8.4–10.5)
CALCIUM SERPL-MCNC: 9.8 MG/DL — SIGNIFICANT CHANGE UP (ref 8.4–10.5)
CHLORIDE SERPL-SCNC: 92 MMOL/L — LOW (ref 96–108)
CHLORIDE SERPL-SCNC: 95 MMOL/L — LOW (ref 96–108)
CO2 SERPL-SCNC: 29 MMOL/L — SIGNIFICANT CHANGE UP (ref 22–31)
CO2 SERPL-SCNC: 31 MMOL/L — SIGNIFICANT CHANGE UP (ref 22–31)
COLOR SPEC: SIGNIFICANT CHANGE UP
CREAT ?TM UR-MCNC: 71 MG/DL — SIGNIFICANT CHANGE UP
CREAT SERPL-MCNC: 1.88 MG/DL — HIGH (ref 0.5–1.3)
CREAT SERPL-MCNC: 2.16 MG/DL — HIGH (ref 0.5–1.3)
DIFF PNL FLD: ABNORMAL
EOSINOPHIL # BLD AUTO: 0.3 K/UL — SIGNIFICANT CHANGE UP (ref 0–0.5)
EOSINOPHIL NFR BLD AUTO: 3.2 % — SIGNIFICANT CHANGE UP (ref 0–6)
EPI CELLS # UR: 1 /HPF — SIGNIFICANT CHANGE UP
GLUCOSE SERPL-MCNC: 123 MG/DL — HIGH (ref 70–99)
GLUCOSE SERPL-MCNC: 85 MG/DL — SIGNIFICANT CHANGE UP (ref 70–99)
GLUCOSE UR QL: NEGATIVE — SIGNIFICANT CHANGE UP
HCT VFR BLD CALC: 37.2 % — SIGNIFICANT CHANGE UP (ref 34.5–45)
HGB BLD-MCNC: 11.5 G/DL — SIGNIFICANT CHANGE UP (ref 11.5–15.5)
HYALINE CASTS # UR AUTO: 0 /LPF — SIGNIFICANT CHANGE UP (ref 0–2)
IMM GRANULOCYTES NFR BLD AUTO: 1.2 % — SIGNIFICANT CHANGE UP (ref 0–1.5)
KETONES UR-MCNC: NEGATIVE — SIGNIFICANT CHANGE UP
LEUKOCYTE ESTERASE UR-ACNC: ABNORMAL
LYMPHOCYTES # BLD AUTO: 1.8 K/UL — SIGNIFICANT CHANGE UP (ref 1–3.3)
LYMPHOCYTES # BLD AUTO: 19.3 % — SIGNIFICANT CHANGE UP (ref 13–44)
MAGNESIUM SERPL-MCNC: 2 MG/DL — SIGNIFICANT CHANGE UP (ref 1.6–2.6)
MCHC RBC-ENTMCNC: 27.3 PG — SIGNIFICANT CHANGE UP (ref 27–34)
MCHC RBC-ENTMCNC: 30.9 GM/DL — LOW (ref 32–36)
MCV RBC AUTO: 88.2 FL — SIGNIFICANT CHANGE UP (ref 80–100)
MONOCYTES # BLD AUTO: 0.93 K/UL — HIGH (ref 0–0.9)
MONOCYTES NFR BLD AUTO: 10 % — SIGNIFICANT CHANGE UP (ref 2–14)
NEUTROPHILS # BLD AUTO: 6.1 K/UL — SIGNIFICANT CHANGE UP (ref 1.8–7.4)
NEUTROPHILS NFR BLD AUTO: 65.5 % — SIGNIFICANT CHANGE UP (ref 43–77)
NITRITE UR-MCNC: NEGATIVE — SIGNIFICANT CHANGE UP
NRBC # BLD: 0 /100 WBCS — SIGNIFICANT CHANGE UP (ref 0–0)
PH UR: 6 — SIGNIFICANT CHANGE UP (ref 5–8)
PHOSPHATE SERPL-MCNC: 3.3 MG/DL — SIGNIFICANT CHANGE UP (ref 2.5–4.5)
PLATELET # BLD AUTO: 281 K/UL — SIGNIFICANT CHANGE UP (ref 150–400)
POTASSIUM SERPL-MCNC: 3 MMOL/L — LOW (ref 3.5–5.3)
POTASSIUM SERPL-MCNC: 3.4 MMOL/L — LOW (ref 3.5–5.3)
POTASSIUM SERPL-SCNC: 3 MMOL/L — LOW (ref 3.5–5.3)
POTASSIUM SERPL-SCNC: 3.4 MMOL/L — LOW (ref 3.5–5.3)
PROT ?TM UR-MCNC: 6 MG/DL — SIGNIFICANT CHANGE UP (ref 0–12)
PROT SERPL-MCNC: 7.3 G/DL — SIGNIFICANT CHANGE UP (ref 6–8.3)
PROT UR-MCNC: NEGATIVE — SIGNIFICANT CHANGE UP
PROT/CREAT UR-RTO: 0.1 RATIO — SIGNIFICANT CHANGE UP (ref 0–0.2)
RBC # BLD: 4.22 M/UL — SIGNIFICANT CHANGE UP (ref 3.8–5.2)
RBC # FLD: 13.9 % — SIGNIFICANT CHANGE UP (ref 10.3–14.5)
RBC CASTS # UR COMP ASSIST: 4 /HPF — SIGNIFICANT CHANGE UP (ref 0–4)
SARS-COV-2 IGG SERPL QL IA: NEGATIVE — SIGNIFICANT CHANGE UP
SARS-COV-2 IGM SERPL IA-ACNC: 0.22 INDEX — SIGNIFICANT CHANGE UP
SODIUM SERPL-SCNC: 135 MMOL/L — SIGNIFICANT CHANGE UP (ref 135–145)
SODIUM SERPL-SCNC: 138 MMOL/L — SIGNIFICANT CHANGE UP (ref 135–145)
SODIUM UR-SCNC: <35 MMOL/L — SIGNIFICANT CHANGE UP
SP GR SPEC: 1.01 — SIGNIFICANT CHANGE UP (ref 1.01–1.02)
UROBILINOGEN FLD QL: NEGATIVE — SIGNIFICANT CHANGE UP
WBC # BLD: 9.31 K/UL — SIGNIFICANT CHANGE UP (ref 3.8–10.5)
WBC # FLD AUTO: 9.31 K/UL — SIGNIFICANT CHANGE UP (ref 3.8–10.5)
WBC UR QL: 3 /HPF — SIGNIFICANT CHANGE UP (ref 0–5)

## 2021-02-23 PROCEDURE — 99223 1ST HOSP IP/OBS HIGH 75: CPT

## 2021-02-23 PROCEDURE — 93306 TTE W/DOPPLER COMPLETE: CPT | Mod: 26

## 2021-02-23 RX ORDER — SODIUM CHLORIDE 9 MG/ML
1000 INJECTION, SOLUTION INTRAVENOUS
Refills: 0 | Status: DISCONTINUED | OUTPATIENT
Start: 2021-02-23 | End: 2021-01-01

## 2021-02-23 RX ORDER — POTASSIUM CHLORIDE 20 MEQ
40 PACKET (EA) ORAL
Refills: 0 | Status: COMPLETED | OUTPATIENT
Start: 2021-02-23 | End: 2021-01-01

## 2021-02-23 RX ADMIN — Medication 5 MILLIGRAM(S): at 10:30

## 2021-02-23 RX ADMIN — APIXABAN 2.5 MILLIGRAM(S): 2.5 TABLET, FILM COATED ORAL at 10:29

## 2021-02-23 RX ADMIN — Medication 40 MILLIEQUIVALENT(S): at 17:39

## 2021-02-23 RX ADMIN — APIXABAN 2.5 MILLIGRAM(S): 2.5 TABLET, FILM COATED ORAL at 21:05

## 2021-02-23 RX ADMIN — CHOLESTYRAMINE 4 GRAM(S): 4 POWDER, FOR SUSPENSION ORAL at 10:29

## 2021-02-23 RX ADMIN — Medication 200 MILLIGRAM(S): at 10:29

## 2021-02-23 RX ADMIN — Medication 25 MILLIGRAM(S): at 21:13

## 2021-02-23 RX ADMIN — Medication 2000 UNIT(S): at 10:30

## 2021-02-23 RX ADMIN — PREGABALIN 1000 MICROGRAM(S): 225 CAPSULE ORAL at 10:30

## 2021-02-23 RX ADMIN — PANTOPRAZOLE SODIUM 40 MILLIGRAM(S): 20 TABLET, DELAYED RELEASE ORAL at 04:39

## 2021-02-23 RX ADMIN — SODIUM CHLORIDE 50 MILLILITER(S): 9 INJECTION, SOLUTION INTRAVENOUS at 20:15

## 2021-02-23 NOTE — CONSULT NOTE ADULT - ASSESSMENT
85 year-old presents with orthostatic hypotension and syncope in the setting of increased diuretics.  With reduction in diuretics, symptoms improved, as did orthostatic vitals.  TTE with structurally normal heart.    No further cardiovascular inpatient work-up necessary.  Patient can follow-up with Dr. Wynne as outpatient.

## 2021-02-23 NOTE — PROGRESS NOTE ADULT - SUBJECTIVE AND OBJECTIVE BOX
MORENITA ROSAS  85y Female  MRN:[id number]    Patient is a 85y old  Female who presents with a chief complaint of syncope (23 Feb 2021 11:28)    HPI:  85 F PMH CHF, AF on Eliquis, MVR, ckd3 who p/w syncope.  Pt notes that she had a syncopal episode on 2/19 when she got up to walk to kitchen. +LOC, down for few seconds. Couldn't get up on own, had to call neighbor who called her son in law to help her up.  Able to ambulate afterwards without issue/pain.  No preceding chest pain. +dizziness at times. Pt notes that last week, for about 3-4 days, she was told by her cardiologist to increase her diuretics.  Ordinarily takes torsemide 40 qd, was told to start spironolactone 25 + metolazone 5 in addition. Endorses not eating/drinking as much over last few days 2/2 lost appetite.  Pt endorses prior syncopal episodes 3x in her life; once at age 16, preceded by "throat quivering", once at a wedding preceded by eoth intake, and once at her other grandson's house while standing. Pt endorses intermittent sob/chest discomfort related to her known AF/CHF, but notes she has not been feeling this preceding her syncope.  She notes her edema has resolved with her diuretics and she is not dyspneic, denies orthopnea.      (22 Feb 2021 21:26)      Patient seen and evaluated at bedside. No acute events overnight except as noted.    Interval HPI:  no acute events o/n.  remains orthostatic     PAST MEDICAL & SURGICAL HISTORY:  CHF (congestive heart failure)    Choledocholithiasis    Gallstones    Mitral valve disease    Osteoporosis    Vitamin B 12 deficiency    Carotid artery occlusion  (R)    GERD (gastroesophageal reflux disease)    Hypercholesteremia    HTN (hypertension)    Insomnia    S/P laparoscopic cholecystectomy    H/O carotid endarterectomy    Varicose veins  s/p sclerotherapy bilaterally    Bilateral cataracts  extraction w/ IOL    Papilloma of breast  s/p excision from right breast &gt;20 years ago    History of lumbar laminectomy for spinal cord decompression  1995    Abdominal hernia  repair 2007    Hx of tonsillectomy    Status post DACIA-BSO  1975    S/P MVR (mitral valve repair)  1997 at [facility]        REVIEW OF SYSTEMS:  as per hpi     VITALS:  Vital Signs Last 24 Hrs  T(C): 36.5 (23 Feb 2021 08:55), Max: 36.7 (22 Feb 2021 20:29)  T(F): 97.7 (23 Feb 2021 08:55), Max: 98.1 (22 Feb 2021 20:29)  HR: 78 (23 Feb 2021 08:55) (78 - 85)  BP: 148/84 (23 Feb 2021 08:55) (94/59 - 148/84)  BP(mean): 87 (22 Feb 2021 20:29) (87 - 87)  RR: 19 (23 Feb 2021 08:55) (17 - 19)  SpO2: 98% (23 Feb 2021 08:55) (95% - 99%)  CAPILLARY BLOOD GLUCOSE        I&O's Summary      PHYSICAL EXAM:  GENERAL: NAD, well-developed  HEAD:  Atraumatic, Normocephalic  EYES: EOMI, PERRLA, conjunctiva and sclera clear  NECK: Supple, No JVD  CHEST/LUNG: Clear to auscultation bilaterally; No wheeze  HEART: S1, S2; No murmurs, rubs, or gallops  ABDOMEN: Soft, Nontender, Nondistended; Bowel sounds present  EXTREMITIES:  2+ Peripheral Pulses, No clubbing, cyanosis, or edema  PSYCH: Normal affect  NEUROLOGY: AAOX3; non-focal  SKIN: No rashes or lesions    Consultant(s) Notes Reviewed:  [x ] YES  [ ] NO  Care Discussed with Consultants/Other Providers [ x] YES  [ ] NO    MEDS:  MEDICATIONS  (STANDING):  allopurinol 200 milliGRAM(s) Oral daily  amitriptyline 25 milliGRAM(s) Oral daily  apixaban 2.5 milliGRAM(s) Oral every 12 hours  cholecalciferol 2000 Unit(s) Oral daily  cholestyramine Powder (Sugar-Free) 4 Gram(s) Oral daily  cyanocobalamin 1000 MICROGram(s) Oral daily  lactated ringers. 1000 milliLiter(s) (50 mL/Hr) IV Continuous <Continuous>  metoprolol succinate  milliGRAM(s) Oral daily  oxybutynin 5 milliGRAM(s) Oral daily  pantoprazole    Tablet 40 milliGRAM(s) Oral before breakfast  potassium chloride    Tablet ER 40 milliEquivalent(s) Oral two times a day    MEDICATIONS  (PRN):  LORazepam     Tablet 1 milliGRAM(s) Oral every 2 hours PRN CIWA-Ar score increase by 2 points and a total score of 7 or less  LORazepam     Tablet 1 milliGRAM(s) Oral every 1 hour PRN CIWA-Ar score 8 or greater    ALLERGIES:  &quot;VASELINE BASED OINTMENTS&quot; (Urticaria; Rash)  adhesives (Urticaria; Rash)  statins (Unknown)      LABS:                        11.5   9.31  )-----------( 281      ( 23 Feb 2021 06:45 )             37.2     02-23    135  |  92<L>  |  72<H>  ----------------------------<  85  3.0<L>   |  31  |  2.16<H>    Ca    9.5      23 Feb 2021 06:45  Phos  3.3     02-23  Mg     2.0     02-23    TPro  7.3  /  Alb  3.8  /  TBili  0.2  /  DBili  x   /  AST  27  /  ALT  23  /  AlkPhos  156<H>  02-23          LIVER FUNCTIONS - ( 23 Feb 2021 06:45 )  Alb: 3.8 g/dL / Pro: 7.3 g/dL / ALK PHOS: 156 U/L / ALT: 23 U/L / AST: 27 U/L / GGT: x

## 2021-02-23 NOTE — CONSULT NOTE ADULT - SUBJECTIVE AND OBJECTIVE BOX
Patient is a 85y old  Female who presents with a chief complaint of syncope (22 Feb 2021 21:26)      HPI:  85 F PMH CHF, AF on Eliquis, MVR, ckd3 who p/w syncope.  Pt notes that she had a syncopal episode on 2/19 when she got up to walk to kitchen. +LOC, down for few seconds.  No preceding chest pain. +dizziness at times. States she had a lot of ankle swelling and her diuretics were increased by her cardiologist. Ordinarily takes torsemide 40 qd, was told to start spironolactone 25 + metolazone 5 in addition. Endorses not eating/drinking as much over last few days 2/2 lost appetite.   She notes her edema has resolved with her diuretics and she is not dyspneic  PAtient appears euvolemic on exam on my encounter. No recent antibiotics/NSAIDS. No urinary issues. Saw an outpatient nephrologist but never followed back. States her kidney numbers have always fluctuated and her cardiologist took care of it      (22 Feb 2021 21:26)      PAST MEDICAL & SURGICAL HISTORY:  CHF (congestive heart failure)    Choledocholithiasis    Gallstones    Mitral valve disease    Osteoporosis    Vitamin B 12 deficiency    Carotid artery occlusion  (R)    GERD (gastroesophageal reflux disease)    Hypercholesteremia    HTN (hypertension)    Insomnia    S/P laparoscopic cholecystectomy    H/O carotid endarterectomy    Varicose veins  s/p sclerotherapy bilaterally    Bilateral cataracts  extraction w/ IOL    Papilloma of breast  s/p excision from right breast &gt;20 years ago    History of lumbar laminectomy for spinal cord decompression  1995    Abdominal hernia  repair 2007    Hx of tonsillectomy    Status post DACIA-BSO  1975    S/P MVR (mitral valve repair)  1997 at [facility]        MEDICATIONS  (STANDING):  allopurinol 200 milliGRAM(s) Oral daily  amitriptyline 25 milliGRAM(s) Oral daily  apixaban 2.5 milliGRAM(s) Oral every 12 hours  cholecalciferol 2000 Unit(s) Oral daily  cholestyramine Powder (Sugar-Free) 4 Gram(s) Oral daily  cyanocobalamin 1000 MICROGram(s) Oral daily  metoprolol succinate  milliGRAM(s) Oral daily  oxybutynin 5 milliGRAM(s) Oral daily  pantoprazole    Tablet 40 milliGRAM(s) Oral before breakfast      Allergies    &quot;VASELINE BASED OINTMENTS&quot; (Urticaria; Rash)  adhesives (Urticaria; Rash)  statins (Unknown)    Intolerances        SOCIAL HISTORY:  Denies ETOh,Smoking,     FAMILY HISTORY:  FH: cirrhosis  father    Family history of dementia  mother    Family history of lung cancer (Grandparent)        REVIEW OF SYSTEMS:  CONSTITUTIONAL: No weakness, fevers or chills  EYES/ENT: No visual changes;  No vertigo or throat pain   NECK: No pain or stiffness  RESPIRATORY: No cough, wheezing, hemoptysis; No shortness of breath  CARDIOVASCULAR: No chest pain or palpitations  GASTROINTESTINAL: No abdominal or epigastric pain. No nausea, vomiting, or hematemesis; No diarrhea or constipation. No melena or hematochezia.  GENITOURINARY: No dysuria, frequency or hematuria  NEUROLOGICAL: No numbness or weakness  SKIN: No itching, burning, rashes, or lesions   All other review of systems is negative unless indicated above.    VITAL:  T(C): , Max: 36.8 (02-22-21 @ 13:01)  T(F): , Max: 98.3 (02-22-21 @ 13:01)  HR: 78 (02-23-21 @ 08:55)  BP: 148/84 (02-23-21 @ 08:55)  BP(mean): 87 (02-22-21 @ 20:29)  RR: 19 (02-23-21 @ 08:55)  SpO2: 98% (02-23-21 @ 08:55)  Wt(kg): --    PHYSICAL EXAM:  Constitutional: NAD, Alert  HEENT: NCAT, MMM  Neck: Supple, No JVD  Respiratory: CTA-b/l  Cardiovascular: RRR s1s2, no m/r/g  Gastrointestinal: BS+, soft, NT/ND  Extremities: No peripheral edema b/l  Neurological: no focal deficits; strength grossly intact  Back: no CVAT b/l  Skin: No rashes, no nevi    LABS:                        11.5   9.31  )-----------( 281      ( 23 Feb 2021 06:45 )             37.2     Na(135)/K(3.0)/Cl(92)/HCO3(31)/BUN(72)/Cr(2.16)Glu(85)/Ca(9.5)/Mg(2.0)/PO4(3.3)    02-23 @ 06:45  Na(136)/K(4.0)/Cl(92)/HCO3(31)/BUN(75)/Cr(2.35)Glu(118)/Ca(9.5)/Mg(--)/PO4(--)    02-22 @ 19:19  Na(137)/K(2.8)/Cl(89)/HCO3(31)/BUN(80)/Cr(2.21)Glu(104)/Ca(10.1)/Mg(--)/PO4(--)    02-22 @ 13:58        ASSESSMENT:  85 F PMH CHF, AF on Eliquis, MVR, ckd3 who p/w syncope.    CKD with non oliguric WEN- likely preenal/cardiorenal etiology baseline Cr appears 1.8- 2 mg/dl. Peaked to 2.3 now improving   Hypokalemia - in the setting of diuretic use   Clinical euvolemia  Blood pressure soft  hemogobin at goal   Syncope - likely vasovagal, ongoing work up     RECOMMEND:  Would hold further diuretics at this point   Check orthostatics- if positive can start LR 50 cc/hour x 15 hours   Check UA,  urine lytes, urine protein  Dose meds for CR Cl   Avoid contrast studies as able to   Check bladder scan  No indication for renal sono   KCL 40 meq BID PO today       Thank you for involving [facility] in this patient's care.    With warm regards,    Kenna Marin MD   [facility]  Office: [phone]           Patient is a 85y old  Female who presents with a chief complaint of syncope (22 Feb 2021 21:26)      HPI:  85 F PMH CHF, AF on Eliquis, MVR, ckd3 who p/w syncope.  Pt notes that she had a syncopal episode on 2/19 when she got up to walk to kitchen. +LOC, down for few seconds.  No preceding chest pain. +dizziness at times. States she had a lot of ankle swelling and her diuretics were increased by her cardiologist. Ordinarily takes torsemide 40 qd, was told to start spironolactone 25 + metolazone 5 in addition. Endorses not eating/drinking as much over last few days 2/2 lost appetite.   She notes her edema has resolved with her diuretics and she is not dyspneic  PAtient appears euvolemic on exam on my encounter. No recent antibiotics/NSAIDS. No urinary issues. Saw an outpatient nephrologist but never followed back. States her kidney numbers have always fluctuated and her cardiologist took care of it      (22 Feb 2021 21:26)      PAST MEDICAL & SURGICAL HISTORY:  CHF (congestive heart failure)    Choledocholithiasis    Gallstones    Mitral valve disease    Osteoporosis    Vitamin B 12 deficiency    Carotid artery occlusion  (R)    GERD (gastroesophageal reflux disease)    Hypercholesteremia    HTN (hypertension)    Insomnia    S/P laparoscopic cholecystectomy    H/O carotid endarterectomy    Varicose veins  s/p sclerotherapy bilaterally    Bilateral cataracts  extraction w/ IOL    Papilloma of breast  s/p excision from right breast &gt;20 years ago    History of lumbar laminectomy for spinal cord decompression  1995    Abdominal hernia  repair 2007    Hx of tonsillectomy    Status post DACIA-BSO  1975    S/P MVR (mitral valve repair)  1997 at [facility]        MEDICATIONS  (STANDING):  allopurinol 200 milliGRAM(s) Oral daily  amitriptyline 25 milliGRAM(s) Oral daily  apixaban 2.5 milliGRAM(s) Oral every 12 hours  cholecalciferol 2000 Unit(s) Oral daily  cholestyramine Powder (Sugar-Free) 4 Gram(s) Oral daily  cyanocobalamin 1000 MICROGram(s) Oral daily  metoprolol succinate  milliGRAM(s) Oral daily  oxybutynin 5 milliGRAM(s) Oral daily  pantoprazole    Tablet 40 milliGRAM(s) Oral before breakfast      Allergies    &quot;VASELINE BASED OINTMENTS&quot; (Urticaria; Rash)  adhesives (Urticaria; Rash)  statins (Unknown)    Intolerances        SOCIAL HISTORY:  Denies ETOh,Smoking,     FAMILY HISTORY:  FH: cirrhosis  father    Family history of dementia  mother    Family history of lung cancer (Grandparent)        REVIEW OF SYSTEMS:  CONSTITUTIONAL: No weakness, fevers or chills  EYES/ENT: No visual changes;  No vertigo or throat pain   NECK: No pain or stiffness  RESPIRATORY: No cough, wheezing, hemoptysis; No shortness of breath  CARDIOVASCULAR: No chest pain or palpitations  GASTROINTESTINAL: No abdominal or epigastric pain. No nausea, vomiting, or hematemesis; No diarrhea or constipation. No melena or hematochezia.  GENITOURINARY: No dysuria, frequency or hematuria  NEUROLOGICAL: No numbness or weakness  SKIN: No itching, burning, rashes, or lesions   All other review of systems is negative unless indicated above.    VITAL:  T(C): , Max: 36.8 (02-22-21 @ 13:01)  T(F): , Max: 98.3 (02-22-21 @ 13:01)  HR: 78 (02-23-21 @ 08:55)  BP: 148/84 (02-23-21 @ 08:55)  BP(mean): 87 (02-22-21 @ 20:29)  RR: 19 (02-23-21 @ 08:55)  SpO2: 98% (02-23-21 @ 08:55)  Wt(kg): --    PHYSICAL EXAM:  Constitutional: NAD, Alert  HEENT: NCAT, MMM  Neck: Supple, No JVD  Respiratory: CTA-b/l  Cardiovascular: RRR s1s2, no m/r/g  Gastrointestinal: BS+, soft, NT/ND  Extremities: No peripheral edema b/l  Neurological: no focal deficits; strength grossly intact  Back: no CVAT b/l  Skin: No rashes, no nevi    LABS:                        11.5   9.31  )-----------( 281      ( 23 Feb 2021 06:45 )             37.2     Na(135)/K(3.0)/Cl(92)/HCO3(31)/BUN(72)/Cr(2.16)Glu(85)/Ca(9.5)/Mg(2.0)/PO4(3.3)    02-23 @ 06:45  Na(136)/K(4.0)/Cl(92)/HCO3(31)/BUN(75)/Cr(2.35)Glu(118)/Ca(9.5)/Mg(--)/PO4(--)    02-22 @ 19:19  Na(137)/K(2.8)/Cl(89)/HCO3(31)/BUN(80)/Cr(2.21)Glu(104)/Ca(10.1)/Mg(--)/PO4(--)    02-22 @ 13:58        ASSESSMENT:  85 F PMH CHF, AF on Eliquis, MVR, ckd3 who p/w syncope.    CKD with non oliguric WEN- likely prerenal/cardiorenal etiology baseline Cr appears 1.8- 2 mg/dl. Peaked to 2.3 now improving   Hypokalemia - in the setting of diuretic use   Clinical euvolemia  Blood pressure soft  hemogobin at goal   Syncope - likely vasovagal, ongoing work up     RECOMMEND:  Would hold further diuretics at this point   Check orthostatics- if positive can start LR 50 cc/hour x 15 hours   Check UA,  urine lytes, urine protein  Dose meds for CR Cl 20 m//min   Avoid contrast studies as able to   Check bladder scan  No indication for renal sono   KCL 40 meq BID PO today   Repeat BMP in pm       Thank you for involving [facility] in this patient's care.    With warm regards,    Kenna Marin MD   [facility]  Office: [phone]

## 2021-02-23 NOTE — PROGRESS NOTE ADULT - ATTENDING COMMENTS
Advanced care planning was discussed with patient and family.  Advanced care planning forms were reviewed and discussed as appropriate.  Differential diagnosis and plan of care discussed with patient after the evaluation.   Pain assessed and judicious use of narcotics when appropriate was discussed.  Importance of Fall prevention discussed.  Counseling on Smoking and Alcohol cessation was offered when appropriate.  Counseling on Diet, exercise, and medication compliance was done.   Approx 30 minutes spent.

## 2021-02-23 NOTE — CONSULT NOTE ADULT - SUBJECTIVE AND OBJECTIVE BOX
Patient seen and evaluated @ 2pm in [facility]  Chief Complaint: orthostatic hypotension and syncope    HPI:  85 F PMH CHF, AF on Eliquis, MVR, ckd3 who p/w syncope.  Pt notes that she had a syncopal episode on 2/19 when she got up to walk to kitchen. +LOC, down for few seconds. Couldn't get up on own, had to call neighbor who called her son in law to help her up.  Able to ambulate afterwards without issue/pain.  No preceding chest pain. +dizziness at times. Pt notes that last week, for about 3-4 days, she was told by her cardiologist to increase her diuretics.  Ordinarily takes torsemide 40 qd, was told to start spironolactone 25 + metolazone 5 in addition. Endorses not eating/drinking as much over last few days 2/2 lost appetite.  Pt endorses prior syncopal episodes 3x in her life; once at age 16, preceded by "throat quivering", once at a wedding preceded by eoth intake, and once at her other grandson's house while standing. Pt endorses intermittent sob/chest discomfort related to her known AF/CHF, but notes she has not been feeling this preceding her syncope.  She notes her edema has resolved with her diuretics and she is not dyspneic, denies orthopnea.      (22 Feb 2021 21:26)    PMH:   CHF (congestive heart failure)    Choledocholithiasis    Gallstones    Mitral valve disease    Osteoporosis    Vitamin B 12 deficiency    Carotid artery occlusion    GERD (gastroesophageal reflux disease)    Hypercholesteremia    HTN (hypertension)    Insomnia      PSH:   S/P laparoscopic cholecystectomy    H/O carotid endarterectomy    Varicose veins    Bilateral cataracts    Papilloma of breast    History of lumbar laminectomy for spinal cord decompression    Abdominal hernia    Hx of tonsillectomy    Status post DACIA-BSO    S/P MVR (mitral valve repair)      Medications:   allopurinol 200 milliGRAM(s) Oral daily  amitriptyline 25 milliGRAM(s) Oral daily  apixaban 2.5 milliGRAM(s) Oral every 12 hours  cholecalciferol 2000 Unit(s) Oral daily  cholestyramine Powder (Sugar-Free) 4 Gram(s) Oral daily  cyanocobalamin 1000 MICROGram(s) Oral daily  lactated ringers. 1000 milliLiter(s) IV Continuous <Continuous>  LORazepam     Tablet 1 milliGRAM(s) Oral every 2 hours PRN  LORazepam     Tablet 1 milliGRAM(s) Oral every 1 hour PRN  metoprolol succinate  milliGRAM(s) Oral daily  oxybutynin 5 milliGRAM(s) Oral daily  pantoprazole    Tablet 40 milliGRAM(s) Oral before breakfast  potassium chloride    Tablet ER 40 milliEquivalent(s) Oral two times a day    Allergies:  &quot;VASELINE BASED OINTMENTS&quot; (Urticaria; Rash)  adhesives (Urticaria; Rash)  statins (Unknown)    FAMILY HISTORY:  FH: cirrhosis  father    Family history of dementia  mother    Family history of lung cancer (Grandparent)    Social History: lives alone  Smoking: former smoker  Alcohol: no EtOH abuse - used to drink wine  Drugs: no illicit drug use    Review of Systems:   Constitutional: No fever, chills, fatigue, or changes in weight  HEENT: No blurry vision, eye pain, headache, runny nose, or sore throat  Respiratory: No shortness of breath, cough, or wheezing  Cardiovascular: No chest pain or palpitations  Gastrointestinal: No nausea, vomiting, diarrhea, or abdominal pain  Genitourinary: No dysuria or incontinence  Extremities: No lower extremity swelling  Neurologic: No focal findings  Lymphatic: No lymphadenopathy   Skin: No rash  Psychiatry: No anxiety or depression  10 point review of systems is otherwise negative except as mentioned above            Physical Exam:  T(C): 36.9 (02-23-21 @ 16:50), Max: 36.9 (02-23-21 @ 16:50)  HR: 89 (02-23-21 @ 16:50) (78 - 89)  BP: 126/75 (02-23-21 @ 16:50) (94/59 - 148/84)  RR: 18 (02-23-21 @ 16:50) (17 - 19)  SpO2: 96% (02-23-21 @ 16:50) (95% - 99%)  Wt(kg): --    Daily     Daily     Appearance: Normal, well groomed, NAD  Eyes: PERRLA, EOMI, pink conjunctiva, no scleral icterus   HENT: Normal oral mucosa  Cardiovascular: RRR, S1, S2, no murmur, rub, or gallop; no edema; no JVD  Respiratory: Clear to auscultation bilaterally  Gastrointestinal: Soft, non-tender, non-distended, BS+  Musculoskeletal: No clubbing or joint deformity   Neurologic: No focal weakness  Lymphatic: No lymphadenopathy  Psychiatry: AAOx3 with appropriate mood and affect  Skin: No rashes, ecchymoses, or cyanosis    Cardiovascular Diagnostic Testing:    Echo: < from: Transthoracic Echocardiogram (02.23.21 @ 14:07) >  ------------------------------------------------------------------------  Dimensions:    Normal Values:  LA:     4.3    2.0 - 4.0 cm  Ao:     2.9    2.0 - 3.8 cm  SEPTUM: 1.1    0.6 - 1.2 cm  PWT:    1.0 0.6 - 1.1 cm  LVIDd:  3.6    3.0 - 5.6 cm  LVIDs:         1.8 - 4.0 cm  Derived variables:  LVMI: 69 g/m2  RWT: 0.55  EF (Visual Estimate): 65-70 %  Doppler Peak Velocity (m/sec): AoV=1.9  ------------------------------------------------------------------------  Observations:  Mitral Valve: Mitral annular calcification and calcified  mitral leaflets with decreased diastolic opening. Mild  mitral regurgitation.  Peak mitral valve gradient equals 18  mm Hg, mean transmitral valve gradient equals 8mm Hg,  consistent with moderate mitral stenosis.  Aortic Valve/Aorta: Calcified trileaflet aortic valve with  decreased opening. Peak transaortic valve gradient equals  14 mm Hg, mean transaortic valve gradient equals 8 mm Hg,  estimated aortic valve area equals 1.6 sqcm (by continuity  equation), aortic valve velocity time integral equals 34  cm, consistent with mild aortic stenosis. Minimal aortic  regurgitation.  Peak left ventricular outflow tract  gradient equals 6 mm Hg, mean gradient is equal to 4 mm Hg,  LVOT velocity time integral equals 22 cm.  Aortic Root: 2.9 cm.  LVOT diameter: 1.8 cm.  Left Atrium: Mildly dilated left atrium.  LA volume index =  40 cc/m2.  Left Ventricle: Normal left ventricular systolic function.  No segmentalwall motion abnormalities. Mild concentric  left ventricular hypertrophy. Normal diastolic function  Right Heart: Mild right atrial enlargement. Right  ventricular enlargement with normal right ventricular  systolic function. Normal tricuspid valve. Mild tricuspid  regurgitation. Normal pulmonic valve. Mild pulmonic  regurgitation.  Hemodynamic: Estimated right atrial pressure is 8 mm Hg.  Estimated right ventricular systolic pressure equals 49 mm  Hg, assuming right atrial pressure equals 8 mm Hg,  consistent with mild pulmonary hypertension.  ------------------------------------------------------------------------  Conclusions:  1. Mitral annular calcification and calcified mitral  leaflets with decreased diastolic opening. Peak mitral  valve gradient equals 18 mm Hg, mean transmitral valve  gradient equals 8 mm Hg, consistent with moderate mitral  stenosis.  2. Calcified trileaflet aortic valve with decreased  opening. Peak transaortic valve gradient equals 14 mm Hg,  mean transaortic valve gradient equals 8 mm Hg, estimated  aortic valve area equals 1.6 sqcm (by continuity equation),  aortic valve velocity time integral equals 34 cm,  consistent with mild aortic stenosis.  3. Mildly dilated left atrium.  LA volume index = 40 cc/m2.  4. Mild concentric left ventricular hypertrophy.  5. Normal left ventricular systolic function. No segmental  wall motion abnormalities.  6. Estimated pulmonary artery systolic pressure equals 49  mm Hg, assuming right atrial pressure equals 8 mm Hg,  consistent with mild pulmonary pressures.  ------------------------------------------------------------------------  Confirmed on  2/23/2021 - 16:25:51 by TONY Covington  ------------------------------------------------------------------------    < end of copied text >    Labs:                        11.5   9.31  )-----------( 281      ( 23 Feb 2021 06:45 )             37.2     02-23    135  |  92<L>  |  72<H>  ----------------------------<  85  3.0<L>   |  31  |  2.16<H>    Ca    9.5      23 Feb 2021 06:45  Phos  3.3     02-23  Mg     2.0     02-23    TPro  7.3  /  Alb  3.8  /  TBili  0.2  /  DBili  x   /  AST  27  /  ALT  23  /  AlkPhos  156<H>  02-23

## 2021-02-24 NOTE — DISCHARGE NOTE PROVIDER - NSDCMRMEDTOKEN_GEN_ALL_CORE_FT
allopurinol 100 mg oral tablet: 2 tab(s) orally once a day  amitriptyline 25 mg oral tablet: 1 tab(s) orally once a day  apixaban 2.5 mg oral tablet: 1 tab(s) orally every 12 hours  cholestyramine 4 g/5 g oral powder for reconstitution: 1 packet(s) orally once a day  Cortizone-10 topical cream: Apply topically to affected area once a day  cyanocobalamin 1000 mcg oral tablet: 1 tab(s) orally once a day  docusate sodium 100 mg oral capsule: as needed  Metoprolol Succinate  mg oral tablet, extended release: 1 tab(s) orally 2 times a day  oxybutynin 5 mg/24 hours oral tablet, extended release: 1 tab(s) orally once a day  pantoprazole 40 mg oral delayed release tablet: 1 tab(s) orally 2 times a day  PreserVision AREDS 2 oral capsule: 1 cap(s) orally 2 times a day  Systane ophthalmic solution: 1 drop(s) to each affected eye once a day  torsemide 20 mg oral tablet: 1 tab(s) orally every other day (at bedtime)  Vitamin D3 1000 intl units oral tablet: 1 tab(s) orally once a day  (taken together with 2000iu: total dose 3000iu  Vitamin D3 2000 intl units (50 mcg) oral tablet: 1 tab(s) orally once a day  (taken together with 1000iu: total dose 3000iu)

## 2021-02-24 NOTE — DISCHARGE NOTE PROVIDER - HOSPITAL COURSE
85 year-old presents with orthostatic hypotension and syncope in the setting of increased diuretics.  With reduction in diuretics, symptoms improved, as did orthostatic vitals.  TTE with structurally normal heart.    No further cardiovascular inpatient work-up necessary.  Patient can follow-up with Dr. Wynne as outpatient.  orthostatics - now improved  Pt without LE edema, lungs clear, appears euvolemic       WEN (acute kidney injury).  -likely prerenal in setting of overdiuresis; known ckd3  -holding diuretics - resumed Torsemide every other day  -avoid nephrotoxins  -renally dose meds.      Hypokalemia.  -repleted  -corrected    Chronic atrial fibrillation.  Plan: -c/w Eliquis  -c/w bb.   Pt medically cleared for discharge to home and follow up with her PCP and Cardiology. 85 year-old presents with orthostatic hypotension and syncope in the setting of increased diuretics.  With reduction in diuretics, symptoms improved, as did orthostatic vitals.  TTE with structurally normal heart.    No further cardiovascular inpatient work-up necessary.  Patient can follow-up with Dr. Wynne as outpatient.  orthostatics - now improved  Pt without LE edema, lungs clear, appears euvolemic       WEN  -likely prerenal in setting of overdiuresis; known ckd3  -holding diuretics - resumed Torsemide every other day  -avoid nephrotoxins  -renally dose meds.      Hypokalemia.  -repleted  -corrected    Chronic atrial fibrillation.  Plan: -c/w Eliquis  -c/w bb.   Pt medically cleared for discharge to home and follow up with her PCP and Cardiology.

## 2021-02-24 NOTE — PROGRESS NOTE ADULT - PROBLEM SELECTOR PLAN 3
-likely prerenal in setting of overdiuresis; known ckd3  -holding diuretics  -trend bmp  -avoid nephrotoxins  -renally dose meds.  -renal consulted
-likely prerenal in setting of overdiuresis; known ckd3  -holding diuretics - consider resuming every other day?  -trend bmp  -avoid nephrotoxins  -renally dose meds.  -renal consulted

## 2021-02-24 NOTE — PROGRESS NOTE ADULT - PROBLEM SELECTOR PLAN 2
-c/w mgt noted above  -hold diuretics for now; pt without LE edema, lungs clear, appears euvolemic  consider resuming every other day
-c/w mgt noted above  -hold diuretics for now; pt without LE edema, lungs clear, appears euvolemic

## 2021-02-24 NOTE — DISCHARGE NOTE PROVIDER - NSDCCPCAREPLAN_GEN_ALL_CORE_FT
PRINCIPAL DISCHARGE DIAGNOSIS  Diagnosis: Near syncope  Assessment and Plan of Treatment: /Orthostatic Hypotension   Condition improved  Continue with Torsemide every other day. Follow up with Cardiology      SECONDARY DISCHARGE DIAGNOSES  Diagnosis: WEN (acute kidney injury)  Assessment and Plan of Treatment: Like  Creatinine is improving    Diagnosis: Hypokalemia  Assessment and Plan of Treatment: Potassium improved    Diagnosis: Chronic atrial fibrillation  Assessment and Plan of Treatment: Continue with Apixaban and follow up with your doctor     PRINCIPAL DISCHARGE DIAGNOSIS  Diagnosis: Near syncope  Assessment and Plan of Treatment: /Orthostatic Hypotension   Condition improved  Continue with Torsemide every other day. Follow up with Cardiology      SECONDARY DISCHARGE DIAGNOSES  Diagnosis: WEN (acute kidney injury)  Assessment and Plan of Treatment: Likely from Diuretics. Stop Metolazone and Spironolactone   Creatinine is improving    Diagnosis: Dehydration  Assessment and Plan of Treatment: Received intravenous fluid. Condition improved    Diagnosis: Hypokalemia  Assessment and Plan of Treatment: Potassium improved    Diagnosis: Chronic atrial fibrillation  Assessment and Plan of Treatment: Continue with Apixaban and follow up with your doctor  Continue with metoprolol

## 2021-02-24 NOTE — PROGRESS NOTE ADULT - ASSESSMENT
85 F PMH CHF, AF on Eliquis, MVR, ckd3 who p/w syncope in setting of suspected overdiuresis and WEN/dehydration. 
85 F PMH CHF, AF on Eliquis, MVR, ckd3 who p/w syncope in setting of suspected overdiuresis and WEN/dehydration.

## 2021-02-24 NOTE — PROGRESS NOTE ADULT - SUBJECTIVE AND OBJECTIVE BOX
MORENITA ROSAS  85y Female  MRN:[id number]    Patient is a 85y old  Female who presents with a chief complaint of syncope (23 Feb 2021 11:28)    HPI:  85 F PMH CHF, AF on Eliquis, MVR, ckd3 who p/w syncope.  Pt notes that she had a syncopal episode on 2/19 when she got up to walk to kitchen. +LOC, down for few seconds. Couldn't get up on own, had to call neighbor who called her son in law to help her up.  Able to ambulate afterwards without issue/pain.  No preceding chest pain. +dizziness at times. Pt notes that last week, for about 3-4 days, she was told by her cardiologist to increase her diuretics.  Ordinarily takes torsemide 40 qd, was told to start spironolactone 25 + metolazone 5 in addition. Endorses not eating/drinking as much over last few days 2/2 lost appetite.  Pt endorses prior syncopal episodes 3x in her life; once at age 16, preceded by "throat quivering", once at a wedding preceded by eoth intake, and once at her other grandson's house while standing. Pt endorses intermittent sob/chest discomfort related to her known AF/CHF, but notes she has not been feeling this preceding her syncope.  She notes her edema has resolved with her diuretics and she is not dyspneic, denies orthopnea.      (22 Feb 2021 21:26)      Patient seen and evaluated at bedside. No acute events overnight except as noted.    Interval HPI:  no acute events o/n.       PAST MEDICAL & SURGICAL HISTORY:  CHF (congestive heart failure)    Choledocholithiasis    Gallstones    Mitral valve disease    Osteoporosis    Vitamin B 12 deficiency    Carotid artery occlusion  (R)    GERD (gastroesophageal reflux disease)    Hypercholesteremia    HTN (hypertension)    Insomnia    S/P laparoscopic cholecystectomy    H/O carotid endarterectomy    Varicose veins  s/p sclerotherapy bilaterally    Bilateral cataracts  extraction w/ IOL    Papilloma of breast  s/p excision from right breast &gt;20 years ago    History of lumbar laminectomy for spinal cord decompression  1995    Abdominal hernia  repair 2007    Hx of tonsillectomy    Status post DACIA-BSO  1975    S/P MVR (mitral valve repair)  1997 at [facility]        REVIEW OF SYSTEMS:  as per hpi     VITALS:  Vital Signs Last 24 Hrs  T(C): 36.5 (24 Feb 2021 04:31), Max: 36.9 (23 Feb 2021 16:50)  T(F): 97.7 (24 Feb 2021 04:31), Max: 98.4 (23 Feb 2021 16:50)  HR: 99 (24 Feb 2021 04:31) (89 - 100)  BP: 125/79 (24 Feb 2021 04:31) (114/74 - 153/84)  BP(mean): --  RR: 18 (24 Feb 2021 04:31) (18 - 18)  SpO2: 92% (24 Feb 2021 04:31) (92% - 98%)      PHYSICAL EXAM:  GENERAL: NAD, well-developed  HEAD:  Atraumatic, Normocephalic  EYES: EOMI, PERRLA, conjunctiva and sclera clear  NECK: Supple, No JVD  CHEST/LUNG: Clear to auscultation bilaterally; No wheeze  HEART: S1, S2; No murmurs, rubs, or gallops  ABDOMEN: Soft, Nontender, Nondistended; Bowel sounds present  EXTREMITIES:  2+ Peripheral Pulses, No clubbing, cyanosis, or edema  PSYCH: Normal affect  NEUROLOGY: AAOX3; non-focal  SKIN: No rashes or lesions    Consultant(s) Notes Reviewed:  [x ] YES  [ ] NO  Care Discussed with Consultants/Other Providers [ x] YES  [ ] NO    MEDS:  MEDICATIONS  (STANDING):  allopurinol 200 milliGRAM(s) Oral daily  amitriptyline 25 milliGRAM(s) Oral daily  apixaban 2.5 milliGRAM(s) Oral every 12 hours  cholecalciferol 2000 Unit(s) Oral daily  cholestyramine Powder (Sugar-Free) 4 Gram(s) Oral daily  cyanocobalamin 1000 MICROGram(s) Oral daily  lactated ringers. 1000 milliLiter(s) (50 mL/Hr) IV Continuous <Continuous>  metoprolol succinate  milliGRAM(s) Oral daily  oxybutynin 5 milliGRAM(s) Oral daily  pantoprazole    Tablet 40 milliGRAM(s) Oral before breakfast  potassium chloride    Tablet ER 40 milliEquivalent(s) Oral once    MEDICATIONS  (PRN):  LORazepam     Tablet 1 milliGRAM(s) Oral every 2 hours PRN CIWA-Ar score increase by 2 points and a total score of 7 or less  LORazepam     Tablet 1 milliGRAM(s) Oral every 1 hour PRN CIWA-Ar score 8 or greater    ALLERGIES:  &quot;VASELINE BASED OINTMENTS&quot; (Urticaria; Rash)  adhesives (Urticaria; Rash)  statins (Unknown)      LABS:                         11.6   9.16  )-----------( 255      ( 24 Feb 2021 07:07 )             38.2   02-24    140  |  99  |  56<H>  ----------------------------<  111<H>  3.3<L>   |  28  |  1.54<H>    Ca    9.6      24 Feb 2021 07:02  Phos  3.3     02-23  Mg     2.0     02-24    TPro  7.3  /  Alb  3.8  /  TBili  0.2  /  DBili  x   /  AST  27  /  ALT  23  /  AlkPhos  156<H>  02-23  < from: Transthoracic Echocardiogram (02.23.21 @ 14:07) >  -----------------  Conclusions:  1. Mitral annular calcification and calcified mitral  leaflets with decreased diastolic opening. Peak mitral  valve gradient equals 18 mm Hg, mean transmitral valve  gradient equals 8 mm Hg, consistent with moderate mitral  stenosis.  2. Calcified trileaflet aortic valve with decreased  opening. Peak transaortic valve gradient equals 14 mm Hg,  mean transaortic valve gradient equals 8 mm Hg, estimated  aortic valve area equals 1.6 sqcm (by continuity equation),  aortic valve velocity time integral equals 34 cm,  consistent with mild aortic stenosis.  3. Mildly dilated left atrium.  LA volume index = 40 cc/m2.  4. Mild concentric left ventricular hypertrophy.  5. Normal left ventricular systolic function. No segmental  wall motion abnormalities.  6. Estimated pulmonary artery systolic pressure equals 49  mm Hg, assuming right atrial pressure equals 8 mm Hg,  consistent with mild pulmonary pressures.  -------------    < end of copied text >

## 2021-02-24 NOTE — PROGRESS NOTE ADULT - SUBJECTIVE AND OBJECTIVE BOX
VITAL:  T(C): , Max: 36.9 (21 @ 16:50)  T(F): , Max: 98.4 (21 @ 16:50)  HR: 99 (21 @ 04:31)  BP: 125/79 (21 @ 04:31)  BP(mean): --  RR: 18 (21 @ 04:31)  SpO2: 92% (21 @ 04:31)  Wt(kg): --      PHYSICAL EXAM:  General: Alerted, oriented  HEENT: MMM  Lungs:CTA-b/l  Heart: RRR S1S2  Abdomen: Soft, NTND  Ext: no pedal edema b/l  : no muhammad      LABS:                        11.6   9.16  )-----------( 255      ( 2021 07:07 )             38.2     Na(140)/K(3.3)/Cl(99)/HCO3(28)/BUN(56)/Cr(1.54)Glu(111)/Ca(9.6)/Mg(2.0)/PO4(--)     @ 07:02  Na(138)/K(3.4)/Cl(95)/HCO3(29)/BUN(67)/Cr(1.88)Glu(123)/Ca(9.8)/Mg(--)/PO4(--)     @ 17:19  Na(135)/K(3.0)/Cl(92)/HCO3(31)/BUN(72)/Cr(2.16)Glu(85)/Ca(9.5)/Mg(2.0)/PO4(3.3)     @ 06:45  Na(136)/K(4.0)/Cl(92)/HCO3(31)/BUN(75)/Cr(2.35)Glu(118)/Ca(9.5)/Mg(--)/PO4(--)     @ 19:19  Na(137)/K(2.8)/Cl(89)/HCO3(31)/BUN(80)/Cr(2.21)Glu(104)/Ca(10.1)/Mg(--)/PO4(--)     @ 13:58    Urinalysis Basic - ( 2021 18:03 )    Color: Light Yellow / Appearance: Clear / S.014 / pH: x  Gluc: x / Ketone: Negative  / Bili: Negative / Urobili: Negative   Blood: x / Protein: Negative / Nitrite: Negative   Leuk Esterase: Moderate / RBC: 4 /hpf / WBC 3 /HPF   Sq Epi: x / Non Sq Epi: 1 /hpf / Bacteria: Negative      Sodium, Random Urine: <35 mmol/L ( @ 18:03)  Creatinine, Random Urine: 71 mg/dL ( @ 18:03)  Protein/Creatinine Ratio Calculation: 0.1 Ratio ( @ 18:03)        85 F PMH CHF, AF on Eliquis, MVR, ckd3 who p/w syncope.    CKD with non oliguric WEN- likely prerenal/cardiorenal etiology baseline Cr appears 1.8- 2 mg/dl. Peaked to 2.3 now improving. Urine protein minimal  Hypokalemia - in the setting of diuretic use   Clinical euvolemia  Blood pressure soft  hemogobin at goal   Syncope - likely vasovagal, ongoing work up     RECOMMEND:  Would hold further diuretics at this point   Dose meds for CR Cl 20 m//min   Avoid contrast studies as able to   No indication for renal sono   KCL 40 meq  PO today       Kenna Marin MD  [facility]  Office: [phone]         In good spirits- anxious for discharge       VITAL:  T(C): , Max: 36.9 (21 @ 16:50)  T(F): , Max: 98.4 (21 @ 16:50)  HR: 99 (21 @ 04:31)  BP: 125/79 (21 @ 04:31)  BP(mean): --  RR: 18 (21 @ 04:31)  SpO2: 92% (21 @ 04:31)  Wt(kg): --      PHYSICAL EXAM:  General: Alerted, oriented  HEENT: MMM  Lungs:CTA-b/l  Heart: RRR S1S2  Abdomen: Soft, NTND  Ext: no pedal edema b/l  : no muhammad      LABS:                        11.6   9.16  )-----------( 255      ( 2021 07:07 )             38.2     Na(140)/K(3.3)/Cl(99)/HCO3(28)/BUN(56)/Cr(1.54)Glu(111)/Ca(9.6)/Mg(2.0)/PO4(--)     @ 07:02  Na(138)/K(3.4)/Cl(95)/HCO3(29)/BUN(67)/Cr(1.88)Glu(123)/Ca(9.8)/Mg(--)/PO4(--)     @ 17:19  Na(135)/K(3.0)/Cl(92)/HCO3(31)/BUN(72)/Cr(2.16)Glu(85)/Ca(9.5)/Mg(2.0)/PO4(3.3)     @ 06:45  Na(136)/K(4.0)/Cl(92)/HCO3(31)/BUN(75)/Cr(2.35)Glu(118)/Ca(9.5)/Mg(--)/PO4(--)     @ 19:19  Na(137)/K(2.8)/Cl(89)/HCO3(31)/BUN(80)/Cr(2.21)Glu(104)/Ca(10.1)/Mg(--)/PO4(--)     @ 13:58    Urinalysis Basic - ( 2021 18:03 )    Color: Light Yellow / Appearance: Clear / S.014 / pH: x  Gluc: x / Ketone: Negative  / Bili: Negative / Urobili: Negative   Blood: x / Protein: Negative / Nitrite: Negative   Leuk Esterase: Moderate / RBC: 4 /hpf / WBC 3 /HPF   Sq Epi: x / Non Sq Epi: 1 /hpf / Bacteria: Negative      Sodium, Random Urine: <35 mmol/L ( @ 18:03)  Creatinine, Random Urine: 71 mg/dL ( @ 18:03)  Protein/Creatinine Ratio Calculation: 0.1 Ratio ( @ 18:03)        85 F PMH CHF, AF on Eliquis, MVR, ckd3 who p/w syncope.    CKD with non oliguric WEN- likely prerenal/cardiorenal etiology baseline Cr appears 1.8- 2 mg/dl. Peaked to 2.3 now improving. Urine protein minimal  Hypokalemia - in the setting of diuretic use   Clinical euvolemia  Blood pressure soft  hemogobin at goal   Syncope - likely vasovagal, ongoing work up     RECOMMEND:  AGree with Torsemide 20 mg every other day   Dose meds for CR Cl 20 m//min   Avoid contrast studies as able to   No indication for renal sono   KCL 40 meq  PO today       Kenna Marin MD  [facility]  Office: [phone]

## 2021-02-24 NOTE — DISCHARGE NOTE PROVIDER - NSDCFUADDAPPT_GEN_ALL_CORE_FT
Follow up with your Primary care doctor within one week  Follow up with Cardiology as listed Follow up with your Primary care doctor within one week  Follow up with Cardiology as listed-Patient will call for an appointment

## 2021-02-24 NOTE — DISCHARGE NOTE PROVIDER - CARE PROVIDER_API CALL
Akash Jain)  Family Medicine  [address], [address]  [place] [address]  Phone: [phone]  Fax: [phone]  Follow Up Time:     Nicolás Wynne)  Cardiovascular Disease; Internal Medicine  [address], [address]  [place] [address]  Phone: [phone]  Fax: [phone]  Follow Up Time:

## 2021-02-24 NOTE — PROGRESS NOTE ADULT - ATTENDING COMMENTS
possible dc later this afternoon with close outpt f/u        Advanced care planning was discussed with patient and family.  Advanced care planning forms were reviewed and discussed as appropriate.  Differential diagnosis and plan of care discussed with patient after the evaluation.   Pain assessed and judicious use of narcotics when appropriate was discussed.  Importance of Fall prevention discussed.  Counseling on Smoking and Alcohol cessation was offered when appropriate.  Counseling on Diet, exercise, and medication compliance was done.   Approx 30 minutes spent. dc today on torsemide 20 every other day  hold aldactone and metolazone.  outpt f/u one week         Advanced care planning was discussed with patient and family.  Advanced care planning forms were reviewed and discussed as appropriate.  Differential diagnosis and plan of care discussed with patient after the evaluation.   Pain assessed and judicious use of narcotics when appropriate was discussed.  Importance of Fall prevention discussed.  Counseling on Smoking and Alcohol cessation was offered when appropriate.  Counseling on Diet, exercise, and medication compliance was done.   Approx 30 minutes spent.

## 2021-02-24 NOTE — DISCHARGE NOTE NURSING/CASE MANAGEMENT/SOCIAL WORK - PATIENT PORTAL LINK FT
You can access the FollowMyHealth Patient Portal offered by [facility] by registering at the following website: [email or web address]. By joining [facility]’s FollowMyHealth portal, you will also be able to view your health information using other applications (apps) compatible with our system.

## 2021-02-24 NOTE — PROGRESS NOTE ADULT - PROBLEM SELECTOR PLAN 6
on eliquis  -pt endorses 3 drinks of wine daily, stating "I look forward to 4 oclock every day."  no s/s of withdrawal currently.  will start low risk ciwa sx triggered, and can d/c if ciwa scores low.
on eliquis  -pt endorses 3 drinks of wine daily, stating "I look forward to 4 oclock every day."  no s/s of withdrawal currently.  will start low risk ciwa sx triggered, and can d/c if ciwa scores low.

## 2021-02-24 NOTE — PROGRESS NOTE ADULT - PROBLEM SELECTOR PLAN 1
-in setting of overdiuresis/ellen/dehydration  -re-check orthostatics - now improved  -fall prec  -TTE noted   -cards consult apprec   no events o/n tele
-in setting of overdiuresis/ellen/dehydration  -re-check orthostatics  -fall prec  -TTE  -cards consulted   no events o/n tele

## 2021-03-03 PROBLEM — N18.9 ANEMIA ASSOCIATED WITH CHRONIC RENAL FAILURE: Status: ACTIVE | Noted: 2019-11-25

## 2021-03-03 NOTE — HISTORY OF PRESENT ILLNESS
[Post-hospitalization from ___ Hospital] : Post-hospitalization from [unfilled] Hospital [Patient Contacted By: ____] : and contacted by [unfilled] [FreeTextEntry2] : 85 year-old presents with orthostatic hypotension and syncope in the setting of \par increased diuretics. \par With reduction in diuretics, symptoms improved, as did orthostatic vitals. \par TTE with structurally normal heart. \par \par No further cardiovascular inpatient work-up necessary. \par Patient can follow-up with Dr. Wynne as outpatient. \par orthostatics - now improved \par Pt without LE edema, lungs clear, appears euvolemic \par \par \par WEN  -likely prerenal in setting of overdiuresis; known ckd3 \par -holding diuretics - resumed Torsemide every other day \par -avoid nephrotoxins \par -renally dose meds. \par \par \par Hypokalemia.  -repleted \par -corrected \par \par Chronic atrial fibrillation.  Plan: -c/w Eliquis \par -c/w bb. \par Pt medically cleared for discharge to home and follow up with her PCP and \par Cardiology. \par \par \par Today, overall doing okay.  No further episodes of syncope.  Still with chronic shortness of breath on exertion but no major change from her baseline.  Regularly in contact with cardiologist.

## 2021-03-03 NOTE — PHYSICAL EXAM
[Normal] : normal gait, coordination grossly intact, no focal deficits [de-identified] : Mild crackles in lower bases bilaterally.  No respiratory distress [de-identified] : No lower extremity edema bilaterally [50904 - High Complexity requires an extensive number of possible diagnoses and/or the management options, extensive complexity of the medical data (tests, etc.) to be reviewed, and a high risk of significant complications, morbidity, and/or mortality as w] : High Complexity

## 2021-03-03 NOTE — ASSESSMENT
[FreeTextEntry1] : 83 y/o female with h/o stage IV CKD, HTN, CHF, Afib on eliquis (renally dosed) \par \par \par afib on eliquis.  rate controlled \par -cont eliquis \par \par HFpEF, no lower extremity edema noted today\par -Continue meds as directed\par -Advised to follow-up with cardiology\par -Advised to check weight daily\par -Low-salt diet\par -We will recheck labs today\par \par Stage IV CKD\par -We will need to monitor closely\par -Recheck labs today\par \par f/u in 3 months \par \par \par \par \par

## 2021-03-31 NOTE — ED ADULT TRIAGE NOTE - AS TEMP SITE
Peripheral Block    Patient location during procedure: pre-op  Start time: 3/31/2021 9:29 AM  End time: 3/31/2021 9:34 AM  Staffing  Performed: resident/CRNA   Resident/CRNA: YISEL Ogden CRNA  Preanesthetic Checklist  Completed: patient identified, IV checked, risks and benefits discussed, surgical consent, monitors and equipment checked, pre-op evaluation, timeout performed, anesthesia consent given, oxygen available and patient being monitored  Peripheral Block  Patient position: supine  Prep: ChloraPrep  Patient monitoring: continuous pulse ox and IV access  Block type: TAP  Laterality: bilateral  Injection technique: single-shot  Guidance: ultrasound guided  Local infiltration: ropivacaine and decadron  Infiltration strength: 0.5 %  Dose: 40 mL  Provider prep: mask and sterile gloves  Local infiltration: ropivacaine and decadron  Needle  Needle type: pajunk 80mm TAP.    Assessment  Injection assessment: negative aspiration for heme, no paresthesia on injection and local visualized surrounding nerve on ultrasound  Paresthesia pain: none  Slow fractionated injection: yes  Hemodynamics: stable  Reason for block: post-op pain management and at surgeon's request
oral

## 2021-04-12 PROBLEM — M79.89 LEG SWELLING: Status: ACTIVE | Noted: 2020-01-08

## 2021-05-04 PROBLEM — R09.02 HYPOXIA: Status: ACTIVE | Noted: 2021-01-01

## 2021-05-04 NOTE — HISTORY OF PRESENT ILLNESS
[FreeTextEntry1] : Referring: Dr. Wynne\par \par Ms. 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 She notes dyspnea upon minimal ambulation which has been worsening. Earlier this year she could walk along the supermarket but now even walking around the [place] she will get shortness of breath. She denies any orthopnea. She notes stable lower extremity edema. Denies any chest pain or chest pressure. She notes dry cough in the morning. Previously with low potassium but that has now resolved. She also notes poor appetite with a sensation of early satiety. \par \par

## 2021-05-04 NOTE — DISCUSSION/SUMMARY
[FreeTextEntry1] : # HFpEF, no signs of infiltrative CM on TTE and NM amyloid scan negative\par - Diuretics: current regimen is torsemide 20 mg daily, asked to increase to torsemide 20 mg BID for 3 days\par - Labs: 4/12/21\par - Continue spironolactone 25 mg daily \par - If workup for MS/PE negative will plan for CardioMEMS\par - RHC pending this week by Dr. Hernandez, will reassess filling pressures at this time \par \par # Mitral stenosis\par - read as moderate on most recent echo though in some views gradient appears to be 10-12 mmHg\par - continue metoprolol succinate 200 mg daily, will have low threshold to add diltiazem\par - she is already scheduled for R/LHC on 5/7 with Dr. Hernandez, I have asked to obtain simultaneous LVEDP/PCWP to assess MS invasively \par \par # Exertional hypoxia and moderate pulmonary hypertension\par - will obtain V/Q scan to assess for PE/CTEPH\par \par # CKD III with baseline Cr 1.5\par - stable, continue to monitor\par \par # pAF\par - on eliquis 2.5 mg BID per Dr. Wynne  \par \par Return to clinic in 6 weeks

## 2021-05-04 NOTE — PHYSICAL EXAM
[Well Developed] : well developed [Well Nourished] : well nourished [Normal S1, S2] : normal S1, S2 [No Rub] : no rub [No Gallop] : no gallop [Clear Lung Fields] : clear lung fields [Good Air Entry] : good air entry [Soft] : abdomen soft [Non Tender] : non-tender [Moves all extremities] : moves all extremities [Alert and Oriented] : alert and oriented [de-identified] : JVP 12-14 cm H20 [de-identified] : 2/6 systolic flow murmur  [de-identified] : Slow, unsteady gait  [de-identified] : 2+ edema to shins

## 2021-05-04 NOTE — CARDIOLOGY SUMMARY
[de-identified] : \par EKG 4/2021: sinus rhythm with sinus arrhythmia  [de-identified] : \par 2/23/21: LV 3.6 cm, mild concentric LVH, LVEF 65-70%, LVOT VTI 24 cm, calcified mitral annulus with moderate mitral stenosis (8 mmHg at HR 94 bpm though one some images with gradient > 10 mmHg),  dilated RV with mild dysfunction, E/A 1.9 and  e' velocities 5-8 estimated PASP 49 mmHg [de-identified] : \par [employer]et[employer] pyrophosphate scan 2019: negative for amyloid  [de-identified] : \par [facility] 7/2018: mild nonobstructive CAD \par \par [facility] 7/2018: RA 9, PA 60/17 (38), PCWP 22, [place] CO/CI 6.1/3.7, PVR 2.6

## 2021-05-04 NOTE — ASSESSMENT
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 While there is volume expansion her symptoms seem out of proportion to the severity of her diastolic heart failure. I wonder if her mitral stenosis may be underestimated or if she has some degree of CTEPH. Will plan to work her up for mitral stenosis and PE and if negative she would be an excellent candidate for a CardioMEMS to longitudinally manage her heart failure syndrome.

## 2021-05-07 NOTE — ASU PATIENT PROFILE, ADULT - PSH
Abdominal hernia  repair 2007  Bilateral cataracts  extraction w/ IOL  H/O carotid endarterectomy    History of lumbar laminectomy for spinal cord decompression  1995  Hx of tonsillectomy    Papilloma of breast  s/p excision from right breast >20 years ago  S/P laparoscopic cholecystectomy    S/P MVR (mitral valve repair)  1997 at [facility]  Status post DACIA-BSO  1975  Varicose veins  s/p sclerotherapy bilaterally

## 2021-05-07 NOTE — ASU DISCHARGE PLAN (ADULT/PEDIATRIC) - CARE PROVIDER_API CALL
Nicolás Wynne (MD)  Cardiovascular Disease; Internal Medicine  [address]  [place] [address]  Phone: [phone]  Fax: [phone]  Follow Up Time:    Nicolás Wynne)  Cardiovascular Disease; Internal Medicine  [address], [address]  [place] [address]  Phone: [phone]  Fax: [phone]  Follow Up Time:     Xochitl Valdovinos)  Cardiology; Internal Medicine  [address], [address] [place] [address]  Phone: [phone]  Fax: [phone]  Follow Up Time:

## 2021-05-07 NOTE — ASU DISCHARGE PLAN (ADULT/PEDIATRIC) - ASU DC SPECIAL INSTRUCTIONSFT
No heavy lifting for 2 weeks, no strenuous activity  or unnecessary stair climbing, no driving for x 2 days,  you may shower 24 hours following procedure but no bathing or swimming for x1  week, no bending, no straining while having a Bowel movement, No strenuous sexual activity for x 1 week check groin for bleeding, pain, tightness or ( golf ball size)  swelling daily following procedure , & follow up with your cardiologist in 1-2 week No heavy lifting for 2 weeks, no strenuous activity  or unnecessary stair climbing, no driving for x 2 days,  you may shower 24 hours following procedure but no bathing or swimming for x1  week, no bending, no straining while having a Bowel movement, No strenuous sexual activity for x 1 week check groin for bleeding, pain, tightness or ( golf ball size)  swelling daily following procedure , & follow up with your cardiologist in 1-2 week    Follow up YULIA

## 2021-05-24 PROBLEM — R32 URINARY INCONTINENCE, UNSPECIFIED TYPE: Status: ACTIVE | Noted: 2019-09-05

## 2021-05-24 PROBLEM — N39.0 UTI (URINARY TRACT INFECTION), UNCOMPLICATED: Status: ACTIVE | Noted: 2021-01-01

## 2021-05-24 NOTE — HISTORY OF PRESENT ILLNESS
[FreeTextEntry8] : 85 year old female presents complaining of urinary frequency and dysuria for the last several dyas.  no back pain, fever, chills, nausea, vomiting.\par \par Recently underwent right sided PCI, no stents placed but noted to have moderate to severe mitral stenosis and YULIA is planned P.  Patient states never received a call to schedule.\par

## 2021-05-24 NOTE — PHYSICAL EXAM
[Normal] : normal gait, coordination grossly intact, no focal deficits [de-identified] : Mild crackles in lower bases bilaterally.  No respiratory distress [de-identified] : No lower extremity edema bilaterally

## 2021-05-24 NOTE — PLAN
[FreeTextEntry1] : -will send urine culture, results in 2-5 days, \par -advised to increase fluid hydration\par

## 2021-05-27 PROBLEM — A49.8 INFECTION DUE TO ESBL-PRODUCING KLEBSIELLA PNEUMONIAE: Status: ACTIVE | Noted: 2021-01-01

## 2021-06-02 NOTE — PHYSICAL EXAM
[Well Developed] : well developed [Well Nourished] : well nourished [Normal S1, S2] : normal S1, S2 [No Rub] : no rub [No Gallop] : no gallop [Clear Lung Fields] : clear lung fields [Good Air Entry] : good air entry [Soft] : abdomen soft [Non Tender] : non-tender [Moves all extremities] : moves all extremities [Alert and Oriented] : alert and oriented [de-identified] : JVP 12-14 cm H20 [de-identified] : 2/6 systolic flow murmur  [de-identified] : Slow, unsteady gait  [de-identified] : 1+ edema to ankles

## 2021-06-02 NOTE — DISCUSSION/SUMMARY
[FreeTextEntry1] : # HFpEF, no signs of infiltrative CM on TTE and NM amyloid scan negative\par - Diuretics: current regimen is torsemide 20 mg daily, will increase to 40 mg daily and advised to call if any dizziness\par - Labs: 5/7/21 with K 4.2 and Cr 1.38\par - Continue spironolactone 25 mg daily \par - If workup for MS negative will plan for CardioMEMS\par \par # Mitral stenosis\par - read as moderate on most recent echo though in some views gradient appears to be 10-12 mmHg and invasive hemodynamics suggestive of significant mitral stenosis \par - continue metoprolol succinate 200 mg daily, will add diltiazem 120 mg daily for further HR control\par - YULIA pending to assess mitral stenosis severity \par \par # CKD III with baseline Cr 1.5\par - stable, continue to monitor\par \par # pAF\par - on eliquis 2.5 mg BID per Dr. Wynne  \par \par Return to clinic in 3 months, will discuss results of YULIA and arrange for consultation with structural/surgery should this be required

## 2021-06-02 NOTE — ASSESSMENT
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 She is awaiting YULIA to better assess the mitral valve which may require intervention. If negative would plan for CardioMEMS to longitudinally manage her heart failure syndrome.

## 2021-06-02 NOTE — CARDIOLOGY SUMMARY
[de-identified] : \par EKG 4/2021: sinus rhythm with sinus arrhythmia  [de-identified] : \par 2/23/21: LV 3.6 cm, mild concentric LVH, LVEF 65-70%, LVOT VTI 24 cm, calcified mitral annulus with moderate mitral stenosis (8 mmHg at HR 94 bpm though one some images with gradient > 10 mmHg),  dilated RV with mild dysfunction, E/A 1.9 and  e' velocities 5-8 estimated PASP 49 mmHg [de-identified] : \par Technetium pyrophosphate scan 2019: negative for amyloid  [de-identified] : \par R/C 5/7/21\par RA 9, PA 60/27 (42), PCWP 31, LVEDP 14 mmHg, Sophie CO/CI 5.5/3.2\par Estimated mitral valve area 1.17\par Non-obstructive CAD with 40% disease in LCx and RCA \par \par \par [facility] 7/2018: mild nonobstructive CAD \par \par [facility] 7/2018: RA 9, PA 60/17 (38), PCWP 22, Sophie CO/CI 6.1/3.7, PVR 2.6

## 2021-06-02 NOTE — HISTORY OF PRESENT ILLNESS
[FreeTextEntry1] : Referring: Dr. Wynne\par \par Ms. 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 A R/LHC was performed which was suggestive of significant mitral stenosis and ordered a YULIA which is pending.\par \par She presents today for followup. She feels similar symptoms of dyspnea on exertion which continues to occur with minimal ambulation. She denies any orthopnea. She noted a mild sensation of chest pain yesterday while resting which was brief. She notes increased lower extremity edema and states weight at home has been stable. Notes stable urination with torsemide. Denies any dizziness or lightheadedness. \par \par \par

## 2021-06-30 PROBLEM — K52.831 COLLAGENOUS COLITIS: Status: ACTIVE | Noted: 2019-04-04

## 2021-06-30 PROBLEM — K52.9 CHRONIC DIARRHEA: Status: ACTIVE | Noted: 2019-12-03

## 2021-07-13 NOTE — ED ADULT NURSE REASSESSMENT NOTE - NS ED NURSE REASSESS COMMENT FT1
Received patient from RN Juan, patient at baseline mental status, able to make needs known, NAD, VSS, patient agreeable to plan of care, pending CICU bed, comfort and safety provided.

## 2021-07-13 NOTE — H&P ADULT - NSHPPHYSICALEXAM_GEN_ALL_CORE
Vital Signs Last 24 Hrs  T(C): 36.4 (13 Jul 2021 20:07), Max: 36.9 (13 Jul 2021 12:51)  T(F): 97.6 (13 Jul 2021 20:07), Max: 98.5 (13 Jul 2021 12:51)  HR: 138 (13 Jul 2021 20:07) (91 - 154)  BP: 91/52 (13 Jul 2021 20:07) (91/52 - 111/86)  BP(mean): 65 (13 Jul 2021 20:07) (65 - 84)  RR: 37 (13 Jul 2021 20:07) (22 - 37)  SpO2: 93% (13 Jul 2021 20:07) (93% - 100%)  PHYSICAL EXAM:  GENERAL: NAD, well appearing   HEENT: No bleeding observed at scalp. PERRL, +EOMI  NECK: soft, Supple   CHEST/LUNG: Crackles at bases b/l  HEART: Irregularly irregular rhythm   ABDOMEN: Soft, Nontender, Nondistended; Bowel sounds present  EXTREMITIES: 2+ edema b/l. Weak Post tibial and dorsalis pedis pulses b/l  SKIN: No rashes or lesions  NEURO: AAOX3, no motor or sensory loss  PSYCH: normal mood Vital Signs Last 24 Hrs  T(C): 36.4 (13 Jul 2021 20:07), Max: 36.9 (13 Jul 2021 12:51)  T(F): 97.6 (13 Jul 2021 20:07), Max: 98.5 (13 Jul 2021 12:51)  HR: 138 (13 Jul 2021 20:07) (91 - 154)  BP: 91/52 (13 Jul 2021 20:07) (91/52 - 111/86)  BP(mean): 65 (13 Jul 2021 20:07) (65 - 84)  RR: 37 (13 Jul 2021 20:07) (22 - 37)  SpO2: 93% (13 Jul 2021 20:07) (93% - 100%)  PHYSICAL EXAM:  GENERAL: NAD, tachypneic while talking  HEENT: No bleeding observed at scalp. PERRL, +EOMI  NECK: soft, Supple   CHEST/LUNG: Crackles at bases b/l  HEART: Irregularly irregular rhythm   ABDOMEN: Soft, Nontender, Nondistended; Bowel sounds present  EXTREMITIES: 2+ edema b/l. Weak Post tibial and dorsalis pedis pulses b/l  SKIN: No rashes or lesions  NEURO: AAOX3, no motor or sensory loss  PSYCH: normal mood

## 2021-07-13 NOTE — ED PROVIDER NOTE - CLINICAL SUMMARY MEDICAL DECISION MAKING FREE TEXT BOX
85yo F hx of MVR sp repair, Afib, HTN, HLD, CHF, on Xarelto, pw SOB x 2-3 days. Possible CHF, Anemia, less likely PNA, infection. cardiac labs, CT head and abdomen to r/o bleeding, hematoma. likely require admission.

## 2021-07-13 NOTE — ED PROVIDER NOTE - ATTENDING CONTRIBUTION TO CARE
Upon my evaluation, this patient had a high probability of imminent or life-threatening deterioration due to severe sepsis, which required my direct attention, intervention, and personal management.  The patient has a  medical condition that impairs one or more vital organ systems.  Frequent personal assessment and adjustment of medical interventions was performed.      I have personally provided 60 minutes of critical care time exclusive of time spent on separately billable procedures. Time includes review of laboratory data, radiology results, discussion with consultants, patient and family; monitoring for potential decompensation, as well as time spent retrieving data and reviewing the chart and documenting the visit. Interventions were performed as documented above.    87 yo F, ill appearing, respiratory distress, more than chf, can have pna, colitis, or uti, sepsis work up, hold for rate control because this can be demanding tachy

## 2021-07-13 NOTE — H&P ADULT - NSHPSOCIALHISTORY_GEN_ALL_CORE
Smoking: former smoker, stopped over 30 uear  EtOH Use: 3 drinks of wine per day  Lives alone  Retired   Ambulates w/ cane Smoking: former smoker, stopped over 30 years ago. Prior 20 pack year smoking history   EtOH Use: 3-4 drinks of wine per day  No drug use   Lives alone  Retired   Ambulates w/ cane

## 2021-07-13 NOTE — H&P ADULT - HISTORY OF PRESENT ILLNESS
87yo F hx of MVR s/p repair, Afib, HTN, HLD, CHF, on Xarelto, p/w SOB x 2-3 days. Pt reports falling 5 days ago, after becoming dizzy and passing out for a brief second. She landed on her back and buttock, and hit her head. Over the past 2-3 days, she has been having worsening SOB. Last night, she had midline CP, palpitations, diaphoresis, that lasted all night. She denied any N/V, radiation of pain.     Pt reports progressive SOB with exertion, and generalized weakness, last night with Chest pain, currently chest pain free. 85yo F hx of MVR s/p repair, Afib, HTN, HLD, CHF, on Eliquis, p/w SOB x 2-3 days. Pt reports falling 5 days ago, after becoming dizzy and passing out for a brief second. She landed on her back and buttock, and hit her head. Over the past 2-3 days, she has been having worsening SOB. Last night, she had midline CP, palpitations, diaphoresis, that lasted all night. She denied any N/V, radiation of pain.     Pt reports progressive SOB with exertion, and generalized weakness, last night with Chest pain, currently chest pain free. 87yo F hx of MVR s/p repair, Afib, HTN, HLD, CHF, on Eliquis, p/w SOB x 2-3 days. Pt reports falling 5 days ago, after becoming dizzy and passing out for a brief second. She landed on her back and buttock, and hit her head. Over the past 2-3 days, she has been having worsening SOB. Last night, she had midline CP, palpitations, diaphoresis, that lasted all night. She denied any N/V, radiation of pain.     Pt reports progressive SOB with exertion, and generalized weakness, last night with Chest pain, currently chest pain free.    In the ED, patient found to be in A Fib w/ RVR and Cardiology consulted.  85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 Pt reports falling 5 days ago, after becoming dizzy and passing out for a brief second. She landed on her back and buttock, and hit her head. Over the past 2-3 days, she has been having worsening SOB and weakness. Last night, she had midline CP, palpitations, diaphoresis, that lasted all night. She denied any N/V, radiation of pain.     Patient has had CP and SOB on exertion for a long time, can only take approx 10 steps until she has symptoms. She has also had cough w/ brown sputum in the AM for a few weeks. She states taht she has had diarrhea for a few days 2/2 colitis, and has been taking Betamethasone to help w/ the diarrhea, but stopped due to dizziness.     Patient denies any fever, sick contacts, recent travel history, abd pain, N/V, urinary changes.     In the ED, patient found to be in A Fib w/ RVR and Cardiology was consulted.

## 2021-07-13 NOTE — ED PROVIDER NOTE - OBJECTIVE STATEMENT
85yo F hx of MVR sp repair, Afib, HTN, HLD, CHF, on Xarelto, pw SOB x 2-3 days. Pt reports falling 5 days ago, mechanical fall, landed on back and buttock, and hit head, no loc. Pt reports progressive SOB with exertion, and generalized weakness, last night with Chest pain, currently chest pain free. Pt with cold extremities, poor waveform on pulse ox. Currently on NRB 6L speaking in full sentences.

## 2021-07-13 NOTE — CONSULT NOTE ADULT - ASSESSMENT
85yo F hx of MVR sp repair, Afib, HTN, HLD, CHF, on Xarelto, pw SOB x 2-3 days. Pt reports falling 5 days ago, mechanical fall, landed on back and buttock, and hit head, no loc. Pt reports progressive SOB with exertion, and generalized weakness, last night with Chest pain. MICU consulted for shortness of breath. Patient had YULIA done `    Recommendations:  - Recommend evaluation by Cardiology (patient sees Dr. Wynne) 85yo F hx of MVR sp repair, Afib, HTN, HLD, CHF, on Xarelto, pw SOB x 2-3 days. Pt reports falling 5 days ago, mechanical fall, landed on back and buttock, and hit head, no loc. Pt reports progressive SOB with exertion, and generalized weakness, last night with Chest pain. MICU consulted for shortness of breath. Patient had YULIA done on 6/11    1. Peak mitral valve gradient equals 22 mm Hg, mean  transmitral valve gradient equals 8 mm Hg, which is  elevated even in the setting of a An annuloplasty ring is  seen in the mitral position..  Durng YULIA PG 13 mm hg, MG 4 mm hg.  2. Moderate aortic regurgitation.  Vena contracta 0.45 cm.  3. Aortic Root: 2.8 cm.  LVOT diameter: 2 cm.  Complex atheroma noted in aortic arch/descending aorta.  4. Normal left ventricular internal dimensions and wall  thicknesses.  5. Normal left ventricular systolic function. No segmental  wall motion abnormalities.  6. Moderate right atrial enlargement.  7. Right ventricular enlargement with normal right  ventricular systolic function (TAPSE 1.9 cm).  8. Estimated pulmonary artery systolic pressure equals 66  mm Hg, assuming right atrial pressure equals 8 mm Hg,  consistent with severe pulmonary pressures. MeanPASP 34 mm  hg. PADP 13 mm hg.  During YULIA PASP approx 50 mm hg.      Recommendations:  - Recommend evaluation by Cardiology (patient sees Dr. Wynne as an outpatient)  -  87yo F hx of MVR sp repair, Afib, HTN, HLD, CHF, on Xarelto, pw SOB x 2-3 days. Pt reports falling 5 days ago, mechanical fall, landed on back and buttock, and hit head, no loc. Pt reports progressive SOB with exertion, and generalized weakness, last night with Chest pain. MICU consulted for shortness of breath. Patient had YULIA done on 6/11which showed elevated mitral valve pressure gradient despite annuoplasty, moderate AR, moderate TR, normal LVSF, normal RVSF, severe pulmonary pressures.     Recommendations:  - Recommend evaluation by Cardiology (patient sees Dr. Wynne as an outpatient) given significant valvular regurgitation seen on bedside POCUS  - Patient may need diuresis  - Consider CTA to r/o PE  - Trend lactate  - Continue to monitor for hemodynamic instability  87yo F hx of MVR sp repair, Afib, HTN, HLD, CHF, on Xarelto, pw SOB x 2-3 days. Pt reports falling 5 days ago, mechanical fall, landed on back and buttock, and hit head, no loc. Pt reports progressive SOB with exertion, and generalized weakness, last night with Chest pain. MICU consulted for shortness of breath. Patient had YULIA done on 6/11which showed elevated mitral valve pressure gradient despite annuoplasty, moderate AR, moderate TR, normal LVSF, normal RVSF, severe pulmonary pressures.     Recommendations:  - Recommend evaluation by Cardiology (patient sees Dr. Wynne as an outpatient) given significant valvular regurgitation seen on bedside POCUS  - F/u BCx, UCx  - Further evaluate to r/o PNA  - Formal TTE  - Patient may need diuresis  - Consider CTA to r/o PE  - Trend lactate  - Continue to monitor for hemodynamic instability or worsening respiratory status

## 2021-07-13 NOTE — CONSULT NOTE ADULT - ATTENDING COMMENTS
Pt seen and examined. Stable resp and hemodynamic status. Requires cardiac work up. no indication for MICU admission at present. Please reconsult if condition changes.

## 2021-07-13 NOTE — ED PROCEDURE NOTE - US CPT CODES
81996 US Chest (PTX, Pleural Effussion/CHF vs COPD)/69454 Echocardiography Transthoracic with Image 2D (Echo/FAST)

## 2021-07-13 NOTE — H&P ADULT - NSHPREVIEWOFSYSTEMS_GEN_ALL_CORE
REVIEW OF SYSTEMS:    CONSTITUTIONAL: No weakness, fevers or chills  EYES/ENT: No visual changes;  No epistaxis   RESPIRATORY: No cough, wheezing, hemoptysis; No shortness of breath  CARDIOVASCULAR: No chest pain or palpitations  GASTROINTESTINAL: No abdominal or epigastric pain. No nausea, vomiting, or hematemesis; No diarrhea or constipation. No melena or hematochezia.  GENITOURINARY: No dysuria, frequency or hematuria  NEUROLOGICAL: No dizziness or LOC. No numbness  PSYCHIATRIC: No depression or anxiety   MUSCULOSKELETAL: NO weakness  SKIN: No itching, rashes REVIEW OF SYSTEMS:    CONSTITUTIONAL: No weakness, fevers or chills  EYES/ENT: No visual changes;    RESPIRATORY: +Cough w/ brown sputum in AM for few weeks. + shortness of breath  CARDIOVASCULAR: +chest pain and palpitations  GASTROINTESTINAL: +Diarrhea prev, none currently. No abdominal or epigastric pain. No nausea, vomiting, or hematemesis;  GENITOURINARY: No dysuria, frequency or hematuria  NEUROLOGICAL: + dizziness. No numbness  PSYCHIATRIC: No depression or anxiety   MUSCULOSKELETAL: No weakness  SKIN: +Itching on L hand REVIEW OF SYSTEMS:    CONSTITUTIONAL: +weakness. No fevers or chills  EYES/ENT: No visual changes;    RESPIRATORY: +Cough w/ brown sputum in AM for few weeks. + shortness of breath  CARDIOVASCULAR: +chest pain and palpitations  GASTROINTESTINAL: +Diarrhea prev, none currently. No abdominal or epigastric pain. No nausea, vomiting, or hematemesis;  GENITOURINARY: No dysuria, frequency or hematuria  NEUROLOGICAL: + dizziness. No numbness  PSYCHIATRIC: No depression or anxiety   MUSCULOSKELETAL: No weakness  SKIN: +Itching on L hand

## 2021-07-13 NOTE — ED PROVIDER NOTE - CARE PLAN
Principal Discharge DX:	Sepsis  Secondary Diagnosis:	Tachycardia   Principal Discharge DX:	Sepsis  Secondary Diagnosis:	Tachycardia  Secondary Diagnosis:	CHF (congestive heart failure)

## 2021-07-13 NOTE — ED ADULT TRIAGE NOTE - CHIEF COMPLAINT QUOTE
SOB x2 days. Hx of CHF. Per EMS, pt 80's on RA, placed on NRB mask. SOB x2 days. Hx of CHF. Per EMS, pt 80's on RA? poor reading, placed on NRB mask by EMS.

## 2021-07-13 NOTE — ED ADULT NURSE NOTE - OBJECTIVE STATEMENT
85 yo female from home A&OX4 daughter bedside BIB EMS c/o SOB over the last several days. 85 yo female from home A&OX4 daughter bedside BIB EMS c/o SOB over the last several days. Pt reportedly fell s/p syncopal episode 2 days ago. st she stood up, felt dizzy and fainted. Unknown if pt hit head, c/o pain to left buttocks. Pt c/o SOB over th elast 2-3 days with intermittent chest pain, currently denies chest pain/palpitations. Pt on Xarelto for afib, pt presents in rapid A-fib ~150BPM, Attending and resident bedside and aware. Pt tachypneic, able to speak in full sentences, does not appear in distress. Pt on NRB at 15LPM, unable to obtain accurate SPO2 waveform from multiple sites. Pt has hx CHF, st feels like SOB from previous exacerbations, no lower extremity edema present. Pt denies abd pain, n/v/d/dizziness. Pt denies fevers/chills, afebrile on arrival.

## 2021-07-13 NOTE — ED PROVIDER NOTE - SEVERE SEPSIS ALERT DETAILS
Erica Dillon M.D. Resident  Pt tachycardic from afib. Hx of CHF with stable BP and baseline mentation. Judicious IVF due to Hx of CHF.

## 2021-07-13 NOTE — ED PROVIDER NOTE - WET READ LAUNCH FT
There are no Wet Read(s) to document. There are 4 Wet Read(s) to document. There is 1 Wet Read(s) to document.

## 2021-07-13 NOTE — H&P ADULT - ASSESSMENT
87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     Neuro   -A&Ox4  -No active issues    Pulm   -Currently on 5L NC    Cards  #A Fib w/ RVR  -Patient found to be in A Fib w/ RVR to HR 160s on admission   -Likely causing SOB  -Given Amio 150mg x1    #CHF  -Likely 2/2 A fib    GI    Renal  #WEN    Heme   -C/w Hep gtt for A Fib     ID    Endo 85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     Neuro   -A&Ox4  -F/u CTH for any intracranial bleed     Pulm   -Currently on 5L NC  -SOB likely 2/2 A Fib w/ RVR. Will treat A Fib as below     Cards  #A Fib w/ RVR to max HR of 154  -Patient found to be in A Fib w/ RVR to HR 160s on admission   -Likely causing SOB  -Given Amio 150mg x1  -C/w home dose Cardizem  -Switch to Lopressor   -If A Fib w/ RVR does not improve, may consider Cardioversion     #CHF  -Likely 2/2 A fib w/ RVR   -TTE from 6/11 show severe pulmonary pressures  -C/w home Torsemide 40mg qd     GI  -Previously had diarrhea 2/2 colitis?  -Patient was taking Betamethasone, which she stopped due to dizziness   -CTM for now     Renal  -Cr elevated at 1.7 on admission. Baseline seems to range from 1.3 to 2.3 on prior admissions from months back   -Trend Cr     Heme   -C/w Hep gtt for A Fib     Musc  -Xrays are neg for fracture   -Tylenol for pain control     ID  -Concern for PNA on Cxray, but no acute symptoms indicating PNA  -s/p Vanc and Zosyn x1  -F/u BCx  -Monitor off abx for now     Endo  -F/u A1C 87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     Neuro   -A&Ox4  -F/u CTH for any intracranial bleed     Pulm   -Currently on 5L NC  -SOB likely 2/2 A Fib w/ RVR. Will treat A Fib as below     Cards  #A Fib w/ RVR to max HR of 154  -Patient found to be in A Fib w/ RVR to HR 160s on admission   -Likely causing SOB  -Given Amio 150mg x1  -C/w home dose Cardizem  -Switch to Lopressor   -If A Fib w/ RVR does not improve, may consider Cardioversion   -F/u TSH    #CHF  -HFpEF  -Likely 2/2 A fib w/ RVR   -TTE from 6/11 show severe pulmonary pressures  -C/w home Torsemide 40mg qd   -Trend lactate    GI  #Diarrhea   -Previously had diarrhea 2/2 colitis?  -Patient was taking Betamethasone, which she stopped due to dizziness   -CTM for now     #Transaminits   -Likely 2/2 CHF  -Treat as above   -Monitor AST/ALT for now     Renal  -Cr elevated at 1.7 on admission. Baseline seems to range from 1.3 to 2.3 on prior admissions from months back   -Trend Cr     Heme   -C/w Hep gtt for A Fib     Musc  -Xrays are neg for fracture   -Tylenol for pain control     ID  -Concern for PNA on Cxray, but no acute symptoms indicating PNA  -s/p Vanc and Zosyn x1  -F/u BCx  -Monitor off abx for now     Endo  -F/u A1C

## 2021-07-13 NOTE — ED PROVIDER NOTE - PSH
Abdominal hernia  repair 2007  Bilateral cataracts  extraction w/ IOL  H/O carotid endarterectomy    History of lumbar laminectomy for spinal cord decompression  1995  Hx of tonsillectomy    Papilloma of breast  s/p excision from right breast >20 years ago  S/P laparoscopic cholecystectomy    S/P MVR (mitral valve repair)  1997 at [facility]  Status post DACIA-BSO  1975  Varicose veins  s/p sclerotherapy bilaterally

## 2021-07-13 NOTE — H&P ADULT - NSHPLABSRESULTS_GEN_ALL_CORE
LABS:                          9.6    14.33 )-----------( 403      ( 2021 11:28 )             31.7         140  |  105  |  48<H>  ----------------------------<  125<H>  4.0   |  20<L>  |  1.65<H>    Ca    7.8<L>      2021 14:53  Mg     1.7         TPro  6.1  /  Alb  3.5  /  TBili  0.5  /  DBili  x   /  AST  187<H>  /  ALT  316<H>  /  AlkPhos  160<H>      LIVER FUNCTIONS - ( 2021 14:53 )  Alb: 3.5 g/dL / Pro: 6.1 g/dL / ALK PHOS: 160 U/L / ALT: 316 U/L / AST: 187 U/L / GGT: x           PT/INR - ( 2021 11:28 )   PT: 21.1 sec;   INR: 1.81 ratio         PTT - ( 2021 11:28 )  PTT:28.0 sec  Urinalysis Basic - ( 2021 16:30 )    Color: Yellow / Appearance: Slightly Turbid / S.019 / pH: x  Gluc: x / Ketone: Negative  / Bili: Negative / Urobili: Negative   Blood: x / Protein: 100 / Nitrite: Negative   Leuk Esterase: Moderate / RBC: 3 /hpf / WBC 9 /HPF   Sq Epi: x / Non Sq Epi: 1 /hpf / Bacteria: Negative    < from: US TTE 2D F/U, Limited w/o Contrast (ED) (21 @ 15:47) >    IMPRESSION:  Questionable small pericardial effusion but no evidence of tamponade. Dilated right ventricle.  Bilateral small pleural effusions.< from: Xray Femur 2 Views, Left (21 @ 11:50) >    IMPRESSION:  No fractures or dislocations in the aboveimaged anatomic regions.    Intact pelvic and obturator rings and symmetrically aligned and spaced SI joints and pubic symphysis.    Lower lumbar spine degenerative change again apparent. Preserved bilateral hip joint spaces. Patellar articular margin osteophytes. No gross radiographic evidence for AVN.    Linear soft tissue calcification again noted adjacent to right greater trochanter superior margin compatible with focus of distal gluteal calcific tendinosis.    Generalized osteopenia otherwise no discrete lytic or blastic lesions.    Scant faint femoral vascular calcifications.    < end of copied text >    < from: Xray Chest 1 View- PORTABLE-Urgent (21 @ 11:50) >    IMPRESSION:    The heart is slightly enlarged. Right middle lobe pneumonia . The left lung is clear. No pleural effusion. No pneumothorax. Status post sternotomy. If clinically warranted a lateral chest x-ray should be obtained.    < end of copied text > LABS:                          9.6    14.33 )-----------( 403      ( 2021 11:28 )             31.7         140  |  105  |  48<H>  ----------------------------<  125<H>  4.0   |  20<L>  |  1.65<H>    Ca    7.8<L>      2021 14:53  Mg     1.7         TPro  6.1  /  Alb  3.5  /  TBili  0.5  /  DBili  x   /  AST  187<H>  /  ALT  316<H>  /  AlkPhos  160<H>      LIVER FUNCTIONS - ( 2021 14:53 )  Alb: 3.5 g/dL / Pro: 6.1 g/dL / ALK PHOS: 160 U/L / ALT: 316 U/L / AST: 187 U/L / GGT: x           PT/INR - ( 2021 11:28 )   PT: 21.1 sec;   INR: 1.81 ratio         PTT - ( 2021 11:28 )  PTT:28.0 sec  Urinalysis Basic - ( 2021 16:30 )    Color: Yellow / Appearance: Slightly Turbid / S.019 / pH: x  Gluc: x / Ketone: Negative  / Bili: Negative / Urobili: Negative   Blood: x / Protein: 100 / Nitrite: Negative   Leuk Esterase: Moderate / RBC: 3 /hpf / WBC 9 /HPF   Sq Epi: x / Non Sq Epi: 1 /hpf / Bacteria: Negative    < from: US TTE 2D F/U, Limited w/o Contrast (ED) (21 @ 15:47) >    IMPRESSION:  Questionable small pericardial effusion but no evidence of tamponade. Dilated right ventricle.  Bilateral small pleural effusions.< from: Xray Femur 2 Views, Left (21 @ 11:50) >    IMPRESSION:  No fractures or dislocations in the aboveimaged anatomic regions.    Intact pelvic and obturator rings and symmetrically aligned and spaced SI joints and pubic symphysis.    Lower lumbar spine degenerative change again apparent. Preserved bilateral hip joint spaces. Patellar articular margin osteophytes. No gross radiographic evidence for AVN.    Linear soft tissue calcification again noted adjacent to right greater trochanter superior margin compatible with focus of distal gluteal calcific tendinosis.    Generalized osteopenia otherwise no discrete lytic or blastic lesions.    Scant faint femoral vascular calcifications.    < end of copied text >    < from: Xray Chest 1 View- PORTABLE-Urgent (21 @ 11:50) >    IMPRESSION:    The heart is slightly enlarged. Right middle lobe pneumonia . The left lung is clear. No pleural effusion. No pneumothorax. Status post sternotomy. If clinically warranted a lateral chest x-ray should be obtained.    < end of copied text >    < from: Transesophageal Echocardiogram (21 @ 08:46) >    EF (Visual Estimate): 55-60 %    < end of copied text >    < from: Transesophageal Echocardiogram (21 @ 08:46) >    Conclusions:  1. Peak mitral valve gradient equals 22 mm Hg, mean  transmitral valve gradient equals 8 mm Hg, which is  elevated even in the setting of a An annuloplasty ring is  seen in the mitral position..  Durng YULIA PG 13 mm hg, MG 4 mm hg.  2. Moderate aortic regurgitation.  Vena contracta 0.45 cm.  3. Aortic Root: 2.8 cm.  LVOT diameter: 2 cm.  Complex atheroma noted in aortic arch/descending aorta.  4. Normal left ventricular internal dimensions and wall  thicknesses.  5. Normal left ventricular systolic function. No segmental  wall motion abnormalities.  6. Moderate right atrial enlargement.  7. Right ventricular enlargement with normal right  ventricular systolic function (TAPSE 1.9 cm).  8. Estimated pulmonary artery systolic pressure equals 66  mm Hg, assuming right atrial pressure equals 8 mm Hg,  consistent with severe pulmonary pressures. MeanPASP 34 mm  hg. PADP 13 mm hg.  During YULIA PASP approx 50 mm hg.    < end of copied text >

## 2021-07-13 NOTE — ED ADULT NURSE NOTE - NSIMPLEMENTINTERV_GEN_ALL_ED
Implemented All Fall with Harm Risk Interventions:  [place] to call system. Call bell, personal items and telephone within reach. Instruct patient to call for assistance. Room bathroom lighting operational. Non-slip footwear when patient is off stretcher. Physically safe environment: no spills, clutter or unnecessary equipment. Stretcher in lowest position, wheels locked, appropriate side rails in place. Provide visual cue, wrist band, yellow gown, etc. Monitor gait and stability. Monitor for mental status changes and reorient to person, place, and time. Review medications for side effects contributing to fall risk. Reinforce activity limits and safety measures with patient and family. Provide visual clues: red socks.

## 2021-07-13 NOTE — CHART NOTE - NSCHARTNOTEFT_GEN_A_CORE
Padua Prediction Score for VTE Risk within 24hours of admission:    Active malignancy:                                                    [  ] YES +3, [ x ] NO   Previous VTE (Excluding Superficial Vein Thrombosis): [  ] YES +3, [ x ] NO  Reduced mobility:                                                     [  ] YES +3, [ x ] NO  Already known thrombophilic condition:                     [  ] YES +3, [ x ] NO  Recent (</=1 month trauma and/or surgery):             [  ] YES +2, [ x ] NO  Elderly age (>/=70):                                                  [ x ] YES +1, [  ] NO  Heart and/or Respiratory Failure:                               [  ] YES +1, [x  ] NO   Acute MI and/or ischemic CVA:                                  [  ] YES +1, [ x ] NO   Acute infection and/or rheumatologic disorder:          [  ] YES +1, [ x ] NO   BMI>/= 30:                                                              [  ] YES +1, [ x ] NO   Ongoing hormonal treatment:                                   [  ] YES +1, [ x ] NO    Total Score: [  1]  points    [ x ] Padua Score <  3: Low Risk of VTE         - Chemical Thromboprophylaxis should be considered on case-by-case basis  [  ] Padua Score >/= 4: High Risk of VTE         - Chemical Thromboprophylaxis is recommended for nonpregnant patients without contraindications (Major bleeding, thrombocytopenia) who are >/=  18 years of age                         VTE Prophylaxis Recommendations:  Mechanical Pneumatic Compression Devices                                [  ]  Yes,  [ x ] No    Chemical VTE Prophylaxis (Heparin/ Lovenox/ Fondaparinux)        [ x  ] Yes,  [  ] No              [ ] Contraindicated, because _____              [x ] Already receiving Systemic Anticoagulation

## 2021-07-13 NOTE — CONSULT NOTE ADULT - SUBJECTIVE AND OBJECTIVE BOX
CHIEF COMPLAINT:    HPI:    PAST MEDICAL & SURGICAL HISTORY:  Insomnia    HTN (hypertension)    Hypercholesteremia    GERD (gastroesophageal reflux disease)    Carotid artery occlusion  (R)    Vitamin B 12 deficiency    Osteoporosis    Mitral valve disease    Gallstones    Choledocholithiasis    CHF (congestive heart failure)    S/P MVR (mitral valve repair)  1997 at [facility]    Status post DACIA-BSO  1975    Hx of tonsillectomy    Abdominal hernia  repair 2007    History of lumbar laminectomy for spinal cord decompression  1995    Papilloma of breast  s/p excision from right breast &gt;20 years ago    Bilateral cataracts  extraction w/ IOL    Varicose veins  s/p sclerotherapy bilaterally    H/O carotid endarterectomy    S/P laparoscopic cholecystectomy        FAMILY HISTORY:  Family history of lung cancer (Grandparent)    Family history of dementia  mother    FH: cirrhosis  father        SOCIAL HISTORY:  Smoking: __ packs x ___ years  EtOH Use:  Marital Status:  Occupation:  Recent Travel:  Country of Birth:  Advance Directives:    Allergies    &quot;VASELINE BASED OINTMENTS&quot; (Urticaria; Rash)  adhesives (Urticaria; Rash)  contrast media (gadolinium-based) (Chills)  Norvasc (Pruritus)  statins (Unknown)    Intolerances        HOME MEDICATIONS:    REVIEW OF SYSTEMS:  Constitutional:   Eyes:  ENT:  CV:  Resp:  GI:  :  MSK:  Integumentary:  Neurological:  Psychiatric:  Endocrine:  Hematologic/Lymphatic:  Allergic/Immunologic:  [ ] All other systems negative  [ ] Unable to assess ROS because ________    OBJECTIVE:  ICU Vital Signs Last 24 Hrs  T(C): 36.9 (13 Jul 2021 12:51), Max: 36.9 (13 Jul 2021 12:51)  T(F): 98.5 (13 Jul 2021 12:51), Max: 98.5 (13 Jul 2021 12:51)  HR: 147 (13 Jul 2021 15:17) (91 - 154)  BP: 103/71 (13 Jul 2021 15:17) (93/76 - 109/73)  BP(mean): 78 (13 Jul 2021 15:17) (78 - 84)  ABP: --  ABP(mean): --  RR: 24 (13 Jul 2021 15:17) (24 - 26)  SpO2: 98% (13 Jul 2021 15:17) (98% - 100%)        CAPILLARY BLOOD GLUCOSE          PHYSICAL EXAM:  General:   HEENT:   Lymph Nodes:  Neck:   Respiratory:   Cardiovascular:   Abdomen:   Extremities:   Skin:   Neurological:  Psychiatry:    HOSPITAL MEDICATIONS:  MEDICATIONS  (STANDING):  heparin   Injectable 5500 Unit(s) IV Push once  heparin  Infusion.  Unit(s)/Hr (12 mL/Hr) IV Continuous <Continuous>    MEDICATIONS  (PRN):  heparin   Injectable 5500 Unit(s) IV Push every 6 hours PRN For aPTT less than 40  heparin   Injectable 2500 Unit(s) IV Push every 6 hours PRN For aPTT between 40 - 57      LABS:                        9.6    14.33 )-----------( 403      ( 13 Jul 2021 11:28 )             31.7     07-13    141  |  105  |  50<H>  ----------------------------<  128<H>  4.2   |  21<L>  |  1.70<H>    Ca    8.4      13 Jul 2021 11:28  Mg     1.7     07-13    TPro  6.8  /  Alb  3.8  /  TBili  0.4  /  DBili  x   /  AST  206<H>  /  ALT  354<H>  /  AlkPhos  174<H>  07-13    PT/INR - ( 13 Jul 2021 11:28 )   PT: 21.1 sec;   INR: 1.81 ratio         PTT - ( 13 Jul 2021 11:28 )  PTT:28.0 sec      Venous Blood Gas:  07-13 @ 14:53  --/--/--/--/--  VBG Lactate: 3.0  Venous Blood Gas:  07-13 @ 11:28  7.36/46/<20/25/12  VBG Lactate: 4.0      MICROBIOLOGY:     RADIOLOGY:  [ ] Reviewed and interpreted by me    EKG: CHIEF COMPLAINT: SOB    HPI: 87yo F hx of MVR sp repair, Afib, HTN, HLD, CHF, on Xarelto, pw SOB x 2-3 days. Pt reports falling 5 days ago, mechanical fall, landed on back and buttock, and hit head, no loc. Pt reports progressive SOB with exertion, and generalized weakness, last night with Chest pain, currently chest pain free. MICU consulted for shortness of breath.     PAST MEDICAL & SURGICAL HISTORY:  Insomnia    HTN (hypertension)    Hypercholesteremia    GERD (gastroesophageal reflux disease)    Carotid artery occlusion  (R)    Vitamin B 12 deficiency    Osteoporosis    Mitral valve disease    Gallstones    Choledocholithiasis    CHF (congestive heart failure)    S/P MVR (mitral valve repair)  1997 at [facility]    Status post DACIA-BSO  1975    Hx of tonsillectomy    Abdominal hernia  repair 2007    History of lumbar laminectomy for spinal cord decompression  1995    Papilloma of breast  s/p excision from right breast &gt;20 years ago    Bilateral cataracts  extraction w/ IOL    Varicose veins  s/p sclerotherapy bilaterally    H/O carotid endarterectomy    S/P laparoscopic cholecystectomy        FAMILY HISTORY:  Family history of lung cancer (Grandparent)    Family history of dementia  mother    FH: cirrhosis  father        SOCIAL HISTORY:  Smoking: former smoker   EtOH Use: 3 drinks of wine per day  Lives alone  Retired     Allergies    &quot;VASELINE BASED OINTMENTS&quot; (Urticaria; Rash)  adhesives (Urticaria; Rash)  contrast media (gadolinium-based) (Chills)  Norvasc (Pruritus)  statins (Unknown)    Intolerances        HOME MEDICATIONS:    REVIEW OF SYSTEMS:    CONSTITUTIONAL: No weakness, fevers or chills  EYES/ENT: No visual changes;  No vertigo or throat pain   NECK: No pain or stiffness  RESPIRATORY: +SOB  CARDIOVASCULAR: +chest pain  GASTROINTESTINAL: No abdominal or epigastric pain. No nausea, vomiting, or hematemesis; No diarrhea or constipation. No melena or hematochezia.  GENITOURINARY: No dysuria, frequency or hematuria  NEUROLOGICAL: No numbness or weakness  SKIN: No itching, rashes    OBJECTIVE:  ICU Vital Signs Last 24 Hrs  T(C): 36.9 (13 Jul 2021 12:51), Max: 36.9 (13 Jul 2021 12:51)  T(F): 98.5 (13 Jul 2021 12:51), Max: 98.5 (13 Jul 2021 12:51)  HR: 147 (13 Jul 2021 15:17) (91 - 154)  BP: 103/71 (13 Jul 2021 15:17) (93/76 - 109/73)  BP(mean): 78 (13 Jul 2021 15:17) (78 - 84)  ABP: --  ABP(mean): --  RR: 24 (13 Jul 2021 15:17) (24 - 26)  SpO2: 98% (13 Jul 2021 15:17) (98% - 100%)        CAPILLARY BLOOD GLUCOSE          PHYSICAL EXAM:  T(C): 36.9 (07-13-21 @ 12:51), Max: 36.9 (07-13-21 @ 12:51)  HR: 147 (07-13-21 @ 15:17) (91 - 154)  BP: 103/71 (07-13-21 @ 15:17) (93/76 - 109/73)  RR: 24 (07-13-21 @ 15:17) (24 - 26)  SpO2: 98% (07-13-21 @ 15:17) (98% - 100%)  GENERAL: NAD, well-developed  HEAD:  Atraumatic, Normocephalic  EYES: EOMI, PERRLA, conjunctiva and sclera clear  NECK: Supple, No JVD  CHEST/LUNG: Clear to auscultation bilaterally; No wheeze  HEART: AFib; No murmurs, rubs, or gallops  ABDOMEN: Soft, Nontender, Nondistended; Bowel sounds present  EXTREMITIES:  No clubbing, cyanosis, or edema  PSYCH: AAOx3  NEUROLOGY: non-focal  SKIN: No rashes or lesions    HOSPITAL MEDICATIONS:  MEDICATIONS  (STANDING):  heparin   Injectable 5500 Unit(s) IV Push once  heparin  Infusion.  Unit(s)/Hr (12 mL/Hr) IV Continuous <Continuous>    MEDICATIONS  (PRN):  heparin   Injectable 5500 Unit(s) IV Push every 6 hours PRN For aPTT less than 40  heparin   Injectable 2500 Unit(s) IV Push every 6 hours PRN For aPTT between 40 - 57      LABS:                        9.6    14.33 )-----------( 403      ( 13 Jul 2021 11:28 )             31.7     07-13    141  |  105  |  50<H>  ----------------------------<  128<H>  4.2   |  21<L>  |  1.70<H>    Ca    8.4      13 Jul 2021 11:28  Mg     1.7     07-13    TPro  6.8  /  Alb  3.8  /  TBili  0.4  /  DBili  x   /  AST  206<H>  /  ALT  354<H>  /  AlkPhos  174<H>  07-13    PT/INR - ( 13 Jul 2021 11:28 )   PT: 21.1 sec;   INR: 1.81 ratio         PTT - ( 13 Jul 2021 11:28 )  PTT:28.0 sec      Venous Blood Gas:  07-13 @ 14:53  --/--/--/--/--  VBG Lactate: 3.0  Venous Blood Gas:  07-13 @ 11:28  7.36/46/<20/25/12  VBG Lactate: 4.0      MICROBIOLOGY:     RADIOLOGY:  [ ] Reviewed and interpreted by me    EKG:

## 2021-07-13 NOTE — ED PROVIDER NOTE - NS ED ROS FT
GENERAL: No fever, chills  EYES: no vision changes, no discharge.   ENT: no difficulty swallowing or speaking   CARDIAC: + chest pain + SOB, no lower extremity swelling  PULMONARY: no cough, + SOB  GI: no abdominal pain, n/v/d  : no dysuria, no hematuria  SKIN: no rashes, no ecchymosis  NEURO: no headache, lightheadedness  MSK: + left low back pain, No joint pain, myalgia, weakness.

## 2021-07-13 NOTE — ED PROVIDER NOTE - PHYSICAL EXAMINATION
GEN: Patient awake alert NAD.   HEENT: normocephalic, atraumatic, EOMI, no scleral icterus, moist MM  CARDIAC: + Afib RVR, S1,S2. no murmur.   PULM: CTA B/L no wheeze, rhonchi, rales.   ABD: soft NT, ND, no rebound no guarding  MSK: Moving all extremities, no edema. 5/5 strength and full ROM in all extremities.   NEURO: A&Ox3, gait normal, no focal neurological deficits, CN 2-12 grossly intact  SKIN: warm, dry, no rash.

## 2021-07-13 NOTE — H&P ADULT - NSICDXFAMILYHX_GEN_ALL_CORE_FT
FAMILY HISTORY:  Family history of dementia, mother  FH: cirrhosis, father    Child  Still living? Unknown  FH: ovarian cancer in first degree relative, Age at diagnosis: Age Unknown    Grandparent  Still living? No  Family history of lung cancer, Age at diagnosis: Age Unknown

## 2021-07-13 NOTE — H&P ADULT - NSICDXPASTSURGICALHX_GEN_ALL_CORE_FT
PAST SURGICAL HISTORY:  Abdominal hernia repair 2007    Bilateral cataracts extraction w/ IOL    H/O carotid endarterectomy     History of lumbar laminectomy for spinal cord decompression 1995    Hx of tonsillectomy     Papilloma of breast s/p excision from right breast >20 years ago    S/P laparoscopic cholecystectomy     S/P MVR (mitral valve repair) 1997 at [facility]    Status post DACIA-BSO 1975    Varicose veins s/p sclerotherapy bilaterally

## 2021-07-13 NOTE — ED PROVIDER NOTE - PROGRESS NOTE DETAILS
Attending/MD Savana. high lactate, high wbc, unknown etilogy but likely sepsis origin, can be uti given the recent or colotis, getting ct abd, ellen so no iv contrast. abx started, modest fluid given the h/o chf, if still soft BP, may proceed to ICU consult. Erica Dillon M.D. Resident  Micu rejected patient. Hospitalist tracy. Erica Dillon M.D. Resident   hospitalist requesting ccu consult for CHF and possible inotropic support. paged Erica Dillon M.D. Resident   hospitalist requesting ccu consult for CHF and possible inotropic support. paged. Heart failure team will evaluate pt for cardiogenic shock and possible CCU placement. Erica Dillon M.D. Resident  Spoke to CCU fellow for cardiogenic shock. Will see patient. VINCENT Richards, Attending: signed out to me. Tachycardic, dyspneic, tachypneic. Rapid afib. Concern for afib rvr 2/2 to PNA. No HOTN at this time. Seen by MICU. Possibly PE given RV findings on TTE but unable to tolerate lying flat for CT. Will initiate AC for rapid afib (and possibly PE). Will monitor urine output and closely monitor for signs of cardiogenic shock. CCU aware and will see. Erica Dillon M.D. Resident   Sp formal echo, pt admitted to CCU.

## 2021-07-13 NOTE — ED PROCEDURE NOTE - ATTENDING CONTRIBUTION TO CARE
VINCENT Richards, Attending: renal sabina wakefield to follow for concerning for decreased output in setting of shock

## 2021-07-14 NOTE — CHART NOTE - NSCHARTNOTEFT_GEN_A_CORE
MAR CCU TRANSFER NOTE    Please refer to CCU transfer note for full details.    Briefly, this is a 87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 Pt reports falling 5 days ago, after becoming dizzy and passing out for a brief second. She landed on her back and buttock, and hit her head. Over the past 2-3 days, she has been having worsening SOB and weakness. Last night, she had midline CP, palpitations, diaphoresis, that lasted all night. She denied any N/V, radiation of pain.     Patient has had CP and SOB on exertion for a long time, can only take approx 10 steps until she has symptoms. She has also had cough w/ brown sputum in the AM for a few weeks. She states taht she has had diarrhea for a few days 2/2 colitis, and has been taking Betamethasone to help w/ the diarrhea, but stopped due to dizziness.     Patient denies any fever, sick contacts, recent travel history, abd pain, N/V, urinary changes.     In the ED, patient found to be in A Fib w/ RVR and Cardiology was consulted.     In CCU, pt received push of amio and started her on lopressor and diltiazem. Chest x-ray showed possible consolidation in RML; blood cultures drawn and pt started on empiric zosyn.           Vital Signs Last 24 Hrs  T(C): 36.5 (14 Jul 2021 08:00), Max: 36.5 (14 Jul 2021 08:00)  T(F): 97.7 (14 Jul 2021 08:00), Max: 97.7 (14 Jul 2021 08:00)  HR: 102 (14 Jul 2021 13:00) (92 - 147)  BP: 103/61 (14 Jul 2021 13:00) (91/52 - 142/71)  BP(mean): 78 (14 Jul 2021 13:00) (65 - 104)  RR: 20 (14 Jul 2021 13:00) (20 - 37)  SpO2: 96% (14 Jul 2021 13:00) (93% - 100%)  I&O's Summary    13 Jul 2021 07:01  -  14 Jul 2021 07:00  --------------------------------------------------------  IN: 204 mL / OUT: 925 mL / NET: -721 mL    14 Jul 2021 07:01  -  14 Jul 2021 14:45  --------------------------------------------------------  IN: 504 mL / OUT: 950 mL / NET: -446 mL      Allergies    &quot;VASELINE BASED OINTMENTS&quot; (Urticaria; Rash)  adhesives (Urticaria; Rash)  contrast media (gadolinium-based) (Chills)  Norvasc (Pruritus)  statins (Unknown)    Intolerances      MEDICATIONS  (STANDING):  allopurinol 200 milliGRAM(s) Oral daily  amitriptyline 25 milliGRAM(s) Oral daily  diltiazem    milliGRAM(s) Oral daily  heparin  Infusion 800 Unit(s)/Hr (4 mL/Hr) IV Continuous <Continuous>  metoprolol tartrate 50 milliGRAM(s) Oral two times a day  oxybutynin 5 milliGRAM(s) Oral daily  pantoprazole    Tablet 40 milliGRAM(s) Oral before breakfast  piperacillin/tazobactam IVPB.. 3.375 Gram(s) IV Intermittent every 8 hours  torsemide 40 milliGRAM(s) Oral daily    MEDICATIONS  (PRN):  acetaminophen   Tablet .. 650 milliGRAM(s) Oral every 6 hours PRN Temp greater or equal to 38.5C (101.3F), Mild Pain (1 - 3)  aluminum hydroxide/magnesium hydroxide/simethicone Suspension 30 milliLiter(s) Oral every 4 hours PRN Dyspepsia  melatonin 3 milliGRAM(s) Oral at bedtime PRN Insomnia  ondansetron Injectable 4 milliGRAM(s) IV Push every 8 hours PRN Nausea and/or Vomiting                                  9.3    14.91 )-----------( 358      ( 14 Jul 2021 14:24 )             31.5     07-14    137  |  105  |  48<H>  ----------------------------<  116<H>  4.2   |  16<L>  |  1.49<H>    Ca    8.1<L>      14 Jul 2021 05:57  Phos  3.8     07-14  Mg     2.2     07-14    TPro  6.4  /  Alb  3.5  /  TBili  0.4  /  DBili  x   /  AST  169<H>  /  ALT  331<H>  /  AlkPhos  168<H>  07-14    PT/INR - ( 14 Jul 2021 05:57 )   PT: 19.8 sec;   INR: 1.69 ratio         PTT - ( 14 Jul 2021 05:57 )  PTT:>200.0 sec        ASSESSMENT & PLAN:   87yo F hx of MVR s/p repair, Afib, HTN, HLD, CHF, on Eliquis, p/w SOB x 2-3 days, found to be in A Fib w/ RVR.     Neuro   -A&Ox4  -CTH showed no acute bleed    Pulm   -Currently on 5L NC  -SOB likely 2/2 A Fib w/ RVR vs. RML pneumonia  -Chest x-ray showed possible consolidation in RML  -Chest CT did not show consolidation  -On Zosyn (7/14- ) empirically, f/u blood cultures   -Legionella urine antigen, Strep pneumoniae antigen, and MRSA antigen sent     Cards  #A Fib w/ RVR to max HR of 160s  -Patient found to be in A Fib w/ RVR to HR 160s on admission   -Likely causing SOB  -Given Amio 150mg x1  -C/w home dose Cardizem  -Switch to Lopressor   -EP consulted to see about cardioversion   -F/u TSH    #CHF  -HFpEF  -Likely 2/2 A fib w/ RVR   -TTE from 6/11 show severe pulmonary pressures  -C/w home Torsemide 40mg qd   -Trend lactate    GI  #Diarrhea   -Previously had diarrhea 2/2 colitis?  -Patient was taking Betamethasone, which she stopped due to dizziness   -CTM for now     #Transaminits   -Likely 2/2 CHF  -Treat as above   -Monitor AST/ALT for now     Renal  -Cr elevated at 1.7 on admission. Baseline seems to range from 1.3 to 2.3 on prior admissions from months back   -Trend Cr     Heme   -transition heparin to home Eliquis     Musc  -Xrays are neg for fracture   -Tylenol for pain control     ID  -Concern for PNA on Cxray, but no acute symptoms indicating PNA  -on Zosyn (7/14- )  -F/u BCx  -Monitor off abx for now     Endo  -F/u A1C        FOR FOLLOW UP:  [ ] F/u BCx, urine legionella, MRSA swab, urine strep pneumo, respiratory viral panel   [ ] f/u EP recs regarding possibly cardioversion  [ ] Will need to convert from heparin to Eliquis tonight, start home Eliquis at 9PM tonight and stop heparin at that time.      Wilner Kumar MD  PGY-3 | Internal Medicine

## 2021-07-14 NOTE — PROGRESS NOTE ADULT - SUBJECTIVE AND OBJECTIVE BOX
PATIENT:  MORENITA ROSAS  [id number]    CHIEF COMPLAINT:  Patient is a 86y old  Female who presents with a chief complaint of SOB (2021 21:29)      INTERVAL HISTORY/OVERNIGHT EVENTS:      REVIEW OF SYSTEMS:    Constitutional:     [ ] negative [ ] fevers [ ] chills [ ] weight loss [ ] weight gain  HEENT:                  [ ] negative [ ] dry eyes [ ] eye irritation [ ] postnasal drip [ ] nasal congestion  CV:                         [ ] negative  [ ] chest pain [ ] orthopnea [ ] palpitations [ ] murmur  Resp:                     [ ] negative [ ] cough [ ] shortness of breath [ ] dyspnea [ ] wheezing [ ] sputum [ ] hemoptysis  GI:                          [ ] negative [ ] nausea [ ] vomiting [ ] diarrhea [ ] constipation [ ] abd pain [ ] dysphagia   :                        [ ] negative [ ] dysuria [ ] nocturia [ ] hematuria [ ] increased urinary frequency  Musculoskeletal: [ ] negative [ ] back pain [ ] myalgias [ ] arthralgias [ ] fracture  Skin:                       [ ] negative [ ] rash [ ] itch  Neurological:        [ ] negative [ ] headache [ ] dizziness [ ] syncope [ ] weakness [ ] numbness  Psychiatric:           [ ] negative [ ] anxiety [ ] depression  Endocrine:            [ ] negative [ ] diabetes [ ] thyroid problem  Heme/Lymph:      [ ] negative [ ] anemia [ ] bleeding problem  Allergic/Immune: [ ] negative [ ] itchy eyes [ ] nasal discharge [ ] hives [ ] angioedema    [ ] All other systems negative  [ ] Unable to assess ROS because ________.    MEDICATIONS:  MEDICATIONS  (STANDING):  allopurinol 200 milliGRAM(s) Oral daily  amitriptyline 25 milliGRAM(s) Oral daily  chlorhexidine 2% Cloths 1 Application(s) Topical daily  diltiazem    milliGRAM(s) Oral daily  heparin  Infusion 800 Unit(s)/Hr (4 mL/Hr) IV Continuous <Continuous>  metoprolol tartrate 50 milliGRAM(s) Oral two times a day  oxybutynin 5 milliGRAM(s) Oral daily  pantoprazole    Tablet 40 milliGRAM(s) Oral before breakfast  piperacillin/tazobactam IVPB.. 3.375 Gram(s) IV Intermittent every 8 hours  torsemide 40 milliGRAM(s) Oral daily    MEDICATIONS  (PRN):      ALLERGIES:  Allergies    &quot;VASELINE BASED OINTMENTS&quot; (Urticaria; Rash)  adhesives (Urticaria; Rash)  contrast media (gadolinium-based) (Chills)  Norvasc (Pruritus)  statins (Unknown)    Intolerances        OBJECTIVE:  ICU Vital Signs Last 24 Hrs  T(C): 36.5 (2021 08:00), Max: 36.9 (2021 12:51)  T(F): 97.7 (2021 08:00), Max: 98.5 (2021 12:51)  HR: 104 (2021 08:00) (91 - 154)  BP: 108/63 (2021 08:00) (91/52 - 142/71)  BP(mean): 74 (2021 08:00) (65 - 104)  ABP: --  ABP(mean): --  RR: 27 (2021 08:00) (21 - 37)  SpO2: 99% (2021 08:00) (93% - 100%)      Adult Advanced Hemodynamics Last 24 Hrs  CVP(mm Hg): --  CVP(cm H2O): --  CO: --  CI: --  PA: --  PA(mean): --  PCWP: --  SVR: --  SVRI: --  PVR: --  PVRI: --  CAPILLARY BLOOD GLUCOSE        CAPILLARY BLOOD GLUCOSE        I&O's Summary    2021 07:01  -  2021 07:00  --------------------------------------------------------  IN: 204 mL / OUT: 925 mL / NET: -721 mL    2021 07:01  -  2021 08:53  --------------------------------------------------------  IN: 124 mL / OUT: 350 mL / NET: -226 mL      Daily Height in cm: 157.48 (2021 10:30)    Daily Weight in k (2021 06:00)    PHYSICAL EXAMINATION:  General: WN/WD NAD  HEENT: PERRLA, EOMI, moist mucous membranes  Neurology: A&Ox3, nonfocal, DAVIS x 4  Respiratory: CTA B/L, normal respiratory effort, no wheezes, crackles, rales  CV: RRR, S1S2, no murmurs, rubs or gallops  Abdominal: Soft, NT, ND +BS, Last BM  Extremities: No edema, + peripheral pulses  Incisions:   Tubes:    LABS:  ABG - ( 2021 05:51 )  pH, Arterial: 7.45  pH, Blood: x     /  pCO2: 31    /  pO2: 142   / HCO3: 21    / Base Excess: -1.9  /  SaO2: 99                                      8.7    12.70 )-----------( 317      ( 2021 05:57 )             28.7     07-14    137  |  105  |  48<H>  ----------------------------<  116<H>  4.2   |  16<L>  |  1.49<H>    Ca    8.1<L>      2021 05:57  Phos  3.8     07-14  Mg     2.2     07-14    TPro  6.4  /  Alb  3.5  /  TBili  0.4  /  DBili  x   /  AST  169<H>  /  ALT  331<H>  /  AlkPhos  168<H>  07-14    LIVER FUNCTIONS - ( 2021 05:57 )  Alb: 3.5 g/dL / Pro: 6.4 g/dL / ALK PHOS: 168 U/L / ALT: 331 U/L / AST: 169 U/L / GGT: x           PT/INR - ( 2021 05:57 )   PT: 19.8 sec;   INR: 1.69 ratio         PTT - ( 2021 05:57 )  PTT:>200.0 sec        Urinalysis Basic - ( 2021 16:30 )    Color: Yellow / Appearance: Slightly Turbid / S.019 / pH: x  Gluc: x / Ketone: Negative  / Bili: Negative / Urobili: Negative   Blood: x / Protein: 100 / Nitrite: Negative   Leuk Esterase: Moderate / RBC: 3 /hpf / WBC 9 /HPF   Sq Epi: x / Non Sq Epi: 1 /hpf / Bacteria: Negative        TELEMETRY:     EKG:     IMAGING:           PATIENT:  MORENITA ROSAS  [id number]    CHIEF COMPLAINT:  Patient is a 86y old  Female who presents with a chief complaint of SOB for three days and one day of midline, non-radiating chest pain, palpitations and diaphoresis found to be in A-fib with RVR.      INTERVAL HISTORY/OVERNIGHT EVENTS:    Came in with A-fib with RVR to 160's and was given amiodarone push and started on home meds of Lopressor and diltiazem and rate slowed to 100s.  Patient now not complaining of any symptoms including SOB and chest pain, which attributes to not moving around.         REVIEW OF SYSTEMS:    Constitutional:     [- ] negative [ ] fevers [ ] chills [ ] weight loss [ ] weight gain  HEENT:                  [ -] negative [ ] dry eyes [ ] eye irritation [ ] postnasal drip [ ] nasal congestion  CV:                         [ -] negative  [ ] chest pain [ ] orthopnea [ ] palpitations [ ] murmur  Resp:                     [- ] negative [ ] cough [ ] shortness of breath [ ] dyspnea [ ] wheezing [ ] sputum [ ] hemoptysis  GI:                          [ ] negative [ ] nausea [ ] vomiting [ +] diarrhea [ ] constipation [ ] abd pain [ ] dysphagia   :                        [ -] negative [ ] dysuria [ ] nocturia [ ] hematuria [ ] increased urinary frequency  Musculoskeletal: [- ] negative [ ] back pain [ ] myalgias [ ] arthralgias [ ] fracture  Skin:                       [ -] negative [ ] rash [ ] itch  Neurological:        [ ] negative [ ] headache [ +] dizziness [ ] syncope [ ] weakness [ ] numbness  Psychiatric:           [- ] negative [ ] anxiety [ ] depression  Endocrine:            [- ] negative [ ] diabetes [ ] thyroid problem  Heme/Lymph:      [- ] negative [ ] anemia [ ] bleeding problem  Allergic/Immune: [ -] negative [ ] itchy eyes [ ] nasal discharge [ ] hives [ ] angioedema    [ ] All other systems negative  [ ] Unable to assess ROS because ________.    MEDICATIONS:  MEDICATIONS  (STANDING):  allopurinol 200 milliGRAM(s) Oral daily  amitriptyline 25 milliGRAM(s) Oral daily  chlorhexidine 2% Cloths 1 Application(s) Topical daily  diltiazem    milliGRAM(s) Oral daily  heparin  Infusion 800 Unit(s)/Hr (4 mL/Hr) IV Continuous <Continuous>  metoprolol tartrate 50 milliGRAM(s) Oral two times a day  oxybutynin 5 milliGRAM(s) Oral daily  pantoprazole    Tablet 40 milliGRAM(s) Oral before breakfast  piperacillin/tazobactam IVPB.. 3.375 Gram(s) IV Intermittent every 8 hours  torsemide 40 milliGRAM(s) Oral daily    MEDICATIONS  (PRN):      ALLERGIES:  Allergies    &quot;VASELINE BASED OINTMENTS&quot; (Urticaria; Rash)  adhesives (Urticaria; Rash)  contrast media (gadolinium-based) (Chills)  Norvasc (Pruritus)  statins (Unknown)    Intolerances        OBJECTIVE:  ICU Vital Signs Last 24 Hrs  T(C): 36.5 (2021 08:00), Max: 36.9 (2021 12:51)  T(F): 97.7 (2021 08:00), Max: 98.5 (2021 12:51)  HR: 104 (2021 08:00) (91 - 154)  BP: 108/63 (2021 08:00) (91/52 - 142/71)  BP(mean): 74 (2021 08:00) (65 - 104)  ABP: --  ABP(mean): --  RR: 27 (2021 08:00) (21 - 37)  SpO2: 99% (2021 08:00) (93% - 100%)      Adult Advanced Hemodynamics Last 24 Hrs  CVP(mm Hg): --  CVP(cm H2O): --  CO: --  CI: --  PA: --  PA(mean): --  PCWP: --  SVR: --  SVRI: --  PVR: --  PVRI: --  CAPILLARY BLOOD GLUCOSE        CAPILLARY BLOOD GLUCOSE        I&O's Summary    2021 07:01  -  2021 07:00  --------------------------------------------------------  IN: 204 mL / OUT: 925 mL / NET: -721 mL    2021 07:01  -  2021 08:53  --------------------------------------------------------  IN: 124 mL / OUT: 350 mL / NET: -226 mL      Daily Height in cm: 157.48 (2021 10:30)    Daily Weight in k (2021 06:00)    PHYSICAL EXAMINATION:  General: Comfortable appearing woman in no acute distress   Neurology: A&Ox3, nonfocal, DAVIS x 4  Respiratory: CTA B/L, normal respiratory effort, no wheezes, crackles, rales  CV: irregular rate, no murmurs   Abdominal: Soft, NT, ND +BS  Extremities: No edema, + peripheral pulses  Incisions:   Tubes:    LABS:  ABG - ( 2021 05:51 )  pH, Arterial: 7.45  pH, Blood: x     /  pCO2: 31    /  pO2: 142   / HCO3: 21    / Base Excess: -1.9  /  SaO2: 99                                      8.7    12.70 )-----------( 317      ( 2021 05:57 )             28.7     07-14    137  |  105  |  48<H>  ----------------------------<  116<H>  4.2   |  16<L>  |  1.49<H>    Ca    8.1<L>      2021 05:57  Phos  3.8     07-  Mg     2.2         TPro  6.4  /  Alb  3.5  /  TBili  0.4  /  DBili  x   /  AST  169<H>  /  ALT  331<H>  /  AlkPhos  168<H>  07-14    LIVER FUNCTIONS - ( 2021 05:57 )  Alb: 3.5 g/dL / Pro: 6.4 g/dL / ALK PHOS: 168 U/L / ALT: 331 U/L / AST: 169 U/L / GGT: x           PT/INR - ( 2021 05:57 )   PT: 19.8 sec;   INR: 1.69 ratio         PTT - ( 2021 05:57 )  PTT:>200.0 sec        Urinalysis Basic - ( 2021 16:30 )    Color: Yellow / Appearance: Slightly Turbid / S.019 / pH: x  Gluc: x / Ketone: Negative  / Bili: Negative / Urobili: Negative   Blood: x / Protein: 100 / Nitrite: Negative   Leuk Esterase: Moderate / RBC: 3 /hpf / WBC 9 /HPF   Sq Epi: x / Non Sq Epi: 1 /hpf / Bacteria: Negative        TELEMETRY:     EKG:     IMAGING:    Chest x-ray:    IMPRESSION:    The heart is slightly enlarged. Right middle lobe pneumonia . The left lung is clear. No pleural effusion. No pneumothorax. Status post sternotomy. If clinically warranted a lateral chest x-ray should be obtained.      CT Abdomen and Pelvis w/o contrast      IMPRESSION:  Limited noncontrast study.    Pericapsular fluid collection adjacent to right lobe of liver. Underlying parenchymal injury is not excluded.    Soft tissue infiltration adjacent to the second/third duodenum with mild ascites. Associated mesenteric injury is not excluded.    Enlarged bilateral pleural effusions.    CT Head w/o contrast      IMPRESSION:    No significant interval change from 2021, redemonstration volume loss, microvascular disease, no acute hemorrhage or midline shift. If symptoms persist consider follow-up head CT or MR if no contraindications.

## 2021-07-14 NOTE — CHART NOTE - NSCHARTNOTEFT_GEN_A_CORE
CCU Transfer Note    Transfer from: CCU    Transfer to: (  ) Medicine    (  ) Telemetry    (  ) RCU                               (  ) Palliative    (  ) Stroke Unit    (  ) MICU    (  ) __________________    Accepting Physician:    Signout given to:     HPI / CCU COURSE:    85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 Pt reports falling 5 days ago, after becoming dizzy and passing out for a brief second. She landed on her back and buttock, and hit her head. Over the past 2-3 days, she has been having worsening SOB and weakness. Last night, she had midline CP, palpitations, diaphoresis, that lasted all night. She denied any N/V, radiation of pain.     Patient has had CP and SOB on exertion for a long time, can only take approx 10 steps until she has symptoms. She has also had cough w/ brown sputum in the AM for a few weeks. She states taht she has had diarrhea for a few days 2/2 colitis, and has been taking Betamethasone to help w/ the diarrhea, but stopped due to dizziness.     Patient denies any fever, sick contacts, recent travel history, abd pain, N/V, urinary changes.     In the ED, patient found to be in A Fib w/ RVR and Cardiology was consulted.               Vital Signs Last 24 Hrs  T(C): 36.5 (14 Jul 2021 08:00), Max: 36.9 (13 Jul 2021 12:51)  T(F): 97.7 (14 Jul 2021 08:00), Max: 98.5 (13 Jul 2021 12:51)  HR: 92 (14 Jul 2021 11:00) (92 - 147)  BP: 110/65 (14 Jul 2021 11:00) (91/52 - 142/71)  BP(mean): 85 (14 Jul 2021 11:00) (65 - 104)  RR: 25 (14 Jul 2021 11:00) (21 - 37)  SpO2: 99% (14 Jul 2021 11:00) (93% - 100%)    I&O's Summary    13 Jul 2021 07:01  -  14 Jul 2021 07:00  --------------------------------------------------------  IN: 204 mL / OUT: 925 mL / NET: -721 mL    14 Jul 2021 07:01  -  14 Jul 2021 11:27  --------------------------------------------------------  IN: 256 mL / OUT: 600 mL / NET: -344 mL        Physical Exam:       LABS:                               8.7    12.70 )-----------( 317      ( 14 Jul 2021 05:57 )             28.7       07-14    137  |  105  |  48<H>  ----------------------------<  116<H>  4.2   |  16<L>  |  1.49<H>    Ca    8.1<L>      14 Jul 2021 05:57  Phos  3.8     07-14  Mg     2.2     07-14    TPro  6.4  /  Alb  3.5  /  TBili  0.4  /  DBili  x   /  AST  169<H>  /  ALT  331<H>  /  AlkPhos  168<H>  07-14      PT/INR - ( 14 Jul 2021 05:57 )   PT: 19.8 sec;   INR: 1.69 ratio         PTT - ( 14 Jul 2021 05:57 )  PTT:>200.0 sec    ABG - ( 14 Jul 2021 05:51 )  pH, Arterial: 7.45  pH, Blood: x     /  pCO2: 31    /  pO2: 142   / HCO3: 21    / Base Excess: -1.9  /  SaO2: 99                    ECG:    Telemetry:    Imaging:      ASSESSMENT & PLAN:           FOR FOLLOW UP:  [ ]   [ ]   [ ] CCU Transfer Note    Transfer from: CCU    Transfer to: (  ) Medicine    (  ) Telemetry    (  ) RCU                               (  ) Palliative    (  ) Stroke Unit    (  ) MICU    (  ) __________________    Accepting Physician:    Signout given to:     HPI / CCU COURSE:    85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 Pt reports falling 5 days ago, after becoming dizzy and passing out for a brief second. She landed on her back and buttock, and hit her head. Over the past 2-3 days, she has been having worsening SOB and weakness. Last night, she had midline CP, palpitations, diaphoresis, that lasted all night. She denied any N/V, radiation of pain.     Patient has had CP and SOB on exertion for a long time, can only take approx 10 steps until she has symptoms. She has also had cough w/ brown sputum in the AM for a few weeks. She states taht she has had diarrhea for a few days 2/2 colitis, and has been taking Betamethasone to help w/ the diarrhea, but stopped due to dizziness.     Patient denies any fever, sick contacts, recent travel history, abd pain, N/V, urinary changes.     In the ED, patient found to be in A Fib w/ RVR and Cardiology was consulted.               Vital Signs Last 24 Hrs  T(C): 36.5 (14 Jul 2021 08:00), Max: 36.9 (13 Jul 2021 12:51)  T(F): 97.7 (14 Jul 2021 08:00), Max: 98.5 (13 Jul 2021 12:51)  HR: 92 (14 Jul 2021 11:00) (92 - 147)  BP: 110/65 (14 Jul 2021 11:00) (91/52 - 142/71)  BP(mean): 85 (14 Jul 2021 11:00) (65 - 104)  RR: 25 (14 Jul 2021 11:00) (21 - 37)  SpO2: 99% (14 Jul 2021 11:00) (93% - 100%)    I&O's Summary    13 Jul 2021 07:01  -  14 Jul 2021 07:00  --------------------------------------------------------  IN: 204 mL / OUT: 925 mL / NET: -721 mL    14 Jul 2021 07:01  -  14 Jul 2021 11:27  --------------------------------------------------------  IN: 256 mL / OUT: 600 mL / NET: -344 mL        Physical Exam:       LABS:                               8.7    12.70 )-----------( 317      ( 14 Jul 2021 05:57 )             28.7       07-14    137  |  105  |  48<H>  ----------------------------<  116<H>  4.2   |  16<L>  |  1.49<H>    Ca    8.1<L>      14 Jul 2021 05:57  Phos  3.8     07-14  Mg     2.2     07-14    TPro  6.4  /  Alb  3.5  /  TBili  0.4  /  DBili  x   /  AST  169<H>  /  ALT  331<H>  /  AlkPhos  168<H>  07-14      PT/INR - ( 14 Jul 2021 05:57 )   PT: 19.8 sec;   INR: 1.69 ratio         PTT - ( 14 Jul 2021 05:57 )  PTT:>200.0 sec    ABG - ( 14 Jul 2021 05:51 )  pH, Arterial: 7.45  pH, Blood: x     /  pCO2: 31    /  pO2: 142   / HCO3: 21    / Base Excess: -1.9  /  SaO2: 99                    ECG:    Telemetry:    Imaging:      ASSESSMENT & PLAN:   85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     Neuro   -A&Ox4  -F/u CTH for any intracranial bleed     Pulm   -Currently on 5L NC  -SOB likely 2/2 A Fib w/ RVR. Will treat A Fib as below     Cards  #A Fib w/ RVR to max HR of 154  -Patient found to be in A Fib w/ RVR to HR 160s on admission   -Likely causing SOB  -Given Amio 150mg x1  -C/w home dose Cardizem  -Switch to Lopressor   -If A Fib w/ RVR does not improve, may consider Cardioversion   -F/u TSH    #CHF  -HFpEF  -Likely 2/2 A fib w/ RVR   -TTE from 6/11 show severe pulmonary pressures  -C/w home Torsemide 40mg qd   -Trend lactate    GI  #Diarrhea   -Previously had diarrhea 2/2 colitis?  -Patient was taking Betamethasone, which she stopped due to dizziness   -CTM for now     #Transaminits   -Likely 2/2 CHF  -Treat as above   -Monitor AST/ALT for now     Renal  -Cr elevated at 1.7 on admission. Baseline seems to range from 1.3 to 2.3 on prior admissions from months back   -Trend Cr     Heme   -C/w Hep gtt for A Fib     Musc  -Xrays are neg for fracture   -Tylenol for pain control     ID  -Concern for PNA on Cxray, but no acute symptoms indicating PNA  -s/p Vanc and Zosyn x1  -F/u BCx  -Monitor off abx for now     Endo  -F/u A1C        FOR FOLLOW UP:  [ ] F/u BCx  [ ]   [ ] CCU Transfer Note    Transfer from: CCU    Transfer to: (  ) Medicine    (  ) Telemetry    (  ) RCU                               (  ) Palliative    (  ) Stroke Unit    (  ) MICU    (  ) __________________    Accepting Physician:    Signout given to:     HPI / CCU COURSE:    87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 Pt reports falling 5 days ago, after becoming dizzy and passing out for a brief second. She landed on her back and buttock, and hit her head. Over the past 2-3 days, she has been having worsening SOB and weakness. Last night, she had midline CP, palpitations, diaphoresis, that lasted all night. She denied any N/V, radiation of pain.     Patient has had CP and SOB on exertion for a long time, can only take approx 10 steps until she has symptoms. She has also had cough w/ brown sputum in the AM for a few weeks. She states taht she has had diarrhea for a few days 2/2 colitis, and has been taking Betamethasone to help w/ the diarrhea, but stopped due to dizziness.     Patient denies any fever, sick contacts, recent travel history, abd pain, N/V, urinary changes.     In the ED, patient found to be in A Fib w/ RVR and Cardiology was consulted.     In CCU, pt received push of amio and started her on lopressor and diltiazem.           Vital Signs Last 24 Hrs  T(C): 36.5 (14 Jul 2021 08:00), Max: 36.9 (13 Jul 2021 12:51)  T(F): 97.7 (14 Jul 2021 08:00), Max: 98.5 (13 Jul 2021 12:51)  HR: 92 (14 Jul 2021 11:00) (92 - 147)  BP: 110/65 (14 Jul 2021 11:00) (91/52 - 142/71)  BP(mean): 85 (14 Jul 2021 11:00) (65 - 104)  RR: 25 (14 Jul 2021 11:00) (21 - 37)  SpO2: 99% (14 Jul 2021 11:00) (93% - 100%)    I&O's Summary    13 Jul 2021 07:01  -  14 Jul 2021 07:00  --------------------------------------------------------  IN: 204 mL / OUT: 925 mL / NET: -721 mL    14 Jul 2021 07:01  -  14 Jul 2021 11:27  --------------------------------------------------------  IN: 256 mL / OUT: 600 mL / NET: -344 mL        Physical Exam:   General: Comfortable appearing woman in no acute distress   Neurology: A&Ox3, nonfocal, DAVIS x 4  Respiratory: CTA B/L, normal respiratory effort, no wheezes, crackles, rales  CV: irregular rate, no murmurs   Abdominal: Soft, NT, ND +BS  Extremities: No edema, + peripheral pulses    LABS:                               8.7    12.70 )-----------( 317      ( 14 Jul 2021 05:57 )             28.7       07-14    137  |  105  |  48<H>  ----------------------------<  116<H>  4.2   |  16<L>  |  1.49<H>    Ca    8.1<L>      14 Jul 2021 05:57  Phos  3.8     07-14  Mg     2.2     07-14    TPro  6.4  /  Alb  3.5  /  TBili  0.4  /  DBili  x   /  AST  169<H>  /  ALT  331<H>  /  AlkPhos  168<H>  07-14      PT/INR - ( 14 Jul 2021 05:57 )   PT: 19.8 sec;   INR: 1.69 ratio         PTT - ( 14 Jul 2021 05:57 )  PTT:>200.0 sec    ABG - ( 14 Jul 2021 05:51 )  pH, Arterial: 7.45  pH, Blood: x     /  pCO2: 31    /  pO2: 142   / HCO3: 21    / Base Excess: -1.9  /  SaO2: 99                    ECG:    Telemetry:    Imaging:      ASSESSMENT & PLAN:   87yo F hx of MVR s/p repair, Afib, HTN, HLD, CHF, on Eliquis, p/w SOB x 2-3 days, found to be in A Fib w/ RVR.     Neuro   -A&Ox4  -CTH showed no acute bleed    Pulm   -Currently on 5L NC  -SOB likely 2/2 A Fib w/ RVR vs. RML pneumonia  -Chest x-ray showed possible consolidation in RML  -Chest CT did not show consolidation  -On Zosyn (7/14- ) empirically, f/u blood cultures   -Legionella urine antigen, Strep pneumoniae antigen, and MRSA antigen sent     Cards  #A Fib w/ RVR to max HR of 160s  -Patient found to be in A Fib w/ RVR to HR 160s on admission   -Likely causing SOB  -Given Amio 150mg x1  -C/w home dose Cardizem  -Switch to Lopressor   -EP consulted to see about cardioversion   -F/u TSH    #CHF  -HFpEF  -Likely 2/2 A fib w/ RVR   -TTE from 6/11 show severe pulmonary pressures  -C/w home Torsemide 40mg qd   -Trend lactate    GI  #Diarrhea   -Previously had diarrhea 2/2 colitis?  -Patient was taking Betamethasone, which she stopped due to dizziness   -CTM for now     #Transaminits   -Likely 2/2 CHF  -Treat as above   -Monitor AST/ALT for now     Renal  -Cr elevated at 1.7 on admission. Baseline seems to range from 1.3 to 2.3 on prior admissions from months back   -Trend Cr     Heme   -transition heparin to home Eliquis     Musc  -Xrays are neg for fracture   -Tylenol for pain control     ID  -Concern for PNA on Cxray, but no acute symptoms indicating PNA  -on Zosyn (7/14- )  -F/u BCx  -Monitor off abx for now     Endo  -F/u A1C        FOR FOLLOW UP:  [ ] F/u BCx, urine legionella, MRSA swab, urine strep pneumo, respiratory viral panel   [ ] f/u EP  [ ] CCU Transfer Note    Transfer from: CCU    Transfer to: ( ) Medicine    (x) Telemetry    (  ) RCU                               (  ) Palliative    (  ) Stroke Unit    (  ) MICU    (  ) __________________    Accepting Physician: Dr. Luciano Leigh    Signout given to:     HPI / CCU COURSE:    87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 Pt reports falling 5 days ago, after becoming dizzy and passing out for a brief second. She landed on her back and buttock, and hit her head. Over the past 2-3 days, she has been having worsening SOB and weakness. Last night, she had midline CP, palpitations, diaphoresis, that lasted all night. She denied any N/V, radiation of pain.     Patient has had CP and SOB on exertion for a long time, can only take approx 10 steps until she has symptoms. She has also had cough w/ brown sputum in the AM for a few weeks. She states taht she has had diarrhea for a few days 2/2 colitis, and has been taking Betamethasone to help w/ the diarrhea, but stopped due to dizziness.     Patient denies any fever, sick contacts, recent travel history, abd pain, N/V, urinary changes.     In the ED, patient found to be in A Fib w/ RVR and Cardiology was consulted.     In CCU, pt received push of amio and started her on lopressor and diltiazem. Chest x-ray showed possible consolidation in RML; blood cultures drawn and pt started on empiric zosyn.           Vital Signs Last 24 Hrs  T(C): 36.5 (14 Jul 2021 08:00), Max: 36.9 (13 Jul 2021 12:51)  T(F): 97.7 (14 Jul 2021 08:00), Max: 98.5 (13 Jul 2021 12:51)  HR: 92 (14 Jul 2021 11:00) (92 - 147)  BP: 110/65 (14 Jul 2021 11:00) (91/52 - 142/71)  BP(mean): 85 (14 Jul 2021 11:00) (65 - 104)  RR: 25 (14 Jul 2021 11:00) (21 - 37)  SpO2: 99% (14 Jul 2021 11:00) (93% - 100%)    I&O's Summary    13 Jul 2021 07:01  -  14 Jul 2021 07:00  --------------------------------------------------------  IN: 204 mL / OUT: 925 mL / NET: -721 mL    14 Jul 2021 07:01  -  14 Jul 2021 11:27  --------------------------------------------------------  IN: 256 mL / OUT: 600 mL / NET: -344 mL        Physical Exam:   General: Comfortable appearing woman in no acute distress   Neurology: A&Ox3, nonfocal, DAVIS x 4  Respiratory: CTA B/L, normal respiratory effort, no wheezes, crackles, rales  CV: irregular rate, no murmurs   Abdominal: Soft, NT, ND +BS  Extremities: No edema, + peripheral pulses    LABS:                               8.7    12.70 )-----------( 317      ( 14 Jul 2021 05:57 )             28.7       07-14    137  |  105  |  48<H>  ----------------------------<  116<H>  4.2   |  16<L>  |  1.49<H>    Ca    8.1<L>      14 Jul 2021 05:57  Phos  3.8     07-14  Mg     2.2     07-14    TPro  6.4  /  Alb  3.5  /  TBili  0.4  /  DBili  x   /  AST  169<H>  /  ALT  331<H>  /  AlkPhos  168<H>  07-14      PT/INR - ( 14 Jul 2021 05:57 )   PT: 19.8 sec;   INR: 1.69 ratio         PTT - ( 14 Jul 2021 05:57 )  PTT:>200.0 sec    ABG - ( 14 Jul 2021 05:51 )  pH, Arterial: 7.45  pH, Blood: x     /  pCO2: 31    /  pO2: 142   / HCO3: 21    / Base Excess: -1.9  /  SaO2: 99                    ECG:    Telemetry:    Imaging:      ASSESSMENT & PLAN:   87yo F hx of MVR s/p repair, Afib, HTN, HLD, CHF, on Eliquis, p/w SOB x 2-3 days, found to be in A Fib w/ RVR.     Neuro   -A&Ox4  -CTH showed no acute bleed    Pulm   -Currently on 5L NC  -SOB likely 2/2 A Fib w/ RVR vs. RML pneumonia  -Chest x-ray showed possible consolidation in RML  -Chest CT did not show consolidation  -On Zosyn (7/14- ) empirically, f/u blood cultures   -Legionella urine antigen, Strep pneumoniae antigen, and MRSA antigen sent     Cards  #A Fib w/ RVR to max HR of 160s  -Patient found to be in A Fib w/ RVR to HR 160s on admission   -Likely causing SOB  -Given Amio 150mg x1  -C/w home dose Cardizem  -Switch to Lopressor   -EP consulted to see about cardioversion   -F/u TSH    #CHF  -HFpEF  -Likely 2/2 A fib w/ RVR   -TTE from 6/11 show severe pulmonary pressures  -C/w home Torsemide 40mg qd   -Trend lactate    GI  #Diarrhea   -Previously had diarrhea 2/2 colitis?  -Patient was taking Betamethasone, which she stopped due to dizziness   -CTM for now     #Transaminits   -Likely 2/2 CHF  -Treat as above   -Monitor AST/ALT for now     Renal  -Cr elevated at 1.7 on admission. Baseline seems to range from 1.3 to 2.3 on prior admissions from months back   -Trend Cr     Heme   -transition heparin to home Eliquis     Musc  -Xrays are neg for fracture   -Tylenol for pain control     ID  -Concern for PNA on Cxray, but no acute symptoms indicating PNA  -on Zosyn (7/14- )  -F/u BCx  -Monitor off abx for now     Endo  -F/u A1C        FOR FOLLOW UP:  [ ] F/u BCx, urine legionella, MRSA swab, urine strep pneumo, respiratory viral panel   [ ] f/u EP  [ ] CCU Transfer Note    Transfer from: CCU    Transfer to: ( ) Medicine    (x) Telemetry    (  ) RCU                               (  ) Palliative    (  ) Stroke Unit    (  ) MICU    (  ) __________________    Accepting Physician: Dr. Luciano Leigh    Signout given to:     HPI / CCU COURSE:    87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 Pt reports falling 5 days ago, after becoming dizzy and passing out for a brief second. She landed on her back and buttock, and hit her head. Over the past 2-3 days, she has been having worsening SOB and weakness. Last night, she had midline CP, palpitations, diaphoresis, that lasted all night. She denied any N/V, radiation of pain.     Patient has had CP and SOB on exertion for a long time, can only take approx 10 steps until she has symptoms. She has also had cough w/ brown sputum in the AM for a few weeks. She states taht she has had diarrhea for a few days 2/2 colitis, and has been taking Betamethasone to help w/ the diarrhea, but stopped due to dizziness.     Patient denies any fever, sick contacts, recent travel history, abd pain, N/V, urinary changes.     In the ED, patient found to be in A Fib w/ RVR and Cardiology was consulted.     In CCU, pt received push of amio and started her on lopressor and diltiazem. Chest x-ray showed possible consolidation in RML; blood cultures drawn and pt started on empiric zosyn.           Vital Signs Last 24 Hrs  T(C): 36.5 (14 Jul 2021 08:00), Max: 36.9 (13 Jul 2021 12:51)  T(F): 97.7 (14 Jul 2021 08:00), Max: 98.5 (13 Jul 2021 12:51)  HR: 92 (14 Jul 2021 11:00) (92 - 147)  BP: 110/65 (14 Jul 2021 11:00) (91/52 - 142/71)  BP(mean): 85 (14 Jul 2021 11:00) (65 - 104)  RR: 25 (14 Jul 2021 11:00) (21 - 37)  SpO2: 99% (14 Jul 2021 11:00) (93% - 100%)    I&O's Summary    13 Jul 2021 07:01  -  14 Jul 2021 07:00  --------------------------------------------------------  IN: 204 mL / OUT: 925 mL / NET: -721 mL    14 Jul 2021 07:01  -  14 Jul 2021 11:27  --------------------------------------------------------  IN: 256 mL / OUT: 600 mL / NET: -344 mL        Physical Exam:   General: Comfortable appearing woman in no acute distress   Neurology: A&Ox3, nonfocal, DAVIS x 4  Respiratory: CTA B/L, normal respiratory effort, no wheezes, crackles, rales  CV: irregular rate, no murmurs   Abdominal: Soft, NT, ND +BS  Extremities: No edema, + peripheral pulses    LABS:                               8.7    12.70 )-----------( 317      ( 14 Jul 2021 05:57 )             28.7       07-14    137  |  105  |  48<H>  ----------------------------<  116<H>  4.2   |  16<L>  |  1.49<H>    Ca    8.1<L>      14 Jul 2021 05:57  Phos  3.8     07-14  Mg     2.2     07-14    TPro  6.4  /  Alb  3.5  /  TBili  0.4  /  DBili  x   /  AST  169<H>  /  ALT  331<H>  /  AlkPhos  168<H>  07-14      PT/INR - ( 14 Jul 2021 05:57 )   PT: 19.8 sec;   INR: 1.69 ratio         PTT - ( 14 Jul 2021 05:57 )  PTT:>200.0 sec    ABG - ( 14 Jul 2021 05:51 )  pH, Arterial: 7.45  pH, Blood: x     /  pCO2: 31    /  pO2: 142   / HCO3: 21    / Base Excess: -1.9  /  SaO2: 99                    ECG:    Telemetry:    Imaging:      ASSESSMENT & PLAN:   87yo F hx of MVR s/p repair, Afib, HTN, HLD, CHF, on Eliquis, p/w SOB x 2-3 days, found to be in A Fib w/ RVR.     Neuro   -A&Ox4  -CTH showed no acute bleed    Pulm   -Currently on 5L NC  -SOB likely 2/2 A Fib w/ RVR vs. RML pneumonia  -Chest x-ray showed possible consolidation in RML  -Chest CT did not show consolidation  -On Zosyn (7/14- ) empirically, f/u blood cultures   -Legionella urine antigen, Strep pneumoniae antigen, and MRSA antigen sent     Cards  #A Fib w/ RVR to max HR of 160s  -Patient found to be in A Fib w/ RVR to HR 160s on admission   -Likely causing SOB  -Given Amio 150mg x1  -C/w home dose Cardizem  -Switch to Lopressor   -EP consulted to see about cardioversion   -F/u TSH    #CHF  -HFpEF  -Likely 2/2 A fib w/ RVR   -TTE from 6/11 show severe pulmonary pressures  -C/w home Torsemide 40mg qd   -Trend lactate    GI  #Diarrhea   -Previously had diarrhea 2/2 colitis?  -Patient was taking Betamethasone, which she stopped due to dizziness   -CTM for now     #Transaminits   -Likely 2/2 CHF  -Treat as above   -Monitor AST/ALT for now     Renal  -Cr elevated at 1.7 on admission. Baseline seems to range from 1.3 to 2.3 on prior admissions from months back   -Trend Cr     Heme   -transition heparin to home Eliquis     Musc  -Xrays are neg for fracture   -Tylenol for pain control     ID  -Concern for PNA on Cxray, but no acute symptoms indicating PNA  -on Zosyn (7/14- )  -F/u BCx  -Monitor off abx for now     Endo  -F/u A1C        FOR FOLLOW UP:  [ ] F/u BCx, urine legionella, MRSA swab, urine strep pneumo, respiratory viral panel   [ ] f/u EP  [ ] Will need to convert from heparin to Eliquis tonight, start Eliquis at 9PM tonight and stop heparin at that time CCU Transfer Note    Transfer from: CCU    Transfer to: ( ) Medicine    (x) Telemetry    (  ) RCU                               (  ) Palliative    (  ) Stroke Unit    (  ) MICU    (  ) __________________    Accepting Physician: Dr. Luciano Leigh    Signout given to: Dr. Luciano Leigh, MAR, NP (Leigh)    HPI / CCU COURSE:    87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 Pt reports falling 5 days ago, after becoming dizzy and passing out for a brief second. She landed on her back and buttock, and hit her head. Over the past 2-3 days, she has been having worsening SOB and weakness. Last night, she had midline CP, palpitations, diaphoresis, that lasted all night. She denied any N/V, radiation of pain.     Patient has had CP and SOB on exertion for a long time, can only take approx 10 steps until she has symptoms. She has also had cough w/ brown sputum in the AM for a few weeks. She states taht she has had diarrhea for a few days 2/2 colitis, and has been taking Betamethasone to help w/ the diarrhea, but stopped due to dizziness.     Patient denies any fever, sick contacts, recent travel history, abd pain, N/V, urinary changes.     In the ED, patient found to be in A Fib w/ RVR and Cardiology was consulted.     In CCU, pt received push of amio and started her on lopressor and diltiazem. Chest x-ray showed possible consolidation in RML; blood cultures drawn and pt started on empiric zosyn.           Vital Signs Last 24 Hrs  T(C): 36.5 (14 Jul 2021 08:00), Max: 36.9 (13 Jul 2021 12:51)  T(F): 97.7 (14 Jul 2021 08:00), Max: 98.5 (13 Jul 2021 12:51)  HR: 92 (14 Jul 2021 11:00) (92 - 147)  BP: 110/65 (14 Jul 2021 11:00) (91/52 - 142/71)  BP(mean): 85 (14 Jul 2021 11:00) (65 - 104)  RR: 25 (14 Jul 2021 11:00) (21 - 37)  SpO2: 99% (14 Jul 2021 11:00) (93% - 100%)    I&O's Summary    13 Jul 2021 07:01  -  14 Jul 2021 07:00  --------------------------------------------------------  IN: 204 mL / OUT: 925 mL / NET: -721 mL    14 Jul 2021 07:01  -  14 Jul 2021 11:27  --------------------------------------------------------  IN: 256 mL / OUT: 600 mL / NET: -344 mL        Physical Exam:   General: Comfortable appearing woman in no acute distress   Neurology: A&Ox3, nonfocal, DAVIS x 4  Respiratory: CTA B/L, normal respiratory effort, no wheezes, crackles, rales  CV: irregular rate, no murmurs   Abdominal: Soft, NT, ND +BS  Extremities: No edema, + peripheral pulses    LABS:                               8.7    12.70 )-----------( 317      ( 14 Jul 2021 05:57 )             28.7       07-14    137  |  105  |  48<H>  ----------------------------<  116<H>  4.2   |  16<L>  |  1.49<H>    Ca    8.1<L>      14 Jul 2021 05:57  Phos  3.8     07-14  Mg     2.2     07-14    TPro  6.4  /  Alb  3.5  /  TBili  0.4  /  DBili  x   /  AST  169<H>  /  ALT  331<H>  /  AlkPhos  168<H>  07-14      PT/INR - ( 14 Jul 2021 05:57 )   PT: 19.8 sec;   INR: 1.69 ratio         PTT - ( 14 Jul 2021 05:57 )  PTT:>200.0 sec    ABG - ( 14 Jul 2021 05:51 )  pH, Arterial: 7.45  pH, Blood: x     /  pCO2: 31    /  pO2: 142   / HCO3: 21    / Base Excess: -1.9  /  SaO2: 99                    ECG:    Telemetry:    Imaging:      ASSESSMENT & PLAN:   87yo F hx of MVR s/p repair, Afib, HTN, HLD, CHF, on Eliquis, p/w SOB x 2-3 days, found to be in A Fib w/ RVR.     Neuro   -A&Ox4  -CTH showed no acute bleed    Pulm   -Currently on 5L NC  -SOB likely 2/2 A Fib w/ RVR vs. RML pneumonia  -Chest x-ray showed possible consolidation in RML  -Chest CT did not show consolidation  -On Zosyn (7/14- ) empirically, f/u blood cultures   -Legionella urine antigen, Strep pneumoniae antigen, and MRSA antigen sent     Cards  #A Fib w/ RVR to max HR of 160s  -Patient found to be in A Fib w/ RVR to HR 160s on admission   -Likely causing SOB  -Given Amio 150mg x1  -C/w home dose Cardizem  -Switch to Lopressor   -EP consulted to see about cardioversion   -F/u TSH    #CHF  -HFpEF  -Likely 2/2 A fib w/ RVR   -TTE from 6/11 show severe pulmonary pressures  -C/w home Torsemide 40mg qd   -Trend lactate    GI  #Diarrhea   -Previously had diarrhea 2/2 colitis?  -Patient was taking Betamethasone, which she stopped due to dizziness   -CTM for now     #Transaminits   -Likely 2/2 CHF  -Treat as above   -Monitor AST/ALT for now     Renal  -Cr elevated at 1.7 on admission. Baseline seems to range from 1.3 to 2.3 on prior admissions from months back   -Trend Cr     Heme   -transition heparin to home Eliquis     Musc  -Xrays are neg for fracture   -Tylenol for pain control     ID  -Concern for PNA on Cxray, but no acute symptoms indicating PNA  -on Zosyn (7/14- )  -F/u BCx  -Monitor off abx for now     Endo  -F/u A1C        FOR FOLLOW UP:  [ ] F/u BCx, urine legionella, MRSA swab, urine strep pneumo, respiratory viral panel   [ ] f/u EP  [ ] Will need to convert from heparin to Eliquis tonight, start Eliquis at 9PM tonight and stop heparin at that time CCU Transfer Note    Transfer from: CCU    Transfer to: ( ) Medicine    (x) Telemetry    (  ) RCU                               (  ) Palliative    (  ) Stroke Unit    (  ) MICU    (  ) __________________    Accepting Physician: Dr. Luciano Leigh    Signout given to: Dr. Luciano Leigh, MAR    Spoke to CARMELITA Abraham to sign out patient and was told that did not need to sign out to her as patient will be going to [facility].    HPI / CCU COURSE:    85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 Pt reports falling 5 days ago, after becoming dizzy and passing out for a brief second. She landed on her back and buttock, and hit her head. Over the past 2-3 days, she has been having worsening SOB and weakness. Last night, she had midline CP, palpitations, diaphoresis, that lasted all night. She denied any N/V, radiation of pain.     Patient has had CP and SOB on exertion for a long time, can only take approx 10 steps until she has symptoms. She has also had cough w/ brown sputum in the AM for a few weeks. She states that she has had diarrhea for a few days 2/2 colitis, and has been taking Betamethasone to help w/ the diarrhea, but stopped due to dizziness.     Patient denies any fever, sick contacts, recent travel history, abd pain, N/V, urinary changes.     In the ED, patient found to be in A Fib w/ RVR and Cardiology was consulted.     In CCU, pt received push of amio and started her on lopressor and diltiazem. Chest x-ray showed possible consolidation in RML; blood cultures drawn and pt started on empiric zosyn.           Vital Signs Last 24 Hrs  T(C): 36.5 (14 Jul 2021 08:00), Max: 36.9 (13 Jul 2021 12:51)  T(F): 97.7 (14 Jul 2021 08:00), Max: 98.5 (13 Jul 2021 12:51)  HR: 92 (14 Jul 2021 11:00) (92 - 147)  BP: 110/65 (14 Jul 2021 11:00) (91/52 - 142/71)  BP(mean): 85 (14 Jul 2021 11:00) (65 - 104)  RR: 25 (14 Jul 2021 11:00) (21 - 37)  SpO2: 99% (14 Jul 2021 11:00) (93% - 100%)    I&O's Summary    13 Jul 2021 07:01  -  14 Jul 2021 07:00  --------------------------------------------------------  IN: 204 mL / OUT: 925 mL / NET: -721 mL    14 Jul 2021 07:01  -  14 Jul 2021 11:27  --------------------------------------------------------  IN: 256 mL / OUT: 600 mL / NET: -344 mL        Physical Exam:   General: Comfortable appearing woman in no acute distress   Neurology: A&Ox3, nonfocal, DAVIS x 4  Respiratory: CTA B/L, normal respiratory effort, no wheezes, crackles, rales  CV: irregular rate, no murmurs   Abdominal: Soft, NT, ND +BS  Extremities: No edema, + peripheral pulses    LABS:                               8.7    12.70 )-----------( 317      ( 14 Jul 2021 05:57 )             28.7       07-14    137  |  105  |  48<H>  ----------------------------<  116<H>  4.2   |  16<L>  |  1.49<H>    Ca    8.1<L>      14 Jul 2021 05:57  Phos  3.8     07-14  Mg     2.2     07-14    TPro  6.4  /  Alb  3.5  /  TBili  0.4  /  DBili  x   /  AST  169<H>  /  ALT  331<H>  /  AlkPhos  168<H>  07-14      PT/INR - ( 14 Jul 2021 05:57 )   PT: 19.8 sec;   INR: 1.69 ratio         PTT - ( 14 Jul 2021 05:57 )  PTT:>200.0 sec    ABG - ( 14 Jul 2021 05:51 )  pH, Arterial: 7.45  pH, Blood: x     /  pCO2: 31    /  pO2: 142   / HCO3: 21    / Base Excess: -1.9  /  SaO2: 99                    ECG:    Telemetry:    Imaging:      ASSESSMENT & PLAN:   85yo F hx of MVR s/p repair, Afib, HTN, HLD, CHF, on Eliquis, p/w SOB x 2-3 days, found to be in A Fib w/ RVR.     Neuro   -A&Ox4  -CTH showed no acute bleed    Pulm   -Currently on 5L NC  -SOB likely 2/2 A Fib w/ RVR vs. RML pneumonia  -Chest x-ray showed possible consolidation in RML  -Chest CT did not show consolidation  -On Zosyn (7/14- ) empirically, f/u blood cultures   -Legionella urine antigen, Strep pneumoniae antigen, and MRSA antigen sent     Cards  #A Fib w/ RVR to max HR of 160s  -Patient found to be in A Fib w/ RVR to HR 160s on admission   -Likely causing SOB  -Given Amio 150mg x1  -C/w home dose Cardizem  -Switch to Lopressor   -EP consulted to see about cardioversion   -F/u TSH    #CHF  -HFpEF  -Likely 2/2 A fib w/ RVR   -TTE from 6/11 show severe pulmonary pressures  -C/w home Torsemide 40mg qd   -Trend lactate    GI  #Diarrhea   -Previously had diarrhea 2/2 colitis?  -Patient was taking Betamethasone, which she stopped due to dizziness   -CTM for now     #Transaminits   -Likely 2/2 CHF  -Treat as above   -Monitor AST/ALT for now     Renal  -Cr elevated at 1.7 on admission. Baseline seems to range from 1.3 to 2.3 on prior admissions from months back   -Trend Cr     Heme   -transition heparin to home Eliquis     Musc  -Xrays are neg for fracture   -Tylenol for pain control     ID  -Concern for PNA on Cxray, but no acute symptoms indicating PNA  -on Zosyn (7/14- )  -F/u BCx  -Monitor off abx for now     Endo  -F/u A1C        FOR FOLLOW UP:  [ ] F/u BCx, urine legionella, MRSA swab, urine strep pneumo, respiratory viral panel   [ ] f/u EP  [ ] Will need to convert from heparin to Eliquis tonight, start Eliquis at 9PM tonight and stop heparin at that time

## 2021-07-14 NOTE — CONSULT NOTE ADULT - ASSESSMENT
86 year old female with PMHx of MVR s/p repair, Afib on Eliquis, HTN, HLD, CHF, Gout, p/w SOB x 2-3 days. Pt reports falling 5 days ago, after becoming dizzy and passing out for a brief second. At that time, patient did not seek treatment. However, over the next 2-3 days, she had worsening SOB as well as chest pain. Upon arrival to the ED, patient found to be in A Fib w/ RVR, with rates up to 160 BPM. Patient states she has had multiple episodes of AF with RVR since being siagnosed ~2 years ago. In CCU, pt received Amiodarone 150mg IVP x1 and was started on lopressor and diltiazem. Chest x-ray showed possible consolidation in RML; blood cultures drawn and pt started on empiric zosyn. EP consulted for evaluation for DCCV.    1. Atrial Fibrillation with RVR  2. CHF: last TTE 6/11/21, EF 50-55%    - Continue to monitor on telemetry  - Keep K >4 and Mg >2  - Continue AC  - Continue Metoprolol and Diltiazem  - Further recommendations pending discussion with attending    Lana Kirkland PA-C  #[phone]   86 year old female with PMHx of MVR s/p repair, Afib on Eliquis, HTN, HLD, CHF, Gout, p/w SOB x 2-3 days. Pt reports falling 5 days ago, after becoming dizzy and passing out for a brief second. At that time, patient did not seek treatment. However, over the next 2-3 days, she had worsening SOB as well as chest pain. Upon arrival to the ED, patient found to be in A Fib w/ RVR, with rates up to 160 BPM. Patient states she has had multiple episodes of AF with RVR since being siagnosed ~2 years ago. In CCU, pt received Amiodarone 150mg IVP x1 and was started on lopressor and diltiazem. Chest x-ray showed possible consolidation in RML; blood cultures drawn and pt started on empiric zosyn. EP consulted for evaluation for DCCV.    1. Atrial Fibrillation with RVR  2. CHF: last TTE 6/11/21, EF 50-55%    - Continue to monitor on telemetry  - Keep K >4 and Mg >2  - Continue AC  - Continue Diltiazem 120mg QD  - Increase Metoprolol 100mg BID (home dose)  - Make NPO after MN for DCCV tomorrow 7/15    Lana Kirkland PA-C  #[phone]   86 year old female with PMHx of MVR s/p repair, Afib on Eliquis, HTN, HLD, CHF, Gout, p/w SOB x 2-3 days. Pt reports falling 5 days ago, after becoming dizzy and passing out for a brief second. At that time, patient did not seek treatment. However, over the next 2-3 days, she had worsening SOB as well as chest pain. Upon arrival to the ED, patient found to be in A Fib w/ RVR, with rates up to 160 BPM. Patient states she has had previous episodes of AF with RVR since being diagnosed ~2 years ago. In CCU, pt received Amiodarone 150mg IVP x1 and was started on lopressor and diltiazem. Chest x-ray showed possible consolidation in RML; blood cultures drawn and pt started on empiric zosyn. EP consulted for evaluation for DCCV.    1. Atrial Fibrillation with RVR  2. CHF: last TTE 6/11/21, EF 50-55%    - Continue to monitor on telemetry  - Keep K >4 and Mg >2  - Continue AC  - Continue Diltiazem 120mg QD  - Increase Metoprolol 100mg BID (home dose)  - Make NPO after MN for DCCV tomorrow 7/15    Lana Kirkland PA-C  #[phone]

## 2021-07-14 NOTE — CONSULT NOTE ADULT - SUBJECTIVE AND OBJECTIVE BOX
CHIEF COMPLAINT:    HISTORY OF PRESENT ILLNESS:      Allergies    &quot;VASELINE BASED OINTMENTS&quot; (Urticaria; Rash)  adhesives (Urticaria; Rash)  contrast media (gadolinium-based) (Chills)  Norvasc (Pruritus)  statins (Unknown)    Intolerances    	    MEDICATIONS:  diltiazem    milliGRAM(s) Oral daily  heparin  Infusion 800 Unit(s)/Hr IV Continuous <Continuous>  metoprolol tartrate 50 milliGRAM(s) Oral two times a day  torsemide 40 milliGRAM(s) Oral daily  piperacillin/tazobactam IVPB.. 3.375 Gram(s) IV Intermittent every 8 hours  acetaminophen   Tablet .. 650 milliGRAM(s) Oral every 6 hours PRN  amitriptyline 25 milliGRAM(s) Oral daily  melatonin 3 milliGRAM(s) Oral at bedtime PRN  ondansetron Injectable 4 milliGRAM(s) IV Push every 8 hours PRN  aluminum hydroxide/magnesium hydroxide/simethicone Suspension 30 milliLiter(s) Oral every 4 hours PRN  pantoprazole    Tablet 40 milliGRAM(s) Oral before breakfast  allopurinol 200 milliGRAM(s) Oral daily  oxybutynin 5 milliGRAM(s) Oral daily      PAST MEDICAL & SURGICAL HISTORY:  Insomnia  HTN (hypertension)  Hypercholesteremia  GERD (gastroesophageal reflux disease)  Carotid artery occlusion  (R)  Vitamin B 12 deficiency  Osteoporosis  Mitral valve disease  Gallstones  Choledocholithiasis  CHF (congestive heart failure)  S/P MVR (mitral valve repair)  1997 at [facility]  Status post DACIA-BSO  1975  Hx of tonsillectomy  Abdominal hernia  repair 2007  History of lumbar laminectomy for spinal cord decompression  1995  Papilloma of breast  s/p excision from right breast &gt;20 years ago  Bilateral cataracts  extraction w/ IOL  Varicose veins  s/p sclerotherapy bilaterally  H/O carotid endarterectomy  S/P laparoscopic cholecystectomy        FAMILY HISTORY:  Family history of lung cancer (Grandparent)  Family history of dementia  mother  FH: cirrhosis  father  FH: ovarian cancer in first degree relative (Child)      SOCIAL HISTORY:    [ ]Non-smoker  [x] Smoker: Smoked 1-2 ppd x20 years, quit over 30 years ago  [x] Alcohol: 2-3 glasses of wine/day      REVIEW OF SYSTEMS:  See HPI. Otherwise, 10 point ROS done and otherwise negative.    PHYSICAL EXAM:  T(C): 36.5 (07-14-21 @ 08:00), Max: 36.5 (07-14-21 @ 08:00)  HR: 102 (07-14-21 @ 13:00) (92 - 147)  BP: 103/61 (07-14-21 @ 13:00) (91/52 - 142/71)  RR: 20 (07-14-21 @ 13:00) (20 - 37)  SpO2: 96% (07-14-21 @ 13:00) (93% - 100%)  Wt(kg): --  I&O's Summary    13 Jul 2021 07:01  -  14 Jul 2021 07:00  --------------------------------------------------------  IN: 204 mL / OUT: 925 mL / NET: -721 mL    14 Jul 2021 07:01  -  14 Jul 2021 14:57  --------------------------------------------------------  IN: 504 mL / OUT: 950 mL / NET: -446 mL        Appearance: Alert. NAD	  Cardiovascular: Irregularly irregular  Respiratory: R side with decreased breath sounds from mid-lower lung, L side clear  Extremities: No edema BLE  Vascular: Peripheral pulses palpable 2+ bilaterally      LABS:	 	    CBC Full  -  ( 14 Jul 2021 14:24 )  WBC Count : 14.91 K/uL  Hemoglobin : 9.3 g/dL  Hematocrit : 31.5 %  Platelet Count - Automated : 358 K/uL  Mean Cell Volume : 82.9 fl  Mean Cell Hemoglobin : 24.5 pg  Mean Cell Hemoglobin Concentration : 29.5 gm/dL  Auto Neutrophil # : x  Auto Lymphocyte # : x  Auto Monocyte # : x  Auto Eosinophil # : x  Auto Basophil # : x  Auto Neutrophil % : x  Auto Lymphocyte % : x  Auto Monocyte % : x  Auto Eosinophil % : x  Auto Basophil % : x    07-14    137  |  105  |  48<H>  ----------------------------<  116<H>  4.2   |  16<L>  |  1.49<H>  07-13    139  |  103  |  50<H>  ----------------------------<  185<H>  4.0   |  19<L>  |  1.74<H>    Ca    8.1<L>      14 Jul 2021 05:57  Ca    8.0<L>      13 Jul 2021 21:41  Phos  3.8     07-14  Phos  4.0     07-13  Mg     2.2     07-14  Mg     1.7     07-13    TPro  6.4  /  Alb  3.5  /  TBili  0.4  /  DBili  x   /  AST  169<H>  /  ALT  331<H>  /  AlkPhos  168<H>  07-14  TPro  6.8  /  Alb  3.8  /  TBili  0.6  /  DBili  x   /  AST  222<H>  /  ALT  378<H>  /  AlkPhos  187<H>  07-13      proBNP: Serum Pro-Brain Natriuretic Peptide: 86234 pg/mL (07-13 @ 21:41)    HgA1c: 6.0  TSH: Thyroid Stimulating Hormone, Serum: 3.07 uIU/mL (07-14 @ 03:35)    CARDIAC MARKERS: 19 -> 18    TELEMETRY: Afib  BPM      ECG:  	  RADIOLOGY:   OTHER: < from: CT Chest No Cont (07.13.21 @ 21:24) >  EXAM:  CT ABDOMEN AND PELVIS                          EXAM:  CT CHEST                            PROCEDURE DATE:  07/13/2021            INTERPRETATION:  CLINICAL INFORMATION: Pelvic pain status post fall. Shortness of breath.    COMPARISON: CT chest dated 5/23/2018. CT abdomen pelvis dated 6/30/2020.    CONTRAST/COMPLICATIONS:  IV Contrast: NONE  Oral Contrast: NONE  Complications: None reported at time of study completion    PROCEDURE:  CT of the Chest, Abdomen and Pelvis was performed.  Sagittal and coronal reformats were performed.    FINDINGS:  CHEST:  LUNGS AND LARGE AIRWAYS: Patent central airways. No pulmonary nodules. Lingular and right middle lobe subsegmental atelectasis.  PLEURA: Small bilateral pleural effusions, right greater than left, and increased since prior CT.  VESSELS: Calcifications of the thoracic aorta and coronary arteries.  HEART: Heart size is normal. No pericardial effusion. Status post mitral valve replacement. Left atrial mural calcification.  MEDIASTINUM AND AMBER: No lymphadenopathy.  CHEST WALL AND LOWER NECK: Calcified fibroadenoma right breast.    ABDOMEN AND PELVIS:  LIVER: Pericapsular fluid collection posterior to liver disease (2, 66) measures 5.8 x 1.8 cm. Mild hepatomegaly with diffuse fatty infiltration.  BILE DUCTS: Normal caliber.  GALLBLADDER: Status post cholecystectomy.  SPLEEN: Within normal limits.  PANCREAS: Within normal limits.  ADRENALS: Within normal limits.  KIDNEYS/URETERS: Within normal limits.    BLADDER: Within normal limits.  REPRODUCTIVE ORGANS: Status post hysterectomy. No adnexal mass.    BOWEL: Small direct esophageal hiatal hernia. No bowel obstruction. Appendix not visualized. Infiltrative changes adjacent to second and third duodenum.  PERITONEUM: Trace ascitesin right flank and pelvis. Mild mesenteric infiltration.  VESSELS: Atheromatous calcifications.  RETROPERITONEUM/LYMPH NODES: No retroperitoneal hematoma.  ABDOMINAL WALL: Rectus diastases with small ventral fat-containing hernia.  BONES: Old compression fracture 10th thoracic vertebra. Disc degeneration lower lumbar spine. No acute bony injury.    IMPRESSION:  Limited noncontrast study.    Pericapsular fluid collection adjacent to right lobe of liver. Underlying parenchymal injury is not excluded.    Soft tissue infiltration adjacent to the second/third duodenum with mild ascites. Associated mesenteric injury is not excluded.    Enlarged bilateral pleural effusions.    --- End of Report ---      NICOLASA SHORT MD; Attending Radiologist  This document has been electronically signed. Jul 14 2021 10:08AM    < end of copied text >      PREVIOUS DIAGNOSTIC TESTING:    Echocardiogram: < from: Transesophageal Echocardiogram (06.11.21 @ 08:46) >  Patient name: MORENITA ROSAS  YOB: 1935   Age: 85 (F)   MR#: [id number]  Study Date: 6/11/2021  Location: O/PSonographer: Juanita Phillip RDCS  2nd Sonographer: Stalin Ardon M.D.  Study quality: Technically fair  Referring Physician:Xochitl Valdovinos MD  Blood Pressure: 152/65 mmHg  Height: 157 cm  Weight: 70 kg  BSA: 1.7 m2  ------------------------------------------------------------------------  PROCEDURE: Transesophageal and transthoracic  echocardiograms with 2-D, M-Mode and complete spectral and  color flow Doppler were performed.  Informed consent was  first obtained for YULIA. The patient was sedated - see  anesthesia record.  The procedure was monitored with  automatic blood pressure monitoring, ECG tracings and pulse  oximetry.  The transesophageal probe was placed in the  esophagus posterior to the heart without complications.  INDICATION: Nonrheumatic aortic (valve) stenosis (I35.0)  ------------------------------------------------------------------------  Dimensions:    Normal Values:  LA:     4.1    2.0 - 4.0 cm  Ao:     2.8    2.0 - 3.8 cm  SEPTUM: 1.1    0.6 - 1.2 cm  PWT:    1.0    0.6 - 1.1 cm  LVIDd:  4.7    3.0 - 5.6 cm  LVIDs:  3.9    1.8 - 4.0 cm  Derived variables:  LVMI: 102 g/m2  RWT: 0.42  Fractional short: 17 %  EF (Visual Estimate): 55-60 %  Doppler Peak Velocity (m/sec): AoV=1.5  ------------------------------------------------------------------------  Observations:  Mitral Valve: An annuloplasty ring is seen in the mitral  position.. Mild-moderate mitral regurgitation. Peak mitral  valve gradient equals 22 mm Hg, mean transmitral valve  gradient equals 8 mm Hg, which is elevated even in the  setting of a An annuloplasty ring is seen in the mitral  position..  Durng YULIA PG 13 mm hg, MG 4 mm hg.  Aortic Valve/Aorta: Calcified trileaflet aortic valve with  normal opening. Peak transaortic valve gradient equals 9 mm  Hg, mean transaortic valve gradient equals 5 mm Hg, aortic  valve velocity time integral equals 24 cm, estimated aortic  valve area equals 2.9 sqcm. Moderate aortic regurgitation.  Vena contracta 0.45 cm. Peak left ventricular outflow tract  gradient equals 4 mm Hg, mean gradient is equal to 2 mm Hg,  LVOT velocity time integral equals 22 cm.  Aortic Root: 2.8 cm.  LVOT diameter: 2 cm.  Complex atheroma noted in aortic arch/descending aorta.  Left Atrium: Mildly dilated left atrium.  LA volume index =  36 cc/m2.   Spontaneous echo contrast seen.   No left  atrial or left atrial appendage thrombus.   Decreased left  atrial appendage velocities noted.  Left Ventricle: Normal left ventricular systolic function.  No segmental wall motion abnormalities. Normal left  ventricular internal dimensions and wall thicknesses.  Right Heart: Moderate right atrial enlargement. Right  ventricular enlargement with normal right ventricular  systolic function (TAPSE 1.9 cm). Normal tricuspid valve.  Moderate tricuspid regurgitation. Normal pulmonic valve.  Mild pulmonic regurgitation.  Pericardium/Pleura: Normal pericardium with no pericardial  effusion.  Hemodynamic: Estimated right ventricular systolic pressure  equals 66 mm Hg, assuming right atrial pressure equals 8 mm  Hg, consistent with severe pulmonary hypertension. Agitated  saline injection and color flow Doppler demonstrates no  evidence of a patent foramen ovale.  ------------------------------------------------------------------------  Conclusions:  1. Peak mitral valve gradient equals 22 mm Hg, mean  transmitral valve gradient equals 8 mm Hg, which is  elevated even in the setting of a An annuloplasty ring is  seen in the mitral position..  Durng YULIA PG 13 mm hg, MG 4 mm hg.  2. Moderate aortic regurgitation.  Vena contracta 0.45 cm.  3. Aortic Root: 2.8 cm.  LVOT diameter: 2 cm.  Complex atheroma noted in aortic arch/descending aorta.  4. Normal left ventricular internal dimensions and wall  thicknesses.  5. Normal left ventricular systolic function. No segmental  wall motion abnormalities.  6. Moderate right atrial enlargement.  7. Right ventricular enlargement with normal right  ventricular systolic function (TAPSE 1.9 cm).  8. Estimated pulmonary artery systolic pressure equals 66  mm Hg, assuming right atrial pressure equals 8 mm Hg,  consistent with severe pulmonary pressures. MeanPASP 34 mm  hg. PADP 13 mm hg.  During YULIA PASP approx 50 mm hg.  ------------------------------------------------------------------------  Confirmed on  6/11/2021 - 13:05:26 by TONY Covington  ------------------------------------------------------------------------    < end of copied text >     CHIEF COMPLAINT:    HISTORY OF PRESENT ILLNESS:      Allergies    &quot;VASELINE BASED OINTMENTS&quot; (Urticaria; Rash)  adhesives (Urticaria; Rash)  contrast media (gadolinium-based) (Chills)  Norvasc (Pruritus)  statins (Unknown)    Intolerances    	    MEDICATIONS:  diltiazem    milliGRAM(s) Oral daily  heparin  Infusion 800 Unit(s)/Hr IV Continuous <Continuous>  metoprolol tartrate 50 milliGRAM(s) Oral two times a day  torsemide 40 milliGRAM(s) Oral daily  piperacillin/tazobactam IVPB.. 3.375 Gram(s) IV Intermittent every 8 hours  acetaminophen   Tablet .. 650 milliGRAM(s) Oral every 6 hours PRN  amitriptyline 25 milliGRAM(s) Oral daily  melatonin 3 milliGRAM(s) Oral at bedtime PRN  ondansetron Injectable 4 milliGRAM(s) IV Push every 8 hours PRN  aluminum hydroxide/magnesium hydroxide/simethicone Suspension 30 milliLiter(s) Oral every 4 hours PRN  pantoprazole    Tablet 40 milliGRAM(s) Oral before breakfast  allopurinol 200 milliGRAM(s) Oral daily  oxybutynin 5 milliGRAM(s) Oral daily      PAST MEDICAL & SURGICAL HISTORY:  Insomnia  HTN (hypertension)  Hypercholesteremia  GERD (gastroesophageal reflux disease)  Carotid artery occlusion  (R)  Vitamin B 12 deficiency  Osteoporosis  Mitral valve disease  Gallstones  Choledocholithiasis  CHF (congestive heart failure)  S/P MVR (mitral valve repair)  1997 at [facility]  Status post DACIA-BSO  1975  Hx of tonsillectomy  Abdominal hernia  repair 2007  History of lumbar laminectomy for spinal cord decompression  1995  Papilloma of breast  s/p excision from right breast &gt;20 years ago  Bilateral cataracts  extraction w/ IOL  Varicose veins  s/p sclerotherapy bilaterally  H/O carotid endarterectomy  S/P laparoscopic cholecystectomy        FAMILY HISTORY:  Family history of lung cancer (Grandparent)  Family history of dementia  mother  FH: cirrhosis  father  FH: ovarian cancer in first degree relative (Child)      SOCIAL HISTORY:    [ ]Non-smoker  [x] Smoker: Smoked 1-2 ppd x20 years, quit over 30 years ago  [x] Alcohol: 2-3 glasses of wine/day      REVIEW OF SYSTEMS:  See HPI. Otherwise, 10 point ROS done and otherwise negative.    PHYSICAL EXAM:  T(C): 36.5 (07-14-21 @ 08:00), Max: 36.5 (07-14-21 @ 08:00)  HR: 102 (07-14-21 @ 13:00) (92 - 147)  BP: 103/61 (07-14-21 @ 13:00) (91/52 - 142/71)  RR: 20 (07-14-21 @ 13:00) (20 - 37)  SpO2: 96% (07-14-21 @ 13:00) (93% - 100%)  Wt(kg): --  I&O's Summary    13 Jul 2021 07:01  -  14 Jul 2021 07:00  --------------------------------------------------------  IN: 204 mL / OUT: 925 mL / NET: -721 mL    14 Jul 2021 07:01  -  14 Jul 2021 14:57  --------------------------------------------------------  IN: 504 mL / OUT: 950 mL / NET: -446 mL        Appearance: Alert. NAD	  Cardiovascular: Irregularly irregular  Respiratory: RML/RLL with decreased breath sounds, LLL with decreased breath sounds   Extremities: No edema BLE  Vascular: Peripheral pulses palpable 2+ bilaterally      LABS:	 	    CBC Full  -  ( 14 Jul 2021 14:24 )  WBC Count : 14.91 K/uL  Hemoglobin : 9.3 g/dL  Hematocrit : 31.5 %  Platelet Count - Automated : 358 K/uL  Mean Cell Volume : 82.9 fl  Mean Cell Hemoglobin : 24.5 pg  Mean Cell Hemoglobin Concentration : 29.5 gm/dL  Auto Neutrophil # : x  Auto Lymphocyte # : x  Auto Monocyte # : x  Auto Eosinophil # : x  Auto Basophil # : x  Auto Neutrophil % : x  Auto Lymphocyte % : x  Auto Monocyte % : x  Auto Eosinophil % : x  Auto Basophil % : x    07-14    137  |  105  |  48<H>  ----------------------------<  116<H>  4.2   |  16<L>  |  1.49<H>  07-13    139  |  103  |  50<H>  ----------------------------<  185<H>  4.0   |  19<L>  |  1.74<H>    Ca    8.1<L>      14 Jul 2021 05:57  Ca    8.0<L>      13 Jul 2021 21:41  Phos  3.8     07-14  Phos  4.0     07-13  Mg     2.2     07-14  Mg     1.7     07-13    TPro  6.4  /  Alb  3.5  /  TBili  0.4  /  DBili  x   /  AST  169<H>  /  ALT  331<H>  /  AlkPhos  168<H>  07-14  TPro  6.8  /  Alb  3.8  /  TBili  0.6  /  DBili  x   /  AST  222<H>  /  ALT  378<H>  /  AlkPhos  187<H>  07-13      proBNP: Serum Pro-Brain Natriuretic Peptide: 52179 pg/mL (07-13 @ 21:41)    HgA1c: 6.0  TSH: Thyroid Stimulating Hormone, Serum: 3.07 uIU/mL (07-14 @ 03:35)    CARDIAC MARKERS: 19 -> 18    TELEMETRY: Afib  BPM      ECG:  	  RADIOLOGY:   OTHER: < from: CT Chest No Cont (07.13.21 @ 21:24) >  EXAM:  CT ABDOMEN AND PELVIS                          EXAM:  CT CHEST                            PROCEDURE DATE:  07/13/2021            INTERPRETATION:  CLINICAL INFORMATION: Pelvic pain status post fall. Shortness of breath.    COMPARISON: CT chest dated 5/23/2018. CT abdomen pelvis dated 6/30/2020.    CONTRAST/COMPLICATIONS:  IV Contrast: NONE  Oral Contrast: NONE  Complications: None reported at time of study completion    PROCEDURE:  CT of the Chest, Abdomen and Pelvis was performed.  Sagittal and coronal reformats were performed.    FINDINGS:  CHEST:  LUNGS AND LARGE AIRWAYS: Patent central airways. No pulmonary nodules. Lingular and right middle lobe subsegmental atelectasis.  PLEURA: Small bilateral pleural effusions, right greater than left, and increased since prior CT.  VESSELS: Calcifications of the thoracic aorta and coronary arteries.  HEART: Heart size is normal. No pericardial effusion. Status post mitral valve replacement. Left atrial mural calcification.  MEDIASTINUM AND AMBER: No lymphadenopathy.  CHEST WALL AND LOWER NECK: Calcified fibroadenoma right breast.    ABDOMEN AND PELVIS:  LIVER: Pericapsular fluid collection posterior to liver disease (2, 66) measures 5.8 x 1.8 cm. Mild hepatomegaly with diffuse fatty infiltration.  BILE DUCTS: Normal caliber.  GALLBLADDER: Status post cholecystectomy.  SPLEEN: Within normal limits.  PANCREAS: Within normal limits.  ADRENALS: Within normal limits.  KIDNEYS/URETERS: Within normal limits.    BLADDER: Within normal limits.  REPRODUCTIVE ORGANS: Status post hysterectomy. No adnexal mass.    BOWEL: Small direct esophageal hiatal hernia. No bowel obstruction. Appendix not visualized. Infiltrative changes adjacent to second and third duodenum.  PERITONEUM: Trace ascitesin right flank and pelvis. Mild mesenteric infiltration.  VESSELS: Atheromatous calcifications.  RETROPERITONEUM/LYMPH NODES: No retroperitoneal hematoma.  ABDOMINAL WALL: Rectus diastases with small ventral fat-containing hernia.  BONES: Old compression fracture 10th thoracic vertebra. Disc degeneration lower lumbar spine. No acute bony injury.    IMPRESSION:  Limited noncontrast study.    Pericapsular fluid collection adjacent to right lobe of liver. Underlying parenchymal injury is not excluded.    Soft tissue infiltration adjacent to the second/third duodenum with mild ascites. Associated mesenteric injury is not excluded.    Enlarged bilateral pleural effusions.    --- End of Report ---      NICOLASA SHORT MD; Attending Radiologist  This document has been electronically signed. Jul 14 2021 10:08AM    < end of copied text >      PREVIOUS DIAGNOSTIC TESTING:    Echocardiogram: < from: Transesophageal Echocardiogram (06.11.21 @ 08:46) >  Patient name: MORENITA ROSAS  YOB: 1935   Age: 85 (F)   MR#: [id number]  Study Date: 6/11/2021  Location: O/PSonographer: Juanita Phillip RDCS  2nd Sonographer: Stalin Ardon M.D.  Study quality: Technically fair  Referring Physician:Xochitl Valdovinos MD  Blood Pressure: 152/65 mmHg  Height: 157 cm  Weight: 70 kg  BSA: 1.7 m2  ------------------------------------------------------------------------  PROCEDURE: Transesophageal and transthoracic  echocardiograms with 2-D, M-Mode and complete spectral and  color flow Doppler were performed.  Informed consent was  first obtained for YULIA. The patient was sedated - see  anesthesia record.  The procedure was monitored with  automatic blood pressure monitoring, ECG tracings and pulse  oximetry.  The transesophageal probe was placed in the  esophagus posterior to the heart without complications.  INDICATION: Nonrheumatic aortic (valve) stenosis (I35.0)  ------------------------------------------------------------------------  Dimensions:    Normal Values:  LA:     4.1    2.0 - 4.0 cm  Ao:     2.8    2.0 - 3.8 cm  SEPTUM: 1.1    0.6 - 1.2 cm  PWT:    1.0    0.6 - 1.1 cm  LVIDd:  4.7    3.0 - 5.6 cm  LVIDs:  3.9    1.8 - 4.0 cm  Derived variables:  LVMI: 102 g/m2  RWT: 0.42  Fractional short: 17 %  EF (Visual Estimate): 55-60 %  Doppler Peak Velocity (m/sec): AoV=1.5  ------------------------------------------------------------------------  Observations:  Mitral Valve: An annuloplasty ring is seen in the mitral  position.. Mild-moderate mitral regurgitation. Peak mitral  valve gradient equals 22 mm Hg, mean transmitral valve  gradient equals 8 mm Hg, which is elevated even in the  setting of a An annuloplasty ring is seen in the mitral  position..  Durng YULIA PG 13 mm hg, MG 4 mm hg.  Aortic Valve/Aorta: Calcified trileaflet aortic valve with  normal opening. Peak transaortic valve gradient equals 9 mm  Hg, mean transaortic valve gradient equals 5 mm Hg, aortic  valve velocity time integral equals 24 cm, estimated aortic  valve area equals 2.9 sqcm. Moderate aortic regurgitation.  Vena contracta 0.45 cm. Peak left ventricular outflow tract  gradient equals 4 mm Hg, mean gradient is equal to 2 mm Hg,  LVOT velocity time integral equals 22 cm.  Aortic Root: 2.8 cm.  LVOT diameter: 2 cm.  Complex atheroma noted in aortic arch/descending aorta.  Left Atrium: Mildly dilated left atrium.  LA volume index =  36 cc/m2.   Spontaneous echo contrast seen.   No left  atrial or left atrial appendage thrombus.   Decreased left  atrial appendage velocities noted.  Left Ventricle: Normal left ventricular systolic function.  No segmental wall motion abnormalities. Normal left  ventricular internal dimensions and wall thicknesses.  Right Heart: Moderate right atrial enlargement. Right  ventricular enlargement with normal right ventricular  systolic function (TAPSE 1.9 cm). Normal tricuspid valve.  Moderate tricuspid regurgitation. Normal pulmonic valve.  Mild pulmonic regurgitation.  Pericardium/Pleura: Normal pericardium with no pericardial  effusion.  Hemodynamic: Estimated right ventricular systolic pressure  equals 66 mm Hg, assuming right atrial pressure equals 8 mm  Hg, consistent with severe pulmonary hypertension. Agitated  saline injection and color flow Doppler demonstrates no  evidence of a patent foramen ovale.  ------------------------------------------------------------------------  Conclusions:  1. Peak mitral valve gradient equals 22 mm Hg, mean  transmitral valve gradient equals 8 mm Hg, which is  elevated even in the setting of a An annuloplasty ring is  seen in the mitral position..  Durng YULIA PG 13 mm hg, MG 4 mm hg.  2. Moderate aortic regurgitation.  Vena contracta 0.45 cm.  3. Aortic Root: 2.8 cm.  LVOT diameter: 2 cm.  Complex atheroma noted in aortic arch/descending aorta.  4. Normal left ventricular internal dimensions and wall  thicknesses.  5. Normal left ventricular systolic function. No segmental  wall motion abnormalities.  6. Moderate right atrial enlargement.  7. Right ventricular enlargement with normal right  ventricular systolic function (TAPSE 1.9 cm).  8. Estimated pulmonary artery systolic pressure equals 66  mm Hg, assuming right atrial pressure equals 8 mm Hg,  consistent with severe pulmonary pressures. MeanPASP 34 mm  hg. PADP 13 mm hg.  During YULIA PASP approx 50 mm hg.  ------------------------------------------------------------------------  Confirmed on  6/11/2021 - 13:05:26 by TONY Covington  ------------------------------------------------------------------------    < end of copied text >     CHIEF COMPLAINT: SOB x2-3 days    HISTORY OF PRESENT ILLNESS: 86 year old female with PMHx of MVR s/p repair, Afib on Eliquis, HTN, HLD, CHF, Gout, p/w SOB x 2-3 days. Pt reports falling 5 days ago, after becoming dizzy and passing out for a brief second. At that time, patient did not seek treatment. However, over the next 2-3 days, she had worsening SOB as well as chest pain. Upon arrival to the ED, patient found to be in A Fib w/ RVR, with rates up to 160 BPM. Patient states she has had multiple episodes of AF with RVR since being siagnosed ~2 years ago. In CCU, pt received Amiodarone 150mg IVP x1 and was started on lopressor and diltiazem. Chest x-ray showed possible consolidation in RML; blood cultures drawn and pt started on empiric zosyn. EP consulted for evaluation for DCCV.      Allergies    &quot;VASELINE BASED OINTMENTS&quot; (Urticaria; Rash)  adhesives (Urticaria; Rash)  contrast media (gadolinium-based) (Chills)  Norvasc (Pruritus)  statins (Unknown)    Intolerances    	    MEDICATIONS:  diltiazem    milliGRAM(s) Oral daily  heparin  Infusion 800 Unit(s)/Hr IV Continuous <Continuous>  metoprolol tartrate 50 milliGRAM(s) Oral two times a day  torsemide 40 milliGRAM(s) Oral daily  piperacillin/tazobactam IVPB.. 3.375 Gram(s) IV Intermittent every 8 hours  acetaminophen   Tablet .. 650 milliGRAM(s) Oral every 6 hours PRN  amitriptyline 25 milliGRAM(s) Oral daily  melatonin 3 milliGRAM(s) Oral at bedtime PRN  ondansetron Injectable 4 milliGRAM(s) IV Push every 8 hours PRN  aluminum hydroxide/magnesium hydroxide/simethicone Suspension 30 milliLiter(s) Oral every 4 hours PRN  pantoprazole    Tablet 40 milliGRAM(s) Oral before breakfast  allopurinol 200 milliGRAM(s) Oral daily  oxybutynin 5 milliGRAM(s) Oral daily      PAST MEDICAL & SURGICAL HISTORY:  Insomnia  HTN (hypertension)  Hypercholesteremia  GERD (gastroesophageal reflux disease)  Carotid artery occlusion  (R)  Vitamin B 12 deficiency  Osteoporosis  Mitral valve disease  Gallstones  Choledocholithiasis  CHF (congestive heart failure)  S/P MVR (mitral valve repair)  1997 at [facility]  Status post DACIA-BSO  1975  Hx of tonsillectomy  Abdominal hernia  repair 2007  History of lumbar laminectomy for spinal cord decompression  1995  Papilloma of breast  s/p excision from right breast &gt;20 years ago  Bilateral cataracts  extraction w/ IOL  Varicose veins  s/p sclerotherapy bilaterally  H/O carotid endarterectomy  S/P laparoscopic cholecystectomy        FAMILY HISTORY:  Family history of lung cancer (Grandparent)  Family history of dementia  mother  FH: cirrhosis  father  FH: ovarian cancer in first degree relative (Child)      SOCIAL HISTORY:    [ ]Non-smoker  [x] Smoker: Smoked 1-2 ppd x20 years, quit over 30 years ago  [x] Alcohol: 2-3 glasses of wine/day      REVIEW OF SYSTEMS:  See HPI. Otherwise, 10 point ROS done and otherwise negative.    PHYSICAL EXAM:  T(C): 36.5 (07-14-21 @ 08:00), Max: 36.5 (07-14-21 @ 08:00)  HR: 102 (07-14-21 @ 13:00) (92 - 147)  BP: 103/61 (07-14-21 @ 13:00) (91/52 - 142/71)  RR: 20 (07-14-21 @ 13:00) (20 - 37)  SpO2: 96% (07-14-21 @ 13:00) (93% - 100%)  Wt(kg): --  I&O's Summary    13 Jul 2021 07:01  -  14 Jul 2021 07:00  --------------------------------------------------------  IN: 204 mL / OUT: 925 mL / NET: -721 mL    14 Jul 2021 07:01  -  14 Jul 2021 14:57  --------------------------------------------------------  IN: 504 mL / OUT: 950 mL / NET: -446 mL        Appearance: Alert. NAD	  Cardiovascular: Irregularly irregular  Respiratory: RML/RLL with decreased breath sounds, LLL with decreased breath sounds   Extremities: No edema BLE  Vascular: Peripheral pulses palpable 2+ bilaterally      LABS:	 	    CBC Full  -  ( 14 Jul 2021 14:24 )  WBC Count : 14.91 K/uL  Hemoglobin : 9.3 g/dL  Hematocrit : 31.5 %  Platelet Count - Automated : 358 K/uL  Mean Cell Volume : 82.9 fl  Mean Cell Hemoglobin : 24.5 pg  Mean Cell Hemoglobin Concentration : 29.5 gm/dL  Auto Neutrophil # : x  Auto Lymphocyte # : x  Auto Monocyte # : x  Auto Eosinophil # : x  Auto Basophil # : x  Auto Neutrophil % : x  Auto Lymphocyte % : x  Auto Monocyte % : x  Auto Eosinophil % : x  Auto Basophil % : x    07-14    137  |  105  |  48<H>  ----------------------------<  116<H>  4.2   |  16<L>  |  1.49<H>  07-13    139  |  103  |  50<H>  ----------------------------<  185<H>  4.0   |  19<L>  |  1.74<H>    Ca    8.1<L>      14 Jul 2021 05:57  Ca    8.0<L>      13 Jul 2021 21:41  Phos  3.8     07-14  Phos  4.0     07-13  Mg     2.2     07-14  Mg     1.7     07-13    TPro  6.4  /  Alb  3.5  /  TBili  0.4  /  DBili  x   /  AST  169<H>  /  ALT  331<H>  /  AlkPhos  168<H>  07-14  TPro  6.8  /  Alb  3.8  /  TBili  0.6  /  DBili  x   /  AST  222<H>  /  ALT  378<H>  /  AlkPhos  187<H>  07-13      proBNP: Serum Pro-Brain Natriuretic Peptide: 06057 pg/mL (07-13 @ 21:41)    HgA1c: 6.0  TSH: Thyroid Stimulating Hormone, Serum: 3.07 uIU/mL (07-14 @ 03:35)    CARDIAC MARKERS: 19 -> 18    TELEMETRY: Afib  BPM      ECG:  	  RADIOLOGY:   OTHER: < from: CT Chest No Cont (07.13.21 @ 21:24) >  EXAM:  CT ABDOMEN AND PELVIS                          EXAM:  CT CHEST                            PROCEDURE DATE:  07/13/2021            INTERPRETATION:  CLINICAL INFORMATION: Pelvic pain status post fall. Shortness of breath.    COMPARISON: CT chest dated 5/23/2018. CT abdomen pelvis dated 6/30/2020.    CONTRAST/COMPLICATIONS:  IV Contrast: NONE  Oral Contrast: NONE  Complications: None reported at time of study completion    PROCEDURE:  CT of the Chest, Abdomen and Pelvis was performed.  Sagittal and coronal reformats were performed.    FINDINGS:  CHEST:  LUNGS AND LARGE AIRWAYS: Patent central airways. No pulmonary nodules. Lingular and right middle lobe subsegmental atelectasis.  PLEURA: Small bilateral pleural effusions, right greater than left, and increased since prior CT.  VESSELS: Calcifications of the thoracic aorta and coronary arteries.  HEART: Heart size is normal. No pericardial effusion. Status post mitral valve replacement. Left atrial mural calcification.  MEDIASTINUM AND AMBER: No lymphadenopathy.  CHEST WALL AND LOWER NECK: Calcified fibroadenoma right breast.    ABDOMEN AND PELVIS:  LIVER: Pericapsular fluid collection posterior to liver disease (2, 66) measures 5.8 x 1.8 cm. Mild hepatomegaly with diffuse fatty infiltration.  BILE DUCTS: Normal caliber.  GALLBLADDER: Status post cholecystectomy.  SPLEEN: Within normal limits.  PANCREAS: Within normal limits.  ADRENALS: Within normal limits.  KIDNEYS/URETERS: Within normal limits.    BLADDER: Within normal limits.  REPRODUCTIVE ORGANS: Status post hysterectomy. No adnexal mass.    BOWEL: Small direct esophageal hiatal hernia. No bowel obstruction. Appendix not visualized. Infiltrative changes adjacent to second and third duodenum.  PERITONEUM: Trace ascitesin right flank and pelvis. Mild mesenteric infiltration.  VESSELS: Atheromatous calcifications.  RETROPERITONEUM/LYMPH NODES: No retroperitoneal hematoma.  ABDOMINAL WALL: Rectus diastases with small ventral fat-containing hernia.  BONES: Old compression fracture 10th thoracic vertebra. Disc degeneration lower lumbar spine. No acute bony injury.    IMPRESSION:  Limited noncontrast study.    Pericapsular fluid collection adjacent to right lobe of liver. Underlying parenchymal injury is not excluded.    Soft tissue infiltration adjacent to the second/third duodenum with mild ascites. Associated mesenteric injury is not excluded.    Enlarged bilateral pleural effusions.    --- End of Report ---      NICOLASA SHORT MD; Attending Radiologist  This document has been electronically signed. Jul 14 2021 10:08AM    < end of copied text >      PREVIOUS DIAGNOSTIC TESTING:    Echocardiogram: < from: Transesophageal Echocardiogram (06.11.21 @ 08:46) >  Patient name: MORENITA ROSAS  YOB: 1935   Age: 85 (F)   MR#: [id number]  Study Date: 6/11/2021  Location: O/PSonographer: Juanita Phillip [address] Sonographer: Stalin Ardon M.D.  Study quality: Technically fair  Referring Physician:Xochitl Valdovinos MD  Blood Pressure: 152/65 mmHg  Height: 157 cm  Weight: 70 kg  BSA: 1.7 m2  ------------------------------------------------------------------------  PROCEDURE: Transesophageal and transthoracic  echocardiograms with 2-D, M-Mode and complete spectral and  color flow Doppler were performed.  Informed consent was  first obtained for YULIA. The patient was sedated - see  anesthesia record.  The procedure was monitored with  automatic blood pressure monitoring, ECG tracings and pulse  oximetry.  The transesophageal probe was placed in the  esophagus posterior to the heart without complications.  INDICATION: Nonrheumatic aortic (valve) stenosis (I35.0)  ------------------------------------------------------------------------  Dimensions:    Normal Values:  LA:     4.1    2.0 - 4.0 cm  Ao:     2.8    2.0 - 3.8 cm  SEPTUM: 1.1    0.6 - 1.2 cm  PWT:    1.0    0.6 - 1.1 cm  LVIDd:  4.7    3.0 - 5.6 cm  LVIDs:  3.9    1.8 - 4.0 cm  Derived variables:  LVMI: 102 g/m2  RWT: 0.42  Fractional short: 17 %  EF (Visual Estimate): 55-60 %  Doppler Peak Velocity (m/sec): AoV=1.5  ------------------------------------------------------------------------  Observations:  Mitral Valve: An annuloplasty ring is seen in the mitral  position.. Mild-moderate mitral regurgitation. Peak mitral  valve gradient equals 22 mm Hg, mean transmitral valve  gradient equals 8 mm Hg, which is elevated even in the  setting of a An annuloplasty ring is seen in the mitral  position..  Durng YULIA PG 13 mm hg, MG 4 mm hg.  Aortic Valve/Aorta: Calcified trileaflet aortic valve with  normal opening. Peak transaortic valve gradient equals 9 mm  Hg, mean transaortic valve gradient equals 5 mm Hg, aortic  valve velocity time integral equals 24 cm, estimated aortic  valve area equals 2.9 sqcm. Moderate aortic regurgitation.  Vena contracta 0.45 cm. Peak left ventricular outflow tract  gradient equals 4 mm Hg, mean gradient is equal to 2 mm Hg,  LVOT velocity time integral equals 22 cm.  Aortic Root: 2.8 cm.  LVOT diameter: 2 cm.  Complex atheroma noted in aortic arch/descending aorta.  Left Atrium: Mildly dilated left atrium.  LA volume index =  36 cc/m2.   Spontaneous echo contrast seen.   No left  atrial or left atrial appendage thrombus.   Decreased left  atrial appendage velocities noted.  Left Ventricle: Normal left ventricular systolic function.  No segmental wall motion abnormalities. Normal left  ventricular internal dimensions and wall thicknesses.  Right Heart: Moderate right atrial enlargement. Right  ventricular enlargement with normal right ventricular  systolic function (TAPSE 1.9 cm). Normal tricuspid valve.  Moderate tricuspid regurgitation. Normal pulmonic valve.  Mild pulmonic regurgitation.  Pericardium/Pleura: Normal pericardium with no pericardial  effusion.  Hemodynamic: Estimated right ventricular systolic pressure  equals 66 mm Hg, assuming right atrial pressure equals 8 mm  Hg, consistent with severe pulmonary hypertension. Agitated  saline injection and color flow Doppler demonstrates no  evidence of a patent foramen ovale.  ------------------------------------------------------------------------  Conclusions:  1. Peak mitral valve gradient equals 22 mm Hg, mean  transmitral valve gradient equals 8 mm Hg, which is  elevated even in the setting of a An annuloplasty ring is  seen in the mitral position..  Durng YULIA PG 13 mm hg, MG 4 mm hg.  2. Moderate aortic regurgitation.  Vena contracta 0.45 cm.  3. Aortic Root: 2.8 cm.  LVOT diameter: 2 cm.  Complex atheroma noted in aortic arch/descending aorta.  4. Normal left ventricular internal dimensions and wall  thicknesses.  5. Normal left ventricular systolic function. No segmental  wall motion abnormalities.  6. Moderate right atrial enlargement.  7. Right ventricular enlargement with normal right  ventricular systolic function (TAPSE 1.9 cm).  8. Estimated pulmonary artery systolic pressure equals 66  mm Hg, assuming right atrial pressure equals 8 mm Hg,  consistent with severe pulmonary pressures. MeanPASP 34 mm  hg. PADP 13 mm hg.  During YULIA PASP approx 50 mm hg.  ------------------------------------------------------------------------  Confirmed on  6/11/2021 - 13:05:26 by TONY Covington  ------------------------------------------------------------------------    < end of copied text >     CHIEF COMPLAINT: SOB x2-3 days    HISTORY OF PRESENT ILLNESS: 86 year old female with PMHx of MVR s/p repair, Afib on Eliquis, HTN, HLD, CHF, Gout, p/w SOB x 2-3 days. Pt reports falling 5 days ago, after becoming dizzy and passing out for a brief second. At that time, patient did not seek treatment. However, over the next 2-3 days, she had worsening SOB as well as chest pain. Upon arrival to the ED, patient found to be in A Fib w/ RVR, with rates up to 160 BPM. Patient states she has had previous episodes of AF with RVR since being diagnosed ~2 years ago. In CCU, pt received Amiodarone 150mg IVP x1 and was started on lopressor and diltiazem. Chest x-ray showed possible consolidation in RML; blood cultures drawn and pt started on empiric zosyn. EP consulted for evaluation for DCCV.      Allergies    &quot;VASELINE BASED OINTMENTS&quot; (Urticaria; Rash)  adhesives (Urticaria; Rash)  contrast media (gadolinium-based) (Chills)  Norvasc (Pruritus)  statins (Unknown)    Intolerances    	    MEDICATIONS:  diltiazem    milliGRAM(s) Oral daily  heparin  Infusion 800 Unit(s)/Hr IV Continuous <Continuous>  metoprolol tartrate 50 milliGRAM(s) Oral two times a day  torsemide 40 milliGRAM(s) Oral daily  piperacillin/tazobactam IVPB.. 3.375 Gram(s) IV Intermittent every 8 hours  acetaminophen   Tablet .. 650 milliGRAM(s) Oral every 6 hours PRN  amitriptyline 25 milliGRAM(s) Oral daily  melatonin 3 milliGRAM(s) Oral at bedtime PRN  ondansetron Injectable 4 milliGRAM(s) IV Push every 8 hours PRN  aluminum hydroxide/magnesium hydroxide/simethicone Suspension 30 milliLiter(s) Oral every 4 hours PRN  pantoprazole    Tablet 40 milliGRAM(s) Oral before breakfast  allopurinol 200 milliGRAM(s) Oral daily  oxybutynin 5 milliGRAM(s) Oral daily      PAST MEDICAL & SURGICAL HISTORY:  Insomnia  HTN (hypertension)  Hypercholesteremia  GERD (gastroesophageal reflux disease)  Carotid artery occlusion  (R)  Vitamin B 12 deficiency  Osteoporosis  Mitral valve disease  Gallstones  Choledocholithiasis  CHF (congestive heart failure)  S/P MVR (mitral valve repair)  1997 at [facility]  Status post DACIA-BSO  1975  Hx of tonsillectomy  Abdominal hernia  repair 2007  History of lumbar laminectomy for spinal cord decompression  1995  Papilloma of breast  s/p excision from right breast &gt;20 years ago  Bilateral cataracts  extraction w/ IOL  Varicose veins  s/p sclerotherapy bilaterally  H/O carotid endarterectomy  S/P laparoscopic cholecystectomy        FAMILY HISTORY:  Family history of lung cancer (Grandparent)  Family history of dementia  mother  FH: cirrhosis  father  FH: ovarian cancer in first degree relative (Child)      SOCIAL HISTORY:    [ ]Non-smoker  [x] Smoker: Smoked 1-2 ppd x20 years, quit over 30 years ago  [x] Alcohol: 2-3 glasses of wine/day      REVIEW OF SYSTEMS:  See HPI. Otherwise, 10 point ROS done and otherwise negative.    PHYSICAL EXAM:  T(C): 36.5 (07-14-21 @ 08:00), Max: 36.5 (07-14-21 @ 08:00)  HR: 102 (07-14-21 @ 13:00) (92 - 147)  BP: 103/61 (07-14-21 @ 13:00) (91/52 - 142/71)  RR: 20 (07-14-21 @ 13:00) (20 - 37)  SpO2: 96% (07-14-21 @ 13:00) (93% - 100%)  Wt(kg): --  I&O's Summary    13 Jul 2021 07:01  -  14 Jul 2021 07:00  --------------------------------------------------------  IN: 204 mL / OUT: 925 mL / NET: -721 mL    14 Jul 2021 07:01  -  14 Jul 2021 14:57  --------------------------------------------------------  IN: 504 mL / OUT: 950 mL / NET: -446 mL        Appearance: Alert. NAD	  Cardiovascular: Irregularly irregular  Respiratory: RML/RLL with decreased breath sounds, LLL with decreased breath sounds   Extremities: No edema BLE  Vascular: Peripheral pulses palpable 2+ bilaterally      LABS:	 	    CBC Full  -  ( 14 Jul 2021 14:24 )  WBC Count : 14.91 K/uL  Hemoglobin : 9.3 g/dL  Hematocrit : 31.5 %  Platelet Count - Automated : 358 K/uL  Mean Cell Volume : 82.9 fl  Mean Cell Hemoglobin : 24.5 pg  Mean Cell Hemoglobin Concentration : 29.5 gm/dL  Auto Neutrophil # : x  Auto Lymphocyte # : x  Auto Monocyte # : x  Auto Eosinophil # : x  Auto Basophil # : x  Auto Neutrophil % : x  Auto Lymphocyte % : x  Auto Monocyte % : x  Auto Eosinophil % : x  Auto Basophil % : x    07-14    137  |  105  |  48<H>  ----------------------------<  116<H>  4.2   |  16<L>  |  1.49<H>  07-13    139  |  103  |  50<H>  ----------------------------<  185<H>  4.0   |  19<L>  |  1.74<H>    Ca    8.1<L>      14 Jul 2021 05:57  Ca    8.0<L>      13 Jul 2021 21:41  Phos  3.8     07-14  Phos  4.0     07-13  Mg     2.2     07-14  Mg     1.7     07-13    TPro  6.4  /  Alb  3.5  /  TBili  0.4  /  DBili  x   /  AST  169<H>  /  ALT  331<H>  /  AlkPhos  168<H>  07-14  TPro  6.8  /  Alb  3.8  /  TBili  0.6  /  DBili  x   /  AST  222<H>  /  ALT  378<H>  /  AlkPhos  187<H>  07-13      proBNP: Serum Pro-Brain Natriuretic Peptide: 33722 pg/mL (07-13 @ 21:41)    HgA1c: 6.0  TSH: Thyroid Stimulating Hormone, Serum: 3.07 uIU/mL (07-14 @ 03:35)    CARDIAC MARKERS: 19 -> 18    TELEMETRY: Afib  BPM      ECG:  	  RADIOLOGY:   OTHER: < from: CT Chest No Cont (07.13.21 @ 21:24) >  EXAM:  CT ABDOMEN AND PELVIS                          EXAM:  CT CHEST                            PROCEDURE DATE:  07/13/2021            INTERPRETATION:  CLINICAL INFORMATION: Pelvic pain status post fall. Shortness of breath.    COMPARISON: CT chest dated 5/23/2018. CT abdomen pelvis dated 6/30/2020.    CONTRAST/COMPLICATIONS:  IV Contrast: NONE  Oral Contrast: NONE  Complications: None reported at time of study completion    PROCEDURE:  CT of the Chest, Abdomen and Pelvis was performed.  Sagittal and coronal reformats were performed.    FINDINGS:  CHEST:  LUNGS AND LARGE AIRWAYS: Patent central airways. No pulmonary nodules. Lingular and right middle lobe subsegmental atelectasis.  PLEURA: Small bilateral pleural effusions, right greater than left, and increased since prior CT.  VESSELS: Calcifications of the thoracic aorta and coronary arteries.  HEART: Heart size is normal. No pericardial effusion. Status post mitral valve replacement. Left atrial mural calcification.  MEDIASTINUM AND AMBER: No lymphadenopathy.  CHEST WALL AND LOWER NECK: Calcified fibroadenoma right breast.    ABDOMEN AND PELVIS:  LIVER: Pericapsular fluid collection posterior to liver disease (2, 66) measures 5.8 x 1.8 cm. Mild hepatomegaly with diffuse fatty infiltration.  BILE DUCTS: Normal caliber.  GALLBLADDER: Status post cholecystectomy.  SPLEEN: Within normal limits.  PANCREAS: Within normal limits.  ADRENALS: Within normal limits.  KIDNEYS/URETERS: Within normal limits.    BLADDER: Within normal limits.  REPRODUCTIVE ORGANS: Status post hysterectomy. No adnexal mass.    BOWEL: Small direct esophageal hiatal hernia. No bowel obstruction. Appendix not visualized. Infiltrative changes adjacent to second and third duodenum.  PERITONEUM: Trace ascitesin right flank and pelvis. Mild mesenteric infiltration.  VESSELS: Atheromatous calcifications.  RETROPERITONEUM/LYMPH NODES: No retroperitoneal hematoma.  ABDOMINAL WALL: Rectus diastases with small ventral fat-containing hernia.  BONES: Old compression fracture 10th thoracic vertebra. Disc degeneration lower lumbar spine. No acute bony injury.    IMPRESSION:  Limited noncontrast study.    Pericapsular fluid collection adjacent to right lobe of liver. Underlying parenchymal injury is not excluded.    Soft tissue infiltration adjacent to the second/third duodenum with mild ascites. Associated mesenteric injury is not excluded.    Enlarged bilateral pleural effusions.    --- End of Report ---      NICOLASA SHORT MD; Attending Radiologist  This document has been electronically signed. Jul 14 2021 10:08AM    < end of copied text >      PREVIOUS DIAGNOSTIC TESTING:    Echocardiogram: < from: Transesophageal Echocardiogram (06.11.21 @ 08:46) >  Patient name: MORENITA ROSAS  YOB: 1935   Age: 85 (F)   MR#: [id number]  Study Date: 6/11/2021  Location: O/PSonographer: Juanita Phillip [address] Sonographer: Stalin Ardon M.D.  Study quality: Technically fair  Referring Physician:Xochitl Valdovinos MD  Blood Pressure: 152/65 mmHg  Height: 157 cm  Weight: 70 kg  BSA: 1.7 m2  ------------------------------------------------------------------------  PROCEDURE: Transesophageal and transthoracic  echocardiograms with 2-D, M-Mode and complete spectral and  color flow Doppler were performed.  Informed consent was  first obtained for YULIA. The patient was sedated - see  anesthesia record.  The procedure was monitored with  automatic blood pressure monitoring, ECG tracings and pulse  oximetry.  The transesophageal probe was placed in the  esophagus posterior to the heart without complications.  INDICATION: Nonrheumatic aortic (valve) stenosis (I35.0)  ------------------------------------------------------------------------  Dimensions:    Normal Values:  LA:     4.1    2.0 - 4.0 cm  Ao:     2.8    2.0 - 3.8 cm  SEPTUM: 1.1    0.6 - 1.2 cm  PWT:    1.0    0.6 - 1.1 cm  LVIDd:  4.7    3.0 - 5.6 cm  LVIDs:  3.9    1.8 - 4.0 cm  Derived variables:  LVMI: 102 g/m2  RWT: 0.42  Fractional short: 17 %  EF (Visual Estimate): 55-60 %  Doppler Peak Velocity (m/sec): AoV=1.5  ------------------------------------------------------------------------  Observations:  Mitral Valve: An annuloplasty ring is seen in the mitral  position.. Mild-moderate mitral regurgitation. Peak mitral  valve gradient equals 22 mm Hg, mean transmitral valve  gradient equals 8 mm Hg, which is elevated even in the  setting of a An annuloplasty ring is seen in the mitral  position..  Durng YULIA PG 13 mm hg, MG 4 mm hg.  Aortic Valve/Aorta: Calcified trileaflet aortic valve with  normal opening. Peak transaortic valve gradient equals 9 mm  Hg, mean transaortic valve gradient equals 5 mm Hg, aortic  valve velocity time integral equals 24 cm, estimated aortic  valve area equals 2.9 sqcm. Moderate aortic regurgitation.  Vena contracta 0.45 cm. Peak left ventricular outflow tract  gradient equals 4 mm Hg, mean gradient is equal to 2 mm Hg,  LVOT velocity time integral equals 22 cm.  Aortic Root: 2.8 cm.  LVOT diameter: 2 cm.  Complex atheroma noted in aortic arch/descending aorta.  Left Atrium: Mildly dilated left atrium.  LA volume index =  36 cc/m2.   Spontaneous echo contrast seen.   No left  atrial or left atrial appendage thrombus.   Decreased left  atrial appendage velocities noted.  Left Ventricle: Normal left ventricular systolic function.  No segmental wall motion abnormalities. Normal left  ventricular internal dimensions and wall thicknesses.  Right Heart: Moderate right atrial enlargement. Right  ventricular enlargement with normal right ventricular  systolic function (TAPSE 1.9 cm). Normal tricuspid valve.  Moderate tricuspid regurgitation. Normal pulmonic valve.  Mild pulmonic regurgitation.  Pericardium/Pleura: Normal pericardium with no pericardial  effusion.  Hemodynamic: Estimated right ventricular systolic pressure  equals 66 mm Hg, assuming right atrial pressure equals 8 mm  Hg, consistent with severe pulmonary hypertension. Agitated  saline injection and color flow Doppler demonstrates no  evidence of a patent foramen ovale.  ------------------------------------------------------------------------  Conclusions:  1. Peak mitral valve gradient equals 22 mm Hg, mean  transmitral valve gradient equals 8 mm Hg, which is  elevated even in the setting of a An annuloplasty ring is  seen in the mitral position..  Durng YULIA PG 13 mm hg, MG 4 mm hg.  2. Moderate aortic regurgitation.  Vena contracta 0.45 cm.  3. Aortic Root: 2.8 cm.  LVOT diameter: 2 cm.  Complex atheroma noted in aortic arch/descending aorta.  4. Normal left ventricular internal dimensions and wall  thicknesses.  5. Normal left ventricular systolic function. No segmental  wall motion abnormalities.  6. Moderate right atrial enlargement.  7. Right ventricular enlargement with normal right  ventricular systolic function (TAPSE 1.9 cm).  8. Estimated pulmonary artery systolic pressure equals 66  mm Hg, assuming right atrial pressure equals 8 mm Hg,  consistent with severe pulmonary pressures. MeanPASP 34 mm  hg. PADP 13 mm hg.  During YULIA PASP approx 50 mm hg.  ------------------------------------------------------------------------  Confirmed on  6/11/2021 - 13:05:26 by TONY Covington  ------------------------------------------------------------------------    < end of copied text >

## 2021-07-14 NOTE — PROGRESS NOTE ADULT - ASSESSMENT
87yo F hx of MVR s/p repair, Afib, HTN, HLD, CHF, on Eliquis, p/w SOB x 2-3 days, found to be in A Fib w/ RVR.     Neuro   -A&Ox4  -CTH showed no acute bleed    Pulm   -Currently on 5L NC  -SOB likely 2/2 A Fib w/ RVR vs. RML pneumonia  -Chest x-ray showed possible consolidation in RML  -Chest CT did not show consolidation  -On Zosyn empirically, f/u blood cultures   -Legionella urine antigen, Strep pneumoniae antigen, and MRSA antigen sent     Cards  #A Fib w/ RVR to max HR of 160s  -Patient found to be in A Fib w/ RVR to HR 160s on admission   -Likely causing SOB  -Given Amio 150mg x1  -C/w home dose Cardizem  -Switch to Lopressor   -EP consulted to see about cardioversion   -F/u TSH    #CHF  -HFpEF  -Likely 2/2 A fib w/ RVR   -TTE from 6/11 show severe pulmonary pressures  -C/w home Torsemide 40mg qd   -Trend lactate    GI  #Diarrhea   -Previously had diarrhea 2/2 colitis?  -Patient was taking Betamethasone, which she stopped due to dizziness   -CTM for now     #Transaminits   -Likely 2/2 CHF  -Treat as above   -Monitor AST/ALT for now     Renal  -Cr elevated at 1.7 on admission. Baseline seems to range from 1.3 to 2.3 on prior admissions from months back   -Trend Cr     Heme   -transition heparin to home Eliquis     Musc  -Xrays are neg for fracture   -Tylenol for pain control     ID  -Concern for PNA on Cxray, but no acute symptoms indicating PNA  -on Zosyn  -F/u BCx  -Monitor off abx for now     Endo  -F/u A1C

## 2021-07-14 NOTE — CHART NOTE - NSCHARTNOTEFT_GEN_A_CORE
Medicine Accept Note    CHIEF COMPLAINT:   Afib with RVR        HPI / INTERVAL HISTORY:  87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 Pt reports falling 5 days ago, after becoming dizzy and passing out for a brief second. She landed on her back and buttock, and hit her head. Over the past 2-3 days, she has been having worsening SOB and weakness. Last night, she had midline CP, palpitations, diaphoresis, that lasted all night. She denied any N/V, radiation of pain.     Patient has had CP and SOB on exertion for a long time, can only take approx 10 steps until she has symptoms. She has also had cough w/ brown sputum in the AM for a few weeks. She states taht she has had diarrhea for a few days 2/2 colitis, and has been taking Betamethasone to help w/ the diarrhea, but stopped due to dizziness.     Patient denies any fever, sick contacts, recent travel history, abd pain, N/V, urinary changes.     In the ED, patient found to be in A Fib w/ RVR and Cardiology was consulted.     In CCU, pt received push of amio and started her on lopressor and diltiazem. Chest x-ray showed possible consolidation in RML; blood cultures drawn and pt started on empiric zosyn.         PAST MEDICAL & SURGICAL HISTORY:  Insomnia  HTN (hypertension)  Hypercholesteremia  GERD (gastroesophageal reflux disease)  Carotid artery occlusion (R)  Vitamin B 12 deficiency  Osteoporosis  Mitral valve disease  Gallstones  Choledocholithiasis  CHF (congestive heart failure)  S/P MVR (mitral valve repair)  at [facility]  Status post DACIA-BSO   Hx of tonsillectomy  Abdominal hernia repair 2007  History of lumbar laminectomy for spinal cord decompression   Papilloma of breast s/p excision from right breast &gt;20 years ago  Bilateral cataracts extraction w/ IOL  Varicose veins s/p sclerotherapy bilaterally  H/O carotid endarterectomy  S/P laparoscopic cholecystectomy        FAMILY HISTORY:  Family history of lung cancer (Grandparent)  Family history of dementia-mother  FH: cirrhosis- father  FH: ovarian cancer in first degree relative (Child)        Social History:  Social History (marital status, living situation, occupation, tobacco use, alcohol and drug use, and sexual history): Smoking: former smoker, stopped over 30 years ago. Prior 20 pack year smoking history   EtOH Use: 3-4 drinks of wine per day  No drug use   Lives alone  Retired   Ambulates w/ cane        Allergies and Intolerances:        Allergies:  	adhesives: Miscellaneous, Urticaria, Rash  	"VASELINE BASED OINTMENTS": Miscellaneous, Urticaria, Rash, "VASELINE BASED OINTMENTS"  	Norvasc: Drug, Pruritus  	contrast media (gadolinium-based): Drug Category, Chills, shaking /chills  	statins: Drug Category, Unknown        Home Medications:   * Patient Currently Takes Medications as of 2021 21:26 documented in Structured Notes  · 	amitriptyline 25 mg oral tablet: Last Dose Taken:  , 1 tab(s) orally once a day  · 	cyanocobalamin 1000 mcg oral tablet: Last Dose Taken:  , 1 tab(s) orally once a day  · 	PreserVision AREDS 2 oral capsule: Last Dose Taken:  , 1 cap(s) orally 2 times a day  · 	Systane ophthalmic solution: Last Dose Taken:  , 1 drop(s) to each affected eye once a day  · 	Vitamin D3 2000 intl units (50 mcg) oral tablet: Last Dose Taken:  , 1 tab(s) orally once a day  	(taken together with 1000iu: total dose 3000iu)  · 	allopurinol 100 mg oral tablet: Last Dose Taken:  , 2 tab(s) orally once a day  · 	Metoprolol Succinate  mg oral tablet, extended release: Last Dose Taken:  , 1 tab(s) orally 2 times a day  · 	oxybutynin 5 mg/24 hours oral tablet, extended release: Last Dose Taken:  , 1 tab(s) orally once a day  · 	spironolactone 25 mg oral tablet: Last Dose Taken:  , 1 tab(s) orally every other day, As Needed  · 	Eliquis 2.5 mg oral tablet: Last Dose Taken:  , 1 tab(s) orally 2 times a day  	  · 	pantoprazole 40 mg oral delayed release tablet: Last Dose Taken:  , 1 tab(s) orally once a day  · 	torsemide 20 mg oral tablet: Last Dose Taken:  , 2 tab(s) orally once a day  · 	dilTIAZem 120 mg/24 hours oral capsule, extended release: Last Dose Taken:  , 1 cap(s) orally once a day        REVIEW OF SYSTEMS:  CONSTITUTIONAL: +weakness. No fevers or chills  EYES/ENT: No visual changes;    RESPIRATORY: +Cough w/ brown sputum in AM for few weeks. + shortness of breath  CARDIOVASCULAR: +chest pain and palpitations  GASTROINTESTINAL: +Diarrhea prev, none currently. No abdominal or epigastric pain. No nausea, vomiting, or hematemesis;  GENITOURINARY: No dysuria, frequency or hematuria  NEUROLOGICAL: + dizziness. No numbness  PSYCHIATRIC: No depression or anxiety   MUSCULOSKELETAL: No weakness  SKIN: +Itching on L hand        OBJECTIVE:  ICU Vital Signs Last 24 Hrs  T(C): 36.5 (2021 08:00), Max: 36.5 (2021 08:00)  T(F): 97.7 (2021 08:00), Max: 97.7 (2021 08:00)  HR: 102 (2021 13:00) (92 - 147)  BP: 103/61 (2021 13:00) (91/52 - 142/71)  BP(mean): 78 (:00) (65 - 104)  ABP: --  ABP(mean): --  RR: 20 (2021 13:00) (20 - 37)  SpO2: 96% (2021 13:00) (93% - 100%)      Physical Exam:   General: Comfortable appearing woman in no acute distress   Neurology: A&Ox3, nonfocal  Respiratory: CTA B/L, normal respiratory effort, no wheezes, crackles, rales  CV: irregular rate, no murmurs   Abdominal: Soft, NT, ND +BS  Extremities: No edema, + peripheral pulses           @ : @ 07:00  --------------------------------------------------------  IN: 204 mL / OUT: 925 mL / NET: -721 mL     @ 07:  -   @ 15:00  --------------------------------------------------------  IN: 504 mL / OUT: 950 mL / NET: -446 mL          HOSPITAL MEDICATIONS:  MEDICATIONS  (STANDING):  allopurinol 200 milliGRAM(s) Oral daily  amitriptyline 25 milliGRAM(s) Oral daily  diltiazem    milliGRAM(s) Oral daily  heparin  Infusion 800 Unit(s)/Hr (4 mL/Hr) IV Continuous <Continuous>  metoprolol tartrate 50 milliGRAM(s) Oral two times a day  oxybutynin 5 milliGRAM(s) Oral daily  pantoprazole    Tablet 40 milliGRAM(s) Oral before breakfast  piperacillin/tazobactam IVPB.. 3.375 Gram(s) IV Intermittent every 8 hours  torsemide 40 milliGRAM(s) Oral daily    MEDICATIONS  (PRN):  acetaminophen   Tablet .. 650 milliGRAM(s) Oral every 6 hours PRN Temp greater or equal to 38.5C (101.3F), Mild Pain (1 - 3)  aluminum hydroxide/magnesium hydroxide/simethicone Suspension 30 milliLiter(s) Oral every 4 hours PRN Dyspepsia  melatonin 3 milliGRAM(s) Oral at bedtime PRN Insomnia  ondansetron Injectable 4 milliGRAM(s) IV Push every 8 hours PRN Nausea and/or Vomiting        LABS:                        9.3    14.91 )-----------( 358      ( 2021 14:24 )             31.5     Hgb Trend: 9.3<--, 8.7<--, 9.3<--, 9.6<--  0714    142  |  102  |  42<H>  ----------------------------<  101<H>  3.1<L>   |  24  |  1.52<H>    Ca    8.6      2021 14:24  Phos  3.0       Mg     1.9         TPro  6.4  /  Alb  3.5  /  TBili  0.4  /  DBili  x   /  AST  169<H>  /  ALT  331<H>  /  AlkPhos  168<H>      Creatinine Trend: 1.52<--, 1.49<--, 1.74<--, 1.65<--, 1.70<--  PT/INR - ( 2021 14:37 )   PT: 17.8 sec;   INR: 1.51 ratio         PTT - ( 2021 05:57 )  PTT:>200.0 sec  Urinalysis Basic - ( 2021 16:30 )    Color: Yellow / Appearance: Slightly Turbid / S.019 / pH: x  Gluc: x / Ketone: Negative  / Bili: Negative / Urobili: Negative   Blood: x / Protein: 100 / Nitrite: Negative   Leuk Esterase: Moderate / RBC: 3 /hpf / WBC 9 /HPF   Sq Epi: x / Non Sq Epi: 1 /hpf / Bacteria: Negative      Arterial Blood Gas:   @ 05:51  7.45/31/142/21/99/-1.9  ABG lactate: --    Venous Blood Gas:   @ 14:53  --/--/--/--/--  VBG Lactate: 3.0  Venous Blood Gas:   @ 11:28  7.36/46/<20/25/12  VBG Lactate: 4.0            ASSESSMENT & PLAN:   87yo F hx of MVR s/p repair, Afib, HTN, HLD, CHF, on Eliquis, p/w SOB x 2-3 days, found to be in A Fib w/ RVR.       #SOB  - SOB likely 2/2 A Fib w/ RVR vs. RML pneumonia  -Currently on RA  -Chest x-ray showed possible consolidation in RML  -Chest CT did not show consolidation  -On Zosyn (- ) empirically, f/u blood cultures   -Legionella urine antigen, Strep pneumoniae antigen, RVP, and MRSA antigen sent       #Afib with RVR to max HR of 160s  -Patient found to be in A Fib w/ RVR to HR 160s on admission   -Likely causing SOB  -Given Amio 150mg x1  -Takes Cardizem at home  -Rate controlled on Lopressor and Diltaizem to the 90s-100s  -EP consulted for possible cardioversion   -Consider YULIA/cardioversion if patient does not convert to sinus rhythm with treatment of pneumonia      #HFpEF  -EF 55-60%, MVR with annuplasty ring and moderate-severe AR and TR, mild pHTN  -Likely 2/2 A fib w/ RVR   -Cardiology (patient sees Dr. Wynne as an outpatient) and Dr. Valdovinos  -TTE from  show severe pulmonary pressures  -TTE from  showed mild pulmonary pressures  -C/w home Torsemide 40mg qd   -BNP [id number]      #Transaminitis  -Likely 2/2 CHF  -Treat as above   -Monitor AST/ALT for now   -F/U hepatitis serologies  -CT ab/pelvis showed perihepatic fluid collection, mild hepatomegaly, diffuse fatty infiltration      #Diarrhea   -Follows with Dr. Young (GI)  -Has microscopic colitis and planned to start prolonged budesonide taper (started )   -Weakness and fatigue, fainting, since starting budesonide so was advised to stop  -Outpatient follow up with Dr. Young      #WEN  -Cr elevated at 1.7 on admission. At baseline  -Trend Cr   -Avoid nephrotoxic medications  -F/U infectious workup      DVT ppx: transition heparin to home Eliquis 2.5mg BID tonight at 9 PM  Diet: Regular, NPO after midnight  Dispo: from CCU

## 2021-07-15 NOTE — DISCHARGE NOTE PROVIDER - NSDCCPCAREPLAN_GEN_ALL_CORE_FT
PRINCIPAL DISCHARGE DIAGNOSIS  Diagnosis: Chronic atrial fibrillation  Assessment and Plan of Treatment: You have an abnormal heart rhythm called atrial fibrillation. With this condition, your heart’s two upper chambers quiver rather than squeeze the blood out in a normal pattern. This leads to an irregular and sometimes rapid heartbeat. Some people will develop associated symptoms such as a flip-flopping heartbeat, chest pain, lightheadedness, or shortness of breath. Other people may have no symptoms at all. Atrial fibrillation is serious because it affects the heart’s ability to fill with blood as it should. Blood clots may form. This increases the risk for stroke. This complication can be prevented if you use blood thinning medications. Please take your diltiazem and metoprolol as prescribed. You also receieved a cardioversion by EP for your Afib.  Limit your intake of coffee, tea, cola, and other beverages with caffeine. Talk with your doctor about whether you should eliminate caffeine. Avoid over-the-counter medicines that have caffeine in them. Never take stimulants such as amphetamines or cocaine. These drugs can speed up your heart rate and trigger atrial fibrillation.  Call your healthcare provider right away if you have any of the following:  Weakness  Dizziness  Fainting  Fatigue  Shortness of breath  Chest pain with increased activity  A change in the usual regularity of your heartbeat, or an unusually fast heartbeat        SECONDARY DISCHARGE DIAGNOSES  Diagnosis: Perihepatic fluid collection  Assessment and Plan of Treatment: A CT scan showed a perihepatic fluid collection (fluid around your liver). You were evaluated by Interventional Radiology and were determined to not need fluid drainage. Please follow up outpatient to monitor the size of the fluid collection and if you become symptomatic. Please make an appointment now to see your primary care doctor within 1-2 weeks. If you have any new or recurrent symptoms, please call your doctor or go to the ED.       PRINCIPAL DISCHARGE DIAGNOSIS  Diagnosis: Chronic atrial fibrillation  Assessment and Plan of Treatment: You have an abnormal heart rhythm called atrial fibrillation. With this condition, your heart’s two upper chambers quiver rather than squeeze the blood out in a normal pattern. This leads to an irregular and sometimes rapid heartbeat. Some people will develop associated symptoms such as a flip-flopping heartbeat, chest pain, lightheadedness, or shortness of breath. Other people may have no symptoms at all. Atrial fibrillation is serious because it affects the heart’s ability to fill with blood as it should. Blood clots may form. This increases the risk for stroke. This complication can be prevented if you use blood thinning medications. Please take your diltiazem and metoprolol as prescribed. You also receieved a cardioversion by EP for your Afib.  Limit your intake of coffee, tea, cola, and other beverages with caffeine. Talk with your doctor about whether you should eliminate caffeine. Avoid over-the-counter medicines that have caffeine in them. Never take stimulants such as amphetamines or cocaine. These drugs can speed up your heart rate and trigger atrial fibrillation.  Call your healthcare provider right away if you have any of the following:  Weakness  Dizziness  Fainting  Fatigue  Shortness of breath  Chest pain with increased activity  A change in the usual regularity of your heartbeat, or an unusually fast heartbeat        SECONDARY DISCHARGE DIAGNOSES  Diagnosis: Perihepatic fluid collection  Assessment and Plan of Treatment: A CT scan showed a perihepatic fluid collection (fluid around your liver). You were evaluated by Interventional Radiology and were determined to not need fluid drainage. Infectious disease had a low suspicion for abscess. Please follow up outpatient to monitor the size of the fluid collection and if you become symptomatic. Please make an appointment now to see your primary care doctor within 1-2 weeks. If you have any new or recurrent symptoms, please call your doctor or go to the ED.       PRINCIPAL DISCHARGE DIAGNOSIS  Diagnosis: Chronic atrial fibrillation  Assessment and Plan of Treatment: You have an abnormal heart rhythm called atrial fibrillation. With this condition, your heart’s two upper chambers quiver rather than squeeze the blood out in a normal pattern. This leads to an irregular and sometimes rapid heartbeat. Some people will develop associated symptoms such as chest pain, lightheadedness, or shortness of breath. Other people may have no symptoms at all. During your hospital stay you were treated with a procedure called a cardioversion and now your heart is in a normal rhythm.   Please follow up with your primary care doctor 1-2 weeks after you are discharged from rehab for further management.      SECONDARY DISCHARGE DIAGNOSES  Diagnosis: Perihepatic fluid collection  Assessment and Plan of Treatment: A CT scan showed a perihepatic fluid collection (fluid around your liver). You were evaluated by Interventional Radiology and were determined to not need fluid drainage. Infectious disease had a low suspicion for abscess. Please follow up outpatient to monitor the size of the fluid collection and if you become symptomatic. Please make an appointment now to see your primary care doctor within 1-2 weeks. If you have any new or recurrent symptoms, please call your doctor or go to the ED.

## 2021-07-15 NOTE — PROGRESS NOTE ADULT - PROBLEM SELECTOR PLAN 8
DVT ppx: transition heparin to home Eliquis 2.5mg BID tonight at 9 PM  Diet: Regular, NPO after midnight  Dispo: from CCU. DVT ppx: Eliquis 2.5mg BID  Diet: NPO, Regular after procedure  Dispo: from CCU.

## 2021-07-15 NOTE — DISCHARGE NOTE PROVIDER - PROVIDER TOKENS
PROVIDER:[TOKEN:[[id number]]] PROVIDER:[TOKEN:[[id number]]],PROVIDER:[TOKEN:[[id number]]] PROVIDER:[TOKEN:[[id number]]],PROVIDER:[TOKEN:[[id number]]],FREE:[LAST:[[facility]],PHONE:[phone]],FAX:[(   )    -],ADDRESS:[[address] [place] [address]]]

## 2021-07-15 NOTE — CONSULT NOTE ADULT - SUBJECTIVE AND OBJECTIVE BOX
Interventional Radiology    Evaluate for Procedure: Evaluation of perihepatic fluid collection.     HPI: 86y Female with PMHx MVR repair, afib on eliquis, HTN, HLD, CHF, gout, p/w SOB. Patient recently in CCU for afib with RVR. IR consulted for evaluation of incidental perihepatic fluid collection on recent CT.     Allergies: &quot;VASELINE BASED OINTMENTS&quot; (Urticaria; Rash)  adhesives (Urticaria; Rash)  contrast media (gadolinium-based) (Chills)  Norvasc (Pruritus)  statins (Unknown)    Medications (Abx/Cardiac/Anticoagulation/Blood Products)    aMIOdarone IVPB: 600 mL/Hr IV Intermittent (07-13 @ 20:27)  apixaban: 2.5 milliGRAM(s) Oral (07-14 @ 21:02)  diltiazem   CD: 120 milliGRAM(s) Oral (07-15 @ 05:14)  furosemide   Injectable: 40 milliGRAM(s) IV Push (07-13 @ 18:17)  heparin   Injectable: 5500 Unit(s) IV Push (07-13 @ 17:30)  heparin  Infusion: 5 mL/Hr IV Continuous (07-14 @ 15:32)  heparin  Infusion.: 1200 Unit(s)/Hr IV Continuous (07-13 @ 16:10)  metoprolol tartrate: 25 milliGRAM(s) Oral (07-14 @ 20:09)  metoprolol tartrate: 75 milliGRAM(s) Oral (07-15 @ 05:14)  metoprolol tartrate: 50 milliGRAM(s) Oral (07-14 @ 18:27)  metoprolol tartrate: 25 milliGRAM(s) Oral (07-13 @ 22:53)  metoprolol tartrate Injectable: 5 milliGRAM(s) IV Push (07-14 @ 20:09)  piperacillin/tazobactam IVPB.: 200 mL/Hr IV Intermittent (07-13 @ 13:02)  piperacillin/tazobactam IVPB..: 25 mL/Hr IV Intermittent (07-15 @ 05:15)  torsemide: 40 milliGRAM(s) Oral (07-15 @ 06:20)  vancomycin  IVPB.: 250 mL/Hr IV Intermittent (07-13 @ 14:47)    Data:    T(C): 36.2  HR: 96  BP: 103/69  RR: 18  SpO2: 96%    -WBC 9.48 / HgB 8.8 / Hct 29.4 / Plt 355  -Na 140 / Cl 105 / BUN 42 / Glucose 116  -K 3.9 / CO2 21 / Cr 1.60  - / Alk Phos 161 / T.Bili 0.4  -INR 1.51 / PTT 50.9      Radiology:   CT Chest Abdomen and Pelvis 7/13/21   IMPRESSION:  Limited noncontrast study.    Pericapsular fluid collection adjacent to right lobe of liver. Underlying parenchymal injury is not excluded.    Soft tissue infiltration adjacent to the second/third duodenum with mild ascites. Associated mesenteric injury is not excluded.    Enlarged bilateral pleural effusions.    Assessment/Plan:   86y Female with PMHx MVR repair, afib on eliquis, HTN, HLD, CHF, gout, p/w SOB. Patient recently in CCU for afib with RVR. IR consulted for evaluation of incidental perihepatic fluid collection on recent CT.     -- Imaging and case reviewed with Dr. Bajwa, defer drainage at this time.   -- Recommend repeat imaging as outpatient for re-evaluation, if with increasing collection will drain.   -- If patient becomes symptomatic will drain.   -- Case discussed with Dr. Bajwa.

## 2021-07-15 NOTE — PROVIDER CONTACT NOTE (CRITICAL VALUE NOTIFICATION) - SITUATION
blood culture set from 7/13, growth in aerobic bottle, gram positive cocci in clusters
Pt came with afib RVR

## 2021-07-15 NOTE — CHART NOTE - NSCHARTNOTEFT_GEN_A_CORE
Pt at 8pm tachycardic to 150, gave IV push 5 mg and oral metoprolol 25mg   Also changed her usual BID metoprolol to 75 mg     Pt improved follow the stat IV push and oral metoprolol and remained stable throughout the night

## 2021-07-15 NOTE — CONSULT NOTE ADULT - SUBJECTIVE AND OBJECTIVE BOX
Patient is a 86y old  Female who presents with a chief complaint of SOB (15 Jul 2021 09:17)      HPI:    86 year old female F hx of MVR s/p repair, Afib on Eliquis, HTN, HLD, CHF, Gout, p/w SOB x 2-3 days. Pt reports falling 5 days ago, after becoming dizzy and passing out for a brief second. She landed on her back and buttock, and hit her head. Over the past 2-3 days, she has been having worsening SOB and weakness. Last night, she had midline CP, palpitations, diaphoresis, that lasted all night. She denied any N/V, radiation of pain.     Patient has had CP and SOB on exertion for a long time, can only take approx 10 steps until she has symptoms. She has also had cough w/ brown sputum in the AM for a few weeks. She states taht she has had diarrhea for a few days 2/2 colitis, and has been taking Betamethasone to help w/ the diarrhea, but stopped due to dizziness.     Patient denies any fever, sick contacts, recent travel history, abd pain, N/V, urinary changes.     In the ED afebrile, hypotensive to systolics in the 90's, tachycardic >150.   CT Chest () with Lingular and right middle lobe subsegmental atelectasis.  CT A/P () with Pericapsular fluid collection adjacent to right lobe of liver    Antibiotics:  Zosyn  -->  Vancomycin       prior hospital charts reviewed [  ]  primary team notes reviewed [  ]  other consultant notes reviewed [  ]    PAST MEDICAL & SURGICAL HISTORY:  Insomnia    HTN (hypertension)    Hypercholesteremia    GERD (gastroesophageal reflux disease)    Carotid artery occlusion  (R)    Vitamin B 12 deficiency    Osteoporosis    Mitral valve disease    Gallstones    Choledocholithiasis    CHF (congestive heart failure)    S/P MVR (mitral valve repair)   at [facility]    Status post DACIA-BSO      Hx of tonsillectomy    Abdominal hernia  repair     History of lumbar laminectomy for spinal cord decompression      Papilloma of breast  s/p excision from right breast &gt;20 years ago    Bilateral cataracts  extraction w/ IOL    Varicose veins  s/p sclerotherapy bilaterally    H/O carotid endarterectomy    S/P laparoscopic cholecystectomy        Allergies  &quot;VASELINE BASED OINTMENTS&quot; (Urticaria; Rash)  adhesives (Urticaria; Rash)  contrast media (gadolinium-based) (Chills)  Norvasc (Pruritus)  statins (Unknown)    ANTIMICROBIALS (past 90 days)  MEDICATIONS  (STANDING):  piperacillin/tazobactam IVPB.   200 mL/Hr IV Intermittent (21 @ 13:02)    piperacillin/tazobactam IVPB..   25 mL/Hr IV Intermittent (07-15-21 @ 05:15)   25 mL/Hr IV Intermittent (21 @ 21:02)   25 mL/Hr IV Intermittent (21 @ 14:53)   25 mL/Hr IV Intermittent (21 @ 06:49)    vancomycin  IVPB.   250 mL/Hr IV Intermittent (21 @ 14:47)      ANTIMICROBIALS:    piperacillin/tazobactam IVPB.. 3.375 every 8 hours    OTHER MEDS: MEDICATIONS  (STANDING):  acetaminophen   Tablet .. 650 every 6 hours PRN  allopurinol 200 daily  aluminum hydroxide/magnesium hydroxide/simethicone Suspension 30 every 4 hours PRN  amitriptyline 25 daily  apixaban 2.5 every 12 hours  diltiazem    daily  melatonin 3 at bedtime PRN  metoprolol tartrate 100 two times a day  ondansetron Injectable 4 every 8 hours PRN  oxybutynin 5 daily  pantoprazole    Tablet 40 before breakfast  torsemide 40 daily    SOCIAL HISTORY:   hx smoking  non-smoker    FAMILY HISTORY:  Family history of lung cancer (Grandparent)    Family history of dementia  mother    FH: cirrhosis  father    FH: ovarian cancer in first degree relative (Child)      REVIEW OF SYSTEMS  [  ] ROS unobtainable because:    [  ] All other systems negative except as noted below:	    Constitutional:  [ ] fever [ ] chills  [ ] weight loss  [ ] weakness  Skin:  [ ] rash [ ] phlebitis	  Eyes: [ ] icterus [ ] pain  [ ] discharge	  ENMT: [ ] sore throat  [ ] thrush [ ] ulcers [ ] exudates  Respiratory: [ ] dyspnea [ ] hemoptysis [ ] cough [ ] sputum	  Cardiovascular:  [ ] chest pain [ ] palpitations [ ] edema	  Gastrointestinal:  [ ] nausea [ ] vomiting [ ] diarrhea [ ] constipation [ ] pain	  Genitourinary:  [ ] dysuria [ ] frequency [ ] hematuria [ ] discharge [ ] flank pain  [ ] incontinence  Musculoskeletal:  [ ] myalgias [ ] arthralgias [ ] arthritis  [ ] back pain  Neurological:  [ ] headache [ ] seizures  [ ] confusion/altered mental status  Psychiatric:  [ ] anxiety [ ] depression	  Hematology/Lymphatics:  [ ] lymphadenopathy  Endocrine:  [ ] adrenal [ ] thyroid  Allergic/Immunologic:	 [ ] transplant [ ] seasonal    Vital Signs Last 24 Hrs  T(F): 97.2 (07-15-21 @ 05:24), Max: 98.5 (21 @ 12:51)  Vital Signs Last 24 Hrs  HR: 96 (07-15-21 @ 05:24) (86 - 150)  BP: 103/69 (07-15-21 @ 05:24) (100/69 - 116/78)  RR: 18 (07-15-21 @ 05:24)  SpO2: 96% (07-15-21 @ 05:24) (96% - 100%)  Wt(kg): --    PHYSICAL EXAMINATION:  General: Alert and Awake, NAD  HEENT: PERRL, EOMI, No subconjunctival hemorrhages, Oropharynx Clear, MMM  Neck: Supple, No HAYLEY  Cardiac: RRR, No M/R/G  Resp: CTAB, No Wh/Rh/Ra  Abdomen: NBS, NT/ND, No HSM, No rigidity or guarding  MSK: No LE edema. No stigmata of IE. No evidence of phlebitis. No evidence of synovitis.  : No muhammad  Skin: No rashes or lesions. Skin is warm and dry to the touch.   Neuro: Alert and Awake. CN 2-12 Grossly intact. Moves all four extremities spontaneously.  Psych: Calm, Pleasant, Cooperative                          8.8    9.48  )-----------( 355      ( 15 Jul 2021 07:20 )             29.4     07-15    140  |  105  |  42<H>  ----------------------------<  116<H>  3.9   |  21<L>  |  1.60<H>    Ca    8.8      15 Jul 2021 07:20  Phos  3.0     07-15  Mg     2.6     07-15    TPro  6.7  /  Alb  3.7  /  TBili  0.4  /  DBili  x   /  AST  96<H>  /  ALT  283<H>  /  AlkPhos  161<H>  07-15    Urinalysis Basic - ( 2021 16:30 )    Color: Yellow / Appearance: Slightly Turbid / S.019 / pH: x  Gluc: x / Ketone: Negative  / Bili: Negative / Urobili: Negative   Blood: x / Protein: 100 / Nitrite: Negative   Leuk Esterase: Moderate / RBC: 3 /hpf / WBC 9 /HPF   Sq Epi: x / Non Sq Epi: 1 /hpf / Bacteria: Negative    MICROBIOLOGY:  Culture - Blood (collected 2021 16:29)  Source: .Blood Blood-Peripheral  Gram Stain (15 Jul 2021 00:47):    Growth in aerobic bottle: Gram Positive Cocci in Clusters  Preliminary Report (15 Jul 2021 00:48):    Growth in aerobic bottle: Gram Positive Cocci in Clusters    ***Blood Panel PCR results on this specimen are available    approximately 3 hours after the Gram stain result.***    Gram stain, PCR, and/or culture results may not always    correspond due to difference in methodologies.    ************************************************************    This PCR assay was performed by multiplex PCR. This    Assay tests for 66 bacterial and resistance gene targets.    Please refer to the [facility] test directory    at [email or web address] for details.  Organism: Blood Culture PCR (15 Jul 2021 03:21)  Organism: Blood Culture PCR (15 Jul 2021 03:21)      -  Staphylococcus epidermidis, Methicillin resistant: Detec      Method Type: PCR    Culture - Blood (collected 2021 16:29)  Source: .Blood Blood-Peripheral  Preliminary Report (2021 17:02):    No growth to date.              Rapid RVP Result: NotDetec ( @ 14:20)        RADIOLOGY:    <The imaging below has been reviewed and visualized by me independently. Findings as detailed in report below>      EXAM:  CT ABDOMEN AND PELVIS                        EXAM:  CT CHEST                        PROCEDURE DATE:  2021    Limited noncontrast study.  Pericapsular fluid collection adjacent to right lobe of liver. Underlying parenchymal injury is not excluded.  Soft tissue infiltration adjacent to the second/third duodenum with mild ascites. Associated mesenteric injury is not excluded.  Enlarged bilateral pleural effusions.       Patient is a 86y old  Female who presents with a chief complaint of SOB (15 Jul 2021 09:17)      HPI:    86 year old female PMH MVR s/p repair, Afib on Eliquis, HTN, HLD, CHF, Gout, p/w SOB x 2-3 days. Pt reports falling 5 days ago, after becoming dizzy and passing out for a brief second. She landed on her back and buttock, and hit her head. Over the past 2-3 days, she has been having worsening SOB and weakness. Last night, she had midline CP, palpitations, diaphoresis, that lasted all night. She denied any N/V, radiation of pain.     Patient has had CP and SOB on exertion for a long time, can only take approx 10 steps until she has symptoms. She has also had cough w/ brown sputum in the AM for a few weeks. She states that she has had diarrhea for a few days 2/2 colitis, and has been taking Betamethasone to help w/ the diarrhea, but stopped due to dizziness.   Patient denies any fever, sick contacts, recent travel history, abd pain, N/V, urinary changes.     In the ED afebrile, hypotensive to systolics in the 90's, tachycardic >150. RVP/COVID19 PCR () Negative. U/A () 9 WBC. BCx () with 1/4 MRSE. CT Chest () with Lingular and right middle lobe subsegmental atelectasis. CT A/P () with Pericapsular fluid collection adjacent to right lobe of liver    Antibiotics:  Zosyn  -->  Vancomycin     prior hospital charts reviewed [  ]  primary team notes reviewed [ x ]  other consultant notes reviewed [ x ]    PAST MEDICAL & SURGICAL HISTORY:  Insomnia    HTN (hypertension)    Hypercholesteremia    GERD (gastroesophageal reflux disease)    Carotid artery occlusion  (R)    Vitamin B 12 deficiency    Osteoporosis    Mitral valve disease    Gallstones    Choledocholithiasis    CHF (congestive heart failure)    S/P MVR (mitral valve repair)   at [facility]    Status post DACIA-BSO      Hx of tonsillectomy    Abdominal hernia  repair     History of lumbar laminectomy for spinal cord decompression      Papilloma of breast  s/p excision from right breast &gt;20 years ago    Bilateral cataracts  extraction w/ IOL    Varicose veins  s/p sclerotherapy bilaterally    H/O carotid endarterectomy    S/P laparoscopic cholecystectomy        Allergies  &quot;VASELINE BASED OINTMENTS&quot; (Urticaria; Rash)  adhesives (Urticaria; Rash)  contrast media (gadolinium-based) (Chills)  Norvasc (Pruritus)  statins (Unknown)    ANTIMICROBIALS (past 90 days)  MEDICATIONS  (STANDING):  piperacillin/tazobactam IVPB.   200 mL/Hr IV Intermittent (21 @ 13:02)    piperacillin/tazobactam IVPB..   25 mL/Hr IV Intermittent (07-15-21 @ 05:15)   25 mL/Hr IV Intermittent (21 @ 21:02)   25 mL/Hr IV Intermittent (21 @ 14:53)   25 mL/Hr IV Intermittent (21 @ 06:49)    vancomycin  IVPB.   250 mL/Hr IV Intermittent (21 @ 14:47)      ANTIMICROBIALS:    piperacillin/tazobactam IVPB.. 3.375 every 8 hours    OTHER MEDS: MEDICATIONS  (STANDING):  acetaminophen   Tablet .. 650 every 6 hours PRN  allopurinol 200 daily  aluminum hydroxide/magnesium hydroxide/simethicone Suspension 30 every 4 hours PRN  amitriptyline 25 daily  apixaban 2.5 every 12 hours  diltiazem    daily  melatonin 3 at bedtime PRN  metoprolol tartrate 100 two times a day  ondansetron Injectable 4 every 8 hours PRN  oxybutynin 5 daily  pantoprazole    Tablet 40 before breakfast  torsemide 40 daily    SOCIAL HISTORY: Prior smoking history, Drinks wine socially. No drug use.     FAMILY HISTORY:  Family history of lung cancer (Grandparent)    Family history of dementia  mother    FH: cirrhosis  father    FH: ovarian cancer in first degree relative (Child)      REVIEW OF SYSTEMS  [  ] ROS unobtainable because:    [x  ] All other systems negative except as noted below:	    Constitutional:  [ ] fever [ ] chills  [ ] weight loss  [ ] weakness  Skin:  [ ] rash [ ] phlebitis	  Eyes: [ ] icterus [ ] pain  [ ] discharge	  ENMT: [ ] sore throat  [ ] thrush [ ] ulcers [ ] exudates  Respiratory: [x ] dyspnea [ ] hemoptysis [ ] cough [ ] sputum	  Cardiovascular:  [ ] chest pain [ ] palpitations [ ] edema	  Gastrointestinal:  [ ] nausea [ ] vomiting [ ] diarrhea [ ] constipation [ ] pain	  Genitourinary:  [ ] dysuria [ ] frequency [ ] hematuria [ ] discharge [ ] flank pain  [ ] incontinence  Musculoskeletal:  [ ] myalgias [ ] arthralgias [ ] arthritis  [ ] back pain  Neurological:  [ ] headache [ ] seizures  [ ] confusion/altered mental status  Psychiatric:  [ ] anxiety [ ] depression	  Hematology/Lymphatics:  [ ] lymphadenopathy  Endocrine:  [ ] adrenal [ ] thyroid  Allergic/Immunologic:	 [ ] transplant [ ] seasonal    Vital Signs Last 24 Hrs  T(F): 97.2 (07-15-21 @ 05:24), Max: 98.5 (21 @ 12:51)  Vital Signs Last 24 Hrs  HR: 96 (07-15-21 @ 05:24) (86 - 150)  BP: 103/69 (07-15-21 @ 05:24) (100/69 - 116/78)  RR: 18 (07-15-21 @ 05:24)  SpO2: 96% (07-15-21 @ 05:24) (96% - 100%)  Wt(kg): --    PHYSICAL EXAMINATION:  General: Alert and Awake, NAD  HEENT: PERRL, EOMI  Neck: Supple  Cardiac: RRR, No M/R/G  Resp: CTAB, No Wh/Rh/Ra  Abdomen: NBS, NT/ND, No HSM, No rigidity or guarding  MSK: No LE edema. No stigmata of IE. No evidence of phlebitis. No evidence of synovitis.  : No muhammad  Skin: No rashes or lesions. Skin is warm and dry to the touch.   Neuro: Alert and Awake. CN 2-12 Grossly intact. Moves all four extremities spontaneously.  Psych: Calm, Pleasant, Cooperative                          8.8    9.48  )-----------( 355      ( 15 Jul 2021 07:20 )             29.4     07-15    140  |  105  |  42<H>  ----------------------------<  116<H>  3.9   |  21<L>  |  1.60<H>    Ca    8.8      15 Jul 2021 07:20  Phos  3.0     07-15  Mg     2.6     07-15    TPro  6.7  /  Alb  3.7  /  TBili  0.4  /  DBili  x   /  AST  96<H>  /  ALT  283<H>  /  AlkPhos  161<H>  0715    Urinalysis Basic - ( 2021 16:30 )    Color: Yellow / Appearance: Slightly Turbid / S.019 / pH: x  Gluc: x / Ketone: Negative  / Bili: Negative / Urobili: Negative   Blood: x / Protein: 100 / Nitrite: Negative   Leuk Esterase: Moderate / RBC: 3 /hpf / WBC 9 /HPF   Sq Epi: x / Non Sq Epi: 1 /hpf / Bacteria: Negative    MICROBIOLOGY:  Culture - Blood (collected 2021 16:29)  Source: .Blood Blood-Peripheral  Gram Stain (15 Jul 2021 00:47):    Growth in aerobic bottle: Gram Positive Cocci in Clusters  Preliminary Report (15 Jul 2021 00:48):    Growth in aerobic bottle: Gram Positive Cocci in Clusters    ***Blood Panel PCR results on this specimen are available    approximately 3 hours after the Gram stain result.***    Gram stain, PCR, and/or culture results may not always    correspond due to difference in methodologies.    ************************************************************    This PCR assay was performed by multiplex PCR. This    Assay tests for 66 bacterial and resistance gene targets.    Please refer to the [facility] test directory    at [email or web address] for details.  Organism: Blood Culture PCR (15 Jul 2021 03:21)  Organism: Blood Culture PCR (15 Jul 2021 03:21)      -  Staphylococcus epidermidis, Methicillin resistant: Detec      Method Type: PCR    Culture - Blood (collected 2021 16:29)  Source: .Blood Blood-Peripheral  Preliminary Report (2021 17:02):    No growth to date.              Rapid RVP Result: NotDetec ( @ 14:20)        RADIOLOGY:    <The imaging below has been reviewed and visualized by me independently. Findings as detailed in report below>      EXAM:  CT ABDOMEN AND PELVIS                        EXAM:  CT CHEST                        PROCEDURE DATE:  2021    Limited noncontrast study.  Pericapsular fluid collection adjacent to right lobe of liver. Underlying parenchymal injury is not excluded.  Soft tissue infiltration adjacent to the second/third duodenum with mild ascites. Associated mesenteric injury is not excluded.  Enlarged bilateral pleural effusions.

## 2021-07-15 NOTE — PROGRESS NOTE ADULT - PROBLEM SELECTOR PLAN 3
-EF 55-60%, MVR with annuplasty ring and moderate-severe AR and TR, mild pHTN  -Likely 2/2 A fib w/ RVR   -Cardiology (patient sees Dr. Wynne as an outpatient) and Dr. Valdovinos  -TTE from 6/11 show severe pulmonary pressures  -TTE from 7/13 showed mild pulmonary pressures  -C/w home Torsemide 40mg qd   -BNP [id number]

## 2021-07-15 NOTE — PROGRESS NOTE ADULT - SUBJECTIVE AND OBJECTIVE BOX
24H hour events: No acute overnight events    MEDICATIONS:  apixaban 2.5 milliGRAM(s) Oral every 12 hours  diltiazem    milliGRAM(s) Oral daily  metoprolol tartrate 100 milliGRAM(s) Oral two times a day  torsemide 40 milliGRAM(s) Oral daily  piperacillin/tazobactam IVPB.. 3.375 Gram(s) IV Intermittent every 8 hours  acetaminophen   Tablet .. 650 milliGRAM(s) Oral every 6 hours PRN  amitriptyline 25 milliGRAM(s) Oral daily  melatonin 3 milliGRAM(s) Oral at bedtime PRN  ondansetron Injectable 4 milliGRAM(s) IV Push every 8 hours PRN  aluminum hydroxide/magnesium hydroxide/simethicone Suspension 30 milliLiter(s) Oral every 4 hours PRN  pantoprazole    Tablet 40 milliGRAM(s) Oral before breakfast  allopurinol 200 milliGRAM(s) Oral daily  oxybutynin 5 milliGRAM(s) Oral daily      REVIEW OF SYSTEMS:  See HPI, otherwise ROS negative.    PHYSICAL EXAM:  T(C): 36.2 (07-15-21 @ 05:24), Max: 36.7 (07-14-21 @ 15:50)  HR: 96 (07-15-21 @ 05:24) (86 - 150)  BP: 103/69 (07-15-21 @ 05:24) (100/69 - 116/78)  RR: 18 (07-15-21 @ 05:24) (18 - 36)  SpO2: 96% (07-15-21 @ 05:24) (96% - 100%)  Wt(kg): --  I&O's Summary    14 Jul 2021 07:01  -  15 Jul 2021 07:00  --------------------------------------------------------  IN: 1082 mL / OUT: 1750 mL / NET: -668 mL        Appearance: Alert. NAD	  Cardiovascular: Irregularly irregular  Respiratory: CTA B/L	  Extremities: No edema BLE  Vascular: Peripheral pulses palpable 2+ bilaterally      LABS:	 	    CBC Full  -  ( 15 Jul 2021 07:20 )  WBC Count : 9.48 K/uL  Hemoglobin : 8.8 g/dL  Hematocrit : 29.4 %  Platelet Count - Automated : 355 K/uL  Mean Cell Volume : 83.8 fl  Mean Cell Hemoglobin : 25.1 pg  Mean Cell Hemoglobin Concentration : 29.9 gm/dL  Auto Neutrophil # : x  Auto Lymphocyte # : x  Auto Monocyte # : x  Auto Eosinophil # : x  Auto Basophil # : x  Auto Neutrophil % : x  Auto Lymphocyte % : x  Auto Monocyte % : x  Auto Eosinophil % : x  Auto Basophil % : x    07-15    140  |  105  |  42<H>  ----------------------------<  116<H>  3.9   |  21<L>  |  1.60<H>  07-14    142  |  102  |  42<H>  ----------------------------<  101<H>  3.1<L>   |  24  |  1.52<H>    Ca    8.8      15 Jul 2021 07:20  Ca    8.6      14 Jul 2021 14:24  Phos  3.0     07-15  Phos  3.0     07-14  Mg     2.6     07-15  Mg     1.9     07-14    TPro  6.7  /  Alb  3.7  /  TBili  0.4  /  DBili  x   /  AST  96<H>  /  ALT  283<H>  /  AlkPhos  161<H>  07-15  TPro  6.4  /  Alb  3.5  /  TBili  0.4  /  DBili  x   /  AST  169<H>  /  ALT  331<H>  /  AlkPhos  168<H>  07-14      proBNP: Serum Pro-Brain Natriuretic Peptide: 30225 pg/mL (07-13 @ 21:41)  Serum Pro-Brain Natriuretic Peptide: 03400 pg/mL (07-13 @ 11:28)    TELEMETRY: Afib 100-120 BPM

## 2021-07-15 NOTE — DISCHARGE NOTE PROVIDER - CARE PROVIDERS DIRECT ADDRESSES
,[email or web address],[email or web address],DirectAddress_Unknown,DirectAddress_Unknown

## 2021-07-15 NOTE — PROGRESS NOTE ADULT - PROBLEM SELECTOR PLAN 6
-Follows with Dr. Young (GI)  -Has microscopic colitis and planned to start prolonged budesonide taper (started 6/28)   -Weakness and fatigue, fainting, since starting budesonide so was advised to stop  -Outpatient follow up with Dr. Young  - K>4, MG>2

## 2021-07-15 NOTE — PROGRESS NOTE ADULT - ASSESSMENT
85yo F hx of MVR s/p repair, Afib, HTN, HLD, CHF, on Eliquis, p/w SOB x 2-3 days, found to be in A Fib w/ RVR.

## 2021-07-15 NOTE — PROGRESS NOTE ADULT - PROBLEM SELECTOR PLAN 1
-SOB likely 2/2 A Fib w/ RVR vs. RML pneumonia  -Currently on RA  -Chest x-ray showed possible consolidation in RML  -Chest CT did not show consolidation, do not suspect pneumonia  -On Zosyn (7/14- ) empirically  -MRSA +  -Blood cx showed gram + cocci in clusters, methicillin resistant staph epidermidis  -F/U Legionella urine antigen, Strep pneumoniae antigen, RVP  -Presented with SIRS criteria on admission but infectious etiology is not clear. -Patient found to be in A Fib w/ RVR to HR 160s on admission   -Likely causing SOB  -Given Amio 150mg x1  -Takes Cardizem at home  -Rate controlled on Lopressor and Diltaizem to the 90s-100s  - Overnight 7/14, patient was tachycardic to 150s, given IV push of metoprolol 5mg, metoprolol 25mg PO, and metoprolol tartrate increased to 75mg BID, further increased to 100mg BID as per EP this morning (7/15)  -EP cardioversion 7/15

## 2021-07-15 NOTE — DISCHARGE NOTE PROVIDER - CARE PROVIDER_API CALL
Kevin Funk)  Infectious Disease; Internal Medicine  [address] [place] [address]  Phone: [phone]  Fax: [phone]  Follow Up Time:    Kevin Funk)  Infectious Disease; Internal Medicine  [address] [place] [address]  Phone: [phone]  Fax: [phone]  Follow Up Time:     Akash Jain)  Family Medicine  [address] [place] [address], [address] [place] [address]  Phone: [phone]  Fax: [phone]  Follow Up Time:    Kevin Funk)  Infectious Disease; Internal Medicine  [address] [place] [address]  Phone: [phone]  Fax: [phone]  Follow Up Time:     Akash Jain)  Family Medicine  [address] [facility], [address]  [place] [address]  Phone: [phone]  Fax: [phone]  Follow Up Time:     [facility],   [address] [place] [address]  Phone: [phone]  Fax: (   )    -  Follow Up Time:

## 2021-07-15 NOTE — PROGRESS NOTE ADULT - PROBLEM SELECTOR PLAN 5
-Likely 2/2 CHF  -Treat as above   -Monitor AST/ALT for now   -F/U hepatitis serologies  -transaminitis - due to possible liver abscess?? Or maybe hepatic congestion from heart failure. Check hepatitis labs -Likely 2/2 CHF  -Treat as above   -Monitor AST/ALT for now, trending down  -F/U hepatitis serologies  -transaminitis - due to possible liver abscess vs hepatic congestion from heart failure

## 2021-07-15 NOTE — PROGRESS NOTE ADULT - ASSESSMENT
86 year old female with PMHx of MVR s/p repair, Afib on Eliquis, HTN, HLD, CHF, Gout, p/w SOB x 2-3 days. Pt reports falling 5 days ago, after becoming dizzy and passing out for a brief second. At that time, patient did not seek treatment. However, over the next 2-3 days, she had worsening SOB as well as chest pain. Upon arrival to the ED, patient found to be in A Fib w/ RVR, with rates up to 160 BPM. Patient states she has had previous episodes of AF with RVR since being diagnosed ~2 years ago. In CCU, pt received Amiodarone 150mg IVP x1 and was started on lopressor and diltiazem. Chest x-ray showed possible consolidation in RML; blood cultures drawn and pt started on empiric zosyn. EP consulted for evaluation for DCCV.    1. Atrial Fibrillation with RVR  2. CHF: last TTE 6/11/21, EF 50-55%    - Continue to monitor on telemetry  - Keep K >4 and Mg >2  - Maintain NPO for DCCV today  - Continue AC  - Continue Diltiazem 120mg QD  - Increase Metoprolol 100mg BID (home dose)    Lana Kirkland PA-C  #[id number]   86 year old female with PMHx of MVR s/p repair, Afib on Eliquis, HTN, HLD, CHF, Gout, p/w SOB x 2-3 days. Pt reports falling 5 days ago, after becoming dizzy and passing out for a brief second. At that time, patient did not seek treatment. However, over the next 2-3 days, she had worsening SOB as well as chest pain. Upon arrival to the ED, patient found to be in A Fib w/ RVR, with rates up to 160 BPM. Patient states she has had previous episodes of AF with RVR since being diagnosed ~2 years ago. In CCU, pt received Amiodarone 150mg IVP x1 and was started on lopressor and diltiazem. Chest x-ray showed possible consolidation in RML; blood cultures drawn and pt started on empiric zosyn. EP consulted for evaluation for DCCV.    1. Atrial Fibrillation with RVR  2. CHF: last TTE 6/11/21, EF 50-55%    - Continue to monitor on telemetry  - Keep K >4 and Mg >2  - Maintain NPO for DCCV today  - Continue AC  - Continue Diltiazem 120mg QD  - Increase Metoprolol 100mg BID (home dose)    Lana Kirkland PA-C  #[id number]    ADDENDUM:  1. Patient s/p successful DCCV, remains in SR  2. C/w Eliquis and Metoprolol  3. EP will sign off at this time. Reconsult PRN.

## 2021-07-15 NOTE — CONSULT NOTE ADULT - ASSESSMENT
86 year old female PMH MVR s/p repair, Afib on Eliquis, HTN, HLD, CHF, Gout, p/w SOB x 2-3 days and fall 5-7 days prior to presentation. In the ED afebrile, hypotensive to systolics in the 90's, tachycardic >150.     RVP/COVID19 PCR (7/14) Negative.   U/A (7/13) 9 WBC.   BCx (7/13) with 1/4 MRSE.     CT Chest (7/13) with Lingular and right middle lobe subsegmental atelectasis.   CT A/P (7/13) with Pericapsular fluid collection adjacent to right lobe of liver    I reviewed imaging with radiology today (7/15) fluid collection may be consistent with hematoma but absence of IV contracts limits ability to differentiate  Patient with some leukocytosis on presentation but possible it is secondary to corticosteroids prior to presentation.  Transaminitis likely secondary to CHF exacerbation  At the present I would monitor off of antibiotics  If/when WEN resolves I would check CT A/P with IV contrast    RECOMMENDATIONS    #Subcapsular Liver Fluid Collection, Leukocytosis, Transaminitis  --Stop Zosyn and monitor off of antibiotics  --If/when WEN resolves I would check CT A/P with IV contrast  --Continue to follow CBC with diff  --Continue to follow renal function (Cr/BUN)  --Continue to follow transaminases  --Continue to follow temperature curve  --Follow up on preliminary blood cultures    #Positive Blood Cultures (Suspect Staph epi is procurement contaminant)  --Monitor off of antibiotics     I will continue to follow. Please feel free to contact me with any further questions.    Kevin Funk M.D.  [facility] Division of Infectious Disease  8AM-5PM: Pager Number [phone]  After Hours (or if no response): Please contact the Infectious Diseases Office at [phone]     The above assessment and plan were discussed with medicine resident

## 2021-07-15 NOTE — DISCHARGE NOTE PROVIDER - NSDCMRMEDTOKEN_GEN_ALL_CORE_FT
allopurinol 100 mg oral tablet: 2 tab(s) orally once a day  amitriptyline 25 mg oral tablet: 1 tab(s) orally once a day  cyanocobalamin 1000 mcg oral tablet: 1 tab(s) orally once a day  dilTIAZem 120 mg/24 hours oral capsule, extended release: 1 cap(s) orally once a day  Eliquis 2.5 mg oral tablet: 1 tab(s) orally 2 times a day    Metoprolol Succinate  mg oral tablet, extended release: 1 tab(s) orally 2 times a day  oxybutynin 5 mg/24 hours oral tablet, extended release: 1 tab(s) orally once a day  pantoprazole 40 mg oral delayed release tablet: 1 tab(s) orally once a day  PreserVision AREDS 2 oral capsule: 1 cap(s) orally 2 times a day  spironolactone 25 mg oral tablet: 1 tab(s) orally every other day, As Needed  Systane ophthalmic solution: 1 drop(s) to each affected eye once a day  torsemide 20 mg oral tablet: 2 tab(s) orally once a day  Vitamin D3 2000 intl units (50 mcg) oral tablet: 1 tab(s) orally once a day  (taken together with 1000iu: total dose 3000iu)   allopurinol 100 mg oral tablet: 2 tab(s) orally once a day  amitriptyline 25 mg oral tablet: 1 tab(s) orally once a day  budesonide 9 mg oral capsule, extended release: 1 cap(s) orally once a day (in the morning)  cyanocobalamin 1000 mcg oral tablet: 1 tab(s) orally once a day  dilTIAZem 120 mg/24 hours oral capsule, extended release: 1 cap(s) orally once a day  Eliquis 2.5 mg oral tablet: 1 tab(s) orally 2 times a day    Metoprolol Succinate  mg oral tablet, extended release: 1 tab(s) orally 2 times a day  oxybutynin 5 mg/24 hours oral tablet, extended release: 1 tab(s) orally once a day  pantoprazole 40 mg oral delayed release tablet: 1 tab(s) orally once a day  PreserVision AREDS 2 oral capsule: 1 cap(s) orally 2 times a day  spironolactone 25 mg oral tablet: 1 tab(s) orally every other day, As Needed  Systane ophthalmic solution: 1 drop(s) to each affected eye once a day  torsemide 20 mg oral tablet: 2 tab(s) orally once a day  Vitamin D3 2000 intl units (50 mcg) oral tablet: 1 tab(s) orally once a day  (taken together with 1000iu: total dose 3000iu)   allopurinol 100 mg oral tablet: 2 tab(s) orally once a day  amiodarone 200 mg oral tablet: 1 tab(s) orally once a day  amitriptyline 25 mg oral tablet: 1 tab(s) orally once a day  budesonide 9 mg oral capsule, extended release: 1 cap(s) orally once a day (in the morning)  cyanocobalamin 1000 mcg oral tablet: 1 tab(s) orally once a day  Eliquis 2.5 mg oral tablet: 1 tab(s) orally 2 times a day    oxybutynin 5 mg/24 hours oral tablet, extended release: 1 tab(s) orally once a day  pantoprazole 40 mg oral delayed release tablet: 1 tab(s) orally once a day  PreserVision AREDS 2 oral capsule: 1 cap(s) orally 2 times a day  spironolactone 25 mg oral tablet: 1 tab(s) orally every other day, As Needed  Systane ophthalmic solution: 1 drop(s) to each affected eye once a day  torsemide 20 mg oral tablet: 2 tab(s) orally once a day  Vitamin D3 2000 intl units (50 mcg) oral tablet: 1 tab(s) orally once a day  (taken together with 1000iu: total dose 3000iu)   Principal Discharge DX:	Hypokalemia  Secondary Diagnosis:	COVID-19

## 2021-07-15 NOTE — DISCHARGE NOTE PROVIDER - NSDCHC_MEDRECSTATUS_GEN_ALL_CORE
Norvasc       Last Written Prescription Date: 5/25/17  Last Fill Quantity: 30,  # refills: 6   Last Office Visit with [facility], [facility] or [facility] prescribing provider: 8/3/17                                             Hydrochlorothiazide       Last Written Prescription Date: 5/25/17  Last Fill Quantity: 30,  # refills: 6   Last Office Visit with [facility], [facility] or [facility] prescribing provider: 8/3/17                                                  Admission Reconciliation is Completed  Discharge Reconciliation is Not Complete Admission Reconciliation is Completed  Discharge Reconciliation is Completed

## 2021-07-15 NOTE — PROGRESS NOTE ADULT - SUBJECTIVE AND OBJECTIVE BOX
**************************  Camilla Rivero  PGY-1 Internal Medicine  [phone]  **************************    Patient is a 86y old  Female who presents with a chief complaint of SOB (15 Jul 2021 08:42)      SUBJECTIVE / OVERNIGHT EVENTS:  Overnight, patient was tachycardic to 150s, given IV push of metoprolol 5mg, metoprolol 25mg PO, and metoprolol tartrate increased to 75mg BID. Patient also reports diarrhea. Otherwise, looks well this morning. To have DCCV today with EP.    MEDICATIONS  (STANDING):  allopurinol 200 milliGRAM(s) Oral daily  amitriptyline 25 milliGRAM(s) Oral daily  apixaban 2.5 milliGRAM(s) Oral every 12 hours  diltiazem    milliGRAM(s) Oral daily  metoprolol tartrate 100 milliGRAM(s) Oral two times a day  oxybutynin 5 milliGRAM(s) Oral daily  pantoprazole    Tablet 40 milliGRAM(s) Oral before breakfast  piperacillin/tazobactam IVPB.. 3.375 Gram(s) IV Intermittent every 8 hours  torsemide 40 milliGRAM(s) Oral daily    MEDICATIONS  (PRN):  acetaminophen   Tablet .. 650 milliGRAM(s) Oral every 6 hours PRN Temp greater or equal to 38.5C (101.3F), Mild Pain (1 - 3)  aluminum hydroxide/magnesium hydroxide/simethicone Suspension 30 milliLiter(s) Oral every 4 hours PRN Dyspepsia  melatonin 3 milliGRAM(s) Oral at bedtime PRN Insomnia  ondansetron Injectable 4 milliGRAM(s) IV Push every 8 hours PRN Nausea and/or Vomiting          I&O's Summary    2021 07:01  -  15 Jul 2021 07:00  --------------------------------------------------------  IN: 1082 mL / OUT: 1750 mL / NET: -668 mL        PHYSICAL EXAM:  Vital Signs Last 24 Hrs  T(C): 36.2 (15 Jul 2021 05:24), Max: 36.7 (2021 15:50)  T(F): 97.2 (15 Jul 2021 05:24), Max: 98 (2021 15:50)  HR: 96 (15 Jul 2021 05:24) (86 - 150)  BP: 103/69 (15 Jul 2021 05:24) (100/69 - 116/78)  BP(mean): 85 (2021 15:00) (73 - 85)  RR: 18 (15 Jul 2021 05:24) (18 - 36)  SpO2: 96% (15 Jul 2021 05:24) (96% - 100%)    CONSTITUTIONAL: NAD, well-developed  RESPIRATORY: Normal respiratory effort; lungs are clear to auscultation bilaterally  CARDIOVASCULAR: Irregular rate and rhythm, normal S1 and S2, no murmur/rub/gallop; No lower extremity edema; Peripheral pulses are 2+ bilaterally  ABDOMEN: Nontender to palpation, normoactive bowel sounds, no rebound/guarding  MUSCLOSKELETAL: no clubbing or cyanosis of digits; no joint swelling or tenderness to palpation  PSYCH: A+O to person, place, and time; affect appropriate    LABS:                        8.8    9.48  )-----------( 355      ( 15 Jul 2021 07:20 )             29.4     07-15    140  |  105  |  42<H>  ----------------------------<  116<H>  3.9   |  21<L>  |  1.60<H>    Ca    8.8      15 Jul 2021 07:20  Phos  3.0     07-15  Mg     2.6     07-15    TPro  6.7  /  Alb  3.7  /  TBili  0.4  /  DBili  x   /  AST  96<H>  /  ALT  283<H>  /  AlkPhos  161<H>  07-15    PT/INR - ( 2021 14:37 )   PT: 17.8 sec;   INR: 1.51 ratio         PTT - ( 2021 14:37 )  PTT:50.9 sec      Urinalysis Basic - ( 2021 16:30 )    Color: Yellow / Appearance: Slightly Turbid / S.019 / pH: x  Gluc: x / Ketone: Negative  / Bili: Negative / Urobili: Negative   Blood: x / Protein: 100 / Nitrite: Negative   Leuk Esterase: Moderate / RBC: 3 /hpf / WBC 9 /HPF   Sq Epi: x / Non Sq Epi: 1 /hpf / Bacteria: Negative        Culture - Blood (collected 2021 16:29)  Source: .Blood Blood-Peripheral  Gram Stain (15 Jul 2021 00:47):    Growth in aerobic bottle: Gram Positive Cocci in Clusters  Preliminary Report (15 Jul 2021 00:48):    Growth in aerobic bottle: Gram Positive Cocci in Clusters    ***Blood Panel PCR results on this specimen are available    approximately 3 hours after the Gram stain result.***    Gram stain, PCR, and/or culture results may not always    correspond due to difference in methodologies.    ************************************************************    This PCR assay was performed by multiplex PCR. This    Assay tests for 66 bacterial and resistance gene targets.    Please refer to the [facility] test directory    at [email or web address] for details.  Organism: Blood Culture PCR (15 Jul 2021 03:21)  Organism: Blood Culture PCR (15 Jul 2021 03:21)    Culture - Blood (collected 2021 16:29)  Source: .Blood Blood-Peripheral  Preliminary Report (2021 17:02):    No growth to date.

## 2021-07-15 NOTE — PROGRESS NOTE ADULT - PROBLEM SELECTOR PLAN 2
-Patient found to be in A Fib w/ RVR to HR 160s on admission   -Likely causing SOB  -Given Amio 150mg x1  -Takes Cardizem at home  -Rate controlled on Lopressor and Diltaizem to the 90s-100s  - Overnight 7/14, patient was tachycardic to 150s, given IV push of metoprolol 5mg, metoprolol 25mg PO, and metoprolol tartrate increased to 75mg BID  -EP cardioversion 7/15 -SOB likely 2/2 A Fib w/ RVR vs. RML pneumonia  -Currently on RA  -Chest x-ray showed possible consolidation in RML  -Chest CT did not show consolidation, do not suspect pneumonia  -On Zosyn (7/14- ) empirically  -MRSA +  -Blood cx showed gram + cocci in clusters, methicillin resistant staph epidermidis  -F/U Legionella urine antigen, Strep pneumoniae antigen, RVP  -Presented with SIRS criteria on admission but infectious etiology is not clear.

## 2021-07-15 NOTE — DISCHARGE NOTE PROVIDER - HOSPITAL COURSE
85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 Pt reports falling 5 days ago, after becoming dizzy and passing out for a brief second. She landed on her back and buttock, and hit her head. Over the past 2-3 days, she has been having worsening SOB and weakness. Last night, she had midline CP, palpitations, diaphoresis, that lasted all night. She denied any N/V, radiation of pain.     Patient has had CP and SOB on exertion for a long time, can only take approx 10 steps until she has symptoms. She has also had cough w/ brown sputum in the AM for a few weeks. She states taht she has had diarrhea for a few days 2/2 colitis, and has been taking Betamethasone to help w/ the diarrhea, but stopped due to dizziness.     Patient denies any fever, sick contacts, recent travel history, abd pain, N/V, urinary changes.     In the ED, patient found to be in A Fib w/ RVR and Cardiology was consulted.    Patient was admitted to the CICU and downgraded to the floor. For her Afib, she was given 150 mg amio x1, and continued to be rate controlled on lopressor 100mg BID and diltiazem 120mg QD. EP cardioverted the patient. Her SOB was likely due to the Afib. There was also a concern for PNA and the patient was started on empiric Zosyn, but chest CT was negative. Patient was also found to have a perihepatic fluid collection. IR recommended outpatient follow up and ID was following. Patient experienced transaminits due to possible liver abscess vs hepatic congestion from heart failure. Patient also having elevated Cr at admission and on baseline 1.7. Lastly, patient experiences chronic diarrhea from microscopic colitis. Stopped budesonide taper due to weakness and fatigue, fainting. 87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 Pt reports falling 5 days ago, after becoming dizzy and passing out for a brief second. She landed on her back and buttock, and hit her head. Over the past 2-3 days, she has been having worsening SOB and weakness. Last night, she had midline CP, palpitations, diaphoresis, that lasted all night. She denied any N/V, radiation of pain.     Patient has had CP and SOB on exertion for a long time, can only take approx 10 steps until she has symptoms. She has also had cough w/ brown sputum in the AM for a few weeks. She states taht she has had diarrhea for a few days 2/2 colitis, and has been taking Betamethasone to help w/ the diarrhea, but stopped due to dizziness.     Patient denies any fever, sick contacts, recent travel history, abd pain, N/V, urinary changes.     In the ED, patient found to be in A Fib w/ RVR and Cardiology was consulted.    Patient was admitted to the CICU and downgraded to the floor. For her Afib, she was given 150 mg amio x1, and continued to be rate controlled on lopressor 100mg BID and diltiazem 120mg QD. EP cardioverted the patient twice. Her SOB was likely due to the Afib.     There was also a concern for PNA and the patient was started on empiric Zosyn, but chest CT was negative. Patient was also found to have a perihepatic fluid collection. IR recommended outpatient follow up and ID had a low suspicion for abscess. Patient experienced transaminits due to possible liver abscess vs hepatic congestion from heart failure. Patient also having elevated Cr at admission and on baseline 1.7. Lastly, patient experiences chronic diarrhea from microscopic colitis. Stopped budesonide taper due to weakness and fatigue, fainting. Restarted budenoside taper and laxatives.    Patient is HD stable and ready for discharge. 87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 Pt reports falling 5 days ago, after becoming dizzy and passing out for a brief second. She landed on her back and buttock, and hit her head. Over the past 2-3 days, she has been having worsening SOB and weakness. Last night, she had midline CP, palpitations, diaphoresis, that lasted all night. She denied any N/V, radiation of pain. Patient has had CP and SOB on exertion for a long time, can only take approx 10 steps until she has symptoms. She has also had cough w/ brown sputum in the AM for a few weeks. She states taht she has had diarrhea for a few days 2/2 colitis, and has been taking Betamethasone to help w/ the diarrhea, but stopped due to dizziness.     Patient denies any fever, sick contacts, recent travel history, abd pain, N/V, urinary changes.     In the ED, patient found to be in A Fib w/ RVR and Cardiology was consulted.    Patient was admitted to the CICU and downgraded to the floor. For her Afib, she was given 150 mg amio x1, and continued to be rate controlled on lopressor 100mg BID and diltiazem 120mg QD. EP cardioverted the patient twice. Her SOB was likely due to the Afib.     There was also a concern for PNA and the patient was started on empiric Zosyn, but chest CT was negative. Patient was also found to have a perihepatic fluid collection. IR recommended outpatient follow up and ID had a low suspicion for abscess. Patient experienced transaminits due to possible liver abscess vs hepatic congestion from heart failure. Patient also having elevated Cr at admission and on baseline 1.7. Lastly, patient experiences chronic diarrhea from microscopic colitis. Stopped budesonide taper due to weakness and fatigue, fainting. Restarted budenoside taper and laxatives.    Patient is HD stable and ready for discharge. 85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 Pt reports falling 5 days ago, after becoming dizzy and passing out for a brief second. She landed on her back and buttock, and hit her head. Over the past 2-3 days, she has been having worsening SOB and weakness. Last night, she had midline CP, palpitations, diaphoresis, that lasted all night. She denied any N/V, radiation of pain. Patient has had CP and SOB on exertion for a long time, can only take approx 10 steps until she has symptoms. She has also had cough w/ brown sputum in the AM for a few weeks. She states that she has had diarrhea for a few days 2/2 colitis, and has been taking Betamethasone to help w/ the diarrhea, but stopped due to dizziness. Patient denies any fever, sick contacts, recent travel history, abd pain, N/V, urinary changes.     In the ED, patient found to be in A Fib w/ RVR and Cardiology was consulted. Patient was admitted to the CICU and downgraded to the floor. For her Afib, she was given amiodarone, metoprolol and diltiazem and cardioverted twice by EP.         150 mg amio x1, and continued to be rate controlled on lopressor 100mg BID and diltiazem 120mg QD. EP cardioverted the patient twice. Her SOB was likely due to the Afib.     There was also a concern for PNA and the patient was started on empiric Zosyn, but chest CT was negative. Patient was also found to have a perihepatic fluid collection. IR recommended outpatient follow up and ID had a low suspicion for abscess. Patient experienced transaminits due to possible liver abscess vs hepatic congestion from heart failure. Patient also having elevated Cr at admission and on baseline 1.7. Lastly, patient experiences chronic diarrhea from microscopic colitis. Stopped budesonide taper due to weakness and fatigue, fainting. Restarted budenoside taper and laxatives.    Patient is HD stable and ready for discharge. 85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 Pt reports falling 5 days ago, after becoming dizzy and passing out for a brief second. She landed on her back and buttock, and hit her head. Over the past 2-3 days, she has been having worsening SOB and weakness. Last night, she had midline CP, palpitations, diaphoresis, that lasted all night. She denied any N/V, radiation of pain. Patient has had CP and SOB on exertion for a long time, can only take approx 10 steps until she has symptoms. She has also had cough w/ brown sputum in the AM for a few weeks. She states that she has had diarrhea for a few days 2/2 colitis, and has been taking Betamethasone to help w/ the diarrhea, but stopped due to dizziness. Patient denies any fever, sick contacts, recent travel history, abd pain, N/V, urinary changes.     In the ED, patient found to be in A Fib w/ RVR and Cardiology was consulted. Patient was admitted to the CICU and downgraded to the floor. For her Afib, she was given amiodarone, metoprolol and diltiazem and cardioverted twice by EP. Following the second cardioversion she had a sinus rhythm and was initially bradycardic but then developed a normal rate.     SOB likely due to Afib, but given the concern for pneumonia patient received one day of empiric Zosyn. Patient was also found to have a perihepatic fluid collection. IR recommended outpatient follow up and ID had a low suspicion for abscess. Patient experienced transaminits due to possible liver abscess vs hepatic congestion from heart failure. Patient also having elevated Cr during admission which trended down. Lastly, patient experiences chronic diarrhea from microscopic colitis and was treated with budesonide. 85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 Pt reports falling 5 days ago, after becoming dizzy and passing out for a brief second. She landed on her back and buttock, and hit her head. Over the past 2-3 days, she has been having worsening SOB and weakness. Last night, she had midline CP, palpitations, diaphoresis, that lasted all night. She denied any N/V, radiation of pain. Patient has had CP and SOB on exertion for a long time, can only take approx 10 steps until she has symptoms. She has also had cough w/ brown sputum in the AM for a few weeks. She states that she has had diarrhea for a few days 2/2 colitis, and has been taking Betamethasone to help w/ the diarrhea, but stopped due to dizziness. Patient denies any fever, sick contacts, recent travel history, abd pain, N/V, urinary changes.     In the ED, patient found to be in A Fib w/ RVR and Cardiology was consulted. Patient was admitted to the CICU and downgraded to the floor. For her Afib, she was given amiodarone, metoprolol and diltiazem and cardioverted twice by EP. Following the second cardioversion she had a sinus rhythm and was initially bradycardic but then developed a normal rate. Will continue on amiodarone as an outpatient. Hold metoprolol and diltiazem. Follow up cardiology for further management.     SOB likely due to Afib, but given the concern for pneumonia patient received one day of empiric Zosyn. Patient was also found to have a perihepatic fluid collection. IR recommended outpatient follow up and ID had a low suspicion for abscess. Patient experienced transaminits due to possible liver abscess vs hepatic congestion from heart failure. Patient also having elevated Cr during admission which trended down. Lastly, patient experiences chronic diarrhea from microscopic colitis and was treated with budesonide.

## 2021-07-16 NOTE — PHYSICAL THERAPY INITIAL EVALUATION ADULT - LEVEL OF INDEPENDENCE: GAIT, REHAB EVAL
ADULT DERMATOLOGY PROGRESS NOTE  NEW VISIT    1/30/2019  Zoya Shanks  1967  MRN: [id number]    Zoya Shanks is a 51 year old female patient who comes in today for a total body skin exam and with the following cutaneous complaints:     #1 Psoriasis - dx in childhood  Location: scalp and ears  Onset: ~4 decades  Duration: waxes and weans, currently flaring  POSITIVE qualities: growing and itchy  NEGATIVE qualities: painful, bleeding and crusty  Triggers: none  Modifying factors: none  Treatment: T-Sal Shampoo and previously using clobetasol by dermatologist Dr. Haynes.    #2 bump  Location: right hand 3rd distal digit  Onset: 6 months ago   Duration: constant  POSITIVE qualities: painful and warm  NEGATIVE qualities: crusty, growing, changing and itchy  Triggers: none  Modifying factors: none  Treatment:  None      Patient feels otherwise well with no additional cutaneous concerns today.          PAST DERMATOLOGIC HISTORY:   None; precancerous moles removed    Past Medical History:   Diagnosis Date   • Anemia 2013    prior to BSO   • Asthma 2018   • Diarrhea     following with [facility]   • DVT (deep venous thrombosis) (CMS/HCC)    • History of Clostridium difficile colitis 2016   • Ileal pouchitis (CMS/HCC) 2015    seen on colonoscopy-bx; chronic   • MIKE (nonalcoholic steatohepatitis)     with Stage 1 Fibrosis   • Ovarian cyst 2013    2.8 right ovarian cyst; 6.2cm left ovarian cyst   • PONV (postoperative nausea and vomiting)    • Ulcerative colitis (CMS/HCC)        FAMILY HISTORY:   History of skin cancer--Maternal uncle; unknown type    SOCIAL HISTORY:   Tanning Bed Use:  no     Tobacco use:  no     ETOH:  no     Illicit drug use:  no    ALLERGIES:   Allergen Reactions   • Flagyl [Metronidazole] Nausea & Vomiting   • Sulfa Antibiotics HIVES   • Tape [Adhesive   (Environmental)] RASH and Dermatitis   • Heparin HIVES       Current Outpatient Medications   Medication Sig Dispense Refill   • MELATONIN PO       • montelukast (SINGULAIR) 10 MG tablet Take 1 tablet by mouth nightly. 90 tablet 4   • albuterol 108 (90 Base) MCG/ACT inhaler Inhale 2 puffs into the lungs every 4 hours as needed for Shortness of Breath or Wheezing. 2 Inhaler 1   • ALPRAZolam (XANAX) 0.5 MG tablet Take 1 tablet by mouth 2 times daily as needed for Anxiety. 10 tablet 0   • omeprazole 20 MG tablet Take 20 mg by mouth daily.      • benzoin-dibucaine-nystatin powd-nystatin cream-vitamin A&D-zinc oxide (Miles's Butt Cream) 0.3-14-14-14-28-28% cream Apply at every stool, for 7-10 days or until rash clears. 240 g 1   • Loratadine-Pseudoephedrine (ALLERGY RELIEF D PO) Take by mouth daily.     • UNABLE TO FIND Citrucel  Immodium     • Probiotic Product (VSL#3 DS) Pack Take 1 tablet by mouth daily. 90 each 3     No current facility-administered medications for this visit.        REVIEW OF SYSTEMS:  No nausea, vomiting, fevers, chills, diarrhea, joint pain, weight loss, fatigue, cough, dizziness, weakness, depression, headaches, blood in stool/urine, visual disturbance, decreased appetite.    EXAM:   Visit Vitals  /78   Temp 98.1 °F (36.7 °C)   Ht 5' 5\" (1.651 m)   Wt 81.6 kg   BMI 29.95 kg/m²       The patient is a well developed Cotto type 3 female is alert and oriented x 3, normal mood and affect and is in no acute distress. A complete cutaneous examination of the scalp, face, neck, eyelids, mouth, lips, conjunctiva, chest, abdomen, back, bilateral arms, bilateral legs, buttocks, digits and nails was performed. Notable findings include: (Physical exam findings noted in A/P)    IMPRESSION / PLAN:     1) Clinically Benign Nevi (Multiple small, evenly colored, brown, regular, well-circumscribed nevi on the lower legs)  -nature of the disorder discussed in detail  -clinically benign today on exam, reassurance was given  -continue to monitor for any changes  -call if any changes occur    2) Digital mucous cyst / Myxoid cyst (translucent  flesh-colored to pink smooth well-circumscribed nodule w/nail fold depression between dorsal left 3rd digit DIP and proximal nail fold)  - Discussed that this is a ganglion cyst arising from the DIP joint of the digits and are thought to form from degeneration of connective tissue, commonly associated with osteoarthritic joints.  - Discussed treatment options (ie. repeated needle puncture/drainage followed by either cryosurgery or ILK, sclerotherapy, and corticosteroid tape)   -While therapies above can be employed, recurrence is common and surgical excision is the definitive treatment.   - Patient will call back for our office or her PCP's office to place a referral to plastic surgery, Dr. Leal. Patient not interested at this time.      3) Psoriasis, scalp psoriasis (well-demarcated erythematous plaques with micaceous scale on the scalp and periumbilical)  -BSA (body surface area) involvement 9 %  -discussed increased risk of CV ds and risk factors (HTN, obesity, elevated, cholesterol and lipids, and smoking). Recommend routine visits with PCP.  -patient with history of cardiovascular disease: No   -discussed koebnerization and the importance of avoiding trauma and scratching  -discussed treatment options including topical versus systemic therapy  -counseled patient regarding monitoring for joint disease  -after discussion of the risks and benefits of different available treatments, we have decided on the below treatment regimen:    Topicals:    -start Clobetasol 0.05% solution BID until smooth for the scalp. Can also use in the belly button for up to 7 days.   - discussed side effects of topical steroids including atrophy, dyschromia, and striae development and importance of discontinuing its use if any of these develop    4) Skin cancer screening   Discussed with patient no lesions concerning for NMSC (non-melanoma skin cancer) or other skin cancers.  Suggest yearly total skin exam. Patient to watch for the  ABCDE's of pigmented lesions or persistent crusts, erosions, irritated areas.  Technique of skin self-exam discussed with patient and given the AAD (American Academy of Dermatology) information on this.   Observe closely for skin damage/changes, and call if such occurs.      Call sooner if any problems or concerns.    Thank you Jewell Leon MD for allowing us to participate in the care of your patient.    On 1/30/2019, IDanika scribed the services personally performed by Jocelin Mathias MD.    The documentation recorded by the scribe accurately and completely reflects the service(s) I personally performed and the decisions made by me.     Ilda Mathias MD  [facility]     unable 2/2 feeling dizzy w/ +orthostatics supervision/contact guard

## 2021-07-16 NOTE — PROGRESS NOTE ADULT - ASSESSMENT
86 year old female PMH MVR s/p repair, Afib on Eliquis, HTN, HLD, CHF, Gout, p/w SOB x 2-3 days and fall 5-7 days prior to presentation. In the ED afebrile, hypotensive to systolics in the 90's, tachycardic >150.     RVP/COVID19 PCR (7/14) Negative.   U/A (7/13) 9 WBC.   BCx (7/13) with 1/4 MRSE.     CT Chest (7/13) with Lingular and right middle lobe subsegmental atelectasis.   CT A/P (7/13) with Pericapsular fluid collection adjacent to right lobe of liver    I reviewed imaging with radiology today (7/15) fluid collection may be consistent with hematoma but absence of IV contracts limits ability to differentiate  Patient with some leukocytosis on presentation but possible it is secondary to corticosteroids prior to presentation.  Transaminitis likely secondary to CHF exacerbation  At the present I would monitor off of antibiotics  If/when WEN resolves I would check CT A/P with IV contrast    RECOMMENDATIONS    #Subcapsular Liver Fluid Collection, Leukocytosis, Transaminitis  --Monitor off of antibiotics (monitor for fevers, abdominal pain or worsening leukocytosis)  --If/when WEN resolves I would check CT A/P with IV contrast  --Continue to follow CBC with diff  --Continue to follow renal function (Cr/BUN)  --Continue to follow transaminases  --Continue to follow temperature curve  --Follow up on preliminary blood cultures    #Positive Blood Cultures (Suspect Staph epi is procurement contaminant)  --Monitor off of antibiotics     I will continue to follow. Please feel free to contact me with any further questions.    Kevin Funk M.D.  [facility] Division of Infectious Disease  8AM-5PM: Pager Number [phone]  After Hours (or if no response): Please contact the Infectious Diseases Office at [phone]

## 2021-07-16 NOTE — CONSULT NOTE ADULT - ASSESSMENT
86 year-old woman with AFib with RVR in the setting of colitis.  Now status post cardioversion.  Maintaining sinus rhythm.    Still having diarrhea.    No cardiac changes at this time.    I will be covered by Juan Medina, DO this weekend. Please call her with any acute cardiac concerns.    Discharge planning per primary team.

## 2021-07-16 NOTE — CHART NOTE - NSCHARTNOTEFT_GEN_A_CORE
pt requesting Ensure. RD discussed providing Suplena a this time given elevated creatinine. pending verification placed verification placed. RD also provided pt with number for diet office to make her own selections. initial assess due on 7/19.

## 2021-07-16 NOTE — PROGRESS NOTE ADULT - SUBJECTIVE AND OBJECTIVE BOX
Follow Up:  Pericapsular Liver fluid collection    Interval History:    REVIEW OF SYSTEMS  [  ] ROS unobtainable because:    [  ] All other systems negative except as noted below    Constitutional:  [ ] fever [ ] chills  [ ] weight loss  [ ] weakness  Skin:  [ ] rash [ ] phlebitis	  Eyes: [ ] icterus [ ] pain  [ ] discharge	  ENMT: [ ] sore throat  [ ] thrush [ ] ulcers [ ] exudates  Respiratory: [ ] dyspnea [ ] hemoptysis [ ] cough [ ] sputum	  Cardiovascular:  [ ] chest pain [ ] palpitations [ ] edema	  Gastrointestinal:  [ ] nausea [ ] vomiting [ ] diarrhea [ ] constipation [ ] pain	  Genitourinary:  [ ] dysuria [ ] frequency [ ] hematuria [ ] discharge [ ] flank pain  [ ] incontinence  Musculoskeletal:  [ ] myalgias [ ] arthralgias [ ] arthritis  [ ] back pain  Neurological:  [ ] headache [ ] seizures  [ ] confusion/altered mental status    Allergies  &quot;VASELINE BASED OINTMENTS&quot; (Urticaria; Rash)  adhesives (Urticaria; Rash)  contrast media (gadolinium-based) (Chills)  Norvasc (Pruritus)  statins (Unknown)        ANTIMICROBIALS:      OTHER MEDS:  MEDICATIONS  (STANDING):  acetaminophen   Tablet .. 650 every 6 hours PRN  allopurinol 200 daily  aluminum hydroxide/magnesium hydroxide/simethicone Suspension 30 every 4 hours PRN  amitriptyline 25 daily  apixaban 2.5 every 12 hours  buDESOnide    EC Capsule 9 daily  diltiazem    daily  melatonin 3 at bedtime PRN  metoprolol tartrate 100 two times a day  ondansetron Injectable 4 every 8 hours PRN  oxybutynin 5 daily  pantoprazole    Tablet 40 before breakfast      Vital Signs Last 24 Hrs  T(C): 36.4 (16 Jul 2021 13:03), Max: 36.4 (15 Jul 2021 20:41)  T(F): 97.6 (16 Jul 2021 13:03), Max: 97.6 (16 Jul 2021 13:03)  HR: 63 (16 Jul 2021 13:03) (59 - 67)  BP: 137/64 (16 Jul 2021 13:03) (123/65 - 137/64)  BP(mean): --  RR: 18 (16 Jul 2021 13:03) (18 - 18)  SpO2: 94% (16 Jul 2021 13:03) (94% - 97%)    PHYSICAL EXAMINATION:  General: Alert and Awake, NAD  HEENT: PERRL, EOMI  Neck: Supple  Cardiac: RRR, No M/R/G  Resp: CTAB, No Wh/Rh/Ra  Abdomen: NBS, NT/ND, No HSM, No rigidity or guarding  MSK: No LE edema. No Calf tenderness  : No muhammad  Skin: No rashes or lesions. Skin is warm and dry to the touch.   Neuro: Alert and Awake. CN 2-12 Grossly intact. Moves all four extremities spontaneously.  Psych: Calm, Pleasant, Cooperative                          8.8    12.15 )-----------( 349      ( 16 Jul 2021 07:10 )             30.0       07-16    139  |  103  |  44<H>  ----------------------------<  123<H>  4.2   |  20<L>  |  1.83<H>    Ca    9.1      16 Jul 2021 06:59  Phos  4.3     07-16  Mg     2.2     07-16    TPro  6.8  /  Alb  3.7  /  TBili  0.4  /  DBili  x   /  AST  96<H>  /  ALT  257<H>  /  AlkPhos  176<H>  07-16          MICROBIOLOGY:  v  .Urine Clean Catch (Midstream)  07-13-21   <10,000 CFU/mL Normal Urogenital Juana  --  --      .Blood Blood-Peripheral  07-13-21   No growth to date.  --  Blood Culture PCR          Rapid RVP Result: Willamteflip (07-14 @ 14:20)        RADIOLOGY:    <The imaging below has been reviewed and visualized by me independently. Findings as detailed in report below> Follow Up:  Pericapsular Liver fluid collection    Interval History: afebrile. no acute overnight events. no abdominal pain.     REVIEW OF SYSTEMS  [  ] ROS unobtainable because:    [x  ] All other systems negative except as noted below    Constitutional:  [ ] fever [ ] chills  [ ] weight loss  [ ] weakness  Skin:  [ ] rash [ ] phlebitis	  Eyes: [ ] icterus [ ] pain  [ ] discharge	  ENMT: [ ] sore throat  [ ] thrush [ ] ulcers [ ] exudates  Respiratory: [ ] dyspnea [ ] hemoptysis [ ] cough [ ] sputum	  Cardiovascular:  [ ] chest pain [ ] palpitations [ ] edema	  Gastrointestinal:  [ ] nausea [ ] vomiting [ ] diarrhea [ ] constipation [ ] pain	  Genitourinary:  [ ] dysuria [ ] frequency [ ] hematuria [ ] discharge [ ] flank pain  [ ] incontinence  Musculoskeletal:  [ ] myalgias [ ] arthralgias [ ] arthritis  [ ] back pain  Neurological:  [ ] headache [ ] seizures  [ ] confusion/altered mental status    Allergies  &quot;VASELINE BASED OINTMENTS&quot; (Urticaria; Rash)  adhesives (Urticaria; Rash)  contrast media (gadolinium-based) (Chills)  Norvasc (Pruritus)  statins (Unknown)        ANTIMICROBIALS:      OTHER MEDS:  MEDICATIONS  (STANDING):  acetaminophen   Tablet .. 650 every 6 hours PRN  allopurinol 200 daily  aluminum hydroxide/magnesium hydroxide/simethicone Suspension 30 every 4 hours PRN  amitriptyline 25 daily  apixaban 2.5 every 12 hours  buDESOnide    EC Capsule 9 daily  diltiazem    daily  melatonin 3 at bedtime PRN  metoprolol tartrate 100 two times a day  ondansetron Injectable 4 every 8 hours PRN  oxybutynin 5 daily  pantoprazole    Tablet 40 before breakfast      Vital Signs Last 24 Hrs  T(C): 36.4 (16 Jul 2021 13:03), Max: 36.4 (15 Jul 2021 20:41)  T(F): 97.6 (16 Jul 2021 13:03), Max: 97.6 (16 Jul 2021 13:03)  HR: 63 (16 Jul 2021 13:03) (59 - 67)  BP: 137/64 (16 Jul 2021 13:03) (123/65 - 137/64)  BP(mean): --  RR: 18 (16 Jul 2021 13:03) (18 - 18)  SpO2: 94% (16 Jul 2021 13:03) (94% - 97%)    PHYSICAL EXAMINATION:  General: Alert and Awake, NAD  Cardiac: RRR, No M/R/G  Resp: CTAB, No Wh/Rh/Ra  Abdomen: NBS, NT/ND, No HSM, No rigidity or guarding  MSK: No LE edema. No Calf tenderness  : No muhammad  Skin: No rashes or lesions. Skin is warm and dry to the touch.   Neuro: Alert and Awake. CN 2-12 Grossly intact. Moves all four extremities spontaneously.  Psych: Calm, Pleasant, Cooperative                          8.8    12.15 )-----------( 349      ( 16 Jul 2021 07:10 )             30.0       07-16    139  |  103  |  44<H>  ----------------------------<  123<H>  4.2   |  20<L>  |  1.83<H>    Ca    9.1      16 Jul 2021 06:59  Phos  4.3     07-16  Mg     2.2     07-16    TPro  6.8  /  Alb  3.7  /  TBili  0.4  /  DBili  x   /  AST  96<H>  /  ALT  257<H>  /  AlkPhos  176<H>  07-16          MICROBIOLOGY:  v  .Urine Clean Catch (Midstream)  07-13-21   <10,000 CFU/mL Normal Urogenital Juana  --  --      .Blood Blood-Peripheral  07-13-21   No growth to date.  --  Blood Culture PCR          Rapid RVP Result: Willamtec (07-14 @ 14:20)        RADIOLOGY:    <The imaging below has been reviewed and visualized by me independently. Findings as detailed in report below>    EXAM:  CT ABDOMEN AND PELVIS                        EXAM:  CT CHEST                        PROCEDURE DATE:  07/13/2021    Limited noncontrast study.  Pericapsular fluid collection adjacent to right lobe of liver. Underlying parenchymal injury is not excluded.  Soft tissue infiltration adjacent to the second/third duodenum with mild ascites. Associated mesenteric injury is not excluded.  Enlarged bilateral pleural effusions.

## 2021-07-16 NOTE — PHYSICAL THERAPY INITIAL EVALUATION ADULT - PERTINENT HX OF CURRENT PROBLEM, REHAB EVAL
86 year old female PMH MVR s/p repair, Afib on Eliquis, HTN, HLD, CHF, Gout, p/w SOB x 2-3 days. Pt reports falling 5 days ago, after becoming dizzy and passing out for a brief second. She landed on her back and buttock, and hit her head. Over the past 2-3 days, she has been having worsening SOB and weakness.

## 2021-07-16 NOTE — PHYSICAL THERAPY INITIAL EVALUATION ADULT - PRECAUTIONS/LIMITATIONS, REHAB EVAL
Last night, she had midline CP, palpitations, diaphoresis, that lasted all night. She denied any N/V, radiation of pain. She states that she has had diarrhea for a few days 2/2 colitis. CT Chest (7/13) with Lingular and right middle lobe subsegmental atelectasis. CT A/P (7/13) with Pericapsular fluid collection adjacent to right lobe of liver/no known precautions/limitations

## 2021-07-16 NOTE — PROGRESS NOTE ADULT - PROBLEM SELECTOR PLAN 7
-Cr elevated at 1.7 on admission  -Cr 1.83 (7/16)  -Trend Cr   -Avoid nephrotoxic medications  -F/U infectious workup

## 2021-07-16 NOTE — PROGRESS NOTE ADULT - PROBLEM SELECTOR PLAN 1
-Patient found to be in A Fib w/ RVR to HR 160s on admission   -Likely causing SOB  -Given Amio 150mg x1  -Takes Cardizem at home  -Rate controlled on Lopressor and Diltaizem to the 90s-100s  -Overnight 7/14, patient was tachycardic to 150s, given IV push of metoprolol 5mg, metoprolol 25mg PO, and metoprolol tartrate increased to 75mg BID, further increased to 100mg BID as per EP (7/15)  -s/p EP cardioversion 7/15

## 2021-07-16 NOTE — PROGRESS NOTE ADULT - SUBJECTIVE AND OBJECTIVE BOX
**************************  Camilla Rivero  PGY-1 Internal Medicine  [phone]  **************************    Patient is a 86y old  Female who presents with a chief complaint of SOB (15 Jul 2021 13:09)      SUBJECTIVE / OVERNIGHT EVENTS:  Patient feeling well this morning. Yesterday had some diarrhea but has since resolved. Patient went for cardioversion yesterday, and currently denies any chest pain, SOB, palpitations. No overnight events, all other ROS negative.    MEDICATIONS  (STANDING):  allopurinol 200 milliGRAM(s) Oral daily  amitriptyline 25 milliGRAM(s) Oral daily  apixaban 2.5 milliGRAM(s) Oral every 12 hours  diltiazem    milliGRAM(s) Oral daily  loperamide 2 milliGRAM(s) Oral once  metoprolol tartrate 100 milliGRAM(s) Oral two times a day  oxybutynin 5 milliGRAM(s) Oral daily  pantoprazole    Tablet 40 milliGRAM(s) Oral before breakfast  torsemide 40 milliGRAM(s) Oral daily    MEDICATIONS  (PRN):  acetaminophen   Tablet .. 650 milliGRAM(s) Oral every 6 hours PRN Temp greater or equal to 38.5C (101.3F), Mild Pain (1 - 3)  aluminum hydroxide/magnesium hydroxide/simethicone Suspension 30 milliLiter(s) Oral every 4 hours PRN Dyspepsia  melatonin 3 milliGRAM(s) Oral at bedtime PRN Insomnia  ondansetron Injectable 4 milliGRAM(s) IV Push every 8 hours PRN Nausea and/or Vomiting        I&O's Summary    15 Jul 2021 07:01  -  16 Jul 2021 07:00  --------------------------------------------------------  IN: 300 mL / OUT: 300 mL / NET: 0 mL        PHYSICAL EXAM:  Vital Signs Last 24 Hrs  T(C): 36.3 (16 Jul 2021 04:29), Max: 36.7 (15 Jul 2021 15:17)  T(F): 97.4 (16 Jul 2021 04:29), Max: 98 (15 Jul 2021 15:17)  HR: 60 (16 Jul 2021 04:29) (60 - 73)  BP: 129/74 (16 Jul 2021 04:29) (93/50 - 129/74)  BP(mean): --  RR: 18 (16 Jul 2021 04:29) (15 - 23)  SpO2: 97% (16 Jul 2021 04:29) (96% - 99%)    CONSTITUTIONAL: NAD, well-developed  RESPIRATORY: Normal respiratory effort; lungs are clear to auscultation bilaterally  CARDIOVASCULAR: Regular rate and rhythm, normal S1 and S2, no murmur/rub/gallop; No lower extremity edema; Peripheral pulses are 2+ bilaterally  ABDOMEN: Nontender to palpation, normoactive bowel sounds, no rebound/guarding  MUSCLOSKELETAL: no clubbing or cyanosis of digits; no joint swelling or tenderness to palpation  PSYCH: A+O to person, place, and time; affect appropriate    LABS:                        8.8    12.15 )-----------( 349      ( 16 Jul 2021 07:10 )             30.0     07-16    139  |  103  |  44<H>  ----------------------------<  123<H>  4.2   |  20<L>  |  1.83<H>    Ca    9.1      16 Jul 2021 06:59  Phos  4.3     07-16  Mg     2.2     07-16    TPro  6.8  /  Alb  3.7  /  TBili  0.4  /  DBili  x   /  AST  96<H>  /  ALT  257<H>  /  AlkPhos  176<H>  07-16    PT/INR - ( 14 Jul 2021 14:37 )   PT: 17.8 sec;   INR: 1.51 ratio         PTT - ( 14 Jul 2021 14:37 )  PTT:50.9 sec          Culture - Blood (collected 13 Jul 2021 16:29)  Source: .Blood Blood-Peripheral  Gram Stain (15 Jul 2021 00:47):    Growth in aerobic bottle: Gram Positive Cocci in Clusters  Final Report (15 Jul 2021 22:24):    Growth in aerobic bottle: Staphylococcus epidermidis    Coag Negative Staphylococcus    Single set isolate, possible contaminant. Contact    Microbiology if susceptibility testing clinically    indicated.    ***Blood Panel PCR results on this specimen are available    approximately 3 hours after the Gram stain result.***    Gram stain, PCR, and/or culture results may not always    correspond due to difference in methodologies.    ************************************************************    This PCR assay was performed by multiplex PCR. This    Assay tests for 66 bacterial and resistance gene targets.    Please refer to the [facility] test directory    at [email or web address] for details.  Organism: Blood Culture PCR (15 Jul 2021 22:24)  Organism: Blood Culture PCR (15 Jul 2021 22:24)    Culture - Blood (collected 13 Jul 2021 16:29)  Source: .Blood Blood-Peripheral  Preliminary Report (14 Jul 2021 17:02):    No growth to date.

## 2021-07-16 NOTE — PHYSICAL THERAPY INITIAL EVALUATION ADULT - ADDITIONAL COMMENTS
PTA pt lives alone in private apartment w/ 12 steps to enter. Pt I with all ADLs, cane use for longer distances. pt reports next week she is moving to [facility] PTA pt lives alone in private apartment w/ 12 steps to enter. Pt I with all ADLs, cane use for longer distances. pt reports next week she is moving to [facility] 7/23

## 2021-07-16 NOTE — PROGRESS NOTE ADULT - PROBLEM SELECTOR PLAN 4
-CT ab/pelvis showed perihepatic fluid collection, mild hepatomegaly, diffuse fatty infiltration  - IR defer drainage, f/u o/p to monitor size and for sx, available if becomes sx  -Appreciate ID recs, will monitor off Zosyn for now  -Repeat CT A/P with contrast after WEN resolves

## 2021-07-16 NOTE — PROGRESS NOTE ADULT - PROBLEM SELECTOR PLAN 3
-EF 55-60%, MVR with annuplasty ring and moderate-severe AR and TR, mild pHTN  -Likely 2/2 A fib w/ RVR   -Cardiology (patient sees Dr. Wynne as an outpatient) and Dr. Valdovinos  -TTE from 6/11 show severe pulmonary pressures  -TTE from 7/13 showed mild pulmonary pressures  -C/w home Torsemide 40mg qd   -BNP 34311

## 2021-07-16 NOTE — CONSULT NOTE ADULT - CONSULT REASON
Evaluation of perihepatic fluid collection on CT.
SOB
Afib with RVR
Pericapsular Liver fluid collection
PAF

## 2021-07-16 NOTE — PROGRESS NOTE ADULT - PROBLEM SELECTOR PLAN 2
-SOB likely 2/2 A Fib w/ RVR vs. RML pneumonia  -Currently on RA  -Chest x-ray showed possible consolidation in RML  -Chest CT did not show consolidation, do not suspect pneumonia  -Zosyn (7/14-7/15) empirically  -MRSA +  -Blood cx showed gram + cocci in clusters, methicillin resistant staph epidermidis  -Legionella urine antigen, Strep pneumoniae antigen, RVP negative  -Presented with SIRS criteria on admission but infectious etiology is not clear  -Appreciate ID recs, will monitor off Zosyn for now

## 2021-07-17 NOTE — PROGRESS NOTE ADULT - PROBLEM SELECTOR PLAN 3
-EF 55-60%, MVR with annuplasty ring and moderate-severe AR and TR, mild pHTN  -Likely 2/2 A fib w/ RVR   -Cardiology (patient sees Dr. Wynne as an outpatient) and Dr. Valdovinos  -TTE from 6/11 show severe pulmonary pressures  -TTE from 7/13 showed mild pulmonary pressures  -C/w home Torsemide 40mg qd   -BNP 51331

## 2021-07-17 NOTE — PROGRESS NOTE ADULT - SUBJECTIVE AND OBJECTIVE BOX
**************************  Nico Keita MD, PGY-3  Internal Medicine  [phone]  **************************    Patient is a 86y old  Female who presents with a chief complaint of SOB (15 Jul 2021 13:09)      SUBJECTIVE / OVERNIGHT EVENTS:  HR overnight to the 50s. Cardizem held    MEDICATIONS  (STANDING):  MEDICATIONS  (STANDING):  allopurinol 200 milliGRAM(s) Oral daily  amitriptyline 25 milliGRAM(s) Oral daily  apixaban 2.5 milliGRAM(s) Oral every 12 hours  buDESOnide    EC Capsule 9 milliGRAM(s) Oral daily  calamine/zinc oxide Lotion 1 Application(s) Topical once  diltiazem    milliGRAM(s) Oral daily  metoprolol tartrate 100 milliGRAM(s) Oral two times a day  oxybutynin 5 milliGRAM(s) Oral daily  pantoprazole    Tablet 40 milliGRAM(s) Oral before breakfast  sodium chloride 0.9%. 1000 milliLiter(s) (100 mL/Hr) IV Continuous <Continuous>    MEDICATIONS  (PRN):  acetaminophen   Tablet .. 650 milliGRAM(s) Oral every 6 hours PRN Temp greater or equal to 38.5C (101.3F), Mild Pain (1 - 3)  aluminum hydroxide/magnesium hydroxide/simethicone Suspension 30 milliLiter(s) Oral every 4 hours PRN Dyspepsia  melatonin 3 milliGRAM(s) Oral at bedtime PRN Insomnia  ondansetron Injectable 4 milliGRAM(s) IV Push every 8 hours PRN Nausea and/or Vomiting      I&O's Summary    16 Jul 2021 07:01  -  17 Jul 2021 07:00  --------------------------------------------------------  IN: 460 mL / OUT: 150 mL / NET: 310 mL    PHYSICAL EXAM:    Vital Signs Last 24 Hrs  T(C): 36.8 (17 Jul 2021 05:06), Max: 36.8 (17 Jul 2021 05:06)  T(F): 98.3 (17 Jul 2021 05:06), Max: 98.3 (17 Jul 2021 05:06)  HR: 55 (17 Jul 2021 06:06) (52 - 63)  BP: 131/74 (17 Jul 2021 05:06) (114/64 - 137/64)  BP(mean): --  RR: 18 (17 Jul 2021 05:06) (18 - 18)  SpO2: 95% (17 Jul 2021 05:06) (94% - 95%)    CONSTITUTIONAL: NAD, well-developed  RESPIRATORY: Normal respiratory effort; lungs are clear to auscultation bilaterally  CARDIOVASCULAR: Regular rate and rhythm, normal S1 and S2, no murmur/rub/gallop; No lower extremity edema; Peripheral pulses are 2+ bilaterally  ABDOMEN: Nontender to palpation, normoactive bowel sounds, no rebound/guarding  MUSCLOSKELETAL: no clubbing or cyanosis of digits; no joint swelling or tenderness to palpation  PSYCH: A+O to person, place, and time; affect appropriate    LABS:                        8.8    12.15 )-----------( 349      ( 16 Jul 2021 07:10 )             30.0     07-16    139  |  103  |  44<H>  ----------------------------<  123<H>  4.2   |  20<L>  |  1.83<H>    Ca    9.1      16 Jul 2021 06:59  Phos  4.3     07-16  Mg     2.2     07-16    TPro  6.8  /  Alb  3.7  /  TBili  0.4  /  DBili  x   /  AST  96<H>  /  ALT  257<H>  /  AlkPhos  176<H>  07-16        Culture - Blood (collected 13 Jul 2021 16:29)  Source: .Blood Blood-Peripheral  Gram Stain (15 Jul 2021 00:47):    Growth in aerobic bottle: Gram Positive Cocci in Clusters  Final Report (15 Jul 2021 22:24):    Growth in aerobic bottle: Staphylococcus epidermidis    Coag Negative Staphylococcus    Single set isolate, possible contaminant. Contact    Microbiology if susceptibility testing clinically    indicated.    ***Blood Panel PCR results on this specimen are available    approximately 3 hours after the Gram stain result.***    Gram stain, PCR, and/or culture results may not always    correspond due to difference in methodologies.    ************************************************************    This PCR assay was performed by multiplex PCR. This    Assay tests for 66 bacterial and resistance gene targets.    Please refer to the [facility] test directory    at [email or web address] for details.  Organism: Blood Culture PCR (15 Jul 2021 22:24)  Organism: Blood Culture PCR (15 Jul 2021 22:24)    Culture - Blood (collected 13 Jul 2021 16:29)  Source: .Blood Blood-Peripheral  Preliminary Report (14 Jul 2021 17:02):    No growth to date.

## 2021-07-17 NOTE — PROGRESS NOTE ADULT - PROBLEM SELECTOR PLAN 5
-Likely 2/2 CHF  -Treat as above   -Monitor AST/ALT for now, trending down  -Hepatitis serologies negative  -Transaminitis - due to possible liver abscess vs hepatic congestion from heart failure

## 2021-07-18 NOTE — PROGRESS NOTE ADULT - SUBJECTIVE AND OBJECTIVE BOX
Follow Up:  Pericapsular Liver fluid collection    Interval History: noting severe dysuria today. denies abdominal pain. denies chills or fevers.     REVIEW OF SYSTEMS  [  ] ROS unobtainable because:    [x  ] All other systems negative except as noted below    Constitutional:  [ ] fever [ ] chills  [ ] weight loss  [ ] weakness  Skin:  [ ] rash [ ] phlebitis	  Eyes: [ ] icterus [ ] pain  [ ] discharge	  ENMT: [ ] sore throat  [ ] thrush [ ] ulcers [ ] exudates  Respiratory: [ ] dyspnea [ ] hemoptysis [ ] cough [ ] sputum	  Cardiovascular:  [ ] chest pain [ ] palpitations [ ] edema	  Gastrointestinal:  [ ] nausea [ ] vomiting [ ] diarrhea [ ] constipation [ ] pain	  Genitourinary:  [ x] dysuria [ ] frequency [ ] hematuria [ ] discharge [ ] flank pain  [ ] incontinence  Musculoskeletal:  [ ] myalgias [ ] arthralgias [ ] arthritis  [ ] back pain  Neurological:  [ ] headache [ ] seizures  [ ] confusion/altered mental status    Allergies  &quot;VASELINE BASED OINTMENTS&quot; (Urticaria; Rash)  adhesives (Urticaria; Rash)  contrast media (gadolinium-based) (Chills)  Norvasc (Pruritus)  statins (Unknown)        ANTIMICROBIALS:      OTHER MEDS:  MEDICATIONS  (STANDING):  acetaminophen   Tablet .. 650 every 6 hours PRN  allopurinol 200 daily  aluminum hydroxide/magnesium hydroxide/simethicone Suspension 30 every 4 hours PRN  amitriptyline 25 daily  apixaban 2.5 every 12 hours  buDESOnide    EC Capsule 9 daily  diltiazem    daily  melatonin 3 at bedtime PRN  metoprolol tartrate 100 two times a day  ondansetron Injectable 4 every 8 hours PRN  oxybutynin 5 daily  pantoprazole    Tablet 40 before breakfast      Vital Signs Last 24 Hrs  T(C): 36.7 (2021 12:19), Max: 36.7 (2021 12:19)  T(F): 98 (2021 12:19), Max: 98 (2021 12:19)  HR: 59 (2021 12:19) (56 - 59)  BP: 130/74 (2021 12:19) (116/71 - 135/74)  BP(mean): --  RR: 18 (2021 12:19) (18 - 18)  SpO2: 97% (2021 12:19) (95% - 98%)    PHYSICAL EXAMINATION:  General: Alert and Awake, NAD  Cardiac: RRR, No M/R/G  Resp: CTAB, No Wh/Rh/Ra  Abdomen: NBS, NT/ND, No HSM, No rigidity or guarding  MSK: No LE edema. No Calf tenderness  : No muhammad  Skin: No rashes or lesions. Skin is warm and dry to the touch.   Neuro: Alert and Awake. CN 2-12 Grossly intact. Moves all four extremities spontaneously.  Psych: Calm, Pleasant, Cooperative                        8.0    10.97 )-----------( 321      ( 2021 07:15 )             26.5           137  |  102  |  53<H>  ----------------------------<  116<H>  3.8   |  18<L>  |  1.95<H>    Ca    9.4      2021 07:15  Phos  3.8       Mg     2.0         TPro  6.4  /  Alb  3.3  /  TBili  0.3  /  DBili  x   /  AST  85<H>  /  ALT  202<H>  /  AlkPhos  162<H>        Urinalysis Basic - ( 2021 13:45 )    Color: Light Yellow / Appearance: Clear / S.017 / pH: x  Gluc: x / Ketone: Negative  / Bili: Negative / Urobili: Negative   Blood: x / Protein: 30 mg/dL / Nitrite: Negative   Leuk Esterase: Moderate / RBC: 2 /hpf / WBC 12 /HPF   Sq Epi: x / Non Sq Epi: 4 /hpf / Bacteria: Negative        MICROBIOLOGY:  v  .Blood Blood-Peripheral  21   No growth to date.  --  --      .Urine Clean Catch (Midstream)  21   <10,000 CFU/mL Normal Urogenital Juana  --  --      .Blood Blood-Peripheral  21   No growth to date.  --  Blood Culture PCR          Rapid RVP Result: NotDetec ( @ 14:20)        RADIOLOGY:    <The imaging below has been reviewed and visualized by me independently. Findings as detailed in report below>    EXAM:  CT ABDOMEN AND PELVIS                        EXAM:  CT CHEST                        PROCEDURE DATE:  2021    Limited noncontrast study.  Pericapsular fluid collection adjacent to right lobe of liver. Underlying parenchymal injury is not excluded.  Soft tissue infiltration adjacent to the second/third duodenum with mild ascites. Associated mesenteric injury is not excluded.  Enlarged bilateral pleural effusions.

## 2021-07-18 NOTE — PROGRESS NOTE ADULT - SUBJECTIVE AND OBJECTIVE BOX
Resident: Lakeshia Javed, PGY1, Internal Medicine, Pager [phone]    24-Hour Events and Subjective:  - endorses dysuria  - denies palpitation and abd pain  - tele 50s-60s    [facility] Meds:  MEDICATIONS  (STANDING):  allopurinol 200 milliGRAM(s) Oral daily  amitriptyline 25 milliGRAM(s) Oral daily  apixaban 2.5 milliGRAM(s) Oral every 12 hours  buDESOnide    EC Capsule 9 milliGRAM(s) Oral daily  calamine/zinc oxide Lotion 1 Application(s) Topical once  diltiazem    milliGRAM(s) Oral daily  hydrocortisone 1% Ointment 1 Application(s) Topical two times a day  lactated ringers. 1000 milliLiter(s) (100 mL/Hr) IV Continuous <Continuous>  metoprolol tartrate 100 milliGRAM(s) Oral two times a day  oxybutynin 5 milliGRAM(s) Oral daily  pantoprazole    Tablet 40 milliGRAM(s) Oral before breakfast  potassium chloride    Tablet ER 40 milliEquivalent(s) Oral every 4 hours  MEDICATIONS  (PRN):  acetaminophen   Tablet .. 650 milliGRAM(s) Oral every 6 hours PRN Temp greater or equal to 38.5C (101.3F), Mild Pain (1 - 3)  aluminum hydroxide/magnesium hydroxide/simethicone Suspension 30 milliLiter(s) Oral every 4 hours PRN Dyspepsia  melatonin 3 milliGRAM(s) Oral at bedtime PRN Insomnia  ondansetron Injectable 4 milliGRAM(s) IV Push every 8 hours PRN Nausea and/or Vomiting    Vitals:  T(F): 98 (18 Jul 2021 12:19), Max: 98 (18 Jul 2021 12:19)  HR: 59 (18 Jul 2021 12:19) (56 - 59)  BP: 130/74 (18 Jul 2021 12:19) (116/71 - 135/74)  RR: 18 (18 Jul 2021 12:19) (18 - 18)  SpO2: 97% (18 Jul 2021 12:19) (95% - 98%) on RA    Weight: 71.2 (18 Jul 2021 04:43)    I&O:   17 Jul 2021 07:01  -  18 Jul 2021 07:00  IN: 800 mL / OUT: 0 mL / NET: 800 mL    Physical Exam:   Constitutional: NAD, comfortable   HEENT: no conjunctival injection, EOMI grossly  Neck: no JVD  Chest: normal WOB at rest, crackles in lower lobes  Heart: RRR, physiologic S1 and S2, no murmurs, no rubs, no gallops, 2+ radial pulses  Abd: nondistended, normoactive bowel sounds, soft, nontender, no rebound, no involuntary guarding  Extremities: no clubbing, warm hands, trace pitting edema  Neuro: alert, A&Ox3, no focal deficits  Psych: appropriate mood and affect    Labs:                         8.0L, down from 8.6 7/17    10.97H, up from 9.08 7/17 )-----------( 321      ( 18 Jul 2021 07:15 )             26.5L     07-18  137  |  102  |  53<H>  ----------------------------<  116<H>  3.8   |  18<L>  |  1.95<H, down from 2.02 7/17>  Ca    9.4      18 Jul 2021 07:15  Phos  3.8     07-18  Mg     2.0     07-18    TPro  6.4  /  Alb  3.3  /  TBili  0.3  /  DBili  x   /  AST  85<H>  /  ALT  202<H>  /  AlkPhos  162<H>  07-18    ProBNP (7/17): 2709    Culture - Blood (collected 16 Jul 2021 17:20)  Source: .Blood Blood-Peripheral  Preliminary Report (17 Jul 2021 18:01):    No growth to date.    Culture - Blood (collected 16 Jul 2021 17:20)  Source: .Blood Blood-Peripheral  Preliminary Report (17 Jul 2021 18:01):    No growth to date.    Studies and Radiology:  [] no new   Telemetry ():   ECG ():   Stress test ():   Echo ():   Heart Cath ():   CXR ():     LABS:                        8.8    12.15 )-----------( 349      ( 16 Jul 2021 07:10 )             30.0     07-16    139  |  103  |  44<H>  ----------------------------<  123<H>  4.2   |  20<L>  |  1.83<H>    Ca    9.1      16 Jul 2021 06:59  Phos  4.3     07-16  Mg     2.2     07-16    TPro  6.8  /  Alb  3.7  /  TBili  0.4  /  DBili  x   /  AST  96<H>  /  ALT  257<H>  /  AlkPhos  176<H>  07-16        Culture - Blood (collected 13 Jul 2021 16:29)  Source: .Blood Blood-Peripheral  Gram Stain (15 Jul 2021 00:47):    Growth in aerobic bottle: Gram Positive Cocci in Clusters  Final Report (15 Jul 2021 22:24):    Growth in aerobic bottle: Staphylococcus epidermidis    Coag Negative Staphylococcus    Single set isolate, possible contaminant. Contact    Microbiology if susceptibility testing clinically    indicated.    ***Blood Panel PCR results on this specimen are available    approximately 3 hours after the Gram stain result.***    Gram stain, PCR, and/or culture results may not always    correspond due to difference in methodologies.    ************************************************************    This PCR assay was performed by multiplex PCR. This    Assay tests for 66 bacterial and resistance gene targets.    Please refer to the [facility] Labs test directory    at [email or web address] for details.  Organism: Blood Culture PCR (15 Jul 2021 22:24)  Organism: Blood Culture PCR (15 Jul 2021 22:24)    Culture - Blood (collected 13 Jul 2021 16:29)  Source: .Blood Blood-Peripheral  Preliminary Report (14 Jul 2021 17:02):    No growth to date.       Resident: Lakeshia Javed, PGY1, Internal Medicine, Pager [phone]    24-Hour Events and Subjective:  - endorses dysuria  - denies palpitation and abd pain  - tele 50s-60s    [facility] Meds:  MEDICATIONS  (STANDING):  allopurinol 200 milliGRAM(s) Oral daily  amitriptyline 25 milliGRAM(s) Oral daily  apixaban 2.5 milliGRAM(s) Oral every 12 hours  buDESOnide    EC Capsule 9 milliGRAM(s) Oral daily  calamine/zinc oxide Lotion 1 Application(s) Topical once  diltiazem    milliGRAM(s) Oral daily  hydrocortisone 1% Ointment 1 Application(s) Topical two times a day  lactated ringers. 1000 milliLiter(s) (100 mL/Hr) IV Continuous <Continuous>  metoprolol tartrate 100 milliGRAM(s) Oral two times a day  oxybutynin 5 milliGRAM(s) Oral daily  pantoprazole    Tablet 40 milliGRAM(s) Oral before breakfast  potassium chloride    Tablet ER 40 milliEquivalent(s) Oral every 4 hours  MEDICATIONS  (PRN):  acetaminophen   Tablet .. 650 milliGRAM(s) Oral every 6 hours PRN Temp greater or equal to 38.5C (101.3F), Mild Pain (1 - 3)  aluminum hydroxide/magnesium hydroxide/simethicone Suspension 30 milliLiter(s) Oral every 4 hours PRN Dyspepsia  melatonin 3 milliGRAM(s) Oral at bedtime PRN Insomnia  ondansetron Injectable 4 milliGRAM(s) IV Push every 8 hours PRN Nausea and/or Vomiting    Vitals:  T(F): 98 (18 Jul 2021 12:19), Max: 98 (18 Jul 2021 12:19)  HR: 59 (18 Jul 2021 12:19) (56 - 59)  BP: 130/74 (18 Jul 2021 12:19) (116/71 - 135/74)  RR: 18 (18 Jul 2021 12:19) (18 - 18)  SpO2: 97% (18 Jul 2021 12:19) (95% - 98%) on RA    Weight: 71.2 (18 Jul 2021 04:43)    I&O:   17 Jul 2021 07:01  -  18 Jul 2021 07:00  IN: 800 mL / OUT: 0 mL / NET: 800 mL    Physical Exam:   Constitutional: NAD, comfortable   HEENT: no conjunctival injection, EOMI grossly  Neck: no JVD  Chest: normal WOB at rest, crackles in lower lobes  Heart: RRR, physiologic S1 and S2, no murmurs, no rubs, no gallops, 2+ radial pulses  Abd: nondistended, normoactive bowel sounds, soft, nontender, no rebound, no involuntary guarding  Extremities: no clubbing, warm hands, trace pitting edema  Neuro: alert, A&Ox3, no focal deficits  Psych: appropriate mood and affect    Labs:                         8.0L, down from 8.6 7/17    10.97H, up from 9.08 7/17 )-----------( 321      ( 18 Jul 2021 07:15 )             26.5L     07-18  137  |  102  |  53<H>  ----------------------------<  116<H>  3.8   |  18<L>  |  1.95<H, down from 2.02 7/17>  Ca    9.4      18 Jul 2021 07:15  Phos  3.8     07-18  Mg     2.0     07-18    TPro  6.4  /  Alb  3.3  /  TBili  0.3  /  DBili  x   /  AST  85<H>  /  ALT  202<H>  /  AlkPhos  162<H>  07-18    ProBNP (7/17): 2709    Culture - Blood (collected 16 Jul 2021 17:20)  Source: .Blood Blood-Peripheral  Preliminary Report (17 Jul 2021 18:01):    No growth to date.    Culture - Blood (collected 16 Jul 2021 17:20)  Source: .Blood Blood-Peripheral  Preliminary Report (17 Jul 2021 18:01):    No growth to date.    Culture - Blood (collected 13 Jul 2021 16:29)  Source: .Blood Blood-Peripheral  Gram Stain (15 Jul 2021 00:47):    Growth in aerobic bottle: Gram Positive Cocci in Clusters  Final Report (15 Jul 2021 22:24):    Growth in aerobic bottle: Staphylococcus epidermidis    Coag Negative Staphylococcus    Single set isolate, possible contaminant. Contact    Microbiology if susceptibility testing clinically    indicated.    ***Blood Panel PCR results on this specimen are available    approximately 3 hours after the Gram stain result.***    Gram stain, PCR, and/or culture results may not always    correspond due to difference in methodologies.    ************************************************************    This PCR assay was performed by multiplex PCR. This    Assay tests for 66 bacterial and resistance gene targets.    Please refer to the [facility] Labs test directory    at [email or web address] for details.  Organism: Blood Culture PCR (15 Jul 2021 22:24)  Organism: Blood Culture PCR (15 Jul 2021 22:24)    Culture - Blood (collected 13 Jul 2021 16:29)  Source: .Blood Blood-Peripheral  Preliminary Report (14 Jul 2021 17:02):    No growth to date.    Studies and Radiology: no new  Resident: Lakeshia Javed, PGY1, Internal Medicine, Pager [phone]    24-Hour Events and Subjective:  - endorses dysuria  - denies palpitation and abd pain  - tele 50s-60s    [facility] Meds:  MEDICATIONS  (STANDING):  allopurinol 200 milliGRAM(s) Oral daily  amitriptyline 25 milliGRAM(s) Oral daily  apixaban 2.5 milliGRAM(s) Oral every 12 hours  buDESOnide    EC Capsule 9 milliGRAM(s) Oral daily  calamine/zinc oxide Lotion 1 Application(s) Topical once  diltiazem    milliGRAM(s) Oral daily  hydrocortisone 1% Ointment 1 Application(s) Topical two times a day  lactated ringers. 1000 milliLiter(s) (100 mL/Hr) IV Continuous <Continuous>  metoprolol tartrate 100 milliGRAM(s) Oral two times a day  oxybutynin 5 milliGRAM(s) Oral daily  pantoprazole    Tablet 40 milliGRAM(s) Oral before breakfast  potassium chloride    Tablet ER 40 milliEquivalent(s) Oral every 4 hours  MEDICATIONS  (PRN):  acetaminophen   Tablet .. 650 milliGRAM(s) Oral every 6 hours PRN Temp greater or equal to 38.5C (101.3F), Mild Pain (1 - 3)  aluminum hydroxide/magnesium hydroxide/simethicone Suspension 30 milliLiter(s) Oral every 4 hours PRN Dyspepsia  melatonin 3 milliGRAM(s) Oral at bedtime PRN Insomnia  ondansetron Injectable 4 milliGRAM(s) IV Push every 8 hours PRN Nausea and/or Vomiting    Vitals:  T(F): 98 (18 Jul 2021 12:19), Max: 98 (18 Jul 2021 12:19)  HR: 59 (18 Jul 2021 12:19) (56 - 59)  BP: 130/74 (18 Jul 2021 12:19) (116/71 - 135/74)  RR: 18 (18 Jul 2021 12:19) (18 - 18)  SpO2: 97% (18 Jul 2021 12:19) (95% - 98%) on RA    Weight: 71.2 (18 Jul 2021 04:43)    I&O:   17 Jul 2021 07:01  -  18 Jul 2021 07:00  IN: 800 mL / OUT: 0 mL / NET: 800 mL    Physical Exam:   Constitutional: NAD, comfortable   HEENT: no conjunctival injection, EOMI grossly  Neck: no JVD  Chest: normal WOB at rest, crackles in lower lobes  Heart: RRR, physiologic S1 and S2, no murmurs, no rubs, no gallops, 2+ radial pulses  Abd: nondistended, normoactive bowel sounds, soft, nontender, no rebound, no involuntary guarding  Extremities: no clubbing, warm hands, trace pitting edema  Neuro: alert, A&Ox3, no focal deficits  Psych: appropriate mood and affect    Labs:                         8.0L, down from 8.6 7/17    10.97H, up from 9.08 7/17 )-----------( 321      ( 18 Jul 2021 07:15 )             26.5L     07-18  137  |  102  |  53<H>  ----------------------------<  116<H>  3.8   |  18<L>  |  1.95<H, down from 2.02 7/17>  Ca    9.4      18 Jul 2021 07:15  Phos  3.8     07-18  Mg     2.0     07-18    TPro  6.4  /  Alb  3.3  /  TBili  0.3  /  DBili  x   /  AST  85<H>  /  ALT  202<H>  /  AlkPhos  162<H>  07-18    ProBNP (7/17): 2709    UA (7/18): protein 30, leuk est moderate, WBC 12    Culture - Blood (collected 16 Jul 2021 17:20)  Source: .Blood Blood-Peripheral  Preliminary Report (17 Jul 2021 18:01):    No growth to date.    Culture - Blood (collected 16 Jul 2021 17:20)  Source: .Blood Blood-Peripheral  Preliminary Report (17 Jul 2021 18:01):    No growth to date.    Culture - Blood (collected 13 Jul 2021 16:29)  Source: .Blood Blood-Peripheral  Gram Stain (15 Jul 2021 00:47):    Growth in aerobic bottle: Gram Positive Cocci in Clusters  Final Report (15 Jul 2021 22:24):    Growth in aerobic bottle: Staphylococcus epidermidis    Coag Negative Staphylococcus    Single set isolate, possible contaminant. Contact    Microbiology if susceptibility testing clinically    indicated.    ***Blood Panel PCR results on this specimen are available    approximately 3 hours after the Gram stain result.***    Gram stain, PCR, and/or culture results may not always    correspond due to difference in methodologies.    ************************************************************    This PCR assay was performed by multiplex PCR. This    Assay tests for 66 bacterial and resistance gene targets.    Please refer to the [facility] Labs test directory    at [email or web address] for details.  Organism: Blood Culture PCR (15 Jul 2021 22:24)  Organism: Blood Culture PCR (15 Jul 2021 22:24)    Culture - Blood (collected 13 Jul 2021 16:29)  Source: .Blood Blood-Peripheral  Preliminary Report (14 Jul 2021 17:02):    No growth to date.    Studies and Radiology: no new

## 2021-07-18 NOTE — PROGRESS NOTE ADULT - ASSESSMENT
87yo F hx of MVR s/p repair, Afib, HTN, HLD, CHF, on Eliquis, p/w SOB x 2-3 days, found to be in A Fib w/ RVR.  87yo F hx of MVR s/p repair, Afib, HTN, HLD, CHF, on Eliquis, p/w SOB x 2-3 days, found to be in A Fib w/ RVR.     85 yo female w/pmh HFpEF, atrial fibrillation, fatty liver, microscopic colitis, gout, severe MR s/p MVR 1997, CKD 3, admitted to CCU for a-fib with RVR, HR 160s. Restarted home meds and HR better controlled. S/p DCCV, now in SR.    -presented with SIRS criteria on admission but sepsis has been ruled out.  -ID following, low suspicion for liver abscess, monitor off abx, check CT Abd w/IV contrast when WEN resolves  -transaminitis - may be hepatic congestion from heart failure  -for WEN, suspect pre-renal since she has been having diarrhea, was npo previously. torsemide was discontinued. Did not receive IVF yesterday. Start IVF. Check renal US, bladder scan. Recheck BMP, urine studies. Consider nephrology consult if no improvement with IVF   -Reduce metoprolol dose if ok with cardiology 85 yo female w/pmh HFpEF, atrial fibrillation, fatty liver, microscopic colitis, gout, severe MR s/p MVR 1997, CKD 3, admitted to CCU for a-fib with RVR, HR 160s. Restarted home meds and HR better controlled. S/p DCCV, now in SR.

## 2021-07-18 NOTE — PROGRESS NOTE ADULT - PROBLEM SELECTOR PLAN 3
-Patient found to be in A Fib w/ RVR to HR 160s on admission   -Likely causing SOB  -Given Amio 150mg x1  -Takes Cardizem at home  -Rate controlled on Lopressor and Diltaizem to the 90s-100s  -Overnight 7/14, patient was tachycardic to 150s, given IV push of metoprolol 5mg, metoprolol 25mg PO, and metoprolol tartrate increased to 75mg BID, further increased to 100mg BID as per EP (7/15)  -s/p EP cardioversion 7/15- currently in NSR -Patient found to be in A Fib w/ RVR to HR 160s on admission   -Likely causing SOB  -Given Amio 150mg x1  -Takes Cardizem at home  -Rate controlled on Lopressor and Diltaizem to the 90s-100s  -Overnight 7/14, patient was tachycardic to 150s, given IV push of metoprolol 5mg, metoprolol 25mg PO, and metoprolol tartrate increased to 75mg BID, further increased to 100mg BID as per EP (7/15)  -s/p EP cardioversion 7/15- currently in NSR  -Reduce metoprolol dose if ok with cardiology

## 2021-07-18 NOTE — PROGRESS NOTE ADULT - PROBLEM SELECTOR PLAN 7
-SOB likely 2/2 A Fib w/ RVR vs. RML pneumonia  -Currently on RA  -Chest x-ray showed possible consolidation in RML  -Chest CT did not show consolidation, do not suspect pneumonia  - s/p Zosyn (7/14-7/15) empirically  -MRSA +  -Blood cx showed gram + cocci in clusters, methicillin resistant staph epidermidis  -Legionella urine antigen, Strep pneumoniae antigen, RVP negative  -Presented with SIRS criteria on admission but infectious etiology is not clear  -Appreciate ID recs, will monitor off Zosyn for now

## 2021-07-18 NOTE — PROGRESS NOTE ADULT - PROBLEM SELECTOR PLAN 1
-Cr elevated at 1.7 on admission. WEN on CKD likely prerenal (2/2 diarrhea from microscopic colitis)  - c/w mIVF, encourage PO intake   -Trend Cr   -Avoid nephrotoxic medications  -F/U infectious workup -Cr elevated at 1.7 on admission. WEN on CKD likely prerenal (2/2 diarrhea from microscopic colitis)  - c/w mIVF, encourage PO intake   -Trend Cr   -Avoid nephrotoxic medications  -F/U infectious workup  - f/u bladder scan, renal US, and UA  - IVF  - d/c torsemide   - consider nephrology consult if no improvement with IVF

## 2021-07-18 NOTE — PROGRESS NOTE ADULT - PROBLEM SELECTOR PLAN 6
-EF 55-60%, MVR with annuplasty ring and moderate-severe AR and TR, mild pHTN  -Likely 2/2 A fib w/ RVR   -Cardiology (patient sees Dr. Wynne as an outpatient) and Dr. Valdovinos  -TTE from 6/11 show severe pulmonary pressures  -TTE from 7/13 showed mild pulmonary pressures  - hold home Torsemide 40mg qd given diarrhea, WEN   -BNP 04203

## 2021-07-18 NOTE — PROGRESS NOTE ADULT - ASSESSMENT
86 year old female PMH MVR s/p repair, Afib on Eliquis, HTN, HLD, CHF, Gout, p/w SOB x 2-3 days and fall 5-7 days prior to presentation. In the ED afebrile, hypotensive to systolics in the 90's, tachycardic >150.     RVP/COVID19 PCR (7/14) Negative.   U/A (7/13) 9 WBC.   BCx (7/13) with 1/4 MRSE.     CT Chest (7/13) with Lingular and right middle lobe subsegmental atelectasis.   CT A/P (7/13) with Pericapsular fluid collection adjacent to right lobe of liver    I reviewed imaging with radiology today (7/15) fluid collection may be consistent with hematoma but absence of IV contracts limits ability to differentiate  Patient with some leukocytosis on presentation but possible it is secondary to corticosteroids prior to presentation.  Transaminitis likely secondary to CHF exacerbation  At the present I would monitor off of antibiotics  If/when WEN resolves I would check CT A/P with IV contrast    RECOMMENDATIONS    #Dysuria  --Check U/A; check urine culture if pyuria  --If U/A positive start Ceftriaxone 1g IV Q24H    #Subcapsular Liver Fluid Collection, Leukocytosis, Transaminitis  --Monitor off of antibiotics (monitor for fevers, abdominal pain or worsening leukocytosis)  --If/when WEN resolves I would check CT A/P with IV contrast  --Continue to follow CBC with diff  --Continue to follow renal function (Cr/BUN)  --Continue to follow transaminases  --Continue to follow temperature curve  --Follow up on preliminary blood cultures    #Positive Blood Cultures (Suspect Staph epi is procurement contaminant)  --Monitor off of antibiotics     I will continue to follow. Please feel free to contact me with any further questions.    Kevin Funk M.D.  [facility] Division of Infectious Disease  8AM-5PM: Pager Number [phone]  After Hours (or if no response): Please contact the Infectious Diseases Office at [phone]

## 2021-07-18 NOTE — PROGRESS NOTE ADULT - PROBLEM SELECTOR PLAN 5
-Likely 2/2 CHF  -Treat as above   -Monitor AST/ALT for now, trending down  -Hepatitis serologies negative  -Transaminitis - due to possible liver abscess vs hepatic congestion from heart failure -Likely 2/2 CHF  -Treat as above   -Monitor AST/ALT for now, trending down  -Hepatitis serologies negative  -Transaminitis - due to possible liver abscess vs hepatic congestion from heart failure  - check CT Abd w/IV contrast when WEN resolves

## 2021-07-18 NOTE — PROGRESS NOTE ADULT - PROBLEM SELECTOR PLAN 2
-Follows with Dr. Young (GI)  -Has microscopic colitis and planned to start prolonged budesonide taper (started 6/28)   -Weakness and fatigue, fainting, since starting budesonide so was advised to stop  -Outpatient follow up with Dr. Young  - per Dr. Young, resume budesonide 9mg qd  - K>4, MG>2

## 2021-07-19 NOTE — PROGRESS NOTE ADULT - SUBJECTIVE AND OBJECTIVE BOX
Follow Up:  Pericapsular Liver fluid collection    Interval History:    REVIEW OF SYSTEMS  [  ] ROS unobtainable because:    [  ] All other systems negative except as noted below    Constitutional:  [ ] fever [ ] chills  [ ] weight loss  [ ] weakness  Skin:  [ ] rash [ ] phlebitis	  Eyes: [ ] icterus [ ] pain  [ ] discharge	  ENMT: [ ] sore throat  [ ] thrush [ ] ulcers [ ] exudates  Respiratory: [ ] dyspnea [ ] hemoptysis [ ] cough [ ] sputum	  Cardiovascular:  [ ] chest pain [ ] palpitations [ ] edema	  Gastrointestinal:  [ ] nausea [ ] vomiting [ ] diarrhea [ ] constipation [ ] pain	  Genitourinary:  [ ] dysuria [ ] frequency [ ] hematuria [ ] discharge [ ] flank pain  [ ] incontinence  Musculoskeletal:  [ ] myalgias [ ] arthralgias [ ] arthritis  [ ] back pain  Neurological:  [ ] headache [ ] seizures  [ ] confusion/altered mental status    Allergies  &quot;VASELINE BASED OINTMENTS&quot; (Urticaria; Rash)  adhesives (Urticaria; Rash)  contrast media (gadolinium-based) (Chills)  Norvasc (Pruritus)  statins (Unknown)        ANTIMICROBIALS:  cefTRIAXone   IVPB 1000 every 24 hours      OTHER MEDS:  MEDICATIONS  (STANDING):  acetaminophen   Tablet .. 650 every 6 hours PRN  allopurinol 200 daily  aluminum hydroxide/magnesium hydroxide/simethicone Suspension 30 every 4 hours PRN  amitriptyline 25 daily  apixaban 2.5 every 12 hours  buDESOnide    EC Capsule 9 daily  diltiazem    daily  melatonin 3 at bedtime PRN  metoprolol tartrate 50 two times a day  ondansetron Injectable 4 every 8 hours PRN  oxybutynin 5 daily  pantoprazole    Tablet 40 before breakfast      Vital Signs Last 24 Hrs  T(C): 36.2 (2021 04:00), Max: 36.8 (2021 20:56)  T(F): 97.2 (2021 04:00), Max: 98.2 (2021 20:56)  HR: 68 (2021 12:15) (51 - 68)  BP: 127/62 (2021 12:15) (127/62 - 132/76)  BP(mean): 68 (2021 12:15) (68 - 68)  RR: 18 (2021 06:00) (18 - 18)  SpO2: 96% (2021 06:00) (96% - 98%)    PHYSICAL EXAMINATION:  General: Alert and Awake, NAD  HEENT: PERRL, EOMI  Neck: Supple  Cardiac: RRR, No M/R/G  Resp: CTAB, No Wh/Rh/Ra  Abdomen: NBS, NT/ND, No HSM, No rigidity or guarding  MSK: No LE edema. No Calf tenderness  : No muhammad  Skin: No rashes or lesions. Skin is warm and dry to the touch.   Neuro: Alert and Awake. CN 2-12 Grossly intact. Moves all four extremities spontaneously.  Psych: Calm, Pleasant, Cooperative                          8.5    13.33 )-----------( 351      ( 2021 07:19 )             28.7           137  |  105  |  57<H>  ----------------------------<  116<H>  5.2   |  16<L>  |  1.88<H>    Ca    9.5      2021 07:01  Phos  3.5       Mg     2.1         TPro  6.5  /  Alb  3.7  /  TBili  0.2  /  DBili  x   /  AST  124<H>  /  ALT  238<H>  /  AlkPhos  167<H>        Urinalysis Basic - ( 2021 13:45 )    Color: Light Yellow / Appearance: Clear / S.017 / pH: x  Gluc: x / Ketone: Negative  / Bili: Negative / Urobili: Negative   Blood: x / Protein: 30 mg/dL / Nitrite: Negative   Leuk Esterase: Moderate / RBC: 2 /hpf / WBC 12 /HPF   Sq Epi: x / Non Sq Epi: 4 /hpf / Bacteria: Negative        MICROBIOLOGY:  v  .Blood Blood-Peripheral  21   No growth to date.  --  --      .Urine Clean Catch (Midstream)  21   <10,000 CFU/mL Normal Urogenital Juana  --  --      .Blood Blood-Peripheral  21   No Growth Final  --  Blood Culture PCR          Rapid RVP Result: [place] ( @ 14:20)        RADIOLOGY:    <The imaging below has been reviewed and visualized by me independently. Findings as detailed in report below>    EXAM:  US ABDOMEN COMPLETE                        PROCEDURE DATE:  2021    Trace amount of perihepatic ascites.  Hepatic steatosis.  Small bilateral pleural effusions. Follow Up:  Pericapsular Liver fluid collection    Interval History: afebrile. noting some SOB today. continued dysuria.     REVIEW OF SYSTEMS  [  ] ROS unobtainable because:    [ x ] All other systems negative except as noted below    Constitutional:  [ ] fever [ ] chills  [ ] weight loss  [ ] weakness  Skin:  [ ] rash [ ] phlebitis	  Eyes: [ ] icterus [ ] pain  [ ] discharge	  ENMT: [ ] sore throat  [ ] thrush [ ] ulcers [ ] exudates  Respiratory: [ ] dyspnea [ ] hemoptysis [ ] cough [ ] sputum	  Cardiovascular:  [ ] chest pain [ ] palpitations [ ] edema	  Gastrointestinal:  [ ] nausea [ ] vomiting [ ] diarrhea [ ] constipation [ ] pain	  Genitourinary:  [ ] dysuria [ ] frequency [ ] hematuria [ ] discharge [ ] flank pain  [ ] incontinence  Musculoskeletal:  [ ] myalgias [ ] arthralgias [ ] arthritis  [ ] back pain  Neurological:  [ ] headache [ ] seizures  [ ] confusion/altered mental status    Allergies  &quot;VASELINE BASED OINTMENTS&quot; (Urticaria; Rash)  adhesives (Urticaria; Rash)  contrast media (gadolinium-based) (Chills)  Norvasc (Pruritus)  statins (Unknown)        ANTIMICROBIALS:  cefTRIAXone   IVPB 1000 every 24 hours      OTHER MEDS:  MEDICATIONS  (STANDING):  acetaminophen   Tablet .. 650 every 6 hours PRN  allopurinol 200 daily  aluminum hydroxide/magnesium hydroxide/simethicone Suspension 30 every 4 hours PRN  amitriptyline 25 daily  apixaban 2.5 every 12 hours  buDESOnide    EC Capsule 9 daily  diltiazem    daily  melatonin 3 at bedtime PRN  metoprolol tartrate 50 two times a day  ondansetron Injectable 4 every 8 hours PRN  oxybutynin 5 daily  pantoprazole    Tablet 40 before breakfast      Vital Signs Last 24 Hrs  T(C): 36.2 (2021 04:00), Max: 36.8 (2021 20:56)  T(F): 97.2 (2021 04:00), Max: 98.2 (2021 20:56)  HR: 68 (2021 12:15) (51 - 68)  BP: 127/62 (2021 12:15) (127/62 - 132/76)  BP(mean): 68 (2021 12:15) (68 - 68)  RR: 18 (2021 06:00) (18 - 18)  SpO2: 96% (2021 06:00) (96% - 98%)    PHYSICAL EXAMINATION:  General: Alert and Awake, NAD  Cardiac: RRR, No M/R/G  Resp: CTAB, No Wh/Rh/Ra  Abdomen: NBS, NT/ND, No HSM, No rigidity or guarding  MSK: No LE edema. No Calf tenderness  : No muhammad  Skin: No rashes or lesions. Skin is warm and dry to the touch.   Neuro: Alert and Awake. CN 2-12 Grossly intact. Moves all four extremities spontaneously.  Psych: Calm, Pleasant, Cooperative                                   8.5    13.33 )-----------( 351      ( 2021 07:19 )             28.7           137  |  105  |  57<H>  ----------------------------<  116<H>  5.2   |  16<L>  |  1.88<H>    Ca    9.5      2021 07:01  Phos  3.5       Mg     2.1         TPro  6.5  /  Alb  3.7  /  TBili  0.2  /  DBili  x   /  AST  124<H>  /  ALT  238<H>  /  AlkPhos  167<H>        Urinalysis Basic - ( 2021 13:45 )    Color: Light Yellow / Appearance: Clear / S.017 / pH: x  Gluc: x / Ketone: Negative  / Bili: Negative / Urobili: Negative   Blood: x / Protein: 30 mg/dL / Nitrite: Negative   Leuk Esterase: Moderate / RBC: 2 /hpf / WBC 12 /HPF   Sq Epi: x / Non Sq Epi: 4 /hpf / Bacteria: Negative        MICROBIOLOGY:  v  .Blood Blood-Peripheral  21   No growth to date.  --  --      .Urine Clean Catch (Midstream)  21   <10,000 CFU/mL Normal Urogenital [place]  --  --      .Blood Blood-Peripheral  21   No Growth Final  --  Blood Culture PCR          Rapid RVP Result: [place] ( @ 14:20)        RADIOLOGY:    <The imaging below has been reviewed and visualized by me independently. Findings as detailed in report below>    EXAM:  US ABDOMEN COMPLETE                        PROCEDURE DATE:  2021    Trace amount of perihepatic ascites.  Hepatic steatosis.  Small bilateral pleural effusions.

## 2021-07-19 NOTE — PROGRESS NOTE ADULT - PROBLEM SELECTOR PLAN 3
-Patient found to be in A Fib w/ RVR to HR 160s on admission   -Likely causing SOB  -Given Amio 150mg x1  -Takes Cardizem at home  -Rate controlled on Lopressor and Diltaizem to the 90s-100s  -Overnight 7/14, patient was tachycardic to 150s, given IV push of metoprolol 5mg, metoprolol 25mg PO, and metoprolol tartrate increased to 75mg BID, further increased to 100mg BID as per EP (7/15)  -s/p EP cardioversion 7/15- currently in NSR  -Reduce metoprolol dose if ok with cardiology

## 2021-07-19 NOTE — PROGRESS NOTE ADULT - ASSESSMENT
86 year-old woman with AFib with RVR in the setting of colitis.  Now status post cardioversion.  Maintaining sinus rhythm.    Still having diarrhea. Diltiazem may be culprit for GI symptoms. Would hold and reassess. Can uptitrate beta-blocker as tolerated.    Discharge planning per primary team.

## 2021-07-19 NOTE — PROGRESS NOTE ADULT - ASSESSMENT
85 yo female w/pmh HFpEF, atrial fibrillation, fatty liver, microscopic colitis, gout, severe MR s/p MVR 1997, CKD 3, admitted to CCU for a-fib with RVR, HR 160s. Restarted home meds and HR better controlled. S/p DCCV, now in SR.

## 2021-07-19 NOTE — PROGRESS NOTE ADULT - PROBLEM SELECTOR PLAN 1
-Cr elevated at 1.7 on admission. WEN on CKD likely prerenal (2/2 diarrhea from microscopic colitis)  - c/w mIVF, encourage PO intake   - Trend Cr   - Avoid nephrotoxic medications  - F/U infectious workup  - f/u bladder scan, renal US  - positive UA, negative Ucx  - c/w ceftriaxone (day 2)  - IVF  - d/c torsemide   - consider nephrology consult if no improvement with IVF

## 2021-07-19 NOTE — PROGRESS NOTE ADULT - SUBJECTIVE AND OBJECTIVE BOX
**************************  Camilla Rivero  PGY-1 Internal Medicine  [phone]  **************************    Patient is a 86y old  Female who presents with a chief complaint of SOB (2021 14:35)      SUBJECTIVE / OVERNIGHT EVENTS:  Patient sitting up in bed this morning, feels well. Reports ongoing dysuria with primafit. Otherwise, ROS negative, no palpitations, no diarrhea, no BM in 2-3 days. Patient in NS rate in 50s on tele.    MEDICATIONS  (STANDING):  allopurinol 200 milliGRAM(s) Oral daily  amitriptyline 25 milliGRAM(s) Oral daily  apixaban 2.5 milliGRAM(s) Oral every 12 hours  buDESOnide    EC Capsule 9 milliGRAM(s) Oral daily  calamine/zinc oxide Lotion 1 Application(s) Topical once  cefTRIAXone   IVPB 1000 milliGRAM(s) IV Intermittent every 24 hours  diltiazem    milliGRAM(s) Oral daily  hydrocortisone 1% Ointment 1 Application(s) Topical two times a day  lactated ringers. 1000 milliLiter(s) (100 mL/Hr) IV Continuous <Continuous>  metoprolol tartrate 100 milliGRAM(s) Oral two times a day  oxybutynin 5 milliGRAM(s) Oral daily  pantoprazole    Tablet 40 milliGRAM(s) Oral before breakfast    MEDICATIONS  (PRN):  acetaminophen   Tablet .. 650 milliGRAM(s) Oral every 6 hours PRN Temp greater or equal to 38.5C (101.3F), Mild Pain (1 - 3)  aluminum hydroxide/magnesium hydroxide/simethicone Suspension 30 milliLiter(s) Oral every 4 hours PRN Dyspepsia  melatonin 3 milliGRAM(s) Oral at bedtime PRN Insomnia  ondansetron Injectable 4 milliGRAM(s) IV Push every 8 hours PRN Nausea and/or Vomiting      I&O's Summary    2021 07:01  -  2021 07:00  --------------------------------------------------------  IN: 660 mL / OUT: 700 mL / NET: -40 mL        PHYSICAL EXAM:  Vital Signs Last 24 Hrs  T(C): 36.2 (2021 04:00), Max: 36.8 (2021 20:56)  T(F): 97.2 (2021 04:00), Max: 98.2 (2021 20:56)  HR: 51 (2021 06:00) (51 - 59)  BP: 128/60 (2021 06:00) (128/60 - 132/76)  BP(mean): --  RR: 18 (2021 06:00) (18 - 18)  SpO2: 96% (2021 06:00) (96% - 98%)    CONSTITUTIONAL: NAD, well-developed  RESPIRATORY: Normal respiratory effort; lungs are clear to auscultation bilaterally  CARDIOVASCULAR: Regular rate and rhythm, normal S1 and S2, no murmur/rub/gallop; No lower extremity edema; Peripheral pulses are 2+ bilaterally  ABDOMEN: Nontender to palpation, normoactive bowel sounds, no rebound/guarding  MUSCLOSKELETAL: no clubbing or cyanosis of digits; no joint swelling or tenderness to palpation  PSYCH: A+O to person, place, and time; affect appropriate    LABS:                        8.5    13.33 )-----------( 351      ( 2021 07:19 )             28.7     07-19    137  |  105  |  57<H>  ----------------------------<  116<H>  5.2   |  16<L>  |  1.88<H>    Ca    9.5      2021 07:01  Phos  3.5     07-19  Mg     2.1     07-19    TPro  6.5  /  Alb  3.7  /  TBili  0.2  /  DBili  x   /  AST  124<H>  /  ALT  238<H>  /  AlkPhos  167<H>  07-19          Urinalysis Basic - ( 2021 13:45 )    Color: Light Yellow / Appearance: Clear / S.017 / pH: x  Gluc: x / Ketone: Negative  / Bili: Negative / Urobili: Negative   Blood: x / Protein: 30 mg/dL / Nitrite: Negative   Leuk Esterase: Moderate / RBC: 2 /hpf / WBC 12 /HPF   Sq Epi: x / Non Sq Epi: 4 /hpf / Bacteria: Negative        Culture - Blood (collected 2021 17:20)  Source: .Blood Blood-Peripheral  Preliminary Report (2021 18:01):    No growth to date.    Culture - Blood (collected 2021 17:20)  Source: .Blood Blood-Peripheral  Preliminary Report (2021 18:01):    No growth to date.

## 2021-07-19 NOTE — PROGRESS NOTE ADULT - PROBLEM SELECTOR PLAN 6
-EF 55-60%, MVR with annuplasty ring and moderate-severe AR and TR, mild pHTN  -Likely 2/2 A fib w/ RVR   -Cardiology (patient sees Dr. Wynne as an outpatient) and Dr. Valdovinos  -TTE from 6/11 show severe pulmonary pressures  -TTE from 7/13 showed mild pulmonary pressures  - hold home Torsemide 40mg qd given diarrhea, WEN   -BNP [id number]

## 2021-07-19 NOTE — PROGRESS NOTE ADULT - ASSESSMENT
86 year old female PMH MVR s/p repair, Afib on Eliquis, HTN, HLD, CHF, Gout, p/w SOB x 2-3 days and fall 5-7 days prior to presentation. In the ED afebrile, hypotensive to systolics in the 90's, tachycardic >150.     RVP/COVID19 PCR (7/14) Negative.   U/A (7/13) 9 WBC.   BCx (7/13) with 1/4 MRSE.     CT Chest (7/13) with Lingular and right middle lobe subsegmental atelectasis.   CT A/P (7/13) with Pericapsular fluid collection adjacent to right lobe of liver    I reviewed imaging with radiology today (7/15) fluid collection may be consistent with hematoma but absence of IV contracts limits ability to differentiate  Patient with some leukocytosis on presentation but possible it is secondary to corticosteroids prior to presentation.  Transaminitis likely secondary to CHF exacerbation  At the present I would monitor off of antibiotics  If/when WEN resolves I would check CT A/P with IV contrast    RECOMMENDATIONS    #Dysuria  --U/A with slight pyuria, UCx pending  --Can continue Ceftriaxone 1g IV Q24H    #Subcapsular Liver Fluid Collection, Leukocytosis, Transaminitis  --If/when WEN resolves I would check CT A/P with IV contrast  --Continue to follow CBC with diff  --Continue to follow renal function (Cr/BUN)  --Continue to follow transaminases  --Continue to follow temperature curve  --Follow up on preliminary blood cultures    #Positive Blood Cultures (Suspect Staph epi is procurement contaminant)    I will continue to follow. Please feel free to contact me with any further questions.    Kevin Funk M.D.  [facility] Division of Infectious Disease  8AM-5PM: Pager Number [phone]  After Hours (or if no response): Please contact the Infectious Diseases Office at [phone]

## 2021-07-19 NOTE — PROGRESS NOTE ADULT - SUBJECTIVE AND OBJECTIVE BOX
HPI:  Patient seen and examined at bedside on 3 DSU.  She is maintaining NSR.    Review Of Systems:           Respiratory: No shortness of breath, cough, or wheezing  Cardiovascular: No chest pain or palpitations  10 point review of systems is otherwise negative except as mentioned above        Medications:  acetaminophen   Tablet .. 650 milliGRAM(s) Oral every 6 hours PRN  allopurinol 200 milliGRAM(s) Oral daily  aluminum hydroxide/magnesium hydroxide/simethicone Suspension 30 milliLiter(s) Oral every 4 hours PRN  amitriptyline 25 milliGRAM(s) Oral daily  apixaban 2.5 milliGRAM(s) Oral every 12 hours  buDESOnide    EC Capsule 9 milliGRAM(s) Oral daily  calamine/zinc oxide Lotion 1 Application(s) Topical once  cefTRIAXone   IVPB 1000 milliGRAM(s) IV Intermittent every 24 hours  diltiazem    milliGRAM(s) Oral daily  hydrocortisone 1% Ointment 1 Application(s) Topical two times a day  lactated ringers. 1000 milliLiter(s) IV Continuous <Continuous>  melatonin 3 milliGRAM(s) Oral at bedtime PRN  metoprolol tartrate 50 milliGRAM(s) Oral two times a day  ondansetron Injectable 4 milliGRAM(s) IV Push every 8 hours PRN  oxybutynin 5 milliGRAM(s) Oral daily  pantoprazole    Tablet 40 milliGRAM(s) Oral before breakfast    PAST MEDICAL & SURGICAL HISTORY:  Insomnia    HTN (hypertension)    Hypercholesteremia    GERD (gastroesophageal reflux disease)    Carotid artery occlusion  (R)    Vitamin B 12 deficiency    Osteoporosis    Mitral valve disease    Gallstones    Choledocholithiasis    CHF (congestive heart failure)    S/P MVR (mitral valve repair)   at [facility]    Status post DACIA-BSO  1975    Hx of tonsillectomy    Abdominal hernia  repair     History of lumbar laminectomy for spinal cord decompression      Papilloma of breast  s/p excision from right breast &gt;20 years ago    Bilateral cataracts  extraction w/ IOL    Varicose veins  s/p sclerotherapy bilaterally    H/O carotid endarterectomy    S/P laparoscopic cholecystectomy      Vitals:  T(C): 36.2 (21 @ 04:00), Max: 36.8 (21 @ 20:56)  HR: 52 (21 @ 17:11) (51 - 68)  BP: 137/76 (21 @ 17:11) (127/62 - 137/76)  BP(mean): 68 (21 @ 12:15) (68 - 68)  RR: 18 (21 @ 06:00) (18 - 18)  SpO2: 96% (21 @ 06:00) (96% - 98%)  Wt(kg): --  Daily     Daily Weight in k (2021 00:26)  I&O's Summary    2021 07:01  -  2021 07:00  --------------------------------------------------------  IN: 660 mL / OUT: 700 mL / NET: -40 mL    2021 07:01  -  2021 18:40  --------------------------------------------------------  IN: 480 mL / OUT: 400 mL / NET: 80 mL        Physical Exam:  Appearance: Normal, well groomed, NAD  Eyes: PERRLA, EOMI, pink conjunctiva, no scleral icterus   HENT: Normal oral mucosa  Cardiovascular: RRR, S1, S2, no murmur, rub, or gallop; no edema; no JVD  Procedural Access Site: Clean, dry, intact, without hematoma  Respiratory: Clear to auscultation bilaterally  Gastrointestinal: Soft, non-tender, non-distended, BS+  Musculoskeletal: No clubbing or joint deformity   Neurologic: No focal weakness  Lymphatic: No lymphadenopathy  Psychiatry: AAOx3 with appropriate mood and affect  Skin: No rashes, ecchymoses, or cyanosis                          8.5    13.33 )-----------( 351      ( 2021 07:19 )             28.7         137  |  105  |  57<H>  ----------------------------<  116<H>  5.2   |  16<L>  |  1.88<H>    Ca    9.5      2021 07:01  Phos  3.5       Mg     2.1         TPro  6.5  /  Alb  3.7  /  TBili  0.2  /  DBili  x   /  AST  124<H>  /  ALT  238<H>  /  AlkPhos  167<H>            Serum Pro-Brain Natriuretic Peptide: 2709 pg/mL ( @ 13:49)  Serum Pro-Brain Natriuretic Peptide: 23005 pg/mL ( @ 21:41)  Serum Pro-Brain Natriuretic Peptide: 81696 pg/mL ( @ 11:28)      Cardiovascular Diagnostic Testing:  ECG:    Echo: < from: TTE with Doppler (w/Cont) (21 @ 17:02) >  ------------------------------------------------------------------------  Dimensions:    Normal Values:  LA:     4.6    2.0 - 4.0 cm  Ao:     3.0    2.0 - 3.8 cm  SEPTUM: 1.0    0.6 - 1.2 cm  PWT:    1.0 0.6 - 1.1 cm  LVIDd:  4.1    3.0 - 5.6 cm  LVIDs:  3.6    1.8 - 4.0 cm  Derived variables:  LVMI: 80 g/m2  RWT: 0.48  Fractional short: 12 %  EF (Visual Estimate): 55-60 %  EF (Hernandez Rule): 56 %Doppler Peak Velocity (m/sec):  MV=1.6 AoV=1.1  ------------------------------------------------------------------------  Observations:  Mitral Valve: An annuloplasty ring is seen in the mitral  position.. Mild mitral regurgitation.  Peak mitral valve  gradient equals 11 mm Hg, mean transmitral valve gradient  equals 5 mm Hg, which is probably normal in the setting of  a An annuloplasty ring is seen in the mitral position..  Aortic Valve/Aorta: Calcified trileaflet aortic valve with  normal opening. Peak transaortic valve gradient equals 5 mm  Hg. Moderate-severe aortic regurgitation. Peak left  ventricular outflow tract gradient equals 3 mm Hg.  Aortic Root: 3 cm.  LVOT diameter: 1.9 cm.  Left Atrium: Mildly dilated left atrium.  LA volume index =  38 cc/m2.  Left Ventricle: Endocardial visualization enhanced with  intravenous injection of Ultrasonic Enhancing Agent  (Definity). Difficult assesment of EF in the setting of  tachycardia.  Overall preserved left ventricular systolic  function. Small territory isolated at the apex that appers  aneurysmal.  Normal left ventricular internal dimensions  and wall thicknesses. Increased E/e'  is consistent with  elevated left ventricular filling pressure.  Right Heart: Right atrial enlargement. Right ventricula  rmild enlargement with decreased right ventricular systolic  function. Tricuspid valve not well visualized.  Moderate-severe tricuspid regurgitation. Pulmonic valve not  well visualized.  Pericardium/Pleura: Normal pericardium with no pericardial  effusion.  Hemodynamic: Estimated right atrial pressure is 8 mm Hg.  Estimated right ventricular systolic pressure equals 49 mm  Hg, assuming right atrial pressure equals 8 mm Hg,  consistent with mild pulmonary hypertension.  ------------------------------------------------------------------------  Conclusions:  1. An annuloplasty ring is seen in the mitral position..  Mild mitral regurgitation.  Peak mitral valve gradient  equals 11 mm Hg, mean transmitral valve gradient equals 5  mm Hg, which is probably normal in the setting of a An  annuloplasty ring is seen in the mitral position..  2. Calcified trileaflet aortic valve with normal opening.  Moderate-severe aortic regurgitation.  3. Normal left ventricular internal dimensions and wall  thicknesses.  4. Endocardial visualization enhanced with intravenous  injection of Ultrasonic Enhancing Agent (Definity).  Difficult assesment of EF in the setting of tachycardia.  Overall preserved left ventricular systolic function. Small  territory isolated at the apex that appers aneurysmal.  5. Increased E/e'  is consistent with elevated left  ventricular filling pressure.  6. Right ventricula rmild enlargement with decreased right  ventricular systolic function.  7. Tricuspid valve not well visualized. Moderate-severe  tricuspidregurgitation.  8. Estimated pulmonary artery systolic pressure equals 49  mm Hg, assuming right atrial pressure equals 8 mm Hg,  consistent with mild pulmonary pressures.  *** Compared with echocardiogram of 2021, tachycardia  limiting LV function in the setting of moderate to severe  AR, TR.  ------------------------------------------------------------------------  Confirmed on  2021 - 08:07:27 by Sarah Smith M.D.  ------------------------------------------------------------    < end of copied text >

## 2021-07-19 NOTE — PROGRESS NOTE ADULT - PROBLEM SELECTOR PLAN 5
-Likely 2/2 CHF  -Treat as above   -Monitor AST/ALT for now, trending down  -Hepatitis serologies negative  -Transaminitis - due to possible liver abscess vs hepatic congestion from heart failure  - check CT Abd w/IV contrast when WEN resolves

## 2021-07-20 NOTE — PROVIDER CONTACT NOTE (OTHER) - ACTION/TREATMENT ORDERED:
MD made aware. Ordered EKG and Lopressor IVP 5 MG. Will continue to monitor.
lopressor 5mg IVP X 1, additional dose of lopressor 25mg. po X 1

## 2021-07-20 NOTE — DIETITIAN INITIAL EVALUATION ADULT. - PERTINENT MEDS FT
MEDICATIONS  (STANDING):  allopurinol 200 milliGRAM(s) Oral daily  amitriptyline 25 milliGRAM(s) Oral daily  apixaban 2.5 milliGRAM(s) Oral every 12 hours  buDESOnide    EC Capsule 9 milliGRAM(s) Oral daily  calamine/zinc oxide Lotion 1 Application(s) Topical once  cefTRIAXone   IVPB 1000 milliGRAM(s) IV Intermittent every 24 hours  diltiazem    milliGRAM(s) Oral daily  hydrocortisone 1% Ointment 1 Application(s) Topical two times a day  lactated ringers. 1000 milliLiter(s) (100 mL/Hr) IV Continuous <Continuous>  metoprolol tartrate 50 milliGRAM(s) Oral two times a day  oxybutynin 5 milliGRAM(s) Oral daily  pantoprazole    Tablet 40 milliGRAM(s) Oral before breakfast    MEDICATIONS  (PRN):  acetaminophen   Tablet .. 650 milliGRAM(s) Oral every 6 hours PRN Temp greater or equal to 38.5C (101.3F), Mild Pain (1 - 3)  aluminum hydroxide/magnesium hydroxide/simethicone Suspension 30 milliLiter(s) Oral every 4 hours PRN Dyspepsia  diphenhydrAMINE 25 milliGRAM(s) Oral every 6 hours PRN Rash and/or Itching  melatonin 3 milliGRAM(s) Oral at bedtime PRN Insomnia  ondansetron Injectable 4 milliGRAM(s) IV Push every 8 hours PRN Nausea and/or Vomiting

## 2021-07-20 NOTE — CHART NOTE - NSCHARTNOTEFT_GEN_A_CORE
As per primary team, okay to go for CT A/P with contrast. WEN has resolved to 1.44 (baseline on admission was 1.70). Risk and benefits of contrast discussed with patient. Plan discussed with radiology.

## 2021-07-20 NOTE — DIETITIAN INITIAL EVALUATION ADULT. - ORAL INTAKE PTA/DIET HISTORY
cereal, banana, coffee, OJ for breakfast, ensure shake for lunch, cheese and crackers for dinner. snacks of cheese and crackers.

## 2021-07-20 NOTE — PROGRESS NOTE ADULT - PROBLEM SELECTOR PLAN 6
-EF 55-60%, MVR with annuplasty ring and moderate-severe AR and TR, mild pHTN  -Likely 2/2 A fib w/ RVR   -Cardiology (patient sees Dr. Wynne as an outpatient) and Dr. Valdovinos  -TTE from 6/11 show severe pulmonary pressures  -TTE from 7/13 showed mild pulmonary pressures  - hold home Torsemide 40mg qd given diarrhea, WEN   -BNP 80520

## 2021-07-20 NOTE — PROGRESS NOTE ADULT - PROBLEM SELECTOR PLAN 3
-Patient found to be in A Fib w/ RVR to HR 160s on admission   -Likely causing SOB  -Given Amio 150mg x1  -Takes Cardizem at home  -Rate controlled on Lopressor and Diltaizem to the 90s-100s  -Overnight 7/14, patient was tachycardic to 150s, given IV push of metoprolol 5mg, metoprolol 25mg PO, and metoprolol tartrate increased to 75mg BID, further increased to 100mg BID as per EP (7/15)  -s/p EP cardioversion 7/15- currently in NSR  -metoprolol decreased to 50mg BID on 7/19

## 2021-07-20 NOTE — PROGRESS NOTE ADULT - SUBJECTIVE AND OBJECTIVE BOX
Follow Up:  Pericapsular Liver fluid collection    Interval History: afebrile. going for CT A/P today to further characterize pericapsular fluid collection. no n/v/d.     REVIEW OF SYSTEMS  [  ] ROS unobtainable because:    [ x ] All other systems negative except as noted below    Constitutional:  [ ] fever [ ] chills  [ ] weight loss  [ ] weakness  Skin:  [ ] rash [ ] phlebitis	  Eyes: [ ] icterus [ ] pain  [ ] discharge	  ENMT: [ ] sore throat  [ ] thrush [ ] ulcers [ ] exudates  Respiratory: [ ] dyspnea [ ] hemoptysis [ ] cough [ ] sputum	  Cardiovascular:  [ ] chest pain [ ] palpitations [ ] edema	  Gastrointestinal:  [ ] nausea [ ] vomiting [ ] diarrhea [ ] constipation [ ] pain	  Genitourinary:  [ ] dysuria [ ] frequency [ ] hematuria [ ] discharge [ ] flank pain  [ ] incontinence  Musculoskeletal:  [ ] myalgias [ ] arthralgias [ ] arthritis  [ ] back pain  Neurological:  [ ] headache [ ] seizures  [ ] confusion/altered mental status    Allergies  &quot;VASELINE BASED OINTMENTS&quot; (Urticaria; Rash)  adhesives (Urticaria; Rash)  contrast media (gadolinium-based) (Chills)  Norvasc (Pruritus)  statins (Unknown)        ANTIMICROBIALS:  cefTRIAXone   IVPB 1000 every 24 hours      OTHER MEDS:  MEDICATIONS  (STANDING):  acetaminophen   Tablet .. 650 every 6 hours PRN  allopurinol 200 daily  aluminum hydroxide/magnesium hydroxide/simethicone Suspension 30 every 4 hours PRN  amitriptyline 25 daily  apixaban 2.5 every 12 hours  buDESOnide    EC Capsule 9 daily  diphenhydrAMINE 25 every 6 hours PRN  melatonin 3 at bedtime PRN  metoprolol tartrate 50 two times a day  ondansetron Injectable 4 every 8 hours PRN  oxybutynin 5 daily  pantoprazole    Tablet 40 before breakfast      Vital Signs Last 24 Hrs  T(C): 36.3 (20 Jul 2021 12:51), Max: 36.4 (20 Jul 2021 04:00)  T(F): 97.3 (20 Jul 2021 12:51), Max: 97.5 (20 Jul 2021 04:00)  HR: 63 (20 Jul 2021 12:51) (52 - 63)  BP: 132/63 (20 Jul 2021 12:51) (132/63 - 165/70)  BP(mean): --  RR: 18 (20 Jul 2021 12:51) (18 - 18)  SpO2: 98% (20 Jul 2021 12:51) (94% - 98%)    PHYSICAL EXAMINATION:  General: Alert and Awake, NAD  Cardiac: RRR, No M/R/G  Resp: CTAB, No Wh/Rh/Ra  Abdomen: NBS, NT/ND, No HSM, No rigidity or guarding  MSK: No LE edema. No Calf tenderness  : No muhammad  Skin: No rashes or lesions. Skin is warm and dry to the touch.   Neuro: Alert and Awake. CN 2-12 Grossly intact. Moves all four extremities spontaneously.  Psych: Calm, Pleasant, Cooperative                                   8.3    9.42  )-----------( 329      ( 20 Jul 2021 07:20 )             28.1       07-20    139  |  106  |  46<H>  ----------------------------<  102<H>  4.3   |  19<L>  |  1.44<H>    Ca    9.6      20 Jul 2021 07:17  Phos  3.2     07-20  Mg     2.0     07-20    TPro  6.7  /  Alb  3.6  /  TBili  0.4  /  DBili  x   /  AST  104<H>  /  ALT  235<H>  /  AlkPhos  168<H>  07-20          MICROBIOLOGY:  v  .Urine Clean Catch (Midstream)  07-19-21   <10,000 CFU/mL Normal Urogenital [place]  --  --      .Blood Blood-Peripheral  07-16-21   No growth to date.  --  --      .Urine Clean Catch (Midstream)  07-13-21   <10,000 CFU/mL Normal Urogenital [place]  --  --      .Blood Blood-Peripheral  07-13-21   No Growth Final  --  Blood Culture PCR          Rapid RVP Result: [employer] (07-14 @ 14:20)        RADIOLOGY:    <The imaging below has been reviewed and visualized by me independently. Findings as detailed in report below>    EXAM:  US ABDOMEN COMPLETE                        PROCEDURE DATE:  07/19/2021    Trace amount of perihepatic ascites.  Hepatic steatosis.  Small bilateral pleural effusions.

## 2021-07-20 NOTE — DIETITIAN INITIAL EVALUATION ADULT. - PERTINENT LABORATORY DATA
07-20 @ 07:17: Na 139, BUN 46<H>, Cr 1.44<H>, <H>, K+ 4.3, Phos 3.2, Mg 2.0, Alk Phos 168<H>, ALT/SGPT 235<H>, AST/SGOT 104<H>, HbA1c --

## 2021-07-20 NOTE — PROGRESS NOTE ADULT - PROBLEM SELECTOR PLAN 2
-Follows with Dr. Young (GI)  -Has microscopic colitis and planned to start prolonged budesonide taper (started 6/28)   -Weakness and fatigue, fainting, since starting budesonide so was advised to stop  -Outpatient follow up with Dr. Young  - per Dr. Young, resume budesonide 9mg qd  - K>4, MG>2  - entamoeba negative

## 2021-07-20 NOTE — PROGRESS NOTE ADULT - SUBJECTIVE AND OBJECTIVE BOX
HPI:  Patient seen and examined at bedside on 3 DSU.  Diltiazem has been discontinued.    Review Of Systems:           Respiratory: No shortness of breath, cough, or wheezing  Cardiovascular: No chest pain or palpitations  10 point review of systems is otherwise negative except as mentioned above        Medications:  acetaminophen   Tablet .. 650 milliGRAM(s) Oral every 6 hours PRN  allopurinol 200 milliGRAM(s) Oral daily  aluminum hydroxide/magnesium hydroxide/simethicone Suspension 30 milliLiter(s) Oral every 4 hours PRN  amitriptyline 25 milliGRAM(s) Oral daily  apixaban 2.5 milliGRAM(s) Oral every 12 hours  buDESOnide    EC Capsule 9 milliGRAM(s) Oral daily  calamine/zinc oxide Lotion 1 Application(s) Topical once  diphenhydrAMINE 25 milliGRAM(s) Oral every 6 hours PRN  hydrocortisone 1% Ointment 1 Application(s) Topical two times a day  lactated ringers. 1000 milliLiter(s) IV Continuous <Continuous>  melatonin 3 milliGRAM(s) Oral at bedtime PRN  metoprolol tartrate 50 milliGRAM(s) Oral three times a day  ondansetron Injectable 4 milliGRAM(s) IV Push every 8 hours PRN  oxybutynin 5 milliGRAM(s) Oral daily  pantoprazole    Tablet 40 milliGRAM(s) Oral before breakfast    PAST MEDICAL & SURGICAL HISTORY:  Insomnia    HTN (hypertension)    Hypercholesteremia    GERD (gastroesophageal reflux disease)    Carotid artery occlusion  (R)    Vitamin B 12 deficiency    Osteoporosis    Mitral valve disease    Gallstones    Choledocholithiasis    CHF (congestive heart failure)    S/P MVR (mitral valve repair)   at [facility]    Status post DACIA-BSO  1975    Hx of tonsillectomy    Abdominal hernia  repair     History of lumbar laminectomy for spinal cord decompression      Papilloma of breast  s/p excision from right breast &gt;20 years ago    Bilateral cataracts  extraction w/ IOL    Varicose veins  s/p sclerotherapy bilaterally    H/O carotid endarterectomy    S/P laparoscopic cholecystectomy      Vitals:  T(C): 36.3 (21 @ 21:00), Max: 36.6 (21 @ 18:25)  HR: 120 (21 @ 21:00) (59 - 130)  BP: 137/82 (21 @ 21:00) (127/76 - 165/70)  BP(mean): --  RR: 18 (21 @ 21:00) (18 - 18)  SpO2: 93% (21 @ 21:00) (93% - 98%)  Wt(kg): --  Daily     Daily Weight in k.2 (2021 04:00)  I&O's Summary    2021 07:01  -  2021 07:00  --------------------------------------------------------  IN: 890 mL / OUT: 900 mL / NET: -10 mL    2021 07:01  -  2021 22:53  --------------------------------------------------------  IN: 800 mL / OUT: 1100 mL / NET: -300 mL        Physical Exam:  Appearance: Normal, well groomed, NAD  Eyes: PERRLA, EOMI, pink conjunctiva, no scleral icterus   HENT: Normal oral mucosa  Cardiovascular: RRR, S1, S2, no murmur, rub, or gallop; no edema; no JVD  Procedural Access Site: Clean, dry, intact, without hematoma  Respiratory: Clear to auscultation bilaterally  Gastrointestinal: Soft, non-tender, non-distended, BS+  Musculoskeletal: No clubbing or joint deformity   Neurologic: No focal weakness  Lymphatic: No lymphadenopathy  Psychiatry: AAOx3 with appropriate mood and affect  Skin: No rashes, ecchymoses, or cyanosis                            8.3    9.42  )-----------( 329      ( 2021 07:20 )             28.1         139  |  106  |  46<H>  ----------------------------<  102<H>  4.3   |  19<L>  |  1.44<H>    Ca    9.6      2021 07:17  Phos  3.2       Mg     2.0         TPro  6.7  /  Alb  3.6  /  TBili  0.4  /  DBili  x   /  AST  104<H>  /  ALT  235<H>  /  AlkPhos  168<H>            Serum Pro-Brain Natriuretic Peptide: 2709 pg/mL ( @ 13:49)        Cardiovascular Diagnostic Testing:  ECG:    Echo: < from: TTE with Doppler (w/Cont) (21 @ 17:02) >  ------------------------------------------------------------------------  Dimensions:    Normal Values:  LA:     4.6    2.0 - 4.0 cm  Ao:     3.0    2.0 - 3.8 cm  SEPTUM: 1.0    0.6 - 1.2 cm  PWT:    1.0 0.6 - 1.1 cm  LVIDd:  4.1    3.0 - 5.6 cm  LVIDs:  3.6    1.8 - 4.0 cm  Derived variables:  LVMI: 80 g/m2  RWT: 0.48  Fractional short: 12 %  EF (Visual Estimate): 55-60 %  EF (Hernandez Rule): 56 %Doppler Peak Velocity (m/sec):  MV=1.6 AoV=1.1  ------------------------------------------------------------------------  Observations:  Mitral Valve: An annuloplasty ring is seen in the mitral  position.. Mild mitral regurgitation.  Peak mitral valve  gradient equals 11 mm Hg, mean transmitral valve gradient  equals 5 mm Hg, which is probably normal in the setting of  a An annuloplasty ring is seen in the mitral position..  Aortic Valve/Aorta: Calcified trileaflet aortic valve with  normal opening. Peak transaortic valve gradient equals 5 mm  Hg. Moderate-severe aortic regurgitation. Peak left  ventricular outflow tract gradient equals 3 mm Hg.  Aortic Root: 3 cm.  LVOT diameter: 1.9 cm.  Left Atrium: Mildly dilated left atrium.  LA volume index =  38 cc/m2.  Left Ventricle: Endocardial visualization enhanced with  intravenous injection of Ultrasonic Enhancing Agent  (Definity). Difficult assesment of EF in the setting of  tachycardia.  Overall preserved left ventricular systolic  function. Small territory isolated at the apex that appers  aneurysmal.  Normal left ventricular internal dimensions  and wall thicknesses. Increased E/e'  is consistent with  elevated left ventricular filling pressure.  Right Heart: Right atrial enlargement. Right ventricula  rmild enlargement with decreased right ventricular systolic  function. Tricuspid valve not well visualized.  Moderate-severe tricuspid regurgitation. Pulmonic valve not  well visualized.  Pericardium/Pleura: Normal pericardium with no pericardial  effusion.  Hemodynamic: Estimated right atrial pressure is 8 mm Hg.  Estimated right ventricular systolic pressure equals 49 mm  Hg, assuming right atrial pressure equals 8 mm Hg,  consistent with mild pulmonary hypertension.  ------------------------------------------------------------------------  Conclusions:  1. An annuloplasty ring is seen in the mitral position..  Mild mitral regurgitation.  Peak mitral valve gradient  equals 11 mm Hg, mean transmitral valve gradient equals 5  mm Hg, which is probably normal in the setting of a An  annuloplasty ring is seen in the mitral position..  2. Calcified trileaflet aortic valve with normal opening.  Moderate-severe aortic regurgitation.  3. Normal left ventricular internal dimensions and wall  thicknesses.  4. Endocardial visualization enhanced with intravenous  injection of Ultrasonic Enhancing Agent (Definity).  Difficult assesment of EF in the setting of tachycardia.  Overall preserved left ventricular systolic function. Small  territory isolated at the apex that appers aneurysmal.  5. Increased E/e'  is consistent with elevated left  ventricular filling pressure.  6. Right ventricula rmild enlargement with decreased right  ventricular systolic function.  7. Tricuspid valve not well visualized. Moderate-severe  tricuspidregurgitation.  8. Estimated pulmonary artery systolic pressure equals 49  mm Hg, assuming right atrial pressure equals 8 mm Hg,  consistent with mild pulmonary pressures.  *** Compared with echocardiogram of 2021, tachycardia  limiting LV function in the setting of moderate to severe  AR, TR.  ------------------------------------------------------------------------  Confirmed on  2021 - 08:07:27 by Sarah Smith M.D.  ------------------------------------------------------------    < end of copied text >

## 2021-07-20 NOTE — PROVIDER CONTACT NOTE (OTHER) - ASSESSMENT
Patient Patient A&OX4. Able to make needs known. Patient complaining of palpitations. No s/s of distress noted. VS as charted. PO Metoprolol 50mg given earlier prior to converting to Afib RVR.

## 2021-07-20 NOTE — PROGRESS NOTE ADULT - ASSESSMENT
86 year old female PMH MVR s/p repair, Afib on Eliquis, HTN, HLD, CHF, Gout, p/w SOB x 2-3 days and fall 5-7 days prior to presentation. In the ED afebrile, hypotensive to systolics in the 90's, tachycardic >150.     RVP/COVID19 PCR (7/14) Negative.   U/A (7/13) 9 WBC.   BCx (7/13) with 1/4 MRSE.     CT Chest (7/13) with Lingular and right middle lobe subsegmental atelectasis.   CT A/P (7/13) with Pericapsular fluid collection adjacent to right lobe of liver    I reviewed imaging with radiology today (7/15) fluid collection may be consistent with hematoma but absence of IV contracts limits ability to differentiate  Patient with some leukocytosis on presentation but possible it is secondary to corticosteroids prior to presentation.  Transaminitis likely secondary to CHF exacerbation    WEN recovered - to go for CT A/P with IV Contrast    RECOMMENDATIONS    #Dysuria  --U/A with slight pyuria, UCx with <10K urogen maurice (but obtained after Abx)  --Complete last dose of Ceftriaxone today    #Subcapsular Liver Fluid Collection, Leukocytosis, Transaminitis  --Follow up on CT A/P with IV contrast  --Continue to follow CBC with diff  --Continue to follow renal function (Cr/BUN)  --Continue to follow transaminases  --Continue to follow temperature curve  --Follow up on preliminary blood cultures    #Positive Blood Cultures (Suspect Staph epi is procurement contaminant)    I will continue to follow. Please feel free to contact me with any further questions.    Kevin Funk M.D.  [facility] Division of Infectious Disease  8AM-5PM: Pager Number [phone]  After Hours (or if no response): Please contact the Infectious Diseases Office at [phone]

## 2021-07-20 NOTE — PROGRESS NOTE ADULT - SUBJECTIVE AND OBJECTIVE BOX
**************************  Camilla Rivero  PGY-1 Internal Medicine  [phone]  **************************    Patient is a 86y old  Female who presents with a chief complaint of SOB (2021 14:23)      SUBJECTIVE / OVERNIGHT EVENTS:  Patient lying comfortably in bed this morning. Yesterday, patient stood up with PT, felt dizzy, and fell back into bed. Yesterday, patient had no BM and dysuria is resolving. Today, notes that she has had pruritis of her hands and arms since admission. Otherwise, denies chest pain, palpitations, diarrhea, abdominal pain. On tele sinus frida 50-60s.    MEDICATIONS  (STANDING):  allopurinol 200 milliGRAM(s) Oral daily  amitriptyline 25 milliGRAM(s) Oral daily  apixaban 2.5 milliGRAM(s) Oral every 12 hours  buDESOnide    EC Capsule 9 milliGRAM(s) Oral daily  calamine/zinc oxide Lotion 1 Application(s) Topical once  cefTRIAXone   IVPB 1000 milliGRAM(s) IV Intermittent every 24 hours  diltiazem    milliGRAM(s) Oral daily  hydrocortisone 1% Ointment 1 Application(s) Topical two times a day  lactated ringers. 1000 milliLiter(s) (100 mL/Hr) IV Continuous <Continuous>  metoprolol tartrate 50 milliGRAM(s) Oral two times a day  oxybutynin 5 milliGRAM(s) Oral daily  pantoprazole    Tablet 40 milliGRAM(s) Oral before breakfast    MEDICATIONS  (PRN):  acetaminophen   Tablet .. 650 milliGRAM(s) Oral every 6 hours PRN Temp greater or equal to 38.5C (101.3F), Mild Pain (1 - 3)  aluminum hydroxide/magnesium hydroxide/simethicone Suspension 30 milliLiter(s) Oral every 4 hours PRN Dyspepsia  diphenhydrAMINE 25 milliGRAM(s) Oral every 6 hours PRN Rash and/or Itching  melatonin 3 milliGRAM(s) Oral at bedtime PRN Insomnia  ondansetron Injectable 4 milliGRAM(s) IV Push every 8 hours PRN Nausea and/or Vomiting      I&O's Summary    2021 07:01  -  2021 07:00  --------------------------------------------------------  IN: 890 mL / OUT: 900 mL / NET: -10 mL        PHYSICAL EXAM:  Vital Signs Last 24 Hrs  T(C): 36.4 (2021 04:00), Max: 36.4 (2021 04:00)  T(F): 97.5 (2021 04:00), Max: 97.5 (2021 04:00)  HR: 59 (2021 07:00) (52 - 68)  BP: 165/70 (2021 04:00) (127/62 - 165/70)  BP(mean): 68 (2021 12:15) (68 - 68)  RR: 18 (2021 04:00) (18 - 18)  SpO2: 94% (2021 04:00) (94% - 98%)    CONSTITUTIONAL: NAD, well-developed  RESPIRATORY: Normal respiratory effort; lungs are clear to auscultation bilaterally  CARDIOVASCULAR: Regular rate and rhythm, normal S1 and S2, no murmur/rub/gallop; No lower extremity edema; Peripheral pulses are 2+ bilaterally  ABDOMEN: Nontender to palpation, normoactive bowel sounds, no rebound/guarding  MUSCLOSKELETAL: no clubbing or cyanosis of digits; no joint swelling or tenderness to palpation  PSYCH: A+O to person, place, and time; affect appropriate    LABS:                        8.3    9.42  )-----------( 329      ( 2021 07:20 )             28.1     07-20    139  |  106  |  46<H>  ----------------------------<  102<H>  4.3   |  19<L>  |  1.44<H>    Ca    9.6      2021 07:17  Phos  3.2     07-20  Mg     2.0     07-20    TPro  6.7  /  Alb  3.6  /  TBili  0.4  /  DBili  x   /  AST  104<H>  /  ALT  235<H>  /  AlkPhos  168<H>  07-20          Urinalysis Basic - ( 2021 13:45 )    Color: Light Yellow / Appearance: Clear / S.017 / pH: x  Gluc: x / Ketone: Negative  / Bili: Negative / Urobili: Negative   Blood: x / Protein: 30 mg/dL / Nitrite: Negative   Leuk Esterase: Moderate / RBC: 2 /hpf / WBC 12 /HPF   Sq Epi: x / Non Sq Epi: 4 /hpf / Bacteria: Negative

## 2021-07-20 NOTE — PROGRESS NOTE ADULT - PROBLEM SELECTOR PLAN 1
-Cr elevated at 1.7 on admission. WEN on CKD likely prerenal (2/2 diarrhea from microscopic colitis)  - c/w mIVF, encourage PO intake   - Trend Cr   - Avoid nephrotoxic medications  - renal u/s showing trace perihepatic ascites, hepatic steatosis, small b/l pleural effusions  - positive UA, Ucx pending  - c/w ceftriaxone (day 3)  - d/c torsemide

## 2021-07-20 NOTE — PROGRESS NOTE ADULT - PROBLEM SELECTOR PLAN 5
-Likely 2/2 CHF  -Treat as above   -Monitor AST/ALT for now, trending down  -Hepatitis serologies negative  -Transaminitis - due to possible liver abscess vs hepatic congestion from heart failure  - check CT Abd w/IV contrast when WEN resolves  - entamoeba negative

## 2021-07-20 NOTE — DIETITIAN INITIAL EVALUATION ADULT. - OTHER INFO
Intake : 100% as per flow sheet  Denies nausea/vomit :  Denies difficulty chewing. she had difficulty swallowing pasta and shrimp last night, she had to spit it out, reports not liking the taste anyway.  Denies diarrhea.   Last BM : 3 days ago and at that time she had diarrhea  Lactose intolerant  Ht: 61.5"  Ht taken from pt  Dosing ht: 157.5cm  Dosing wt: 68kg  BMI: 30.7  BMI calculated using wt from flow sheet  BMI calculated using ht from pt  Education Provided : pt was educated on the importance of supplements to increase calorie and protein intake in light of RD's nutritional findings.   pt does not like [place] but she is willing try it over ice.   pressure injury: none Intake : 100% as per flow sheet  Denies nausea/vomit :  Denies difficulty chewing. she had difficulty swallowing pasta and shrimp last night, she had to spit it out, reports not liking the taste anyway.  Denies diarrhea.   Last BM : 3 days ago and at that time she had diarrhea  Lactose intolerant  Ht: 61.5"  Ht taken from pt  Dosing ht: 157.5cm  Dosing wt: 68kg  BMI: 30.7  BMI calculated using wt from flow sheet  BMI calculated using ht from pt  Education Provided : pt was educated on the importance of supplements to increase calorie and protein intake in light of RD's nutritional findings. Pre-Diabetes Wellness Brochure, List of Ambulatory Nutrition Services  pt does not like [place] but she is willing try it over ice.   pressure injury: none

## 2021-07-21 NOTE — PROGRESS NOTE ADULT - SUBJECTIVE AND OBJECTIVE BOX
Follow Up:  Pericapsular Liver fluid collection    Interval History:    REVIEW OF SYSTEMS  [  ] ROS unobtainable because:    [  ] All other systems negative except as noted below    Constitutional:  [ ] fever [ ] chills  [ ] weight loss  [ ] weakness  Skin:  [ ] rash [ ] phlebitis	  Eyes: [ ] icterus [ ] pain  [ ] discharge	  ENMT: [ ] sore throat  [ ] thrush [ ] ulcers [ ] exudates  Respiratory: [ ] dyspnea [ ] hemoptysis [ ] cough [ ] sputum	  Cardiovascular:  [ ] chest pain [ ] palpitations [ ] edema	  Gastrointestinal:  [ ] nausea [ ] vomiting [ ] diarrhea [ ] constipation [ ] pain	  Genitourinary:  [ ] dysuria [ ] frequency [ ] hematuria [ ] discharge [ ] flank pain  [ ] incontinence  Musculoskeletal:  [ ] myalgias [ ] arthralgias [ ] arthritis  [ ] back pain  Neurological:  [ ] headache [ ] seizures  [ ] confusion/altered mental status    Allergies  &quot;VASELINE BASED OINTMENTS&quot; (Urticaria; Rash)  adhesives (Urticaria; Rash)  contrast media (gadolinium-based) (Chills)  Norvasc (Pruritus)  statins (Unknown)        ANTIMICROBIALS:      OTHER MEDS:  MEDICATIONS  (STANDING):  acetaminophen   Tablet .. 650 every 6 hours PRN  allopurinol 200 daily  aluminum hydroxide/magnesium hydroxide/simethicone Suspension 30 every 4 hours PRN  amitriptyline 25 daily  apixaban 2.5 every 12 hours  buDESOnide    EC Capsule 9 daily  diltiazem    daily  diphenhydrAMINE 25 every 6 hours PRN  melatonin 3 at bedtime PRN  metoprolol tartrate 100 two times a day  ondansetron Injectable 4 every 8 hours PRN  oxybutynin 5 daily  pantoprazole    Tablet 40 before breakfast      Vital Signs Last 24 Hrs  T(C): 36.3 (21 Jul 2021 13:16), Max: 36.6 (20 Jul 2021 18:25)  T(F): 97.3 (21 Jul 2021 13:16), Max: 97.8 (20 Jul 2021 18:25)  HR: 128 (21 Jul 2021 15:08) (72 - 130)  BP: 110/77 (21 Jul 2021 15:08) (110/68 - 141/87)  BP(mean): --  RR: 18 (21 Jul 2021 13:16) (18 - 18)  SpO2: 98% (21 Jul 2021 13:16) (93% - 98%)    PHYSICAL EXAMINATION:  General: Alert and Awake, NAD  HEENT: PERRL, EOMI  Neck: Supple  Cardiac: RRR, No M/R/G  Resp: CTAB, No Wh/Rh/Ra  Abdomen: NBS, NT/ND, No HSM, No rigidity or guarding  MSK: No LE edema. No Calf tenderness  : No muhammad  Skin: No rashes or lesions. Skin is warm and dry to the touch.   Neuro: Alert and Awake. CN 2-12 Grossly intact. Moves all four extremities spontaneously.  Psych: Calm, Pleasant, Cooperative                          8.4    10.82 )-----------( 357      ( 21 Jul 2021 06:27 )             28.1       07-21    139  |  106  |  30<H>  ----------------------------<  112<H>  4.1   |  20<L>  |  1.06    Ca    9.6      21 Jul 2021 06:27  Phos  3.0     07-21  Mg     1.9     07-21    TPro  6.3  /  Alb  3.1<L>  /  TBili  0.2  /  DBili  x   /  AST  72<H>  /  ALT  193<H>  /  AlkPhos  165<H>  07-21          MICROBIOLOGY:    .Urine Clean Catch (Midstream)  07-19-21   <10,000 CFU/mL Normal Urogenital [place]  --  --      .Blood Blood-Peripheral  07-16-21   No growth to date.  --  --      .Urine Clean Catch (Midstream)  07-13-21   <10,000 CFU/mL Normal Urogenital [place]  --  --      .Blood Blood-Peripheral  07-13-21   No Growth Final  --  Blood Culture PCR    RADIOLOGY:    <The imaging below has been reviewed and visualized by me independently. Findings as detailed in report below>    EXAM:  CT ABDOMEN AND PELVIS IC                        PROCEDURE DATE:  07/20/2021    Pericapsular rim-enhancing fluid collection along the posterior hepatic dome, with differential including abscess or an organized hematoma. Fluid sampling may be helpful, though its posterior subdiaphragmatic location may make this difficult. Consider further characterization with contrast enhanced abdominal MRI as clinically indicated.    Moderate right and small left pleural effusions are unchanged. Follow Up:  Pericapsular Liver fluid collection    Interval History: afebrile. no abdominal pain. noting some mild dysuria.     REVIEW OF SYSTEMS  [  ] ROS unobtainable because:    [ x ] All other systems negative except as noted below    Constitutional:  [ ] fever [ ] chills  [ ] weight loss  [ ] weakness  Skin:  [ ] rash [ ] phlebitis	  Eyes: [ ] icterus [ ] pain  [ ] discharge	  ENMT: [ ] sore throat  [ ] thrush [ ] ulcers [ ] exudates  Respiratory: [ ] dyspnea [ ] hemoptysis [ ] cough [ ] sputum	  Cardiovascular:  [ ] chest pain [ ] palpitations [ ] edema	  Gastrointestinal:  [ ] nausea [ ] vomiting [ ] diarrhea [ ] constipation [ ] pain	  Genitourinary:  [x ] dysuria [ ] frequency [ ] hematuria [ ] discharge [ ] flank pain  [ ] incontinence  Musculoskeletal:  [ ] myalgias [ ] arthralgias [ ] arthritis  [ ] back pain  Neurological:  [ ] headache [ ] seizures  [ ] confusion/altered mental status    Allergies  &quot;VASELINE BASED OINTMENTS&quot; (Urticaria; Rash)  adhesives (Urticaria; Rash)  contrast media (gadolinium-based) (Chills)  Norvasc (Pruritus)  statins (Unknown)        ANTIMICROBIALS:      OTHER MEDS:  MEDICATIONS  (STANDING):  acetaminophen   Tablet .. 650 every 6 hours PRN  allopurinol 200 daily  aluminum hydroxide/magnesium hydroxide/simethicone Suspension 30 every 4 hours PRN  amitriptyline 25 daily  apixaban 2.5 every 12 hours  buDESOnide    EC Capsule 9 daily  diltiazem    daily  diphenhydrAMINE 25 every 6 hours PRN  melatonin 3 at bedtime PRN  metoprolol tartrate 100 two times a day  ondansetron Injectable 4 every 8 hours PRN  oxybutynin 5 daily  pantoprazole    Tablet 40 before breakfast      Vital Signs Last 24 Hrs  T(C): 36.3 (21 Jul 2021 13:16), Max: 36.6 (20 Jul 2021 18:25)  T(F): 97.3 (21 Jul 2021 13:16), Max: 97.8 (20 Jul 2021 18:25)  HR: 128 (21 Jul 2021 15:08) (72 - 130)  BP: 110/77 (21 Jul 2021 15:08) (110/68 - 141/87)  BP(mean): --  RR: 18 (21 Jul 2021 13:16) (18 - 18)  SpO2: 98% (21 Jul 2021 13:16) (93% - 98%)    PHYSICAL EXAMINATION:  General: Alert and Awake, NAD  Cardiac: RRR, No M/R/G  Resp: CTAB, No Wh/Rh/Ra  Abdomen: NBS, NT/ND, No HSM, No rigidity or guarding  MSK: No LE edema. No Calf tenderness  : No muhammad  Skin: No rashes or lesions. Skin is warm and dry to the touch.   Neuro: Alert and Awake. CN 2-12 Grossly intact. Moves all four extremities spontaneously.  Psych: Calm, Pleasant, Cooperative                          8.4    10.82 )-----------( 357      ( 21 Jul 2021 06:27 )             28.1       07-21    139  |  106  |  30<H>  ----------------------------<  112<H>  4.1   |  20<L>  |  1.06    Ca    9.6      21 Jul 2021 06:27  Phos  3.0     07-21  Mg     1.9     07-21    TPro  6.3  /  Alb  3.1<L>  /  TBili  0.2  /  DBili  x   /  AST  72<H>  /  ALT  193<H>  /  AlkPhos  165<H>  07-21          MICROBIOLOGY:    .Urine Clean Catch (Midstream)  07-19-21   <10,000 CFU/mL Normal Urogenital [place]  --  --      .Blood Blood-Peripheral  07-16-21   No growth to date.  --  --      .Urine Clean Catch (Midstream)  07-13-21   <10,000 CFU/mL Normal Urogenital [place]  --  --      .Blood Blood-Peripheral  07-13-21   No Growth Final  --  Blood Culture PCR    RADIOLOGY:    <The imaging below has been reviewed and visualized by me independently. Findings as detailed in report below>    EXAM:  CT ABDOMEN AND PELVIS IC                        PROCEDURE DATE:  07/20/2021    Pericapsular rim-enhancing fluid collection along the posterior hepatic dome, with differential including abscess or an organized hematoma. Fluid sampling may be helpful, though its posterior subdiaphragmatic location may make this difficult. Consider further characterization with contrast enhanced abdominal MRI as clinically indicated.    Moderate right and small left pleural effusions are unchanged.

## 2021-07-21 NOTE — PROGRESS NOTE ADULT - ASSESSMENT
86 year old female with PMHx of MVR s/p repair, Afib on Eliquis, HTN, HLD, CHF, Gout, p/w SOB x 2-3 days. Pt reports falling 5 days ago, after becoming dizzy and passing out for a brief second. At that time, patient did not seek treatment. However, over the next 2-3 days, she had worsening SOB as well as chest pain. Upon arrival to the ED, patient found to be in A Fib w/ RVR, with rates up to 160 BPM. Patient states she has had previous episodes of AF with RVR since being diagnosed ~2 years ago. In CCU, pt received Amiodarone 150mg IVP x1 and was started on lopressor and diltiazem. Chest x-ray showed possible consolidation in RML; blood cultures drawn and pt started on empiric zosyn. EP consulted for evaluation for DCCV.    1. Atrial Fibrillation with RVR  2. CHF: last TTE 6/11/21, EF 50-55%    - Continue to monitor on telemetry  - Keep K >4 and Mg >2  - Continue AC  - Continue Metoprolol 100mg BID  - Continue Diltiazem 120mg QD  - S/p DCCV on 7/15 with conversion to SR, as of 7/20 PM patient converted back to AF with -140 BPM  - Start Amiodarone load at 400mg q8 hour. Black box warning discussed with patient, aware of LFT/TFT monitoring as well as PFTs and yearly opthalmologic visits.      Lana Kirkland PA-C  #[id number]

## 2021-07-21 NOTE — PROGRESS NOTE ADULT - SUBJECTIVE AND OBJECTIVE BOX
**************************  Camilla Rivero  PGY-1 Internal Medicine  [phone]  **************************    Patient is a 86y old  Female who presents with a chief complaint of SOB (20 Jul 2021 16:36)      SUBJECTIVE / OVERNIGHT EVENTS:  Overnight, patient in Afib to the 120s. Yesterday evening, given 5mg push of lopressor and changed lopressor 50 BID to TID. This morning, reports feeling palpitations, but denies chest pain or shortness of breath. Reports ongoing pruritus alleviated by calamine lotion, and denies any dysuria. Received miralax yesterday but has not had a BM in days.    MEDICATIONS  (STANDING):  allopurinol 200 milliGRAM(s) Oral daily  amitriptyline 25 milliGRAM(s) Oral daily  apixaban 2.5 milliGRAM(s) Oral every 12 hours  buDESOnide    EC Capsule 9 milliGRAM(s) Oral daily  calamine/zinc oxide Lotion 1 Application(s) Topical once  hydrocortisone 1% Ointment 1 Application(s) Topical two times a day  lactated ringers. 1000 milliLiter(s) (100 mL/Hr) IV Continuous <Continuous>  metoprolol tartrate 50 milliGRAM(s) Oral three times a day  oxybutynin 5 milliGRAM(s) Oral daily  pantoprazole    Tablet 40 milliGRAM(s) Oral before breakfast    MEDICATIONS  (PRN):  acetaminophen   Tablet .. 650 milliGRAM(s) Oral every 6 hours PRN Temp greater or equal to 38.5C (101.3F), Mild Pain (1 - 3)  aluminum hydroxide/magnesium hydroxide/simethicone Suspension 30 milliLiter(s) Oral every 4 hours PRN Dyspepsia  diphenhydrAMINE 25 milliGRAM(s) Oral every 6 hours PRN Rash and/or Itching  melatonin 3 milliGRAM(s) Oral at bedtime PRN Insomnia  ondansetron Injectable 4 milliGRAM(s) IV Push every 8 hours PRN Nausea and/or Vomiting      I&O's Summary    20 Jul 2021 07:01  -  21 Jul 2021 07:00  --------------------------------------------------------  IN: 800 mL / OUT: 1600 mL / NET: -800 mL        PHYSICAL EXAM:  Vital Signs Last 24 Hrs  T(C): 36.4 (21 Jul 2021 04:27), Max: 36.6 (20 Jul 2021 18:25)  T(F): 97.5 (21 Jul 2021 04:27), Max: 97.8 (20 Jul 2021 18:25)  HR: 110 (21 Jul 2021 07:00) (63 - 130)  BP: 114/73 (21 Jul 2021 04:27) (114/73 - 141/87)  BP(mean): --  RR: 18 (21 Jul 2021 04:27) (18 - 18)  SpO2: 96% (21 Jul 2021 04:27) (93% - 98%)    CONSTITUTIONAL: NAD, well-developed  RESPIRATORY: Normal respiratory effort; lungs are clear to auscultation bilaterally  CARDIOVASCULAR: Iregular rate and rhythm, tachycardic, normal S1 and S2, no murmur/rub/gallop; No lower extremity edema; Peripheral pulses are 2+ bilaterally  ABDOMEN: Nontender to palpation, normoactive bowel sounds, no rebound/guarding  MUSCLOSKELETAL: no clubbing or cyanosis of digits; no joint swelling or tenderness to palpation  PSYCH: A+O to person, place, and time; affect appropriate    LABS:                        8.4    10.82 )-----------( 357      ( 21 Jul 2021 06:27 )             28.1     07-21    139  |  106  |  30<H>  ----------------------------<  112<H>  4.1   |  20<L>  |  1.06    Ca    9.6      21 Jul 2021 06:27  Phos  3.0     07-21  Mg     1.9     07-21    TPro  6.3  /  Alb  3.1<L>  /  TBili  0.2  /  DBili  x   /  AST  72<H>  /  ALT  193<H>  /  AlkPhos  165<H>  07-21          Culture - Urine (collected 19 Jul 2021 11:50)  Source: .Urine Clean Catch (Midstream)  Final Report (20 Jul 2021 14:16):    <10,000 CFU/mL Normal Urogenital [place]        EXAM:  CT ABDOMEN AND PELVIS IC                            PROCEDURE DATE:  07/20/2021        INTERPRETATION:  CLINICAL INFORMATION: Evaluate fluid collection seen on noncontrast CT scan 7/13/2021    COMPARISON: Multiple prior CT exams, most recently 7/13/2021; MRI abdomen 5/16/2018    IMPRESSION:  Pericapsular rim-enhancing fluid collection along the posterior hepatic dome, with differential including abscess or an organized hematoma. Fluid sampling may be helpful, though its posterior subdiaphragmatic location may make this difficult. Consider further characterization with contrast enhanced abdominal MRI as clinically indicated.    Moderate right and small left pleural effusions are unchanged.      --- End of Report ---     **************************  Camilla Rivero  PGY-1 Internal Medicine  [phone]  **************************    Patient is a 86y old  Female who presents with a chief complaint of SOB (20 Jul 2021 16:36)      SUBJECTIVE / OVERNIGHT EVENTS:  Overnight, patient in Afib to the 120s. Yesterday evening, given 5mg push of lopressor and changed lopressor 50 BID to TID, further changed to 100 BID. This morning, reports feeling palpitations, but denies chest pain or shortness of breath. Reports ongoing pruritus alleviated by calamine lotion, and denies any dysuria. Received miralax yesterday but has not had a BM in days.    MEDICATIONS  (STANDING):  allopurinol 200 milliGRAM(s) Oral daily  amitriptyline 25 milliGRAM(s) Oral daily  apixaban 2.5 milliGRAM(s) Oral every 12 hours  buDESOnide    EC Capsule 9 milliGRAM(s) Oral daily  calamine/zinc oxide Lotion 1 Application(s) Topical once  hydrocortisone 1% Ointment 1 Application(s) Topical two times a day  lactated ringers. 1000 milliLiter(s) (100 mL/Hr) IV Continuous <Continuous>  metoprolol tartrate 50 milliGRAM(s) Oral three times a day  oxybutynin 5 milliGRAM(s) Oral daily  pantoprazole    Tablet 40 milliGRAM(s) Oral before breakfast    MEDICATIONS  (PRN):  acetaminophen   Tablet .. 650 milliGRAM(s) Oral every 6 hours PRN Temp greater or equal to 38.5C (101.3F), Mild Pain (1 - 3)  aluminum hydroxide/magnesium hydroxide/simethicone Suspension 30 milliLiter(s) Oral every 4 hours PRN Dyspepsia  diphenhydrAMINE 25 milliGRAM(s) Oral every 6 hours PRN Rash and/or Itching  melatonin 3 milliGRAM(s) Oral at bedtime PRN Insomnia  ondansetron Injectable 4 milliGRAM(s) IV Push every 8 hours PRN Nausea and/or Vomiting      I&O's Summary    20 Jul 2021 07:01  -  21 Jul 2021 07:00  --------------------------------------------------------  IN: 800 mL / OUT: 1600 mL / NET: -800 mL        PHYSICAL EXAM:  Vital Signs Last 24 Hrs  T(C): 36.4 (21 Jul 2021 04:27), Max: 36.6 (20 Jul 2021 18:25)  T(F): 97.5 (21 Jul 2021 04:27), Max: 97.8 (20 Jul 2021 18:25)  HR: 110 (21 Jul 2021 07:00) (63 - 130)  BP: 114/73 (21 Jul 2021 04:27) (114/73 - 141/87)  BP(mean): --  RR: 18 (21 Jul 2021 04:27) (18 - 18)  SpO2: 96% (21 Jul 2021 04:27) (93% - 98%)    CONSTITUTIONAL: NAD, well-developed  RESPIRATORY: Normal respiratory effort; lungs are clear to auscultation bilaterally  CARDIOVASCULAR: Iregular rate and rhythm, tachycardic, normal S1 and S2, no murmur/rub/gallop; No lower extremity edema; Peripheral pulses are 2+ bilaterally  ABDOMEN: Nontender to palpation, normoactive bowel sounds, no rebound/guarding  MUSCLOSKELETAL: no clubbing or cyanosis of digits; no joint swelling or tenderness to palpation  PSYCH: A+O to person, place, and time; affect appropriate    LABS:                        8.4    10.82 )-----------( 357      ( 21 Jul 2021 06:27 )             28.1     07-21    139  |  106  |  30<H>  ----------------------------<  112<H>  4.1   |  20<L>  |  1.06    Ca    9.6      21 Jul 2021 06:27  Phos  3.0     07-21  Mg     1.9     07-21    TPro  6.3  /  Alb  3.1<L>  /  TBili  0.2  /  DBili  x   /  AST  72<H>  /  ALT  193<H>  /  AlkPhos  165<H>  07-21          Culture - Urine (collected 19 Jul 2021 11:50)  Source: .Urine Clean Catch (Midstream)  Final Report (20 Jul 2021 14:16):    <10,000 CFU/mL Normal Urogenital [place]        EXAM:  CT ABDOMEN AND PELVIS IC                            PROCEDURE DATE:  07/20/2021        INTERPRETATION:  CLINICAL INFORMATION: Evaluate fluid collection seen on noncontrast CT scan 7/13/2021    COMPARISON: Multiple prior CT exams, most recently 7/13/2021; MRI abdomen 5/16/2018    IMPRESSION:  Pericapsular rim-enhancing fluid collection along the posterior hepatic dome, with differential including abscess or an organized hematoma. Fluid sampling may be helpful, though its posterior subdiaphragmatic location may make this difficult. Consider further characterization with contrast enhanced abdominal MRI as clinically indicated.    Moderate right and small left pleural effusions are unchanged.      --- End of Report ---

## 2021-07-21 NOTE — OCCUPATIONAL THERAPY INITIAL EVALUATION ADULT - LEVEL OF INDEPENDENCE: SCOOT/BRIDGE, REHAB EVAL
Patient Seen in: THE [facility] Emergency Department In [place]      History   Patient presents with:  Arrythmia/Palpitations    Stated Complaint: palpitations while shoveling last week;left shoulder soreness past 2 days    HPI/Subjective:   HPI    57-year-ol HPI.  Constitutional and vital signs reviewed. All other systems reviewed and negative except as noted above.     Physical Exam     ED Triage Vitals [02/09/21 1609]   /78   Pulse 79   Resp 18   Temp 98 °F (36.7 °C)   Temp src    SpO2 100 %   O2 D EKG              Chest x-ray and labs are unremarkable. MDM      Patient is asymptomatic currently symptoms seem to be consistent with a musculoskeletal left upper chest shoulder pain and then the palpitations a few days ago.   She had a history of supervision

## 2021-07-21 NOTE — PROGRESS NOTE ADULT - PROBLEM SELECTOR PLAN 6
-EF 55-60%, MVR with annuplasty ring and moderate-severe AR and TR, mild pHTN  -Likely 2/2 A fib w/ RVR   -Cardiology (patient sees Dr. Wynne as an outpatient) and Dr. Valdovinos  -TTE from 6/11 show severe pulmonary pressures  -TTE from 7/13 showed mild pulmonary pressures  -Hold home Torsemide 40mg qd given diarrhea, WEN   -BNP 16977

## 2021-07-21 NOTE — OCCUPATIONAL THERAPY INITIAL EVALUATION ADULT - PERTINENT HX OF CURRENT PROBLEM, REHAB EVAL
85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 Pt reports falling 5 days ago, after becoming dizzy and passing out for a brief second. She landed on her back and buttock, and hit her head.

## 2021-07-21 NOTE — PROGRESS NOTE ADULT - ASSESSMENT
Called and spoke to patient. Will start INH/B6 daily x 9 months. I advised patient that he will have to follow monthly for CBC and liver function testing. I advised the patient to abstain from alcohol. I advised patient to discontinue his medicine and notify me if he develops right upper quadrant abdominal pain or yellowing of the skin. Patient expressed understanding and agreed with the plan.  86 year old female PMH MVR s/p repair, Afib on Eliquis, HTN, HLD, CHF, Gout, p/w SOB x 2-3 days and fall 5-7 days prior to presentation. In the ED afebrile, hypotensive to systolics in the 90's, tachycardic >150.     RVP/COVID19 PCR (7/14) Negative.   U/A (7/13) 9 WBC.   BCx (7/13) with 1/4 MRSE.     CT Chest (7/13) with Lingular and right middle lobe subsegmental atelectasis.   CT A/P (7/13) with Pericapsular fluid collection adjacent to right lobe of liver    I reviewed imaging with radiology today (7/15) fluid collection may be consistent with hematoma but absence of IV contracts limits ability to differentiate  Patient with some leukocytosis on presentation but possible it is secondary to corticosteroids prior to presentation.  I suspect transaminitis likely secondary to CHF exacerbation    Repeat CT A/P with IV contrast (7/20) with rim enhancement and some septations   I reviewed CT A/P with radiology today (7/21) rim enhancement could be from complex hematoma versus infection  Otherwise patient without symptoms suggestive of infection (but has been on Ceftriaxone for UTI and was on Zosyn after admission)  I would obtain IR re-evaluation to see if the collection is amenable to aspiration.     RECOMMENDATIONS    #Subcapsular Liver Fluid Collection, Leukocytosis, Transaminitis  --obtain IR re-evaluation to see if the collection is amenable to aspiration.   --Continue to follow CBC with diff  --Continue to follow renal function (Cr/BUN)  --Continue to follow transaminases  --Continue to follow temperature curve    #Dysuria  --U/A with slight pyuria, UCx with <10K urogen maurice (but obtained after Abx)  --s/p 3 days of Ceftriaxone    #Positive Blood Cultures (Suspect Staph epi is procurement contaminant)    I will continue to follow. Please feel free to contact me with any further questions.    Kevin Funk M.D.  [facility] Division of Infectious Disease  8AM-5PM: Pager Number [phone]  After Hours (or if no response): Please contact the Infectious Diseases Office at [phone]     The above assessment and plan were discussed with medicine resident

## 2021-07-21 NOTE — OCCUPATIONAL THERAPY INITIAL EVALUATION ADULT - LIVES WITH, PROFILE
PTA pt living alone in co-op with 3 steps to enter and chair lift. Pt has grab bars in shower. Independent PTA. Also driving

## 2021-07-21 NOTE — PROGRESS NOTE ADULT - PROBLEM SELECTOR PLAN 5
-Likely 2/2 CHF  -Treat as above   -Monitor AST/ALT for now, trending down  -Hepatitis serologies negative  -Transaminitis - due to possible liver abscess vs hepatic congestion from heart failure  -Entamoeba negative

## 2021-07-21 NOTE — PROGRESS NOTE ADULT - SUBJECTIVE AND OBJECTIVE BOX
HPI:  Patient seen and examined at bedside on 3 DSU.  She is back in AFib with RVR.    Review Of Systems:           Respiratory: No shortness of breath, cough, or wheezing  Cardiovascular: No chest pain or palpitations  10 point review of systems is otherwise negative except as mentioned above        Medications:  acetaminophen   Tablet .. 650 milliGRAM(s) Oral every 6 hours PRN  allopurinol 200 milliGRAM(s) Oral daily  aluminum hydroxide/magnesium hydroxide/simethicone Suspension 30 milliLiter(s) Oral every 4 hours PRN  aMIOdarone    Tablet 400 milliGRAM(s) Oral every 8 hours  amitriptyline 25 milliGRAM(s) Oral daily  apixaban 2.5 milliGRAM(s) Oral every 12 hours  buDESOnide    EC Capsule 9 milliGRAM(s) Oral daily  calamine/zinc oxide Lotion 1 Application(s) Topical once  diltiazem    milliGRAM(s) Oral daily  diphenhydrAMINE 25 milliGRAM(s) Oral every 6 hours PRN  hydrocortisone 1% Ointment 1 Application(s) Topical two times a day  lactated ringers. 1000 milliLiter(s) IV Continuous <Continuous>  melatonin 3 milliGRAM(s) Oral at bedtime PRN  metoprolol tartrate 100 milliGRAM(s) Oral two times a day  ondansetron Injectable 4 milliGRAM(s) IV Push every 8 hours PRN  oxybutynin 5 milliGRAM(s) Oral daily  pantoprazole    Tablet 40 milliGRAM(s) Oral before breakfast    PAST MEDICAL & SURGICAL HISTORY:  Insomnia    HTN (hypertension)    Hypercholesteremia    GERD (gastroesophageal reflux disease)    Carotid artery occlusion  (R)    Vitamin B 12 deficiency    Osteoporosis    Mitral valve disease    Gallstones    Choledocholithiasis    CHF (congestive heart failure)    S/P MVR (mitral valve repair)   at [facility]    Status post DACIA-BSO  1975    Hx of tonsillectomy    Abdominal hernia  repair     History of lumbar laminectomy for spinal cord decompression      Papilloma of breast  s/p excision from right breast &gt;20 years ago    Bilateral cataracts  extraction w/ IOL    Varicose veins  s/p sclerotherapy bilaterally    H/O carotid endarterectomy    S/P laparoscopic cholecystectomy      Vitals:  T(C): 36.3 (21 @ 20:59), Max: 36.4 (21 @ 04:27)  HR: 89 (21 @ 20:59) (89 - 129)  BP: 121/79 (21 @ 20:59) (110/68 - 135/85)  BP(mean): --  RR: 18 (21 @ 20:59) (18 - 18)  SpO2: 98% (21 @ 20:59) (96% - 98%)  Wt(kg): --  Daily     Daily Weight in k.6 (2021 04:27)  I&O's Summary    2021 07:  -  2021 07:00  --------------------------------------------------------  IN: 800 mL / OUT: 1600 mL / NET: -800 mL    2021 07:01  -  2021 22:57  --------------------------------------------------------  IN: 620 mL / OUT: 400 mL / NET: 220 mL        Physical Exam:  Appearance: Normal, well groomed, NAD  Eyes: PERRLA, EOMI, pink conjunctiva, no scleral icterus   HENT: Normal oral mucosa  Cardiovascular: irregular tachycardia, S1, S2, no murmur, rub, or gallop; no edema; no JVD  Respiratory: Clear to auscultation bilaterally  Gastrointestinal: Soft, non-tender, non-distended, BS+  Musculoskeletal: No clubbing or joint deformity   Neurologic: No focal weakness  Lymphatic: No lymphadenopathy  Psychiatry: AAOx3 with appropriate mood and affect  Skin: No rashes, ecchymoses, or cyanosis                            8.4    10.82 )-----------( 357      ( 2021 06:27 )             28.1         139  |  106  |  30<H>  ----------------------------<  112<H>  4.1   |  20<L>  |  1.06    Ca    9.6      2021 06:27  Phos  3.0       Mg     1.9         TPro  6.3  /  Alb  3.1<L>  /  TBili  0.2  /  DBili  x   /  AST  72<H>  /  ALT  193<H>  /  AlkPhos  165<H>            Serum Pro-Brain Natriuretic Peptide: 2709 pg/mL ( @ 13:49)        Interpretation of Telemetry: AFib with  - 130 bpm

## 2021-07-21 NOTE — PROGRESS NOTE ADULT - SUBJECTIVE AND OBJECTIVE BOX
24H hour events: No acute overnight events    MEDICATIONS:  apixaban 2.5 milliGRAM(s) Oral every 12 hours  diltiazem    milliGRAM(s) Oral daily  metoprolol tartrate 100 milliGRAM(s) Oral two times a day  acetaminophen   Tablet .. 650 milliGRAM(s) Oral every 6 hours PRN  amitriptyline 25 milliGRAM(s) Oral daily  diphenhydrAMINE 25 milliGRAM(s) Oral every 6 hours PRN  melatonin 3 milliGRAM(s) Oral at bedtime PRN  ondansetron Injectable 4 milliGRAM(s) IV Push every 8 hours PRN  aluminum hydroxide/magnesium hydroxide/simethicone Suspension 30 milliLiter(s) Oral every 4 hours PRN  pantoprazole    Tablet 40 milliGRAM(s) Oral before breakfast  allopurinol 200 milliGRAM(s) Oral daily  buDESOnide    EC Capsule 9 milliGRAM(s) Oral daily  calamine/zinc oxide Lotion 1 Application(s) Topical once  hydrocortisone 1% Ointment 1 Application(s) Topical two times a day  lactated ringers. 1000 milliLiter(s) IV Continuous <Continuous>  oxybutynin 5 milliGRAM(s) Oral daily      REVIEW OF SYSTEMS:  See HPI, otherwise ROS negative.    PHYSICAL EXAM:  T(C): 36.3 (07-21-21 @ 13:16), Max: 36.6 (07-20-21 @ 18:25)  HR: 128 (07-21-21 @ 15:08) (105 - 130)  BP: 110/77 (07-21-21 @ 15:08) (110/68 - 141/87)  RR: 18 (07-21-21 @ 13:16) (18 - 18)  SpO2: 98% (07-21-21 @ 13:16) (93% - 98%)  Wt(kg): --  I&O's Summary    20 Jul 2021 07:01  -  21 Jul 2021 07:00  --------------------------------------------------------  IN: 800 mL / OUT: 1600 mL / NET: -800 mL    21 Jul 2021 07:01 - 21 Jul 2021 17:29  --------------------------------------------------------  IN: 380 mL / OUT: 0 mL / NET: 380 mL        Appearance: Alert. NAD	  Cardiovascular: Irregularly irregular  Respiratory: Decreased breath sounds RML	  Extremities: 1+ pitting edema B/L LE  Vascular: Peripheral pulses palpable 2+ bilaterally      LABS:	 	    CBC Full  -  ( 21 Jul 2021 06:27 )  WBC Count : 10.82 K/uL  Hemoglobin : 8.4 g/dL  Hematocrit : 28.1 %  Platelet Count - Automated : 357 K/uL  Mean Cell Volume : 81.2 fl  Mean Cell Hemoglobin : 24.3 pg  Mean Cell Hemoglobin Concentration : 29.9 gm/dL  Auto Neutrophil # : x  Auto Lymphocyte # : x  Auto Monocyte # : x  Auto Eosinophil # : x  Auto Basophil # : x  Auto Neutrophil % : x  Auto Lymphocyte % : x  Auto Monocyte % : x  Auto Eosinophil % : x  Auto Basophil % : x    07-21    139  |  106  |  30<H>  ----------------------------<  112<H>  4.1   |  20<L>  |  1.06  07-20    139  |  106  |  46<H>  ----------------------------<  102<H>  4.3   |  19<L>  |  1.44<H>    Ca    9.6      21 Jul 2021 06:27  Ca    9.6      20 Jul 2021 07:17  Phos  3.0     07-21  Phos  3.2     07-20  Mg     1.9     07-21  Mg     2.0     07-20    TPro  6.3  /  Alb  3.1<L>  /  TBili  0.2  /  DBili  x   /  AST  72<H>  /  ALT  193<H>  /  AlkPhos  165<H>  07-21  TPro  6.7  /  Alb  3.6  /  TBili  0.4  /  DBili  x   /  AST  104<H>  /  ALT  235<H>  /  AlkPhos  168<H>  07-20      proBNP: Serum Pro-Brain Natriuretic Peptide: 2709 pg/mL (07-17 @ 13:49)    TELEMETRY: Atrial Fibrillation 120-140 BPM  	    ECG:  Atrial Fibrillation with RVR @ 120 BPM

## 2021-07-21 NOTE — PROGRESS NOTE ADULT - ASSESSMENT
86 year-old woman with AFib with RVR in the setting of colitis.  Now status post cardioversion, but back in atrial fibrillation with RVR as of last night.    Uptitrate beta-blocker as tolerated.  Restart CaCB.    EP follow-up.

## 2021-07-21 NOTE — OCCUPATIONAL THERAPY INITIAL EVALUATION ADULT - BALANCE TRAINING, PT EVAL
GOAL: Pt will demonstrate improved static/dynamic balance to GOOD with least restrictive device in order to increase safety and independence in ADLs within 4 weeks

## 2021-07-21 NOTE — OCCUPATIONAL THERAPY INITIAL EVALUATION ADULT - ADL RETRAINING, OT EVAL
GOAL: Pt will tolerate standing at the sink for ~5 minutes for grooming ADLs Independently in 4 weeks

## 2021-07-21 NOTE — PROGRESS NOTE ADULT - PROBLEM SELECTOR PLAN 1
-Cr elevated at 1.7 on admission. WEN on CKD likely prerenal (2/2 diarrhea from microscopic colitis)  - c/w mIVF, encourage PO intake   - Trend Cr   - Cr 1.06 (7/21)  - Avoid nephrotoxic medications  - renal u/s showing trace perihepatic ascites, hepatic steatosis, small b/l pleural effusions  - positive UA, negative Ucx  - s/p ceftriaxone  - d/c torsemide

## 2021-07-21 NOTE — PROGRESS NOTE ADULT - PROBLEM SELECTOR PLAN 3
-Patient found to be in A Fib w/ RVR to HR 160s on admission   -Likely caused SOB  -Given Amio 150mg x1  -Takes Cardizem at home  -Rate controlled on Lopressor and Diltaizem to the 90s-100s  -Overnight 7/14, patient was tachycardic to 150s, given IV push of metoprolol 5mg, metoprolol 25mg PO, and metoprolol tartrate increased to 75mg BID, further increased to 100mg BID as per EP (7/15)  - s/p EP cardioversion 7/15  - metoprolol decreased to 50mg BID on 7/19  - Overnight 7/20, patient was in Afib tachycardic to the 120s, given IV push of metoprolol 5mg, and metoprolol tartrate increased to 50mg TID -Patient found to be in A Fib w/ RVR to HR 160s on admission   -Likely caused SOB  -Given Amio 150mg x1  -Takes Cardizem at home  -Rate controlled on Lopressor and Diltaizem to the 90s-100s  -Overnight 7/14, patient was tachycardic to 150s, given IV push of metoprolol 5mg, metoprolol 25mg PO, and metoprolol tartrate increased to 75mg BID, further increased to 100mg BID as per EP (7/15)  - s/p EP cardioversion 7/15  - metoprolol decreased to 50mg BID on 7/19  - Overnight 7/20, patient was in Afib tachycardic to the 120s, given IV push of metoprolol 5mg, and metoprolol tartrate increased to 50mg TID, further changed to 100 mg BID (7/21)

## 2021-07-21 NOTE — OCCUPATIONAL THERAPY INITIAL EVALUATION ADULT - PRECAUTIONS/LIMITATIONS, REHAB EVAL
Over the past 2-3 days, she has been having worsening SOB and weakness. Last night, she had midline CP, palpitations, diaphoresis, that lasted all night. She denied any N/V, radiation of pain. Pt found to be in A Fib w/ RVR. Now status post cardioversion./fall precautions

## 2021-07-21 NOTE — OCCUPATIONAL THERAPY INITIAL EVALUATION ADULT - ANTICIPATED DISCHARGE DISPOSITION, OT EVAL
Home OT recommended for home safety evaluation, DME training, fall prevention, cognition, ADLs, balance, strength, ROM and endurance.  Pt owns cane and RW. Recommend 3:1 commode. Supervision/Assist for ADLs and functional mobility as needed./home w/ OT

## 2021-07-21 NOTE — PROGRESS NOTE ADULT - PROBLEM SELECTOR PLAN 4
-CT ab/pelvis showed perihepatic fluid collection, mild hepatomegaly, diffuse fatty infiltration  - IR defer drainage, f/u o/p to monitor size and for sx, available if becomes sx  - Appreciate ID recs, will monitor off Zosyn for now  - Repeat CT A/P with contrast 7/20 showed pericapsular rim enhancing fluid collection posteriorly concerning for abscess vs hematoma

## 2021-07-22 NOTE — PROGRESS NOTE ADULT - ASSESSMENT
86 year old female with PMHx of MVR s/p repair, Afib on Eliquis, HTN, HLD, CHF, Gout, p/w SOB x 2-3 days. Pt reports falling 5 days ago, after becoming dizzy and passing out for a brief second. At that time, patient did not seek treatment. However, over the next 2-3 days, she had worsening SOB as well as chest pain. Upon arrival to the ED, patient found to be in A Fib w/ RVR, with rates up to 160 BPM. Patient states she has had previous episodes of AF with RVR since being diagnosed ~2 years ago. In CCU, pt received Amiodarone 150mg IVP x1 and was started on lopressor and diltiazem. Chest x-ray showed possible consolidation in RML; blood cultures drawn and pt started on empiric zosyn. EP reconsulted for Afib with RVR management.  1. Atrial Fibrillation with RVR  2. CHF: last TTE 6/11/21, EF 50-55%    - Continue to monitor on telemetry  - Keep K >4 and Mg >2  - Continue AC  - Continue Metoprolol 100mg BID  - Continue Diltiazem 120mg QD  - S/p DCCV on 7/15 with conversion to SR, as of 7/20 PM patient converted back to AF with -140 BPM  - Continue Amiodarone load  - Trend LFTs      Lana Kirkland PA-C  #[id number]   86 year old female with PMHx of MVR s/p repair, Afib on Eliquis, HTN, HLD, CHF, Gout, p/w SOB x 2-3 days. Pt reports falling 5 days ago, after becoming dizzy and passing out for a brief second. At that time, patient did not seek treatment. However, over the next 2-3 days, she had worsening SOB as well as chest pain. Upon arrival to the ED, patient found to be in A Fib w/ RVR, with rates up to 160 BPM. Patient states she has had previous episodes of AF with RVR since being diagnosed ~2 years ago. In CCU, pt received Amiodarone 150mg IVP x1 and was started on lopressor and diltiazem. Chest x-ray showed possible consolidation in RML; blood cultures drawn and pt started on empiric zosyn. EP reconsulted for Afib with RVR management.  1. Atrial Fibrillation with RVR  2. CHF: last TTE 6/11/21, EF 50-55%    - Continue to monitor on telemetry  - Keep K >4 and Mg >2  - Continue AC  - Continue Metoprolol 100mg BID  - Continue Diltiazem 120mg QD  - S/p DCCV on 7/15 with conversion to SR, as of 7/20 PM patient converted back to AF with -140 BPM  - Continue Amiodarone load to 5G load, will be complete 7/27  - Trend LFTs      Lana Kirkland PA-C  #[id number]

## 2021-07-22 NOTE — PROGRESS NOTE ADULT - PROBLEM SELECTOR PLAN 1
-Cr elevated at 1.7 on admission. WEN on CKD likely prerenal (2/2 diarrhea from microscopic colitis)  - c/w mIVF, encourage PO intake   - Trend Cr   - Cr 1.09 (7/22)  - Avoid nephrotoxic medications  - renal u/s showing trace perihepatic ascites, hepatic steatosis, small b/l pleural effusions  - positive UA, negative Ucx  - s/p ceftriaxone  - d/c torsemide

## 2021-07-22 NOTE — PROGRESS NOTE ADULT - SUBJECTIVE AND OBJECTIVE BOX
24H hour events: No acute overnight events.    MEDICATIONS:  aMIOdarone    Tablet 400 milliGRAM(s) Oral every 8 hours  apixaban 2.5 milliGRAM(s) Oral every 12 hours  diltiazem    milliGRAM(s) Oral daily  metoprolol tartrate 100 milliGRAM(s) Oral two times a day  acetaminophen   Tablet .. 650 milliGRAM(s) Oral every 6 hours PRN  amitriptyline 25 milliGRAM(s) Oral daily  diphenhydrAMINE 25 milliGRAM(s) Oral every 6 hours PRN  melatonin 3 milliGRAM(s) Oral at bedtime PRN  ondansetron Injectable 4 milliGRAM(s) IV Push every 8 hours PRN  aluminum hydroxide/magnesium hydroxide/simethicone Suspension 30 milliLiter(s) Oral every 4 hours PRN  pantoprazole    Tablet 40 milliGRAM(s) Oral before breakfast  polyethylene glycol 3350 34 Gram(s) Oral daily  allopurinol 200 milliGRAM(s) Oral daily  buDESOnide    EC Capsule 9 milliGRAM(s) Oral daily  calamine/zinc oxide Lotion 1 Application(s) Topical once  hydrocortisone 1% Ointment 1 Application(s) Topical two times a day  lactated ringers. 1000 milliLiter(s) IV Continuous <Continuous>  lactated ringers. 500 milliLiter(s) IV Continuous <Continuous>  oxybutynin 5 milliGRAM(s) Oral daily      REVIEW OF SYSTEMS:  See HPI, otherwise ROS negative.    PHYSICAL EXAM:  T(C): 36.7 (07-22-21 @ 06:00), Max: 36.7 (07-22-21 @ 06:00)  HR: 84 (07-22-21 @ 06:00) (84 - 129)  BP: 122/70 (07-22-21 @ 06:00) (110/68 - 125/75)  RR: 18 (07-22-21 @ 06:00) (18 - 18)  SpO2: 96% (07-22-21 @ 06:00) (96% - 98%)  Wt(kg): --  I&O's Summary    21 Jul 2021 07:01  -  22 Jul 2021 07:00  --------------------------------------------------------  IN: 620 mL / OUT: 400 mL / NET: 220 mL        Appearance: Alert. NAD	  Cardiovascular: Irregularly irregular  Respiratory: RML with decreased breath sounds, rest of exam clear  Extremities: Trace B/L LE edema  Vascular: Peripheral pulses palpable 2+ bilaterally      LABS:	 	    CBC Full  -  ( 22 Jul 2021 07:08 )  WBC Count : 11.41 K/uL  Hemoglobin : 8.4 g/dL  Hematocrit : 28.9 %  Platelet Count - Automated : 365 K/uL  Mean Cell Volume : 83.0 fl  Mean Cell Hemoglobin : 24.1 pg  Mean Cell Hemoglobin Concentration : 29.1 gm/dL  Auto Neutrophil # : x  Auto Lymphocyte # : x  Auto Monocyte # : x  Auto Eosinophil # : x  Auto Basophil # : x  Auto Neutrophil % : x  Auto Lymphocyte % : x  Auto Monocyte % : x  Auto Eosinophil % : x  Auto Basophil % : x    07-22    139  |  110<H>  |  30<H>  ----------------------------<  108<H>  4.2   |  20<L>  |  1.09  07-21    139  |  106  |  30<H>  ----------------------------<  112<H>  4.1   |  20<L>  |  1.06    Ca    9.2      22 Jul 2021 07:08  Ca    9.6      21 Jul 2021 06:27  Phos  3.4     07-22  Phos  3.0     07-21  Mg     2.0     07-22  Mg     1.9     07-21    TPro  6.1  /  Alb  3.3  /  TBili  0.3  /  DBili  x   /  AST  55<H>  /  ALT  165<H>  /  AlkPhos  160<H>  07-22  TPro  6.3  /  Alb  3.1<L>  /  TBili  0.2  /  DBili  x   /  AST  72<H>  /  ALT  193<H>  /  AlkPhos  165<H>  07-21      proBNP: Serum Pro-Brain Natriuretic Peptide: 2709 pg/mL (07-17 @ 13:49)    TELEMETRY: AF 70-80 BPM

## 2021-07-22 NOTE — PROGRESS NOTE ADULT - PROBLEM SELECTOR PLAN 4
-CT ab/pelvis showed perihepatic fluid collection, mild hepatomegaly, diffuse fatty infiltration  - IR defer drainage, f/u o/p to monitor size and for sx, available if becomes sx  - Appreciate ID recs, will monitor off Zosyn for now  - Repeat CT A/P with contrast 7/20 showed pericapsular rim enhancing fluid collection posteriorly concerning for abscess vs hematoma  - ID low suspicion for abscess, will contact IR for recs on sampling fluid

## 2021-07-22 NOTE — PROGRESS NOTE ADULT - ASSESSMENT
85 yo female w/pmh HFpEF, atrial fibrillation, fatty liver, microscopic colitis, gout, severe MR s/p MVR 1997, CKD 3, admitted to CCU for a-fib with RVR, HR 160s. Restarted home meds and HR better controlled. S/p DCCV, was in NSR, now in Afib again.

## 2021-07-22 NOTE — PROGRESS NOTE ADULT - SUBJECTIVE AND OBJECTIVE BOX
**************************  Camilla Rivero  PGY-1 Internal Medicine  [phone]  **************************    Patient is a 86y old  Female who presents with a chief complaint of SOB (21 Jul 2021 17:24)      SUBJECTIVE / OVERNIGHT EVENTS:  This morning, patient still in Afib on tele 70s-90s with frequent PVCs. Denies any palpitations, chest pain, SOB. Has not had a BM in a few days.    MEDICATIONS  (STANDING):  allopurinol 200 milliGRAM(s) Oral daily  aMIOdarone    Tablet 400 milliGRAM(s) Oral every 8 hours  amitriptyline 25 milliGRAM(s) Oral daily  apixaban 2.5 milliGRAM(s) Oral every 12 hours  buDESOnide    EC Capsule 9 milliGRAM(s) Oral daily  calamine/zinc oxide Lotion 1 Application(s) Topical once  diltiazem    milliGRAM(s) Oral daily  hydrocortisone 1% Ointment 1 Application(s) Topical two times a day  lactated ringers. 1000 milliLiter(s) (100 mL/Hr) IV Continuous <Continuous>  metoprolol tartrate 100 milliGRAM(s) Oral two times a day  oxybutynin 5 milliGRAM(s) Oral daily  pantoprazole    Tablet 40 milliGRAM(s) Oral before breakfast    MEDICATIONS  (PRN):  acetaminophen   Tablet .. 650 milliGRAM(s) Oral every 6 hours PRN Temp greater or equal to 38.5C (101.3F), Mild Pain (1 - 3)  aluminum hydroxide/magnesium hydroxide/simethicone Suspension 30 milliLiter(s) Oral every 4 hours PRN Dyspepsia  diphenhydrAMINE 25 milliGRAM(s) Oral every 6 hours PRN Rash and/or Itching  melatonin 3 milliGRAM(s) Oral at bedtime PRN Insomnia  ondansetron Injectable 4 milliGRAM(s) IV Push every 8 hours PRN Nausea and/or Vomiting      I&O's Summary    21 Jul 2021 07:01  -  22 Jul 2021 07:00  --------------------------------------------------------  IN: 620 mL / OUT: 400 mL / NET: 220 mL        PHYSICAL EXAM:  Vital Signs Last 24 Hrs  T(C): 36.7 (22 Jul 2021 06:00), Max: 36.7 (22 Jul 2021 06:00)  T(F): 98 (22 Jul 2021 06:00), Max: 98 (22 Jul 2021 06:00)  HR: 84 (22 Jul 2021 06:00) (84 - 129)  BP: 122/70 (22 Jul 2021 06:00) (110/68 - 135/85)  BP(mean): --  RR: 18 (22 Jul 2021 06:00) (18 - 18)  SpO2: 96% (22 Jul 2021 06:00) (96% - 98%)    CONSTITUTIONAL: NAD, well-developed  RESPIRATORY: Normal respiratory effort; lungs are clear to auscultation bilaterally  CARDIOVASCULAR: Regular rate, irregular rhythm, normal S1 and S2, no murmur/rub/gallop; No lower extremity edema; Peripheral pulses are 2+ bilaterally  ABDOMEN: Nontender to palpation, normoactive bowel sounds, no rebound/guarding  MUSCLOSKELETAL: no clubbing or cyanosis of digits; no joint swelling or tenderness to palpation  PSYCH: A+O to person, place, and time; affect appropriate    LABS:                        8.4    11.41 )-----------( 365      ( 22 Jul 2021 07:08 )             28.9     07-22    139  |  110<H>  |  30<H>  ----------------------------<  108<H>  4.2   |  20<L>  |  1.09    Ca    9.2      22 Jul 2021 07:08  Phos  3.4     07-22  Mg     2.0     07-22    TPro  6.1  /  Alb  3.3  /  TBili  0.3  /  DBili  x   /  AST  55<H>  /  ALT  165<H>  /  AlkPhos  160<H>  07-22        Culture - Urine (collected 19 Jul 2021 11:50)  Source: .Urine Clean Catch (Midstream)  Final Report (20 Jul 2021 14:16):    <10,000 CFU/mL Normal Urogenital [place]

## 2021-07-22 NOTE — PROGRESS NOTE ADULT - SUBJECTIVE AND OBJECTIVE BOX
Follow Up:  Pericapsular Liver fluid collection    Interval History: afebrile overnight. denies abdominal pain.     REVIEW OF SYSTEMS  [  ] ROS unobtainable because:    [ x ] All other systems negative except as noted below    Constitutional:  [ ] fever [ ] chills  [ ] weight loss  [ ] weakness  Skin:  [ ] rash [ ] phlebitis	  Eyes: [ ] icterus [ ] pain  [ ] discharge	  ENMT: [ ] sore throat  [ ] thrush [ ] ulcers [ ] exudates  Respiratory: [ ] dyspnea [ ] hemoptysis [ ] cough [ ] sputum	  Cardiovascular:  [ ] chest pain [ ] palpitations [ ] edema	  Gastrointestinal:  [ ] nausea [ ] vomiting [ ] diarrhea [ ] constipation [ ] pain	  Genitourinary:  [ ] dysuria [ ] frequency [ ] hematuria [ ] discharge [ ] flank pain  [ ] incontinence  Musculoskeletal:  [ ] myalgias [ ] arthralgias [ ] arthritis  [ ] back pain  Neurological:  [ ] headache [ ] seizures  [ ] confusion/altered mental status    Allergies  &quot;VASELINE BASED OINTMENTS&quot; (Urticaria; Rash)  adhesives (Urticaria; Rash)  contrast media (gadolinium-based) (Chills)  Norvasc (Pruritus)  statins (Unknown)        ANTIMICROBIALS:      OTHER MEDS:  MEDICATIONS  (STANDING):  acetaminophen   Tablet .. 650 every 6 hours PRN  allopurinol 200 daily  aluminum hydroxide/magnesium hydroxide/simethicone Suspension 30 every 4 hours PRN  aMIOdarone    Tablet 400 every 8 hours  amitriptyline 25 daily  apixaban 2.5 every 12 hours  buDESOnide    EC Capsule 9 daily  diltiazem    daily  diphenhydrAMINE 25 every 6 hours PRN  melatonin 3 at bedtime PRN  metoprolol tartrate 100 two times a day  ondansetron Injectable 4 every 8 hours PRN  oxybutynin 5 daily  pantoprazole    Tablet 40 before breakfast  polyethylene glycol 3350 34 daily      Vital Signs Last 24 Hrs  T(C): 36.3 (22 Jul 2021 11:16), Max: 36.7 (22 Jul 2021 06:00)  T(F): 97.4 (22 Jul 2021 11:16), Max: 98 (22 Jul 2021 06:00)  HR: 76 (22 Jul 2021 11:16) (76 - 108)  BP: 113/65 (22 Jul 2021 11:16) (113/65 - 125/75)  BP(mean): --  RR: 18 (22 Jul 2021 11:16) (18 - 18)  SpO2: 96% (22 Jul 2021 11:16) (96% - 98%)    PHYSICAL EXAMINATION:  General: Alert and Awake, NAD  Cardiac: RRR, No M/R/G  Resp: CTAB, No Wh/Rh/Ra  Abdomen: NBS, NT/ND, No HSM, No rigidity or guarding  MSK: No LE edema. No Calf tenderness  : No muhammad  Skin: No rashes or lesions. Skin is warm and dry to the touch.   Neuro: Alert and Awake. CN 2-12 Grossly intact. Moves all four extremities spontaneously.  Psych: Calm, Pleasant, Cooperative                          8.4    11.41 )-----------( 365      ( 22 Jul 2021 07:08 )             28.9       07-22    139  |  110<H>  |  30<H>  ----------------------------<  108<H>  4.2   |  20<L>  |  1.09    Ca    9.2      22 Jul 2021 07:08  Phos  3.4     07-22  Mg     2.0     07-22    TPro  6.1  /  Alb  3.3  /  TBili  0.3  /  DBili  x   /  AST  55<H>  /  ALT  165<H>  /  AlkPhos  160<H>  07-22          MICROBIOLOGY:  v  .Urine Clean Catch (Midstream)  07-19-21   <10,000 CFU/mL Normal Urogenital [place]  --  --      .Blood Blood-Peripheral  07-16-21   No Growth Final  --  --      .Urine Clean Catch (Midstream)  07-13-21   <10,000 CFU/mL Normal Urogenital [place]  --  --      .Blood Blood-Peripheral  07-13-21   No Growth Final  --  Blood Culture PCR    RADIOLOGY:    <The imaging below has been reviewed and visualized by me independently. Findings as detailed in report below>    EXAM:  CT ABDOMEN AND PELVIS IC                        PROCEDURE DATE:  07/20/2021    Pericapsular rim-enhancing fluid collection along the posterior hepatic dome, with differential including abscess or an organized hematoma. Fluid sampling may be helpful, though its posterior subdiaphragmatic location may make this difficult. Consider further characterization with contrast enhanced abdominal MRI as clinically indicated.    Moderate right and small left pleural effusions are unchanged.

## 2021-07-22 NOTE — PROGRESS NOTE ADULT - ASSESSMENT
86 year old female PMH MVR s/p repair, Afib on Eliquis, HTN, HLD, CHF, Gout, p/w SOB x 2-3 days and fall 5-7 days prior to presentation. In the ED afebrile, hypotensive to systolics in the 90's, tachycardic >150.     RVP/COVID19 PCR (7/14) Negative.   U/A (7/13) 9 WBC.   BCx (7/13) with 1/4 MRSE.     CT Chest (7/13) with Lingular and right middle lobe subsegmental atelectasis.   CT A/P (7/13) with Pericapsular fluid collection adjacent to right lobe of liver    I reviewed imaging with radiology today (7/15) fluid collection may be consistent with hematoma but absence of IV contracts limits ability to differentiate  Patient with some leukocytosis on presentation but possible it is secondary to corticosteroids prior to presentation.  I suspect transaminitis likely secondary to CHF exacerbation    Repeat CT A/P with IV contrast (7/20) with rim enhancement and some septations   I reviewed CT A/P with radiology today (7/21) rim enhancement could be from complex hematoma versus infection  Otherwise patient without symptoms suggestive of infection (but has been on Ceftriaxone for UTI and was on Zosyn after admission)    RECOMMENDATIONS    #Subcapsular Liver Fluid Collection, Leukocytosis, Transaminitis  --IR re-evaluated 7/20 CT A/P imaging and deemed procedure high risk and based on their review low suspicion for infection  --Would continue to monitor off of antibiotics   --Continue to follow CBC with diff  --Continue to follow renal function (Cr/BUN)  --Continue to follow transaminases  --Continue to follow temperature curve    #Dysuria  --U/A with slight pyuria, UCx with <10K urogen maurice (but obtained after Abx)  --s/p 3 days of Ceftriaxone    #Positive Blood Cultures (Suspect Staph epi is procurement contaminant)    I will continue to follow. Please feel free to contact me with any further questions.    Kevin Funk M.D.  [facility] Division of Infectious Disease  8AM-5PM: Pager Number [phone]  After Hours (or if no response): Please contact the Infectious Diseases Office at [phone]     The above assessment and plan were discussed with medicine resident

## 2021-07-22 NOTE — PROGRESS NOTE ADULT - PROBLEM SELECTOR PLAN 6
-EF 55-60%, MVR with annuplasty ring and moderate-severe AR and TR, mild pHTN  -Likely 2/2 A fib w/ RVR   -Cardiology (patient sees Dr. Wynne as an outpatient) and Dr. Valdovinos  -TTE from 6/11 show severe pulmonary pressures  -TTE from 7/13 showed mild pulmonary pressures  -Hold home Torsemide 40mg qd given diarrhea, WEN   -BNP [id number]

## 2021-07-22 NOTE — PROGRESS NOTE ADULT - PROBLEM SELECTOR PLAN 3
-Patient found to be in A Fib w/ RVR to HR 160s on admission   -Likely caused SOB  -Given Amio 150mg x1  -Takes Cardizem at home  -Rate controlled on Lopressor and Diltaizem to the 90s-100s  -Overnight 7/14, patient was tachycardic to 150s, given IV push of metoprolol 5mg, metoprolol 25mg PO, and metoprolol tartrate increased to 75mg BID, further increased to 100mg BID as per EP (7/15)  - s/p EP cardioversion 7/15  - metoprolol decreased to 50mg BID on 7/19  - Overnight 7/20, patient was in Afib tachycardic to the 120s, given IV push of metoprolol 5mg, and metoprolol tartrate increased to 50mg TID, further changed to 100 mg BID (7/21)  - Appreciate EP recs  - Still in Afib, amiodarone 400mg Q8 added (7/21)

## 2021-07-23 NOTE — PROGRESS NOTE ADULT - PROBLEM SELECTOR PLAN 1
-Cr elevated at 1.7 on admission. WEN on CKD likely prerenal (2/2 diarrhea from microscopic colitis)  - c/w mIVF, encourage PO intake   - Trend Cr   - Cr 1.09 (7/23)  - Avoid nephrotoxic medications  - renal u/s showing trace perihepatic ascites, hepatic steatosis, small b/l pleural effusions  - positive UA, negative Ucx  - s/p ceftriaxone  - d/c torsemide

## 2021-07-23 NOTE — PROGRESS NOTE ADULT - SUBJECTIVE AND OBJECTIVE BOX
Patient seen and examined today in bed. Feeling better today compared to yesterday. Eager to move around    TELE: Afib with rates consistently 80-90s    MEDICATIONS  (STANDING):  allopurinol 200 milliGRAM(s) Oral daily  aMIOdarone    Tablet 400 milliGRAM(s) Oral every 8 hours  amitriptyline 25 milliGRAM(s) Oral daily  apixaban 2.5 milliGRAM(s) Oral every 12 hours  buDESOnide    EC Capsule 9 milliGRAM(s) Oral daily  diltiazem    milliGRAM(s) Oral daily  hydrocortisone 1% Ointment 1 Application(s) Topical two times a day  lactated ringers. 500 milliLiter(s) (50 mL/Hr) IV Continuous <Continuous>  lactated ringers. 1000 milliLiter(s) (100 mL/Hr) IV Continuous <Continuous>  metoprolol tartrate 100 milliGRAM(s) Oral two times a day  oxybutynin 5 milliGRAM(s) Oral daily  pantoprazole    Tablet 40 milliGRAM(s) Oral before breakfast  polyethylene glycol 3350 34 Gram(s) Oral daily  senna 2 Tablet(s) Oral at bedtime    MEDICATIONS  (PRN):  acetaminophen   Tablet .. 650 milliGRAM(s) Oral every 6 hours PRN Temp greater or equal to 38.5C (101.3F), Mild Pain (1 - 3)  aluminum hydroxide/magnesium hydroxide/simethicone Suspension 30 milliLiter(s) Oral every 4 hours PRN Dyspepsia  diphenhydrAMINE 25 milliGRAM(s) Oral every 6 hours PRN Rash and/or Itching  melatonin 3 milliGRAM(s) Oral at bedtime PRN Insomnia  ondansetron Injectable 4 milliGRAM(s) IV Push every 8 hours PRN Nausea and/or Vomiting    Allergies  &quot;VASELINE BASED OINTMENTS&quot; (Urticaria; Rash)  adhesives (Urticaria; Rash)  contrast media (gadolinium-based) (Chills)  Norvasc (Pruritus)  statins (Unknown)    Intolerances  lactose (Unknown)    PAST MEDICAL & SURGICAL HISTORY:  Insomnia  HTN (hypertension)  Hypercholesteremia  GERD (gastroesophageal reflux disease)  Carotid artery occlusion  (R)  Vitamin B 12 deficiency  Osteoporosis  Mitral valve disease  Gallstones  Choledocholithiasis  CHF (congestive heart failure)  S/P MVR (mitral valve repair)  1997 at [facility]  Status post DACIA-BSO  1975  Hx of tonsillectomy  Abdominal hernia  repair 2007  History of lumbar laminectomy for spinal cord decompression  1995  Papilloma of breast  s/p excision from right breast &gt;20 years ago  Bilateral cataracts  extraction w/ IOL  Varicose veins  s/p sclerotherapy bilaterally  H/O carotid endarterectomy  S/P laparoscopic cholecystectomy    Vital Signs Last 24 Hrs  T(C): 36.6 (23 Jul 2021 11:42), Max: 36.6 (23 Jul 2021 11:42)  T(F): 97.8 (23 Jul 2021 11:42), Max: 97.8 (23 Jul 2021 11:42)  HR: 90 (23 Jul 2021 11:42) (90 - 98)  BP: 103/62 (23 Jul 2021 11:50) (103/62 - 131/70)  RR: 18 (23 Jul 2021 11:50) (18 - 18)  SpO2: 98% (23 Jul 2021 11:50) (96% - 98%)    Physical Exam:  Constitutional: NAD, AAOx3, well developed  Cardiovascular: +S1S2 IRIR, AFib at time of exam  Pulmonary: Decreased R>L breath sounds, unlabored  GI: soft NTND +BS  Extremities: trace bilateral LE edema  Neuro: non focal, DAVIS x4    LABS:                        8.5    14.35 )-----------( 342      ( 23 Jul 2021 06:35 )             28.5     140  |  108  |  29<H>  ----------------------------<  116<H>  4.5   |  19<L>  |  1.09  Ca    9.4      23 Jul 2021 06:52  Phos  3.2     07-23  Mg     2.0     07-23  TPro  6.4  /  Alb  3.5  /  TBili  0.3  /  DBili  x   /  AST  48<H>  /  ALT  149<H>  /  AlkPhos  152<H>  07-23    A/P  86 year old female with a history of MVR s/p repair, persistent Afib on Eliquis, HTN, HLD, CHF, Gout, admitted with perihepatic fluid collection, transaminitis, HFpEF exacerbation, and rapid atrial fibrillation s/p DCCV 7/15 with recurrence of Afib on 7/20.     Lenient rate control has been achieved. Average heart rate around 80-90bpm    - continue rate control with Cardizem, Lopressor.   - Amiodarone 5G load to conclude 7/27  - continue Eliquis for anticoagulaiton  - Potential repeat DCCV vs AFib ablation early next week. Patient is agreeable for ablation if offered.     Juan F Lundy, PAC  [phone]

## 2021-07-23 NOTE — PROGRESS NOTE ADULT - SUBJECTIVE AND OBJECTIVE BOX
HPI:  Patient seen and examined at bedside on 3 DSU.  Patient remains in rate controlled AFib while on metorpolol, diltiazem, amiodarone.  No events overnight.    Review Of Systems:           Respiratory: No shortness of breath, cough, or wheezing  Cardiovascular: No chest pain or palpitations  10 point review of systems is otherwise negative except as mentioned above        Medications:  acetaminophen   Tablet .. 650 milliGRAM(s) Oral every 6 hours PRN  allopurinol 200 milliGRAM(s) Oral daily  aluminum hydroxide/magnesium hydroxide/simethicone Suspension 30 milliLiter(s) Oral every 4 hours PRN  aMIOdarone    Tablet 400 milliGRAM(s) Oral every 8 hours  amitriptyline 25 milliGRAM(s) Oral daily  apixaban 2.5 milliGRAM(s) Oral every 12 hours  buDESOnide    EC Capsule 9 milliGRAM(s) Oral daily  diltiazem    milliGRAM(s) Oral daily  diphenhydrAMINE 25 milliGRAM(s) Oral every 6 hours PRN  hydrocortisone 1% Ointment 1 Application(s) Topical two times a day  lactated ringers. 500 milliLiter(s) IV Continuous <Continuous>  lactated ringers. 1000 milliLiter(s) IV Continuous <Continuous>  melatonin 3 milliGRAM(s) Oral at bedtime PRN  metoprolol tartrate 100 milliGRAM(s) Oral two times a day  ondansetron Injectable 4 milliGRAM(s) IV Push every 8 hours PRN  oxybutynin 5 milliGRAM(s) Oral daily  pantoprazole    Tablet 40 milliGRAM(s) Oral before breakfast  polyethylene glycol 3350 34 Gram(s) Oral daily  senna 2 Tablet(s) Oral at bedtime    PAST MEDICAL & SURGICAL HISTORY:  Insomnia    HTN (hypertension)    Hypercholesteremia    GERD (gastroesophageal reflux disease)    Carotid artery occlusion  (R)    Vitamin B 12 deficiency    Osteoporosis    Mitral valve disease    Gallstones    Choledocholithiasis    CHF (congestive heart failure)    S/P MVR (mitral valve repair)  1997 at [facility]    Status post DACIA-BSO  1975    Hx of tonsillectomy    Abdominal hernia  repair 2007    History of lumbar laminectomy for spinal cord decompression  1995    Papilloma of breast  s/p excision from right breast &gt;20 years ago    Bilateral cataracts  extraction w/ IOL    Varicose veins  s/p sclerotherapy bilaterally    H/O carotid endarterectomy    S/P laparoscopic cholecystectomy      Vitals:  T(C): 36.6 (07-23-21 @ 11:42), Max: 36.6 (07-23-21 @ 11:42)  HR: 90 (07-23-21 @ 11:42) (90 - 98)  BP: 103/62 (07-23-21 @ 11:50) (103/62 - 131/70)  BP(mean): --  RR: 18 (07-23-21 @ 11:50) (18 - 18)  SpO2: 98% (07-23-21 @ 11:50) (96% - 98%)  Wt(kg): --  Daily     Daily   I&O's Summary    22 Jul 2021 07:01  -  23 Jul 2021 07:00  --------------------------------------------------------  IN: 1080 mL / OUT: 1500 mL / NET: -420 mL    23 Jul 2021 07:01  -  23 Jul 2021 17:23  --------------------------------------------------------  IN: 600 mL / OUT: 200 mL / NET: 400 mL        Physical Exam:  Appearance: Normal, well groomed, NAD  Eyes: PERRLA, EOMI, pink conjunctiva, no scleral icterus   HENT: Normal oral mucosa  Cardiovascular: irregular, S1, S2, no murmur, rub, or gallop; no edema; no JVD  Respiratory: Clear to auscultation bilaterally  Gastrointestinal: Soft, non-tender, non-distended, BS+  Musculoskeletal: No clubbing or joint deformity   Neurologic: No focal weakness  Lymphatic: No lymphadenopathy  Psychiatry: AAOx3 with appropriate mood and affect  Skin: No rashes, ecchymoses, or cyanosis                        8.5    14.35 )-----------( 342      ( 23 Jul 2021 06:35 )             28.5     07-23    140  |  108  |  29<H>  ----------------------------<  116<H>  4.5   |  19<L>  |  1.09    Ca    9.4      23 Jul 2021 06:52  Phos  3.2     07-23  Mg     2.0     07-23    TPro  6.4  /  Alb  3.5  /  TBili  0.3  /  DBili  x   /  AST  48<H>  /  ALT  149<H>  /  AlkPhos  152<H>  07-23          Serum Pro-Brain Natriuretic Peptide: 2709 pg/mL (07-17 @ 13:49)      Interpretation of Telemetry: rate controlled AFib

## 2021-07-23 NOTE — PROGRESS NOTE ADULT - SUBJECTIVE AND OBJECTIVE BOX
**************************  Camilla Muñozisler  PGY-1 Internal Medicine  [phone]  **************************    Patient is a 86y old  Female who presents with a chief complaint of SOB (22 Jul 2021 15:56)      SUBJECTIVE / OVERNIGHT EVENTS:    ADDITIONAL REVIEW OF SYSTEMS:    MEDICATIONS  (STANDING):  allopurinol 200 milliGRAM(s) Oral daily  aMIOdarone    Tablet 400 milliGRAM(s) Oral every 8 hours  amitriptyline 25 milliGRAM(s) Oral daily  apixaban 2.5 milliGRAM(s) Oral every 12 hours  buDESOnide    EC Capsule 9 milliGRAM(s) Oral daily  diltiazem    milliGRAM(s) Oral daily  hydrocortisone 1% Ointment 1 Application(s) Topical two times a day  lactated ringers. 500 milliLiter(s) (50 mL/Hr) IV Continuous <Continuous>  lactated ringers. 1000 milliLiter(s) (100 mL/Hr) IV Continuous <Continuous>  metoprolol tartrate 100 milliGRAM(s) Oral two times a day  oxybutynin 5 milliGRAM(s) Oral daily  pantoprazole    Tablet 40 milliGRAM(s) Oral before breakfast  polyethylene glycol 3350 34 Gram(s) Oral daily  senna 2 Tablet(s) Oral at bedtime    MEDICATIONS  (PRN):  acetaminophen   Tablet .. 650 milliGRAM(s) Oral every 6 hours PRN Temp greater or equal to 38.5C (101.3F), Mild Pain (1 - 3)  aluminum hydroxide/magnesium hydroxide/simethicone Suspension 30 milliLiter(s) Oral every 4 hours PRN Dyspepsia  diphenhydrAMINE 25 milliGRAM(s) Oral every 6 hours PRN Rash and/or Itching  melatonin 3 milliGRAM(s) Oral at bedtime PRN Insomnia  ondansetron Injectable 4 milliGRAM(s) IV Push every 8 hours PRN Nausea and/or Vomiting      CAPILLARY BLOOD GLUCOSE        I&O's Summary    22 Jul 2021 07:01  -  23 Jul 2021 07:00  --------------------------------------------------------  IN: 1080 mL / OUT: 1500 mL / NET: -420 mL        PHYSICAL EXAM:  Vital Signs Last 24 Hrs  T(C): 36.5 (23 Jul 2021 04:45), Max: 36.5 (23 Jul 2021 04:45)  T(F): 97.7 (23 Jul 2021 04:45), Max: 97.7 (23 Jul 2021 04:45)  HR: 98 (23 Jul 2021 04:45) (76 - 98)  BP: 121/75 (23 Jul 2021 04:45) (113/65 - 131/70)  BP(mean): --  RR: 18 (23 Jul 2021 04:45) (18 - 18)  SpO2: 98% (23 Jul 2021 04:45) (96% - 98%)    CONSTITUTIONAL: NAD, well-developed  RESPIRATORY: Normal respiratory effort; lungs are clear to auscultation bilaterally  CARDIOVASCULAR: Regular rate and rhythm, normal S1 and S2, no murmur/rub/gallop; No lower extremity edema; Peripheral pulses are 2+ bilaterally  ABDOMEN: Nontender to palpation, normoactive bowel sounds, no rebound/guarding; No hepatosplenomegaly  MUSCLOSKELETAL: no clubbing or cyanosis of digits; no joint swelling or tenderness to palpation  PSYCH: A+O to person, place, and time; affect appropriate    LABS:                        8.5    14.35 )-----------( 342      ( 23 Jul 2021 06:35 )             28.5     07-22    139  |  110<H>  |  30<H>  ----------------------------<  108<H>  4.2   |  20<L>  |  1.09    Ca    9.2      22 Jul 2021 07:08  Phos  3.4     07-22  Mg     2.0     07-22    TPro  6.1  /  Alb  3.3  /  TBili  0.3  /  DBili  x   /  AST  55<H>  /  ALT  165<H>  /  AlkPhos  160<H>  07-22                RADIOLOGY & ADDITIONAL TESTS:  Results Reviewed:   Imaging Personally Reviewed:  Electrocardiogram Personally Reviewed:    COORDINATION OF CARE:  Care Discussed with Consultants/Other Providers [Y/N]:  Prior or Outpatient Records Reviewed [Y/N]:   **************************  Camilla Walterler  PGY-1 Internal Medicine  [phone]  **************************    Patient is a 86y old  Female who presents with a chief complaint of SOB (22 Jul 2021 15:56)      SUBJECTIVE / OVERNIGHT EVENTS:  This morning on tele, still in Afib 80s to 90s. Denies CP, SOB, palpitations. S/p senna yesterday, still has not had a BM in days. No overnight events, all other ROS negative.    MEDICATIONS  (STANDING):  allopurinol 200 milliGRAM(s) Oral daily  aMIOdarone    Tablet 400 milliGRAM(s) Oral every 8 hours  amitriptyline 25 milliGRAM(s) Oral daily  apixaban 2.5 milliGRAM(s) Oral every 12 hours  buDESOnide    EC Capsule 9 milliGRAM(s) Oral daily  diltiazem    milliGRAM(s) Oral daily  hydrocortisone 1% Ointment 1 Application(s) Topical two times a day  lactated ringers. 500 milliLiter(s) (50 mL/Hr) IV Continuous <Continuous>  lactated ringers. 1000 milliLiter(s) (100 mL/Hr) IV Continuous <Continuous>  metoprolol tartrate 100 milliGRAM(s) Oral two times a day  oxybutynin 5 milliGRAM(s) Oral daily  pantoprazole    Tablet 40 milliGRAM(s) Oral before breakfast  polyethylene glycol 3350 34 Gram(s) Oral daily  senna 2 Tablet(s) Oral at bedtime    MEDICATIONS  (PRN):  acetaminophen   Tablet .. 650 milliGRAM(s) Oral every 6 hours PRN Temp greater or equal to 38.5C (101.3F), Mild Pain (1 - 3)  aluminum hydroxide/magnesium hydroxide/simethicone Suspension 30 milliLiter(s) Oral every 4 hours PRN Dyspepsia  diphenhydrAMINE 25 milliGRAM(s) Oral every 6 hours PRN Rash and/or Itching  melatonin 3 milliGRAM(s) Oral at bedtime PRN Insomnia  ondansetron Injectable 4 milliGRAM(s) IV Push every 8 hours PRN Nausea and/or Vomiting        I&O's Summary    22 Jul 2021 07:01  -  23 Jul 2021 07:00  --------------------------------------------------------  IN: 1080 mL / OUT: 1500 mL / NET: -420 mL        PHYSICAL EXAM:  Vital Signs Last 24 Hrs  T(C): 36.5 (23 Jul 2021 04:45), Max: 36.5 (23 Jul 2021 04:45)  T(F): 97.7 (23 Jul 2021 04:45), Max: 97.7 (23 Jul 2021 04:45)  HR: 98 (23 Jul 2021 04:45) (76 - 98)  BP: 121/75 (23 Jul 2021 04:45) (113/65 - 131/70)  BP(mean): --  RR: 18 (23 Jul 2021 04:45) (18 - 18)  SpO2: 98% (23 Jul 2021 04:45) (96% - 98%)    CONSTITUTIONAL: NAD, well-developed  RESPIRATORY: Normal respiratory effort; lungs are clear to auscultation bilaterally  CARDIOVASCULAR: Regular rate and irregular rhythm, normal S1 and S2, no murmur/rub/gallop; No lower extremity edema; Peripheral pulses are 2+ bilaterally  ABDOMEN: Nontender to palpation, normoactive bowel sounds, no rebound/guarding  MUSCLOSKELETAL: no clubbing or cyanosis of digits; no joint swelling or tenderness to palpation  PSYCH: A+O to person, place, and time; affect appropriate    LABS:                        8.5    14.35 )-----------( 342      ( 23 Jul 2021 06:35 )             28.5     07-22    139  |  110<H>  |  30<H>  ----------------------------<  108<H>  4.2   |  20<L>  |  1.09    Ca    9.2      22 Jul 2021 07:08  Phos  3.4     07-22  Mg     2.0     07-22    TPro  6.1  /  Alb  3.3  /  TBili  0.3  /  DBili  x   /  AST  55<H>  /  ALT  165<H>  /  AlkPhos  160<H>  07-22

## 2021-07-23 NOTE — PROGRESS NOTE ADULT - ASSESSMENT
86 year-old woman with AFib with RVR in the setting of colitis.  Now status post cardioversion, but back in atrial fibrillation with RVR on 7/20.    Now rate controlled on:  metoprolol tartrate 100 mg BID,  diltiazem 120 mg daily  amiodarone 400 mg TID    EP follow-up appreciated. Possible ablation next week.

## 2021-07-23 NOTE — PROGRESS NOTE ADULT - SUBJECTIVE AND OBJECTIVE BOX
Follow Up:  Pericapsular Liver fluid collection    Interval History:    REVIEW OF SYSTEMS  [  ] ROS unobtainable because:    [  ] All other systems negative except as noted below    Constitutional:  [ ] fever [ ] chills  [ ] weight loss  [ ] weakness  Skin:  [ ] rash [ ] phlebitis	  Eyes: [ ] icterus [ ] pain  [ ] discharge	  ENMT: [ ] sore throat  [ ] thrush [ ] ulcers [ ] exudates  Respiratory: [ ] dyspnea [ ] hemoptysis [ ] cough [ ] sputum	  Cardiovascular:  [ ] chest pain [ ] palpitations [ ] edema	  Gastrointestinal:  [ ] nausea [ ] vomiting [ ] diarrhea [ ] constipation [ ] pain	  Genitourinary:  [ ] dysuria [ ] frequency [ ] hematuria [ ] discharge [ ] flank pain  [ ] incontinence  Musculoskeletal:  [ ] myalgias [ ] arthralgias [ ] arthritis  [ ] back pain  Neurological:  [ ] headache [ ] seizures  [ ] confusion/altered mental status    Allergies  &quot;VASELINE BASED OINTMENTS&quot; (Urticaria; Rash)  adhesives (Urticaria; Rash)  contrast media (gadolinium-based) (Chills)  Norvasc (Pruritus)  statins (Unknown)        ANTIMICROBIALS:      OTHER MEDS:  MEDICATIONS  (STANDING):  acetaminophen   Tablet .. 650 every 6 hours PRN  allopurinol 200 daily  aluminum hydroxide/magnesium hydroxide/simethicone Suspension 30 every 4 hours PRN  aMIOdarone    Tablet 400 every 8 hours  amitriptyline 25 daily  apixaban 2.5 every 12 hours  buDESOnide    EC Capsule 9 daily  diltiazem    daily  diphenhydrAMINE 25 every 6 hours PRN  melatonin 3 at bedtime PRN  metoprolol tartrate 100 two times a day  ondansetron Injectable 4 every 8 hours PRN  oxybutynin 5 daily  pantoprazole    Tablet 40 before breakfast  polyethylene glycol 3350 34 daily  senna 2 at bedtime      Vital Signs Last 24 Hrs  T(C): 36.5 (23 Jul 2021 04:45), Max: 36.5 (23 Jul 2021 04:45)  T(F): 97.7 (23 Jul 2021 04:45), Max: 97.7 (23 Jul 2021 04:45)  HR: 98 (23 Jul 2021 04:45) (96 - 98)  BP: 121/75 (23 Jul 2021 04:45) (121/75 - 131/70)  BP(mean): --  RR: 18 (23 Jul 2021 04:45) (18 - 18)  SpO2: 98% (23 Jul 2021 04:45) (97% - 98%)    PHYSICAL EXAMINATION:  General: Alert and Awake, NAD  HEENT: PERRL, EOMI  Neck: Supple  Cardiac: RRR, No M/R/G  Resp: CTAB, No Wh/Rh/Ra  Abdomen: NBS, NT/ND, No HSM, No rigidity or guarding  MSK: No LE edema. No Calf tenderness  : No muhammad  Skin: No rashes or lesions. Skin is warm and dry to the touch.   Neuro: Alert and Awake. CN 2-12 Grossly intact. Moves all four extremities spontaneously.  Psych: Calm, Pleasant, Cooperative                          8.5    14.35 )-----------( 342      ( 23 Jul 2021 06:35 )             28.5       07-23    140  |  108  |  29<H>  ----------------------------<  116<H>  4.5   |  19<L>  |  1.09    Ca    9.4      23 Jul 2021 06:52  Phos  3.2     07-23  Mg     2.0     07-23    TPro  6.4  /  Alb  3.5  /  TBili  0.3  /  DBili  x   /  AST  48<H>  /  ALT  149<H>  /  AlkPhos  152<H>  07-23          MICROBIOLOGY:  v  .Urine Clean Catch (Midstream)  07-19-21   <10,000 CFU/mL Normal Urogenital Juana  --  --      .Blood Blood-Peripheral  07-16-21   No Growth Final  --  --      .Urine Clean Catch (Midstream)  07-13-21   <10,000 CFU/mL Normal Urogenital [place]  --  --      .Blood Blood-Peripheral  07-13-21   No Growth Final  --  Blood Culture PCR                RADIOLOGY:    <The imaging below has been reviewed and visualized by me independently. Findings as detailed in report below>    EXAM:  CT ABDOMEN AND PELVIS IC                        PROCEDURE DATE:  07/20/2021    Pericapsular rim-enhancing fluid collection along the posterior hepatic dome, with differential including abscess or an organized hematoma. Fluid sampling may be helpful, though its posterior subdiaphragmatic location may make this difficult. Consider further characterization with contrast enhanced abdominal MRI as clinically indicated.    Moderate right and small left pleural effusions are unchanged. Follow Up:  Pericapsular Liver fluid collection    Interval History: afebrile. noting more SOB with ambulation today.     REVIEW OF SYSTEMS  [  ] ROS unobtainable because:    [x  ] All other systems negative except as noted below    Constitutional:  [ ] fever [ ] chills  [ ] weight loss  [ ] weakness  Skin:  [ ] rash [ ] phlebitis	  Eyes: [ ] icterus [ ] pain  [ ] discharge	  ENMT: [ ] sore throat  [ ] thrush [ ] ulcers [ ] exudates  Respiratory: [x ] dyspnea [ ] hemoptysis [ ] cough [ ] sputum	  Cardiovascular:  [ ] chest pain [ ] palpitations [ ] edema	  Gastrointestinal:  [ ] nausea [ ] vomiting [ ] diarrhea [ ] constipation [ ] pain	  Genitourinary:  [ ] dysuria [ ] frequency [ ] hematuria [ ] discharge [ ] flank pain  [ ] incontinence  Musculoskeletal:  [ ] myalgias [ ] arthralgias [ ] arthritis  [ ] back pain  Neurological:  [ ] headache [ ] seizures  [ ] confusion/altered mental status    Allergies  &quot;VASELINE BASED OINTMENTS&quot; (Urticaria; Rash)  adhesives (Urticaria; Rash)  contrast media (gadolinium-based) (Chills)  Norvasc (Pruritus)  statins (Unknown)        ANTIMICROBIALS:      OTHER MEDS:  MEDICATIONS  (STANDING):  acetaminophen   Tablet .. 650 every 6 hours PRN  allopurinol 200 daily  aluminum hydroxide/magnesium hydroxide/simethicone Suspension 30 every 4 hours PRN  aMIOdarone    Tablet 400 every 8 hours  amitriptyline 25 daily  apixaban 2.5 every 12 hours  buDESOnide    EC Capsule 9 daily  diltiazem    daily  diphenhydrAMINE 25 every 6 hours PRN  melatonin 3 at bedtime PRN  metoprolol tartrate 100 two times a day  ondansetron Injectable 4 every 8 hours PRN  oxybutynin 5 daily  pantoprazole    Tablet 40 before breakfast  polyethylene glycol 3350 34 daily  senna 2 at bedtime      Vital Signs Last 24 Hrs  T(C): 36.5 (23 Jul 2021 04:45), Max: 36.5 (23 Jul 2021 04:45)  T(F): 97.7 (23 Jul 2021 04:45), Max: 97.7 (23 Jul 2021 04:45)  HR: 98 (23 Jul 2021 04:45) (96 - 98)  BP: 121/75 (23 Jul 2021 04:45) (121/75 - 131/70)  BP(mean): --  RR: 18 (23 Jul 2021 04:45) (18 - 18)  SpO2: 98% (23 Jul 2021 04:45) (97% - 98%)    PHYSICAL EXAMINATION:  General: Alert and Awake, NAD  Cardiac: RRR, No M/R/G  Resp: CTAB, No Wh/Rh/Ra  Abdomen: NBS, NT/ND, No HSM, No rigidity or guarding  MSK: No LE edema. No Calf tenderness  : No muhammad  Skin: No rashes or lesions. Skin is warm and dry to the touch.   Neuro: Alert and Awake. CN 2-12 Grossly intact. Moves all four extremities spontaneously.  Psych: Calm, Pleasant, Cooperative                            8.5    14.35 )-----------( 342      ( 23 Jul 2021 06:35 )             28.5       07-23    140  |  108  |  29<H>  ----------------------------<  116<H>  4.5   |  19<L>  |  1.09    Ca    9.4      23 Jul 2021 06:52  Phos  3.2     07-23  Mg     2.0     07-23    TPro  6.4  /  Alb  3.5  /  TBili  0.3  /  DBili  x   /  AST  48<H>  /  ALT  149<H>  /  AlkPhos  152<H>  07-23          MICROBIOLOGY:  v  .Urine Clean Catch (Midstream)  07-19-21   <10,000 CFU/mL Normal Urogenital [place]  --  --      .Blood Blood-Peripheral  07-16-21   No Growth Final  --  --      .Urine Clean Catch (Midstream)  07-13-21   <10,000 CFU/mL Normal Urogenital [place]  --  --      .Blood Blood-Peripheral  07-13-21   No Growth Final  --  Blood Culture PCR                RADIOLOGY:    <The imaging below has been reviewed and visualized by me independently. Findings as detailed in report below>    EXAM:  CT ABDOMEN AND PELVIS IC                        PROCEDURE DATE:  07/20/2021    Pericapsular rim-enhancing fluid collection along the posterior hepatic dome, with differential including abscess or an organized hematoma. Fluid sampling may be helpful, though its posterior subdiaphragmatic location may make this difficult. Consider further characterization with contrast enhanced abdominal MRI as clinically indicated.    Moderate right and small left pleural effusions are unchanged.

## 2021-07-23 NOTE — PROGRESS NOTE ADULT - ASSESSMENT
87 yo female w/pmh HFpEF, atrial fibrillation, fatty liver, microscopic colitis, gout, severe MR s/p MVR 1997, CKD 3, admitted to CCU for a-fib with RVR, HR 160s. Restarted home meds and HR better controlled. S/p DCCV, was in NSR, now in Afib again.

## 2021-07-23 NOTE — PROGRESS NOTE ADULT - ASSESSMENT
86 year old female PMH MVR s/p repair, Afib on Eliquis, HTN, HLD, CHF, Gout, p/w SOB x 2-3 days and fall 5-7 days prior to presentation. In the ED afebrile, hypotensive to systolics in the 90's, tachycardic >150.     RVP/COVID19 PCR (7/14) Negative.   U/A (7/13) 9 WBC.   BCx (7/13) with 1/4 MRSE.     CT Chest (7/13) with Lingular and right middle lobe subsegmental atelectasis.   CT A/P (7/13) with Pericapsular fluid collection adjacent to right lobe of liver    I reviewed imaging with radiology today (7/15) fluid collection may be consistent with hematoma but absence of IV contracts limits ability to differentiate  Patient with some leukocytosis on presentation but possible it is secondary to corticosteroids prior to presentation.  I suspect transaminitis likely secondary to CHF exacerbation    Repeat CT A/P with IV contrast (7/20) with rim enhancement and some septations   I reviewed CT A/P with radiology today (7/21) rim enhancement could be from complex hematoma versus infection  IR re-evaluated 7/20 CT A/P imaging and deemed procedure high risk and based on their review low suspicion for infection  Otherwise patient without symptoms suggestive of infection (but has been on Ceftriaxone for UTI and was on Zosyn after admission)    Leukocytosis slowly uptrending (but was reinitiated on Budesonide on 7/17)    RECOMMENDATIONS    #Subcapsular Liver Fluid Collection, Leukocytosis (increasing), Transaminitis  --Would continue to monitor off of antibiotics   --Continue to follow CBC with diff  --Continue to follow renal function (Cr/BUN)  --Continue to follow transaminases  --Continue to follow temperature curve    #Dysuria  --U/A with slight pyuria, UCx with <10K urogen maurice (but obtained after Abx)  --s/p 3 days of Ceftriaxone    I will be away over this upcoming weekend. Please contact the Infectious Diseases Office with any further questions or concerns.     Kevin Funk M.D.  [facility] Division of Infectious Disease  8AM-5PM: Pager Number [phone]  After Hours (or if no response): Please contact the Infectious Diseases Office at [phone]

## 2021-07-23 NOTE — PROGRESS NOTE ADULT - PROBLEM SELECTOR PLAN 6
-EF 55-60%, MVR with annuplasty ring and moderate-severe AR and TR, mild pHTN  -Likely 2/2 A fib w/ RVR   -Cardiology (patient sees Dr. Wynne as an outpatient) and Dr. Valdovinos  -TTE from 6/11 show severe pulmonary pressures  -TTE from 7/13 showed mild pulmonary pressures  -Hold home Torsemide 40mg qd given diarrhea, WEN   -BNP [id number]

## 2021-07-24 NOTE — PROGRESS NOTE ADULT - PROBLEM SELECTOR PLAN 6
-EF 55-60%, MVR with annuplasty ring and moderate-severe AR and TR, mild pHTN  -Likely 2/2 A fib w/ RVR   -Cardiology (patient sees Dr. Wynne as an outpatient) and Dr. Valdovinos  -TTE from 6/11 show severe pulmonary pressures  -TTE from 7/13 showed mild pulmonary pressures  -Hold home Torsemide 40mg qd given diarrhea, WEN   -BNP [phone]

## 2021-07-24 NOTE — PROGRESS NOTE ADULT - SUBJECTIVE AND OBJECTIVE BOX
Authored by Corby Arambula MD ([phone]) [facility]    SUBJECTIVE / OVERNIGHT EVENTS:    allopurinol   200 milliGRAM(s) Oral (07-23-21 @ 12:40)    aMIOdarone    Tablet   400 milliGRAM(s) Oral (07-24-21 @ 05:02)   400 milliGRAM(s) Oral (07-23-21 @ 21:35)   400 milliGRAM(s) Oral (07-23-21 @ 13:27)    amitriptyline   25 milliGRAM(s) Oral (07-23-21 @ 12:39)    apixaban   2.5 milliGRAM(s) Oral (07-23-21 @ 21:35)   2.5 milliGRAM(s) Oral (07-23-21 @ 09:29)    buDESOnide    EC Capsule   9 milliGRAM(s) Oral (07-24-21 @ 05:02)    diltiazem   CD   120 milliGRAM(s) Oral (07-24-21 @ 05:03)    hydrocortisone 1% Ointment   1 Application(s) Topical (07-24-21 @ 05:04)   1 Application(s) Topical (07-23-21 @ 18:03)    melatonin   3 milliGRAM(s) Oral (07-23-21 @ 21:36)    metoprolol tartrate   100 milliGRAM(s) Oral (07-24-21 @ 05:03)   100 milliGRAM(s) Oral (07-23-21 @ 18:00)    oxybutynin   5 milliGRAM(s) Oral (07-23-21 @ 12:39)    pantoprazole    Tablet   40 milliGRAM(s) Oral (07-24-21 @ 05:03)    polyethylene glycol 3350   34 Gram(s) Oral (07-23-21 @ 12:38)    senna   2 Tablet(s) Oral (07-23-21 @ 21:35)    MEDICATIONS  (STANDING):  allopurinol 200 milliGRAM(s) Oral daily  aMIOdarone    Tablet 400 milliGRAM(s) Oral every 8 hours  amitriptyline 25 milliGRAM(s) Oral daily  apixaban 2.5 milliGRAM(s) Oral every 12 hours  buDESOnide    EC Capsule 9 milliGRAM(s) Oral daily  diltiazem    milliGRAM(s) Oral daily  hydrocortisone 1% Ointment 1 Application(s) Topical two times a day  lactated ringers. 1000 milliLiter(s) (100 mL/Hr) IV Continuous <Continuous>  lactated ringers. 500 milliLiter(s) (50 mL/Hr) IV Continuous <Continuous>  metoprolol tartrate 100 milliGRAM(s) Oral two times a day  oxybutynin 5 milliGRAM(s) Oral daily  pantoprazole    Tablet 40 milliGRAM(s) Oral before breakfast  polyethylene glycol 3350 34 Gram(s) Oral daily  senna 2 Tablet(s) Oral at bedtime    MEDICATIONS  (PRN):  acetaminophen   Tablet .. 650 milliGRAM(s) Oral every 6 hours PRN Temp greater or equal to 38.5C (101.3F), Mild Pain (1 - 3)  aluminum hydroxide/magnesium hydroxide/simethicone Suspension 30 milliLiter(s) Oral every 4 hours PRN Dyspepsia  diphenhydrAMINE 25 milliGRAM(s) Oral every 6 hours PRN Rash and/or Itching  melatonin 3 milliGRAM(s) Oral at bedtime PRN Insomnia  ondansetron Injectable 4 milliGRAM(s) IV Push every 8 hours PRN Nausea and/or Vomiting    I&O's Summary    23 Jul 2021 07:01  -  24 Jul 2021 07:00  --------------------------------------------------------  IN: 960 mL / OUT: 600 mL / NET: 360 mL    PHYSICAL EXAM:  Vital Signs Last 24 Hrs  T(C): 36.4 (24 Jul 2021 04:07), Max: 36.6 (23 Jul 2021 11:42)  T(F): 97.6 (24 Jul 2021 04:07), Max: 97.8 (23 Jul 2021 11:42)  HR: 81 (24 Jul 2021 04:07) (81 - 91)  BP: 122/77 (24 Jul 2021 04:07) (103/62 - 124/68)  BP(mean): --  RR: 18 (24 Jul 2021 04:07) (18 - 18)  SpO2: 100% (24 Jul 2021 04:07) (96% - 100%)    GENERAL: No acute distress, well-developed  HEAD:  Atraumatic, Normocephalic  EYES: EOMI, PERRLA, conjunctiva and sclera clear  NECK: Supple, no lymphadenopathy, no JVD  CHEST/LUNG: CTAB; No wheezes, rales, or rhonchi  HEART: Regular rate and rhythm; No murmurs, rubs, or gallops  ABDOMEN: Soft, non-tender, non-distended; normal bowel sounds, no organomegaly  EXTREMITIES:  2+ peripheral pulses b/l, No clubbing, cyanosis, or edema  NEUROLOGY: A&O x 3, no focal deficits  SKIN: No rashes or lesions    LABS:                        8.3    15.44 )-----------( 356      ( 24 Jul 2021 06:50 )             28.3     07-24    138  |  106  |  40<H>  ----------------------------<  103<H>  4.4   |  18<L>  |  1.33<H>    Ca    9.6      24 Jul 2021 06:49  Phos  4.0     07-24  Mg     2.3     07-24    TPro  6.5  /  Alb  3.5  /  TBili  0.3  /  DBili  x   /  AST  43<H>  /  ALT  134<H>  /  AlkPhos  159<H>  07-24 Authored by Corby Arambula MD ([phone]) [facility]    SUBJECTIVE / OVERNIGHT EVENTS: NAOEN. This am pt is awake and alert in bed, NAD. Denies HA, cp, SOB, abd pain.     allopurinol   200 milliGRAM(s) Oral (07-23-21 @ 12:40)    aMIOdarone    Tablet   400 milliGRAM(s) Oral (07-24-21 @ 05:02)   400 milliGRAM(s) Oral (07-23-21 @ 21:35)   400 milliGRAM(s) Oral (07-23-21 @ 13:27)    amitriptyline   25 milliGRAM(s) Oral (07-23-21 @ 12:39)    apixaban   2.5 milliGRAM(s) Oral (07-23-21 @ 21:35)   2.5 milliGRAM(s) Oral (07-23-21 @ 09:29)    buDESOnide    EC Capsule   9 milliGRAM(s) Oral (07-24-21 @ 05:02)    diltiazem   CD   120 milliGRAM(s) Oral (07-24-21 @ 05:03)    hydrocortisone 1% Ointment   1 Application(s) Topical (07-24-21 @ 05:04)   1 Application(s) Topical (07-23-21 @ 18:03)    melatonin   3 milliGRAM(s) Oral (07-23-21 @ 21:36)    metoprolol tartrate   100 milliGRAM(s) Oral (07-24-21 @ 05:03)   100 milliGRAM(s) Oral (07-23-21 @ 18:00)    oxybutynin   5 milliGRAM(s) Oral (07-23-21 @ 12:39)    pantoprazole    Tablet   40 milliGRAM(s) Oral (07-24-21 @ 05:03)    polyethylene glycol 3350   34 Gram(s) Oral (07-23-21 @ 12:38)    senna   2 Tablet(s) Oral (07-23-21 @ 21:35)    MEDICATIONS  (STANDING):  allopurinol 200 milliGRAM(s) Oral daily  aMIOdarone    Tablet 400 milliGRAM(s) Oral every 8 hours  amitriptyline 25 milliGRAM(s) Oral daily  apixaban 2.5 milliGRAM(s) Oral every 12 hours  buDESOnide    EC Capsule 9 milliGRAM(s) Oral daily  diltiazem    milliGRAM(s) Oral daily  hydrocortisone 1% Ointment 1 Application(s) Topical two times a day  lactated ringers. 1000 milliLiter(s) (100 mL/Hr) IV Continuous <Continuous>  lactated ringers. 500 milliLiter(s) (50 mL/Hr) IV Continuous <Continuous>  metoprolol tartrate 100 milliGRAM(s) Oral two times a day  oxybutynin 5 milliGRAM(s) Oral daily  pantoprazole    Tablet 40 milliGRAM(s) Oral before breakfast  polyethylene glycol 3350 34 Gram(s) Oral daily  senna 2 Tablet(s) Oral at bedtime    MEDICATIONS  (PRN):  acetaminophen   Tablet .. 650 milliGRAM(s) Oral every 6 hours PRN Temp greater or equal to 38.5C (101.3F), Mild Pain (1 - 3)  aluminum hydroxide/magnesium hydroxide/simethicone Suspension 30 milliLiter(s) Oral every 4 hours PRN Dyspepsia  diphenhydrAMINE 25 milliGRAM(s) Oral every 6 hours PRN Rash and/or Itching  melatonin 3 milliGRAM(s) Oral at bedtime PRN Insomnia  ondansetron Injectable 4 milliGRAM(s) IV Push every 8 hours PRN Nausea and/or Vomiting    I&O's Summary    23 Jul 2021 07:01  -  24 Jul 2021 07:00  --------------------------------------------------------  IN: 960 mL / OUT: 600 mL / NET: 360 mL    PHYSICAL EXAM:  Vital Signs Last 24 Hrs  T(C): 36.4 (24 Jul 2021 04:07), Max: 36.6 (23 Jul 2021 11:42)  T(F): 97.6 (24 Jul 2021 04:07), Max: 97.8 (23 Jul 2021 11:42)  HR: 81 (24 Jul 2021 04:07) (81 - 91)  BP: 122/77 (24 Jul 2021 04:07) (103/62 - 124/68)  BP(mean): --  RR: 18 (24 Jul 2021 04:07) (18 - 18)  SpO2: 100% (24 Jul 2021 04:07) (96% - 100%)    GENERAL: No acute distress  HEAD:  Atraumatic, Normocephalic  EYES: EOMI, PERRLA, conjunctiva and sclera clear  NECK: Supple, no lymphadenopathy, no JVD  CHEST/LUNG: CTAB; No wheezes, rales, or rhonchi  HEART: irregular rate and rhythm. No murmurs, rubs, or gallops  ABDOMEN: Soft, non-tender, non-distended; normal bowel sounds, no organomegaly  EXTREMITIES:  2+ peripheral pulses b/l, No clubbing, cyanosis, or edema  NEUROLOGY: A&O x 3, no focal deficits  SKIN: No rashes or lesions    LABS:                        8.3    15.44 )-----------( 356      ( 24 Jul 2021 06:50 )             28.3     07-24    138  |  106  |  40<H>  ----------------------------<  103<H>  4.4   |  18<L>  |  1.33<H>    Ca    9.6      24 Jul 2021 06:49  Phos  4.0     07-24  Mg     2.3     07-24    TPro  6.5  /  Alb  3.5  /  TBili  0.3  /  DBili  x   /  AST  43<H>  /  ALT  134<H>  /  AlkPhos  159<H>  07-24 Authored by Corby Arambula MD ([phone]) [facility]    SUBJECTIVE / OVERNIGHT EVENTS: NAOEN. This am pt is awake and alert in bed, NAD. Denies HA, cp, SOB, abd pain.   12 point ros negative except for above    allopurinol   200 milliGRAM(s) Oral (07-23-21 @ 12:40)    aMIOdarone    Tablet   400 milliGRAM(s) Oral (07-24-21 @ 05:02)   400 milliGRAM(s) Oral (07-23-21 @ 21:35)   400 milliGRAM(s) Oral (07-23-21 @ 13:27)    amitriptyline   25 milliGRAM(s) Oral (07-23-21 @ 12:39)    apixaban   2.5 milliGRAM(s) Oral (07-23-21 @ 21:35)   2.5 milliGRAM(s) Oral (07-23-21 @ 09:29)    buDESOnide    EC Capsule   9 milliGRAM(s) Oral (07-24-21 @ 05:02)    diltiazem   CD   120 milliGRAM(s) Oral (07-24-21 @ 05:03)    hydrocortisone 1% Ointment   1 Application(s) Topical (07-24-21 @ 05:04)   1 Application(s) Topical (07-23-21 @ 18:03)    melatonin   3 milliGRAM(s) Oral (07-23-21 @ 21:36)    metoprolol tartrate   100 milliGRAM(s) Oral (07-24-21 @ 05:03)   100 milliGRAM(s) Oral (07-23-21 @ 18:00)    oxybutynin   5 milliGRAM(s) Oral (07-23-21 @ 12:39)    pantoprazole    Tablet   40 milliGRAM(s) Oral (07-24-21 @ 05:03)    polyethylene glycol 3350   34 Gram(s) Oral (07-23-21 @ 12:38)    senna   2 Tablet(s) Oral (07-23-21 @ 21:35)    MEDICATIONS  (STANDING):  allopurinol 200 milliGRAM(s) Oral daily  aMIOdarone    Tablet 400 milliGRAM(s) Oral every 8 hours  amitriptyline 25 milliGRAM(s) Oral daily  apixaban 2.5 milliGRAM(s) Oral every 12 hours  buDESOnide    EC Capsule 9 milliGRAM(s) Oral daily  diltiazem    milliGRAM(s) Oral daily  hydrocortisone 1% Ointment 1 Application(s) Topical two times a day  lactated ringers. 1000 milliLiter(s) (100 mL/Hr) IV Continuous <Continuous>  lactated ringers. 500 milliLiter(s) (50 mL/Hr) IV Continuous <Continuous>  metoprolol tartrate 100 milliGRAM(s) Oral two times a day  oxybutynin 5 milliGRAM(s) Oral daily  pantoprazole    Tablet 40 milliGRAM(s) Oral before breakfast  polyethylene glycol 3350 34 Gram(s) Oral daily  senna 2 Tablet(s) Oral at bedtime    MEDICATIONS  (PRN):  acetaminophen   Tablet .. 650 milliGRAM(s) Oral every 6 hours PRN Temp greater or equal to 38.5C (101.3F), Mild Pain (1 - 3)  aluminum hydroxide/magnesium hydroxide/simethicone Suspension 30 milliLiter(s) Oral every 4 hours PRN Dyspepsia  diphenhydrAMINE 25 milliGRAM(s) Oral every 6 hours PRN Rash and/or Itching  melatonin 3 milliGRAM(s) Oral at bedtime PRN Insomnia  ondansetron Injectable 4 milliGRAM(s) IV Push every 8 hours PRN Nausea and/or Vomiting    I&O's Summary    23 Jul 2021 07:01  -  24 Jul 2021 07:00  --------------------------------------------------------  IN: 960 mL / OUT: 600 mL / NET: 360 mL    PHYSICAL EXAM:  Vital Signs Last 24 Hrs  T(C): 36.4 (24 Jul 2021 04:07), Max: 36.6 (23 Jul 2021 11:42)  T(F): 97.6 (24 Jul 2021 04:07), Max: 97.8 (23 Jul 2021 11:42)  HR: 81 (24 Jul 2021 04:07) (81 - 91)  BP: 122/77 (24 Jul 2021 04:07) (103/62 - 124/68)  BP(mean): --  RR: 18 (24 Jul 2021 04:07) (18 - 18)  SpO2: 100% (24 Jul 2021 04:07) (96% - 100%)    GENERAL: No acute distress  HEAD:  Atraumatic, Normocephalic  EYES: EOMI, PERRLA, conjunctiva and sclera clear  NECK: Supple, no lymphadenopathy, no JVD  CHEST/LUNG: CTAB; No wheezes, rales, or rhonchi  HEART: irregular rate and rhythm. No murmurs, rubs, or gallops  ABDOMEN: Soft, non-tender, non-distended; normal bowel sounds, no organomegaly  EXTREMITIES:  2+ peripheral pulses b/l, No clubbing, cyanosis, or edema  NEUROLOGY: A&O x 3, no focal deficits  SKIN: No rashes or lesions    LABS:                        8.3    15.44 )-----------( 356      ( 24 Jul 2021 06:50 )             28.3     07-24    138  |  106  |  40<H>  ----------------------------<  103<H>  4.4   |  18<L>  |  1.33<H>    Ca    9.6      24 Jul 2021 06:49  Phos  4.0     07-24  Mg     2.3     07-24    TPro  6.5  /  Alb  3.5  /  TBili  0.3  /  DBili  x   /  AST  43<H>  /  ALT  134<H>  /  AlkPhos  159<H>  07-24

## 2021-07-24 NOTE — PROGRESS NOTE ADULT - PROBLEM SELECTOR PLAN 2
- Follows with Dr. Young (GI)  - Has microscopic colitis and planned to start prolonged budesonide taper (started 6/28)   - Weakness and fatigue, fainting, since starting budesonide so was advised to stop  - Per Dr. Young, resume budesonide 9 mg qd  - Outpatient follow up with Dr. Young  - K>4, MG>2

## 2021-07-24 NOTE — PROGRESS NOTE ADULT - ASSESSMENT
86 year old female with a history of MVR s/p repair, persistent Afib on Eliquis, HTN, HLD, CHF, Gout, admitted with perihepatic fluid collection, transaminitis, HFpEF exacerbation, and rapid atrial fibrillation s/p DCCV 7/15 with recurrence of Afib on 7/20.     1.  Afib  2. HFpEF  - Lenient rate control has been achieved. Average heart rate around 80-90bpm  - Continue 5Gms Amio load to conclude 7/27, currently on 400mg tid  - Continue rate control with Cardizem, Lopressor  - Continue AC with Eliquis  - Plan for repeat DCCV on Monday, NPO post midnight on Sunday    Jaylyn Daley ANP-C  #[phone]

## 2021-07-24 NOTE — PROGRESS NOTE ADULT - PROBLEM SELECTOR PLAN 4
-CT ab/pelvis showed perihepatic fluid collection, mild hepatomegaly, diffuse fatty infiltration  - IR defer drainage, f/u o/p to monitor size and for sx, available if becomes sx  - Appreciate ID recs, will monitor off Zosyn for now  - Repeat CT A/P with contrast 7/20 showed pericapsular rim enhancing fluid collection posteriorly concerning for abscess vs hematoma  - ID low suspicion for abscess, will contact IR for recs on sampling fluid  - entamoeba, echinococcus negative

## 2021-07-24 NOTE — PROGRESS NOTE ADULT - SUBJECTIVE AND OBJECTIVE BOX
24H hour events:   No new events overnight; patient without complaints at this time    MEDICATIONS:  aMIOdarone    Tablet 400 milliGRAM(s) Oral every 8 hours  apixaban 2.5 milliGRAM(s) Oral every 12 hours  diltiazem    milliGRAM(s) Oral daily  metoprolol tartrate 100 milliGRAM(s) Oral two times a day  acetaminophen   Tablet .. 650 milliGRAM(s) Oral every 6 hours PRN  amitriptyline 25 milliGRAM(s) Oral daily  diphenhydrAMINE 25 milliGRAM(s) Oral every 6 hours PRN  melatonin 3 milliGRAM(s) Oral at bedtime PRN  ondansetron Injectable 4 milliGRAM(s) IV Push every 8 hours PRN  aluminum hydroxide/magnesium hydroxide/simethicone Suspension 30 milliLiter(s) Oral every 4 hours PRN  pantoprazole    Tablet 40 milliGRAM(s) Oral before breakfast  allopurinol 200 milliGRAM(s) Oral daily  buDESOnide    EC Capsule 9 milliGRAM(s) Oral daily  hydrocortisone 1% Ointment 1 Application(s) Topical two times a day  lactated ringers. 1000 milliLiter(s) IV Continuous <Continuous>  oxybutynin 5 milliGRAM(s) Oral daily      PHYSICAL EXAM:  T(C): 36.8 (07-24-21 @ 11:51), Max: 36.8 (07-24-21 @ 11:51)  HR: 90 (07-24-21 @ 11:51) (81 - 91)  BP: 134/85 (07-24-21 @ 11:51) (122/77 - 134/85)  RR: 18 (07-24-21 @ 11:51) (18 - 18)  SpO2: 97% (07-24-21 @ 11:51) (97% - 100%)  Wt(kg): --  I&O's Summary    23 Jul 2021 07:01  -  24 Jul 2021 07:00  --------------------------------------------------------  IN: 960 mL / OUT: 600 mL / NET: 360 mL        Appearance: Normal, in NAD	  HEENT: Normocephalic, atraumatic  Cardiovascular: irregular S1 S2, No JVD, No r/g  Respiratory: Lungs clear to auscultation	  Psychiatry: A & O x 3, Mood & affect appropriate  Gastrointestinal:  Soft, Non-tender, + BS	  Skin: No rashes, No ecchymoses, No cyanosis	  Neurologic: Non-focal  Extremities: Normal range of motion, No clubbing, cyanosis or edema  Vascular: Peripheral pulses palpable 2+ bilaterally        LABS:	 	    CBC Full  -  ( 24 Jul 2021 06:50 )  WBC Count : 15.44 K/uL  Hemoglobin : 8.3 g/dL  Hematocrit : 28.3 %  Platelet Count - Automated : 356 K/uL  Mean Cell Volume : 82.5 fl  Mean Cell Hemoglobin : 24.2 pg  Mean Cell Hemoglobin Concentration : 29.3 gm/dL  Auto Neutrophil # : x  Auto Lymphocyte # : x  Auto Monocyte # : x  Auto Eosinophil # : x  Auto Basophil # : x  Auto Neutrophil % : x  Auto Lymphocyte % : x  Auto Monocyte % : x  Auto Eosinophil % : x  Auto Basophil % : x    07-24    138  |  106  |  40<H>  ----------------------------<  103<H>  4.4   |  18<L>  |  1.33<H>  07-23    140  |  108  |  29<H>  ----------------------------<  116<H>  4.5   |  19<L>  |  1.09    Ca    9.6      24 Jul 2021 06:49  Ca    9.4      23 Jul 2021 06:52  Phos  4.0     07-24  Phos  3.2     07-23  Mg     2.3     07-24  Mg     2.0     07-23    TPro  6.5  /  Alb  3.5  /  TBili  0.3  /  DBili  x   /  AST  43<H>  /  ALT  134<H>  /  AlkPhos  159<H>  07-24  TPro  6.4  /  Alb  3.5  /  TBili  0.3  /  DBili  x   /  AST  48<H>  /  ALT  149<H>  /  AlkPhos  152<H>  07-23      proBNP:   Lipid Profile:   HgA1c:   TSH:       CARDIAC MARKERS:      TELEMETRY: Atrial Fibrillation 80-100s

## 2021-07-24 NOTE — PROGRESS NOTE ADULT - PROBLEM SELECTOR PLAN 1
-Cr elevated at 1.7 on admission. ISIDRA on CKD likely prerenal (2/2 diarrhea from microscopic colitis). Isidra resolved but SCr now 1.33, BUN/CR 30 c/w prerenal- no infectious source  - c/w mIVF, encourage PO intake   - Trend Cr   - f/u UA, UCx, blood cx, urine studies  - monitor off Abx given no fever or localizing symptoms  - WBC uptrending i/s/o budesonide, no obvious infectious source  - ID following- appreciate recs  - Avoid nephrotoxic medications  - renal u/s showing trace perihepatic ascites, hepatic steatosis, small b/l pleural effusions  - s/p ceftriaxone  - hold torsemide - c/w mIVF, encourage PO intake   - Trend Cr   - f/u UA, UCx, blood cx, urine studies  - monitor off Abx given no fever or localizing symptoms  - WBC uptrending i/s/o budesonide, no obvious infectious source  - ID following- appreciate recs  - Avoid nephrotoxic medications  - renal u/s showing trace perihepatic ascites, hepatic steatosis, small b/l pleural effusions  - s/p ceftriaxone  - hold torsemide

## 2021-07-24 NOTE — PROGRESS NOTE ADULT - PROBLEM SELECTOR PLAN 3
-Patient found to be in A Fib w/ RVR to HR 160s on admission   -Likely caused SOB  -Given Amio 150mg x1  - c/w amio load per EP  -Takes Cardizem at home  -Rate controlled on Lopressor and Diltaizem to the 90s-100s  -Overnight 7/14, patient was tachycardic to 150s, given IV push of metoprolol 5mg, metoprolol 25mg PO, and metoprolol tartrate increased to 75mg BID, further increased to 100mg BID as per EP (7/15)  - s/p EP cardioversion 7/15  - metoprolol decreased to 50mg BID on 7/19  - Overnight 7/20, patient was in Afib tachycardic to the 120s, given IV push of metoprolol 5mg, and metoprolol tartrate increased to 50mg TID, further changed to 100 mg BID (7/21)  - Appreciate EP recs  - Still in Afib, amiodarone 400mg Q8 added (7/21)

## 2021-07-24 NOTE — CHART NOTE - NSCHARTNOTEFT_GEN_A_CORE
pt seen as per Diet Tech. pt complaining of not being able to receive items with lactose. RD confirmed with pt that she is lactose intolerant but wishes that it be removed because she is able to tolerate cheesecakes and the only thing she requests is lactaid milk instead of Reg milk in the future. RD placed preference in CBORD. RD removed lactose intolerance as per pt request.

## 2021-07-25 NOTE — PROGRESS NOTE ADULT - ASSESSMENT
86 year old female PMH MVR s/p repair, Afib on Eliquis, HTN, HLD, CHF, Gout, p/w SOB x 2-3 days and fall 5-7 days prior to presentation. In the ED afebrile, hypotensive to systolics in the 90's, tachycardic >150.     RVP/COVID19 PCR (7/14) Negative.   U/A (7/13) 9 WBC.   BCx (7/13) with 1/4 MRSE.     CT Chest (7/13) with Lingular and right middle lobe subsegmental atelectasis.   CT A/P (7/13) with Pericapsular fluid collection adjacent to right lobe of liver    perihepatic fluid collection may be consistent with hematoma - clinical appearance and improvement of white count OFF antibiotics makes infectious abscess unlikely      Repeat CT A/P with IV contrast (7/20) with rim enhancement and some septations   I reviewed CT A/P with radiology today (7/21) rim enhancement could be from complex hematoma versus infection  IR re-evaluated 7/20 CT A/P imaging and deemed procedure high risk and based on their review low suspicion for infection  Otherwise patient without symptoms suggestive of infection (but has been on Ceftriaxone for UTI and was on Zosyn after admission)      RECOMMENDATIONS    #Subcapsular Liver Fluid Collection, Leukocytosis (increasing), Transaminitis  --Would continue to monitor off of antibiotics   --Continue to follow CBC with diff  --Continue to follow renal function (Cr/BUN)  --Continue to follow transaminases  --Continue to follow temperature curve    #Dysuria  --7/24 U/A without wbc noted  --s/p 3 days of Ceftriaxone

## 2021-07-25 NOTE — PROGRESS NOTE ADULT - PROBLEM SELECTOR PLAN 1
- c/w mIVF, encourage PO intake   - Trend Cr   - f/u UA, UCx, blood cx, urine studies  - monitor off Abx given no fever or localizing symptoms  - WBC uptrending i/s/o budesonide, no obvious infectious source  - WBC downtrended again (7/25)  - ID following- appreciate recs  - Avoid nephrotoxic medications  - renal u/s showing trace perihepatic ascites, hepatic steatosis, small b/l pleural effusions  - s/p ceftriaxone  - hold torsemide

## 2021-07-25 NOTE — PROGRESS NOTE ADULT - SUBJECTIVE AND OBJECTIVE BOX
**************************  Camilla Walterler  PGY-1 Internal Medicine  [phone]  **************************    Patient is a 86y old  Female who presents with a chief complaint of SOB (2021 09:15)      SUBJECTIVE / OVERNIGHT EVENTS:  This morning on tele, patient in Afib 70s-100. Denies chest pain, palpitations, SOB. Received laxatives and had 4 BMs yesterday. No overnight events. Plan for repeat DCCV with EP tomorrow.    MEDICATIONS  (STANDING):  allopurinol 200 milliGRAM(s) Oral daily  aMIOdarone    Tablet 400 milliGRAM(s) Oral every 8 hours  amitriptyline 25 milliGRAM(s) Oral daily  apixaban 2.5 milliGRAM(s) Oral every 12 hours  buDESOnide    EC Capsule 9 milliGRAM(s) Oral daily  diltiazem    milliGRAM(s) Oral daily  hydrocortisone 1% Ointment 1 Application(s) Topical two times a day  lactated ringers. 1000 milliLiter(s) (100 mL/Hr) IV Continuous <Continuous>  metoprolol tartrate 100 milliGRAM(s) Oral two times a day  oxybutynin 5 milliGRAM(s) Oral daily  pantoprazole    Tablet 40 milliGRAM(s) Oral before breakfast    MEDICATIONS  (PRN):  acetaminophen   Tablet .. 650 milliGRAM(s) Oral every 6 hours PRN Temp greater or equal to 38.5C (101.3F), Mild Pain (1 - 3)  aluminum hydroxide/magnesium hydroxide/simethicone Suspension 30 milliLiter(s) Oral every 4 hours PRN Dyspepsia  diphenhydrAMINE 25 milliGRAM(s) Oral every 6 hours PRN Rash and/or Itching  melatonin 3 milliGRAM(s) Oral at bedtime PRN Insomnia  ondansetron Injectable 4 milliGRAM(s) IV Push every 8 hours PRN Nausea and/or Vomiting        I&O's Summary    2021 07:01  -  2021 07:00  --------------------------------------------------------  IN: 480 mL / OUT: 600 mL / NET: -120 mL        PHYSICAL EXAM:  Vital Signs Last 24 Hrs  T(C): 36.8 (2021 04:15), Max: 36.8 (2021 11:51)  T(F): 98.3 (2021 04:15), Max: 98.3 (2021 04:15)  HR: 88 (2021 20:35) (88 - 90)  BP: 111/67 (2021 04:15) (110/73 - 134/85)  BP(mean): --  RR: 18 (2021 04:15) (18 - 18)  SpO2: 96% (2021 04:15) (96% - 97%)    CONSTITUTIONAL: NAD, well-developed  RESPIRATORY: Normal respiratory effort; lungs are clear to auscultation bilaterally  CARDIOVASCULAR: Regular rate and irregular rhythm, normal S1 and S2, no murmur/rub/gallop; No lower extremity edema; Peripheral pulses are 2+ bilaterally  ABDOMEN: Nontender to palpation, normoactive bowel sounds, no rebound/guarding  MUSCLOSKELETAL: no clubbing or cyanosis of digits; no joint swelling or tenderness to palpation  PSYCH: A+O to person, place, and time; affect appropriate    LABS:                        8.4    10.75 )-----------( 325      ( 2021 07:03 )             27.8     07-25    138  |  107  |  36<H>  ----------------------------<  86  4.0   |  18<L>  |  1.32<H>    Ca    9.2      2021 07:03  Phos  4.2     07-25  Mg     2.1     07-25    TPro  6.2  /  Alb  3.4  /  TBili  0.2  /  DBili  x   /  AST  43<H>  /  ALT  119<H>  /  AlkPhos  145<H>  07-25          Urinalysis Basic - ( 2021 14:24 )    Color: Yellow / Appearance: Slightly Turbid / S.023 / pH: x  Gluc: x / Ketone: Negative  / Bili: Negative / Urobili: Negative   Blood: x / Protein: 30 mg/dL / Nitrite: Negative   Leuk Esterase: Moderate / RBC: 6 /hpf / WBC x   Sq Epi: x / Non Sq Epi: 4 /hpf / Bacteria: Negative

## 2021-07-25 NOTE — PROGRESS NOTE ADULT - PROBLEM SELECTOR PLAN 6
-EF 55-60%, MVR with annuplasty ring and moderate-severe AR and TR, mild pHTN  -Likely 2/2 A fib w/ RVR   -Cardiology (patient sees Dr. Wynne as an outpatient) and Dr. Valdovinos  -TTE from 6/11 show severe pulmonary pressures  -TTE from 7/13 showed mild pulmonary pressures  -Hold home Torsemide 40mg qd given diarrhea, WEN   -BNP 35065

## 2021-07-25 NOTE — PROGRESS NOTE ADULT - SUBJECTIVE AND OBJECTIVE BOX
Follow Up:  afib    Interval History/ROS: comfortable at rest, notes dysuria    Allergies  &quot;VASELINE BASED OINTMENTS&quot; (Urticaria; Rash)  adhesives (Urticaria; Rash)  contrast media (gadolinium-based) (Chills)  Norvasc (Pruritus)  statins (Unknown)    ANTIMICROBIALS:      OTHER MEDS:  MEDICATIONS  (STANDING):  acetaminophen   Tablet .. 650 every 6 hours PRN  allopurinol 200 daily  aluminum hydroxide/magnesium hydroxide/simethicone Suspension 30 every 4 hours PRN  aMIOdarone    Tablet 400 every 8 hours  amitriptyline 25 daily  apixaban 2.5 every 12 hours  buDESOnide    EC Capsule 9 daily  diltiazem    daily  diphenhydrAMINE 25 every 6 hours PRN  melatonin 3 at bedtime PRN  metoprolol tartrate 100 two times a day  ondansetron Injectable 4 every 8 hours PRN  oxybutynin 5 daily  pantoprazole    Tablet 40 before breakfast    Vital Signs Last 24 Hrs  T(C): 36.7 (2021 11:42), Max: 36.8 (2021 04:15)  T(F): 98.1 (2021 11:42), Max: 98.3 (2021 04:15)  HR: 91 (2021 11:42) (88 - 91)  BP: 116/59 (2021 11:42) (110/73 - 116/59)  BP(mean): --  RR: 18 (2021 11:42) (18 - 18)  SpO2: 95% (2021 11:42) (95% - 96%)    PHYSICAL EXAM:  General: WN/WD NAD, Non-toxic  Neurology: A&Ox3, nonfocal  Respiratory: Clear to auscultation bilaterally  CV: RRR, S1S2, no murmurs, rubs or gallops  Abdominal: Soft, Non-tender, non-distended  Extremities: No edema,   Line Sites: Clear  Skin: No rash                        8.4    10.75 )-----------( 325      ( 2021 07:03 )             27.8   WBC Count: 10.75 ( @ 07:03)  WBC Count: 15.44 ( @ 06:50)  WBC Count: 14.35 ( @ 06:35)  WBC Count: 11.41 ( @ 07:08)  WBC Count: 10.82 ( @ 06:27)          138  |  107  |  36<H>  ----------------------------<  86  4.0   |  18<L>  |  1.32<H>    Ca    9.2      2021 07:03  Phos  4.2       Mg     2.1         TPro  6.2  /  Alb  3.4  /  TBili  0.2  /  DBili  x   /  AST  43<H>  /  ALT  119<H>  /  AlkPhos  145<H>        Urinalysis Basic - ( 2021 14:24 )  Color: Yellow / Appearance: Slightly Turbid / S.023 / pH: x  Gluc: x / Ketone: Negative  / Bili: Negative / Urobili: Negative   Blood: x / Protein: 30 mg/dL / Nitrite: Negative   Leuk Esterase: Moderate / RBC: 6 /hpf / WBC x   Sq Epi: x / Non Sq Epi: 4 /hpf / Bacteria: Negative      MICROBIOLOGY:  .Blood Blood-Peripheral  21   No growth to date.  --  --      .Urine Clean Catch (Midstream)  21   <10,000 CFU/mL Normal Urogenital [place]  --  --      .Blood Blood-Peripheral  21   No Growth Final  --  --      .Urine Clean Catch (Midstream)  21   <10,000 CFU/mL Normal Urogenital [place]  --  --      .Blood Blood-Peripheral  21   No Growth Final  --  Blood Culture PCR      RADIOLOGY:  < from: CT Abdomen and Pelvis w/ IV Cont (21 @ 15:20) >  FINDINGS:  LOWER CHEST: Moderate right and small left pleural effusions with associated compressive atelectasis, unchanged from prior. Cardiomegaly. Mitral valve replacement. Calcification of the aorta and coronary arteries.    LIVER: Heterogeneous attenuation of the liver may reflect a background steatosis with sites of subcapsular sparing. Pericapsular fluid collection posterior to the hepatic dome measuring 6.3 x 3.2 x 4.3 cm (series 3 image 18), not significantly changed from the prior CT exam when measured utilizing similar technique, and new since 2020. The collection demonstrates a thick rim of enhancement and partial septations.  BILE DUCTS: Mildly prominent common bile duct to 8 mm in caliber, likely secondary to postcholecystectomy state. No intrahepatic duct dilation.  GALLBLADDER: Cholecystectomy.  SPLEEN: Within normal limits.  PANCREAS: Within normal limits.  ADRENALS: Within normal limits.  KIDNEYS/URETERS: Exophytic right upper pole renal cyst measuring 3 cm.    BLADDER: Within normal limits.  REPRODUCTIVE ORGANS: Hysterectomy.    BOWEL: No bowel obstruction. Appendix is normal. Small hiatal hernia.  PERITONEUM: No ascites.  VESSELS: Atherosclerotic changes.  RETROPERITONEUM/LYMPH NODES: No lymphadenopathy.  ABDOMINAL WALL: Ventral hernia repair. Small to moderate umbilical hernia containing fat and nonobstructed mid transverse colon.. Mild anasarca.  BONES: Multilevel degenerative changes of the spine. Unchanged grade 2 anterolisthesis at L4-5 and grade 1 anterolisthesis and L5-S1. Severe compression fracture deformity of the T10 vertebral body unchanged.  Partially imaged median sternotomy.    IMPRESSION:  Pericapsular rim-enhancing fluid collection along the posterior hepatic dome, with differential including abscess or an organized hematoma. Fluid sampling may be helpful, though its posterior subdiaphragmatic location may make this difficult. Consider further characterization with contrast enhanced abdominal MRI as clinically indicated.    Moderate right and small left pleural effusions are unchanged.    < end of copied text >      Madan Meyers MD; Division of Infectious Disease; Pager: [phone]; nights and weekends: [phone]

## 2021-07-25 NOTE — PROGRESS NOTE ADULT - PROBLEM SELECTOR PLAN 3
-Patient found to be in A Fib w/ RVR to HR 160s on admission   -Likely caused SOB  -Given Amio 150mg x1  - c/w amio load per EP  -Takes Cardizem at home  -Rate controlled on Lopressor and Diltaizem to the 90s-100s  -Overnight 7/14, patient was tachycardic to 150s, given IV push of metoprolol 5mg, metoprolol 25mg PO, and metoprolol tartrate increased to 75mg BID, further increased to 100mg BID as per EP (7/15)  - s/p EP cardioversion 7/15  - metoprolol decreased to 50mg BID on 7/19  - Overnight 7/20, patient was in Afib tachycardic to the 120s, given IV push of metoprolol 5mg, and metoprolol tartrate increased to 50mg TID, further changed to 100 mg BID (7/21)  - Appreciate EP recs  - Still in Afib, amiodarone 400mg Q8 added (7/21)  - NPO 7/25 midnight for repeat DCCV Monday 7/26 -Patient found to be in A Fib w/ RVR to HR 160s on admission   -Likely caused SOB  -Given Amio 150mg x1  -c/w amio load per EP  -Takes Cardizem at home  -Rate controlled on Lopressor and Diltaizem to the 90s-100s  -Overnight 7/14, patient was tachycardic to 150s, given IV push of metoprolol 5mg, metoprolol 25mg PO, and metoprolol tartrate increased to 75mg BID, further increased to 100mg BID as per EP (7/15)  - s/p EP cardioversion 7/15  - metoprolol decreased to 50mg BID on 7/19  - Overnight 7/20, patient was in Afib tachycardic to the 120s, given IV push of metoprolol 5mg, and metoprolol tartrate increased to 50mg TID, further changed to 100 mg BID (7/21)  - Appreciate EP recs  - Still in Afib, amiodarone 400mg Q8 added (7/21)  - NPO 7/25 midnight for repeat DCCV Monday 7/26

## 2021-07-26 NOTE — PROGRESS NOTE ADULT - PROBLEM SELECTOR PLAN 6
-EF 55-60%, MVR with annuplasty ring and moderate-severe AR and TR, mild pHTN  -Likely 2/2 A fib w/ RVR   -Cardiology (patient sees Dr. Wynne as an outpatient) and Dr. Valdovinos  -TTE from 6/11 show severe pulmonary pressures  -TTE from 7/13 showed mild pulmonary pressures  -Hold home Torsemide 40mg qd given diarrhea, WEN   -BNP 79905 -hold home diuretic (torsemide) given diarrhea and WEN     -EF 55-60%, MVR with annuplasty ring and moderate-severe AR and TR, mild pHTN  -Likely 2/2 A fib w/ RVR   -Cardiology (patient sees Dr. Wynne as an outpatient) and Dr. Valdovinos  -TTE from 6/11 show severe pulmonary pressures  -TTE from 7/13 showed mild pulmonary pressures  -Hold home Torsemide 40mg qd given diarrhea, WEN   -BNP 86628 -EF 55-60%, MVR with annuplasty ring and moderate-severe AR and TR, mild pHTN  -Likely 2/2 A fib w/ RVR   -Cardiology (patient sees Dr. Wynne as an outpatient) and Dr. Valdovinos  -TTE from 6/11 show severe pulmonary pressures  -TTE from 7/13 showed mild pulmonary pressures  -Hold home Torsemide 40mg qd given diarrhea, WEN   -BNP 11735

## 2021-07-26 NOTE — PROGRESS NOTE ADULT - PROBLEM SELECTOR PLAN 2
- Follows with Dr. Young (GI)  - Has microscopic colitis and planned to start prolonged budesonide taper (started 6/28)   - Weakness and fatigue, fainting, since starting budesonide so was advised to stop  - Per Dr. Young, resume budesonide 9 mg qd  - Outpatient follow up with Dr. Young  - K>4, MG>2 - c/w mIVF, encourage PO intake   - Trend Cr   - WBC elevated i/s/o budesonide, no obvious infectious source  - ID following- advised to continue to monitor off antibiotics   - Avoid nephrotoxic medications  - hold torsemide - c/w mIVF, encourage PO intake   - Trend Cr (improving)   - WBC elevated i/s/o budesonide, no obvious infectious source  - ID following- advised to continue to monitor off antibiotics   - Avoid nephrotoxic medications - Trend Cr (improving)   - WBC elevated i/s/o budesonide, no obvious infectious source  - ID following- advised to continue to monitor off antibiotics   - Avoid nephrotoxic medications

## 2021-07-26 NOTE — PROGRESS NOTE ADULT - ASSESSMENT
86 year old female PMH MVR s/p repair, Afib on Eliquis, HTN, HLD, CHF, Gout, p/w SOB x 2-3 days and fall 5-7 days prior to presentation. In the ED afebrile, hypotensive to systolics in the 90's, tachycardic >150.     RVP/COVID19 PCR (7/14) Negative.   U/A (7/13) 9 WBC.   BCx (7/13) with 1/4 MRSE.     CT Chest (7/13) with Lingular and right middle lobe subsegmental atelectasis.   CT A/P (7/13) with Pericapsular fluid collection adjacent to right lobe of liver    perihepatic fluid collection may be consistent with hematoma - clinical appearance and improvement of white count OFF antibiotics makes infectious abscess unlikely    Repeat CT A/P with IV contrast (7/20) with rim enhancement and some septations   I reviewed CT A/P with radiology today (7/21) rim enhancement could be from complex hematoma versus infection  IR re-evaluated 7/20 CT A/P imaging and deemed procedure high risk and based on their review low suspicion for infection  Otherwise patient without symptoms suggestive of infection (but has been on Ceftriaxone for UTI and was on Zosyn after admission)    RECOMMENDATIONS    #Subcapsular Liver Fluid Collection, Leukocytosis, Transaminitis  --Would continue to monitor off of antibiotics   --Continue to follow CBC with diff  --Continue to follow renal function (Cr/BUN)  --Continue to follow transaminases  --Continue to follow temperature curve    #Dysuria  --7/24 U/A without wbc noted  --s/p 3 days of Ceftriaxone    I will follow the patient as needed. Please feel free to contact me with any further questions or concerns.    Kevin Funk M.D.  [facility] Division of Infectious Disease  8AM-5PM: Pager Number [phone]  After Hours (or if no response): Please contact the Infectious Diseases Office at [phone]

## 2021-07-26 NOTE — PROGRESS NOTE ADULT - ASSESSMENT
86 year-old woman with AFib with RVR in the setting of colitis.  Now status post cardioversion, but back in atrial fibrillation with RVR on 7/20. Plan for repeat cardioversion now that she has been amio loaded.    Now rate controlled on:  metoprolol tartrate 100 mg BID,  diltiazem 120 mg daily  amiodarone 400 mg TID until tomorrrow    EP follow-up appreciated. Possible ablation if this cardioversion does not hold.

## 2021-07-26 NOTE — PROGRESS NOTE ADULT - PROBLEM SELECTOR PLAN 7
-SOB likely 2/2 A Fib w/ RVR vs. RML pneumonia  -Currently on RA  -Chest x-ray showed possible consolidation in RML  -Chest CT did not show consolidation, do not suspect pneumonia  - s/p Zosyn (7/14-7/15) empirically  -MRSA +  -Blood cx showed gram + cocci in clusters, methicillin resistant staph epidermidis  -Legionella urine antigen, Strep pneumoniae antigen, RVP negative  -Presented with SIRS criteria on admission but infectious etiology is not clear  -Appreciate ID recs, will monitor off Zosyn for now - most likely from a fib, ID following no ABx for now     -SOB likely 2/2 A Fib w/ RVR vs. RML pneumonia  -Currently on RA  -Chest x-ray showed possible consolidation in RML  -Chest CT did not show consolidation, do not suspect pneumonia  - s/p Zosyn (7/14-7/15) empirically  -MRSA +  -Blood cx showed gram + cocci in clusters, methicillin resistant staph epidermidis  -Legionella urine antigen, Strep pneumoniae antigen, RVP negative  -Presented with SIRS criteria on admission but infectious etiology is not clear  -Appreciate ID recs, will monitor off Zosyn for now

## 2021-07-26 NOTE — PROGRESS NOTE ADULT - SUBJECTIVE AND OBJECTIVE BOX
24H hour events: No acute overnight events    MEDICATIONS:  aMIOdarone    Tablet 400 milliGRAM(s) Oral every 8 hours  apixaban 2.5 milliGRAM(s) Oral every 12 hours  diltiazem    milliGRAM(s) Oral daily  metoprolol tartrate 100 milliGRAM(s) Oral two times a day  acetaminophen   Tablet .. 650 milliGRAM(s) Oral every 6 hours PRN  amitriptyline 25 milliGRAM(s) Oral daily  diphenhydrAMINE 25 milliGRAM(s) Oral every 6 hours PRN  melatonin 3 milliGRAM(s) Oral at bedtime PRN  ondansetron Injectable 4 milliGRAM(s) IV Push every 8 hours PRN  aluminum hydroxide/magnesium hydroxide/simethicone Suspension 30 milliLiter(s) Oral every 4 hours PRN  pantoprazole    Tablet 40 milliGRAM(s) Oral before breakfast  allopurinol 200 milliGRAM(s) Oral daily  buDESOnide    EC Capsule 9 milliGRAM(s) Oral daily  hydrocortisone 1% Ointment 1 Application(s) Topical two times a day  lactated ringers. 1000 milliLiter(s) IV Continuous <Continuous>  oxybutynin 5 milliGRAM(s) Oral daily      REVIEW OF SYSTEMS:  See HPI, otherwise ROS negative.    PHYSICAL EXAM:  T(C): 36.7 (07-26-21 @ 04:33), Max: 36.8 (07-25-21 @ 21:06)  HR: 94 (07-26-21 @ 04:33) (87 - 123)  BP: 132/73 (07-26-21 @ 04:33) (116/59 - 132/73)  RR: 18 (07-26-21 @ 04:33) (18 - 18)  SpO2: 95% (07-26-21 @ 04:33) (95% - 98%)  Wt(kg): --  I&O's Summary    25 Jul 2021 07:01  -  26 Jul 2021 07:00  --------------------------------------------------------  IN: 720 mL / OUT: 1000 mL / NET: -280 mL        Appearance: Alert. NAD	  Cardiovascular: Irregularly irregular  Respiratory: CTA B/L	  Extremities: B/L LE pitting edema  Vascular: Peripheral pulses palpable 2+ bilaterally      LABS:	 	    CBC Full  -  ( 26 Jul 2021 06:51 )  WBC Count : 11.65 K/uL  Hemoglobin : 8.3 g/dL  Hematocrit : 27.8 %  Platelet Count - Automated : 304 K/uL  Mean Cell Volume : 81.8 fl  Mean Cell Hemoglobin : 24.4 pg  Mean Cell Hemoglobin Concentration : 29.9 gm/dL  Auto Neutrophil # : x  Auto Lymphocyte # : x  Auto Monocyte # : x  Auto Eosinophil # : x  Auto Basophil # : x  Auto Neutrophil % : x  Auto Lymphocyte % : x  Auto Monocyte % : x  Auto Eosinophil % : x  Auto Basophil % : x    07-26    137  |  107  |  34<H>  ----------------------------<  106<H>  4.0   |  18<L>  |  1.23  07-25    138  |  107  |  36<H>  ----------------------------<  86  4.0   |  18<L>  |  1.32<H>    Ca    9.1      26 Jul 2021 06:51  Ca    9.2      25 Jul 2021 07:03  Phos  3.8     07-26  Phos  4.2     07-25  Mg     2.1     07-26  Mg     2.1     07-25    TPro  6.3  /  Alb  3.4  /  TBili  0.3  /  DBili  x   /  AST  38  /  ALT  111<H>  /  AlkPhos  146<H>  07-26  TPro  6.2  /  Alb  3.4  /  TBili  0.2  /  DBili  x   /  AST  43<H>  /  ALT  119<H>  /  AlkPhos  145<H>  07-25    TELEMETRY: Atrial Fibrillation @ 70-90 BPM

## 2021-07-26 NOTE — PROGRESS NOTE ADULT - SUBJECTIVE AND OBJECTIVE BOX
PROGRESS NOTE:   Authored by Priscilla Csatellanos MD PGY-1  Pager [phone] [facility], [address] M   Please page night float  after 7PM    Patient is a 86y old  Female who presents with a chief complaint of SOB (2021 16:06)      SUBJECTIVE / OVERNIGHT EVENTS:    ADDITIONAL REVIEW OF SYSTEMS:    MEDICATIONS  (STANDING):  allopurinol 200 milliGRAM(s) Oral daily  aMIOdarone    Tablet 400 milliGRAM(s) Oral every 8 hours  amitriptyline 25 milliGRAM(s) Oral daily  apixaban 2.5 milliGRAM(s) Oral every 12 hours  buDESOnide    EC Capsule 9 milliGRAM(s) Oral daily  diltiazem    milliGRAM(s) Oral daily  hydrocortisone 1% Ointment 1 Application(s) Topical two times a day  lactated ringers. 1000 milliLiter(s) (100 mL/Hr) IV Continuous <Continuous>  metoprolol tartrate 100 milliGRAM(s) Oral two times a day  oxybutynin 5 milliGRAM(s) Oral daily  pantoprazole    Tablet 40 milliGRAM(s) Oral before breakfast    MEDICATIONS  (PRN):  acetaminophen   Tablet .. 650 milliGRAM(s) Oral every 6 hours PRN Temp greater or equal to 38.5C (101.3F), Mild Pain (1 - 3)  aluminum hydroxide/magnesium hydroxide/simethicone Suspension 30 milliLiter(s) Oral every 4 hours PRN Dyspepsia  diphenhydrAMINE 25 milliGRAM(s) Oral every 6 hours PRN Rash and/or Itching  melatonin 3 milliGRAM(s) Oral at bedtime PRN Insomnia  ondansetron Injectable 4 milliGRAM(s) IV Push every 8 hours PRN Nausea and/or Vomiting      CAPILLARY BLOOD GLUCOSE        I&O's Summary    2021 07:01  -  2021 07:00  --------------------------------------------------------  IN: 720 mL / OUT: 1000 mL / NET: -280 mL        PHYSICAL EXAM:  Vital Signs Last 24 Hrs  T(C): 36.7 (2021 04:33), Max: 36.8 (2021 21:06)  T(F): 98.1 (2021 04:33), Max: 98.2 (2021 21:06)  HR: 94 (2021 04:33) (87 - 123)  BP: 132/73 (2021 04:33) (116/59 - 132/73)  BP(mean): --  RR: 18 (2021 04:33) (18 - 18)  SpO2: 95% (2021 04:33) (95% - 98%)    CONSTITUTIONAL: NAD, well-developed  RESPIRATORY: Normal respiratory effort; lungs are clear to auscultation bilaterally  CARDIOVASCULAR: Regular rate and rhythm, normal S1 and S2, no murmur/rub/gallop; No lower extremity edema; Peripheral pulses are 2+ bilaterally  ABDOMEN: Nontender to palpation, normoactive bowel sounds, no rebound/guarding  MUSCULOSKELETAL: no clubbing or cyanosis of digits; no joint swelling or tenderness to palpation  PSYCH: A+O to person, place, and time; affect appropriate    LABS:                        8.3    11.65 )-----------( 304      ( 2021 06:51 )             27.8     07-25    138  |  107  |  36<H>  ----------------------------<  86  4.0   |  18<L>  |  1.32<H>    Ca    9.2      2021 07:03  Phos  4.2     07-25  Mg     2.1     07-25    TPro  6.2  /  Alb  3.4  /  TBili  0.2  /  DBili  x   /  AST  43<H>  /  ALT  119<H>  /  AlkPhos  145<H>  07-25          Urinalysis Basic - ( 2021 14:24 )    Color: Yellow / Appearance: Slightly Turbid / S.023 / pH: x  Gluc: x / Ketone: Negative  / Bili: Negative / Urobili: Negative   Blood: x / Protein: 30 mg/dL / Nitrite: Negative   Leuk Esterase: Moderate / RBC: 6 /hpf / WBC x   Sq Epi: x / Non Sq Epi: 4 /hpf / Bacteria: Negative        Culture - Urine (collected 2021 20:56)  Source: Clean Catch Clean Catch (Midstream)  Preliminary Report (2021 18:23):    10,000 - 49,000 CFU/mL Gram positive organisms    Culture - Blood (collected 2021 12:24)  Source: .Blood Blood-Venous  Preliminary Report (2021 13:01):    No growth to date.    Culture - Blood (collected 2021 12:24)  Source: .Blood Blood-Peripheral  Preliminary Report (2021 13:01):    No growth to date.        RADIOLOGY & ADDITIONAL TESTS:  Results Reviewed:   Imaging Personally Reviewed:  Electrocardiogram Personally Reviewed:    COORDINATION OF CARE:  Care Discussed with Consultants/Other Providers [Y/N]:  Prior or Outpatient Records Reviewed [Y/N]:   PROGRESS NOTE:   Authored by Priscilla Castellanos MD PGY-1  Pager [phone] [facility], [address] ZIJ   Please page night float  after 7PM    Patient is a 86y old  Female who presents with a chief complaint of SOB (2021 16:06)    85 yo female pmh HFpEF, atrial fibrillation, fatty liver, microscopic colitis, gout, MR, CKD 3, admitted to CCU for a-fib with RVR, HR 160s. S/p DCCV, was in NSR, now in Afib again. Scheduled for repeat cardioversion today.     SUBJECTIVE / OVERNIGHT EVENTS: No acute events overnight. Tele overnight- atrial fibrillation with heart rate     ADDITIONAL REVIEW OF SYSTEMS: negative     MEDICATIONS  (STANDING):  allopurinol 200 milliGRAM(s) Oral daily  aMIOdarone    Tablet 400 milliGRAM(s) Oral every 8 hours  amitriptyline 25 milliGRAM(s) Oral daily  apixaban 2.5 milliGRAM(s) Oral every 12 hours  buDESOnide    EC Capsule 9 milliGRAM(s) Oral daily  diltiazem    milliGRAM(s) Oral daily  hydrocortisone 1% Ointment 1 Application(s) Topical two times a day  lactated ringers. 1000 milliLiter(s) (100 mL/Hr) IV Continuous <Continuous>  metoprolol tartrate 100 milliGRAM(s) Oral two times a day  oxybutynin 5 milliGRAM(s) Oral daily  pantoprazole    Tablet 40 milliGRAM(s) Oral before breakfast    MEDICATIONS  (PRN):  acetaminophen   Tablet .. 650 milliGRAM(s) Oral every 6 hours PRN Temp greater or equal to 38.5C (101.3F), Mild Pain (1 - 3)  aluminum hydroxide/magnesium hydroxide/simethicone Suspension 30 milliLiter(s) Oral every 4 hours PRN Dyspepsia  diphenhydrAMINE 25 milliGRAM(s) Oral every 6 hours PRN Rash and/or Itching  melatonin 3 milliGRAM(s) Oral at bedtime PRN Insomnia  ondansetron Injectable 4 milliGRAM(s) IV Push every 8 hours PRN Nausea and/or Vomiting      CAPILLARY BLOOD GLUCOSE        I&O's Summary    2021 07:01  -  2021 07:00  --------------------------------------------------------  IN: 720 mL / OUT: 1000 mL / NET: -280 mL        PHYSICAL EXAM:  Vital Signs Last 24 Hrs  T(C): 36.7 (2021 04:33), Max: 36.8 (2021 21:06)  T(F): 98.1 (2021 04:33), Max: 98.2 (2021 21:06)  HR: 94 (2021 04:33) (87 - 123)  BP: 132/73 (2021 04:33) (116/59 - 132/73)  BP(mean): --  RR: 18 (2021 04:33) (18 - 18)  SpO2: 95% (2021 04:33) (95% - 98%)    CONSTITUTIONAL: NAD, well-developed  RESPIRATORY: Normal respiratory effort; lungs are clear to auscultation bilaterally  CARDIOVASCULAR: Regular rate and irregular rhythm, normal S1 and S2, no murmur/rub/gallop; No lower extremity edema; Peripheral pulses are 2+ bilaterally  ABDOMEN: Nontender to palpation, normoactive bowel sounds, no rebound/guarding  MUSCULOSKELETAL: no clubbing or cyanosis of digits; no joint swelling or tenderness to palpation  PSYCH: A+O to person, place, and time; affect appropriate    LABS:                        8.3    11.65 )-----------( 304      ( 2021 06:51 )             27.8     -    137  |  107  |  34<H>  ----------------------------<  106<H>  4.0   |  18<L>  |  1.23    Ca    9.1      2021 06:51  Phos  3.8       Mg     2.1         TPro  6.3  /  Alb  3.4  /  TBili  0.3  /  DBili  x   /  AST  38  /  ALT  111<H>  /  AlkPhos  146<H>        Urinalysis Basic - ( 2021 14:24 )    Color: Yellow / Appearance: Slightly Turbid / S.023 / pH: x  Gluc: x / Ketone: Negative  / Bili: Negative / Urobili: Negative   Blood: x / Protein: 30 mg/dL / Nitrite: Negative   Leuk Esterase: Moderate / RBC: 6 /hpf / WBC x   Sq Epi: x / Non Sq Epi: 4 /hpf / Bacteria: Negative        Culture - Urine (collected 2021 20:56)  Source: Clean Catch Clean Catch (Midstream)  Preliminary Report (2021 18:23):    10,000 - 49,000 CFU/mL Gram positive organisms    Culture - Blood (collected 2021 12:24)  Source: .Blood Blood-Venous  Preliminary Report (2021 13:01):    No growth to date.    Culture - Blood (collected 2021 12:24)  Source: .Blood Blood-Peripheral  Preliminary Report (2021 13:01):    No growth to date.        RADIOLOGY & ADDITIONAL TESTS:  Results Reviewed:   Imaging Personally Reviewed:  Electrocardiogram Personally Reviewed:    COORDINATION OF CARE:  Care Discussed with Consultants/Other Providers [Y/N]: cardiology   Prior or Outpatient Records Reviewed [Y/N]:          PROGRESS NOTE:   Authored by Priscilla Castellanos MD PGY-1  Pager [phone] [facility], [address] ZLJ   Please page night float  after 7PM    Patient is a 86y old  Female who presents with a chief complaint of SOB (2021 16:06)    85 yo female pmh HFpEF, atrial fibrillation, fatty liver, microscopic colitis, gout, MR, CKD 3, admitted to CCU for a-fib with RVR, HR 160s. S/p DCCV, was in NSR, now in Afib again. Scheduled for repeat cardioversion today.     SUBJECTIVE / OVERNIGHT EVENTS: No acute events overnight. Tele overnight- atrial fibrillation with heart rate     ADDITIONAL REVIEW OF SYSTEMS: negative     MEDICATIONS  (STANDING):  allopurinol 200 milliGRAM(s) Oral daily  aMIOdarone    Tablet 400 milliGRAM(s) Oral every 8 hours  amitriptyline 25 milliGRAM(s) Oral daily  apixaban 2.5 milliGRAM(s) Oral every 12 hours  buDESOnide    EC Capsule 9 milliGRAM(s) Oral daily  diltiazem    milliGRAM(s) Oral daily  hydrocortisone 1% Ointment 1 Application(s) Topical two times a day  lactated ringers. 1000 milliLiter(s) (100 mL/Hr) IV Continuous <Continuous>  metoprolol tartrate 100 milliGRAM(s) Oral two times a day  oxybutynin 5 milliGRAM(s) Oral daily  pantoprazole    Tablet 40 milliGRAM(s) Oral before breakfast    MEDICATIONS  (PRN):  acetaminophen   Tablet .. 650 milliGRAM(s) Oral every 6 hours PRN Temp greater or equal to 38.5C (101.3F), Mild Pain (1 - 3)  aluminum hydroxide/magnesium hydroxide/simethicone Suspension 30 milliLiter(s) Oral every 4 hours PRN Dyspepsia  diphenhydrAMINE 25 milliGRAM(s) Oral every 6 hours PRN Rash and/or Itching  melatonin 3 milliGRAM(s) Oral at bedtime PRN Insomnia  ondansetron Injectable 4 milliGRAM(s) IV Push every 8 hours PRN Nausea and/or Vomiting      CAPILLARY BLOOD GLUCOSE        I&O's Summary    2021 07:01  -  2021 07:00  --------------------------------------------------------  IN: 720 mL / OUT: 1000 mL / NET: -280 mL        PHYSICAL EXAM:  Vital Signs Last 24 Hrs  T(C): 36.7 (2021 04:33), Max: 36.8 (2021 21:06)  T(F): 98.1 (2021 04:33), Max: 98.2 (2021 21:06)  HR: 94 (2021 04:33) (87 - 123)  BP: 132/73 (2021 04:33) (116/59 - 132/73)  BP(mean): --  RR: 18 (2021 04:33) (18 - 18)  SpO2: 95% (2021 04:33) (95% - 98%)    CONSTITUTIONAL: NAD, well-developed  RESPIRATORY: Normal respiratory effort; lungs are clear to auscultation bilaterally  CARDIOVASCULAR: Regular rate and irregular rhythm, normal S1 and S2, no murmur/rub/gallop; No lower extremity edema; Peripheral pulses are 2+ bilaterally  ABDOMEN: Nontender to palpation, normoactive bowel sounds, no rebound/guarding  MUSCULOSKELETAL: no clubbing or cyanosis of digits; no joint swelling or tenderness to palpation  PSYCH: A+O to person, place, and time; affect appropriate    LABS:                        8.3    11.65 )-----------( 304      ( 2021 06:51 )             27.8     -    137  |  107  |  34<H>  ----------------------------<  106<H>  4.0   |  18<L>  |  1.23    Ca    9.1      2021 06:51  Phos  3.8       Mg     2.1         TPro  6.3  /  Alb  3.4  /  TBili  0.3  /  DBili  x   /  AST  38  /  ALT  111<H>  /  AlkPhos  146<H>        Urinalysis Basic - ( 2021 14:24 )    Color: Yellow / Appearance: Slightly Turbid / S.023 / pH: x  Gluc: x / Ketone: Negative  / Bili: Negative / Urobili: Negative   Blood: x / Protein: 30 mg/dL / Nitrite: Negative   Leuk Esterase: Moderate / RBC: 6 /hpf / WBC x   Sq Epi: x / Non Sq Epi: 4 /hpf / Bacteria: Negative        Culture - Urine (collected 2021 20:56)  Source: Clean Catch Clean Catch (Midstream)  Preliminary Report (2021 18:23):    10,000 - 49,000 CFU/mL Gram positive organisms    Culture - Blood (collected 2021 12:24)  Source: .Blood Blood-Venous  Preliminary Report (2021 13:01):    No growth to date.    Culture - Blood (collected 2021 12:24)  Source: .Blood Blood-Peripheral  Preliminary Report (2021 13:01):    No growth to date.        RADIOLOGY & ADDITIONAL TESTS:  Results Reviewed:   Imaging Personally Reviewed:  Electrocardiogram Personally Reviewed:    COORDINATION OF CARE:  Care Discussed with Consultants/Other Providers [Y/N]: cardiology, infectious disease   Prior or Outpatient Records Reviewed [Y/N]:          PROGRESS NOTE:   Authored by Priscilla Castellanos MD PGY-1  Pager [phone] [facility], [id number] MKR   Please page night float  after 7PM    Patient is a 86y old  Female who presents with a chief complaint of SOB (2021 16:06)    SUBJECTIVE / OVERNIGHT EVENTS: No acute events overnight. Tele overnight- atrial fibrillation with heart rate . Scheduled for repeat cardioversion today.     ADDITIONAL REVIEW OF SYSTEMS: negative     MEDICATIONS  (STANDING):  allopurinol 200 milliGRAM(s) Oral daily  aMIOdarone    Tablet 400 milliGRAM(s) Oral every 8 hours  amitriptyline 25 milliGRAM(s) Oral daily  apixaban 2.5 milliGRAM(s) Oral every 12 hours  buDESOnide    EC Capsule 9 milliGRAM(s) Oral daily  diltiazem    milliGRAM(s) Oral daily  hydrocortisone 1% Ointment 1 Application(s) Topical two times a day  lactated ringers. 1000 milliLiter(s) (100 mL/Hr) IV Continuous <Continuous>  metoprolol tartrate 100 milliGRAM(s) Oral two times a day  oxybutynin 5 milliGRAM(s) Oral daily  pantoprazole    Tablet 40 milliGRAM(s) Oral before breakfast    MEDICATIONS  (PRN):  acetaminophen   Tablet .. 650 milliGRAM(s) Oral every 6 hours PRN Temp greater or equal to 38.5C (101.3F), Mild Pain (1 - 3)  aluminum hydroxide/magnesium hydroxide/simethicone Suspension 30 milliLiter(s) Oral every 4 hours PRN Dyspepsia  diphenhydrAMINE 25 milliGRAM(s) Oral every 6 hours PRN Rash and/or Itching  melatonin 3 milliGRAM(s) Oral at bedtime PRN Insomnia  ondansetron Injectable 4 milliGRAM(s) IV Push every 8 hours PRN Nausea and/or Vomiting      CAPILLARY BLOOD GLUCOSE        I&O's Summary    2021 07:01  -  2021 07:00  --------------------------------------------------------  IN: 720 mL / OUT: 1000 mL / NET: -280 mL        PHYSICAL EXAM:  Vital Signs Last 24 Hrs  T(C): 36.7 (2021 04:33), Max: 36.8 (2021 21:06)  T(F): 98.1 (2021 04:33), Max: 98.2 (2021 21:06)  HR: 94 (2021 04:33) (87 - 123)  BP: 132/73 (2021 04:33) (116/59 - 132/73)  BP(mean): --  RR: 18 (2021 04:33) (18 - 18)  SpO2: 95% (2021 04:33) (95% - 98%)    CONSTITUTIONAL: NAD, well-developed  RESPIRATORY: Normal respiratory effort; lungs are clear to auscultation bilaterally  CARDIOVASCULAR: Regular rate and irregular rhythm, normal S1 and S2, no murmur/rub/gallop; No lower extremity edema; Peripheral pulses are 2+ bilaterally  ABDOMEN: Nontender to palpation, normoactive bowel sounds, no rebound/guarding  MUSCULOSKELETAL: no clubbing or cyanosis of digits; no joint swelling or tenderness to palpation  PSYCH: A+O to person, place, and time; affect appropriate    LABS:                        8.3    11.65 )-----------( 304      ( 2021 06:51 )             27.8     -    137  |  107  |  34<H>  ----------------------------<  106<H>  4.0   |  18<L>  |  1.23    Ca    9.1      2021 06:51  Phos  3.8       Mg     2.1         TPro  6.3  /  Alb  3.4  /  TBili  0.3  /  DBili  x   /  AST  38  /  ALT  111<H>  /  AlkPhos  146<H>        Urinalysis Basic - ( 2021 14:24 )    Color: Yellow / Appearance: Slightly Turbid / S.023 / pH: x  Gluc: x / Ketone: Negative  / Bili: Negative / Urobili: Negative   Blood: x / Protein: 30 mg/dL / Nitrite: Negative   Leuk Esterase: Moderate / RBC: 6 /hpf / WBC x   Sq Epi: x / Non Sq Epi: 4 /hpf / Bacteria: Negative        Culture - Urine (collected 2021 20:56)  Source: Clean Catch Clean Catch (Midstream)  Preliminary Report (2021 18:23):    10,000 - 49,000 CFU/mL Gram positive organisms    Culture - Blood (collected 2021 12:24)  Source: .Blood Blood-Venous  Preliminary Report (2021 13:01):    No growth to date.    Culture - Blood (collected 2021 12:24)  Source: .Blood Blood-Peripheral  Preliminary Report (2021 13:01):    No growth to date.        RADIOLOGY & ADDITIONAL TESTS:  Results Reviewed:   Imaging Personally Reviewed:  Electrocardiogram Personally Reviewed:    COORDINATION OF CARE:  Care Discussed with Consultants/Other Providers [Y/N]: cardiology, infectious disease   Prior or Outpatient Records Reviewed [Y/N]:

## 2021-07-26 NOTE — PROGRESS NOTE ADULT - SUBJECTIVE AND OBJECTIVE BOX
Follow Up:  subcapsular liver collection    Interval History: afebrile. noting continued dyspnea.     REVIEW OF SYSTEMS  [  ] ROS unobtainable because:    [ x ] All other systems negative except as noted below    Constitutional:  [ ] fever [ ] chills  [ ] weight loss  [ ] weakness  Skin:  [ ] rash [ ] phlebitis	  Eyes: [ ] icterus [ ] pain  [ ] discharge	  ENMT: [ ] sore throat  [ ] thrush [ ] ulcers [ ] exudates  Respiratory: [ x] dyspnea [ ] hemoptysis [ ] cough [ ] sputum	  Cardiovascular:  [ ] chest pain [ ] palpitations [ ] edema	  Gastrointestinal:  [ ] nausea [ ] vomiting [ ] diarrhea [ ] constipation [ ] pain	  Genitourinary:  [ ] dysuria [ ] frequency [ ] hematuria [ ] discharge [ ] flank pain  [ ] incontinence  Musculoskeletal:  [ ] myalgias [ ] arthralgias [ ] arthritis  [ ] back pain  Neurological:  [ ] headache [ ] seizures  [ ] confusion/altered mental status    Allergies  &quot;VASELINE BASED OINTMENTS&quot; (Urticaria; Rash)  adhesives (Urticaria; Rash)  contrast media (gadolinium-based) (Chills)  Norvasc (Pruritus)  statins (Unknown)        ANTIMICROBIALS:      OTHER MEDS:  MEDICATIONS  (STANDING):  acetaminophen   Tablet .. 650 every 6 hours PRN  allopurinol 200 daily  aluminum hydroxide/magnesium hydroxide/simethicone Suspension 30 every 4 hours PRN  aMIOdarone    Tablet 200 daily  amitriptyline 25 daily  apixaban 2.5 every 12 hours  buDESOnide    EC Capsule 9 daily  diphenhydrAMINE 25 every 6 hours PRN  melatonin 3 at bedtime PRN  metoprolol tartrate 100 two times a day  ondansetron Injectable 4 every 8 hours PRN  oxybutynin 5 daily  pantoprazole    Tablet 40 before breakfast      Vital Signs Last 24 Hrs  T(C): 36.3 (26 Jul 2021 16:29), Max: 36.8 (25 Jul 2021 21:06)  T(F): 97.3 (26 Jul 2021 16:29), Max: 98.2 (25 Jul 2021 21:06)  HR: 53 (26 Jul 2021 16:29) (42 - 94)  BP: 126/72 (26 Jul 2021 16:29) (111/55 - 137/57)  BP(mean): --  RR: 20 (26 Jul 2021 16:29) (16 - 24)  SpO2: 93% (26 Jul 2021 16:29) (93% - 100%)    PHYSICAL EXAMINATION:  General: Alert and Awake, NAD  Cardiac: RRR, No M/R/G  Resp: CTAB, No Wh/Rh/Ra  Abdomen: NBS, NT/ND, No HSM, No rigidity or guarding  MSK: No LE edema. No Calf tenderness  : No muhammad  Skin: No rashes or lesions. Skin is warm and dry to the touch.   Neuro: Alert and Awake. CN 2-12 Grossly intact. Moves all four extremities spontaneously.  Psych: Calm, Pleasant, Cooperative                          8.3    11.65 )-----------( 304      ( 26 Jul 2021 06:51 )             27.8       07-26    137  |  107  |  34<H>  ----------------------------<  106<H>  4.0   |  18<L>  |  1.23    Ca    9.1      26 Jul 2021 06:51  Phos  3.8     07-26  Mg     2.1     07-26    TPro  6.3  /  Alb  3.4  /  TBili  0.3  /  DBili  x   /  AST  38  /  ALT  111<H>  /  AlkPhos  146<H>  07-26          MICROBIOLOGY:  v  Clean Catch Clean Catch (Midstream)  07-24-21   10,000 - 49,000 CFU/mL Gram positive organisms  --  --      .Blood Blood-Peripheral  07-24-21   No growth to date.  --  --      .Urine Clean Catch (Midstream)  07-19-21   <10,000 CFU/mL Normal Urogenital [place]  --  --      .Blood Blood-Peripheral  07-16-21   No Growth Final  --  --      .Urine Clean Catch (Midstream)  07-13-21   <10,000 CFU/mL Normal Urogenital [place]  --  --      .Blood Blood-Peripheral  07-13-21   No Growth Final  --  Blood Culture PCR    RADIOLOGY:    <The imaging below has been reviewed and visualized by me independently. Findings as detailed in report below>    EXAM:  CT ABDOMEN AND PELVIS IC                        PROCEDURE DATE:  07/20/2021    Pericapsular rim-enhancing fluid collection along the posterior hepatic dome, with differential including abscess or an organized hematoma. Fluid sampling may be helpful, though its posterior subdiaphragmatic location may make this difficult. Consider further characterization with contrast enhanced abdominal MRI as clinically indicated.    Moderate right and small left pleural effusions are unchanged.

## 2021-07-26 NOTE — PROGRESS NOTE ADULT - ASSESSMENT
86 year old female with a history of MVR s/p repair, persistent Afib on Eliquis, HTN, HLD, CHF, Gout, admitted with perihepatic fluid collection, transaminitis, HFpEF exacerbation, and rapid atrial fibrillation s/p DCCV 7/15 with recurrence of Afib on 7/20.     1.  Afib  2. HFpEF    - Maintain NPO for DCCV today. Consent signed and in chart as well as DNR rescinded.  - Lenient rate control has been achieved. Average heart rate around 80-90bpm  - Continue 5Gms Amio load to conclude 7/27, currently on 400mg tid  - Continue rate control with Cardizem, Lopressor  - Continue AC with Eliquis    Lana Kirkland PA-C  #[id number]         86 year old female with a history of MVR s/p repair, persistent Afib on Eliquis, HTN, HLD, CHF, Gout, admitted with perihepatic fluid collection, transaminitis, HFpEF exacerbation, and rapid atrial fibrillation s/p DCCV 7/15 with recurrence of Afib on 7/20.     1.  Afib  2. HFpEF    - Maintain NPO for DCCV today. Consent signed and in chart as well as DNR rescinded.  - Lenient rate control has been achieved. Average heart rate around 80-90bpm  - Continue 5Gms Amio load to conclude 7/27, currently on 400mg tid  - Continue rate control with Cardizem, Lopressor  - Continue AC with Eliquis    Lana Kirkland PA-C  #[id number]    ADDENDUM:  1. Rates slow after DCCV today, hold PM Metoprolol and Cardizem       86 year old female with a history of MVR s/p repair, persistent Afib on Eliquis, HTN, HLD, CHF, Gout, admitted with perihepatic fluid collection, transaminitis, HFpEF exacerbation, and rapid atrial fibrillation s/p DCCV 7/15 with recurrence of Afib on 7/20.     1.  Afib  2. HFpEF    - Maintain NPO for DCCV today. Consent signed and in chart as well as DNR rescinded.  - Lenient rate control has been achieved. Average heart rate around 80-90bpm  - Continue 5Gms Amio load to conclude 7/27, currently on 400mg tid  - Continue rate control with Cardizem, Lopressor  - Continue AC with Eliquis    Lana Kirkland PA-C  #[id number]    ADDENDUM:  1. Rates slow after DCCV today, hold PM Metoprolol and Cardizem  2. Patient c/o 5/10 chest tightness 10 minutes after waking up from DCCV, relates it to similar episodes she has occasionally at night. EKG unchanged. BP stable, HR ~45 BPM.       86 year old female with a history of MVR s/p repair, persistent Afib on Eliquis, HTN, HLD, CHF, Gout, admitted with perihepatic fluid collection, transaminitis, HFpEF exacerbation, and rapid atrial fibrillation s/p DCCV 7/15 with recurrence of Afib on 7/20.     1.  Afib  2. HFpEF    - Maintain NPO for DCCV today. Consent signed and in chart as well as DNR rescinded.  - Lenient rate control has been achieved. Average heart rate around 80-90bpm  - Continue 5Gms Amio load to conclude 7/27, currently on 400mg tid  - Continue rate control with Cardizem, Lopressor  - Continue AC with Eliquis    Lana Kirkland PA-C  #[id number]    ADDENDUM:  1. Rates slow after DCCV today, hold PM Metoprolol and Cardizem  2. Patient c/o 5/10 chest tightness 10 minutes after waking up from DCCV, relates it to similar episodes she has occasionally at night. EKG unchanged. BP stable, HR ~45 BPM.  3. Amio load completed, start Amiodarone 200mg daily starting 7/27/21       86 year old female with a history of MVR s/p repair, persistent Afib on Eliquis, HTN, HLD, CHF, Gout, admitted with perihepatic fluid collection, transaminitis, HFpEF exacerbation, and rapid atrial fibrillation s/p DCCV 7/15 with recurrence of Afib on 7/20.     1.  Afib  2. HFpEF    - Maintain NPO for DCCV today. Consent signed and in chart as well as DNR rescinded.  - Lenient rate control has been achieved. Average heart rate around 80-90bpm  - Continue 5Gms Amio load to conclude 7/27, currently on 400mg tid  - Continue rate control with Cardizem, Lopressor  - Continue AC with Eliquis    Lana Kirkland PA-C  #[id number]    ADDENDUM:  1. Rates slow after DCCV today, hold PM Metoprolol and Cardizem  2. Patient c/o 5/10 chest tightness 10 minutes after waking up from DCCV, relates it to similar episodes she has occasionally at night. EKG unchanged. BP stable, HR ~45 BPM. Has since resolved.  3. Amio load completed, start Amiodarone 200mg daily starting 7/27/21

## 2021-07-26 NOTE — PROGRESS NOTE ADULT - PROBLEM SELECTOR PLAN 3
-Patient found to be in A Fib w/ RVR to HR 160s on admission   -Likely caused SOB  -Given Amio 150mg x1  -c/w amio load per EP  -Takes Cardizem at home  -Rate controlled on Lopressor and Diltaizem to the 90s-100s  -Overnight 7/14, patient was tachycardic to 150s, given IV push of metoprolol 5mg, metoprolol 25mg PO, and metoprolol tartrate increased to 75mg BID, further increased to 100mg BID as per EP (7/15)  - s/p EP cardioversion 7/15  - metoprolol decreased to 50mg BID on 7/19  - Overnight 7/20, patient was in Afib tachycardic to the 120s, given IV push of metoprolol 5mg, and metoprolol tartrate increased to 50mg TID, further changed to 100 mg BID (7/21)  - Appreciate EP recs  - Still in Afib, amiodarone 400mg Q8 added (7/21)  - NPO 7/25 midnight for repeat DCCV Monday 7/26 - Follows with Dr. Young (GI)  - Has microscopic colitis and planned to start prolonged budesonide taper (started 6/28)   - Weakness and fatigue, fainting, since starting budesonide so was advised to stop  - Per Dr. Young, resume budesonide 9 mg qd  - Outpatient follow up with Dr. Young  - K>4, MG>2

## 2021-07-26 NOTE — PROGRESS NOTE ADULT - SUBJECTIVE AND OBJECTIVE BOX
HPI:  Patient seen and examined at bedside on 3 DSU prior to going downstairs for cardioversion.  She remains in rate controlled AFib, which she continues to be symptomatic of at this time.     Review Of Systems:           Respiratory: No shortness of breath, cough, or wheezing  Cardiovascular: No chest pain; + palpitations  10 point review of systems is otherwise negative except as mentioned above        Medications:  acetaminophen   Tablet .. 650 milliGRAM(s) Oral every 6 hours PRN  allopurinol 200 milliGRAM(s) Oral daily  aluminum hydroxide/magnesium hydroxide/simethicone Suspension 30 milliLiter(s) Oral every 4 hours PRN  aMIOdarone    Tablet 200 milliGRAM(s) Oral daily  amitriptyline 25 milliGRAM(s) Oral daily  apixaban 2.5 milliGRAM(s) Oral every 12 hours  buDESOnide    EC Capsule 9 milliGRAM(s) Oral daily  diphenhydrAMINE 25 milliGRAM(s) Oral every 6 hours PRN  hydrocortisone 1% Ointment 1 Application(s) Topical two times a day  lactated ringers. 1000 milliLiter(s) IV Continuous <Continuous>  melatonin 3 milliGRAM(s) Oral at bedtime PRN  metoprolol tartrate 50 milliGRAM(s) Oral two times a day  ondansetron Injectable 4 milliGRAM(s) IV Push every 8 hours PRN  oxybutynin 5 milliGRAM(s) Oral daily  pantoprazole    Tablet 40 milliGRAM(s) Oral before breakfast    PAST MEDICAL & SURGICAL HISTORY:  Insomnia    HTN (hypertension)    Hypercholesteremia    GERD (gastroesophageal reflux disease)    Carotid artery occlusion  (R)    Vitamin B 12 deficiency    Osteoporosis    Mitral valve disease    Gallstones    Choledocholithiasis    CHF (congestive heart failure)    S/P MVR (mitral valve repair)   at [facility]    Status post DACIA-BSO      Hx of tonsillectomy    Abdominal hernia  repair     History of lumbar laminectomy for spinal cord decompression      Papilloma of breast  s/p excision from right breast &gt;20 years ago    Bilateral cataracts  extraction w/ IOL    Varicose veins  s/p sclerotherapy bilaterally    H/O carotid endarterectomy    S/P laparoscopic cholecystectomy      Vitals:  T(C): 36.3 (21 @ 21:04), Max: 36.7 (21 @ 04:33)  HR: 50 (21 @ 21:04) (42 - 94)  BP: 135/76 (21 @ 21:04) (111/55 - 137/57)  BP(mean): --  RR: 20 (21 @ 21:04) (16 - 24)  SpO2: 93% (21 @ 21:04) (93% - 100%)  Wt(kg): --  Daily     Daily Weight in k.1 (2021 04:33)  I&O's Summary    2021 07:01  -  2021 07:00  --------------------------------------------------------  IN: 720 mL / OUT: 1000 mL / NET: -280 mL    2021 07:01  -  2021 00:06  --------------------------------------------------------  IN: 240 mL / OUT: 150 mL / NET: 90 mL        Physical Exam:  Appearance: Normal, well groomed, NAD  Eyes: PERRLA, EOMI, pink conjunctiva, no scleral icterus   HENT: Normal oral mucosa  Cardiovascular: irregular, S1, S2, no murmur, rub, or gallop; no edema; no JVD  Respiratory: Clear to auscultation bilaterally  Gastrointestinal: Soft, non-tender, non-distended, BS+  Musculoskeletal: No clubbing or joint deformity   Neurologic: No focal weakness  Lymphatic: No lymphadenopathy  Psychiatry: AAOx3 with appropriate mood and affect  Skin: No rashes, ecchymoses, or cyanosis                          8.3    11.65 )-----------( 304      ( 2021 06:51 )             27.8         137  |  107  |  34<H>  ----------------------------<  106<H>  4.0   |  18<L>  |  1.23    Ca    9.1      2021 06:51  Phos  3.8       Mg     2.1         TPro  6.3  /  Alb  3.4  /  TBili  0.3  /  DBili  x   /  AST  38  /  ALT  111<H>  /  AlkPhos  146<H>

## 2021-07-26 NOTE — PROGRESS NOTE ADULT - PROBLEM SELECTOR PLAN 1
- c/w mIVF, encourage PO intake   - Trend Cr   - f/u UA, UCx, blood cx, urine studies  - monitor off Abx given no fever or localizing symptoms  - WBC uptrending i/s/o budesonide, no obvious infectious source  - WBC downtrended again (7/25)  - ID following- appreciate recs  - Avoid nephrotoxic medications  - renal u/s showing trace perihepatic ascites, hepatic steatosis, small b/l pleural effusions  - s/p ceftriaxone  - hold torsemide -Patient found to be in A Fib w/ RVR to HR 160s on admission, Likely caused SOB  -continue with amiodarone, diltiazem, metoprolol   - repeat cardioversion today (7/26) -Patient found to be in A Fib w/ RVR to HR 160s on admission, Likely caused SOB  -continue with amiodarone  - repeat cardioversion today successful   - per EP hold metoprolol and diltiazem tonight

## 2021-07-26 NOTE — PROGRESS NOTE ADULT - PROBLEM SELECTOR PLAN 5
-Likely 2/2 CHF  -Treat as above   -Monitor AST/ALT for now, trending down  -Hepatitis serologies negative  -Transaminitis - due to possible liver abscess vs hepatic congestion from heart failure  -Entamoeba negative -continue to monitor     -Likely 2/2 CHF  -Treat as above   -Monitor AST/ALT for now, trending down  -Hepatitis serologies negative  -Transaminitis - due to possible liver abscess vs hepatic congestion from heart failure  -Entamoeba negative -Likely 2/2 CHF  -Treat as above   -Monitor AST/ALT for now, trending down  -Hepatitis serologies negative  -Transaminitis - due to possible liver abscess vs hepatic congestion from heart failure

## 2021-07-26 NOTE — PROGRESS NOTE ADULT - PROBLEM SELECTOR PLAN 4
-CT ab/pelvis showed perihepatic fluid collection, mild hepatomegaly, diffuse fatty infiltration  - IR defer drainage, f/u o/p to monitor size and for sx, available if becomes sx  - Appreciate ID recs, will monitor off Zosyn for now  - Repeat CT A/P with contrast 7/20 showed pericapsular rim enhancing fluid collection posteriorly concerning for abscess vs hematoma  - ID low suspicion for abscess, will contact IR for recs on sampling fluid  - entamoeba, echinococcus negative - ID and IR signed off, follow up outpatient     -CT ab/pelvis showed perihepatic fluid collection, mild hepatomegaly, diffuse fatty infiltration  - IR defer drainage, f/u o/p to monitor size and for sx, available if becomes sx  - Appreciate ID recs, will monitor off Zosyn for now  - Repeat CT A/P with contrast 7/20 showed pericapsular rim enhancing fluid collection posteriorly concerning for abscess vs hematoma  - ID low suspicion for abscess, will contact IR for recs on sampling fluid  - entamoeba, echinococcus negative - CT ab/pelvis showed perihepatic fluid collection, mild hepatomegaly, diffuse fatty infiltration  - IR defer drainage, f/u o/p to monitor size and for sx, available if becomes sx  - Appreciate ID recs, will monitor off Zosyn for now  - Repeat CT A/P with contrast 7/20 showed pericapsular rim enhancing fluid collection posteriorly concerning for abscess vs hematoma  -ID and IR signed off, follow up outpatient

## 2021-07-26 NOTE — PROGRESS NOTE ADULT - ASSESSMENT
85 yo female w/pmh HFpEF, atrial fibrillation, fatty liver, microscopic colitis, gout, severe MR s/p MVR 1997, CKD 3, admitted to CCU for a-fib with RVR, HR 160s. Restarted home meds and HR better controlled. S/p DCCV, was in NSR, now in Afib again. 87 yo female w/pmh HFpEF, atrial fibrillation, fatty liver, microscopic colitis, gout, severe MR s/p MVR 1997, CKD 3, admitted to CCU for a-fib with RVR, HR 160s. Restarted home meds and HR better controlled. S/p DCCV, was in NSR, now in Afib again. Scheduled for repeat cardioversion today.

## 2021-07-27 NOTE — PROGRESS NOTE ADULT - PROBLEM SELECTOR PLAN 4
- CT ab/pelvis showed perihepatic fluid collection, mild hepatomegaly, diffuse fatty infiltration  - IR defer drainage, f/u o/p to monitor size and for sx, available if becomes sx  - Appreciate ID recs, will monitor off Zosyn for now  - Repeat CT A/P with contrast 7/20 showed pericapsular rim enhancing fluid collection posteriorly concerning for abscess vs hematoma  -ID and IR signed off, follow up outpatient

## 2021-07-27 NOTE — PROGRESS NOTE ADULT - ASSESSMENT
87 yo female w/pmh HFpEF, atrial fibrillation, fatty liver, microscopic colitis, gout, severe MR s/p MVR 1997, CKD 3, admitted to CCU for a-fib with RVR, HR 160s. Restarted home meds and HR better controlled. S/p DCCV, was in NSR, now in Afib again. Scheduled for repeat cardioversion today. 87 yo female w/pmh HFpEF, atrial fibrillation, fatty liver, microscopic colitis, gout, severe MR s/p MVR 1997, CKD 3, admitted to CCU for a-fib with RVR, HR 160s. Restarted home meds and HR better controlled. S/p DCCV, was in NSR, now in Afib again. Now in NSR after repeat cardioversion.

## 2021-07-27 NOTE — PROGRESS NOTE ADULT - SUBJECTIVE AND OBJECTIVE BOX
HPI:  Patient seen and examined at bedside on 3 DSU.  She is maintaining sinus rhythm, but it is sinus bradycardia at 40-60 bpm.    Review Of Systems:           Respiratory: No shortness of breath, cough, or wheezing  Cardiovascular: No chest pain or palpitations  10 point review of systems is otherwise negative except as mentioned above        Medications:  acetaminophen   Tablet .. 650 milliGRAM(s) Oral every 6 hours PRN  allopurinol 200 milliGRAM(s) Oral daily  aluminum hydroxide/magnesium hydroxide/simethicone Suspension 30 milliLiter(s) Oral every 4 hours PRN  aMIOdarone    Tablet 200 milliGRAM(s) Oral daily  amitriptyline 25 milliGRAM(s) Oral daily  apixaban 2.5 milliGRAM(s) Oral every 12 hours  buDESOnide    EC Capsule 9 milliGRAM(s) Oral daily  diphenhydrAMINE 25 milliGRAM(s) Oral every 6 hours PRN  hydrocortisone 1% Ointment 1 Application(s) Topical two times a day  lactated ringers. 1000 milliLiter(s) IV Continuous <Continuous>  melatonin 3 milliGRAM(s) Oral at bedtime PRN  nystatin Powder 1 Application(s) Topical two times a day  ondansetron Injectable 4 milliGRAM(s) IV Push every 8 hours PRN  oxybutynin 5 milliGRAM(s) Oral daily  pantoprazole    Tablet 40 milliGRAM(s) Oral before breakfast    PAST MEDICAL & SURGICAL HISTORY:  Insomnia    HTN (hypertension)    Hypercholesteremia    GERD (gastroesophageal reflux disease)    Carotid artery occlusion  (R)    Vitamin B 12 deficiency    Osteoporosis    Mitral valve disease    Gallstones    Choledocholithiasis    CHF (congestive heart failure)    S/P MVR (mitral valve repair)  1997 at [facility]    Status post DACIA-BSO  1975    Hx of tonsillectomy    Abdominal hernia  repair 2007    History of lumbar laminectomy for spinal cord decompression  1995    Papilloma of breast  s/p excision from right breast &gt;20 years ago    Bilateral cataracts  extraction w/ IOL    Varicose veins  s/p sclerotherapy bilaterally    H/O carotid endarterectomy    S/P laparoscopic cholecystectomy      Vitals:  T(C): 36.3 (07-27-21 @ 21:07), Max: 36.7 (07-27-21 @ 12:47)  HR: 62 (07-27-21 @ 21:07) (51 - 62)  BP: 131/57 (07-27-21 @ 21:07) (131/57 - 146/63)  BP(mean): --  RR: 18 (07-27-21 @ 21:07) (18 - 18)  SpO2: 95% (07-27-21 @ 21:07) (93% - 97%)  Wt(kg): --  Daily     Daily   I&O's Summary    26 Jul 2021 07:01  -  27 Jul 2021 07:00  --------------------------------------------------------  IN: 240 mL / OUT: 150 mL / NET: 90 mL    27 Jul 2021 07:01  -  28 Jul 2021 00:09  --------------------------------------------------------  IN: 880 mL / OUT: 475 mL / NET: 405 mL        Physical Exam:  Appearance: Normal, well groomed, NAD  Eyes: PERRLA, EOMI, pink conjunctiva, no scleral icterus   HENT: Normal oral mucosa  Cardiovascular: frida, S1, S2, no murmur, rub, or gallop; no edema; no JVD  Respiratory: Clear to auscultation bilaterally  Gastrointestinal: Soft, non-tender, non-distended, BS+  Musculoskeletal: No clubbing or joint deformity   Neurologic: No focal weakness  Lymphatic: No lymphadenopathy  Psychiatry: AAOx3 with appropriate mood and affect  Skin: No rashes, ecchymoses, or cyanosis                          8.7    12.39 )-----------( 311      ( 27 Jul 2021 07:06 )             29.6     07-27    136  |  104  |  49<H>  ----------------------------<  120<H>  4.5   |  19<L>  |  2.00<H>    Ca    9.2      27 Jul 2021 14:44  Phos  5.2     07-27  Mg     2.3     07-27    TPro  6.3  /  Alb  3.4  /  TBili  0.4  /  DBili  x   /  AST  76<H>  /  ALT  144<H>  /  AlkPhos  150<H>  07-27

## 2021-07-27 NOTE — PROGRESS NOTE ADULT - PROBLEM SELECTOR PLAN 6
-EF 55-60%, MVR with annuplasty ring and moderate-severe AR and TR, mild pHTN  -Likely 2/2 A fib w/ RVR   -Cardiology (patient sees Dr. Wynne as an outpatient) and Dr. Valdovinos  -TTE from 6/11 show severe pulmonary pressures  -TTE from 7/13 showed mild pulmonary pressures  -Hold home Torsemide 40mg qd given diarrhea, WEN   -BNP [id number]

## 2021-07-27 NOTE — PROGRESS NOTE ADULT - PROBLEM SELECTOR PLAN 5
-Likely 2/2 CHF  -Treat as above   -Monitor AST/ALT for now, trending down  -Hepatitis serologies negative  -Transaminitis - due to possible liver abscess vs hepatic congestion from heart failure -Likely 2/2 CHF  -Treat as above   - monitor AST/ALT  -Hepatitis serologies negative  -Transaminitis - due to possible liver abscess vs hepatic congestion from heart failure

## 2021-07-27 NOTE — PROGRESS NOTE ADULT - PROBLEM SELECTOR PLAN 2
- Trend Cr (improving)   - WBC elevated i/s/o budesonide, no obvious infectious source  - ID following- advised to continue to monitor off antibiotics   - Avoid nephrotoxic medications - today creatinine increased from 1.23 to 2.04, increase could be secondary to procedure   - will check creatinine again at 2pm   - Avoid nephrotoxic medications

## 2021-07-27 NOTE — PROGRESS NOTE ADULT - ASSESSMENT
86 year old female with a history of MVR s/p repair, persistent Afib on Eliquis, HTN, HLD, CHF, Gout, admitted with perihepatic fluid collection, transaminitis, HFpEF exacerbation, and rapid atrial fibrillation s/p DCCV 7/15 with recurrence of Afib on 7/20 now s/p DCCV 7/26 remains in SB over night s/p Amiodarone load    1.  Afib  2. HFpEF    - monitor on telemetry remains in SR s/p successful DCCV 7/26/21  - monitor electrolytes and replete K+>4,MG++>2  - Continue AC with Eliquis  - continue Amiodarone 200 mg oral daily, monitor LFTs, TFTs and will need out patient opthalmology and PFTs follow up ( hold for HR<55 bpm )  - continue Metoprolol 50 mg oral BID with hold parameters for HR < 70 bpm  [id number] 86 year old female with a history of MVR s/p repair, persistent Afib on Eliquis, HTN, HLD, CHF, Gout, admitted with perihepatic fluid collection, transaminitis, HFpEF exacerbation, and rapid atrial fibrillation s/p DCCV 7/15 with recurrence of Afib on 7/20 now s/p DCCV 7/26 remains in SB over night s/p Amiodarone load    1.  Afib  2. HFpEF    - monitor on telemetry remains in SR s/p successful DCCV 7/26/21  - monitor electrolytes and replete K+>4,MG++>2  - Continue AC with Eliquis  - continue Amiodarone 200 mg oral daily, monitor LFTs, TFTs and will need out patient opthalmology and PFTs follow up ( hold for HR<55 bpm )  - continue Metoprolol 50 mg oral BID with hold parameters for HR < 70 bpm  - discussed with Team 7 resident @ [phone] beeper   [id number] 86 year old female with a history of MVR s/p repair, persistent Afib on Eliquis, HTN, HLD, CHF, Gout, admitted with perihepatic fluid collection, transaminitis, HFpEF exacerbation, and rapid atrial fibrillation s/p DCCV 7/15 with recurrence of Afib on 7/20 now s/p DCCV 7/26 remains in SB over night s/p Amiodarone load    1.  Afib  2. HFpEF    - monitor on telemetry remains in SR s/p successful DCCV 7/26/21  - monitor electrolytes and replete K+>4,MG++>2  - Continue AC with Eliquis  - continue Amiodarone 200 mg oral daily, monitor LFTs, TFTs and will need out patient opthalmology and PFTs follow up ( hold for HR<55 bpm )  - continue Metoprolol 50 mg oral BID with hold parameters for HR < 70 bpm  - discussed with Team 7 resident @ [phone] beeper   [id number]  addendum  18:45  discontinue metoprolol  continue Amiodarone as ordered   EP will Sign call with questions     86 year old female with a history of MVR s/p repair, persistent Afib on Eliquis, HTN, HLD, CHF, Gout, admitted with perihepatic fluid collection, transaminitis, HFpEF exacerbation, and rapid atrial fibrillation s/p DCCV 7/15 with recurrence of Afib on 7/20 now s/p DCCV 7/26 remains in SB over night s/p Amiodarone load    1.  Afib  2. HFpEF    - monitor on telemetry remains in SR s/p successful DCCV 7/26/21  - monitor electrolytes and replete K+>4,MG++>2  - Continue AC with Eliquis  - continue Amiodarone 200 mg oral daily, monitor LFTs, TFTs and will need out patient opthalmology and PFTs follow up ( hold for HR<55 bpm )  - continue Metoprolol 50 mg oral BID with hold parameters for HR < 70 bpm  - discussed with Team 7 resident @ [phone] beeper   [id number]  addendum  18:45  discontinue metoprolol  continue Amiodarone as ordered

## 2021-07-27 NOTE — PROGRESS NOTE ADULT - PROBLEM SELECTOR PLAN 1
-Patient found to be in A Fib w/ RVR to HR 160s on admission, Likely caused SOB  -continue with amiodarone  - repeat cardioversion today successful   - per EP hold metoprolol and diltiazem tonight -Patient found to be in A Fib w/ RVR to HR 160s on admission, Likely caused SOB  -continue with amiodarone  - repeat cardioversion successful   - hold metoprolol for HR <70

## 2021-07-27 NOTE — PROGRESS NOTE ADULT - ASSESSMENT
86 year-old woman with AFib with RVR in the setting of colitis.  Now status post cardioversion, but back in atrial fibrillation with RVR on 7/20.   Amio loaded.  Successful repeat cardioversion 7/26/2021.    Now rate and rhythm controlled on:  amiodarone 200 mg daily.    Continue current therapy.  Discharge planning per primary team.    Case discussed with outpatient cardiology, Nicolás Wynne MD.

## 2021-07-27 NOTE — DISCHARGE NOTE NURSING/CASE MANAGEMENT/SOCIAL WORK - PATIENT PORTAL LINK FT
You can access the FollowMyHealth Patient Portal offered by [facility] by registering at the following website: [email or web address]. By joining [facility]’s FollowMyHealth portal, you will also be able to view your health information using other applications (apps) compatible with our system.

## 2021-07-27 NOTE — PROGRESS NOTE ADULT - SUBJECTIVE AND OBJECTIVE BOX
HPI: no current chest pressure but c/o SOB with turning and postioning this am during bath    MEDICATIONS  (STANDING):  allopurinol 200 milliGRAM(s) Oral daily  aMIOdarone    Tablet 200 milliGRAM(s) Oral daily  amitriptyline 25 milliGRAM(s) Oral daily  apixaban 2.5 milliGRAM(s) Oral every 12 hours  buDESOnide    EC Capsule 9 milliGRAM(s) Oral daily  hydrocortisone 1% Ointment 1 Application(s) Topical two times a day  lactated ringers. 1000 milliLiter(s) (100 mL/Hr) IV Continuous <Continuous>  metoprolol tartrate 50 milliGRAM(s) Oral two times a day  oxybutynin 5 milliGRAM(s) Oral daily  pantoprazole    Tablet 40 milliGRAM(s) Oral before breakfast    MEDICATIONS  (PRN):  acetaminophen   Tablet .. 650 milliGRAM(s) Oral every 6 hours PRN Temp greater or equal to 38.5C (101.3F), Mild Pain (1 - 3)  aluminum hydroxide/magnesium hydroxide/simethicone Suspension 30 milliLiter(s) Oral every 4 hours PRN Dyspepsia  diphenhydrAMINE 25 milliGRAM(s) Oral every 6 hours PRN Rash and/or Itching  melatonin 3 milliGRAM(s) Oral at bedtime PRN Insomnia  ondansetron Injectable 4 milliGRAM(s) IV Push every 8 hours PRN Nausea and/or Vomiting    Allergies    &quot;VASELINE BASED OINTMENTS&quot; (Urticaria; Rash)  adhesives (Urticaria; Rash)  contrast media (gadolinium-based) (Chills)  Norvasc (Pruritus)  statins (Unknown)    Intolerances    REVIEW OF SYSTEM: all other ROS negative  Vital Signs Last 24 Hrs  T(C): 36.4 (2021 04:33), Max: 36.4 (2021 12:15)  T(F): 97.6 (2021 04:33), Max: 97.6 (2021 04:33)  HR: 51 (2021 04:33) (42 - 53)  BP: 146/63 (2021 04:33) (111/55 - 146/63)    RR: 18 (2021 04:33) (18 - 24)  SpO2: 93% (2021 04:33) (93% - 100%)    Physical Exam:  General : well developed, well nourished,  and no acute distress  Neuro : Alert and oriented x 3, no focal deficits  HEENT : Sclera clear, no JVD, no Lymphadeopathy, no carotid bruits, neck supple  Lungs: Clear to Ascultation, no wheezing , rales or rhonchi   Cardiovascular : + 1 +2, RRR, no murmurs, no rubs  GI : abdomen soft, NT, ND, + BS   : no suprapubic tenderness  Extremities : No edema, + 2 DP and +2 PT, feet warm   Skin :     TELE: SR 50 's  EKG:     LABS:                        8.7    12.39 )-----------( 311      ( 2021 07:06 )             29.6         137  |  105  |  46<H>  ----------------------------<  109<H>  5.1   |  18<L>  |  2.04<H>    Ca    9.2      2021 07:12  Phos  5.2       Mg     2.3         TPro  6.4  /  Alb  3.4  /  TBili  0.4  /  DBili  x   /  AST  99<H>  /  ALT  153<H>  /  AlkPhos  153<H>        Urinalysis Basic - ( 2021 10:01 )    Color: Yellow / Appearance: Slightly Turbid / S.024 / pH: x  Gluc: x / Ketone: Negative  / Bili: Negative / Urobili: Negative   Blood: x / Protein: 100 / Nitrite: Negative   Leuk Esterase: Moderate / RBC: x / WBC x   Sq Epi: x / Non Sq Epi: x / Bacteria: x    RADIOLOGY & ADDITIONAL TESTS:  < from: CT Abdomen and Pelvis w/ IV Cont (21 @ 15:20) >  IMPRESSION:  Pericapsular rim-enhancing fluid collection along the posterior hepatic dome, with differential including abscess or an organized hematoma. Fluid sampling may be helpful, though its posterior subdiaphragmatic location may make this difficult. Consider further characterization with contrast enhanced abdominal MRI as clinically indicated.    Moderate right and small left pleural effusions are unchanged.    < from: Transesophageal Echocardiogram (21 @ 08:46) >  ------------------------------------------------------------------------  Dimensions:    Normal Values:  LA:     4.1    2.0 - 4.0 cm  Ao:     2.8    2.0 - 3.8 cm  SEPTUM: 1.1    0.6 - 1.2 cm  PWT:    1.0    0.6 - 1.1 cm  LVIDd:  4.7    3.0 - 5.6 cm  LVIDs:  3.9    1.8 - 4.0 cm  Derived variables:  LVMI: 102 g/m2  RWT: 0.42  Fractional short: 17 %  EF (Visual Estimate): 55-60 %  Doppler Peak Velocity (m/sec): AoV=1.5  ------------------------------------------------------------------------  Observations:  Mitral Valve: An annuloplasty ring is seen in the mitral  position.. Mild-moderate mitral regurgitation. Peak mitral  valve gradient equals 22 mm Hg, mean transmitral valve  gradient equals 8 mm Hg, which is elevated even in the  setting of a An annuloplasty ring is seen in the mitral  position..  Durng YULIA PG 13 mm hg, MG 4 mm hg.  Aortic Valve/Aorta: Calcified trileaflet aortic valve with  normal opening. Peak transaortic valve gradient equals 9 mm  Hg, mean transaortic valve gradient equals 5 mm Hg, aortic  valve velocity time integral equals 24 cm, estimated aortic  valve area equals 2.9 sqcm. Moderate aortic regurgitation.  Vena contracta 0.45 cm. Peak left ventricular outflow tract  gradient equals 4 mm Hg, mean gradient is equal to 2 mm Hg,  LVOT velocity time integral equals 22 cm.  Aortic Root: 2.8 cm.  LVOT diameter: 2 cm.  Complex atheroma noted in aortic arch/descending aorta.  Left Atrium: Mildly dilated left atrium.  LA volume index =  36 cc/m2.   Spontaneous echo contrast seen.   No left  atrial or left atrial appendage thrombus.   Decreased left  atrial appendage velocities noted.  Left Ventricle: Normal left ventricular systolic function.  No segmental wall motion abnormalities. Normal left  ventricular internal dimensions and wall thicknesses.  Right Heart: Moderate right atrial enlargement. Right  ventricular enlargement with normal right ventricular  systolic function (TAPSE 1.9 cm). Normal tricuspid valve.  Moderate tricuspid regurgitation. Normal pulmonic valve.  Mild pulmonic regurgitation.  Pericardium/Pleura: Normal pericardium with no pericardial  effusion.  Hemodynamic: Estimated right ventricular systolic pressure  equals 66 mm Hg, assuming right atrial pressure equals 8 mm  Hg, consistent with severe pulmonary hypertension. Agitated  saline injection and color flow Doppler demonstrates no  evidence of a patent foramen ovale.  ------------------------------------------------------------------------  Conclusions:  1. Peak mitral valve gradient equals 22 mm Hg, mean  transmitral valve gradient equals 8 mm Hg, which is  elevated even in the setting of a An annuloplasty ring is  seen in the mitral position..  Durng YULIA PG 13 mm hg, MG 4 mm hg.  2. Moderate aortic regurgitation.  Vena contracta 0.45 cm.  3. Aortic Root: 2.8 cm.  LVOT diameter: 2 cm.  Complex atheroma noted in aortic arch/descending aorta.  4. Normal left ventricular internal dimensions and wall  thicknesses.  5. Normal left ventricular systolic function. No segmental  wall motion abnormalities.  6. Moderate right atrial enlargement.  7. Right ventricular enlargement with normal right  ventricular systolic function (TAPSE 1.9 cm).  8. Estimated pulmonary artery systolic pressure equals 66  mm Hg, assuming right atrial pressure equals 8 mm Hg,  consistent with severe pulmonary pressures. MeanPASP 34 mm  hg. PADP 13 mm hg.  During YULIA PASP approx 50 mm hg.  ------------------------------------------------------------------------  Confirmed on  2021 - 13:05:26 by TONY Covington  ------------------------------------------------------------------------    < end of copied text >    < from: TTE with Doppler (w/Cont) (21 @ 17:02) >  ------------------------------------------------------------------------  Dimensions:    Normal Values:  LA:     4.6    2.0 - 4.0 cm  Ao:     3.0    2.0 - 3.8 cm  SEPTUM: 1.0    0.6 - 1.2 cm  PWT:    1.0 0.6 - 1.1 cm  LVIDd:  4.1    3.0 - 5.6 cm  LVIDs:  3.6    1.8 - 4.0 cm  Derived variables:  LVMI: 80 g/m2  RWT: 0.48  Fractional short: 12 %  EF (Visual Estimate): 55-60 %  EF (Hernandez Rule): 56 %Doppler Peak Velocity (m/sec):  MV=1.6 AoV=1.1  ------------------------------------------------------------------------  Observations:  Mitral Valve: An annuloplasty ring is seen in the mitral  position.. Mild mitral regurgitation.  Peak mitral valve  gradient equals 11 mm Hg, mean transmitral valve gradient  equals 5 mm Hg, which is probably normal in the setting of  a An annuloplasty ring is seen in the mitral position..  Aortic Valve/Aorta: Calcified trileaflet aortic valve with  normal opening. Peak transaortic valve gradient equals 5 mm  Hg. Moderate-severe aortic regurgitation. Peak left  ventricular outflow tract gradient equals 3 mm Hg.  Aortic Root: 3 cm.  LVOT diameter: 1.9 cm.  Left Atrium: Mildly dilated left atrium.  LA volume index =  38 cc/m2.  Left Ventricle: Endocardial visualization enhanced with  intravenous injection of Ultrasonic Enhancing Agent  (Definity). Difficult assesment of EF in the setting of  tachycardia.  Overall preserved left ventricular systolic  function. Small territory isolated at the apex that appers  aneurysmal.  Normal left ventricular internal dimensions  and wall thicknesses. Increased E/e'  is consistent with  elevated left ventricular filling pressure.  Right Heart: Right atrial enlargement. Right ventricula  rmild enlargement with decreased right ventricular systolic  function. Tricuspid valve not well visualized.  Moderate-severe tricuspid regurgitation. Pulmonic valve not  well visualized.  Pericardium/Pleura: Normal pericardium with no pericardial  effusion.  Hemodynamic: Estimated right atrial pressure is 8 mm Hg.  Estimated right ventricular systolic pressure equals 49 mm  Hg, assuming right atrial pressure equals 8 mm Hg,  consistent with mild pulmonary hypertension.  ------------------------------------------------------------------------  Conclusions:  1. An annuloplasty ring is seen in the mitral position..  Mild mitral regurgitation.  Peak mitral valve gradient  equals 11 mm Hg, mean transmitral valve gradient equals 5  mm Hg, which is probably normal in the setting of a An  annuloplasty ring is seen in the mitral position..  2. Calcified trileaflet aortic valve with normal opening.  Moderate-severe aortic regurgitation.  3. Normal left ventricular internal dimensions and wall  thicknesses.  4. Endocardial visualization enhanced with intravenous  injection of Ultrasonic Enhancing Agent (Definity).  Difficult assesment of EF in the setting of tachycardia.  Overall preserved left ventricular systolic function. Small  territory isolated at the apex that appers aneurysmal.  5. Increased E/e'  is consistent with elevated left  ventricular filling pressure.  6. Right ventricula rmild enlargement with decreased right  ventricular systolic function.  7. Tricuspid valve not well visualized. Moderate-severe  tricuspidregurgitation.  8. Estimated pulmonary artery systolic pressure equals 49  mm Hg, assuming right atrial pressure equals 8 mm Hg,  consistent with mild pulmonary pressures.  *** Compared with echocardiogram of 2021, tachycardia  limiting LV function in the setting of moderate to severe  AR, TR.  ------------------------------------------------------------------------  Confirmed on  2021 - 08:07:27 by Sarah Smith M.D.    < end of copied text >

## 2021-07-27 NOTE — PROGRESS NOTE ADULT - SUBJECTIVE AND OBJECTIVE BOX
PROGRESS NOTE:   Authored by Priscilla Castellanos MD PGY-1  Pager [phone] [facility], [id number] [address]   Please page night float  after 7PM    Patient is a 86y old  Female who presents with a chief complaint of SOB (26 Jul 2021 17:46)      SUBJECTIVE / OVERNIGHT EVENTS:    ADDITIONAL REVIEW OF SYSTEMS:    MEDICATIONS  (STANDING):  allopurinol 200 milliGRAM(s) Oral daily  aMIOdarone    Tablet 200 milliGRAM(s) Oral daily  amitriptyline 25 milliGRAM(s) Oral daily  apixaban 2.5 milliGRAM(s) Oral every 12 hours  buDESOnide    EC Capsule 9 milliGRAM(s) Oral daily  hydrocortisone 1% Ointment 1 Application(s) Topical two times a day  lactated ringers. 1000 milliLiter(s) (100 mL/Hr) IV Continuous <Continuous>  metoprolol tartrate 50 milliGRAM(s) Oral two times a day  oxybutynin 5 milliGRAM(s) Oral daily  pantoprazole    Tablet 40 milliGRAM(s) Oral before breakfast    MEDICATIONS  (PRN):  acetaminophen   Tablet .. 650 milliGRAM(s) Oral every 6 hours PRN Temp greater or equal to 38.5C (101.3F), Mild Pain (1 - 3)  aluminum hydroxide/magnesium hydroxide/simethicone Suspension 30 milliLiter(s) Oral every 4 hours PRN Dyspepsia  diphenhydrAMINE 25 milliGRAM(s) Oral every 6 hours PRN Rash and/or Itching  melatonin 3 milliGRAM(s) Oral at bedtime PRN Insomnia  ondansetron Injectable 4 milliGRAM(s) IV Push every 8 hours PRN Nausea and/or Vomiting      CAPILLARY BLOOD GLUCOSE        I&O's Summary    26 Jul 2021 07:01  -  27 Jul 2021 07:00  --------------------------------------------------------  IN: 240 mL / OUT: 150 mL / NET: 90 mL        PHYSICAL EXAM:  Vital Signs Last 24 Hrs  T(C): 36.4 (27 Jul 2021 04:33), Max: 36.6 (26 Jul 2021 10:50)  T(F): 97.6 (27 Jul 2021 04:33), Max: 97.9 (26 Jul 2021 10:50)  HR: 51 (27 Jul 2021 04:33) (42 - 68)  BP: 146/63 (27 Jul 2021 04:33) (111/55 - 146/63)  BP(mean): --  RR: 18 (27 Jul 2021 04:33) (16 - 24)  SpO2: 93% (27 Jul 2021 04:33) (93% - 100%)    CONSTITUTIONAL: NAD, well-developed  RESPIRATORY: Normal respiratory effort; lungs are clear to auscultation bilaterally  CARDIOVASCULAR: Regular rate and rhythm, normal S1 and S2, no murmur/rub/gallop; No lower extremity edema; Peripheral pulses are 2+ bilaterally  ABDOMEN: Nontender to palpation, normoactive bowel sounds, no rebound/guarding  MUSCULOSKELETAL: no clubbing or cyanosis of digits; no joint swelling or tenderness to palpation  PSYCH: A+O to person, place, and time; affect appropriate    LABS:                        8.3    11.65 )-----------( 304      ( 26 Jul 2021 06:51 )             27.8     07-26    137  |  107  |  34<H>  ----------------------------<  106<H>  4.0   |  18<L>  |  1.23    Ca    9.1      26 Jul 2021 06:51  Phos  3.8     07-26  Mg     2.1     07-26    TPro  6.3  /  Alb  3.4  /  TBili  0.3  /  DBili  x   /  AST  38  /  ALT  111<H>  /  AlkPhos  146<H>  07-26              Culture - Urine (collected 24 Jul 2021 20:56)  Source: Clean Catch Clean Catch (Midstream)  Final Report (26 Jul 2021 21:20):    10,000 - 49,000 CFU/mL Enterococcus faecalis    10,000 - 49,000 CFU/mL Presumptive Candida albicans "Susceptibilities not    performed"  Organism: Enterococcus faecalis (26 Jul 2021 21:20)  Organism: Enterococcus faecalis (26 Jul 2021 21:20)    Culture - Blood (collected 24 Jul 2021 12:24)  Source: .Blood Blood-Venous  Preliminary Report (25 Jul 2021 13:01):    No growth to date.    Culture - Blood (collected 24 Jul 2021 12:24)  Source: .Blood Blood-Peripheral  Preliminary Report (25 Jul 2021 13:01):    No growth to date.        RADIOLOGY & ADDITIONAL TESTS:  Results Reviewed:   Imaging Personally Reviewed:  Electrocardiogram Personally Reviewed:    COORDINATION OF CARE:  Care Discussed with Consultants/Other Providers [Y/N]:  Prior or Outpatient Records Reviewed [Y/N]:   PROGRESS NOTE:   Authored by Priscilla Castellanos MD PGY-1  Pager [phone] [facility], [id number] [address]   Please page night float  after 7PM    Patient is a 86y old  Female who presents with a chief complaint of SOB (26 Jul 2021 17:46)      SUBJECTIVE / OVERNIGHT EVENTS: Sinus bradycardia 40-50s overnight. Held metoprolol and Cardizem overnight. Patient with no complaints overnight.     ADDITIONAL REVIEW OF SYSTEMS: negative    MEDICATIONS  (STANDING):  allopurinol 200 milliGRAM(s) Oral daily  aMIOdarone    Tablet 200 milliGRAM(s) Oral daily  amitriptyline 25 milliGRAM(s) Oral daily  apixaban 2.5 milliGRAM(s) Oral every 12 hours  buDESOnide    EC Capsule 9 milliGRAM(s) Oral daily  hydrocortisone 1% Ointment 1 Application(s) Topical two times a day  lactated ringers. 1000 milliLiter(s) (100 mL/Hr) IV Continuous <Continuous>  metoprolol tartrate 50 milliGRAM(s) Oral two times a day  oxybutynin 5 milliGRAM(s) Oral daily  pantoprazole    Tablet 40 milliGRAM(s) Oral before breakfast    MEDICATIONS  (PRN):  acetaminophen   Tablet .. 650 milliGRAM(s) Oral every 6 hours PRN Temp greater or equal to 38.5C (101.3F), Mild Pain (1 - 3)  aluminum hydroxide/magnesium hydroxide/simethicone Suspension 30 milliLiter(s) Oral every 4 hours PRN Dyspepsia  diphenhydrAMINE 25 milliGRAM(s) Oral every 6 hours PRN Rash and/or Itching  melatonin 3 milliGRAM(s) Oral at bedtime PRN Insomnia  ondansetron Injectable 4 milliGRAM(s) IV Push every 8 hours PRN Nausea and/or Vomiting      CAPILLARY BLOOD GLUCOSE        I&O's Summary    26 Jul 2021 07:01  -  27 Jul 2021 07:00  --------------------------------------------------------  IN: 240 mL / OUT: 150 mL / NET: 90 mL        PHYSICAL EXAM:  Vital Signs Last 24 Hrs  T(C): 36.4 (27 Jul 2021 04:33), Max: 36.6 (26 Jul 2021 10:50)  T(F): 97.6 (27 Jul 2021 04:33), Max: 97.9 (26 Jul 2021 10:50)  HR: 51 (27 Jul 2021 04:33) (42 - 68)  BP: 146/63 (27 Jul 2021 04:33) (111/55 - 146/63)  BP(mean): --  RR: 18 (27 Jul 2021 04:33) (16 - 24)  SpO2: 93% (27 Jul 2021 04:33) (93% - 100%)    CONSTITUTIONAL: NAD, well-developed  RESPIRATORY: Normal respiratory effort; lungs are clear to auscultation bilaterally  CARDIOVASCULAR: Regular rate and rhythm, normal S1 and S2, no murmur/rub/gallop; No lower extremity edema; Peripheral pulses are 2+ bilaterally  ABDOMEN: Nontender to palpation, normoactive bowel sounds, no rebound/guarding  MUSCULOSKELETAL: no clubbing or cyanosis of digits; no joint swelling or tenderness to palpation  PSYCH: A+O to person, place, and time; affect appropriate    LABS:                        8.3    11.65 )-----------( 304      ( 26 Jul 2021 06:51 )             27.8     07-26    137  |  107  |  34<H>  ----------------------------<  106<H>  4.0   |  18<L>  |  1.23    Ca    9.1      26 Jul 2021 06:51  Phos  3.8     07-26  Mg     2.1     07-26    TPro  6.3  /  Alb  3.4  /  TBili  0.3  /  DBili  x   /  AST  38  /  ALT  111<H>  /  AlkPhos  146<H>  07-26              Culture - Urine (collected 24 Jul 2021 20:56)  Source: Clean Catch Clean Catch (Midstream)  Final Report (26 Jul 2021 21:20):    10,000 - 49,000 CFU/mL Enterococcus faecalis    10,000 - 49,000 CFU/mL Presumptive Candida albicans "Susceptibilities not    performed"  Organism: Enterococcus faecalis (26 Jul 2021 21:20)  Organism: Enterococcus faecalis (26 Jul 2021 21:20)    Culture - Blood (collected 24 Jul 2021 12:24)  Source: .Blood Blood-Venous  Preliminary Report (25 Jul 2021 13:01):    No growth to date.    Culture - Blood (collected 24 Jul 2021 12:24)  Source: .Blood Blood-Peripheral  Preliminary Report (25 Jul 2021 13:01):    No growth to date.        RADIOLOGY & ADDITIONAL TESTS:  Results Reviewed:   Imaging Personally Reviewed:  Electrocardiogram Personally Reviewed:    COORDINATION OF CARE:  Care Discussed with Consultants/Other Providers [Y/N]: infectious disease   Prior or Outpatient Records Reviewed [Y/N]:

## 2021-07-28 NOTE — PROGRESS NOTE ADULT - ASSESSMENT
86 year old female with a history of MVR s/p repair, persistent Afib on Eliquis, HTN, HLD, CHF, Gout, admitted with perihepatic fluid collection, transaminitis, HFpEF exacerbation, and rapid atrial fibrillation s/p DCCV 7/15 with recurrence of Afib on 7/20 now s/p DCCV 7/26 remains in SB over night s/p Amiodarone load     1. recurrent persistent Afib  2. HFpEF     - monitor on telemetry remains in SR s/p successful DCCV 7/26/21  - monitor electrolytes and replete K+>4,MG++>2  - Continue AC with Eliquis  - continue Amiodarone 200 mg oral daily, monitor LFTs, TFTs and will need out patient opthalmology and PFTs follow up ( hold for HR<55 bpm )  - continue Metoprolol 50 mg oral BID with hold parameters for HR < 70 bpm  - discussed with Team 7 resident @ [phone] beeper   - discontinued metoprolol, bradycardia slightly improved  - continue Amiodarone as ordered   EP will Sign call with questions    [phone]

## 2021-07-28 NOTE — PROGRESS NOTE ADULT - ASSESSMENT
85 yo female w/pmh HFpEF, atrial fibrillation, fatty liver, microscopic colitis, gout, severe MR s/p MVR 1997, CKD 3, admitted to CCU for a-fib with RVR, HR 160s. Restarted home meds and HR better controlled. S/p DCCV, was in NSR, now in Afib again. Now in NSR after repeat cardioversion.

## 2021-07-28 NOTE — PROGRESS NOTE ADULT - PROBLEM SELECTOR PLAN 6
-EF 55-60%, MVR with annuplasty ring and moderate-severe AR and TR, mild pHTN  -Likely 2/2 A fib w/ RVR   -Cardiology (patient sees Dr. Wynne as an outpatient) and Dr. Valdovinos  -TTE from 6/11 show severe pulmonary pressures  -TTE from 7/13 showed mild pulmonary pressures  -Hold home Torsemide 40mg qd given diarrhea, WEN   -BNP [id number]

## 2021-07-28 NOTE — PROGRESS NOTE ADULT - PROBLEM SELECTOR PLAN 1
-Patient found to be in A Fib w/ RVR to HR 160s on admission, Likely caused SOB  -continue with amiodarone  - repeat cardioversion successful   - hold metoprolol for HR <70 -Patient found to be in A Fib w/ RVR to HR 160s on admission, Likely caused SOB  -continue with amiodarone  - repeat cardioversion successful   -now in NSR 60s-70s

## 2021-07-28 NOTE — PROGRESS NOTE ADULT - SUBJECTIVE AND OBJECTIVE BOX
HPI:  Patient seen and examined at bedside on 3 DSU.  Maintaining sinus rhythm.    Review Of Systems:           Respiratory: No shortness of breath, cough, or wheezing  Cardiovascular: No chest pain or palpitations  10 point review of systems is otherwise negative except as mentioned above        Medications:  acetaminophen   Tablet .. 650 milliGRAM(s) Oral every 6 hours PRN  allopurinol 200 milliGRAM(s) Oral daily  aluminum hydroxide/magnesium hydroxide/simethicone Suspension 30 milliLiter(s) Oral every 4 hours PRN  aMIOdarone    Tablet 200 milliGRAM(s) Oral daily  amitriptyline 25 milliGRAM(s) Oral at bedtime  apixaban 2.5 milliGRAM(s) Oral every 12 hours  buDESOnide    EC Capsule 9 milliGRAM(s) Oral daily  diphenhydrAMINE 25 milliGRAM(s) Oral every 6 hours PRN  hydrocortisone 1% Ointment 1 Application(s) Topical two times a day  lactated ringers. 1000 milliLiter(s) IV Continuous <Continuous>  melatonin 3 milliGRAM(s) Oral at bedtime PRN  nystatin Powder 1 Application(s) Topical two times a day  ondansetron Injectable 4 milliGRAM(s) IV Push every 8 hours PRN  oxybutynin 5 milliGRAM(s) Oral daily  pantoprazole    Tablet 40 milliGRAM(s) Oral before breakfast    PAST MEDICAL & SURGICAL HISTORY:  Insomnia    HTN (hypertension)    Hypercholesteremia    GERD (gastroesophageal reflux disease)    Carotid artery occlusion  (R)    Vitamin B 12 deficiency    Osteoporosis    Mitral valve disease    Gallstones    Choledocholithiasis    CHF (congestive heart failure)    S/P MVR (mitral valve repair)   at [facility]    Status post DACIA-BSO  1975    Hx of tonsillectomy    Abdominal hernia  repair     History of lumbar laminectomy for spinal cord decompression      Papilloma of breast  s/p excision from right breast &gt;20 years ago    Bilateral cataracts  extraction w/ IOL    Varicose veins  s/p sclerotherapy bilaterally    H/O carotid endarterectomy    S/P laparoscopic cholecystectomy      Vitals:  T(C): 36.4 (21 @ 21:01), Max: 36.6 (21 @ 04:00)  HR: 70 (21 @ 21:01) (63 - 70)  BP: 154/75 (21 @ 21:01) (147/72 - 167/80)  BP(mean): --  RR: 18 (21 @ 21:01) (18 - 18)  SpO2: 99% (21 @ 21:01) (96% - 99%)  Wt(kg): --  Daily     Daily Weight in k.9 (2021 04:00)  I&O's Summary    2021 07:  -  2021 07:00  --------------------------------------------------------  IN: 880 mL / OUT: 775 mL / NET: 105 mL    2021 07:01  -  2021 23:10  --------------------------------------------------------  IN: 500 mL / OUT: 650 mL / NET: -150 mL        Physical Exam:  Appearance: Normal, well groomed, NAD  Eyes: PERRLA, EOMI, pink conjunctiva, no scleral icterus   HENT: Normal oral mucosa  Cardiovascular: frida, S1, S2, no murmur, rub, or gallop; no edema; no JVD  Respiratory: Clear to auscultation bilaterally  Gastrointestinal: Soft, non-tender, non-distended, BS+  Musculoskeletal: No clubbing or joint deformity   Neurologic: No focal weakness  Lymphatic: No lymphadenopathy  Psychiatry: AAOx3 with appropriate mood and affect  Skin: No rashes, ecchymoses, or cyanosis                        8.8    10.36 )-----------( 293      ( 2021 06:43 )             29.5         135  |  103  |  48<H>  ----------------------------<  101<H>  4.2   |  19<L>  |  1.97<H>    Ca    9.2      2021 06:42  Phos  3.8       Mg     2.2         TPro  6.6  /  Alb  3.5  /  TBili  0.3  /  DBili  x   /  AST  63<H>  /  ALT  139<H>  /  AlkPhos  154<H>            Tele: NSR

## 2021-07-28 NOTE — PROGRESS NOTE ADULT - PROBLEM SELECTOR PLAN 2
- today creatinine increased from 1.23 to 2.04, increase could be secondary to procedure   - will check creatinine again at 2pm   - Avoid nephrotoxic medications - creatinine trending down, will continue to monitor and not give fluids at this time   - Avoid nephrotoxic medications

## 2021-07-28 NOTE — PROGRESS NOTE ADULT - SUBJECTIVE AND OBJECTIVE BOX
24H hour events: no further SOB     MEDICATIONS:  aMIOdarone    Tablet 200 milliGRAM(s) Oral daily  apixaban 2.5 milliGRAM(s) Oral every 12 hours  acetaminophen   Tablet .. 650 milliGRAM(s) Oral every 6 hours PRN  amitriptyline 25 milliGRAM(s) Oral at bedtime  diphenhydrAMINE 25 milliGRAM(s) Oral every 6 hours PRN  melatonin 3 milliGRAM(s) Oral at bedtime PRN  ondansetron Injectable 4 milliGRAM(s) IV Push every 8 hours PRN  aluminum hydroxide/magnesium hydroxide/simethicone Suspension 30 milliLiter(s) Oral every 4 hours PRN  pantoprazole    Tablet 40 milliGRAM(s) Oral before breakfast  allopurinol 200 milliGRAM(s) Oral daily  buDESOnide    EC Capsule 9 milliGRAM(s) Oral daily    hydrocortisone 1% Ointment 1 Application(s) Topical two times a day  lactated ringers. 1000 milliLiter(s) IV Continuous <Continuous>  nystatin Powder 1 Application(s) Topical two times a day  oxybutynin 5 milliGRAM(s) Oral daily    REVIEW OF SYSTEMS:  See HPI, otherwise ROS negative.    PHYSICAL EXAM:  T(C): 36.3 (07-28-21 @ 12:07), Max: 36.7 (07-27-21 @ 12:47)  HR: 68 (07-28-21 @ 12:07) (55 - 68)  BP: 149/69 (07-28-21 @ 12:07) (131/57 - 167/80)  RR: 18 (07-28-21 @ 12:07) (18 - 18)  SpO2: 99% (07-28-21 @ 12:07) (95% - 99%)    I&O's Summary    27 Jul 2021 07:01  -  28 Jul 2021 07:00  --------------------------------------------------------  IN: 880 mL / OUT: 775 mL / NET: 105 mL    Appearance: Alert. NAD	  Cardiovascular: +S1S2 RRR no m/g/r  Respiratory: CTA B/L	  Psychiatry: A & O x 3, Mood & affect appropriate  Gastrointestinal:  Soft, NT. ND. +BS	  Skin: No rashes	  Neurologic: Non-focal  Extremities: No edema BLE  Vascular: Peripheral pulses palpable 2+ bilaterally      LABS:	 	    CBC Full  -  ( 28 Jul 2021 06:43 )  WBC Count : 10.36 K/uL  Hemoglobin : 8.8 g/dL  Hematocrit : 29.5 %  Platelet Count - Automated : 293 K/uL  Mean Cell Volume : 80.8 fl  Mean Cell Hemoglobin : 24.1 pg  Mean Cell Hemoglobin Concentration : 29.8 gm/dL  Auto Neutrophil # : x  Auto Lymphocyte # : x  Auto Monocyte # : x  Auto Eosinophil # : x  Auto Basophil # : x  Auto Neutrophil % : x  Auto Lymphocyte % : x  Auto Monocyte % : x  Auto Eosinophil % : x  Auto Basophil % : x    07-28    135  |  103  |  48<H>  ----------------------------<  101<H>  4.2   |  19<L>  |  1.97<H>  07-27    136  |  104  |  49<H>  ----------------------------<  120<H>  4.5   |  19<L>  |  2.00<H>    Ca    9.2      28 Jul 2021 06:42  Ca    9.2      27 Jul 2021 14:44  Phos  3.8     07-28  Phos  5.2     07-27  Mg     2.2     07-28  Mg     2.3     07-27    TPro  6.6  /  Alb  3.5  /  TBili  0.3  /  DBili  x   /  AST  63<H>  /  ALT  139<H>  /  AlkPhos  154<H>  07-28  TPro  6.3  /  Alb  3.4  /  TBili  0.4  /  DBili  x   /  AST  76<H>  /  ALT  144<H>  /  AlkPhos  150<H>  07-27      RADIOLOGY & ADDITIONAL TESTS:  < from: CT Abdomen and Pelvis w/ IV Cont (07.20.21 @ 15:20) >  IMPRESSION:  Pericapsular rim-enhancing fluid collection along the posterior hepatic dome, with differential including abscess or an organized hematoma. Fluid sampling may be helpful, though its posterior subdiaphragmatic location may make this difficult. Consider further characterization with contrast enhanced abdominal MRI as clinically indicated.    Moderate right and small left pleural effusions are unchanged.    < from: Transesophageal Echocardiogram (06.11.21 @ 08:46) >  ------------------------------------------------------------------------  Dimensions:    Normal Values:  LA:     4.1    2.0 - 4.0 cm  Ao:     2.8    2.0 - 3.8 cm  SEPTUM: 1.1    0.6 - 1.2 cm  PWT:    1.0    0.6 - 1.1 cm  LVIDd:  4.7    3.0 - 5.6 cm  LVIDs:  3.9    1.8 - 4.0 cm  Derived variables:  LVMI: 102 g/m2  RWT: 0.42  Fractional short: 17 %  EF (Visual Estimate): 55-60 %  Doppler Peak Velocity (m/sec): AoV=1.5  ------------------------------------------------------------------------  Observations:  Mitral Valve: An annuloplasty ring is seen in the mitral  position.. Mild-moderate mitral regurgitation. Peak mitral  valve gradient equals 22 mm Hg, mean transmitral valve  gradient equals 8 mm Hg, which is elevated even in the  setting of a An annuloplasty ring is seen in the mitral  position..  Durng YULIA PG 13 mm hg, MG 4 mm hg.  Aortic Valve/Aorta: Calcified trileaflet aortic valve with  normal opening. Peak transaortic valve gradient equals 9 mm  Hg, mean transaortic valve gradient equals 5 mm Hg, aortic  valve velocity time integral equals 24 cm, estimated aortic  valve area equals 2.9 sqcm. Moderate aortic regurgitation.  Vena contracta 0.45 cm. Peak left ventricular outflow tract  gradient equals 4 mm Hg, mean gradient is equal to 2 mm Hg,  LVOT velocity time integral equals 22 cm.  Aortic Root: 2.8 cm.  LVOT diameter: 2 cm.  Complex atheroma noted in aortic arch/descending aorta.  Left Atrium: Mildly dilated left atrium.  LA volume index =  36 cc/m2.   Spontaneous echo contrast seen.   No left  atrial or left atrial appendage thrombus.   Decreased left  atrial appendage velocities noted.  Left Ventricle: Normal left ventricular systolic function.  No segmental wall motion abnormalities. Normal left  ventricular internal dimensions and wall thicknesses.  Right Heart: Moderate right atrial enlargement. Right  ventricular enlargement with normal right ventricular  systolic function (TAPSE 1.9 cm). Normal tricuspid valve.  Moderate tricuspid regurgitation. Normal pulmonic valve.  Mild pulmonic regurgitation.  Pericardium/Pleura: Normal pericardium with no pericardial  effusion.  Hemodynamic: Estimated right ventricular systolic pressure  equals 66 mm Hg, assuming right atrial pressure equals 8 mm  Hg, consistent with severe pulmonary hypertension. Agitated  saline injection and color flow Doppler demonstrates no  evidence of a patent foramen ovale.  ------------------------------------------------------------------------  Conclusions:  1. Peak mitral valve gradient equals 22 mm Hg, mean  transmitral valve gradient equals 8 mm Hg, which is  elevated even in the setting of a An annuloplasty ring is  seen in the mitral position..  Durng YULIA PG 13 mm hg, MG 4 mm hg.  2. Moderate aortic regurgitation.  Vena contracta 0.45 cm.  3. Aortic Root: 2.8 cm.  LVOT diameter: 2 cm.  Complex atheroma noted in aortic arch/descending aorta.  4. Normal left ventricular internal dimensions and wall  thicknesses.  5. Normal left ventricular systolic function. No segmental  wall motion abnormalities.  6. Moderate right atrial enlargement.  7. Right ventricular enlargement with normal right  ventricular systolic function (TAPSE 1.9 cm).  8. Estimated pulmonary artery systolic pressure equals 66  mm Hg, assuming right atrial pressure equals 8 mm Hg,  consistent with severe pulmonary pressures. MeanPASP 34 mm  hg. PADP 13 mm hg.  During YULIA PASP approx 50 mm hg.  ------------------------------------------------------------------------  Confirmed on  6/11/2021 - 13:05:26 by TONY Covington  ------------------------------------------------------------------------    < end of copied text >    < from: TTE with Doppler (w/Cont) (07.13.21 @ 17:02) >  ------------------------------------------------------------------------  Dimensions:    Normal Values:  LA:     4.6    2.0 - 4.0 cm  Ao:     3.0    2.0 - 3.8 cm  SEPTUM: 1.0    0.6 - 1.2 cm  PWT:    1.0 0.6 - 1.1 cm  LVIDd:  4.1    3.0 - 5.6 cm  LVIDs:  3.6    1.8 - 4.0 cm  Derived variables:  LVMI: 80 g/m2  RWT: 0.48  Fractional short: 12 %  EF (Visual Estimate): 55-60 %  EF (Hernandez Rule): 56 %Doppler Peak Velocity (m/sec):  MV=1.6 AoV=1.1  ------------------------------------------------------------------------  Observations:  Mitral Valve: An annuloplasty ring is seen in the mitral  position.. Mild mitral regurgitation.  Peak mitral valve  gradient equals 11 mm Hg, mean transmitral valve gradient  equals 5 mm Hg, which is probably normal in the setting of  a An annuloplasty ring is seen in the mitral position..  Aortic Valve/Aorta: Calcified trileaflet aortic valve with  normal opening. Peak transaortic valve gradient equals 5 mm  Hg. Moderate-severe aortic regurgitation. Peak left  ventricular outflow tract gradient equals 3 mm Hg.  Aortic Root: 3 cm.  LVOT diameter: 1.9 cm.  Left Atrium: Mildly dilated left atrium.  LA volume index =  38 cc/m2.  Left Ventricle: Endocardial visualization enhanced with  intravenous injection of Ultrasonic Enhancing Agent  (Definity). Difficult assesment of EF in the setting of  tachycardia.  Overall preserved left ventricular systolic  function. Small territory isolated at the apex that appers  aneurysmal.  Normal left ventricular internal dimensions  and wall thicknesses. Increased E/e'  is consistent with  elevated left ventricular filling pressure.  Right Heart: Right atrial enlargement. Right ventricula  rmild enlargement with decreased right ventricular systolic  function. Tricuspid valve not well visualized.  Moderate-severe tricuspid regurgitation. Pulmonic valve not  well visualized.  Pericardium/Pleura: Normal pericardium with no pericardial  effusion.  Hemodynamic: Estimated right atrial pressure is 8 mm Hg.  Estimated right ventricular systolic pressure equals 49 mm  Hg, assuming right atrial pressure equals 8 mm Hg,  consistent with mild pulmonary hypertension.  ------------------------------------------------------------------------  Conclusions:  1. An annuloplasty ring is seen in the mitral position..  Mild mitral regurgitation.  Peak mitral valve gradient  equals 11 mm Hg, mean transmitral valve gradient equals 5  mm Hg, which is probably normal in the setting of a An  annuloplasty ring is seen in the mitral position..  2. Calcified trileaflet aortic valve with normal opening.  Moderate-severe aortic regurgitation.  3. Normal left ventricular internal dimensions and wall  thicknesses.  4. Endocardial visualization enhanced with intravenous  injection of Ultrasonic Enhancing Agent (Definity).  Difficult assesment of EF in the setting of tachycardia.  Overall preserved left ventricular systolic function. Small  territory isolated at the apex that appers aneurysmal.  5. Increased E/e'  is consistent with elevated left  ventricular filling pressure.  6. Right ventricula rmild enlargement with decreased right  ventricular systolic function.  7. Tricuspid valve not well visualized. Moderate-severe  tricuspidregurgitation.  8. Estimated pulmonary artery systolic pressure equals 49  mm Hg, assuming right atrial pressure equals 8 mm Hg,  consistent with mild pulmonary pressures.  *** Compared with echocardiogram of 6/11/2021, tachycardia  limiting LV function in the setting of moderate to severe  AR, TR.  ------------------------------------------------------------------------  Confirmed on  7/14/2021 - 08:07:27 by Sarah Smith M.D.

## 2021-07-28 NOTE — PROGRESS NOTE ADULT - SUBJECTIVE AND OBJECTIVE BOX
PROGRESS NOTE:   Authored by Priscilla Castellanos MD PGY-1  Pager [phone] [facility], [address] [place]   Please page night float  after 7PM    Patient is a 86y old  Female who presents with a chief complaint of SOB (2021 10:05)      SUBJECTIVE / OVERNIGHT EVENTS:    ADDITIONAL REVIEW OF SYSTEMS:    MEDICATIONS  (STANDING):  allopurinol 200 milliGRAM(s) Oral daily  aMIOdarone    Tablet 200 milliGRAM(s) Oral daily  amitriptyline 25 milliGRAM(s) Oral daily  apixaban 2.5 milliGRAM(s) Oral every 12 hours  buDESOnide    EC Capsule 9 milliGRAM(s) Oral daily  hydrocortisone 1% Ointment 1 Application(s) Topical two times a day  lactated ringers. 1000 milliLiter(s) (100 mL/Hr) IV Continuous <Continuous>  nystatin Powder 1 Application(s) Topical two times a day  oxybutynin 5 milliGRAM(s) Oral daily  pantoprazole    Tablet 40 milliGRAM(s) Oral before breakfast    MEDICATIONS  (PRN):  acetaminophen   Tablet .. 650 milliGRAM(s) Oral every 6 hours PRN Temp greater or equal to 38.5C (101.3F), Mild Pain (1 - 3)  aluminum hydroxide/magnesium hydroxide/simethicone Suspension 30 milliLiter(s) Oral every 4 hours PRN Dyspepsia  diphenhydrAMINE 25 milliGRAM(s) Oral every 6 hours PRN Rash and/or Itching  melatonin 3 milliGRAM(s) Oral at bedtime PRN Insomnia  ondansetron Injectable 4 milliGRAM(s) IV Push every 8 hours PRN Nausea and/or Vomiting      CAPILLARY BLOOD GLUCOSE        I&O's Summary    2021 07:01  -  2021 07:00  --------------------------------------------------------  IN: 880 mL / OUT: 775 mL / NET: 105 mL        PHYSICAL EXAM:  Vital Signs Last 24 Hrs  T(C): 36.6 (2021 04:00), Max: 36.7 (2021 12:47)  T(F): 97.8 (2021 04:00), Max: 98 (2021 12:47)  HR: 63 (2021 04:00) (55 - 63)  BP: 147/72 (2021 05:00) (131/57 - 167/80)  BP(mean): --  RR: 18 (2021 05:00) (18 - 18)  SpO2: 96% (2021 05:00) (95% - 97%)    CONSTITUTIONAL: NAD, well-developed  RESPIRATORY: Normal respiratory effort; lungs are clear to auscultation bilaterally  CARDIOVASCULAR: Regular rate and rhythm, normal S1 and S2, no murmur/rub/gallop; No lower extremity edema; Peripheral pulses are 2+ bilaterally  ABDOMEN: Nontender to palpation, normoactive bowel sounds, no rebound/guarding  MUSCULOSKELETAL: no clubbing or cyanosis of digits; no joint swelling or tenderness to palpation  PSYCH: A+O to person, place, and time; affect appropriate    LABS:                        8.8    10.36 )-----------( 293      ( 2021 06:43 )             29.5         135  |  103  |  48<H>  ----------------------------<  101<H>  4.2   |  19<L>  |  1.97<H>    Ca    9.2      2021 06:42  Phos  3.8       Mg     2.2         TPro  6.6  /  Alb  3.5  /  TBili  0.3  /  DBili  x   /  AST  63<H>  /  ALT  139<H>  /  AlkPhos  154<H>            Urinalysis Basic - ( 2021 10:01 )    Color: Yellow / Appearance: Slightly Turbid / S.024 / pH: x  Gluc: x / Ketone: Negative  / Bili: Negative / Urobili: Negative   Blood: x / Protein: 100 / Nitrite: Negative   Leuk Esterase: Moderate / RBC: 2 /hpf / WBC 13 /HPF   Sq Epi: x / Non Sq Epi: 21 /hpf / Bacteria: Negative          RADIOLOGY & ADDITIONAL TESTS:  Results Reviewed:   Imaging Personally Reviewed:  Electrocardiogram Personally Reviewed:    COORDINATION OF CARE:  Care Discussed with Consultants/Other Providers [Y/N]:  Prior or Outpatient Records Reviewed [Y/N]:   PROGRESS NOTE:   Authored by Priscilla Castellanos MD PGY-1  Pager [phone] [facility], [id number] [facility]   Please page night float  after 7PM    Patient is a 86y old  Female who presents with a chief complaint of SOB (2021 10:05)      SUBJECTIVE / OVERNIGHT EVENTS: No acute events overnight. Patient sinus 60s-70s. Patient complaining of some left heel pain today. Heel appears normal on exam with bilateral pitting edema.     Repeat creatinine 1.96, BUN/creatinine 24.5, FeNa 0.2% (pre-renal), hydration? HFpeEF EF 55-60%, decreased RV systolic dysfunction     ADDITIONAL REVIEW OF SYSTEMS:    MEDICATIONS  (STANDING):  allopurinol 200 milliGRAM(s) Oral daily  aMIOdarone    Tablet 200 milliGRAM(s) Oral daily  amitriptyline 25 milliGRAM(s) Oral daily  apixaban 2.5 milliGRAM(s) Oral every 12 hours  buDESOnide    EC Capsule 9 milliGRAM(s) Oral daily  hydrocortisone 1% Ointment 1 Application(s) Topical two times a day  lactated ringers. 1000 milliLiter(s) (100 mL/Hr) IV Continuous <Continuous>  nystatin Powder 1 Application(s) Topical two times a day  oxybutynin 5 milliGRAM(s) Oral daily  pantoprazole    Tablet 40 milliGRAM(s) Oral before breakfast    MEDICATIONS  (PRN):  acetaminophen   Tablet .. 650 milliGRAM(s) Oral every 6 hours PRN Temp greater or equal to 38.5C (101.3F), Mild Pain (1 - 3)  aluminum hydroxide/magnesium hydroxide/simethicone Suspension 30 milliLiter(s) Oral every 4 hours PRN Dyspepsia  diphenhydrAMINE 25 milliGRAM(s) Oral every 6 hours PRN Rash and/or Itching  melatonin 3 milliGRAM(s) Oral at bedtime PRN Insomnia  ondansetron Injectable 4 milliGRAM(s) IV Push every 8 hours PRN Nausea and/or Vomiting      CAPILLARY BLOOD GLUCOSE        I&O's Summary    2021 07:01  -  2021 07:00  --------------------------------------------------------  IN: 880 mL / OUT: 775 mL / NET: 105 mL        PHYSICAL EXAM:  Vital Signs Last 24 Hrs  T(C): 36.6 (2021 04:00), Max: 36.7 (2021 12:47)  T(F): 97.8 (2021 04:00), Max: 98 (2021 12:47)  HR: 63 (2021 04:00) (55 - 63)  BP: 147/72 (2021 05:00) (131/57 - 167/80)  BP(mean): --  RR: 18 (2021 05:00) (18 - 18)  SpO2: 96% (2021 05:00) (95% - 97%)    CONSTITUTIONAL: NAD, well-developed  RESPIRATORY: Normal respiratory effort; lungs are clear to auscultation bilaterally  CARDIOVASCULAR: Regular rate and rhythm, normal S1 and S2, no murmur/rub/gallop; No lower extremity edema; Peripheral pulses are 2+ bilaterally  ABDOMEN: Nontender to palpation, normoactive bowel sounds, no rebound/guarding  MUSCULOSKELETAL: no clubbing or cyanosis of digits; no joint swelling or tenderness to palpation  PSYCH: A+O to person, place, and time; affect appropriate    LABS:                        8.8    10.36 )-----------( 293      ( 2021 06:43 )             29.5         135  |  103  |  48<H>  ----------------------------<  101<H>  4.2   |  19<L>  |  1.97<H>    Ca    9.2      2021 06:42  Phos  3.8       Mg     2.2         TPro  6.6  /  Alb  3.5  /  TBili  0.3  /  DBili  x   /  AST  63<H>  /  ALT  139<H>  /  AlkPhos  154<H>            Urinalysis Basic - ( 2021 10:01 )    Color: Yellow / Appearance: Slightly Turbid / S.024 / pH: x  Gluc: x / Ketone: Negative  / Bili: Negative / Urobili: Negative   Blood: x / Protein: 100 / Nitrite: Negative   Leuk Esterase: Moderate / RBC: 2 /hpf / WBC 13 /HPF   Sq Epi: x / Non Sq Epi: 21 /hpf / Bacteria: Negative          RADIOLOGY & ADDITIONAL TESTS:  Results Reviewed:   Imaging Personally Reviewed:  Electrocardiogram Personally Reviewed:    COORDINATION OF CARE:  Care Discussed with Consultants/Other Providers [Y/N]:  Prior or Outpatient Records Reviewed [Y/N]:   PROGRESS NOTE:   Authored by Priscilla Castellanos MD PGY-1  Pager [phone] [facility], [id number] [place]   Please page night float  after 7PM    Patient is a 86y old  Female who presents with a chief complaint of SOB (2021 10:05)      SUBJECTIVE / OVERNIGHT EVENTS: No acute events overnight. Patient sinus 60s-70s. Patient complaining of some left heel pain today. Heel appears normal on exam with bilateral pitting edema.     ADDITIONAL REVIEW OF SYSTEMS:    MEDICATIONS  (STANDING):  allopurinol 200 milliGRAM(s) Oral daily  aMIOdarone    Tablet 200 milliGRAM(s) Oral daily  amitriptyline 25 milliGRAM(s) Oral daily  apixaban 2.5 milliGRAM(s) Oral every 12 hours  buDESOnide    EC Capsule 9 milliGRAM(s) Oral daily  hydrocortisone 1% Ointment 1 Application(s) Topical two times a day  lactated ringers. 1000 milliLiter(s) (100 mL/Hr) IV Continuous <Continuous>  nystatin Powder 1 Application(s) Topical two times a day  oxybutynin 5 milliGRAM(s) Oral daily  pantoprazole    Tablet 40 milliGRAM(s) Oral before breakfast    MEDICATIONS  (PRN):  acetaminophen   Tablet .. 650 milliGRAM(s) Oral every 6 hours PRN Temp greater or equal to 38.5C (101.3F), Mild Pain (1 - 3)  aluminum hydroxide/magnesium hydroxide/simethicone Suspension 30 milliLiter(s) Oral every 4 hours PRN Dyspepsia  diphenhydrAMINE 25 milliGRAM(s) Oral every 6 hours PRN Rash and/or Itching  melatonin 3 milliGRAM(s) Oral at bedtime PRN Insomnia  ondansetron Injectable 4 milliGRAM(s) IV Push every 8 hours PRN Nausea and/or Vomiting      CAPILLARY BLOOD GLUCOSE        I&O's Summary    2021 07:01  -  2021 07:00  --------------------------------------------------------  IN: 880 mL / OUT: 775 mL / NET: 105 mL        PHYSICAL EXAM:  Vital Signs Last 24 Hrs  T(C): 36.6 (2021 04:00), Max: 36.7 (2021 12:47)  T(F): 97.8 (2021 04:00), Max: 98 (2021 12:47)  HR: 63 (2021 04:00) (55 - 63)  BP: 147/72 (2021 05:00) (131/57 - 167/80)  BP(mean): --  RR: 18 (2021 05:00) (18 - 18)  SpO2: 96% (2021 05:00) (95% - 97%)    CONSTITUTIONAL: NAD, well-developed  RESPIRATORY: Normal respiratory effort; lungs are clear to auscultation bilaterally  CARDIOVASCULAR: Regular rate and rhythm, normal S1 and S2, no murmur/rub/gallop; bilateral pitting edema; Peripheral pulses are 2+ bilaterally  ABDOMEN: Nontender to palpation, normoactive bowel sounds, no rebound/guarding  MUSCULOSKELETAL: no clubbing or cyanosis of digits; no joint swelling or tenderness to palpation  PSYCH: A+O to person, place, and time; affect appropriate    LABS:                        8.8    10.36 )-----------( 293      ( 2021 06:43 )             29.5         135  |  103  |  48<H>  ----------------------------<  101<H>  4.2   |  19<L>  |  1.97<H>    Ca    9.2      2021 06:42  Phos  3.8       Mg     2.2         TPro  6.6  /  Alb  3.5  /  TBili  0.3  /  DBili  x   /  AST  63<H>  /  ALT  139<H>  /  AlkPhos  154<H>            Urinalysis Basic - ( 2021 10:01 )    Color: Yellow / Appearance: Slightly Turbid / S.024 / pH: x  Gluc: x / Ketone: Negative  / Bili: Negative / Urobili: Negative   Blood: x / Protein: 100 / Nitrite: Negative   Leuk Esterase: Moderate / RBC: 2 /hpf / WBC 13 /HPF   Sq Epi: x / Non Sq Epi: 21 /hpf / Bacteria: Negative          RADIOLOGY & ADDITIONAL TESTS:  Results Reviewed:   Imaging Personally Reviewed:  Electrocardiogram Personally Reviewed:    COORDINATION OF CARE:  Care Discussed with Consultants/Other Providers [Y/N]: electrophysiology   Prior or Outpatient Records Reviewed [Y/N]:

## 2021-07-28 NOTE — PROGRESS NOTE ADULT - PROBLEM SELECTOR PLAN 5
-Likely 2/2 CHF  -Treat as above   - monitor AST/ALT  -Hepatitis serologies negative  -Transaminitis - due to possible liver abscess vs hepatic congestion from heart failure

## 2021-07-29 NOTE — PROGRESS NOTE ADULT - NSICDXPILOT_GEN_ALL_CORE
[place]

## 2021-07-29 NOTE — PROGRESS NOTE ADULT - PROBLEM SELECTOR PROBLEM 7
WEN (acute kidney injury)
Shortness of breath
WEN (acute kidney injury)
Shortness of breath
WEN (acute kidney injury)

## 2021-07-29 NOTE — PROGRESS NOTE ADULT - PROBLEM SELECTOR PLAN 2
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 Wynne as an outpatient) and Dr. Valdovinos  -TTE from 6/11 show severe pulmonary pressures  -TTE from 7/13 showed mild pulmonary pressures  - restart torsemide 20mg daily in the setting of bilateral pitting edema

## 2021-07-29 NOTE — PROGRESS NOTE ADULT - REASON FOR ADMISSION
SOB

## 2021-07-29 NOTE — CHART NOTE - NSCHARTNOTESELECT_GEN_ALL_CORE
Event Note
Event Note
MAR CCU Downgrade
Nutrition Services
Transfer Note
Transfer Note
VTE risk assessment/Event Note

## 2021-07-29 NOTE — CHART NOTE - NSCHARTNOTEFT_GEN_A_CORE
Nutrition Follow Up Note  Patient seen for: follow up on 3 DSU    Chart reviewed, events noted.  T7 afib w/ rvr; dccv 7/15, ellen,  repeat dccv ;  SHAD (pending auth)    Source: [x] Patient       [x] EMR        [] RN        [] Family at bedside       [] Other:    -If unable to interview patient: [] Trach/Vent/BiPAP  [] Disoriented/confused/inappropriate to interview    Diet Order:   Diet, Consistent Carbohydrate/No Snacks:   Supplement Feeding Modality:  Oral  Su[place]ena Cans or Servings Per Day:  1       Frequency:  Daily (21)    - Is current order appropriate/adequate? [x] Yes  []  No:     - PO intake meals :   [x] >75%  Adequate    [] 50-75%  Fair       [] <50%  Poor  - PO intake of supplements if pt receiving: []>75% []50% []25%   as per   []flow sheet  [x]patient  []family/aide  []PCA  []Nurse  []RD observation    - Nutrition-related concerns: pt new to pre-diabetes    pt seen by SLP []Yes [x]No    GI:  Last BM __ as per flow sheet, 3 days ago as per pt_.   Bowel Regimen? [] Yes   [x] No  RD added prunes at lunch      Weights:   Daily Weight in k.9 (), Weight in k.9 (), Weight in k.1 (), Weight in k.9 ()    Nutritionally Pertinent MEDICATIONS  (STANDING):  allopurinol  aMIOdarone    Tablet  buDESOnide    EC Capsule  lactated ringers.  pantoprazole    Tablet  torsemide    Pertinent Labs:  @ 05:59: Na 139, BUN 39<H>, Cr 1.55<H>, BG 97, K+ 3.9, Phos 3.0, Mg 2.1, Alk Phos 147<H>, ALT/SGPT 130<H>, AST/SGOT 51<H>, HbA1c --    A1C with Estimated Average Glucose Result: 6.0 % (21 @ 03:25)          Pressure Injuries as per nursing documentation: none  Edema: +2 left leg, right leg    Estimated Needs:   [x] no change since previous assessment  [] recalculated:     Previous Nutrition Diagnosis: Altered Nutrition Related Lab Values, Overweight/Obesity.   Nutrition Diagnosis is: [x] ongoing  [] resolved [] not applicable     New Nutrition Diagnosis: [x] Not applicable    Nutrition Care Plan:  [] In Progress  [x] Achieved  [] Not applicable    Nutrition Interventions:     Education Provided: Pre Diabetes      [x] Yes:  [] No:   pt was educated on the importance of supplements to increase calorie and protein intake in light of RD's nutritional findings [x]Yes []N/A         Recommendations:         [x] Continue current diet order     [] Change diet to:      [x] continue oral nutrition supplement: Suplena 1 x daily        [] Add micronutrient supplementation:      [] Continue current micronutrient supplementation:        []Discussed recommendations with provider     [] Needed to escalate to provider     []Placed pending verification with NP/PA      []Placed pending verification with Team      []Placed sticker (malnutrition/BMI/underweight)     []Recommend swallow evaluation     [x] monitor need for diet ed reinforcement     [] Other:     Monitoring and Evaluation:   Continue to monitor nutritional intake, tolerance to diet prescription, weights, labs, skin integrity      RD remains available upon request and will follow up per protocol  Sarah Sharma MA, RD, CDN #[id number] Nutrition Follow Up Note  Patient seen for: follow up on 3 DSU    Chart reviewed, events noted.  T7 afib w/ rvr; dccv 7/15, ellen,  repeat dccv ;  SHAD (pending auth)    Source: [x] Patient       [x] EMR        [] RN        [] Family at bedside       [] Other:    -If unable to interview patient: [] Trach/Vent/BiPAP  [] Disoriented/confused/inappropriate to interview    Diet Order:   Diet, Consistent Carbohydrate/No Snacks:   Supplement Feeding Modality:  Oral  Suplena Cans or Servings Per Day:  1       Frequency:  Daily (21)    - Is current order appropriate/adequate? [x] Yes  []  No:     - PO intake meals :   [x] >75%  Adequate    [] 50-75%  Fair       [] <50%  Poor  - PO intake of supplements if pt receiving: []>75% []50% []25%   as per   []flow sheet  [x]patient  []family/aide  []PCA  []Nurse  []RD observation    - Nutrition-related concerns: pt new to pre-diabetes    pt seen by SLP []Yes [x]No    GI:  Last BM __ as per flow sheet, 3 days ago as per pt_.   Bowel Regimen? [] Yes   [x] No  RD added prunes at lunch      Weights:   Daily Weight in k.9 (), Weight in k.9 (), Weight in k.1 (), Weight in k.9 ()    Nutritionally Pertinent MEDICATIONS  (STANDING):  allopurinol  aMIOdarone    Tablet  buDESOnide    EC Capsule  lactated ringers.  pantoprazole    Tablet  torsemide    Pertinent Labs:  @ 05:59: Na 139, BUN 39<H>, Cr 1.55<H>, BG 97, K+ 3.9, Phos 3.0, Mg 2.1, Alk Phos 147<H>, ALT/SGPT 130<H>, AST/SGOT 51<H>, HbA1c --    A1C with Estimated Average Glucose Result: 6.0 % (21 @ 03:25)          Pressure Injuries as per nursing documentation: none  Edema: +2 left leg, right leg    Estimated Needs:   [x] no change since previous assessment  [] recalculated:     Previous Nutrition Diagnosis: Altered Nutrition Related Lab Values, Overweight/Obesity.   Nutrition Diagnosis is: [x] ongoing  [] resolved [] not applicable     New Nutrition Diagnosis: [x] Not applicable    Nutrition Care Plan:  [] In Progress  [x] Achieved  [] Not applicable    Nutrition Interventions:     Education Provided: Pre Diabetes      [x] Yes:  [] No:   pt was educated on the importance of supplements to increase calorie and protein intake in light of RD's nutritional findings [x]Yes []N/A         Recommendations:         [x] Continue current diet order     [] Change diet to:      [x] continue oral nutrition supplement: Suplena 1 x daily        [] Add micronutrient supplementation:      [] Continue current micronutrient supplementation:        []Discussed recommendations with provider     [] Needed to escalate to provider     []Placed pending verification with NP/PA      []Placed pending verification with Team      []Placed sticker (malnutrition/BMI/underweight)     []Recommend swallow evaluation     [x] monitor need for diet ed reinforcement     [x] Other: RD provided prunes at lunch     Monitoring and Evaluation:   Continue to monitor nutritional intake, tolerance to diet prescription, weights, labs, skin integrity      RD remains available upon request and will follow up per protocol  Sarah Sharma MA, RD, CDN #[phone]

## 2021-07-29 NOTE — PROGRESS NOTE ADULT - SUBJECTIVE AND OBJECTIVE BOX
PROGRESS NOTE:   Authored by Priscilla Castellanos MD PGY-1  Pager [phone] [facility], [id number] [address]   Please page night float  after 7PM    Patient is a 86y old  Female who presents with a chief complaint of SOB (2021 08:45)      SUBJECTIVE / OVERNIGHT EVENTS:    ADDITIONAL REVIEW OF SYSTEMS:    MEDICATIONS  (STANDING):  allopurinol 200 milliGRAM(s) Oral daily  aMIOdarone    Tablet 200 milliGRAM(s) Oral daily  amitriptyline 25 milliGRAM(s) Oral at bedtime  apixaban 2.5 milliGRAM(s) Oral every 12 hours  buDESOnide    EC Capsule 9 milliGRAM(s) Oral daily  hydrocortisone 1% Ointment 1 Application(s) Topical two times a day  lactated ringers. 1000 milliLiter(s) (100 mL/Hr) IV Continuous <Continuous>  nystatin Powder 1 Application(s) Topical two times a day  oxybutynin 5 milliGRAM(s) Oral daily  pantoprazole    Tablet 40 milliGRAM(s) Oral before breakfast    MEDICATIONS  (PRN):  acetaminophen   Tablet .. 650 milliGRAM(s) Oral every 6 hours PRN Temp greater or equal to 38.5C (101.3F), Mild Pain (1 - 3)  aluminum hydroxide/magnesium hydroxide/simethicone Suspension 30 milliLiter(s) Oral every 4 hours PRN Dyspepsia  diphenhydrAMINE 25 milliGRAM(s) Oral every 6 hours PRN Rash and/or Itching  melatonin 3 milliGRAM(s) Oral at bedtime PRN Insomnia  ondansetron Injectable 4 milliGRAM(s) IV Push every 8 hours PRN Nausea and/or Vomiting      CAPILLARY BLOOD GLUCOSE        I&O's Summary    2021 07:01  -  2021 07:00  --------------------------------------------------------  IN: 500 mL / OUT: 1250 mL / NET: -750 mL        PHYSICAL EXAM:  Vital Signs Last 24 Hrs  T(C): 36.4 (2021 04:00), Max: 36.4 (2021 21:01)  T(F): 97.5 (2021 04:00), Max: 97.5 (2021 21:01)  HR: 99 (2021 04:00) (67 - 99)  BP: 159/71 (2021 04:00) (149/69 - 159/71)  BP(mean): --  RR: 18 (2021 04:00) (18 - 18)  SpO2: 100% (2021 04:00) (99% - 100%)    CONSTITUTIONAL: NAD, well-developed  RESPIRATORY: Normal respiratory effort; lungs are clear to auscultation bilaterally  CARDIOVASCULAR: Regular rate and rhythm, normal S1 and S2, no murmur/rub/gallop; No lower extremity edema; Peripheral pulses are 2+ bilaterally  ABDOMEN: Nontender to palpation, normoactive bowel sounds, no rebound/guarding  MUSCULOSKELETAL: no clubbing or cyanosis of digits; no joint swelling or tenderness to palpation  PSYCH: A+O to person, place, and time; affect appropriate    LABS:                        8.8    10.36 )-----------( 293      ( 2021 06:43 )             29.5         139  |  108  |  39<H>  ----------------------------<  97  3.9   |  20<L>  |  1.55<H>    Ca    9.3      2021 05:59  Phos  3.0       Mg     2.1         TPro  6.6  /  Alb  3.6  /  TBili  0.4  /  DBili  x   /  AST  51<H>  /  ALT  130<H>  /  AlkPhos  147<H>            Urinalysis Basic - ( 2021 10:01 )    Color: Yellow / Appearance: Slightly Turbid / S.024 / pH: x  Gluc: x / Ketone: Negative  / Bili: Negative / Urobili: Negative   Blood: x / Protein: 100 / Nitrite: Negative   Leuk Esterase: Moderate / RBC: 2 /hpf / WBC 13 /HPF   Sq Epi: x / Non Sq Epi: 21 /hpf / Bacteria: Negative          RADIOLOGY & ADDITIONAL TESTS:  Results Reviewed:   Imaging Personally Reviewed:  Electrocardiogram Personally Reviewed:    COORDINATION OF CARE:  Care Discussed with Consultants/Other Providers [Y/N]:  Prior or Outpatient Records Reviewed [Y/N]:   PROGRESS NOTE:   Authored by Priscilla Castellanos MD PGY-1  Pager [phone] [facility], [id number] [address]   Please page night float  after 7PM    Patient is a 86y old  Female who presents with a chief complaint of SOB (2021 08:45)      SUBJECTIVE / OVERNIGHT EVENTS: No acute events overnight. Tele: Sinus 70s-90s. Patient complains of increased leg swelling and left heel pain today. Otherwise patient feels well.     ADDITIONAL REVIEW OF SYSTEMS: negative     MEDICATIONS  (STANDING):  allopurinol 200 milliGRAM(s) Oral daily  aMIOdarone    Tablet 200 milliGRAM(s) Oral daily  amitriptyline 25 milliGRAM(s) Oral at bedtime  apixaban 2.5 milliGRAM(s) Oral every 12 hours  buDESOnide    EC Capsule 9 milliGRAM(s) Oral daily  hydrocortisone 1% Ointment 1 Application(s) Topical two times a day  lactated ringers. 1000 milliLiter(s) (100 mL/Hr) IV Continuous <Continuous>  nystatin Powder 1 Application(s) Topical two times a day  oxybutynin 5 milliGRAM(s) Oral daily  pantoprazole    Tablet 40 milliGRAM(s) Oral before breakfast    MEDICATIONS  (PRN):  acetaminophen   Tablet .. 650 milliGRAM(s) Oral every 6 hours PRN Temp greater or equal to 38.5C (101.3F), Mild Pain (1 - 3)  aluminum hydroxide/magnesium hydroxide/simethicone Suspension 30 milliLiter(s) Oral every 4 hours PRN Dyspepsia  diphenhydrAMINE 25 milliGRAM(s) Oral every 6 hours PRN Rash and/or Itching  melatonin 3 milliGRAM(s) Oral at bedtime PRN Insomnia  ondansetron Injectable 4 milliGRAM(s) IV Push every 8 hours PRN Nausea and/or Vomiting      CAPILLARY BLOOD GLUCOSE        I&O's Summary    2021 07:01  -  2021 07:00  --------------------------------------------------------  IN: 500 mL / OUT: 1250 mL / NET: -750 mL        PHYSICAL EXAM:  Vital Signs Last 24 Hrs  T(C): 36.4 (2021 04:00), Max: 36.4 (2021 21:01)  T(F): 97.5 (2021 04:00), Max: 97.5 (2021 21:01)  HR: 99 (2021 04:00) (67 - 99)  BP: 159/71 (2021 04:00) (149/69 - 159/71)  BP(mean): --  RR: 18 (2021 04:00) (18 - 18)  SpO2: 100% (2021 04:00) (99% - 100%)    CONSTITUTIONAL: NAD, well-developed  RESPIRATORY: Normal respiratory effort; lungs are clear to auscultation bilaterally  CARDIOVASCULAR: Regular rate and rhythm, normal S1 and S2, no murmur/rub/gallop; bilateral pitting edema  ABDOMEN: Nontender to palpation, normoactive bowel sounds, no rebound/guarding  MUSCULOSKELETAL: no clubbing or cyanosis of digits; no joint swelling or tenderness to palpation  PSYCH: A+O to person, place, and time; affect appropriate      LABS:                        8.8    10.36 )-----------( 293      ( 2021 06:43 )             29.5     07    139  |  108  |  39<H>  ----------------------------<  97  3.9   |  20<L>  |  1.55<H>    Ca    9.3      2021 05:59  Phos  3.0       Mg     2.1         TPro  6.6  /  Alb  3.6  /  TBili  0.4  /  DBili  x   /  AST  51<H>  /  ALT  130<H>  /  AlkPhos  147<H>            Urinalysis Basic - ( 2021 10:01 )    Color: Yellow / Appearance: Slightly Turbid / S.024 / pH: x  Gluc: x / Ketone: Negative  / Bili: Negative / Urobili: Negative   Blood: x / Protein: 100 / Nitrite: Negative   Leuk Esterase: Moderate / RBC: 2 /hpf / WBC 13 /HPF   Sq Epi: x / Non Sq Epi: 21 /hpf / Bacteria: Negative          RADIOLOGY & ADDITIONAL TESTS:  Results Reviewed:   Imaging Personally Reviewed:  Electrocardiogram Personally Reviewed:    COORDINATION OF CARE:  Care Discussed with Consultants/Other Providers [Y/N]: electrophysiology, cardiology   Prior or Outpatient Records Reviewed [Y/N]:   PROGRESS NOTE:   Authored by Priscilla Castellanos MD PGY-1  Pager [phone] [facility], [phone] [address]   Please page night float  after 7PM    Patient is a 86y old  Female who presents with a chief complaint of SOB (2021 08:45)      SUBJECTIVE / OVERNIGHT EVENTS: No acute events overnight. Tele: Sinus 70s-90s. Patient complains of increased leg swelling and left heel pain today. Otherwise patient feels well.     ADDITIONAL REVIEW OF SYSTEMS: negative     MEDICATIONS  (STANDING):  allopurinol 200 milliGRAM(s) Oral daily  aMIOdarone    Tablet 200 milliGRAM(s) Oral daily  amitriptyline 25 milliGRAM(s) Oral at bedtime  apixaban 2.5 milliGRAM(s) Oral every 12 hours  buDESOnide    EC Capsule 9 milliGRAM(s) Oral daily  hydrocortisone 1% Ointment 1 Application(s) Topical two times a day  lactated ringers. 1000 milliLiter(s) (100 mL/Hr) IV Continuous <Continuous>  nystatin Powder 1 Application(s) Topical two times a day  oxybutynin 5 milliGRAM(s) Oral daily  pantoprazole    Tablet 40 milliGRAM(s) Oral before breakfast    MEDICATIONS  (PRN):  acetaminophen   Tablet .. 650 milliGRAM(s) Oral every 6 hours PRN Temp greater or equal to 38.5C (101.3F), Mild Pain (1 - 3)  aluminum hydroxide/magnesium hydroxide/simethicone Suspension 30 milliLiter(s) Oral every 4 hours PRN Dyspepsia  diphenhydrAMINE 25 milliGRAM(s) Oral every 6 hours PRN Rash and/or Itching  melatonin 3 milliGRAM(s) Oral at bedtime PRN Insomnia  ondansetron Injectable 4 milliGRAM(s) IV Push every 8 hours PRN Nausea and/or Vomiting      CAPILLARY BLOOD GLUCOSE        I&O's Summary    2021 07:01  -  2021 07:00  --------------------------------------------------------  IN: 500 mL / OUT: 1250 mL / NET: -750 mL        PHYSICAL EXAM:  Vital Signs Last 24 Hrs  T(C): 36.4 (2021 04:00), Max: 36.4 (2021 21:01)  T(F): 97.5 (2021 04:00), Max: 97.5 (2021 21:01)  HR: 99 (2021 04:00) (67 - 99)  BP: 159/71 (2021 04:00) (149/69 - 159/71)  BP(mean): --  RR: 18 (2021 04:00) (18 - 18)  SpO2: 100% (2021 04:00) (99% - 100%)    CONSTITUTIONAL: NAD, well-developed  RESPIRATORY: Normal respiratory effort; lungs are clear to auscultation bilaterally  CARDIOVASCULAR: Regular rate and rhythm, normal S1 and S2, no murmur/rub/gallop; bilateral pitting edema up to thighs  ABDOMEN: Nontender to palpation, normoactive bowel sounds, no rebound/guarding  MUSCULOSKELETAL: no clubbing or cyanosis of digits; no joint swelling or tenderness to palpation  PSYCH: A+O to person, place, and time; affect appropriate      LABS:                        8.8    10.36 )-----------( 293      ( 2021 06:43 )             29.5     07    139  |  108  |  39<H>  ----------------------------<  97  3.9   |  20<L>  |  1.55<H>    Ca    9.3      2021 05:59  Phos  3.0       Mg     2.1         TPro  6.6  /  Alb  3.6  /  TBili  0.4  /  DBili  x   /  AST  51<H>  /  ALT  130<H>  /  AlkPhos  147<H>            Urinalysis Basic - ( 2021 10:01 )    Color: Yellow / Appearance: Slightly Turbid / S.024 / pH: x  Gluc: x / Ketone: Negative  / Bili: Negative / Urobili: Negative   Blood: x / Protein: 100 / Nitrite: Negative   Leuk Esterase: Moderate / RBC: 2 /hpf / WBC 13 /HPF   Sq Epi: x / Non Sq Epi: 21 /hpf / Bacteria: Negative          RADIOLOGY & ADDITIONAL TESTS:  Results Reviewed:   Imaging Personally Reviewed:  Electrocardiogram Personally Reviewed:    COORDINATION OF CARE:  Care Discussed with Consultants/Other Providers [Y/N]: electrophysiology, cardiology   Prior or Outpatient Records Reviewed [Y/N]:

## 2021-07-29 NOTE — PROGRESS NOTE ADULT - ATTENDING COMMENTS
Recurrent persistent AF.  On amio. For repeat CV post 5 gm load.
Amio load for recurrent persistent AF.  Black boxed warning reviewed including the need to follow periodic PFTs, TFTs, LFTs, and ophthalmologic assessments.
87 yo female w/pmh HFpEF, atrial fibrillation, fatty liver, microscopic colitis, gout, severe MR s/p MVR 1997, CKD 3, admitted to CCU for a-fib with RVR, HR 160s. Restarted home meds and HR better controlled. S/p DCCV, was in SR for days, this morning 7/21 back in afib RVR. Completed tx for UTI.    -ID following, low suspicion for liver abscess, monitor off abx, CT Abd w/IV contrast not diagnostic of either abcess or hematoma.   -transaminitis - due to fatty liver, enzymes are stable. Hepatitis A/B/C neg. F/u with hepatology as outpatient. Discussed with patient importance of abstaining from drinking alcohol.   -WEN is pre-renal in etiology, resolved on IVF and holding diuretics  -afib - failed DCCV, now on metoprolol, diltiazem, amiodarone load. EP recs appreciated. cont tele monitoring. Possible DCCV vs ablation after weekend if still in afib.  -started on budesonide for microscopic colitis per outpatient GI. Miralax/senna for constipation.  -trial abdominal binder for chronic orthostasis
Amio loading.  For repeat cardioversion 7/26. continue AC
Amio loading.  plan for CV monday
Patient seen and examined. Agree with assessment and plan as outlined above.  86 year-old woman with history of mitral valve repair, paroxysmal atrial fibrillation with atrial fibrillation with rapid ventricular response in the setting of suspected RML pneumonia. Patient with recent fall at home, possible syncopal episode. Patient on IV zosyn for possible pneumonia. Rapid heart rates overnight now with heart rates in 90s-100s bpm on home AV-jeyson blockers. No evidence of heart failure on examination. Continue eliquis for anti-coagulation. Consider YULAI/cardioversion if patient does not convert to sinus rhythm with treatment of pneumonia. No further ICU indication now that heart rates better controlled. EP evaluation for possible cardioversion. Patient to be followed by Dr. Dumont while on telemetry.
85 yo female w/pmh HFpEF, atrial fibrillation, fatty liver, microscopic colitis, gout, severe MR s/p MVR 1997, CKD 3, admitted to CCU for a-fib with RVR, HR 160s. Restarted home meds and HR better controlled. S/p DCCV, now in SR.    -presented with SIRS criteria on admission but sepsis has been ruled out.  -ID following, low suspicion for liver abscess, monitor off abx, check CT Abd w/IV contrast when WEN resolves  -transaminitis - may be hepatic congestion from heart failure  -for WEN, suspect pre-renal since she has been having diarrhea, was npo previously. torsemide was discontinued. Did not receive IVF yesterday. Start IVF. Check renal US, bladder scan. Recheck BMP, urine studies. Consider nephrology consult if no improvement with IVF   -Reduce metoprolol dose if ok with cardiology
85 yo female w/pmh HFpEF, atrial fibrillation, fatty liver, microscopic colitis, gout, severe MR s/p MVR 1997, CKD 3, admitted to CCU for a-fib with RVR, HR 160s. Restarted home meds and HR better controlled. S/p DCCV, now in SR.    -presented with SIRS criteria on admission but sepsis has been ruled out.  -ID following, low suspicion for liver abscess, monitor off abx, check CT Abd w/IV contrast when WEN resolves  -transaminitis - may be hepatic congestion from heart failure  -for WEN, suspect pre-renal since she has been having diarrhea, and is getting torsemide, has been NPO for most of yesterday. Today will give 1L IVF the course of the day and re-check Cr tomorrow.
85 yo female w/pmh HFpEF, atrial fibrillation, fatty liver, microscopic colitis, gout, severe MR s/p MVR 1997, CKD 3, admitted to CCU for a-fib with RVR, HR 160s. Restarted home meds and HR better controlled. S/p DCCV, was in SR for days, this morning 7/21 back in afib RVR. Completed tx for UTI.    -ID following, low suspicion for liver abscess, monitor off abx, CT Abd w/IV contrast not diagnostic of either abcess or hematoma. Will ask IR if it is possible to send for cultures.   -transaminitis - due to fatty liver, enzymes are stable. Hepatitis A/B/C neg. F/u with hepatology as outpatient. Discussed with patient importance of abstaining from drinking alcohol.   -WEN is pre-renal in etiology, resolved on IVF and holding diuretics  -afib - failed DCCV, now on metoprolol, diltiazem, amiodarone load. EP recs appreciated. cont tele monitoring.  -started on budesonide for microscopic colitis per outpatient GI. Miralax for constipation.  -trial abdominal binder for chronic orthostasis
87 yo female w/pmh HFpEF, atrial fibrillation, fatty liver, microscopic colitis, gout, severe MR s/p MVR 1997, CKD 3, admitted to CCU for a-fib with RVR, HR 160s. Restarted home meds and HR better controlled. S/p DCCV, now in SR.    -started on ceftriaxone for UTI, f/u repeat urine cx  -ID following, low suspicion for liver abscess, monitor off abx, check CT Abd w/IV contrast when WEN resolves  -transaminitis - may be hepatic congestion from heart failure. Also has fatty liver. Hepatitis A/B/C neg. F/u abd US.  -for WEN, suspect pre-renal since she has been having diarrhea, was npo previously. torsemide was discontinued. continue IVF. Check renal US, bladder scan. WEN improving.  -Bradycardia - Reduce metoprolol dose to 50mg BID  -started on budesonide for microscopic colitis per outpatient GI. Monitor for constipation since now she has not had BM for 2 days.
86F HFpEF, atrial fibrillation, fatty liver, microscopic colitis, gout, severe MR s/p MVR 1997, CKD 3, admitted to CCU for a-fib with RVR, HR 160s. Restarted home meds and HR better controlled. S/p DCCV, was in SR for days had recurrent AF, on Amiodarone load and now s/p repeat DCCV on 7/26, in sinus rhythm since  Completed amiodarone load, continue amiodarone 200 mg PO daily  Cont renally dosed apixaban  Perihepatic collection- appreciate ID f/u- stable off abx  WEN- stable/slightly improved; cont to hold diuretics  D/C planning SHAD if no further inpatient monitoring required from EP perspective
87 yo female w/pmh HFpEF, atrial fibrillation, fatty liver, microscopic colitis, gout, severe MR s/p MVR 1997, CKD 3, admitted to CCU for a-fib with RVR, HR 160s. Restarted home meds and HR better controlled. S/p DCCV, now in SR.    -started on ceftriaxone for UTI, f/u repeat urine cx  -ID following, low suspicion for liver abscess, monitor off abx, check CT Abd w/IV contrast today, WEN has resolved back to baseline. Will give IVF before and after contrast.   -transaminitis - due to fatty liver, enzymes are stable. Hepatitis A/B/C neg. F/u with hepatology as outpatient. Discussed with patient importance of abstaining from drinking alcohol.   -WEN is pre-renal in etiology, improved on IVF and holding diuretics  -Bradycardia - Reduced metoprolol dose to 50mg BID. DC diltiazem.  -started on budesonide for microscopic colitis per outpatient GI. Monitor for constipation since now she has not had BM for 2 days.  -positive orthostatics yesterday, patient says she has had this problem before. Can try compression stockings and abdominal binder. Not hypovolemic now.
I agree with the above findings, assessment, and plan with the following additions and exceptions:     85 yo female w/ pmh HFpEF, atrial fibrillation, fatty liver, microscopic colitis, gout, severe MR s/p MVR 1997, CKD 3, admitted to CCU for a-fib with RVR, HR 160s. Restarted home meds and HR better controlled. S/p DCCV, was in SR for days but now afib again. Amio loading with plan for DCCV on 7/24.   #AF -- mgmt as above  #Perihepatic collection -- ID in agreement to monitor off abx;   #Transaminitis -- due to fatty liver, enzymes are stable. C/w trending.   #WEN is pre-renal in etiology -- additional ivf being given; monitor for volume overload terell given mod-sev AR.   #Orthostatics hypotension -- repeat tomorrow after ivf.     All consultant contribution to care greatly appreciated.   Rest of plan as above    Dr. Matty Alvarez, DO  Attending Physician  Division of Hospital Medicine  [facility]  Available via Microsoft Teams (preferred)  Also available via Pager [phone]
I agree with the above findings, assessment, and plan with the following additions and exceptions:     87 yo female w/ pmh HFpEF, atrial fibrillation, fatty liver, microscopic colitis, gout, severe MR s/p MVR 1997, CKD 3, admitted to CCU for a-fib with RVR, HR 160s. Restarted home meds and HR better controlled. S/p DCCV, was in SR for days but now afib again. Amio loading with plan for DCCV on 7/25.  #AF -- mgmt as above  #Perihepatic collection -- ID in agreement to monitor off abx;   #Transaminitis -- due to fatty liver, enzymes are stable. C/w trending.   #WEN is pre-renal in etiology -- additional ivf were given; Cr relatively stable.     #Orthostatics hypotension -- repeat today pending.     All consultant contribution to care greatly appreciated.   Rest of plan as above    Dr. Matty Alvarez, DO  Attending Physician  Division of Hospital Medicine  [facility]  Available via Microsoft Teams (preferred)  Also available via Pager [phone]
85 yo female w/pmh HFpEF, atrial fibrillation, fatty liver, microscopic colitis, gout, severe MR s/p MVR 1997, CKD 3, admitted to CCU for a-fib with RVR, HR 160s. Restarted home meds and HR better controlled. S/p DCCV, was in SR for days, this morning 7/21 back in afib RVR.    -started on ceftriaxone for UTI, per ID have DC'ed  -ID following, low suspicion for liver abscess, monitor off abx, CT Abd w/IV contrast not diagnostic of either abcess or hematoma  -transaminitis - due to fatty liver, enzymes are stable. Hepatitis A/B/C neg. F/u with hepatology as outpatient. Discussed with patient importance of abstaining from drinking alcohol.   -WEN is pre-renal in etiology, resolved on IVF and holding diuretics  -restart diltiazem, cont metoprolol for rate control of afib, re-consult EP  -started on budesonide for microscopic colitis per outpatient GI. Monitor for constipation.  -positive orthostatics yesterday, patient says she has had this problem before. Can try compression stockings and abdominal binder. Not hypovolemic now.
86F HFpEF, atrial fibrillation, fatty liver, microscopic colitis, gout, severe MR s/p MVR 1997, CKD 3, admitted to CCU for a-fib with RVR, HR 160s. Restarted home meds and HR better controlled. S/p DCCV, was in SR for days had recurrent AF, on Amiodarone load and now s/p repeat DCCV on 7/26, currently in sinus bradycardia   Completed load, now on Amiodarone 200 mg PO daily  Cont renally dosed apixaban  Perihepatic collection- appreciate ID f/u- stable off abx  WEN- recurrent post procedure, responded previously to IVF- repeat BMP today and if trending upward will resume gentle hydration
85 yo female w/pmh HFpEF, atrial fibrillation, fatty liver, microscopic colitis, gout, severe MR s/p MVR 1997, CKD 3, admitted to CCU for a-fib with RVR, HR 160s. Restarted home meds and HR better controlled. S/p DCCV, now in SR.    -patient complaining of dysuria today. check ua, urine culture. start abx if positive  -ID following, low suspicion for liver abscess, monitor off abx, check CT Abd w/IV contrast when WEN resolves  -transaminitis - may be hepatic congestion from heart failure  -for WEN, suspect pre-renal since she has been having diarrhea, was npo previously. torsemide was discontinued. continue IVF. Check renal US, bladder scan. Consider nephrology consult if no improvement with IVF   -Bradycardia - Reduce metoprolol dose if ok with cardiology
85 yo female w/pmh HFpEF, atrial fibrillation, fatty liver, microscopic colitis, gout, severe MR s/p MVR 1997, CKD 3, admitted to CCU for a-fib with RVR, HR 160s. Restarted home meds and HR better controlled.    -presented with SIRS criteria on admission but infectious etiology is not clear. Possible liver abscess. Sepsis has resolved.  -cont zosyn for fluid collection around the liver seen on CT to cover for liver abscess. Has elevated wbc count, which might also be from her betamethasone usage but last dose was last Friday. IR recs against drainage at this time  -consult ID today  -do not suspect pneumonia since CT scan is negative  -transaminitis - due to possible liver abscess?? Or maybe hepatic congestion from heart failure. Check hepatitis labs.   -f/u blood, urine cx  -plan for cardioversion today with EP, recs appreciated
86F HFpEF, atrial fibrillation, fatty liver, microscopic colitis, gout, severe MR s/p MVR 1997, CKD 3, admitted to CCU for a-fib with RVR, HR 160s. Restarted home meds and HR better controlled. S/p DCCV, was in SR for days had recurrent AF, on Amiodarone load and now s/p repeat DCCV on 7/26, in sinus rhythm since  Continue amiodarone 200 mg PO daily  Cont renally dosed apixaban  Perihepatic collection- stable off abx  WEN- improved  LE edema- resume torsemide  D/C planning SHAD pending bed- 45 minutes spent discharging the patient
86F HFpEF, atrial fibrillation, fatty liver, microscopic colitis, gout, severe MR s/p MVR 1997, CKD 3, admitted to CCU for a-fib with RVR, HR 160s. Restarted home meds and HR better controlled. S/p DCCV, was in SR for days had recurrent AF, on Amiodarone load and s/p successful DCCV today  Continue 5 gram Amiodarone load through 7/27  Perihepatic collection- per ID monitoring off abx  WEN- improved/stable

## 2021-07-29 NOTE — PROGRESS NOTE ADULT - ASSESSMENT
85 yo female w/pmh HFpEF, atrial fibrillation, fatty liver, microscopic colitis, gout, severe MR s/p MVR 1997, CKD 3, admitted to CCU for a-fib with RVR, HR 160s. Restarted home meds and HR better controlled. S/p DCCV, was in NSR, now in Afib again. Now in NSR after repeat cardioversion.  87 yo female w/pmh HFpEF, atrial fibrillation, fatty liver, microscopic colitis, gout, severe MR s/p MVR 1997, CKD 3, admitted to CCU for a-fib with RVR, HR 160s. Restarted home meds and HR better controlled. S/p DCCV, was in NSR, now in Afib again. Now in NSR after repeat cardioversion. Patient with increased leg swelling today.  85 yo female w/pmh HFpEF, atrial fibrillation, fatty liver, microscopic colitis, gout, severe MR s/p MVR 1997, CKD 3, admitted to CCU for a-fib with RVR, HR 160s. Restarted home meds and HR better controlled. S/p DCCV, was in NSR, now in Afib again. Now in NSR after repeat cardioversion. Patient with increased leg swelling today. Awaiting authorization for discharge to rehab.

## 2021-07-29 NOTE — PROGRESS NOTE ADULT - PROVIDER SPECIALTY LIST ADULT
CCU
Cardiology
Cardiology
Electrophysiology
Electrophysiology
Infectious Disease
Cardiology
Cardiology
Electrophysiology
Infectious Disease
Infectious Disease
Internal Medicine
Cardiology
Cardiology
Electrophysiology
Electrophysiology
Internal Medicine
Cardiology
Electrophysiology
Electrophysiology
Infectious Disease
Internal Medicine
Electrophysiology
Cardiology
Internal Medicine

## 2021-07-29 NOTE — PROGRESS NOTE ADULT - PROBLEM SELECTOR PLAN 6
-EF 55-60%, MVR with annuplasty ring and moderate-severe AR and TR, mild pHTN  -Likely 2/2 A fib w/ RVR   -Cardiology (patient sees Dr. Wynne as an outpatient) and Dr. Valdovinos  -TTE from 6/11 show severe pulmonary pressures  -TTE from 7/13 showed mild pulmonary pressures  -Hold home Torsemide 40mg qd given diarrhea, WEN   -BNP [id number] -Likely 2/2 CHF  -Treat as above   - monitor AST/ALT  -Hepatitis serologies negative  -Transaminitis - due to possible liver abscess vs hepatic congestion from heart failure

## 2021-07-29 NOTE — PROGRESS NOTE ADULT - ATTENDING SUPERVISION STATEMENT
ACP
Resident
ACP
Student
Resident

## 2021-07-29 NOTE — PROGRESS NOTE ADULT - PROBLEM SELECTOR PLAN 4
- CT ab/pelvis showed perihepatic fluid collection, mild hepatomegaly, diffuse fatty infiltration  - IR defer drainage, f/u o/p to monitor size and for sx, available if becomes sx  - Appreciate ID recs, will monitor off Zosyn for now  - Repeat CT A/P with contrast 7/20 showed pericapsular rim enhancing fluid collection posteriorly concerning for abscess vs hematoma  -ID and IR signed off, follow up outpatient - Follows with Dr. Young (GI)  - Has microscopic colitis and planned to start prolonged budesonide taper (started 6/28)   - Weakness and fatigue, fainting, since starting budesonide so was advised to stop  - Per Dr. Young, resume budesonide 9 mg qd  - Outpatient follow up with Dr. Young  - K>4, MG>2

## 2021-07-29 NOTE — PROGRESS NOTE ADULT - PROBLEM SELECTOR PLAN 1
-Patient found to be in A Fib w/ RVR to HR 160s on admission, Likely caused SOB  -continue with amiodarone  - repeat cardioversion successful   -now in NSR 60s-70s

## 2021-07-29 NOTE — PROGRESS NOTE ADULT - PROBLEM SELECTOR PLAN 5
-Likely 2/2 CHF  -Treat as above   - monitor AST/ALT  -Hepatitis serologies negative  -Transaminitis - due to possible liver abscess vs hepatic congestion from heart failure - CT ab/pelvis showed perihepatic fluid collection, mild hepatomegaly, diffuse fatty infiltration  - IR defer drainage, f/u o/p to monitor size and for sx, available if becomes sx  - Appreciate ID recs, will monitor off Zosyn for now  - Repeat CT A/P with contrast 7/20 showed pericapsular rim enhancing fluid collection posteriorly concerning for abscess vs hematoma  -ID and IR signed off, follow up outpatient

## 2021-07-29 NOTE — PROGRESS NOTE ADULT - SUBJECTIVE AND OBJECTIVE BOX
HPI:  Patient seen and examined at bedside on 3 DSU.  She is maintaining sinus rhythm.    Review Of Systems:           Respiratory: No shortness of breath, cough, or wheezing  Cardiovascular: No chest pain or palpitations  10 point review of systems is otherwise negative except as mentioned above        Medications:  acetaminophen   Tablet .. 650 milliGRAM(s) Oral every 6 hours PRN  allopurinol 200 milliGRAM(s) Oral daily  aluminum hydroxide/magnesium hydroxide/simethicone Suspension 30 milliLiter(s) Oral every 4 hours PRN  aMIOdarone    Tablet 200 milliGRAM(s) Oral daily  amitriptyline 25 milliGRAM(s) Oral at bedtime  apixaban 2.5 milliGRAM(s) Oral every 12 hours  buDESOnide    EC Capsule 9 milliGRAM(s) Oral daily  diphenhydrAMINE 25 milliGRAM(s) Oral every 6 hours PRN  hydrocortisone 1% Ointment 1 Application(s) Topical two times a day  lactated ringers. 1000 milliLiter(s) IV Continuous <Continuous>  melatonin 3 milliGRAM(s) Oral at bedtime PRN  nystatin Powder 1 Application(s) Topical two times a day  ondansetron Injectable 4 milliGRAM(s) IV Push every 8 hours PRN  oxybutynin 5 milliGRAM(s) Oral daily  pantoprazole    Tablet 40 milliGRAM(s) Oral before breakfast  torsemide 20 milliGRAM(s) Oral daily    PAST MEDICAL & SURGICAL HISTORY:  Insomnia    HTN (hypertension)    Hypercholesteremia    GERD (gastroesophageal reflux disease)    Carotid artery occlusion  (R)    Vitamin B 12 deficiency    Osteoporosis    Mitral valve disease    Gallstones    Choledocholithiasis    CHF (congestive heart failure)    S/P MVR (mitral valve repair)   at [facility]    Status post DACIA-BSO  1975    Hx of tonsillectomy    Abdominal hernia  repair 2007    History of lumbar laminectomy for spinal cord decompression      Papilloma of breast  s/p excision from right breast &gt;20 years ago    Bilateral cataracts  extraction w/ IOL    Varicose veins  s/p sclerotherapy bilaterally    H/O carotid endarterectomy    S/P laparoscopic cholecystectomy      Vitals:  T(C): 36.4 (21 @ 11:37), Max: 36.4 (21 @ 21:01)  HR: 96 (21 @ 11:37) (70 - 99)  BP: 143/79 (21 @ 11:37) (143/79 - 159/71)  BP(mean): --  RR: 18 (21 @ 11:37) (18 - 18)  SpO2: 99% (21 @ 11:37) (99% - 100%)  Wt(kg): --  Daily     Daily Weight in k.9 (2021 04:00)  I&O's Summary    2021 07:01  -  2021 07:00  --------------------------------------------------------  IN: 500 mL / OUT: 1250 mL / NET: -750 mL    2021 07:01  -  2021 19:18  --------------------------------------------------------  IN: 360 mL / OUT: 1500 mL / NET: -1140 mL        Physical Exam:  Appearance: Normal, well groomed, NAD  Eyes: PERRLA, EOMI, pink conjunctiva, no scleral icterus   HENT: Normal oral mucosa  Cardiovascular: RRR, S1, S2, no murmur, rub, or gallop; no edema; no JVD  Respiratory: Clear to auscultation bilaterally  Gastrointestinal: Soft, non-tender, non-distended, BS+  Musculoskeletal: No clubbing or joint deformity   Neurologic: No focal weakness  Lymphatic: No lymphadenopathy  Psychiatry: AAOx3 with appropriate mood and affect  Skin: No rashes, ecchymoses, or cyanosis                          8.8    10.36 )-----------( 293      ( 2021 06:43 )             29.5         139  |  108  |  39<H>  ----------------------------<  97  3.9   |  20<L>  |  1.55<H>    Ca    9.3      2021 05:59  Phos  3.0       Mg     2.1         TPro  6.6  /  Alb  3.6  /  TBili  0.4  /  DBili  x   /  AST  51<H>  /  ALT  130<H>  /  AlkPhos  147<H>          Tele: NSR

## 2021-07-29 NOTE — PROGRESS NOTE ADULT - PROBLEM SELECTOR PLAN 3
- Follows with Dr. Young (GI)  - Has microscopic colitis and planned to start prolonged budesonide taper (started 6/28)   - Weakness and fatigue, fainting, since starting budesonide so was advised to stop  - Per Dr. Young, resume budesonide 9 mg qd  - Outpatient follow up with Dr. Young  - K>4, MG>2 - creatinine trending down, will continue to monitor and not give fluids at this time   - Avoid nephrotoxic medications

## 2021-09-01 PROBLEM — I48.91 ATRIAL FIBRILLATION: Status: ACTIVE | Noted: 2018-03-20

## 2021-11-23 PROBLEM — R79.89 AZOTEMIA: Status: ACTIVE | Noted: 2019-10-16

## 2021-11-23 PROBLEM — N18.4 CHRONIC KIDNEY DISEASE, STAGE IV (SEVERE): Status: ACTIVE | Noted: 2019-10-23

## 2021-12-01 PROBLEM — I10 BENIGN ESSENTIAL HYPERTENSION: Status: ACTIVE | Noted: 2019-11-21

## 2021-12-01 PROBLEM — I50.32 CHRONIC DIASTOLIC CONGESTIVE HEART FAILURE: Status: ACTIVE | Noted: 2018-08-16

## 2021-12-15 PROBLEM — E03.9 HYPOTHYROIDISM, ADULT: Status: ACTIVE | Noted: 2021-01-01

## 2022-01-01 ENCOUNTER — NON-APPOINTMENT (OUTPATIENT)
Age: 87
End: 2022-01-01

## 2022-01-01 RX ORDER — APIXABAN 2.5 MG/1
2.5 TABLET, FILM COATED ORAL
Qty: 60 | Refills: 4 | Status: DISCONTINUED | COMMUNITY
Start: 2018-10-12 | End: 2022-01-01

## 2022-01-01 RX ORDER — RIVAROXABAN 15 MG/1
15 TABLET, FILM COATED ORAL
Qty: 90 | Refills: 3 | Status: ACTIVE | COMMUNITY
Start: 2022-01-01 | End: 1900-01-01

## 2022-01-13 NOTE — DISCHARGE NOTE PROVIDER - NSDCQMCOGNITION_NEU_ALL_CORE
No difficulties You were seen in the emergency department for chest pain in the setting of Covid-19.     Please follow-up with your cardiologist in the next 24-48 hours.     Please take Tylenol as prescribed on the bottles for pain control.     If you have any worsening symptoms, severe chest pain, shortness of breath, or your oxygen level is below 92%, please return to the emergency department.

## 2022-03-26 NOTE — PROVIDER CONTACT NOTE (OTHER) - ASSESSMENT
pt c/o burning on urination & frequency PAST SURGICAL HISTORY:  AICD (automatic cardioverter/defibrillator) present     H/O abdominal hysterectomy     H/O extremity bypass graft     H/O tubal ligation     History of cholecystectomy

## 2022-06-21 NOTE — DISCHARGE NOTE ADULT - REASON FOR ADMISSION
Tdap Vaccine (Tetanus, Diphtheria and Pertussis): What You Need to Know      1. Why get vaccinated?  Tetanus, diphtheria and pertussis are very serious diseases. Tdap vaccine can protect us from these diseases. And, Tdap vaccine given to pregnant women can protect  babies against pertussis..  TETANUS (Lockjaw) is rare in the [place] today. It causes painful muscle tightening and stiffness, usually all over the body.  It can lead to tightening of muscles in the head and neck so you can't open your mouth, swallow, or sometimes even breathe. Tetanus kills about 1 out of 10 people who are infected even after receiving the best medical care.  DIPHTHERIA is also rare in the [place] today. It can cause a thick coating to form in the back of the throat.  It can lead to breathing problems, heart failure, paralysis, and death.  PERTUSSIS (Whooping Cough) causes severe coughing spells, which can cause difficulty breathing, vomiting and disturbed sleep.  It can also lead to weight loss, incontinence, and rib fractures. Up to 2 in 100 adolescents and 5 in 100 adults with pertussis are hospitalized or have complications, which could include pneumonia or death.    These diseases are caused by bacteria. Diphtheria and pertussis are spread from person to person through secretions from coughing or sneezing. Tetanus enters the body through cuts, scratches, or wounds.  Before vaccines, as many as 200,000 cases of diphtheria, 200,000 cases of pertussis, and hundreds of cases of tetanus, were reported in the [place] each year. Since vaccination began, reports of cases for tetanus and diphtheria have dropped by about 99% and for pertussis by about 80%.    2. Tdap vaccine  Tdap vaccine can protect adolescents and adults from tetanus, diphtheria, and pertussis. One dose of Tdap is routinely given at age 11 or 12. People who did not get Tdap at that age should get it as soon as possible.  Tdap is especially important  ARCHER for healthcare professionals and anyone having close contact with a baby younger than 12 months.  Pregnant women should get a dose of Tdap during every pregnancy, to protect the  from pertussis. Infants are most at risk for severe, life-threatening complications from pertussis.  Another vaccine, called Td, protects against tetanus and diphtheria, but not pertussis. A Td booster should be given every 10 years. Tdap may be given as one of these boosters if you have never gotten Tdap before. Tdap may also be given after a severe cut or burn to prevent tetanus infection.  Your doctor or the person giving you the vaccine can give you more information.  Tdap may safely be given at the same time as other vaccines.    3. Some people should not get this vaccine  A person who has ever had a life-threatening allergic reaction after a previous dose of any diphtheria, tetanus or pertussis containing vaccine, OR has a severe allergy to any part of this vaccine, should not get Tdap vaccine. Tell the person giving the vaccine about any severe allergies.  Anyone who had coma or long repeated seizures within 7 days after a childhood dose of DTP or DTaP, or a previous dose of Tdap, should not get Tdap, unless a cause other than the vaccine was found. They can still get Td.  Talk to your doctor if you:  have seizures or another nervous system problem,  had severe pain or swelling after any vaccine containing diphtheria, tetanus or pertussis,  ever had a condition called Guillain-Barré Syndrome (GBS),  aren't feeling well on the day the shot is scheduled.    4. Risks  With any medicine, including vaccines, there is a chance of side effects. These are usually mild and go away on their own. Serious reactions are also possible but are rare.  Most people who get Tdap vaccine do not have any problems with it.  Mild problems following Tdap  (Did not interfere with activities)  Pain where the shot was given (about 3 in 4 adolescents or  2 in 3 adults)  Redness or swelling where the shot was given (about 1 person in 5)  Mild fever of at least 100.4°F (up to about 1 in 25 adolescents or 1 in 100 adults)  Headache (about 3 or 4 people in 10)  Tiredness (about 1 person in 3 or 4)  Nausea, vomiting, diarrhea, stomach ache (up to 1 in 4 adolescents or 1 in 10 adults)  Chills, sore joints (about 1 person in 10)  Body aches (about 1 person in 3 or 4)  Rash, swollen glands (uncommon)  Moderate problems following Tdap  (Interfered with activities, but did not require medical attention)  Pain where the shot was given (up to 1 in 5 or 6)  Redness or swelling where the shot was given (up to about 1 in 16 adolescents or 1 in 12 adults)  Fever over 102°F (about 1 in 100 adolescents or 1 in 250 adults)  Headache (about 1 in 7 adolescents or 1 in 10 adults)  Nausea, vomiting, diarrhea, stomach ache (up to 1 or 3 people in 100)  Swelling of the entire arm where the shot was given (up to about 1 in 500).  Severe problems following Tdap  (Unable to perform usual activities; required medical attention)  Swelling, severe pain, bleeding and redness in the arm where the shot was given (rare).  Problems that could happen after any vaccine:  People sometimes faint after a medical procedure, including vaccination. Sitting or lying down for about 15 minutes can help prevent fainting, and injuries caused by a fall. Tell your doctor if you feel dizzy, or have vision changes or ringing in the ears.  Some people get severe pain in the shoulder and have difficulty moving the arm where a shot was given. This happens very rarely.  Any medication can cause a severe allergic reaction. Such reactions from a vaccine are very rare, estimated at fewer than 1 in a million doses, and would happen within a few minutes to a few hours after the vaccination.  As with any medicine, there is a very remote chance of a vaccine causing a serious injury or death.  The safety of vaccines is always  being monitored. For more information, visit: www.cdc.gov/vaccinesafety/    5. What if there is a serious problem?  What should I look for?  Look for anything that concerns you, such as signs of a severe allergic reaction, very high fever, or unusual behavior.  Signs of a severe allergic reaction can include hives, swelling of the face and throat, difficulty breathing, a fast heartbeat, dizziness, and weakness. These would usually start a few minutes to a few hours after the vaccination.  What should I do?  If you think it is a severe allergic reaction or other emergency that can't wait, call 9-1-1 or get the person to the nearest hospital. Otherwise, call your doctor.  Afterward, the reaction should be reported to the Vaccine Adverse Event Reporting System (VAERS). Your doctor might file this report, or you can do it yourself through the VAERS web site at www.vaers.hhs.gov, or by calling 1-777.157.2949.  VAERS does not give medical advice.    6. The National Vaccine Injury Compensation Program  The National Vaccine Injury Compensation Program (VICP) is a federal program that was created to compensate people who may have been injured by certain vaccines.  Persons who believe they may have been injured by a vaccine can learn about the program and about filing a claim by calling 1-548.583.1699 or visiting the VICP website at www.hrsa.gov/vaccinecompensation. There is a time limit to file a claim for compensation.    7. How can I learn more?  Ask your doctor. He or she can give you the vaccine package insert or suggest other sources of information.  Call your local or state health department.  Contact the Centers for Disease Control and Prevention (CDC):  Call 1-326.500.7552 (4-405-HDB-INFO) or  Visit CDC's website at www.cdc.gov/vaccines      Vaccine Information Statement Tdap Vaccine (2/24/2015)  This information is not intended to replace advice given to you by your health care provider. Make sure you discuss any  questions you have with your health care provider.  Document Released: 06/18/2013 Document Revised: 08/05/2019 Document Reviewed: 08/05/2019  [employer] Interactive Patient Education © 2019 [employer].         ARCHER - new atrial fibrillation but patient converted to normal sinus rhythm on her own - started on Amiodarone to keep patient in normal rhythm, chronic kidney disease w/ discharge creatinine @ 1.5

## 2022-09-22 NOTE — ED ADULT NURSE NOTE - CAS TRG GEN SKIN COLOR
Normal for race
Continue Regimen: Skyrizi [id number]\\nclobetasol ointment PRN
Render In Strict Bullet Format?: No
Detail Level: Zone

## 2022-09-29 PROCEDURE — 0: CUSTOM

## 2022-11-03 NOTE — ED PROVIDER NOTE - NS ED NOTE AC HIGH RISK COUNTRIES
No Brow Lift Text: A midfrontal incision was made medially to the defect to allow access to the tissues just superior to the left eyebrow. Following careful dissection inferiorly in a supraperiosteal plane to the level of the left eyebrow, several 3-0 monocryl sutures were used to resuspend the eyebrow orbicularis oculi muscular unit to the superior frontal bone periosteum. This resulted in an appropriate reapproximation of static eyebrow symmetry and correction of the left brow ptosis.

## 2022-12-30 NOTE — PHYSICAL THERAPY INITIAL EVALUATION ADULT - PLANNED THERAPY INTERVENTIONS, PT EVAL
Take both antibiotics as directed until gone, starting today. Take tessalon perles for cough.  Take tylenol and ibuprofen for pain/fevers. Follow up with primary clinic. Antibiotics can take 3 days to see effects/improvement.   gait training/balance training/transfer training

## 2023-05-01 NOTE — ED PROVIDER NOTE - ADMIT DISPOSITION PRESENT ON ADMISSION SEPSIS
Prior Authorization Form:      DEMOGRAPHICS:                     Patient Name:  Chino Solomon  Patient :  1942            Insurance:  Payor: MEDICARE / Plan: MEDICARE PART A AND B / Product Type: *No Product type* /   Insurance ID Number:    Payer/Plan Subscr  Sex Relation Sub. Ins. ID Effective Group Num   1. MEDICARE - MEGorge Daunt  Female Self [id number]                                    [address] [address]   2.   - TRIGorge Daunt*  Female Self [id number]                                    [address]         DIAGNOSIS & PROCEDURE:                       Procedure/Operation: LAP ROBOTIC ASSISTED RIGHT INGUINAL HERNIA           CPT Code: 97903    Diagnosis:  RIGHT INGUINAL HERNIA    ICD10 Code: K40.90    Location:  [facility]    Surgeon:  Neda Hurd INFORMATION:                          Date: 2023    Time: 9:10              Anesthesia:  General                                                       Status:  Outpatient        Special Comments:         Electronically signed by Biju Hanley MA on 2023 at 3:33 PM No

## 2023-05-08 NOTE — CHART NOTE - NSCHARTNOTEFT_GEN_A_CORE
reviewed  pt  lab  work  and  pt  potassium  was  3.4,  Pt  was  seen  an  evaluated  denies  any  chest  pain,  sob  or  dizziness.  Pt  is  supplimented  with  potassium  at this  time.    Patient is a 85y old  Female who presents with a chief complaint of syncope (23 Feb 2021 16:59)      Vital Signs Last 24 Hrs  T(C): 36.6 (23 Feb 2021 22:46), Max: 36.9 (23 Feb 2021 16:50)  T(F): 97.8 (23 Feb 2021 22:46), Max: 98.4 (23 Feb 2021 16:50)  HR: 97 (23 Feb 2021 22:46) (78 - 100)  BP: 127/74 (23 Feb 2021 22:46) (94/59 - 148/84)  BP(mean): --  RR: 18 (23 Feb 2021 22:46) (18 - 19)  SpO2: 95% (23 Feb 2021 22:46) (95% - 98%)      Labs:                          11.5   9.31  )-----------( 281      ( 23 Feb 2021 06:45 )             37.2     02-23    138  |  95<L>  |  67<H>  ----------------------------<  123<H>  3.4<L>   |  29  |  1.88<H>    Ca    9.8      23 Feb 2021 17:19  Phos  3.3     02-23  Mg     2.0     02-23    TPro  7.3  /  Alb  3.8  /  TBili  0.2  /  DBili  x   /  AST  27  /  ALT  23  /  AlkPhos  156<H>  02-23        Radiology:    Physical Exam:  General: WN/WD NAD  Neurology: A&Ox3, nonfocal, DAVIS x 4  Head:  Normocephalic, atraumatic  Respiratory: CTA B/L  CV: RRR, S1S2, no murmur  Abdominal: Soft, NT, ND no palpable mass  MSK: No edema, + peripheral pulses, FROM all 4 extremity    Assessment & Plan:  HPI:  85 F PMH CHF, AF on Eliquis, MVR, ckd3 who p/w syncope.  Pt notes that she had a syncopal episode on 2/19 when she got up to walk to kitchen. +LOC, down for few seconds. Couldn't get up on own, had to call neighbor who called her son in law to help her up.  Able to ambulate afterwards without issue/pain.  No preceding chest pain. +dizziness at times. Pt notes that last week, for about 3-4 days, she was told by her cardiologist to increase her diuretics.  Ordinarily takes torsemide 40 qd, was told to start spironolactone 25 + metolazone 5 in addition. Endorses not eating/drinking as much over last few days 2/2 lost appetite.  Pt endorses prior syncopal episodes 3x in her life; once at age 16, preceded by "throat quivering", once at a wedding preceded by eoth intake, and once at her other grandson's house while standing. Pt endorses intermittent sob/chest discomfort related to her known AF/CHF, but notes she has not been feeling this preceding her syncope.  She notes her edema has resolved with her diuretics and she is not dyspneic, denies orthopnea. pt  now  with  potassium  of  3.4     (22 Feb 2021 21:26)    >  Potassium  supplemented  KDUR  40  meq  x1  >  F/U  BMP  in   the  am  (  )    >C/W  LR  at  50 ml/hr   >Follow up with Attending in AM. [Negative] : Heme/Lymph [FreeTextEntry4] : mildly enlarged thyroid on exam

## 2023-06-06 NOTE — PHYSICAL THERAPY INITIAL EVALUATION ADULT - IMPAIRMENTS CONTRIBUTING IMPAIRED BED MOBILITY, REHAB EVAL
impaired balance/decreased strength/endurance decreased/pain Topical Steroids Counseling: I discussed with the patient that prolonged use of topical steroids can result in the increased appearance of superficial blood vessels (telangiectasias), lightening (hypopigmentation) and thinning of the skin (atrophy).  Patient understands to avoid using high potency steroids in skin folds, the groin or the face.  The patient verbalized understanding of the proper use and possible adverse effects of topical steroids.  All of the patient's questions and concerns were addressed.

## 2023-07-19 NOTE — DISCHARGE NOTE ADULT - MEDICATION SUMMARY - MEDICATIONS TO STOP TAKING
Is the patient currently in the state of [place]? YES    Visit mode:VIDEO    If the visit is dropped, the patient can be reconnected by: VIDEO VISIT: Text to cell phone: [phone]    Will anyone else be joining the visit? NO      How would you like to obtain your AVS? MyChart    Are changes needed to the allergy or medication list? NO    Reason for visit: RECHECK        
I will STOP taking the medications listed below when I get home from the hospital:    Toprol- mg oral tablet, extended release  -- 1 tab(s) by mouth once a day    Lasix 40 mg oral tablet  -- 1 tab(s) by mouth once a day    Systane ophthalmic solution  -- 1 drop(s) to each affected eye once a day

## 2023-12-11 NOTE — PROGRESS NOTE ADULT - PROBLEM SELECTOR PLAN 7
Please see the attached refill request.   -Cr elevated at 1.7 on admission. At baseline  -Trend Cr   -Avoid nephrotoxic medications  -F/U infectious workup

## 2023-12-20 NOTE — PHYSICAL THERAPY INITIAL EVALUATION ADULT - PATIENT/FAMILY AGREES WITH PLAN
Disposition Note  [facility] CLINICAL DECISION  Patient: Vivien Miramontes  MRN: [id number]  : 1957  Date of Evaluation: 2023  ED Provider: INOCENCIA Martin-CNP, TIFFANY      Limitations to history: none  Independent Historian: yes  External Records Reviewed: EMR      Subjective:    Vivien Miramontes is a 66 y.o. female has undergone comprehensive diagnostic evaluation and therapeutic management in accordance with the CDU guidelines for abdominal hematoma. Based on the patient's clinical response and diagnostic information during this period of observation, it has been determined that the patient will be discharged.     The acute evaluation included:  Orders Placed This Encounter   Procedures    Blood Culture    Blood Culture    XR chest 1 view    CT angio chest for pulmonary embolism    CT abdomen pelvis w IV contrast    CBC and Auto Differential    Comprehensive Metabolic Panel    Lactate    Urinalysis with Reflex Culture and Microscopic    Lipase    Troponin Series, (0, 1 HR)    Creatinine, Serum    Urinalysis with Reflex Culture and Microscopic    Extra Urine Gray Tube    Troponin I, High Sensitivity, Initial    Troponin, High Sensitivity, 1 Hour    BLOOD GAS VENOUS FULL PANEL    Extra Tubes    Lavender Top    Potassium    CBC and Auto Differential    Basic metabolic panel    Adult diet Regular    Vital Signs    Monitor intake and output    Start Sepsis Red Timer    Telemetry Monitoring    Inpatient consult to Acute Care Surgery    Inpatient consult to Acute Care Surgery    Pulse oximetry, continuous    Send to CDU    Initiate observation status         Placed in observation at:          Past History     Past Medical History:   Diagnosis Date    Coronary artery disease     : cardiac cath with PCI, 25: cardiac cath with PCI x2 (LAD & LCx) intermediate disease of a tortuous RCA - cardiac clearance requested    Crohn's disease (CMS/HCC)     bowel perforation  s/p colostomy,  reversed 2010    Depression     Hyperlipidemia     Hypertension     IBS (irritable bowel syndrome)     Lung nodules     nonconcerning per pulm note    Myocardial infarction (CMS/HCC)     Ulcerative colitis (CMS/HCC)      Past Surgical History:   Procedure Laterality Date    ABDOMINAL HERNIA REPAIR  2018    with mesh    CARDIAC CATHETERIZATION      PCI    CARDIAC CATHETERIZATION  2023    PCI x 2 (LAD and LCx)     SECTION, LOW TRANSVERSE  1987    COLONOSCOPY      COLOSTOMY  2009    REVISION / TAKEDOWN COLOSTOMY  2010    TUBAL LIGATION  1998    UMBILICAL HERNIA REPAIR      x 2     Social History     Socioeconomic History    Marital status:      Spouse name: Not on file    Number of children: Not on file    Years of education: Not on file    Highest education level: Not on file   Occupational History    Not on file   Tobacco Use    Smoking status: Every Day     Packs/day: 0.25     Years: 50.00     Additional pack years: 0.00     Total pack years: 12.50     Types: Cigarettes    Smokeless tobacco: Never   Substance and Sexual Activity    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    Sexual activity: Not on file   Other Topics Concern    Not on file   Social History Narrative    Not on file     Social Determinants of Health     Financial Resource Strain: Low Risk  (2023)    Overall Financial Resource Strain (CARDIA)     Difficulty of Paying Living Expenses: Not hard at all   Food Insecurity: Not on file   Transportation Needs: No Transportation Needs (2023)    PRAPARE - Transportation     Lack of Transportation (Medical): No     Lack of Transportation (Non-Medical): No   Physical Activity: Not on file   Stress: Not on file   Social Connections: Not on file   Intimate Partner Violence: Not on file   Housing Stability: Low Risk  (2023)    Housing Stability Vital Sign     Unable to Pay for Housing in the Last Year:  No     Number of Places Lived in the Last Year: 1     Unstable Housing in the Last Year: No         Medications/Allergies     Previous Medications    ACETAMINOPHEN (TYLENOL) 325 MG TABLET    Take 2 tablets (650 mg) by mouth every 6 hours if needed for mild pain (1 - 3).    ASPIRIN 81 MG EC TABLET    Take 1 tablet (81 mg) by mouth once daily.    CARVEDILOL (COREG) 12.5 MG TABLET    Take 2 tablets (25 mg) by mouth twice a day.    CLOPIDOGREL (PLAVIX) 75 MG TABLET    Take 1 tablet (75 mg) by mouth once daily.    EVOLOCUMAB (REPATHA SYRINGE) 140 MG/ML INJECTION    Inject 1 mL (140 mg) under the skin every 14 (fourteen) days.    EZETIMIBE (ZETIA) 10 MG TABLET    Take 1 tablet (10 mg) by mouth once daily.    FAMOTIDINE (PEPCID) 20 MG TABLET    Take 1 tablet (20 mg) by mouth 2 times a day.    HYDROCHLOROTHIAZIDE (HYDRODIURIL) 25 MG TABLET    Take 1 tablet (25 mg) by mouth once daily.    HYDROXYZINE HCL (ATARAX) 25 MG TABLET    Take 1 tablet (25 mg) by mouth 3 times a day as needed for itching. May cause drowsiness    LISINOPRIL 40 MG TABLET    Take 1 tablet (40 mg) by mouth once daily. as directed    NITROGLYCERIN (NITROSTAT) 0.4 MG SL TABLET    Place 1 tablet (0.4 mg) under the tongue. Dissolve 1 tablet under the tongue as needed for chest pain.    ONDANSETRON ODT (ZOFRAN-ODT) 4 MG DISINTEGRATING TABLET    Take 1 tablet (4 mg) by mouth every 8 hours if needed for nausea or vomiting.    POLYETHYLENE GLYCOL (GLYCOLAX, MIRALAX) 17 GRAM PACKET    Take 17 g by mouth once daily for 14 days. Do not start before December 13, 2023.    PREGABALIN (LYRICA) 75 MG CAPSULE    Take 1 capsule (75 mg) by mouth if needed.    ROSUVASTATIN (CRESTOR) 40 MG TABLET    Take 1 tablet (40 mg) by mouth once daily at bedtime.    STELARA INJECTION    Inject 1 mL (90 mg) under the skin every 8 (eight) weeks.    VITAMIN E 180 MG (400 UNIT) CAPSULE    Take 1 capsule (400 Units) by mouth once daily.     Allergies   Allergen Reactions    Penicillins Hives          Review of Systems  All systems reviewed and otherwise negative, except as stated above in HPI.    Diagnostics reviewed by Petra Vela, APRN-CNP, DNP     Labs:  Results for orders placed or performed during the hospital encounter of 12/19/23   Blood Culture    Specimen: Peripheral Venipuncture; Blood culture   Result Value Ref Range    Blood Culture Loaded on Instrument - Culture in progress    Blood Culture    Specimen: Peripheral Venipuncture; Blood culture   Result Value Ref Range    Blood Culture Loaded on Instrument - Culture in progress    CBC and Auto Differential   Result Value Ref Range    WBC 17.4 (H) 4.4 - 11.3 x10*3/uL    nRBC 0.0 0.0 - 0.0 /100 WBCs    RBC 4.55 4.00 - 5.20 x10*6/uL    Hemoglobin 13.0 12.0 - 16.0 g/dL    Hematocrit 38.6 36.0 - 46.0 %    MCV 85 80 - 100 fL    MCH 28.6 26.0 - 34.0 pg    MCHC 33.7 32.0 - 36.0 g/dL    RDW 13.7 11.5 - 14.5 %    Platelets 485 (H) 150 - 450 x10*3/uL    Neutrophils % 84.7 40.0 - 80.0 %    Immature Granulocytes %, Automated 1.5 (H) 0.0 - 0.9 %    Lymphocytes % 7.6 13.0 - 44.0 %    Monocytes % 4.9 2.0 - 10.0 %    Eosinophils % 0.9 0.0 - 6.0 %    Basophils % 0.4 0.0 - 2.0 %    Neutrophils Absolute 14.77 (H) 1.20 - 7.70 x10*3/uL    Immature Granulocytes Absolute, Automated 0.26 0.00 - 0.70 x10*3/uL    Lymphocytes Absolute 1.33 1.20 - 4.80 x10*3/uL    Monocytes Absolute 0.86 0.10 - 1.00 x10*3/uL    Eosinophils Absolute 0.15 0.00 - 0.70 x10*3/uL    Basophils Absolute 0.07 0.00 - 0.10 x10*3/uL   Comprehensive Metabolic Panel   Result Value Ref Range    Glucose 99 74 - 99 mg/dL    Sodium 133 (L) 136 - 145 mmol/L    Potassium 5.8 (H) 3.5 - 5.3 mmol/L    Chloride 101 98 - 107 mmol/L    Bicarbonate 19 (L) 21 - 32 mmol/L    Anion Gap 19 10 - 20 mmol/L    Urea Nitrogen 31 (H) 6 - 23 mg/dL    Creatinine 1.62 (H) 0.50 - 1.05 mg/dL    eGFR 35 (L) >60 mL/min/1.73m*2    Calcium 9.6 8.6 - 10.6 mg/dL    Albumin 4.2 3.4 - 5.0 g/dL    Alkaline Phosphatase 90 33 - 136 U/L     Total Protein 8.3 (H) 6.4 - 8.2 g/dL    AST 26 9 - 39 U/L    Bilirubin, Total 1.1 0.0 - 1.2 mg/dL    ALT 19 7 - 45 U/L   Lactate   Result Value Ref Range    Lactate 1.6 0.4 - 2.0 mmol/L   Creatinine, Serum   Result Value Ref Range    Creatinine 1.79 (H) 0.50 - 1.05 mg/dL    eGFR 31 (L) >60 mL/min/1.73m*2   Urinalysis with Reflex Culture and Microscopic   Result Value Ref Range    Color, Urine Yellow Straw, Yellow    Appearance, Urine Clear Clear    Specific Gravity, Urine >1.060 (A) 1.005 - 1.035    pH, Urine 5.0 5.0, 5.5, 6.0, 6.5, 7.0, 7.5, 8.0    Protein, Urine NEGATIVE NEGATIVE mg/dL    Glucose, Urine NEGATIVE NEGATIVE mg/dL    Blood, Urine NEGATIVE NEGATIVE    Ketones, Urine NEGATIVE NEGATIVE mg/dL    Bilirubin, Urine NEGATIVE NEGATIVE    Urobilinogen, Urine <2.0 <2.0 mg/dL    Nitrite, Urine NEGATIVE NEGATIVE    Leukocyte Esterase, Urine NEGATIVE NEGATIVE   Troponin, High Sensitivity, 1 Hour   Result Value Ref Range    Troponin I, High Sensitivity 12 0 - 34 ng/L   Lavender Top   Result Value Ref Range    Extra Tube Hold for add-ons.    Potassium   Result Value Ref Range    Potassium 5.1 3.5 - 5.3 mmol/L   CBC and Auto Differential   Result Value Ref Range    WBC 10.6 4.4 - 11.3 x10*3/uL    nRBC 0.0 0.0 - 0.0 /100 WBCs    RBC 2.91 (L) 4.00 - 5.20 x10*6/uL    Hemoglobin 8.5 (L) 12.0 - 16.0 g/dL    Hematocrit 24.5 (L) 36.0 - 46.0 %    MCV 84 80 - 100 fL    MCH 29.2 26.0 - 34.0 pg    MCHC 34.7 32.0 - 36.0 g/dL    RDW 13.6 11.5 - 14.5 %    Platelets 330 150 - 450 x10*3/uL    Neutrophils % 76.3 40.0 - 80.0 %    Immature Granulocytes %, Automated 2.5 (H) 0.0 - 0.9 %    Lymphocytes % 11.8 13.0 - 44.0 %    Monocytes % 6.9 2.0 - 10.0 %    Eosinophils % 2.2 0.0 - 6.0 %    Basophils % 0.3 0.0 - 2.0 %    Neutrophils Absolute 8.09 (H) 1.20 - 7.70 x10*3/uL    Immature Granulocytes Absolute, Automated 0.27 0.00 - 0.70 x10*3/uL    Lymphocytes Absolute 1.25 1.20 - 4.80 x10*3/uL    Monocytes Absolute 0.73 0.10 - 1.00 x10*3/uL     Eosinophils Absolute 0.23 0.00 - 0.70 x10*3/uL    Basophils Absolute 0.03 0.00 - 0.10 x10*3/uL   Basic metabolic panel   Result Value Ref Range    Glucose 89 74 - 99 mg/dL    Sodium 135 (L) 136 - 145 mmol/L    Potassium 3.6 3.5 - 5.3 mmol/L    Chloride 109 (H) 98 - 107 mmol/L    Bicarbonate 19 (L) 21 - 32 mmol/L    Anion Gap 11 10 - 20 mmol/L    Urea Nitrogen 30 (H) 6 - 23 mg/dL    Creatinine 1.19 (H) 0.50 - 1.05 mg/dL    eGFR 51 (L) >60 mL/min/1.73m*2    Calcium 7.4 (L) 8.6 - 10.6 mg/dL   Blood Gas Venous Full Panel Unsolicited   Result Value Ref Range    POCT pH, Venous 7.42 7.33 - 7.43 pH    POCT pCO2, Venous 40 (L) 41 - 51 mm Hg    POCT pO2, Venous 26 (L) 35 - 45 mm Hg    POCT SO2, Venous 44 (L) 45 - 75 %    POCT Oxy Hemoglobin, Venous 42.5 (L) 45.0 - 75.0 %    POCT Hematocrit Calculated, Venous 35.0 (L) 36.0 - 46.0 %    POCT Sodium, Venous 133 (L) 136 - 145 mmol/L    POCT Potassium, Venous 5.0 3.5 - 5.3 mmol/L    POCT Chloride, Venous 102 98 - 107 mmol/L    POCT Ionized Calicum, Venous 1.18 1.10 - 1.33 mmol/L    POCT Glucose, Venous 118 (H) 74 - 99 mg/dL    POCT Lactate, Venous 1.6 0.4 - 2.0 mmol/L    POCT Base Excess, Venous 1.3 -2.0 - 3.0 mmol/L    POCT HCO3 Calculated, Venous 25.9 22.0 - 26.0 mmol/L    POCT Hemoglobin, Venous 11.6 (L) 12.0 - 16.0 g/dL    POCT Anion Gap, Venous 10.0 10.0 - 25.0 mmol/L    Patient Temperature 37.0 degrees Celsius   Blood Gas Venous Full Panel Unsolicited   Result Value Ref Range    POCT pH, Venous 7.39 7.33 - 7.43 pH    POCT pCO2, Venous 39 (L) 41 - 51 mm Hg    POCT pO2, Venous 40 35 - 45 mm Hg    POCT SO2, Venous 61 45 - 75 %    POCT Oxy Hemoglobin, Venous 59.3 45.0 - 75.0 %    POCT Hematocrit Calculated, Venous 41.0 36.0 - 46.0 %    POCT Sodium, Venous 130 (L) 136 - 145 mmol/L    POCT Potassium, Venous 4.5 3.5 - 5.3 mmol/L    POCT Chloride, Venous 102 98 - 107 mmol/L    POCT Ionized Calicum, Venous 1.11 1.10 - 1.33 mmol/L    POCT Glucose, Venous 71 (L) 74 - 99 mg/dL     POCT Lactate, Venous 0.8 0.4 - 2.0 mmol/L    POCT Base Excess, Venous -1.2 -2.0 - 3.0 mmol/L    POCT HCO3 Calculated, Venous 23.6 22.0 - 26.0 mmol/L    POCT Hemoglobin, Venous 13.6 12.0 - 16.0 g/dL    POCT Anion Gap, Venous 9.0 (L) 10.0 - 25.0 mmol/L    Patient Temperature 37.0 degrees Celsius     Radiographs:  CT angio chest for pulmonary embolism   Final Result   1. No evidence of acute pulmonary embolism to segmental level. Please   note that, assessment of subsegmental branches is limited and small   peripheral emboli are not entirely excluded.   2. Left ventricular chamber enlargement with extensive coronary   artery calcifications. Correlate with a component of ischemic heart   disease. Bibasilar opacities most likely atelectatic in nature.             1.  Incidental Finding:  A non-calcified pulmonary nodule measures   0.5 cm, likely benign. No further follow-up is required, however, if   the patient has high risk factors for primary lung malignancy,   follow-up noncontrast CT scan chest in 12 months may be obtained.   (Saurabh Griffiths et al., Guidelines for management of incidental   pulmonary nodules detected on CT images: From the Fleischner Society   2017, Radiology. 2017 Jul;284 (1):228-243.) [email or web address]        I personally reviewed the images/study and I agree with the findings   as stated above by resident physician, Joel Trammell MD. This study   was interpreted at [facility],   [place], [place].        MACRO:   Incidental Finding:  A non-calcified pulmonary nodule/multiple   non-calcified pulmonary nodules measuring less than 6 mm, likely   benign.  (**-YCF-**)        Instructions:  No further follow-up is required, however, if the   patient has high risk factors for primary lung malignancy, follow-up   noncontrast CT scan chest in 12 months may be obtained. (Saurabh   Phihojavier et al., Guidelines for management of incidental pulmonary   nodules detected on CT  images: From the Fleischner Society 2017,   Radiology. 2017 Jul;284 (1):228-243.) [email or web address]        Signed by: Bipin Woody 12/19/2023 5:07 PM   Dictation workstation:   [id number]      CT abdomen pelvis w IV contrast   Final Result   1.  Postoperative changes of ventral hernia repair with mesh. An   approximately 11.2 x 3.4 x 8.5 cm loculated hyperdense fluid   collection with layering hyperdensity, along the anterior abdominal   wall within the surgical bed extending to the left subphrenic region.   This collection was partially visualized on 12/10/2023 CT chest,   however appears more hypodense on today's scan. Findings compatible   with postoperative hematoma. No air foci noted within the collection,   however sterility can not be assessed on this exam. Follow-up to   resolution is recommended.   2. Please refer to concurrently imaged and separately dictated CTA of   the chest for intrathoracic findings.        I personally reviewed the images/study and resident's interpretation   and I agree with the findings as stated by Destiny Griffin MD (resident   radiologist). This study was analyzed and interpreted at [place], [place].        MACRO:   Gilmar Ellis discussed the significance and urgency of this critical   finding by Epic secure chat with Dr. Joel Solorzano and Dr. QYUEN STONE on 12/19/2023 at 3:11 pm.  (**-RCF-**) Findings:  See findings.        Signed by: Gilmar Ellis 12/19/2023 3:15 PM   Dictation workstation:   [id number]      XR chest 1 view   Final Result   No evidence of acute intrathoracic abnormality.        Signed by: Ion Olivares 12/19/2023 12:31 PM   Dictation workstation:   [id number]              Physical Exam     Visit Vitals  /64 (BP Location: Right arm, Patient Position: Lying)   Pulse 65   Temp 36.5 °C (97.7 °F)   Resp 16   SpO2 100%   OB Status Postmenopausal   Smoking Status Every Day       GENERAL:  The patient  appears nourished and normally developed. Vital signs as documented.   HEENT:  Head normocephalic, atraumatic, EOMs intact, PERRLA, Mucous membranes moist. Nares patent without copious rhinorrhea.  No lymphadenopathy.  PULMONARY:  Lungs are clear to auscultation, without any respiratory distress. Able to speak full sentences, no accessory muscle use  CARDIAC:   Normal rate. No murmurs, rubs or gallops  ABDOMEN:  Soft, non-distended, generalized tenderness with no guarding or rebound. , BS positive x 4 quadrants, No rebound or guarding, no peritoneal signs, no CVA tenderness, no masses or organomegaly  MUSCULOSKELETAL:   Able to ambulate, Non edematous, with no obvious deformities. Pulses intact distal  SKIN:   Good color, with no significant rashes.  No pallor.  NEURO:  No obvious neurological deficits, normal sensation and strength bilaterally.  Able to follow commands, NIH 0, CN 2-12 intact.  Psych: Appropriate mood and affect    Consultants  1) ACS       Impression and Plan    In summary, Vivien Miramontes has been cared for according to the standard [facility] Clinical Decision Unit observation protocol for Hematoma. This extended period of observation was specifically required to determine the need for hospitalization. Prior to discharge from observation, the final physical exam is documented above.     Significant events during the course of observation based on the goals of the clinical problem list include:   1) remained hemodynamically stable   2) tolerated PO   3) improvement in her FLORENCE   4) significant improvement in her symptoms     Based on the patient's condition and test results, the patient will be discharged.     Discharge Diagnosis  Hematoma    Issues Requiring Follow-Up  Has a follow up with her surgeon tomorrow morning at 1000    Discharge Meds     Your medication list        ASK your doctor about these medications        Instructions Last Dose Given Next Dose Due    acetaminophen 325 mg tablet  Commonly known as: Tylenol      Take 2 tablets (650 mg) by mouth every 6 hours if needed for mild pain (1 - 3).       aspirin 81 mg EC tablet           carvedilol 12.5 mg tablet  Commonly known as: Coreg           clopidogrel 75 mg tablet  Commonly known as: Plavix           ezetimibe 10 mg tablet  Commonly known as: Zetia           famotidine 20 mg tablet  Commonly known as: Pepcid           hydroCHLOROthiazide 25 mg tablet  Commonly known as: HYDRODiuril           hydrOXYzine HCL 25 mg tablet  Commonly known as: Atarax           lisinopril 40 mg tablet           nitroglycerin 0.4 mg SL tablet  Commonly known as: Nitrostat           ondansetron ODT 4 mg disintegrating tablet  Commonly known as: Zofran-ODT      Take 1 tablet (4 mg) by mouth every 8 hours if needed for nausea or vomiting.       polyethylene glycol 17 gram packet  Commonly known as: Glycolax, Miralax      Take 17 g by mouth once daily for 14 days. Do not start before December 13, 2023.       pregabalin 75 mg capsule  Commonly known as: Lyrica           Repatha Syringe 140 mg/mL injection  Generic drug: evolocumab           rosuvastatin 40 mg tablet  Commonly known as: Crestor           Stelara injection  Generic drug: ustekinumab           traMADol 50 mg tablet  Commonly known as: Ultram  Ask about: Should I take this medication?      Take 1 tablet (50 mg) by mouth every 6 hours if needed for severe pain (7 - 10) for up to 3 days.       vitamin E 180 mg (400 unit) capsule                    Test Results Pending At Discharge  Pending Labs       Order Current Status    Lipase Collected (12/19/23 1205)    Troponin I, High Sensitivity, Initial Collected (12/19/23 1205)    Extra Urine Gray Tube In process    Troponin Series, (0, 1 HR) In process    Urinalysis with Reflex Culture and Microscopic In process    Blood Culture Preliminary result    Blood Culture Preliminary result          CDU COURSE:  This is a 66 year old female  who was admitted to the CDU with a hematoma at the surgical site (hernia repair) , FLORENCE and nausea and vomiting.   Repeat labs this morning showed a Cr of 1.19 (improvement from 1.79), she tolerated PO quite well. She was reassessed by ACS team who recommended discharge. She has a follow up appointment with her surgeon tomorrow morning.   Remained hemodynamically stable while in the CDU.       Outpatient Follow-Up  Future Appointments   Date Time Provider Department Center   12/21/2023  3:00 PM Suresh De Guzman MD [id number] [place] [facility]   1/5/2024 10:00 AM Ping Pedro MD [id number] [facility]         Petra Vela, APRN-CNP, DNP             yes

## 2024-04-17 NOTE — PROGRESS NOTE ADULT - PROBLEM SELECTOR PROBLEM 8
GASTROENTEROLOGY Preop H+P NOTE    Patient: Nery Yeung  : 1972  MRN: [id number]  PCP: Hoang Cardenas MD    Date of Consult: 2024    Consulting Provider: Giovany Vanessa MD                                 ------------------------------------------------------------------------------------------------------------    Review of Systems  x13 otherwise negative except for above  Pertinent items are noted in HPI. All other systems reviewed and negative.    PMH:  Patient Active Problem List   Diagnosis    Dyschromia    Alopecia    Patient denies medical problems    Right sided sciatica    Sacroiliac dysfunction    Hypertension     Past Surgical History:   Procedure Laterality Date    Eye surgery      Laparoscopic cholecystectomy         FMH:  Family History   Problem Relation Age of Onset    Hyperlipidemia Mother     Diabetes Mother     Hyperlipidemia Father     Hypertension Father        SH:  Social History     Tobacco Use    Smoking status: Never    Smokeless tobacco: Never   Substance Use Topics    Alcohol use: No    Drug use: No       Allergies:  ALLERGIES:  No Known Allergies    Home Medications:  Current Outpatient Medications   Medication Sig    Sodium Sulfate-Mag Sulfate-KCl 4108-826-746 MG Tab Take 12 tablets by mouth as directed. See Colonoscopy Instructions.    losartan (COZAAR) 25 MG tablet Take 1 tablet by mouth daily.     No current facility-administered medications for this encounter.       Physical Exam  Vitals: Visit Vitals  LMP 2023 (Approximate)      A+Ox3, cooperative, nad  No LAD  ctab  rrr  Soft, +bs, nml pitch, no hsm  Trace edema  Neuro normal attention span/concentration  Psych normal mood and affect  Skin normal with no redness or rashes  MSK no obvious deformities    Lab Studies  Reviewed relevant and available data  Recent Labs   Lab 23  1153   WBC 6.0   HGB 13.3   HCT 38.7      MCV 87.0     Recent Labs   Lab 23  1153   Sodium 138   Potassium 3.7    Chloride 100   Carbon Dioxide 27   BUN 11   Creatinine 0.43*   Calcium 9.0     No results for input(s): \"PT\", \"INR\", \"PTT\" in the last 8765 hours.  Recent Labs   Lab 11/27/23  1153   Albumin 3.9   Protein, Total 7.6     No results found for: \"HCV\"  No results found for: \"AIGM\", \"HSAB\", \"HEPCM\"  No results for input(s): \"CHINA\", \"SMA\", \"LKM\", \"AMITO\" in the last 8765 hours.  No results for input(s): \"IRON\", \"TIBC\", \"FERR\", \"ALYSSA\", \"VB12\" in the last 8765 hours.  No results for input(s): \"TSH\", \"A1AT\", \"CERUL\", \"COPPER\", \"AFP\", \"\", \"CEA\", \"RESR\", \"CRP\" in the last 8765 hours.    Radiology Exams  Reviewed    No results found.  A/P:  This is a 51 year old female being seen for hx of htn, clbp, champ here for screening    -colon with MAC. I have explained the procedure to the patient. Complications including depression in breathing, bleeding or continued bleeding, missed polyps from either prep/spasm/folds, perforation that could require surgery, IV abx, ICU stay, and death were explained to the patient. Patient understands and agrees to proceed with the procedure.    ASA:2    Pt is stable and cleared to proceed in the ASC with monitored anesthesia care.    Electronically Signed by:  Giovany Vanessa MD  4/17/2024      Hypercholesteremia

## 2024-07-11 NOTE — DISCHARGE NOTE NURSING/CASE MANAGEMENT/SOCIAL WORK - [facility]_GEN_ALL_CORE
Rx Refill Note  Requested Prescriptions     Pending Prescriptions Disp Refills    estrogens, conjugated,-methyltestosterone (EEMT,COVARYX) 1.25-2.5 MG per tablet 30 tablet 1     Sig: Take 1 tablet by mouth Daily.      Last office visit with prescribing clinician: 1/19/2024   Last telemedicine visit with prescribing clinician: Visit date not found   Next office visit with prescribing clinician: Visit date not found                         Would you like a call back once the refill request has been completed: [] Yes [] No    If the office needs to give you a call back, can they leave a voicemail: [] Yes [] No    Edie Gates MA  07/11/24, 10:23 EDT    [facility]

## 2025-05-01 NOTE — PROGRESS NOTE ADULT - PROBLEM SELECTOR PROBLEM 4
R/O WEN (acute kidney injury)
[Negative] : Heme/Lymph